# Patient Record
Sex: MALE | Race: WHITE | NOT HISPANIC OR LATINO | Employment: FULL TIME | URBAN - METROPOLITAN AREA
[De-identification: names, ages, dates, MRNs, and addresses within clinical notes are randomized per-mention and may not be internally consistent; named-entity substitution may affect disease eponyms.]

---

## 2017-02-08 ENCOUNTER — HISTORICAL (OUTPATIENT)
Dept: ADMINISTRATIVE | Facility: HOSPITAL | Age: 67
End: 2017-02-08

## 2017-02-21 ENCOUNTER — HISTORICAL (OUTPATIENT)
Dept: ADMINISTRATIVE | Facility: HOSPITAL | Age: 67
End: 2017-02-21

## 2017-03-14 ENCOUNTER — HISTORICAL (OUTPATIENT)
Dept: LAB | Facility: HOSPITAL | Age: 67
End: 2017-03-14

## 2017-03-30 ENCOUNTER — HOSPITAL ENCOUNTER (OUTPATIENT)
Dept: MEDSURG UNIT | Facility: HOSPITAL | Age: 67
End: 2017-03-31
Attending: SURGERY | Admitting: SURGERY

## 2017-08-14 ENCOUNTER — HISTORICAL (OUTPATIENT)
Dept: ADMINISTRATIVE | Facility: HOSPITAL | Age: 67
End: 2017-08-14

## 2017-08-14 LAB
ABS NEUT (OLG): 3.89 X10(3)/MCL (ref 2.1–9.2)
ALBUMIN SERPL-MCNC: 3.3 GM/DL (ref 3.4–5)
ALBUMIN/GLOB SERPL: 1.3 {RATIO}
ALP SERPL-CCNC: 79 UNIT/L (ref 50–136)
ALT SERPL-CCNC: 21 UNIT/L (ref 12–78)
AST SERPL-CCNC: 12 UNIT/L (ref 15–37)
BASOPHILS # BLD AUTO: 0 X10(3)/MCL (ref 0–0.2)
BASOPHILS NFR BLD AUTO: 1 %
BILIRUB SERPL-MCNC: 0.3 MG/DL (ref 0.2–1)
BILIRUBIN DIRECT+TOT PNL SERPL-MCNC: 0.1 MG/DL (ref 0–0.2)
BILIRUBIN DIRECT+TOT PNL SERPL-MCNC: 0.2 MG/DL (ref 0–0.8)
BUN SERPL-MCNC: 11 MG/DL (ref 7–18)
CALCIUM SERPL-MCNC: 8.7 MG/DL (ref 8.5–10.1)
CHLORIDE SERPL-SCNC: 101 MMOL/L (ref 98–107)
CHOLEST SERPL-MCNC: 149 MG/DL (ref 0–200)
CHOLEST/HDLC SERPL: 3.6 {RATIO} (ref 0–5)
CO2 SERPL-SCNC: 35 MMOL/L (ref 21–32)
CREAT SERPL-MCNC: 0.87 MG/DL (ref 0.7–1.3)
EOSINOPHIL # BLD AUTO: 0.4 X10(3)/MCL (ref 0–0.9)
EOSINOPHIL NFR BLD AUTO: 6 %
ERYTHROCYTE [DISTWIDTH] IN BLOOD BY AUTOMATED COUNT: 13.1 % (ref 11.5–17)
GLOBULIN SER-MCNC: 2.6 GM/DL (ref 2.4–3.5)
GLUCOSE SERPL-MCNC: 86 MG/DL (ref 74–106)
HCT VFR BLD AUTO: 46.8 % (ref 42–52)
HDLC SERPL-MCNC: 41 MG/DL (ref 35–60)
HGB BLD-MCNC: 14.8 GM/DL (ref 14–18)
LDLC SERPL CALC-MCNC: 93 MG/DL (ref 0–129)
LYMPHOCYTES # BLD AUTO: 0.8 X10(3)/MCL (ref 0.6–4.6)
LYMPHOCYTES NFR BLD AUTO: 14 %
MCH RBC QN AUTO: 29.4 PG (ref 27–31)
MCHC RBC AUTO-ENTMCNC: 31.6 GM/DL (ref 33–36)
MCV RBC AUTO: 93 FL (ref 80–94)
MONOCYTES # BLD AUTO: 0.6 X10(3)/MCL (ref 0.1–1.3)
MONOCYTES NFR BLD AUTO: 10 %
NEUTROPHILS # BLD AUTO: 3.89 X10(3)/MCL (ref 2.1–9.2)
NEUTROPHILS NFR BLD AUTO: 68 %
PLATELET # BLD AUTO: 144 X10(3)/MCL (ref 130–400)
PMV BLD AUTO: 10.3 FL (ref 9.4–12.4)
POTASSIUM SERPL-SCNC: 4 MMOL/L (ref 3.5–5.1)
PROT SERPL-MCNC: 5.9 GM/DL (ref 6.4–8.2)
PSA SERPL-MCNC: 0.88 NG/ML (ref 0–4)
RBC # BLD AUTO: 5.03 X10(6)/MCL (ref 4.7–6.1)
SODIUM SERPL-SCNC: 141 MMOL/L (ref 136–145)
TESTOST SERPL-MCNC: 247 NG/DL (ref 241–827)
TRIGL SERPL-MCNC: 75 MG/DL (ref 30–150)
VLDLC SERPL CALC-MCNC: 15 MG/DL
WBC # SPEC AUTO: 5.7 X10(3)/MCL (ref 4.5–11.5)

## 2017-08-21 ENCOUNTER — HISTORICAL (OUTPATIENT)
Dept: LAB | Facility: HOSPITAL | Age: 67
End: 2017-08-21

## 2017-08-21 LAB
APPEARANCE, UA: CLEAR
BACTERIA SPEC CULT: NORMAL /HPF
BILIRUB UR QL STRIP: NEGATIVE
BNP BLD-MCNC: 16 PG/ML (ref 0–125)
COLOR UR: YELLOW
GLUCOSE (UA): NEGATIVE
HGB UR QL STRIP: NEGATIVE
KETONES UR QL STRIP: NEGATIVE
LEUKOCYTE ESTERASE UR QL STRIP: NEGATIVE
NITRITE UR QL STRIP: NEGATIVE
PH UR STRIP: 6 [PH] (ref 5–9)
PROT UR QL STRIP: NEGATIVE
RBC #/AREA URNS HPF: NORMAL /[HPF]
SP GR UR STRIP: 1.02 (ref 1–1.03)
SQUAMOUS EPITHELIAL, UA: NORMAL
TSH SERPL-ACNC: 1.09 MIU/ML (ref 0.36–3.74)
UROBILINOGEN UR STRIP-ACNC: 0.2
WBC #/AREA URNS HPF: NORMAL /HPF

## 2017-09-12 ENCOUNTER — HISTORICAL (OUTPATIENT)
Dept: ADMINISTRATIVE | Facility: HOSPITAL | Age: 67
End: 2017-09-12

## 2018-01-29 ENCOUNTER — HISTORICAL (OUTPATIENT)
Dept: ADMINISTRATIVE | Facility: HOSPITAL | Age: 68
End: 2018-01-29

## 2018-01-29 LAB
ABS NEUT (OLG): 4.47 X10(3)/MCL (ref 2.1–9.2)
ALBUMIN SERPL-MCNC: 3.5 GM/DL (ref 3.4–5)
ALBUMIN/GLOB SERPL: 1.2 RATIO (ref 1.1–2)
ALP SERPL-CCNC: 67 UNIT/L (ref 50–136)
ALT SERPL-CCNC: 29 UNIT/L (ref 12–78)
AST SERPL-CCNC: 19 UNIT/L (ref 15–37)
BASOPHILS # BLD AUTO: 0 X10(3)/MCL (ref 0–0.2)
BASOPHILS NFR BLD AUTO: 1 %
BILIRUB SERPL-MCNC: 0.5 MG/DL (ref 0.2–1)
BILIRUBIN DIRECT+TOT PNL SERPL-MCNC: 0.1 MG/DL (ref 0–0.5)
BILIRUBIN DIRECT+TOT PNL SERPL-MCNC: 0.4 MG/DL (ref 0–0.8)
BUN SERPL-MCNC: 13 MG/DL (ref 7–18)
CALCIUM SERPL-MCNC: 8.6 MG/DL (ref 8.5–10.1)
CHLORIDE SERPL-SCNC: 100 MMOL/L (ref 98–107)
CHOLEST SERPL-MCNC: 167 MG/DL (ref 0–200)
CHOLEST/HDLC SERPL: 3.5 {RATIO} (ref 0–5)
CO2 SERPL-SCNC: 35 MMOL/L (ref 21–32)
CREAT SERPL-MCNC: 0.78 MG/DL (ref 0.7–1.3)
EOSINOPHIL # BLD AUTO: 0.4 X10(3)/MCL (ref 0–0.9)
EOSINOPHIL NFR BLD AUTO: 6 %
ERYTHROCYTE [DISTWIDTH] IN BLOOD BY AUTOMATED COUNT: 13.4 % (ref 11.5–17)
GLOBULIN SER-MCNC: 2.9 GM/DL (ref 2.4–3.5)
GLUCOSE SERPL-MCNC: 91 MG/DL (ref 74–106)
HCT VFR BLD AUTO: 49 % (ref 42–52)
HDLC SERPL-MCNC: 48 MG/DL (ref 35–60)
HGB BLD-MCNC: 15.8 GM/DL (ref 14–18)
LDLC SERPL CALC-MCNC: 105 MG/DL (ref 0–129)
LYMPHOCYTES # BLD AUTO: 1 X10(3)/MCL (ref 0.6–4.6)
LYMPHOCYTES NFR BLD AUTO: 15 %
MCH RBC QN AUTO: 29.7 PG (ref 27–31)
MCHC RBC AUTO-ENTMCNC: 32.2 GM/DL (ref 33–36)
MCV RBC AUTO: 92.1 FL (ref 80–94)
MONOCYTES # BLD AUTO: 0.6 X10(3)/MCL (ref 0.1–1.3)
MONOCYTES NFR BLD AUTO: 9 %
NEUTROPHILS # BLD AUTO: 4.47 X10(3)/MCL (ref 1.4–7.9)
NEUTROPHILS NFR BLD AUTO: 70 %
PLATELET # BLD AUTO: 156 X10(3)/MCL (ref 130–400)
PMV BLD AUTO: 10.3 FL (ref 9.4–12.4)
POTASSIUM SERPL-SCNC: 4 MMOL/L (ref 3.5–5.1)
PROT SERPL-MCNC: 6.4 GM/DL (ref 6.4–8.2)
RBC # BLD AUTO: 5.32 X10(6)/MCL (ref 4.7–6.1)
SODIUM SERPL-SCNC: 141 MMOL/L (ref 136–145)
TESTOST SERPL-MCNC: 318.7 NG/DL (ref 241–827)
TRIGL SERPL-MCNC: 71 MG/DL (ref 30–150)
VLDLC SERPL CALC-MCNC: 14 MG/DL
WBC # SPEC AUTO: 6.4 X10(3)/MCL (ref 4.5–11.5)

## 2018-07-31 ENCOUNTER — HISTORICAL (OUTPATIENT)
Dept: ADMINISTRATIVE | Facility: HOSPITAL | Age: 68
End: 2018-07-31

## 2018-07-31 LAB
ABS NEUT (OLG): 5.09 X10(3)/MCL (ref 2.1–9.2)
ALBUMIN SERPL-MCNC: 3.5 GM/DL (ref 3.4–5)
ALBUMIN/GLOB SERPL: 1.2 RATIO (ref 1.1–2)
ALP SERPL-CCNC: 57 UNIT/L (ref 50–136)
ALT SERPL-CCNC: 26 UNIT/L (ref 12–78)
AST SERPL-CCNC: 19 UNIT/L (ref 15–37)
BASOPHILS # BLD AUTO: 0 X10(3)/MCL (ref 0–0.2)
BASOPHILS NFR BLD AUTO: 1 %
BILIRUB SERPL-MCNC: 0.3 MG/DL (ref 0.2–1)
BILIRUBIN DIRECT+TOT PNL SERPL-MCNC: 0.1 MG/DL (ref 0–0.5)
BILIRUBIN DIRECT+TOT PNL SERPL-MCNC: 0.2 MG/DL (ref 0–0.8)
BUN SERPL-MCNC: 20 MG/DL (ref 7–18)
CALCIUM SERPL-MCNC: 8.9 MG/DL (ref 8.5–10.1)
CHLORIDE SERPL-SCNC: 103 MMOL/L (ref 98–107)
CHOLEST SERPL-MCNC: 165 MG/DL (ref 0–200)
CHOLEST/HDLC SERPL: 4.1 {RATIO} (ref 0–5)
CO2 SERPL-SCNC: 32 MMOL/L (ref 21–32)
CREAT SERPL-MCNC: 0.91 MG/DL (ref 0.7–1.3)
EOSINOPHIL # BLD AUTO: 0.4 X10(3)/MCL (ref 0–0.9)
EOSINOPHIL NFR BLD AUTO: 5 %
ERYTHROCYTE [DISTWIDTH] IN BLOOD BY AUTOMATED COUNT: 13.5 % (ref 11.5–17)
GLOBULIN SER-MCNC: 2.8 GM/DL (ref 2.4–3.5)
GLUCOSE SERPL-MCNC: 89 MG/DL (ref 74–106)
HCT VFR BLD AUTO: 48.4 % (ref 42–52)
HDLC SERPL-MCNC: 40 MG/DL (ref 35–60)
HGB BLD-MCNC: 15.5 GM/DL (ref 14–18)
LDLC SERPL CALC-MCNC: 112 MG/DL (ref 0–129)
LYMPHOCYTES # BLD AUTO: 0.8 X10(3)/MCL (ref 0.6–4.6)
LYMPHOCYTES NFR BLD AUTO: 11 %
MCH RBC QN AUTO: 29.9 PG (ref 27–31)
MCHC RBC AUTO-ENTMCNC: 32 GM/DL (ref 33–36)
MCV RBC AUTO: 93.3 FL (ref 80–94)
MONOCYTES # BLD AUTO: 0.6 X10(3)/MCL (ref 0.1–1.3)
MONOCYTES NFR BLD AUTO: 9 %
NEUTROPHILS # BLD AUTO: 5.09 X10(3)/MCL (ref 2.1–9.2)
NEUTROPHILS NFR BLD AUTO: 74 %
PLATELET # BLD AUTO: 134 X10(3)/MCL (ref 130–400)
PMV BLD AUTO: 11.2 FL (ref 9.4–12.4)
POTASSIUM SERPL-SCNC: 4.1 MMOL/L (ref 3.5–5.1)
PROT SERPL-MCNC: 6.3 GM/DL (ref 6.4–8.2)
PSA SERPL-MCNC: 1.05 NG/ML (ref 0–4)
RBC # BLD AUTO: 5.19 X10(6)/MCL (ref 4.7–6.1)
SODIUM SERPL-SCNC: 143 MMOL/L (ref 136–145)
TESTOST SERPL-MCNC: 516 NG/DL (ref 241–827)
TRIGL SERPL-MCNC: 66 MG/DL (ref 30–150)
VLDLC SERPL CALC-MCNC: 13 MG/DL
WBC # SPEC AUTO: 6.9 X10(3)/MCL (ref 4.5–11.5)

## 2018-08-16 ENCOUNTER — HISTORICAL (OUTPATIENT)
Dept: LAB | Facility: HOSPITAL | Age: 68
End: 2018-08-16

## 2018-08-16 LAB
APPEARANCE, UA: CLEAR
BACTERIA SPEC CULT: ABNORMAL /HPF
BILIRUB UR QL STRIP: ABNORMAL
COLOR UR: ABNORMAL
GLUCOSE (UA): NEGATIVE
HGB UR QL STRIP: NEGATIVE
KETONES UR QL STRIP: NEGATIVE
LEUKOCYTE ESTERASE UR QL STRIP: ABNORMAL
NITRITE UR QL STRIP: NEGATIVE
PH UR STRIP: 5.5 [PH] (ref 5–9)
PROT UR QL STRIP: ABNORMAL
RBC #/AREA URNS HPF: ABNORMAL /[HPF]
SP GR UR STRIP: 1.03 (ref 1–1.03)
SQUAMOUS EPITHELIAL, UA: ABNORMAL
UROBILINOGEN UR STRIP-ACNC: 1
WBC #/AREA URNS HPF: 47 /HPF (ref 0–3)

## 2018-08-21 ENCOUNTER — HISTORICAL (OUTPATIENT)
Dept: CARDIOLOGY | Facility: HOSPITAL | Age: 68
End: 2018-08-21

## 2018-09-18 ENCOUNTER — HISTORICAL (OUTPATIENT)
Dept: ADMINISTRATIVE | Facility: HOSPITAL | Age: 68
End: 2018-09-18

## 2019-01-29 ENCOUNTER — HISTORICAL (OUTPATIENT)
Dept: ADMINISTRATIVE | Facility: HOSPITAL | Age: 69
End: 2019-01-29

## 2019-01-29 LAB
ABS NEUT (OLG): 4.94 X10(3)/MCL (ref 2.1–9.2)
ALBUMIN SERPL-MCNC: 3.4 GM/DL (ref 3.4–5)
ALBUMIN/GLOB SERPL: 1.3 RATIO (ref 1.1–2)
ALP SERPL-CCNC: 58 UNIT/L (ref 50–136)
ALT SERPL-CCNC: 28 UNIT/L (ref 12–78)
AST SERPL-CCNC: 19 UNIT/L (ref 15–37)
BASOPHILS # BLD AUTO: 0 X10(3)/MCL (ref 0–0.2)
BASOPHILS NFR BLD AUTO: 0 %
BILIRUB SERPL-MCNC: 0.4 MG/DL (ref 0.2–1)
BILIRUBIN DIRECT+TOT PNL SERPL-MCNC: 0.1 MG/DL (ref 0–0.5)
BILIRUBIN DIRECT+TOT PNL SERPL-MCNC: 0.3 MG/DL (ref 0–0.8)
BUN SERPL-MCNC: 22 MG/DL (ref 7–18)
CALCIUM SERPL-MCNC: 8.5 MG/DL (ref 8.5–10.1)
CHLORIDE SERPL-SCNC: 102 MMOL/L (ref 98–107)
CHOLEST SERPL-MCNC: 162 MG/DL (ref 0–200)
CHOLEST/HDLC SERPL: 4 {RATIO} (ref 0–5)
CO2 SERPL-SCNC: 33 MMOL/L (ref 21–32)
CREAT SERPL-MCNC: 0.85 MG/DL (ref 0.7–1.3)
EOSINOPHIL # BLD AUTO: 0.4 X10(3)/MCL (ref 0–0.9)
EOSINOPHIL NFR BLD AUTO: 5 %
ERYTHROCYTE [DISTWIDTH] IN BLOOD BY AUTOMATED COUNT: 13.2 % (ref 11.5–17)
GLOBULIN SER-MCNC: 2.6 GM/DL (ref 2.4–3.5)
GLUCOSE SERPL-MCNC: 93 MG/DL (ref 74–106)
HCT VFR BLD AUTO: 48.1 % (ref 42–52)
HDLC SERPL-MCNC: 41 MG/DL (ref 35–60)
HGB BLD-MCNC: 15.2 GM/DL (ref 14–18)
LDLC SERPL CALC-MCNC: 107 MG/DL (ref 0–129)
LYMPHOCYTES # BLD AUTO: 0.7 X10(3)/MCL (ref 0.6–4.6)
LYMPHOCYTES NFR BLD AUTO: 11 %
MCH RBC QN AUTO: 29.8 PG (ref 27–31)
MCHC RBC AUTO-ENTMCNC: 31.6 GM/DL (ref 33–36)
MCV RBC AUTO: 94.3 FL (ref 80–94)
MONOCYTES # BLD AUTO: 0.6 X10(3)/MCL (ref 0.1–1.3)
MONOCYTES NFR BLD AUTO: 9 %
NEUTROPHILS # BLD AUTO: 4.94 X10(3)/MCL (ref 2.1–9.2)
NEUTROPHILS NFR BLD AUTO: 74 %
PLATELET # BLD AUTO: 129 X10(3)/MCL (ref 130–400)
PMV BLD AUTO: 10.3 FL (ref 9.4–12.4)
POTASSIUM SERPL-SCNC: 3.8 MMOL/L (ref 3.5–5.1)
PROT SERPL-MCNC: 6 GM/DL (ref 6.4–8.2)
RBC # BLD AUTO: 5.1 X10(6)/MCL (ref 4.7–6.1)
SODIUM SERPL-SCNC: 140 MMOL/L (ref 136–145)
TRIGL SERPL-MCNC: 72 MG/DL (ref 30–150)
VLDLC SERPL CALC-MCNC: 14 MG/DL
WBC # SPEC AUTO: 6.7 X10(3)/MCL (ref 4.5–11.5)

## 2019-02-05 ENCOUNTER — HISTORICAL (OUTPATIENT)
Dept: ADMINISTRATIVE | Facility: HOSPITAL | Age: 69
End: 2019-02-05

## 2019-02-19 ENCOUNTER — HISTORICAL (OUTPATIENT)
Dept: ADMINISTRATIVE | Facility: HOSPITAL | Age: 69
End: 2019-02-19

## 2019-06-05 LAB — NONINV COLON CA DNA+OCC BLD SCRN STL QL: NEGATIVE

## 2019-07-10 ENCOUNTER — HISTORICAL (OUTPATIENT)
Dept: ADMINISTRATIVE | Facility: HOSPITAL | Age: 69
End: 2019-07-10

## 2019-07-30 ENCOUNTER — HISTORICAL (OUTPATIENT)
Dept: ADMINISTRATIVE | Facility: HOSPITAL | Age: 69
End: 2019-07-30

## 2019-07-30 LAB
ABS NEUT (OLG): 3.59 X10(3)/MCL (ref 2.1–9.2)
ALBUMIN SERPL-MCNC: 3.2 GM/DL (ref 3.4–5)
ALBUMIN/GLOB SERPL: 1.1 RATIO (ref 1.1–2)
ALP SERPL-CCNC: 64 UNIT/L (ref 50–136)
ALT SERPL-CCNC: 24 UNIT/L (ref 12–78)
AST SERPL-CCNC: 15 UNIT/L (ref 15–37)
BASOPHILS # BLD AUTO: 0 X10(3)/MCL (ref 0–0.2)
BASOPHILS NFR BLD AUTO: 1 %
BILIRUB SERPL-MCNC: 0.4 MG/DL (ref 0.2–1)
BILIRUBIN DIRECT+TOT PNL SERPL-MCNC: 0.1 MG/DL (ref 0–0.5)
BILIRUBIN DIRECT+TOT PNL SERPL-MCNC: 0.3 MG/DL (ref 0–0.8)
BUN SERPL-MCNC: 18 MG/DL (ref 7–18)
CALCIUM SERPL-MCNC: 8.6 MG/DL (ref 8.5–10.1)
CHLORIDE SERPL-SCNC: 103 MMOL/L (ref 98–107)
CHOLEST SERPL-MCNC: 158 MG/DL (ref 0–200)
CHOLEST/HDLC SERPL: 3.7 {RATIO} (ref 0–5)
CO2 SERPL-SCNC: 34 MMOL/L (ref 21–32)
CREAT SERPL-MCNC: 0.81 MG/DL (ref 0.7–1.3)
EOSINOPHIL # BLD AUTO: 0.4 X10(3)/MCL (ref 0–0.9)
EOSINOPHIL NFR BLD AUTO: 7 %
ERYTHROCYTE [DISTWIDTH] IN BLOOD BY AUTOMATED COUNT: 13.3 % (ref 11.5–17)
GLOBULIN SER-MCNC: 2.8 GM/DL (ref 2.4–3.5)
GLUCOSE SERPL-MCNC: 86 MG/DL (ref 74–106)
HCT VFR BLD AUTO: 49.1 % (ref 42–52)
HDLC SERPL-MCNC: 43 MG/DL (ref 35–60)
HGB BLD-MCNC: 15.4 GM/DL (ref 14–18)
LDLC SERPL CALC-MCNC: 103 MG/DL (ref 0–129)
LYMPHOCYTES # BLD AUTO: 0.7 X10(3)/MCL (ref 0.6–4.6)
LYMPHOCYTES NFR BLD AUTO: 14 %
MCH RBC QN AUTO: 30 PG (ref 27–31)
MCHC RBC AUTO-ENTMCNC: 31.4 GM/DL (ref 33–36)
MCV RBC AUTO: 95.5 FL (ref 80–94)
MONOCYTES # BLD AUTO: 0.6 X10(3)/MCL (ref 0.1–1.3)
MONOCYTES NFR BLD AUTO: 10 %
NEUTROPHILS # BLD AUTO: 3.59 X10(3)/MCL (ref 2.1–9.2)
NEUTROPHILS NFR BLD AUTO: 68 %
PLATELET # BLD AUTO: 161 X10(3)/MCL (ref 130–400)
PMV BLD AUTO: 10.6 FL (ref 9.4–12.4)
POTASSIUM SERPL-SCNC: 3.9 MMOL/L (ref 3.5–5.1)
PROT SERPL-MCNC: 6 GM/DL (ref 6.4–8.2)
PSA SERPL-MCNC: 3.36 NG/ML (ref 0–4)
RBC # BLD AUTO: 5.14 X10(6)/MCL (ref 4.7–6.1)
SODIUM SERPL-SCNC: 142 MMOL/L (ref 136–145)
TESTOST SERPL-MCNC: 399 NG/DL (ref 241–827)
TRIGL SERPL-MCNC: 59 MG/DL (ref 30–150)
TSH SERPL-ACNC: 1.35 MIU/L (ref 0.36–3.74)
VLDLC SERPL CALC-MCNC: 12 MG/DL
WBC # SPEC AUTO: 5.2 X10(3)/MCL (ref 4.5–11.5)

## 2019-09-03 ENCOUNTER — HISTORICAL (OUTPATIENT)
Dept: ADMINISTRATIVE | Facility: HOSPITAL | Age: 69
End: 2019-09-03

## 2020-01-21 ENCOUNTER — HISTORICAL (OUTPATIENT)
Dept: ADMINISTRATIVE | Facility: HOSPITAL | Age: 70
End: 2020-01-21

## 2020-02-04 ENCOUNTER — HISTORICAL (OUTPATIENT)
Dept: ADMINISTRATIVE | Facility: HOSPITAL | Age: 70
End: 2020-02-04

## 2020-02-04 LAB
ABS NEUT (OLG): 4.39 X10(3)/MCL (ref 2.1–9.2)
ALBUMIN SERPL-MCNC: 3.4 GM/DL (ref 3.4–5)
ALBUMIN/GLOB SERPL: 1.2 RATIO (ref 1.1–2)
ALP SERPL-CCNC: 64 UNIT/L (ref 50–136)
ALT SERPL-CCNC: 24 UNIT/L (ref 12–78)
AST SERPL-CCNC: 13 UNIT/L (ref 15–37)
BASOPHILS # BLD AUTO: 0 X10(3)/MCL (ref 0–0.2)
BASOPHILS NFR BLD AUTO: 0 %
BILIRUB SERPL-MCNC: 0.4 MG/DL (ref 0.2–1)
BILIRUBIN DIRECT+TOT PNL SERPL-MCNC: 0.2 MG/DL (ref 0–0.5)
BILIRUBIN DIRECT+TOT PNL SERPL-MCNC: 0.2 MG/DL (ref 0–0.8)
BUN SERPL-MCNC: 11 MG/DL (ref 7–18)
CALCIUM SERPL-MCNC: 8.3 MG/DL (ref 8.5–10.1)
CHLORIDE SERPL-SCNC: 102 MMOL/L (ref 98–107)
CHOLEST SERPL-MCNC: 155 MG/DL (ref 0–200)
CHOLEST/HDLC SERPL: 3.5 {RATIO} (ref 0–5)
CO2 SERPL-SCNC: 36 MMOL/L (ref 21–32)
CREAT SERPL-MCNC: 0.82 MG/DL (ref 0.7–1.3)
EOSINOPHIL # BLD AUTO: 0.2 X10(3)/MCL (ref 0–0.9)
EOSINOPHIL NFR BLD AUTO: 4 %
ERYTHROCYTE [DISTWIDTH] IN BLOOD BY AUTOMATED COUNT: 13.3 % (ref 11.5–17)
GLOBULIN SER-MCNC: 2.8 GM/DL (ref 2.4–3.5)
GLUCOSE SERPL-MCNC: 84 MG/DL (ref 74–106)
HCT VFR BLD AUTO: 51.1 % (ref 42–52)
HDLC SERPL-MCNC: 44 MG/DL (ref 35–60)
HGB BLD-MCNC: 15.9 GM/DL (ref 14–18)
IMM GRANULOCYTES # BLD AUTO: 0 10*3/UL
IMM GRANULOCYTES NFR BLD AUTO: 0 %
LDLC SERPL CALC-MCNC: 101 MG/DL (ref 0–129)
LYMPHOCYTES # BLD AUTO: 0.6 X10(3)/MCL (ref 0.6–4.6)
LYMPHOCYTES NFR BLD AUTO: 10 %
MCH RBC QN AUTO: 29.5 PG (ref 27–31)
MCHC RBC AUTO-ENTMCNC: 31.1 GM/DL (ref 33–36)
MCV RBC AUTO: 94.8 FL (ref 80–94)
MONOCYTES # BLD AUTO: 0.5 X10(3)/MCL (ref 0.1–1.3)
MONOCYTES NFR BLD AUTO: 8 %
NEUTROPHILS # BLD AUTO: 4.39 X10(3)/MCL (ref 2.1–9.2)
NEUTROPHILS NFR BLD AUTO: 77 %
PLATELET # BLD AUTO: 154 X10(3)/MCL (ref 130–400)
PMV BLD AUTO: 10.5 FL (ref 9.4–12.4)
POTASSIUM SERPL-SCNC: 4.2 MMOL/L (ref 3.5–5.1)
PROT SERPL-MCNC: 6.2 GM/DL (ref 6.4–8.2)
PSA SERPL-MCNC: 1.54 NG/ML (ref 0–4)
RBC # BLD AUTO: 5.39 X10(6)/MCL (ref 4.7–6.1)
SODIUM SERPL-SCNC: 141 MMOL/L (ref 136–145)
TESTOST SERPL-MCNC: 530 NG/DL (ref 241–827)
TRIGL SERPL-MCNC: 49 MG/DL (ref 30–150)
VLDLC SERPL CALC-MCNC: 10 MG/DL
WBC # SPEC AUTO: 5.7 X10(3)/MCL (ref 4.5–11.5)

## 2020-04-03 ENCOUNTER — HISTORICAL (OUTPATIENT)
Dept: ADMINISTRATIVE | Facility: HOSPITAL | Age: 70
End: 2020-04-03

## 2020-04-03 LAB
BUN SERPL-MCNC: 15 MG/DL (ref 8.4–25.7)
CALCIUM SERPL-MCNC: 8.6 MG/DL (ref 8.8–10)
CHLORIDE SERPL-SCNC: 100 MMOL/L (ref 98–107)
CO2 SERPL-SCNC: 38 MMOL/L (ref 23–31)
CREAT SERPL-MCNC: 0.85 MG/DL (ref 0.73–1.18)
CREAT/UREA NIT SERPL: 18
GLUCOSE SERPL-MCNC: 96 MG/DL (ref 82–115)
POTASSIUM SERPL-SCNC: 4.3 MMOL/L (ref 3.5–5.1)
SODIUM SERPL-SCNC: 144 MMOL/L (ref 136–145)

## 2020-04-16 ENCOUNTER — HISTORICAL (OUTPATIENT)
Dept: CARDIOLOGY | Facility: HOSPITAL | Age: 70
End: 2020-04-16

## 2020-04-28 ENCOUNTER — HISTORICAL (OUTPATIENT)
Dept: RADIOLOGY | Facility: HOSPITAL | Age: 70
End: 2020-04-28

## 2020-05-13 ENCOUNTER — HISTORICAL (OUTPATIENT)
Dept: ADMINISTRATIVE | Facility: HOSPITAL | Age: 70
End: 2020-05-13

## 2020-05-13 LAB
ABS NEUT (OLG): 4.83 X10(3)/MCL (ref 2.1–9.2)
ALBUMIN SERPL-MCNC: 3.6 GM/DL (ref 3.4–4.8)
ALBUMIN/GLOB SERPL: 1.6 RATIO (ref 1.1–2)
ALP SERPL-CCNC: 72 UNIT/L (ref 40–150)
ALT SERPL-CCNC: 23 UNIT/L (ref 0–55)
AST SERPL-CCNC: 18 UNIT/L (ref 5–34)
BASOPHILS # BLD AUTO: 0 X10(3)/MCL (ref 0–0.2)
BASOPHILS NFR BLD AUTO: 1 %
BILIRUB SERPL-MCNC: 0.4 MG/DL
BILIRUBIN DIRECT+TOT PNL SERPL-MCNC: 0.2 MG/DL (ref 0–0.5)
BILIRUBIN DIRECT+TOT PNL SERPL-MCNC: 0.2 MG/DL (ref 0–0.8)
BNP BLD-MCNC: 12.7 PG/ML
BUN SERPL-MCNC: 17 MG/DL (ref 8.4–25.7)
CALCIUM SERPL-MCNC: 9 MG/DL (ref 8.8–10)
CHLORIDE SERPL-SCNC: 104 MMOL/L (ref 98–107)
CHOLEST SERPL-MCNC: 92 MG/DL
CHOLEST/HDLC SERPL: 2 {RATIO} (ref 0–5)
CO2 SERPL-SCNC: 34 MMOL/L (ref 23–31)
CREAT SERPL-MCNC: 0.81 MG/DL (ref 0.73–1.18)
D DIMER PPP IA.FEU-MCNC: 0.37 MCG/ML FEU (ref 0–0.5)
EOSINOPHIL # BLD AUTO: 0.3 X10(3)/MCL (ref 0–0.9)
EOSINOPHIL NFR BLD AUTO: 5 %
ERYTHROCYTE [DISTWIDTH] IN BLOOD BY AUTOMATED COUNT: 14.5 % (ref 11.5–17)
GLOBULIN SER-MCNC: 2.2 GM/DL (ref 2.4–3.5)
GLUCOSE SERPL-MCNC: 98 MG/DL (ref 82–115)
HCT VFR BLD AUTO: 45.4 % (ref 42–52)
HDLC SERPL-MCNC: 37 MG/DL (ref 35–60)
HGB BLD-MCNC: 14.5 GM/DL (ref 14–18)
LDLC SERPL CALC-MCNC: 43 MG/DL (ref 50–140)
LYMPHOCYTES # BLD AUTO: 0.6 X10(3)/MCL (ref 0.6–4.6)
LYMPHOCYTES NFR BLD AUTO: 10 %
MCH RBC QN AUTO: 30.2 PG (ref 27–31)
MCHC RBC AUTO-ENTMCNC: 31.9 GM/DL (ref 33–36)
MCV RBC AUTO: 94.6 FL (ref 80–94)
MONOCYTES # BLD AUTO: 0.6 X10(3)/MCL (ref 0.1–1.3)
MONOCYTES NFR BLD AUTO: 9 %
NEUTROPHILS # BLD AUTO: 4.83 X10(3)/MCL (ref 2.1–9.2)
NEUTROPHILS NFR BLD AUTO: 75 %
PLATELET # BLD AUTO: 130 X10(3)/MCL (ref 130–400)
PMV BLD AUTO: 10.3 FL (ref 9.4–12.4)
POTASSIUM SERPL-SCNC: 3.9 MMOL/L (ref 3.5–5.1)
PROT SERPL-MCNC: 5.8 GM/DL (ref 5.8–7.6)
RBC # BLD AUTO: 4.8 X10(6)/MCL (ref 4.7–6.1)
SODIUM SERPL-SCNC: 144 MMOL/L (ref 136–145)
TRIGL SERPL-MCNC: 62 MG/DL (ref 34–140)
TSH SERPL-ACNC: 0.87 UIU/ML (ref 0.35–4.94)
VLDLC SERPL CALC-MCNC: 12 MG/DL
WBC # SPEC AUTO: 6.4 X10(3)/MCL (ref 4.5–11.5)

## 2020-05-18 ENCOUNTER — HISTORICAL (OUTPATIENT)
Dept: LAB | Facility: HOSPITAL | Age: 70
End: 2020-05-18

## 2020-05-21 ENCOUNTER — HISTORICAL (OUTPATIENT)
Dept: RADIOLOGY | Facility: HOSPITAL | Age: 70
End: 2020-05-21

## 2020-06-04 ENCOUNTER — HISTORICAL (OUTPATIENT)
Dept: ADMINISTRATIVE | Facility: HOSPITAL | Age: 70
End: 2020-06-04

## 2020-06-24 ENCOUNTER — HISTORICAL (OUTPATIENT)
Dept: RESPIRATORY THERAPY | Facility: HOSPITAL | Age: 70
End: 2020-06-24

## 2020-07-20 ENCOUNTER — HISTORICAL (OUTPATIENT)
Dept: ADMINISTRATIVE | Facility: HOSPITAL | Age: 70
End: 2020-07-20

## 2020-07-20 LAB
ABS NEUT (OLG): 3.89 X10(3)/MCL (ref 2.1–9.2)
ALBUMIN SERPL-MCNC: 3.5 GM/DL (ref 3.4–5)
ALBUMIN/GLOB SERPL: 1.5 RATIO (ref 1.1–2)
ALP SERPL-CCNC: 71 UNIT/L (ref 40–150)
ALT SERPL-CCNC: 23 UNIT/L (ref 0–55)
AST SERPL-CCNC: 19 UNIT/L (ref 5–34)
BASOPHILS # BLD AUTO: 0 X10(3)/MCL (ref 0–0.2)
BASOPHILS NFR BLD AUTO: 1 %
BILIRUB SERPL-MCNC: 0.5 MG/DL
BILIRUBIN DIRECT+TOT PNL SERPL-MCNC: 0.2 MG/DL (ref 0–0.8)
BILIRUBIN DIRECT+TOT PNL SERPL-MCNC: 0.3 MG/DL (ref 0–0.5)
BUN SERPL-MCNC: 15.5 MG/DL (ref 8.4–25.7)
CALCIUM SERPL-MCNC: 8.4 MG/DL (ref 8.8–10)
CHLORIDE SERPL-SCNC: 98 MMOL/L (ref 98–107)
CHOLEST SERPL-MCNC: 102 MG/DL
CHOLEST/HDLC SERPL: 3 {RATIO} (ref 0–5)
CO2 SERPL-SCNC: 38 MMOL/L (ref 23–31)
CREAT SERPL-MCNC: 0.8 MG/DL (ref 0.73–1.18)
EOSINOPHIL # BLD AUTO: 0.2 X10(3)/MCL (ref 0–0.9)
EOSINOPHIL NFR BLD AUTO: 5 %
ERYTHROCYTE [DISTWIDTH] IN BLOOD BY AUTOMATED COUNT: 12.8 % (ref 11.5–17)
GLOBULIN SER-MCNC: 2.3 GM/DL (ref 2.4–3.5)
GLUCOSE SERPL-MCNC: 71 MG/DL (ref 82–115)
HCT VFR BLD AUTO: 50.4 % (ref 42–52)
HDLC SERPL-MCNC: 39 MG/DL (ref 35–60)
HGB BLD-MCNC: 15.6 GM/DL (ref 14–18)
LDLC SERPL CALC-MCNC: 54 MG/DL (ref 50–140)
LYMPHOCYTES # BLD AUTO: 0.7 X10(3)/MCL (ref 0.6–4.6)
LYMPHOCYTES NFR BLD AUTO: 12 %
MCH RBC QN AUTO: 30.4 PG (ref 27–31)
MCHC RBC AUTO-ENTMCNC: 31 GM/DL (ref 33–36)
MCV RBC AUTO: 98.1 FL (ref 80–94)
MONOCYTES # BLD AUTO: 0.6 X10(3)/MCL (ref 0.1–1.3)
MONOCYTES NFR BLD AUTO: 11 %
NEUTROPHILS # BLD AUTO: 3.89 X10(3)/MCL (ref 2.1–9.2)
NEUTROPHILS NFR BLD AUTO: 71 %
PLATELET # BLD AUTO: 126 X10(3)/MCL (ref 130–400)
PMV BLD AUTO: 10.6 FL (ref 9.4–12.4)
POTASSIUM SERPL-SCNC: 4.3 MMOL/L (ref 3.5–5.1)
PROT SERPL-MCNC: 5.8 GM/DL (ref 5.8–7.6)
PSA SERPL-MCNC: 0.84 NG/ML
RBC # BLD AUTO: 5.14 X10(6)/MCL (ref 4.7–6.1)
SODIUM SERPL-SCNC: 143 MMOL/L (ref 136–145)
TESTOST SERPL-MCNC: 453.34 NG/DL (ref 220.91–715.81)
TRIGL SERPL-MCNC: 46 MG/DL (ref 34–140)
VLDLC SERPL CALC-MCNC: 9 MG/DL
WBC # SPEC AUTO: 5.4 X10(3)/MCL (ref 4.5–11.5)

## 2020-11-04 ENCOUNTER — HISTORICAL (OUTPATIENT)
Dept: ADMINISTRATIVE | Facility: HOSPITAL | Age: 70
End: 2020-11-04

## 2020-11-04 LAB
BUN SERPL-MCNC: 17.2 MG/DL (ref 7–25)
CALCIUM SERPL-MCNC: 8.9 MG/DL (ref 8.5–10.1)
CHLORIDE SERPL-SCNC: 101 MMOL/L (ref 98–107)
CO2 SERPL-SCNC: 31 MMOL/L (ref 23–31)
CREAT SERPL-MCNC: 0.79 MG/DL (ref 0.73–1.18)
CREAT/UREA NIT SERPL: 22
GLUCOSE SERPL-MCNC: 128 MG/DL (ref 82–115)
POTASSIUM SERPL-SCNC: 3.7 MMOL/L (ref 3.5–5.1)
SODIUM SERPL-SCNC: 142 MMOL/L (ref 136–145)

## 2021-02-02 ENCOUNTER — HISTORICAL (OUTPATIENT)
Dept: ADMINISTRATIVE | Facility: HOSPITAL | Age: 71
End: 2021-02-02

## 2021-02-02 LAB
ABS NEUT (OLG): 4.43 X10(3)/MCL (ref 2.1–9.2)
ALBUMIN SERPL-MCNC: 3.8 GM/DL (ref 3.4–4.8)
ALBUMIN/GLOB SERPL: 1.7 RATIO (ref 1.1–2)
ALP SERPL-CCNC: 59 UNIT/L (ref 40–150)
ALT SERPL-CCNC: 20 UNIT/L (ref 0–55)
AST SERPL-CCNC: 19 UNIT/L (ref 5–34)
BASOPHILS # BLD AUTO: 0 X10(3)/MCL (ref 0–0.2)
BASOPHILS NFR BLD AUTO: 0 %
BILIRUB SERPL-MCNC: 0.5 MG/DL
BILIRUBIN DIRECT+TOT PNL SERPL-MCNC: 0.2 MG/DL (ref 0–0.8)
BILIRUBIN DIRECT+TOT PNL SERPL-MCNC: 0.3 MG/DL (ref 0–0.5)
BUN SERPL-MCNC: 23.5 MG/DL (ref 7–25)
CALCIUM SERPL-MCNC: 9.1 MG/DL (ref 8.5–10.1)
CHLORIDE SERPL-SCNC: 101 MMOL/L (ref 98–107)
CHOLEST SERPL-MCNC: 98 MG/DL
CHOLEST/HDLC SERPL: 2 {RATIO} (ref 0–5)
CO2 SERPL-SCNC: 39 MMOL/L (ref 23–31)
CREAT SERPL-MCNC: 0.91 MG/DL (ref 0.73–1.18)
EOSINOPHIL # BLD AUTO: 0.2 X10(3)/MCL (ref 0–0.9)
EOSINOPHIL NFR BLD AUTO: 4 %
ERYTHROCYTE [DISTWIDTH] IN BLOOD BY AUTOMATED COUNT: 13.5 % (ref 11.5–17)
GLOBULIN SER-MCNC: 2.2 GM/DL (ref 2.4–3.5)
GLUCOSE SERPL-MCNC: 83 MG/DL (ref 82–115)
HCT VFR BLD AUTO: 49.6 % (ref 42–52)
HDLC SERPL-MCNC: 40 MG/DL (ref 35–60)
HGB BLD-MCNC: 15.9 GM/DL (ref 14–18)
LDLC SERPL CALC-MCNC: 47 MG/DL (ref 50–140)
LYMPHOCYTES # BLD AUTO: 0.6 X10(3)/MCL (ref 0.6–4.6)
LYMPHOCYTES NFR BLD AUTO: 10 % (ref 13–40)
MCH RBC QN AUTO: 31.2 PG (ref 27–31)
MCHC RBC AUTO-ENTMCNC: 32.1 GM/DL (ref 33–36)
MCV RBC AUTO: 97.4 FL (ref 80–94)
MONOCYTES # BLD AUTO: 0.6 X10(3)/MCL (ref 0.1–1.3)
MONOCYTES NFR BLD AUTO: 10 %
NEUTROPHILS # BLD AUTO: 4.43 X10(3)/MCL (ref 2.1–9.2)
NEUTROPHILS NFR BLD AUTO: 76 %
PLATELET # BLD AUTO: 127 X10(3)/MCL (ref 130–400)
PMV BLD AUTO: 11.3 FL (ref 9.4–12.4)
POTASSIUM SERPL-SCNC: 3.8 MMOL/L (ref 3.5–5.1)
PROT SERPL-MCNC: 6 GM/DL (ref 5.8–7.6)
PSA SERPL-MCNC: 1.09 NG/ML
RBC # BLD AUTO: 5.09 X10(6)/MCL (ref 4.7–6.1)
SODIUM SERPL-SCNC: 143 MMOL/L (ref 136–145)
TESTOST SERPL-MCNC: 431.27 NG/DL (ref 220.91–715.81)
TRIGL SERPL-MCNC: 57 MG/DL (ref 34–140)
VLDLC SERPL CALC-MCNC: 11 MG/DL
WBC # SPEC AUTO: 5.8 X10(3)/MCL (ref 4.5–11.5)

## 2021-05-12 ENCOUNTER — HISTORICAL (OUTPATIENT)
Dept: ADMINISTRATIVE | Facility: HOSPITAL | Age: 71
End: 2021-05-12

## 2021-05-12 LAB
ANTINUCLEAR ANTIBODY SCREEN (OHS): NEGATIVE
DSDNA ANTIBODY (OHS): NEGATIVE
EST. AVERAGE GLUCOSE BLD GHB EST-MCNC: 96.8 MG/DL
FT4I SERPL CALC-MCNC: 2.35 (ref 2.6–3.6)
HBA1C MFR BLD: 5 %
RHEUMATOID FACT SERPL-ACNC: <13 IU/ML
T3RU NFR SERPL: 26.93 % (ref 31–39)
T4 SERPL-MCNC: 8.74 UG/DL (ref 4.87–11.72)
TSH SERPL-ACNC: 0.82 UIU/ML (ref 0.35–4.94)
VIT B12 SERPL-MCNC: 742 PG/ML (ref 213–816)

## 2021-08-03 ENCOUNTER — HISTORICAL (OUTPATIENT)
Dept: ADMINISTRATIVE | Facility: HOSPITAL | Age: 71
End: 2021-08-03

## 2021-08-03 LAB
ABS NEUT (OLG): 6.37 X10(3)/MCL (ref 2.1–9.2)
ALBUMIN SERPL-MCNC: 3.5 GM/DL (ref 3.4–4.8)
ALBUMIN/GLOB SERPL: 1.4 RATIO (ref 1.1–2)
ALP SERPL-CCNC: 54 UNIT/L (ref 40–150)
ALT SERPL-CCNC: 19 UNIT/L (ref 0–55)
AST SERPL-CCNC: 21 UNIT/L (ref 5–34)
BASOPHILS # BLD AUTO: 0 X10(3)/MCL (ref 0–0.2)
BASOPHILS NFR BLD AUTO: 0 %
BILIRUB SERPL-MCNC: 0.6 MG/DL
BILIRUBIN DIRECT+TOT PNL SERPL-MCNC: 0.3 MG/DL (ref 0–0.5)
BILIRUBIN DIRECT+TOT PNL SERPL-MCNC: 0.3 MG/DL (ref 0–0.8)
BUN SERPL-MCNC: 20.3 MG/DL (ref 7–25)
CALCIUM SERPL-MCNC: 10.1 MG/DL (ref 8.5–10.1)
CHLORIDE SERPL-SCNC: 100 MMOL/L (ref 98–107)
CHOLEST SERPL-MCNC: 110 MG/DL
CHOLEST/HDLC SERPL: 3 {RATIO} (ref 0–5)
CO2 SERPL-SCNC: 34 MMOL/L (ref 23–31)
CREAT SERPL-MCNC: 0.91 MG/DL (ref 0.73–1.18)
EOSINOPHIL # BLD AUTO: 0.2 X10(3)/MCL (ref 0–0.9)
EOSINOPHIL NFR BLD AUTO: 3 %
ERYTHROCYTE [DISTWIDTH] IN BLOOD BY AUTOMATED COUNT: 13.1 % (ref 11.5–17)
GLOBULIN SER-MCNC: 2.5 GM/DL (ref 2.4–3.5)
GLUCOSE SERPL-MCNC: 77 MG/DL (ref 82–115)
HCT VFR BLD AUTO: 49.8 % (ref 42–52)
HDLC SERPL-MCNC: 39 MG/DL (ref 35–60)
HGB BLD-MCNC: 16.2 GM/DL (ref 14–18)
LDLC SERPL CALC-MCNC: 56 MG/DL (ref 50–140)
LYMPHOCYTES # BLD AUTO: 0.8 X10(3)/MCL (ref 0.6–4.6)
LYMPHOCYTES NFR BLD AUTO: 10 % (ref 13–40)
MCH RBC QN AUTO: 31.5 PG (ref 27–31)
MCHC RBC AUTO-ENTMCNC: 32.5 GM/DL (ref 33–36)
MCV RBC AUTO: 96.9 FL (ref 80–94)
MONOCYTES # BLD AUTO: 0.6 X10(3)/MCL (ref 0.1–1.3)
MONOCYTES NFR BLD AUTO: 8 %
NEUTROPHILS # BLD AUTO: 6.37 X10(3)/MCL (ref 2.1–9.2)
NEUTROPHILS NFR BLD AUTO: 79 %
PLATELET # BLD AUTO: 139 X10(3)/MCL (ref 130–400)
PMV BLD AUTO: 11.4 FL (ref 9.4–12.4)
POTASSIUM SERPL-SCNC: 4.1 MMOL/L (ref 3.5–5.1)
PROT SERPL-MCNC: 6 GM/DL (ref 5.8–7.6)
PSA SERPL-MCNC: 0.82 NG/ML
RBC # BLD AUTO: 5.14 X10(6)/MCL (ref 4.7–6.1)
SODIUM SERPL-SCNC: 145 MMOL/L (ref 136–145)
TESTOST SERPL-MCNC: 257.28 NG/DL (ref 220.91–715.81)
TRIGL SERPL-MCNC: 75 MG/DL (ref 34–140)
VLDLC SERPL CALC-MCNC: 15 MG/DL
WBC # SPEC AUTO: 8.1 X10(3)/MCL (ref 4.5–11.5)

## 2021-11-09 ENCOUNTER — HISTORICAL (OUTPATIENT)
Dept: ADMINISTRATIVE | Facility: HOSPITAL | Age: 71
End: 2021-11-09

## 2021-12-28 ENCOUNTER — HISTORICAL (OUTPATIENT)
Dept: RESPIRATORY THERAPY | Facility: HOSPITAL | Age: 71
End: 2021-12-28

## 2022-01-10 ENCOUNTER — HISTORICAL (OUTPATIENT)
Dept: ADMINISTRATIVE | Facility: HOSPITAL | Age: 72
End: 2022-01-10

## 2022-01-10 LAB — SARS-COV-2 RNA RESP QL NAA+PROBE: NOT DETECTED

## 2022-02-16 ENCOUNTER — HISTORICAL (OUTPATIENT)
Dept: ADMINISTRATIVE | Facility: HOSPITAL | Age: 72
End: 2022-02-16

## 2022-02-16 LAB
ABS NEUT (OLG): 5.7 (ref 2.1–9.2)
ALBUMIN SERPL-MCNC: 3.5 G/DL (ref 3.4–4.8)
ALBUMIN/GLOB SERPL: 1.5 {RATIO} (ref 1.1–2)
ALP SERPL-CCNC: 56 U/L (ref 40–150)
ALT SERPL-CCNC: 25 U/L (ref 0–55)
AST SERPL-CCNC: 21 U/L (ref 5–34)
BASOPHILS # BLD AUTO: 0 10*3/UL (ref 0–0.2)
BASOPHILS NFR BLD AUTO: 0 %
BILIRUB SERPL-MCNC: 0.6 MG/DL
BILIRUBIN DIRECT+TOT PNL SERPL-MCNC: 0.3 (ref 0–0.5)
BILIRUBIN DIRECT+TOT PNL SERPL-MCNC: 0.3 (ref 0–0.8)
BUN SERPL-MCNC: 14.6 MG/DL (ref 7–25)
CALCIUM SERPL-MCNC: 9 MG/DL (ref 8.5–10.1)
CHLORIDE SERPL-SCNC: 100 MMOL/L (ref 98–107)
CHOLEST SERPL-MCNC: 115 MG/DL
CHOLEST/HDLC SERPL: 3 {RATIO} (ref 0–5)
CO2 SERPL-SCNC: 36 MMOL/L (ref 23–31)
CREAT SERPL-MCNC: 0.86 MG/DL (ref 0.73–1.18)
EOSINOPHIL # BLD AUTO: 0.2 10*3/UL (ref 0–0.9)
EOSINOPHIL NFR BLD AUTO: 3 %
ERYTHROCYTE [DISTWIDTH] IN BLOOD BY AUTOMATED COUNT: 14.1 % (ref 11.5–17)
GLOBULIN SER-MCNC: 2.4 G/DL (ref 2.4–3.5)
GLUCOSE SERPL-MCNC: 84 MG/DL (ref 82–115)
HCT VFR BLD AUTO: 49.6 % (ref 42–52)
HDLC SERPL-MCNC: 42 MG/DL (ref 35–60)
HEMOLYSIS INTERF INDEX SERPL-ACNC: 10
HGB BLD-MCNC: 16 G/DL (ref 14–18)
ICTERIC INTERF INDEX SERPL-ACNC: 1
LDLC SERPL CALC-MCNC: 60 MG/DL (ref 50–140)
LIPEMIC INTERF INDEX SERPL-ACNC: 1
LYMPHOCYTES # BLD AUTO: 0.7 10*3/UL (ref 0.6–4.6)
LYMPHOCYTES NFR BLD AUTO: 10 % (ref 13–40)
MANUAL DIFF? (OHS): NO
MCH RBC QN AUTO: 30.3 PG (ref 27–31)
MCHC RBC AUTO-ENTMCNC: 32.3 G/DL (ref 33–36)
MCV RBC AUTO: 93.9 FL (ref 80–94)
MONOCYTES # BLD AUTO: 0.6 10*3/UL (ref 0.1–1.3)
MONOCYTES NFR BLD AUTO: 9 %
NEUTROPHILS # BLD AUTO: 5.7 10*3/UL (ref 2.1–9.2)
NEUTROPHILS NFR BLD AUTO: 78 %
PLATELET # BLD AUTO: 121 10*3/UL (ref 130–400)
PMV BLD AUTO: 11.2 FL (ref 9.4–12.4)
POTASSIUM SERPL-SCNC: 3.8 MMOL/L (ref 3.5–5.1)
PROT SERPL-MCNC: 5.9 G/DL (ref 5.8–7.6)
RBC # BLD AUTO: 5.28 10*6/UL (ref 4.7–6.1)
SODIUM SERPL-SCNC: 143 MMOL/L (ref 136–145)
TESTOST SERPL-MCNC: 685.85 NG/DL (ref 220.91–715.81)
TRIGL SERPL-MCNC: 66 MG/DL (ref 34–140)
VLDLC SERPL CALC-MCNC: 13 MG/DL
WBC # SPEC AUTO: 7.3 10*3/UL (ref 4.5–11.5)

## 2022-04-10 ENCOUNTER — HISTORICAL (OUTPATIENT)
Dept: ADMINISTRATIVE | Facility: HOSPITAL | Age: 72
End: 2022-04-10
Payer: COMMERCIAL

## 2022-04-11 ENCOUNTER — HISTORICAL (OUTPATIENT)
Dept: ADMINISTRATIVE | Facility: HOSPITAL | Age: 72
End: 2022-04-11

## 2022-04-29 ENCOUNTER — HISTORICAL (OUTPATIENT)
Dept: ADMINISTRATIVE | Facility: HOSPITAL | Age: 72
End: 2022-04-29
Payer: COMMERCIAL

## 2022-04-29 VITALS
HEIGHT: 68 IN | OXYGEN SATURATION: 92 % | WEIGHT: 241 LBS | DIASTOLIC BLOOD PRESSURE: 72 MMHG | BODY MASS INDEX: 36.53 KG/M2 | SYSTOLIC BLOOD PRESSURE: 118 MMHG

## 2022-04-29 LAB
ABS NEUT (OLG): 3.29 (ref 2.1–9.2)
ALBUMIN SERPL-MCNC: 3.5 G/DL (ref 3.4–4.8)
ALBUMIN/GLOB SERPL: 1.6 {RATIO} (ref 1.1–2)
ALP SERPL-CCNC: 55 U/L (ref 40–150)
ALT SERPL-CCNC: 44 U/L (ref 0–55)
AST SERPL-CCNC: 27 U/L (ref 5–34)
BASOPHILS # BLD AUTO: 0 10*3/UL (ref 0–0.2)
BASOPHILS NFR BLD AUTO: 1 %
BILIRUB SERPL-MCNC: 0.6 MG/DL
BILIRUBIN DIRECT+TOT PNL SERPL-MCNC: 0.3 (ref 0–0.5)
BILIRUBIN DIRECT+TOT PNL SERPL-MCNC: 0.3 (ref 0–0.8)
BUN SERPL-MCNC: 12.8 MG/DL (ref 8.4–25.7)
CALCIUM SERPL-MCNC: 9 MG/DL (ref 8.7–10.5)
CHLORIDE SERPL-SCNC: 100 MMOL/L (ref 98–107)
CO2 SERPL-SCNC: 32 MMOL/L (ref 23–31)
CREAT SERPL-MCNC: 0.73 MG/DL (ref 0.73–1.18)
EOSINOPHIL # BLD AUTO: 0.2 10*3/UL (ref 0–0.9)
EOSINOPHIL NFR BLD AUTO: 4 %
ERYTHROCYTE [DISTWIDTH] IN BLOOD BY AUTOMATED COUNT: 13.5 % (ref 11.5–17)
GLOBULIN SER-MCNC: 2.2 G/DL (ref 2.4–3.5)
GLUCOSE SERPL-MCNC: 78 MG/DL (ref 82–115)
HCT VFR BLD AUTO: 44.8 % (ref 42–52)
HEMOLYSIS INTERF INDEX SERPL-ACNC: 5
HGB BLD-MCNC: 14.6 G/DL (ref 14–18)
ICTERIC INTERF INDEX SERPL-ACNC: 1
LIPEMIC INTERF INDEX SERPL-ACNC: 2
LYMPHOCYTES # BLD AUTO: 0.7 10*3/UL (ref 0.6–4.6)
LYMPHOCYTES NFR BLD AUTO: 15 %
MANUAL DIFF? (OHS): NO
MCH RBC QN AUTO: 30.9 PG (ref 27–31)
MCHC RBC AUTO-ENTMCNC: 32.6 G/DL (ref 33–36)
MCV RBC AUTO: 94.9 FL (ref 80–94)
MONOCYTES # BLD AUTO: 0.5 10*3/UL (ref 0.1–1.3)
MONOCYTES NFR BLD AUTO: 10 %
NEUTROPHILS # BLD AUTO: 3.29 10*3/UL (ref 2.1–9.2)
NEUTROPHILS NFR BLD AUTO: 70 %
PLATELET # BLD AUTO: 180 10*3/UL (ref 130–400)
PMV BLD AUTO: 10.7 FL (ref 9.4–12.4)
POTASSIUM SERPL-SCNC: 3.6 MMOL/L (ref 3.5–5.1)
PROT SERPL-MCNC: 5.7 G/DL (ref 5.8–7.6)
RBC # BLD AUTO: 4.72 10*6/UL (ref 4.7–6.1)
SODIUM SERPL-SCNC: 141 MMOL/L (ref 136–145)
WBC # SPEC AUTO: 4.7 10*3/UL (ref 4.5–11.5)

## 2022-05-03 ENCOUNTER — OFFICE VISIT (OUTPATIENT)
Dept: SURGICAL ONCOLOGY | Facility: CLINIC | Age: 72
End: 2022-05-03
Payer: COMMERCIAL

## 2022-05-03 VITALS
BODY MASS INDEX: 35.16 KG/M2 | HEIGHT: 68 IN | HEART RATE: 90 BPM | DIASTOLIC BLOOD PRESSURE: 64 MMHG | SYSTOLIC BLOOD PRESSURE: 118 MMHG | WEIGHT: 232 LBS

## 2022-05-03 DIAGNOSIS — R10.9 ABDOMINAL PAIN, UNSPECIFIED ABDOMINAL LOCATION: Primary | ICD-10-CM

## 2022-05-03 PROCEDURE — 3074F PR MOST RECENT SYSTOLIC BLOOD PRESSURE < 130 MM HG: ICD-10-PCS | Mod: CPTII,S$GLB,, | Performed by: NURSE PRACTITIONER

## 2022-05-03 PROCEDURE — 3078F DIAST BP <80 MM HG: CPT | Mod: CPTII,S$GLB,, | Performed by: NURSE PRACTITIONER

## 2022-05-03 PROCEDURE — 3074F SYST BP LT 130 MM HG: CPT | Mod: CPTII,S$GLB,, | Performed by: NURSE PRACTITIONER

## 2022-05-03 PROCEDURE — 1159F MED LIST DOCD IN RCRD: CPT | Mod: CPTII,S$GLB,, | Performed by: NURSE PRACTITIONER

## 2022-05-03 PROCEDURE — 1159F PR MEDICATION LIST DOCUMENTED IN MEDICAL RECORD: ICD-10-PCS | Mod: CPTII,S$GLB,, | Performed by: NURSE PRACTITIONER

## 2022-05-03 PROCEDURE — 99024 POSTOP FOLLOW-UP VISIT: CPT | Mod: S$GLB,,, | Performed by: NURSE PRACTITIONER

## 2022-05-03 PROCEDURE — 3078F PR MOST RECENT DIASTOLIC BLOOD PRESSURE < 80 MM HG: ICD-10-PCS | Mod: CPTII,S$GLB,, | Performed by: NURSE PRACTITIONER

## 2022-05-03 PROCEDURE — 3008F BODY MASS INDEX DOCD: CPT | Mod: CPTII,S$GLB,, | Performed by: NURSE PRACTITIONER

## 2022-05-03 PROCEDURE — 3008F PR BODY MASS INDEX (BMI) DOCUMENTED: ICD-10-PCS | Mod: CPTII,S$GLB,, | Performed by: NURSE PRACTITIONER

## 2022-05-03 PROCEDURE — 99999 PR PBB SHADOW E&M-EST. PATIENT-LVL III: ICD-10-PCS | Mod: PBBFAC,,, | Performed by: NURSE PRACTITIONER

## 2022-05-03 PROCEDURE — 99999 PR PBB SHADOW E&M-EST. PATIENT-LVL III: CPT | Mod: PBBFAC,,, | Performed by: NURSE PRACTITIONER

## 2022-05-03 PROCEDURE — 99024 PR POST-OP FOLLOW-UP VISIT: ICD-10-PCS | Mod: S$GLB,,, | Performed by: NURSE PRACTITIONER

## 2022-05-03 RX ORDER — ALBUTEROL SULFATE 90 UG/1
AEROSOL, METERED RESPIRATORY (INHALATION)
COMMUNITY
Start: 2022-04-26 | End: 2022-07-12 | Stop reason: SDUPTHER

## 2022-05-03 RX ORDER — UMECLIDINIUM BROMIDE AND VILANTEROL TRIFENATATE 62.5; 25 UG/1; UG/1
POWDER RESPIRATORY (INHALATION)
Status: ON HOLD | COMMUNITY
Start: 2022-05-02 | End: 2023-01-07 | Stop reason: ALTCHOICE

## 2022-05-03 RX ORDER — ALPRAZOLAM 0.5 MG/1
0.5 TABLET ORAL 2 TIMES DAILY
COMMUNITY
Start: 2022-04-25 | End: 2022-11-22

## 2022-05-03 RX ORDER — LATANOPROST 50 UG/ML
SOLUTION/ DROPS OPHTHALMIC
Status: ON HOLD | COMMUNITY
Start: 2022-04-26 | End: 2023-01-07 | Stop reason: ALTCHOICE

## 2022-05-03 RX ORDER — TESTOSTERONE CYPIONATE 200 MG/ML
INJECTION, SOLUTION INTRAMUSCULAR
COMMUNITY
Start: 2022-03-08 | End: 2022-06-20 | Stop reason: SDUPTHER

## 2022-05-03 RX ORDER — DOXYCYCLINE HYCLATE 100 MG
100 TABLET ORAL 2 TIMES DAILY
Status: ON HOLD | COMMUNITY
Start: 2022-01-19 | End: 2023-01-05

## 2022-05-03 RX ORDER — TAMSULOSIN HYDROCHLORIDE 0.4 MG/1
1 CAPSULE ORAL DAILY
COMMUNITY
Start: 2022-03-18

## 2022-05-03 RX ORDER — APIXABAN 5 MG/1
5 TABLET, FILM COATED ORAL 2 TIMES DAILY
COMMUNITY
Start: 2022-04-16 | End: 2023-06-07 | Stop reason: SDUPTHER

## 2022-05-03 RX ORDER — DILTIAZEM HYDROCHLORIDE 180 MG/1
180 CAPSULE, COATED, EXTENDED RELEASE ORAL DAILY
COMMUNITY
Start: 2022-04-28 | End: 2022-08-11

## 2022-05-03 RX ORDER — ROSUVASTATIN CALCIUM 20 MG/1
TABLET, COATED ORAL
COMMUNITY
Start: 2022-03-14

## 2022-05-03 RX ORDER — FLUTICASONE PROPIONATE AND SALMETEROL XINAFOATE 230; 21 UG/1; UG/1
2 AEROSOL, METERED RESPIRATORY (INHALATION) 2 TIMES DAILY
COMMUNITY
Start: 2022-02-23 | End: 2022-06-08 | Stop reason: ALTCHOICE

## 2022-05-03 RX ORDER — AMIODARONE HYDROCHLORIDE 200 MG/1
200 TABLET ORAL DAILY
Status: ON HOLD | COMMUNITY
Start: 2022-04-16 | End: 2023-01-05 | Stop reason: HOSPADM

## 2022-05-03 RX ORDER — MONTELUKAST SODIUM 10 MG/1
10 TABLET ORAL DAILY
COMMUNITY
Start: 2022-04-07 | End: 2022-08-29

## 2022-05-03 RX ORDER — ZOLPIDEM TARTRATE 5 MG/1
TABLET ORAL
COMMUNITY
Start: 2022-04-28 | End: 2022-08-11

## 2022-05-03 RX ORDER — OMEPRAZOLE 40 MG/1
40 CAPSULE, DELAYED RELEASE ORAL DAILY
COMMUNITY
Start: 2022-04-07 | End: 2022-08-11

## 2022-05-03 RX ORDER — POTASSIUM CHLORIDE 600 MG/1
8 TABLET, FILM COATED, EXTENDED RELEASE ORAL DAILY
COMMUNITY
Start: 2022-04-28 | End: 2022-12-03

## 2022-05-03 RX ORDER — DULOXETIN HYDROCHLORIDE 30 MG/1
CAPSULE, DELAYED RELEASE ORAL
COMMUNITY
Start: 2022-04-28 | End: 2022-06-17

## 2022-05-03 RX ORDER — HYDROCHLOROTHIAZIDE 50 MG/1
50 TABLET ORAL DAILY
COMMUNITY
Start: 2022-03-18 | End: 2022-08-10

## 2022-05-03 RX ORDER — METOPROLOL SUCCINATE 50 MG/1
50 TABLET, EXTENDED RELEASE ORAL DAILY
COMMUNITY
Start: 2022-04-25 | End: 2022-11-03 | Stop reason: SDUPTHER

## 2022-05-03 RX ORDER — FLUTICASONE PROPIONATE AND SALMETEROL XINAFOATE 115; 21 UG/1; UG/1
2 AEROSOL, METERED RESPIRATORY (INHALATION) 2 TIMES DAILY
COMMUNITY
Start: 2022-01-26 | End: 2022-05-03

## 2022-05-03 NOTE — LETTER
May 3, 2022      [facility]  [address], [address]  [place] [address]  Phone: [phone]       Patient: John Bullard   YOB: 1950  Date of Visit: 05/03/2022    To Whom It May Concern:    Raad Bullard  was at [facility] on 05/03/2022. The patient may return to work/school on 5/6/2022with no restrictions. If you have any questions or concerns, or if I can be of further assistance, please do not hesitate to contact me.    Sincerely,    NED Gray

## 2022-05-03 NOTE — PROGRESS NOTES
HOSPITAL FOLLOW UP  RECENT ADMISSION FOR SBO TREATED WITH CONSERVATIVE MEASURES    PT TELLS ME HE IS DOING WELL  EATING REG FOOD  HAVING BMS  NO COMPLAINTS OF PAIN  NO NAUSEA OR VOMITING  HE IS EAGER TO GET BACK TO WORK  NO FEVER OR CHILLS  HE IS VERY APPRECIATIVE OF CARE    ABD SOFT, NONTENDER    WORK RELEASE AND PAPERWORK COMPLETED  HE KNOWS TO CALL WITH ANY PROBLEMS    RTC PRN

## 2022-05-04 NOTE — HISTORICAL [facility] CERNER
This is a historical note converted from [facility]. Formatting and pictures may have been removed.  Please reference [facility] for original formatting and attached multimedia. Chief Complaint  Pt is here for bilateral knee pain. Pt stated he has pain at times. Pt has been taking meloxicam to help with pain.  History of Present Illness  Knee symptoms both knees ?::knee gives way ?? ?no Knee joint pain on the left and right,but a tightness ???? ?Worse with weightbearing ?? ?Worse in the morning  ?? ?Slowly worsens with extended activity ?? ?Is increased by bending it ?? ?By twisting it ?? ?By kneeling ?? ?With stairs ?? ?By squatting  ?? ?Knee joint swelling on the left? ?posteriorly ?? ?Medially ?? ?Laterally? ?  ? ?Knee joint pain not improved by rest ?? ?Not by ice? ?? ?No clicking sensation in the left knee ?? ?No grating sensation in the left knee?  ?? ?The knee did not suddenly buckle due to contact ?? ?No popping sound was heard in the knee?  ?? ?No tingling ?? ?No burning sensation  ?   c/o leg weakness and heaviness  Review of Systems  Review of Systems?  ?????Constitutional: ?No fever, No chills. ?  ?????Respiratory: ?No shortness of breath, No cough. ?  ?????Cardiovascular: ?No chest pain. ?  ?????Gastrointestinal: ?No nausea, No vomiting, No diarrhea, No constipation, No heartburn. ?  ?????Genitourinary: ?No dysuria, No hematuria. ?  ?????Hematology/Lymphatics: ?No bleeding tendency. ?  ?????Endocrine: ?No polyuria. ?  ?????Neurologic: ?Alert and oriented X4, No numbness, No tingling. ?  ?????Psychiatric: ?No anxiety, No depression. ?  ?????Integumentary: no abnormalities except as noted in history of present illness  Physical Exam  Vitals & Measurements  T:?36.1? ?C (Oral)? HR:?80(Peripheral)? BP:?130/78?  HT:?172?cm? WT:?105?kg? BMI:?35.49?  PHYSICAL FINDINGS  Cardiovascular:  ? ?? Arterial Pulses: ? Dorsalis pedis pulses were normal right.? ? Dorsalis pedis pulses were normal left.  Musculoskeletal System:  ?  ?? Hips:  ? ?? ?? ? General/bilateral: ? No swelling of the hips.? ? No induration of the hips.  ? ?? ?? ? Right Hip: ? Motion was normal.? ? No pain was elicited by active internal rotation with the hip flexed.  ? ?? ?? ? ? No pain was elicited by active external rotation with the hip flexed.? ? No pain was elicited by active motion.? ? No pain was elicited by passive motion.  ? ?? ?? ? Left Hip: ? Motion was normal.  ? ?? Left and right?Knee: ? Examined.? ??No effusion.? ? no localized swelling.? ?Genu varum.? ?Patella demonstrated crepitus.? ?Anteromedial aspect was tender on palpation.? ?Medial aspect was tender on palpation.  patella  Left Knee:  Knee Motion:? ?? ?? Value???????????  ? Active flexion?????????125 ?degrees  Active extension??????0 ?degrees  ?   left knee?? ?? negative fluctuation test.? ? No erythema.? ? No warmth.? ? Anterior aspect was not tender on palpation.? ? Patellofemoral region was not tender on palpation.? ? Medial collateral ligament was not tender on palpation.? ? Lateral collateral ligament was not tender on palpation.? ? No pain was elicited by motion using Leanna apprehension test.? ? No laxity of the posterior cruciate ligament.? ? No anterior drawer sign was present.? ? No one plane medial (straight) instability.? ? No one plane lateral (straight) instability.? ? A Lachman test did not demonstrate one plane anterior instability.? ? Clarkes sign was not observed for chondromalacia.  ?   Right Knee:  Knee Motion:? ?? ? Value? ?? ?? ?? ?? Normal Range  Active flexion???????125 degrees  Active extension???? 0 degrees  ?  ?   2-3+ edema BLE  ?   TESTS  Imaging:  X-Ray Knee:  AP and lateral view x-rays of the left knee with sunrise view of the patella were performed -of left knee.  ?   IMPRESSIONS RADIOLOGY TEST  Mild narrowing of the left knee joint space medially L>R  ?   diagnosis  bilateral knee OA  BLE edema  ?  plan  Mobic  compression socks  weight  loss  Assessment/Plan  1.?Arthritis of knee, right?M17.11  Ordered:  Clinic Follow up, 01/21/20 11:05:00 CST, prn, Arthritis of knee, right  Arthritis of knee, left  Lower extremity edema, [facility]  Office/Outpatient Visit Level 3 Established [id number], Arthritis of knee, right  Arthritis of knee, left  Lower extremity edema, [facility], 01/21/20 11:05:00 CST  ?  2.?Arthritis of knee, left?M17.12  Ordered:  Clinic Follow up, 01/21/20 11:05:00 CST, prn, Arthritis of knee, right  Arthritis of knee, left  Lower extremity edema, [facility]  Office/Outpatient Visit Level 3 Established [id number] [facility], Arthritis of knee, right  Arthritis of knee, left  Lower extremity edema, [facility], 01/21/20 11:05:00 CST  ?  3.?Lower extremity edema?R60.0  Ordered:  Clinic Follow up, 01/21/20 11:05:00 CST, prn, Arthritis of knee, right  Arthritis of knee, left  Lower extremity edema, [facility]  Office/Outpatient Visit Level 3 Established [id number] PC, Arthritis of knee, right  Arthritis of knee, left  Lower extremity edema, [facility] Clinic, 01/21/20 11:05:00 CST  ?  Orders:  XR Knee Left 4 or More Views, Routine, 01/21/20 10:23:00 CST, Pain, None, Ambulatory, Patient Has IV?, Patient on Oxygen?, Rad Type, Left knee pain, 01/21/20 10:23:00 CST  XR Knee Right 4 or More Views, Routine, 01/21/20 10:23:00 CST, Pain, None, Ambulatory, Patient Has IV?, Patient on Oxygen?, Rad Type, Right knee pain, 01/21/20 10:23:00 CST  Referrals  Clinic Follow up, 01/21/20 11:05:00 CST, prn, Arthritis of knee, right  Arthritis of knee, left  Lower extremity edema, [facility]   Problem List/Past Medical History  Ongoing  Abnormal PSA  Adynamic ileus  Anxiety Disorder(  Confirmed  )  Arthritis of knee, left  Arthritis of knee, right  Asthma  Chondromalacia of left patella  Chondromalacia of right patella  Decreased testosterone level  GERD (gastroesophageal reflux disease)(  Confirmed  )  Hernia,  umbilical  History of kidney stones(  Confirmed  )  Hypertension, essential(  Confirmed  )  Insomnia  Lower extremity edema  Malaise and fatigue  Obesity  Ongoing use of possibly toxic medication  Pneumonia  Quadriceps weakness  Seborrhea capitis(  Confirmed  )  Venous insufficiency  Venous Insufficiency(  Confirmed  )  Wellness examination  Historical  Anxiety  Asthma  GERD (gastroesophageal reflux disease)  Hypertension  Kidney stones  Pneumonia  SBO (small bowel obstruction)  Umbilical hernia  Venous insufficiency of lower extremity  Procedure/Surgical History  Hernia Repair Umbilical Robot Assist (.) (03/30/2017)  Laparoscopy, surgical, repair, ventral, umbilical, spigelian or epigastric hernia (includes mesh insertion, when performed); incarcerated or strangulated (03/30/2017)  Robotic Assisted Procedure of Trunk Region, Percutaneous Endoscopic Approach (03/30/2017)  Supplement Abdominal Wall with Synthetic Substitute, Percutaneous Endoscopic Approach (03/30/2017)  Diagnostic Laparoscopic (06/19/2016)  Excision of Abdominal Wall, Open Approach (06/19/2016)  Insertion of Infusion Device into Trunk Subcutaneous Tissue and Fascia, Open Approach (06/19/2016)  Inspection of Gastrointestinal Tract, Percutaneous Endoscopic Approach (06/19/2016)  Release Ileum, Open Approach (06/19/2016)  Release Mesentery, Open Approach (06/19/2016)  Repair Abdominal Wall, Open Approach (06/19/2016)  Repair Ileum, Open Approach (06/19/2016)  colonoscopy (06/23/2009)  Lithotripsy  sinus surgery   Medications  alPRAzolam 0.5 mg oral tablet, 0.5 mg= 1 tab(s), Oral, BID, PRN, 1 refills  Diltiazem Hydrochloride  mg/24 hours oral capsule, extended release, See Instructions, 11 refills  Flonase 50 mcg/inh nasal spray, 2 spray(s), Nasal, Daily  fluticasone CFC free 220 mcg/inh inhalation aerosol, 1 puff(s), INH, BID, 5 refills  hydrochlorothiazide 25 mg oral tablet, See Instructions, 11 refills  Klor-Con 8 oral tablet, extended  release, See Instructions, 11 refills  Melatonin 5 mg oral tablet, 10 mg= 2 tab(s), Oral, Once a day (at bedtime), PRN  meloxicam 7.5 mg oral tablet, See Instructions  omeprazole 40 mg oral DR capsule, See Instructions, 11 refills  ProAir HFA 90 mcg/inh inhalation aerosol with adapter, 2 puff(s), INH, QID, PRN, 11 refills  TAMSULOSIN HCL 0.4 MG CAPSULE, 0.4 mg= 1 cap(s), Oral, Daily  Testosterone Cypionate 200 mg/mL intramuscular solution, See Instructions  Tylenol 8 HR Arthritis Pain 650 mg oral tablet, extended release, 1300 mg= 2 tab(s), Oral, q8hr, PRN  zolpidem 5 mg oral tablet, See Instructions  Allergies  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  Immunizations  Vaccine Date Status Comments   pneumococcal 13-valent conjugate vaccine 12/11/2017 Given    influenza virus vaccine, inactivated 10/2017 Recorded [2/5/2018] at work [facility]   influenza virus vaccine, inactivated 10/31/2016 Recorded [2/21/2017] got at work   influenza virus vaccine, inactivated 10/30/2015 Recorded    influenza virus vaccine, inactivated 10/08/2014 Recorded    pneumococcal 23-polyvalent vaccine 08/13/2014 Recorded    influenza virus vaccine, inactivated 10/30/2013 Recorded    Health Maintenance  Health Maintenance  ???Pending?(in the next year)  ??? ??OverDue  ??? ? ? ?Pneumococcal Vaccine due??and every?  ??? ? ? ?Zoster Vaccine due??03/21/18??and every 100??year(s)  ??? ? ? ?Asthma Management-Spirometry  due??03/30/18??and every 1??year(s)  ??? ? ? ?Hypertension Management-Education due??10/09/19??and every 6??month(s)  ??? ? ? ?Advance Directive due??01/01/20??and every 1??year(s)  ??? ? ? ?Geriatric Depression Screening due??01/01/20??and every 1??year(s)  ??? ??Due?  ??? ? ? ?Alcohol Misuse Screening due??01/01/20??and every 1??year(s)  ??? ? ? ?Cognitive Screening due??01/01/20??and every 1??year(s)  ??? ? ? ?Fall Risk Assessment due??01/01/20??and every 1??year(s)  ??? ? ? ?Functional Assessment due??01/01/20??and every 1??year(s)  ??? ? ? ?Asthma Management-[place] Flow due??01/21/20??Variable frequency  ??? ? ? ?Asthma Management-Written Action Plan due??01/21/20??and every 6??month(s)  ??? ? ? ?Tetanus Vaccine due??01/21/20??and every 10??year(s)  ??? ??Due In Future?  ??? ? ? ?Asthma Management-Asthma Education not due until??02/06/20??and every 6??month(s)  ??? ? ? ?ADL Screening not due until??04/09/20??and every 1??year(s)  ??? ? ? ?Aspirin Therapy for CVD Prevention not due until??05/28/20??and every 1??year(s)  ??? ? ? ?TB Skin Test not due until??07/18/20??and every 1??year(s)  ??? ? ? ?Hypertension Management-BMP not due until??07/29/20??and every 1??year(s)  ??? ? ? ?Hypertension Management-Blood Pressure not due until??08/05/20??and every 1??year(s)  ??? ? ? ?Obesity Screening not due until??01/01/21??and every 1??year(s)  ???Satisfied?(in the past 1 year)  ??? ??Satisfied?  ??? ? ? ?ADL Screening on??04/09/19.??Satisfied by Vladimir Lopez LPN.  ??? ? ? ?Advance Directive on??08/06/19.??Satisfied by Susie Harvey LPN  ??? ? ? ?Alcohol Misuse Screening on??07/10/19.??Satisfied by Susie Harvey LPN  ??? ? ? ?Aspirin Therapy for CVD Prevention on??05/28/19.??Satisfied by Vladimir Lopez LPN.  ??? ? ? ?Asthma Management-Asthma Education on??08/06/19.??Satisfied by Susie Harvey LPN  ??? ? ? ?Blood Pressure Screening on??01/21/20.??Satisfied by Gail Luong LPN  ??? ? ? ?Body Mass Index Check  on??01/21/20.??Satisfied by Gail Luong LPN  ??? ? ? ?Cognitive Screening on??04/09/19.??Satisfied by Vladimir Lopez LPN.  ??? ? ? ?Colorectal Screening ([facility]) on??05/28/19.??Satisfied by Vladimir Lopez LPN.  ??? ? ? ?Depression Screening on??04/09/19.??Satisfied by Vladimir Lopez LPN.  ??? ? ? ?Diabetes Screening on??07/30/19.??Satisfied by Parminder Galdamez  ??? ? ? ?Fall Risk Assessment on??07/06/19.??Satisfied by Ketty Tran RN  ??? ? ? ?Functional Assessment on??05/28/19.??Satisfied by Vladimir Lopez LPN.  ??? ? ? ?Geriatric Depression Screening on??04/09/19.??Satisfied by Vladimir Lopez LPN.  ??? ? ? ?Hypertension Management-Blood Pressure on??01/21/20.??Satisfied by Gail Luong LPN  ??? ? ? ?Lipid Screening on??07/30/19.??Satisfied by Parminder Galdamez  ??? ? ? ?Obesity Screening on??01/21/20.??Satisfied by Gail Luong LPN  ??? ? ? ?TB Skin Test on??07/18/19.  ??? ??Refused?  ??? ? ? ?Alcohol Misuse Screening on??05/28/19.??Recorded by Vladimir Lopez LPN.  ?

## 2022-05-04 NOTE — HISTORICAL [facility]
This is a historical note converted from [facility]. Formatting and pictures may have been removed.  Please reference [facility] for original formatting and attached multimedia. Chief Complaint  pt here for right knee pain, states was going down stairs and hurt knee about a week ago, wears a knee sleeve, xrays today.....sm  History of Present Illness  Knee symptoms ::knee gives way ??? Knee joint pain on the right ??? Worse with weightbearing ??? Worse in the morning  ??? Slowly worsens with extended activity ??? Is increased by bending it ??? By twisting it ??? By kneeling ??? With stairs ??? By squatting  ??? Knee joint swelling on the right??  ?? Knee joint pain not improved by rest ? ??? No clicking sensation in the right knee ??? No grating sensation in the right knee?  ??? The knee did not suddenly buckle due to contact ?? No popping sound was heard in the knee?  ??? No tingling ??? No burning sensation  Review of Systems  Review of Systems?  ?????Constitutional: ?No fever, No chills. ?  ?????Respiratory: ?No shortness of breath, No cough. ?  ?????Cardiovascular: ?No chest pain. ?  ?????Gastrointestinal: ?No nausea, No vomiting, No diarrhea, No constipation, No heartburn. ?  ?????Genitourinary: ?No dysuria, No hematuria. ?  ?????Hematology/Lymphatics: ?No bleeding tendency. ?  ?????Endocrine: ?No polyuria. ?  ?????Neurologic: ?Alert and oriented X4, No numbness, No tingling. ?  ?????Psychiatric: ?No anxiety, No depression. ?  ?????Integumentary: no abnormalities except as noted in history of present illness  Physical Exam  Vitals & Measurements  T:?97.8? ?F (Oral)? HR:?84(Peripheral)? BP:?131/78?  HT:?172?cm? WT:?106?kg? BMI:?35.83?  PHYSICAL FINDINGS  Cardiovascular:  Arterial Pulses: ? Dorsalis pedis pulses were normal right. ? Dorsalis pedis pulses were normal right.  Musculoskeletal System:  Hips:  General/bilateral: ? No swelling of the hips. ? No induration of the hips.  Right Hip: ? Motion was normal. ? No pain  was elicited by active internal rotation with the hip flexed.  ? No pain was elicited by active external rotation with the hip flexed. ? No pain was elicited by active motion. ? No pain was elicited by passive motion.  Left Hip: ? Motion was normal.  Right Knee: ? Examined. ? Positive effusion. ?Localized swelling. ?Genu varum. ?Patella demonstrated crepitus. ?Anteromedial aspect was tender on palpation. ?Medial aspect was tender on palpation.  patella  Right Knee:  Knee Motion: Value Normal Range  Active flexion??????125??? degrees  Active extension???0 ?degrees  ???  ? Pain was elicited throughout the range of motion. ? Swelling with a negative fluctuation test. ? No erythema. ? No warmth. ? Anterior aspect was not tender on palpation. ? Patellofemoral region was not tender on palpation. ? Medial collateral ligament was not tender on palpation. ? Lateral collateral ligament was not tender on palpation. ? No pain was elicited by motion using [place] apprehension test. ? No laxity of the posterior cruciate ligament. ? No anterior drawer sign was present. ? No one plane medial (straight) instability. ? No one plane lateral (straight) instability. ? A Lachman test did not demonstrate one plane anterior instability. ? Clarkes sign was? observed for chondromalacia.  Left Knee:  Knee Motion: Value Normal Range  Active flexion??????125??? ?degrees  Active extension??0?? degrees  ???  Foot:  Right Foot: ? No excessive pronation. ? No excessive supination.  Functional Exam:  General/bilateral: ? Ambulation did not require a cane. ? Ambulation did not require a walker.  Neurological:  Sensation: ? No sensory exam abnormalities were noted.  Gait and Stance: ? A right-sided antalgic gait was observed.  Skin:  Injury / Incision Site: ? Right knee showed no tissue injury scar.  ???  ???  TESTS  Imaging:  X-Ray Knee:  AP and lateral view x-rays of the right knee with sunrise view of the patella were performed -of right  knee.  ???  IMPRESSIONS RADIOLOGY TEST  Narrowing of the right knee joint space?at the patellofemoral joint, showed sclerosis of the right knee, and osteophytes arising from the right knee.  This patient is can avoid stooping squatting and stair climbing.  Assessment/Plan  Right knee pain?M25.561  Ordered:  XR Knee Right 4 or More Views, Routine, 06/04/20 12:12:00 CDT, None, Stretcher, Patient Has IV?, Patient on Oxygen?, Rad Type, Right knee pain, Not Scheduled, 06/04/20 12:12:00 CDT  ?   Problem List/Past Medical History  Ongoing  Abnormal PSA  Adynamic ileus  Anxiety Disorder(  Confirmed  )  Arthritis of knee, left  Arthritis of knee, right  Asthma  Chest pain on exertion  Chondromalacia of left patella  Chondromalacia of right patella  Decreased testosterone level  Gallstone  GERD (gastroesophageal reflux disease)(  Confirmed  )  Hernia, umbilical  History of kidney stones(  Confirmed  )  Hypertension, essential(  Confirmed  )  Insomnia  Laceration of leg  Lower extremity edema  Malaise and fatigue  Obesity  Ongoing use of possibly toxic medication  Osteoarthritis  Pneumonia  Quadriceps weakness  Seborrhea capitis(  Confirmed  )  Venous insufficiency  Venous Insufficiency(  Confirmed  )  Wellness examination  Historical  Anxiety  Asthma  GERD (gastroesophageal reflux disease)  Hypertension  Kidney stones  Pneumonia  SBO (small bowel obstruction)  Umbilical hernia  Venous insufficiency of lower extremity  Procedure/Surgical History  Catheter placement in coronary artery(s) for coronary angiography, including intraprocedural injection(s) for coronary angiography, imaging supervision and interpretation; with left heart catheterization including intraprocedural injection(s) for left teresa (04/16/2020)  Fluoroscopy of Left Heart using Low Osmolar Contrast (04/16/2020)  Fluoroscopy of Multiple Coronary Arteries using Low Osmolar Contrast (04/16/2020)  Measurement of Cardiac Sampling and Pressure, Left Heart,  Percutaneous Approach (04/16/2020)  Hernia Repair Umbilical Robot Assist (.) (03/30/2017)  Laparoscopy, surgical, repair, ventral, umbilical, spigelian or epigastric hernia (includes mesh insertion, when performed); incarcerated or strangulated (03/30/2017)  Robotic Assisted Procedure of Trunk Region, Percutaneous Endoscopic Approach (03/30/2017)  Supplement Abdominal Wall with Synthetic Substitute, Percutaneous Endoscopic Approach (03/30/2017)  Diagnostic Laparoscopic (06/19/2016)  Excision of Abdominal Wall, Open Approach (06/19/2016)  Insertion of Infusion Device into Trunk Subcutaneous Tissue and Fascia, Open Approach (06/19/2016)  Inspection of Gastrointestinal Tract, Percutaneous Endoscopic Approach (06/19/2016)  Release Ileum, Open Approach (06/19/2016)  Release Mesentery, Open Approach (06/19/2016)  Repair Abdominal Wall, Open Approach (06/19/2016)  Repair Ileum, Open Approach (06/19/2016)  colonoscopy (06/23/2009)  Lithotripsy  sinus surgery   Medications  ALBUTEROL HFA 90MCG (9GM) AER, See Instructions,? ?Not taking: Last Dose Date/Time Unknown  alPRAzolam 0.5 mg oral tablet, 0.5 mg= 1 tab(s), Oral, BID, PRN, 5 refills  aspirin 81 mg oral Delayed Release (EC) tablet, 81 mg= 1 tab(s), Oral, Daily  Bactroban 2% Ointment (22.5 gmTube), 1 le, TOP, TID, 1 refills  Diltiazem Hydrochloride  mg/24 hours oral capsule, extended release, See Instructions, 11 refills  hydrochlorothiazide 25 mg oral tablet, See Instructions, 11 refills  Klor-Con 8 oral tablet, extended release, See Instructions, 11 refills  Melatonin 5 mg oral tablet, 5 mg= 1 tab(s), Oral, Once a day (at bedtime), PRN  meloxicam 7.5 mg oral tablet, 7.5 mg= 1 tab(s), Oral, Daily  omeprazole 40 mg oral DR capsule, See Instructions, 11 refills  ProAir HFA 90 mcg/inh inhalation aerosol with adapter, 2 puff(s), INH, QID, PRN, 11 refills  rosuvastatin 20 mg oral tablet, 20 mg= 1 tab(s), Oral, qPM  TAMSULOSIN HCL 0.4 MG CAPSULE, 0.4 mg= 1 cap(s), Oral,  Daily  Testosterone Cypionate 200 mg/mL intramuscular solution, See Instructions, 1 refills  travoprost 0.004% ophthalmic solution, 1 drop(s), OPTH, qPM  Tylenol 8 HR Arthritis Pain 650 mg oral tablet, extended release, 1300 mg= 2 tab(s), Oral, q8hr, PRN  zolpidem 5 mg oral tablet, 5 mg= 1 tab(s), Oral, Once a day (at bedtime), PRN, 5 refills  Allergies  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  Immunizations  Vaccine Date Status Comments   pneumococcal 13-valent conjugate vaccine 12/11/2017 Given    influenza virus vaccine, inactivated 10/2017 Recorded [2/5/2018] at work [facility]   influenza virus vaccine, inactivated 10/31/2016 Recorded [2/21/2017] got at work   influenza virus vaccine, inactivated 10/30/2015 Recorded    influenza virus vaccine, inactivated 10/08/2014 Recorded    pneumococcal 23-polyvalent vaccine 08/13/2014 Recorded    influenza virus vaccine, inactivated 10/30/2013 Recorded    Health Maintenance  Health Maintenance  ???Pending?(in the next year)  ??? ??OverDue  ??? ? ? ?Coronary Artery Disease Maintenance-Antiplatelet Agent Prescribed due??and every?  ??? ? ? ?Coronary Artery Disease Maintenance-Lipid Lowering Therapy due??and every?  ??? ? ? ?Pneumococcal Vaccine due??and every?  ??? ? ? ?Zoster Vaccine due??03/21/18??and every 100??year(s)  ??? ? ? ?Asthma Management-Spirometry due??03/30/18??and every 1??year(s)  ??? ? ? ?Asthma  Management-Asthma Education due??02/06/20??and every 6??month(s)  ??? ??Due?  ??? ? ? ?Asthma Management-[place] Flow due??06/04/20??Variable frequency  ??? ? ? ?Asthma Management-Written Action Plan due??06/04/20??and every 6??month(s)  ??? ? ? ?Tetanus Vaccine due??06/04/20??and every 10??year(s)  ??? ??Due In Future?  ??? ? ? ?TB Skin Test not due until??07/18/20??and every 1??year(s)  ??? ? ? ?Hypertension Management-Education not due until??11/12/20??and every 6??month(s)  ??? ? ? ?Advance Directive not due until??01/01/21??and every 1??year(s)  ??? ? ? ?Alcohol Misuse Screening not due until??01/01/21??and every 1??year(s)  ??? ? ? ?Cognitive Screening not due until??01/01/21??and every 1??year(s)  ??? ? ? ?Fall Risk Assessment not due until??01/01/21??and every 1??year(s)  ??? ? ? ?Functional Assessment not due until??01/01/21??and every 1??year(s)  ??? ? ? ?Obesity Screening not due until??01/01/21??and every 1??year(s)  ??? ? ? ?Aspirin Therapy for CVD Prevention not due until??04/16/21??and every 1??year(s)  ??? ? ? ?ADL Screening not due until??04/30/21??and every 1??year(s)  ??? ? ? ?Hypertension Management-Blood Pressure not due until??05/12/21??and every 1??year(s)  ??? ? ? ?Hypertension Management-BMP not due until??05/13/21??and every 1??year(s)  ???Satisfied?(in the past 1 year)  ??? ??Satisfied?  ??? ? ? ?ADL Screening on??04/30/20.??Satisfied by Susie Harvey LPN  ??? ? ? ?Advance Directive on??02/11/20.??Satisfied by Susie Harvey LPN  ??? ? ? ?Alcohol Misuse Screening on??02/11/20.??Satisfied by Susie Harvey LPN  ??? ? ? ?Aspirin Therapy for CVD Prevention on??04/16/20.  ??? ? ? ?Asthma Management-Asthma Medication Prescribed on??04/30/20.??Satisfied by Fabiano Liang MD  ??? ? ? ?Blood Pressure Screening on??06/04/20.??Satisfied by Sarika Sam LPN  ??? ? ? ?Body Mass Index Check on??06/04/20.??Satisfied by Sarika Sam LPN  ??? ? ? ?Coronary Artery Disease Maintenance-Lipid  Lowering Therapy on??04/30/20.  ??? ? ? ?Depression Screening on??02/11/20.??Satisfied by Susie Harvey LPN  ??? ? ? ?Diabetes Screening on??05/13/20.??Satisfied by Randee Palomino MT  ??? ? ? ?Fall Risk Assessment on??07/06/19.??Satisfied by Ketty Tran RN  ??? ? ? ?Functional Assessment on??04/16/20.??Satisfied by Omero Aleman RN  ??? ? ? ?Hypertension Management-Blood Pressure on??06/04/20.??Satisfied by Sarika Sam LPN  ??? ? ? ?Lipid Screening on??05/13/20.??Satisfied by Randee Palomino MT  ??? ? ? ?Obesity Screening on??06/04/20.??Satisfied by Sarika Sam LPN  ??? ? ? ?TB Skin Test on??07/18/19.  ?      Diagnosis: Right knee osteoarthritis  Plan: Patient avoid deep knee flexion stooping and squatting activities much as possible.  Follow-up in 1 month for repeat evaluation

## 2022-05-04 NOTE — HISTORICAL [facility]
This is a historical note converted from oDnovan. Formatting and pictures may have been removed.  Please reference [facility] for original formatting and attached multimedia. Chief Complaint  Pt is here for bilateral knee pain. Pt stated the pain has been going on for years.  History of Present Illness  Bilateral Knee symptoms ::knee gives way ?? ?Knee joint pain ?? ?Worse with weightbearing ?? ?Worse in the morning  ?? ?Slowly worsens with extended activity ?? ?Is increased by bending it ?? ?By twisting it ?? ?By kneeling ?? ?With stairs ?? ?By squatting  ?? ?Knee joint swelling on the left? ?posteriorly ?? ?Medially ?? ?Laterally? ?  ? ?Knee joint pain not improved by rest ?? ?Not by ice? ?? ?No clicking sensation? ?? ?No grating sensation  ?? ?The knee did not suddenly buckle due to contact ?? ?No popping sound was heard in the knee?  ?? ?No tingling ?? ?No burning sensation  Review of Systems  Review of Systems?  ?????Constitutional: ?No fever, No chills. ?  ?????Respiratory: ?No shortness of breath, No cough. ?  ?????Cardiovascular: ?No chest pain. ?  ?????Gastrointestinal: ?No nausea, No vomiting, No diarrhea, No constipation, No heartburn. ?  ?????Genitourinary: ?No dysuria, No hematuria. ?  ?????Hematology/Lymphatics: ?No bleeding tendency. ?  ?????Endocrine: ?No polyuria. ?  ?????Neurologic: ?Alert and oriented X4, No numbness, No tingling. ?  ?????Psychiatric: ?No anxiety, No depression. ?  ?????Integumentary: no abnormalities except as noted in history of present illness  Physical Exam  Vitals & Measurements  BP:?138/84?  HT:?172?cm? WT:?107.2?kg? BMI:?36.24?  PHYSICAL FINDINGS  Cardiovascular:  ? ?? Arterial Pulses: ? Dorsalis pedis pulses were normal right.? ? Dorsalis pedis pulses were normal left.  Musculoskeletal System:  ? ?? Hips:  ? ?? ?? ? General/bilateral: ? No swelling of the hips.? ? No induration of the hips.  ? ?? ?? ? Right Hip: ? Motion was normal.? ? No pain was elicited by active internal  rotation with the hip flexed.  ? ?? ?? ? ? No pain was elicited by active external rotation with the hip flexed.? ? No pain was elicited by active motion.? ? No pain was elicited by passive motion.  ? ?? ?? ? Left Hip: ? some mild?restriction with internal rotation  ???? Left Knee: ? Examined.? ? Positive effusion.? ?Localized swelling.? ?Genu varum.? ?Patella demonstrated crepitus.? ?Anteromedial aspect was tender on palpation.? ?Medial aspect was tender on palpation.  patella  Left Knee:  Knee Motion:? ?? ?? Value???????????  ? Active flexion???????125 degrees  Active extension?????? 03?degrees  ?   left knee ?? Pain was elicited throughout the range of motion.? ? Swelling with a negative fluctuation test.? ? No erythema.? ? No warmth.? ? Anterior aspect was not tender on palpation.? ? Patellofemoral region was tender on palpation.? ? Medial collateral ligament was not tender on palpation.? ? Lateral collateral ligament was not tender on palpation.? ? No pain was elicited by motion using [place] apprehension test.? ? No laxity of the posterior cruciate ligament.? ? No anterior drawer sign was present.? ? No one plane medial (straight) instability.? ? No one plane lateral (straight) instability.? ? A Lachman test did not demonstrate one plane anterior instability.? ? Clarkes sign was observed for chondromalacia.  ?   Right Knee:  Knee Motion:? ?? ? Value? ?? ?? ?? ?? Normal Range  Active flexion???????? 125?degrees  Active extension???? 03?degrees  ?   2+ pitting edema BLE  ?  ?   TESTS  Imaging:  X-Ray Knee:  AP and lateral view x-rays of the left knee with sunrise view of the patella were performed -of left knee.  ?   IMPRESSIONS RADIOLOGY TEST  mild arthritic changes of both knees but joint spaces well maintained  patellofemoral compartments show more degeneration  ?   low impact activities  avoid stooping and squatting (deep knee flexion)  NSAIDS  Assessment/Plan  1.?Chondromalacia of left  patella?M22.42  2.?Chondromalacia of right patella?M22.41  Bilateral knee pain?M25.561   Problem List/Past Medical History  Ongoing  Adynamic ileus  Anxiety Disorder(  Confirmed  )  Asthma  Chondromalacia of left patella  Chondromalacia of right patella  Decreased testosterone level  GERD (gastroesophageal reflux disease)(  Confirmed  )  Hernia, umbilical  History of kidney stones(  Confirmed  )  Hypertension, essential(  Confirmed  )  Insomnia  Malaise and fatigue  Obesity  Ongoing use of possibly toxic medication  Pneumonia  Quadriceps weakness  Seborrhea capitis(  Confirmed  )  Venous insufficiency  Venous Insufficiency(  Confirmed  )  Historical  Anxiety  Asthma  GERD (gastroesophageal reflux disease)  Hypertension  Kidney stones  Pneumonia  SBO (small bowel obstruction)  Umbilical hernia  Venous insufficiency of lower extremity  Procedure/Surgical History  Hernia Repair Umbilical Robot Assist (.) (03/30/2017)  Diagnostic Laparoscopic (06/19/2016)  colonoscopy (06/23/2009)  Lithotripsy  sinus surgery   Medications  alPRAzolam 0.5 mg oral tablet, See Instructions, 3 refills  Benadryl  Cartia  mg/24 hours oral capsule, extended release, See Instructions, 11 refills  Flonase 50 mcg/inh nasal spray, 2 spray(s), Nasal, Daily  fluticasone CFC free 220 mcg/inh inhalation aerosol, 1 puff(s), INH, BID, 5 refills  hydrochlorothiazide 25 mg oral tablet, See Instructions, 11 refills  KETOCONAZOLE 2% SHAMPOO, 1 le, TOP, 2x/Wk  Klor-Con 8 oral tablet, extended release, See Instructions, 11 refills  Maalox Max with Simethicone (400/400/40mg per 5 mL) UD Susp, 30 mL, Oral, BID, PRN  Melatonin 5 mg oral tablet, 10 mg= 2 tab(s), Oral, Once a day (at bedtime), PRN  meloxicam 7.5 mg oral tablet, 7.5 mg= 1 tab(s), Oral, Daily  omeprazole 40 mg oral DR capsule, 40 mg= 1 cap(s), Oral, Daily, 11 refills  ProAir HFA 90 mcg/inh inhalation aerosol with adapter, See Instructions, 11 refills  Qvar 40 mcg/inh inhalation  aerosol, 1 puff(s), INH, BID, 11 refills  TAMSULOSIN HCL 0.4 MG CAPSULE, 0.4 mg= 1 cap(s), Oral, Daily  Testosterone Cypionate 200 mg/mL intramuscular solution, See Instructions, 1 refills  zolpidem 5 mg oral tablet, 5 mg= 1 tab(s), Oral, Once a day (at bedtime), PRN, 3 refills  Allergies  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 Previous treatment: None., 08/16/2018  Family History  Cancer: Mother.  Immunizations  Vaccine Date Status Comments   pneumococcal 13-valent conjugate vaccine 12/11/2017 Given    influenza virus vaccine, inactivated 10/2017 Recorded [2/5/2018] at work [facility]   influenza virus vaccine, inactivated 10/31/2016 Recorded [2/21/2017] got at work   influenza virus vaccine, inactivated 10/30/2015 Recorded    influenza virus vaccine, inactivated 10/08/2014 Recorded    pneumococcal 23-polyvalent vaccine 08/13/2014 Recorded    influenza virus vaccine, inactivated 10/30/2013 Recorded    Health Maintenance  Health Maintenance  ???Pending?(in the next year)  ??? ??OverDue  ??? ? ? ?Pneumococcal Vaccine due??and every?  ??? ? ? ?Zoster Vaccine due??03/21/18??and every 100??year(s)  ??? ? ? ?Asthma Management-Spirometry due??03/30/18??and every 1??year(s)  ??? ? ? ?Asthma Management-Asthma Education due??02/14/19??and every 6??month(s)  ??? ??Due?  ??? ? ? ?ADL Screening due??02/21/19??and every 1??year(s)  ??? ? ? ?Aspirin Therapy for CVD Prevention due??02/21/19??and every 1??year(s)  ???  ? ? ?Asthma Management-Written Action Plan due??02/21/19??and every 6??month(s)  ??? ? ? ?Asthma Management-[place] Flow due??02/21/19??Variable frequency  ??? ? ? ?Cognitive Screening due??02/21/19??and every 1??year(s)  ??? ? ? ?Geriatric Depression Screening due??02/21/19??and every 1??year(s)  ??? ? ? ?TB Skin Test due??02/21/19??Variable frequency  ??? ? ? ?Tetanus Vaccine due??02/21/19??and every 10??year(s)  ??? ??Due In Future?  ??? ? ? ?Colorectal Screening (Senior Wellness) not due until??06/21/19??and every 10??year(s)  ??? ? ? ?Hypertension Management-Education not due until??08/05/19??and every 6??month(s)  ??? ? ? ?Alcohol Misuse Screening not due until??08/14/19??and every 1??year(s)  ??? ? ? ?Functional Assessment not due until??08/14/19??and every 1??year(s)  ??? ? ? ?Advance Directive not due until??08/16/19??and every 1??year(s)  ??? ? ? ?Fall Risk Assessment not due until??12/12/19??and every 1??year(s)  ??? ? ? ?Hypertension Management-BMP not due until??01/29/20??and every 1??year(s)  ??? ? ? ?Hypertension Management-Blood Pressure not due until??02/19/20??and every 1??year(s)  ??? ? ? ?Obesity Screening not due until??02/19/20??and every 1??year(s)  ???Satisfied?(in the past 1 year)  ??? ??Satisfied?  ??? ? ? ?Advance Directive on??08/16/18.??Satisfied by Susie Dykes LPN  ??? ? ? ?Alcohol Misuse Screening on??08/14/18.??Satisfied by Susie Dykes LPN  ??? ? ? ?Asthma Management-Asthma Medication Prescribed on??02/18/19.??Satisfied by Fabiano Liang MD  ??? ? ? ?Blood Pressure Screening on??02/19/19.??Satisfied by Gail Luong LPN  ??? ? ? ?Body Mass Index Check on??02/19/19.??Satisfied by Gail Luong LPN  ??? ? ? ?Diabetes Screening on??01/29/19.??Satisfied by Parminder Galdamez  ??? ? ? ?Fall Risk Assessment on??12/12/18.??Satisfied by Eduarda Benito LPN  ??? ? ? ?Functional Assessment on??08/14/18.??Satisfied by Susie Dykes LPN  ??? ? ?  ?Hypertension Management-Blood Pressure on??02/19/19.??Satisfied by Gail Luong LPN  ??? ? ? ?Lipid Screening on??01/29/19.??Satisfied by Parminder Galdamez  ??? ? ? ?Obesity Screening on??02/19/19.??Satisfied by Gail Luong LPN  ?  ?

## 2022-05-17 ENCOUNTER — CLINICAL SUPPORT (OUTPATIENT)
Dept: INTERNAL MEDICINE | Facility: CLINIC | Age: 72
End: 2022-05-17
Payer: COMMERCIAL

## 2022-05-17 DIAGNOSIS — R79.89 LOW TESTOSTERONE: Primary | ICD-10-CM

## 2022-05-17 PROCEDURE — 99211 OFF/OP EST MAY X REQ PHY/QHP: CPT | Mod: ,,, | Performed by: INTERNAL MEDICINE

## 2022-05-17 PROCEDURE — 99211 PR OFFICE/OUTPT VISIT, EST, LEVL I: ICD-10-PCS | Mod: ,,, | Performed by: INTERNAL MEDICINE

## 2022-05-17 RX ORDER — TESTOSTERONE CYPIONATE 200 MG/ML
150 INJECTION, SOLUTION INTRAMUSCULAR
Status: COMPLETED | OUTPATIENT
Start: 2022-05-17 | End: 2022-05-17

## 2022-05-17 RX ORDER — TESTOSTERONE CYPIONATE 200 MG/ML
50 INJECTION, SOLUTION INTRAMUSCULAR
Status: DISCONTINUED | OUTPATIENT
Start: 2022-05-17 | End: 2022-05-17

## 2022-05-17 RX ADMIN — TESTOSTERONE CYPIONATE 150 MG: 200 INJECTION, SOLUTION INTRAMUSCULAR at 08:05

## 2022-05-31 ENCOUNTER — CLINICAL SUPPORT (OUTPATIENT)
Dept: INTERNAL MEDICINE | Facility: CLINIC | Age: 72
End: 2022-05-31
Payer: COMMERCIAL

## 2022-05-31 DIAGNOSIS — R79.89 LOW TESTOSTERONE: Primary | ICD-10-CM

## 2022-05-31 DIAGNOSIS — R79.89 LOW TESTOSTERONE IN MALE: ICD-10-CM

## 2022-05-31 DIAGNOSIS — E29.1 HYPOGONADISM IN MALE: ICD-10-CM

## 2022-05-31 RX ORDER — TESTOSTERONE CYPIONATE 200 MG/ML
200 INJECTION, SOLUTION INTRAMUSCULAR
Status: DISCONTINUED | OUTPATIENT
Start: 2022-05-31 | End: 2022-06-21

## 2022-06-02 ENCOUNTER — DOCUMENTATION ONLY (OUTPATIENT)
Dept: ADMINISTRATIVE | Facility: HOSPITAL | Age: 72
End: 2022-06-02
Payer: COMMERCIAL

## 2022-06-08 PROBLEM — E66.01 SEVERE OBESITY (BMI 35.0-39.9) WITH COMORBIDITY: Status: ACTIVE | Noted: 2022-06-08

## 2022-06-20 DIAGNOSIS — R79.89 LOW TESTOSTERONE: Primary | ICD-10-CM

## 2022-06-20 RX ORDER — TESTOSTERONE CYPIONATE 200 MG/ML
INJECTION, SOLUTION INTRAMUSCULAR
Qty: 6 ML | Refills: 1 | Status: SHIPPED | OUTPATIENT
Start: 2022-06-20 | End: 2022-06-21

## 2022-06-22 RX ORDER — TESTOSTERONE CYPIONATE 200 MG/ML
INJECTION, SOLUTION INTRAMUSCULAR
Qty: 3 ML | Refills: 0 | OUTPATIENT
Start: 2022-06-22

## 2022-06-28 ENCOUNTER — CLINICAL SUPPORT (OUTPATIENT)
Dept: INTERNAL MEDICINE | Facility: CLINIC | Age: 72
End: 2022-06-28
Payer: COMMERCIAL

## 2022-06-28 DIAGNOSIS — R79.89 LOW TESTOSTERONE: Primary | ICD-10-CM

## 2022-06-28 RX ORDER — TESTOSTERONE CYPIONATE 200 MG/ML
200 INJECTION, SOLUTION INTRAMUSCULAR
Status: DISCONTINUED | OUTPATIENT
Start: 2022-06-28 | End: 2022-08-09

## 2022-07-01 DIAGNOSIS — R79.89 LOW TESTOSTERONE: Primary | ICD-10-CM

## 2022-07-01 DIAGNOSIS — I10 HYPERTENSION, UNSPECIFIED TYPE: ICD-10-CM

## 2022-07-01 DIAGNOSIS — K21.9 GASTROESOPHAGEAL REFLUX DISEASE, UNSPECIFIED WHETHER ESOPHAGITIS PRESENT: ICD-10-CM

## 2022-07-01 DIAGNOSIS — J45.909 ASTHMA, UNSPECIFIED ASTHMA SEVERITY, UNSPECIFIED WHETHER COMPLICATED, UNSPECIFIED WHETHER PERSISTENT: ICD-10-CM

## 2022-07-07 ENCOUNTER — LAB VISIT (OUTPATIENT)
Dept: LAB | Facility: HOSPITAL | Age: 72
End: 2022-07-07
Attending: INTERNAL MEDICINE
Payer: COMMERCIAL

## 2022-07-07 DIAGNOSIS — K21.9 GASTROESOPHAGEAL REFLUX DISEASE, UNSPECIFIED WHETHER ESOPHAGITIS PRESENT: ICD-10-CM

## 2022-07-07 DIAGNOSIS — J45.909 ASTHMA, UNSPECIFIED ASTHMA SEVERITY, UNSPECIFIED WHETHER COMPLICATED, UNSPECIFIED WHETHER PERSISTENT: ICD-10-CM

## 2022-07-07 DIAGNOSIS — I10 HYPERTENSION, UNSPECIFIED TYPE: ICD-10-CM

## 2022-07-07 DIAGNOSIS — R79.89 LOW TESTOSTERONE: ICD-10-CM

## 2022-07-07 LAB
ALBUMIN SERPL-MCNC: 3.7 GM/DL (ref 3.4–4.8)
ALBUMIN/GLOB SERPL: 1.8 RATIO (ref 1.1–2)
ALP SERPL-CCNC: 50 UNIT/L (ref 40–150)
ALT SERPL-CCNC: 24 UNIT/L (ref 0–55)
AST SERPL-CCNC: 21 UNIT/L (ref 5–34)
BASOPHILS # BLD AUTO: 0.03 X10(3)/MCL (ref 0–0.2)
BASOPHILS NFR BLD AUTO: 0.5 %
BILIRUBIN DIRECT+TOT PNL SERPL-MCNC: 0.6 MG/DL
BUN SERPL-MCNC: 18.6 MG/DL (ref 8.4–25.7)
CALCIUM SERPL-MCNC: 9.5 MG/DL (ref 8.8–10)
CHLORIDE SERPL-SCNC: 101 MMOL/L (ref 98–107)
CHOLEST SERPL-MCNC: 102 MG/DL
CHOLEST/HDLC SERPL: 2 {RATIO} (ref 0–5)
CO2 SERPL-SCNC: 37 MMOL/L (ref 23–31)
CREAT SERPL-MCNC: 0.96 MG/DL (ref 0.73–1.18)
EOSINOPHIL # BLD AUTO: 0.19 X10(3)/MCL (ref 0–0.9)
EOSINOPHIL NFR BLD AUTO: 3.2 %
ERYTHROCYTE [DISTWIDTH] IN BLOOD BY AUTOMATED COUNT: 13.6 % (ref 11.5–17)
GLOBULIN SER-MCNC: 2.1 GM/DL (ref 2.4–3.5)
GLUCOSE SERPL-MCNC: 94 MG/DL (ref 82–115)
HCT VFR BLD AUTO: 46.8 % (ref 42–52)
HDLC SERPL-MCNC: 41 MG/DL (ref 35–60)
HGB BLD-MCNC: 15.3 GM/DL (ref 14–18)
IMM GRANULOCYTES # BLD AUTO: 0.04 X10(3)/MCL (ref 0–0.04)
IMM GRANULOCYTES NFR BLD AUTO: 0.7 %
LDLC SERPL CALC-MCNC: 50 MG/DL (ref 50–140)
LYMPHOCYTES # BLD AUTO: 0.6 X10(3)/MCL (ref 0.6–4.6)
LYMPHOCYTES NFR BLD AUTO: 10.3 %
MCH RBC QN AUTO: 31.3 PG (ref 27–31)
MCHC RBC AUTO-ENTMCNC: 32.7 MG/DL (ref 33–36)
MCV RBC AUTO: 95.7 FL (ref 80–94)
MONOCYTES # BLD AUTO: 0.57 X10(3)/MCL (ref 0.1–1.3)
MONOCYTES NFR BLD AUTO: 9.7 %
NEUTROPHILS # BLD AUTO: 4.4 X10(3)/MCL (ref 2.1–9.2)
NEUTROPHILS NFR BLD AUTO: 75.6 %
NRBC BLD AUTO-RTO: 0 %
PLATELET # BLD AUTO: 140 X10(3)/MCL (ref 130–400)
PMV BLD AUTO: 10.8 FL (ref 7.4–10.4)
POTASSIUM SERPL-SCNC: 3.7 MMOL/L (ref 3.5–5.1)
PROT SERPL-MCNC: 5.8 GM/DL (ref 5.8–7.6)
PSA SERPL-MCNC: 1.04 NG/ML
RBC # BLD AUTO: 4.89 X10(6)/MCL (ref 4.7–6.1)
SODIUM SERPL-SCNC: 144 MMOL/L (ref 136–145)
TRIGL SERPL-MCNC: 55 MG/DL (ref 34–140)
TSH SERPL-ACNC: 1.32 UIU/ML (ref 0.35–4.94)
VLDLC SERPL CALC-MCNC: 11 MG/DL
WBC # SPEC AUTO: 5.9 X10(3)/MCL (ref 4.5–11.5)

## 2022-07-07 PROCEDURE — 36415 COLL VENOUS BLD VENIPUNCTURE: CPT

## 2022-07-07 PROCEDURE — 80061 LIPID PANEL: CPT

## 2022-07-07 PROCEDURE — 84153 ASSAY OF PSA TOTAL: CPT

## 2022-07-07 PROCEDURE — 80053 COMPREHEN METABOLIC PANEL: CPT

## 2022-07-07 PROCEDURE — 84443 ASSAY THYROID STIM HORMONE: CPT

## 2022-07-07 PROCEDURE — 85025 COMPLETE CBC W/AUTO DIFF WBC: CPT

## 2022-07-11 DIAGNOSIS — R79.89 LOW TESTOSTERONE: Primary | ICD-10-CM

## 2022-07-11 RX ORDER — TESTOSTERONE CYPIONATE 200 MG/ML
200 INJECTION, SOLUTION INTRAMUSCULAR
Qty: 3 ML | Refills: 5 | Status: SHIPPED | OUTPATIENT
Start: 2022-07-11 | End: 2023-01-31

## 2022-07-11 RX ORDER — TESTOSTERONE CYPIONATE 200 MG/ML
200 INJECTION, SOLUTION INTRAMUSCULAR
COMMUNITY
Start: 2022-06-22 | End: 2022-07-11 | Stop reason: SDUPTHER

## 2022-07-12 ENCOUNTER — CLINICAL SUPPORT (OUTPATIENT)
Dept: INTERNAL MEDICINE | Facility: CLINIC | Age: 72
End: 2022-07-12
Payer: COMMERCIAL

## 2022-07-12 DIAGNOSIS — R79.89 LOW TESTOSTERONE: Primary | ICD-10-CM

## 2022-07-12 DIAGNOSIS — R06.02 SOB (SHORTNESS OF BREATH): Primary | ICD-10-CM

## 2022-07-12 RX ORDER — ALBUTEROL SULFATE 90 UG/1
AEROSOL, METERED RESPIRATORY (INHALATION)
Qty: 8 G | Refills: 3 | Status: SHIPPED | OUTPATIENT
Start: 2022-07-12 | End: 2022-09-29 | Stop reason: SDUPTHER

## 2022-07-12 RX ORDER — TESTOSTERONE CYPIONATE 200 MG/ML
200 INJECTION, SOLUTION INTRAMUSCULAR
Status: DISCONTINUED | OUTPATIENT
Start: 2022-07-12 | End: 2022-08-09

## 2022-07-20 ENCOUNTER — TELEPHONE (OUTPATIENT)
Dept: INTERNAL MEDICINE | Facility: CLINIC | Age: 72
End: 2022-07-20
Payer: COMMERCIAL

## 2022-07-20 DIAGNOSIS — J45.909 ASTHMA, UNSPECIFIED ASTHMA SEVERITY, UNSPECIFIED WHETHER COMPLICATED, UNSPECIFIED WHETHER PERSISTENT: Primary | ICD-10-CM

## 2022-07-20 NOTE — TELEPHONE ENCOUNTER
Patient saw me over the weekend while I was making hospital rounds.  He tells me his breathing is getting worse.  He feels like his diaphragm is affecting his breathing.  He would like a referral to Pulmonary Medicine doctor.  He requested Dr. Mitchell in.  We will make that referral

## 2022-07-26 ENCOUNTER — CLINICAL SUPPORT (OUTPATIENT)
Dept: INTERNAL MEDICINE | Facility: CLINIC | Age: 72
End: 2022-07-26
Payer: COMMERCIAL

## 2022-07-26 DIAGNOSIS — R79.89 LOW TESTOSTERONE: Primary | ICD-10-CM

## 2022-07-26 PROCEDURE — 99211 OFF/OP EST MAY X REQ PHY/QHP: CPT | Mod: ,,, | Performed by: INTERNAL MEDICINE

## 2022-07-26 PROCEDURE — 99211 PR OFFICE/OUTPT VISIT, EST, LEVL I: ICD-10-PCS | Mod: ,,, | Performed by: INTERNAL MEDICINE

## 2022-07-26 RX ORDER — TESTOSTERONE CYPIONATE 200 MG/ML
200 INJECTION, SOLUTION INTRAMUSCULAR
Status: COMPLETED | OUTPATIENT
Start: 2022-07-26 | End: 2022-07-26

## 2022-07-26 RX ADMIN — TESTOSTERONE CYPIONATE 200 MG: 200 INJECTION, SOLUTION INTRAMUSCULAR at 08:07

## 2022-08-08 DIAGNOSIS — I10 HYPERTENSION, UNSPECIFIED TYPE: ICD-10-CM

## 2022-08-08 DIAGNOSIS — R79.89 LOW TESTOSTERONE: Primary | ICD-10-CM

## 2022-08-08 DIAGNOSIS — J45.909 ASTHMA, UNSPECIFIED ASTHMA SEVERITY, UNSPECIFIED WHETHER COMPLICATED, UNSPECIFIED WHETHER PERSISTENT: ICD-10-CM

## 2022-08-08 DIAGNOSIS — K21.9 GASTROESOPHAGEAL REFLUX DISEASE, UNSPECIFIED WHETHER ESOPHAGITIS PRESENT: ICD-10-CM

## 2022-08-09 ENCOUNTER — CLINICAL SUPPORT (OUTPATIENT)
Dept: INTERNAL MEDICINE | Facility: CLINIC | Age: 72
End: 2022-08-09
Payer: COMMERCIAL

## 2022-08-09 DIAGNOSIS — R79.89 LOW TESTOSTERONE: Primary | ICD-10-CM

## 2022-08-09 PROCEDURE — 99211 PR OFFICE/OUTPT VISIT, EST, LEVL I: ICD-10-PCS | Mod: ,,, | Performed by: INTERNAL MEDICINE

## 2022-08-09 PROCEDURE — 99211 OFF/OP EST MAY X REQ PHY/QHP: CPT | Mod: ,,, | Performed by: INTERNAL MEDICINE

## 2022-08-09 RX ORDER — TESTOSTERONE CYPIONATE 200 MG/ML
50 INJECTION, SOLUTION INTRAMUSCULAR
Status: DISCONTINUED | OUTPATIENT
Start: 2022-08-09 | End: 2022-08-09

## 2022-08-09 RX ORDER — TESTOSTERONE CYPIONATE 200 MG/ML
200 INJECTION, SOLUTION INTRAMUSCULAR
Status: COMPLETED | OUTPATIENT
Start: 2022-08-09 | End: 2022-08-09

## 2022-08-09 RX ADMIN — TESTOSTERONE CYPIONATE 200 MG: 200 INJECTION, SOLUTION INTRAMUSCULAR at 08:08

## 2022-08-12 ENCOUNTER — DOCUMENTATION ONLY (OUTPATIENT)
Dept: INTERNAL MEDICINE | Facility: CLINIC | Age: 72
End: 2022-08-12
Payer: COMMERCIAL

## 2022-08-12 LAB — CRC RECOMMENDATION EXT: NORMAL

## 2022-08-16 ENCOUNTER — OFFICE VISIT (OUTPATIENT)
Dept: INTERNAL MEDICINE | Facility: CLINIC | Age: 72
End: 2022-08-16
Payer: COMMERCIAL

## 2022-08-16 ENCOUNTER — TELEPHONE (OUTPATIENT)
Dept: INTERNAL MEDICINE | Facility: CLINIC | Age: 72
End: 2022-08-16

## 2022-08-16 VITALS
WEIGHT: 234 LBS | HEART RATE: 82 BPM | OXYGEN SATURATION: 93 % | RESPIRATION RATE: 18 BRPM | BODY MASS INDEX: 35.46 KG/M2 | SYSTOLIC BLOOD PRESSURE: 134 MMHG | DIASTOLIC BLOOD PRESSURE: 76 MMHG | HEIGHT: 68 IN

## 2022-08-16 DIAGNOSIS — E66.01 SEVERE OBESITY (BMI 35.0-39.9) WITH COMORBIDITY: ICD-10-CM

## 2022-08-16 DIAGNOSIS — I48.0 PAROXYSMAL ATRIAL FIBRILLATION: ICD-10-CM

## 2022-08-16 DIAGNOSIS — I65.23 BILATERAL CAROTID ARTERY OCCLUSION: ICD-10-CM

## 2022-08-16 DIAGNOSIS — R79.89 LOW TESTOSTERONE: ICD-10-CM

## 2022-08-16 DIAGNOSIS — R09.89 RIGHT CAROTID BRUIT: ICD-10-CM

## 2022-08-16 DIAGNOSIS — K56.50 SMALL BOWEL OBSTRUCTION DUE TO ADHESIONS: ICD-10-CM

## 2022-08-16 DIAGNOSIS — K21.9 GASTROESOPHAGEAL REFLUX DISEASE, UNSPECIFIED WHETHER ESOPHAGITIS PRESENT: ICD-10-CM

## 2022-08-16 DIAGNOSIS — Z00.00 WELLNESS EXAMINATION: Primary | ICD-10-CM

## 2022-08-16 DIAGNOSIS — E66.01 SEVERE OBESITY (BMI 35.0-39.9) WITH COMORBIDITY: Primary | ICD-10-CM

## 2022-08-16 DIAGNOSIS — J45.909 ASTHMA, UNSPECIFIED ASTHMA SEVERITY, UNSPECIFIED WHETHER COMPLICATED, UNSPECIFIED WHETHER PERSISTENT: ICD-10-CM

## 2022-08-16 DIAGNOSIS — G47.33 OSA ON CPAP: ICD-10-CM

## 2022-08-16 DIAGNOSIS — I47.20 VENTRICULAR TACHYARRHYTHMIA: ICD-10-CM

## 2022-08-16 DIAGNOSIS — I10 HYPERTENSION, UNSPECIFIED TYPE: ICD-10-CM

## 2022-08-16 PROCEDURE — 1159F PR MEDICATION LIST DOCUMENTED IN MEDICAL RECORD: ICD-10-PCS | Mod: CPTII,,, | Performed by: INTERNAL MEDICINE

## 2022-08-16 PROCEDURE — 3008F PR BODY MASS INDEX (BMI) DOCUMENTED: ICD-10-PCS | Mod: CPTII,,, | Performed by: INTERNAL MEDICINE

## 2022-08-16 PROCEDURE — 3288F PR FALLS RISK ASSESSMENT DOCUMENTED: ICD-10-PCS | Mod: CPTII,,, | Performed by: INTERNAL MEDICINE

## 2022-08-16 PROCEDURE — 1159F MED LIST DOCD IN RCRD: CPT | Mod: CPTII,,, | Performed by: INTERNAL MEDICINE

## 2022-08-16 PROCEDURE — 3288F FALL RISK ASSESSMENT DOCD: CPT | Mod: CPTII,,, | Performed by: INTERNAL MEDICINE

## 2022-08-16 PROCEDURE — 1101F PR PT FALLS ASSESS DOC 0-1 FALLS W/OUT INJ PAST YR: ICD-10-PCS | Mod: CPTII,,, | Performed by: INTERNAL MEDICINE

## 2022-08-16 PROCEDURE — G0439 PR MEDICARE ANNUAL WELLNESS SUBSEQUENT VISIT: ICD-10-PCS | Mod: ,,, | Performed by: INTERNAL MEDICINE

## 2022-08-16 PROCEDURE — 3078F DIAST BP <80 MM HG: CPT | Mod: CPTII,,, | Performed by: INTERNAL MEDICINE

## 2022-08-16 PROCEDURE — 3075F PR MOST RECENT SYSTOLIC BLOOD PRESS GE 130-139MM HG: ICD-10-PCS | Mod: CPTII,,, | Performed by: INTERNAL MEDICINE

## 2022-08-16 PROCEDURE — 1126F AMNT PAIN NOTED NONE PRSNT: CPT | Mod: CPTII,,, | Performed by: INTERNAL MEDICINE

## 2022-08-16 PROCEDURE — 1101F PT FALLS ASSESS-DOCD LE1/YR: CPT | Mod: CPTII,,, | Performed by: INTERNAL MEDICINE

## 2022-08-16 PROCEDURE — 3075F SYST BP GE 130 - 139MM HG: CPT | Mod: CPTII,,, | Performed by: INTERNAL MEDICINE

## 2022-08-16 PROCEDURE — G0439 PPPS, SUBSEQ VISIT: HCPCS | Mod: ,,, | Performed by: INTERNAL MEDICINE

## 2022-08-16 PROCEDURE — 3008F BODY MASS INDEX DOCD: CPT | Mod: CPTII,,, | Performed by: INTERNAL MEDICINE

## 2022-08-16 PROCEDURE — 1126F PR PAIN SEVERITY QUANTIFIED, NO PAIN PRESENT: ICD-10-PCS | Mod: CPTII,,, | Performed by: INTERNAL MEDICINE

## 2022-08-16 PROCEDURE — 3078F PR MOST RECENT DIASTOLIC BLOOD PRESSURE < 80 MM HG: ICD-10-PCS | Mod: CPTII,,, | Performed by: INTERNAL MEDICINE

## 2022-08-16 NOTE — PROGRESS NOTES
Subjective:      Patient Care Team:  Fabiano Liang MD as PCP - General (Internal Medicine)      Patient ID: John Bullard is a 72 y.o. male.    Chief Complaint: Annual Exam      Patient is a 72-year-old man and for wellness check.  He feels okay except for persistent shortness of breath.  Worse when he sits down and better with standing.  He is worried about his diaphragm which was elevated in the past.  However PFTs were more consistent with obstructive disease. He was even referred to a pulmonologist who changed his inhalers but did not think the diaphragm was an issue.  The patient does suffer from significant central obesity.    Review of Systems     Current Outpatient Medications on File Prior to Visit   Medication Sig Dispense Refill    albuterol (PROVENTIL/VENTOLIN HFA) 90 mcg/actuation inhaler INHALE 2 PUFFS BY MOUTH EVERY 6 HOURS 8 g 3    ALPRAZolam (XANAX) 0.5 MG tablet Take 0.5 mg by mouth 2 (two) times daily. as needed for anxiety.      amiodarone (PACERONE) 200 MG Tab TAKE 2 TABLETS BY MOUTH TWICE DAILY FOR 3 DAYS, THEN TAKE 1 TABLET BY MOUTH TWICE DAILY FOR 3 DAYS, THEN TAKE 1 TABLET BY MOUTH ONCE DAILY      ANORO ELLIPTA 62.5-25 mcg/actuation DsDv       CARTIA  mg 24 hr capsule Take 1 capsule by mouth once daily 30 capsule 0    DULoxetine (CYMBALTA) 30 MG capsule TAKE 1 CAPSULE BY MOUTH ONCE DAILY (DO NOT CRUSH OR CHEW) 30 capsule 0    ELIQUIS 5 mg Tab Take 5 mg by mouth 2 (two) times daily.      hydroCHLOROthiazide (HYDRODIURIL) 50 MG tablet Take 1 tablet by mouth once daily 30 tablet 0    latanoprost 0.005 % ophthalmic solution INSTILL 1 DROP INTO EACH EYE AT BEDTIME      metoprolol succinate (TOPROL-XL) 50 MG 24 hr tablet Take 50 mg by mouth once daily.      montelukast (SINGULAIR) 10 mg tablet Take 10 mg by mouth once daily.      omeprazole (PRILOSEC) 40 MG capsule Take 1 capsule by mouth once daily 30 capsule 0    potassium chloride (KLOR-CON) 8 MEQ TbSR Take 8 mEq by  "mouth once daily.      rosuvastatin (CRESTOR) 20 MG tablet TAKE 1 TABLET BY MOUTH ONCE IN THE EVENING      tamsulosin (FLOMAX) 0.4 mg Cap Take 1 capsule by mouth once daily.      testosterone cypionate (DEPOTESTOTERONE CYPIONATE) 200 mg/mL injection Inject 1 mL (200 mg total) into the muscle every 14 (fourteen) days. 3 mL 5    zolpidem (AMBIEN) 5 MG Tab TAKE 1 TABLET BY MOUTH ONCE DAILY AT BEDTIME AS NEEDED FOR INSOMNIA 30 tablet 0    doxycycline (VIBRA-TABS) 100 MG tablet Take 100 mg by mouth 2 (two) times daily.       No current facility-administered medications on file prior to visit.       Patient Reported Health Risk Assessment       Objective:      /76 (BP Location: Right arm, Patient Position: Sitting, BP Method: Large (Manual))   Pulse 82   Resp 18   Ht 5' 8" (1.727 m)   Wt 106.1 kg (234 lb)   SpO2 (!) 93%   BMI 35.58 kg/m²     Physical Exam  Vitals reviewed.   Constitutional:       Appearance: Normal appearance.   HENT:      Head: Normocephalic and atraumatic.      Right Ear: Tympanic membrane normal.      Left Ear: Tympanic membrane normal.      Mouth/Throat:      Pharynx: Oropharynx is clear.   Eyes:      Pupils: Pupils are equal, round, and reactive to light.   Neck:      Vascular: No carotid bruit.      Comments: There is a right carotid bruit that I think is new  Cardiovascular:      Rate and Rhythm: Normal rate and regular rhythm.      Pulses: Normal pulses.      Heart sounds: Normal heart sounds.      Comments: There is a 2/6 systolic murmur right upper sternal border  Pulmonary:      Effort: Pulmonary effort is normal.      Breath sounds: Normal breath sounds.   Abdominal:      General: Abdomen is flat.      Palpations: Abdomen is soft. There is no mass.      Tenderness: There is no abdominal tenderness. There is no guarding.   Genitourinary:     Comments: Rectal exam not done as he sees his urologist  Musculoskeletal:         General: No swelling.      Cervical back: Neck supple. "      Comments: 1+ bilateral pretibial edema. Normal foot pulses.   Lymphadenopathy:      Cervical: No cervical adenopathy.   Skin:     General: Skin is warm and dry.   Neurological:      General: No focal deficit present.      Mental Status: He is alert and oriented to person, place, and time.         Lab work all okay.    No flowsheet data found.  Fall Risk Assessment - Outpatient 8/16/2022   Mobility Status Ambulatory   Number of falls 0   Identified as fall risk 0                Assessment:       1. Wellness examination    2. Paroxysmal atrial fibrillation    3. Small bowel obstruction due to adhesions    4. Ventricular tachyarrhythmia    5. Hypertension, unspecified type    6. Asthma, unspecified asthma severity, unspecified whether complicated, unspecified whether persistent    7. Severe obesity (BMI 35.0-39.9) with comorbidity    8. Gastroesophageal reflux disease, unspecified whether esophagitis present    9. Low testosterone    10. MINDA on CPAP    11. Right carotid bruit      overall he is medically stable and does have numerous health issues and also suffers from severe obesity.  The obesity is affecting his respiratory situation.  I think more so than the sluggish diaphragm on the left.  I do not think surgical intervention on the diaphragm would be beneficial but I do think bariatric surgery likely would be.  He has been trying to lose weight for many years and has had very little success.  Plan:       Check carotid ultrasound.  Obtain a chest x-ray.  If no surprises there then would strongly consider bariatric surgery.    Refer to GI for screening colonoscopy.  He had a rosario guard study a little over 3 years ago that was negative.    Follow-up with me 6 months with CBC CMP lipid prior        Medicare Annual Wellness and Personalized Prevention Plan:   Fall Risk + Home Safety + Hearing Impairment + Depression Screen + Cognitive Impairment Screen + Health Risk Assessment all reviewed.     Health  Maintenance Topics with due status: Not Due       Topic Last Completion Date    Influenza Vaccine 09/26/2021    Lipid Panel 07/07/2022      The patient's Health Maintenance was reviewed and the following appears to be due at this time:   Health Maintenance Due   Topic Date Due    Hepatitis C Screening  Never done    TETANUS VACCINE  Never done    Shingles Vaccine (1 of 2) Never done    Pneumococcal Vaccines (Age 65+) (2 - PPSV23 or PCV20) 12/11/2018    Colorectal Cancer Screening  06/06/2019    COVID-19 Vaccine (4 - Booster for Pfizer series) 04/02/2022         Advance Care Planning   I attest to discussing Advance Care Planning with patient and/or family member.  Education was provided including the importance of the Health Care Power of , Advance Directives, and/or [place] documentation.  The patient expressed understanding to the importance of this information and discussion.  Length of ACP conversation in minutes:          Follow up in about 6 months (around 2/16/2023). In addition to their scheduled follow up, the patient has also been instructed to follow up on as needed basis.

## 2022-08-19 ENCOUNTER — HOSPITAL ENCOUNTER (OUTPATIENT)
Dept: RADIOLOGY | Facility: HOSPITAL | Age: 72
Discharge: HOME | End: 2022-08-19
Attending: INTERNAL MEDICINE
Payer: COMMERCIAL

## 2022-08-19 DIAGNOSIS — J45.909 ASTHMA, UNSPECIFIED ASTHMA SEVERITY, UNSPECIFIED WHETHER COMPLICATED, UNSPECIFIED WHETHER PERSISTENT: ICD-10-CM

## 2022-08-19 PROCEDURE — 71046 X-RAY EXAM CHEST 2 VIEWS: CPT | Mod: TC

## 2022-08-23 ENCOUNTER — CLINICAL SUPPORT (OUTPATIENT)
Dept: INTERNAL MEDICINE | Facility: CLINIC | Age: 72
End: 2022-08-23
Payer: COMMERCIAL

## 2022-08-23 DIAGNOSIS — R79.89 LOW TESTOSTERONE: Primary | ICD-10-CM

## 2022-08-23 PROCEDURE — 96372 PR INJECTION,THERAP/PROPH/DIAG2ST, IM OR SUBCUT: ICD-10-PCS | Mod: ,,, | Performed by: INTERNAL MEDICINE

## 2022-08-23 PROCEDURE — 96372 THER/PROPH/DIAG INJ SC/IM: CPT | Mod: ,,, | Performed by: INTERNAL MEDICINE

## 2022-08-23 RX ORDER — TESTOSTERONE CYPIONATE 200 MG/ML
200 INJECTION, SOLUTION INTRAMUSCULAR
Status: COMPLETED | OUTPATIENT
Start: 2022-08-23 | End: 2022-08-23

## 2022-08-23 RX ADMIN — TESTOSTERONE CYPIONATE 200 MG: 200 INJECTION, SOLUTION INTRAMUSCULAR at 08:08

## 2022-09-06 ENCOUNTER — CLINICAL SUPPORT (OUTPATIENT)
Dept: INTERNAL MEDICINE | Facility: CLINIC | Age: 72
End: 2022-09-06
Payer: COMMERCIAL

## 2022-09-06 DIAGNOSIS — R79.89 LOW TESTOSTERONE: Primary | ICD-10-CM

## 2022-09-06 PROCEDURE — 96372 THER/PROPH/DIAG INJ SC/IM: CPT | Mod: ,,, | Performed by: INTERNAL MEDICINE

## 2022-09-06 PROCEDURE — 96372 PR INJECTION,THERAP/PROPH/DIAG2ST, IM OR SUBCUT: ICD-10-PCS | Mod: ,,, | Performed by: INTERNAL MEDICINE

## 2022-09-06 RX ORDER — TESTOSTERONE CYPIONATE 200 MG/ML
200 INJECTION, SOLUTION INTRAMUSCULAR
Status: COMPLETED | OUTPATIENT
Start: 2022-09-06 | End: 2022-09-06

## 2022-09-06 RX ADMIN — TESTOSTERONE CYPIONATE 200 MG: 200 INJECTION, SOLUTION INTRAMUSCULAR at 08:09

## 2022-09-15 DIAGNOSIS — K21.9 GASTROESOPHAGEAL REFLUX DISEASE, UNSPECIFIED WHETHER ESOPHAGITIS PRESENT: Primary | ICD-10-CM

## 2022-09-15 RX ORDER — OMEPRAZOLE 40 MG/1
CAPSULE, DELAYED RELEASE ORAL
Qty: 30 CAPSULE | Refills: 0 | Status: SHIPPED | OUTPATIENT
Start: 2022-09-15 | End: 2022-10-28

## 2022-09-20 ENCOUNTER — CLINICAL SUPPORT (OUTPATIENT)
Dept: INTERNAL MEDICINE | Facility: CLINIC | Age: 72
End: 2022-09-20
Payer: COMMERCIAL

## 2022-09-20 DIAGNOSIS — R79.89 LOW TESTOSTERONE: Primary | ICD-10-CM

## 2022-09-20 PROCEDURE — 96372 THER/PROPH/DIAG INJ SC/IM: CPT | Mod: ,,, | Performed by: INTERNAL MEDICINE

## 2022-09-20 PROCEDURE — 96372 PR INJECTION,THERAP/PROPH/DIAG2ST, IM OR SUBCUT: ICD-10-PCS | Mod: ,,, | Performed by: INTERNAL MEDICINE

## 2022-09-20 RX ORDER — TESTOSTERONE CYPIONATE 200 MG/ML
200 INJECTION, SOLUTION INTRAMUSCULAR
Status: COMPLETED | OUTPATIENT
Start: 2022-09-20 | End: 2022-09-20

## 2022-09-20 RX ADMIN — TESTOSTERONE CYPIONATE 200 MG: 200 INJECTION, SOLUTION INTRAMUSCULAR at 08:09

## 2022-09-26 ENCOUNTER — DOCUMENTATION ONLY (OUTPATIENT)
Dept: ADMINISTRATIVE | Facility: HOSPITAL | Age: 72
End: 2022-09-26
Payer: COMMERCIAL

## 2022-09-29 ENCOUNTER — TELEPHONE (OUTPATIENT)
Dept: INTERNAL MEDICINE | Facility: CLINIC | Age: 72
End: 2022-09-29

## 2022-09-29 ENCOUNTER — OFFICE VISIT (OUTPATIENT)
Dept: INTERNAL MEDICINE | Facility: CLINIC | Age: 72
End: 2022-09-29
Payer: COMMERCIAL

## 2022-09-29 VITALS
BODY MASS INDEX: 36.92 KG/M2 | DIASTOLIC BLOOD PRESSURE: 58 MMHG | HEIGHT: 68 IN | SYSTOLIC BLOOD PRESSURE: 100 MMHG | WEIGHT: 243.63 LBS | HEART RATE: 77 BPM | OXYGEN SATURATION: 97 % | RESPIRATION RATE: 14 BRPM

## 2022-09-29 DIAGNOSIS — K21.9 GASTROESOPHAGEAL REFLUX DISEASE, UNSPECIFIED WHETHER ESOPHAGITIS PRESENT: ICD-10-CM

## 2022-09-29 DIAGNOSIS — I10 HYPERTENSION, UNSPECIFIED TYPE: ICD-10-CM

## 2022-09-29 DIAGNOSIS — I48.0 PAROXYSMAL ATRIAL FIBRILLATION: ICD-10-CM

## 2022-09-29 DIAGNOSIS — R06.02 SOB (SHORTNESS OF BREATH): ICD-10-CM

## 2022-09-29 DIAGNOSIS — I87.2 VENOUS INSUFFICIENCY: ICD-10-CM

## 2022-09-29 DIAGNOSIS — R60.0 LEG EDEMA, RIGHT: Primary | ICD-10-CM

## 2022-09-29 DIAGNOSIS — K21.9 GASTROESOPHAGEAL REFLUX DISEASE, UNSPECIFIED WHETHER ESOPHAGITIS PRESENT: Primary | ICD-10-CM

## 2022-09-29 PROCEDURE — 3008F PR BODY MASS INDEX (BMI) DOCUMENTED: ICD-10-PCS | Mod: CPTII,,, | Performed by: NURSE PRACTITIONER

## 2022-09-29 PROCEDURE — 1101F PT FALLS ASSESS-DOCD LE1/YR: CPT | Mod: CPTII,,, | Performed by: NURSE PRACTITIONER

## 2022-09-29 PROCEDURE — 3078F PR MOST RECENT DIASTOLIC BLOOD PRESSURE < 80 MM HG: ICD-10-PCS | Mod: CPTII,,, | Performed by: NURSE PRACTITIONER

## 2022-09-29 PROCEDURE — 3008F BODY MASS INDEX DOCD: CPT | Mod: CPTII,,, | Performed by: NURSE PRACTITIONER

## 2022-09-29 PROCEDURE — 1125F AMNT PAIN NOTED PAIN PRSNT: CPT | Mod: CPTII,,, | Performed by: NURSE PRACTITIONER

## 2022-09-29 PROCEDURE — 99214 OFFICE O/P EST MOD 30 MIN: CPT | Mod: ,,, | Performed by: NURSE PRACTITIONER

## 2022-09-29 PROCEDURE — 3078F DIAST BP <80 MM HG: CPT | Mod: CPTII,,, | Performed by: NURSE PRACTITIONER

## 2022-09-29 PROCEDURE — 1159F MED LIST DOCD IN RCRD: CPT | Mod: CPTII,,, | Performed by: NURSE PRACTITIONER

## 2022-09-29 PROCEDURE — 1160F RVW MEDS BY RX/DR IN RCRD: CPT | Mod: CPTII,,, | Performed by: NURSE PRACTITIONER

## 2022-09-29 PROCEDURE — 3074F PR MOST RECENT SYSTOLIC BLOOD PRESSURE < 130 MM HG: ICD-10-PCS | Mod: CPTII,,, | Performed by: NURSE PRACTITIONER

## 2022-09-29 PROCEDURE — 1159F PR MEDICATION LIST DOCUMENTED IN MEDICAL RECORD: ICD-10-PCS | Mod: CPTII,,, | Performed by: NURSE PRACTITIONER

## 2022-09-29 PROCEDURE — 3288F PR FALLS RISK ASSESSMENT DOCUMENTED: ICD-10-PCS | Mod: CPTII,,, | Performed by: NURSE PRACTITIONER

## 2022-09-29 PROCEDURE — 1160F PR REVIEW ALL MEDS BY PRESCRIBER/CLIN PHARMACIST DOCUMENTED: ICD-10-PCS | Mod: CPTII,,, | Performed by: NURSE PRACTITIONER

## 2022-09-29 PROCEDURE — 1125F PR PAIN SEVERITY QUANTIFIED, PAIN PRESENT: ICD-10-PCS | Mod: CPTII,,, | Performed by: NURSE PRACTITIONER

## 2022-09-29 PROCEDURE — 3074F SYST BP LT 130 MM HG: CPT | Mod: CPTII,,, | Performed by: NURSE PRACTITIONER

## 2022-09-29 PROCEDURE — 99214 PR OFFICE/OUTPT VISIT, EST, LEVL IV, 30-39 MIN: ICD-10-PCS | Mod: ,,, | Performed by: NURSE PRACTITIONER

## 2022-09-29 PROCEDURE — 1101F PR PT FALLS ASSESS DOC 0-1 FALLS W/OUT INJ PAST YR: ICD-10-PCS | Mod: CPTII,,, | Performed by: NURSE PRACTITIONER

## 2022-09-29 PROCEDURE — 3288F FALL RISK ASSESSMENT DOCD: CPT | Mod: CPTII,,, | Performed by: NURSE PRACTITIONER

## 2022-09-29 RX ORDER — TRIFLURIDINE 1 G/100ML
SOLUTION OPHTHALMIC
Status: ON HOLD | COMMUNITY
Start: 2022-09-28 | End: 2023-01-07

## 2022-09-29 RX ORDER — DULOXETIN HYDROCHLORIDE 30 MG/1
CAPSULE, DELAYED RELEASE ORAL
Qty: 30 CAPSULE | Refills: 0 | Status: SHIPPED | OUTPATIENT
Start: 2022-09-29 | End: 2022-10-28

## 2022-09-29 RX ORDER — DILTIAZEM HYDROCHLORIDE 180 MG/1
CAPSULE, EXTENDED RELEASE ORAL
Qty: 30 CAPSULE | Refills: 0 | Status: SHIPPED | OUTPATIENT
Start: 2022-09-29 | End: 2022-11-03

## 2022-09-29 RX ORDER — MONTELUKAST SODIUM 10 MG/1
TABLET ORAL
Qty: 30 TABLET | Refills: 0 | Status: SHIPPED | OUTPATIENT
Start: 2022-09-29 | End: 2022-11-03

## 2022-09-29 RX ORDER — ALBUTEROL SULFATE 90 UG/1
AEROSOL, METERED RESPIRATORY (INHALATION)
Qty: 8 G | Refills: 3 | Status: SHIPPED | OUTPATIENT
Start: 2022-09-29 | End: 2022-09-30 | Stop reason: SDUPTHER

## 2022-09-29 NOTE — PROGRESS NOTES
"Subjective:      Patient ID: John Bullard is a 72 y.o. male.    Chief Complaint: Leg Pain (Pt here for right calf muscle pain that started last week and thought it was a rosa horse but not it is swollen and down to his feet and pt is on Eliquis and pain not getting better)      HPI: Patient here today for c/o RLE edema/pain x 10 days. States that he awoke with Charley horse 10 days ago with inc pain to care that persists.  Some bruising noted to ankle. Reports some claudication s/s. No CP, palpitations, or SOB. He is OAC Eliquis for h/o A-fib.    Vaccines: Td, Shingrix, PNA, COVID  Colorectal Screening: Due    Review of patient's allergies indicates:   Allergen Reactions    Ativan [lorazepam] Hallucinations       Review of Systems  Constitutional: No fever, No chills, No sweats, No fatigue  Respiratory: No shortness of breath, No exertional dyspnea.   Cardiovascular: No chest pain, No palpitations, claudication, No orthopnea, RLE peripheral edema.  Integumentary: No rash, R calf ecchymosis.    Objective:   BP (!) 100/58 (BP Location: Left arm, Patient Position: Sitting, BP Method: Medium (Manual))   Pulse 77   Resp 14   Ht 5' 8" (1.727 m)   Wt 110.5 kg (243 lb 9.6 oz)   SpO2 97%   BMI 37.04 kg/m²     Physical Exam  Constitutional:       General: He is not in acute distress.     Appearance: Normal appearance. He is normal weight. He is not ill-appearing, toxic-appearing or diaphoretic.   HENT:      Head: Normocephalic and atraumatic.   Cardiovascular:      Rate and Rhythm: Normal rate and regular rhythm.      Pulses: Normal pulses.      Heart sounds: Normal heart sounds.   Pulmonary:      Effort: Pulmonary effort is normal. No respiratory distress.      Breath sounds: Normal breath sounds. No stridor. No wheezing, rhonchi or rales.   Musculoskeletal:         General: Swelling (RLE) present. Normal range of motion.   Skin:     General: Skin is warm and dry.      Findings: Bruising (mild area to " posterior/lower R calf) present.   Neurological:      General: No focal deficit present.      Mental Status: He is alert and oriented to person, place, and time. Mental status is at baseline.   Psychiatric:         Mood and Affect: Mood normal.         Behavior: Behavior normal.         Thought Content: Thought content normal.         Judgment: Judgment normal.       Assessment/Plan:   1. Leg edema, right  US venous NIVA  Elevate LE, rec use of compression    - US Lower Extremity Veins Right; Future    2. Venous insufficiency  See above.    - US Lower Extremity Veins Right; Future    3. Paroxysmal atrial fibrillation  On Eliquis.    - US Lower Extremity Veins Right; Future    Medication List with Changes/Refills   Current Medications    ALBUTEROL (PROVENTIL/VENTOLIN HFA) 90 MCG/ACTUATION INHALER    INHALE 2 PUFFS BY MOUTH EVERY 6 HOURS    ALPRAZOLAM (XANAX) 0.5 MG TABLET    Take 0.5 mg by mouth 2 (two) times daily. as needed for anxiety.    AMIODARONE (PACERONE) 200 MG TAB    TAKE 2 TABLETS BY MOUTH TWICE DAILY FOR 3 DAYS, THEN TAKE 1 TABLET BY MOUTH TWICE DAILY FOR 3 DAYS, THEN TAKE 1 TABLET BY MOUTH ONCE DAILY    ANORO ELLIPTA 62.5-25 MCG/ACTUATION DSDV        CARTIA  MG 24 HR CAPSULE    Take 1 capsule by mouth once daily    DOXYCYCLINE (VIBRA-TABS) 100 MG TABLET    Take 100 mg by mouth 2 (two) times daily.    DULOXETINE (CYMBALTA) 30 MG CAPSULE    TAKE 1 CAPSULE BY MOUTH ONCE DAILY**DO NOT CRUSH OR CHEW**    ELIQUIS 5 MG TAB    Take 5 mg by mouth 2 (two) times daily.    HYDROCHLOROTHIAZIDE (HYDRODIURIL) 50 MG TABLET    Take 1 tablet by mouth once daily    LATANOPROST 0.005 % OPHTHALMIC SOLUTION    INSTILL 1 DROP INTO EACH EYE AT BEDTIME    METOPROLOL SUCCINATE (TOPROL-XL) 50 MG 24 HR TABLET    Take 50 mg by mouth once daily.    MONTELUKAST (SINGULAIR) 10 MG TABLET    Take 1 tablet by mouth once daily    OMEPRAZOLE (PRILOSEC) 40 MG CAPSULE    Take 1 capsule by mouth once daily    POTASSIUM CHLORIDE  (KLOR-CON) 8 MEQ TBSR    Take 8 mEq by mouth once daily.    ROSUVASTATIN (CRESTOR) 20 MG TABLET    TAKE 1 TABLET BY MOUTH ONCE IN THE EVENING    TAMSULOSIN (FLOMAX) 0.4 MG CAP    Take 1 capsule by mouth once daily.    TESTOSTERONE CYPIONATE (DEPOTESTOTERONE CYPIONATE) 200 MG/ML INJECTION    Inject 1 mL (200 mg total) into the muscle every 14 (fourteen) days.    TRIFLURIDINE (VIROPTIC) 1 % OPHTHALMIC SOLUTION    Place into both eyes.    ZOLPIDEM (AMBIEN) 5 MG TAB    TAKE 1 TABLET BY MOUTH ONCE DAILY AT BEDTIME AS NEEDED FOR INSOMNIA        No follow-ups on file.

## 2022-09-29 NOTE — TELEPHONE ENCOUNTER
Attempted to scheduled at [facility] but was advised by  unable to see order.  I asked if patient can physically bring in the order so that he can schedule when he gets to work, he works at [employer].  Was advised that patient cannot bring in physical orders to schedule, UNTIL patient is scheduled.  Order Edit for [facility], as originally ordered

## 2022-09-30 DIAGNOSIS — R06.02 SOB (SHORTNESS OF BREATH): ICD-10-CM

## 2022-09-30 RX ORDER — ALBUTEROL SULFATE 90 UG/1
AEROSOL, METERED RESPIRATORY (INHALATION)
Qty: 8 G | Refills: 3 | Status: SHIPPED | OUTPATIENT
Start: 2022-09-30 | End: 2022-12-23 | Stop reason: SDUPTHER

## 2022-10-04 DIAGNOSIS — R79.89 LOW TESTOSTERONE: Primary | ICD-10-CM

## 2022-10-04 PROCEDURE — 96372 PR INJECTION,THERAP/PROPH/DIAG2ST, IM OR SUBCUT: ICD-10-PCS | Mod: ,,, | Performed by: INTERNAL MEDICINE

## 2022-10-04 PROCEDURE — 96372 THER/PROPH/DIAG INJ SC/IM: CPT | Mod: ,,, | Performed by: INTERNAL MEDICINE

## 2022-10-04 RX ORDER — TESTOSTERONE CYPIONATE 200 MG/ML
200 INJECTION, SOLUTION INTRAMUSCULAR
Status: COMPLETED | OUTPATIENT
Start: 2022-10-04 | End: 2022-10-04

## 2022-10-04 RX ADMIN — TESTOSTERONE CYPIONATE 200 MG: 200 INJECTION, SOLUTION INTRAMUSCULAR at 08:10

## 2022-10-18 ENCOUNTER — CLINICAL SUPPORT (OUTPATIENT)
Dept: INTERNAL MEDICINE | Facility: CLINIC | Age: 72
End: 2022-10-18
Payer: COMMERCIAL

## 2022-10-18 DIAGNOSIS — R79.89 LOW TESTOSTERONE: Primary | ICD-10-CM

## 2022-10-18 PROCEDURE — 96372 PR INJECTION,THERAP/PROPH/DIAG2ST, IM OR SUBCUT: ICD-10-PCS | Mod: ,,, | Performed by: INTERNAL MEDICINE

## 2022-10-18 PROCEDURE — 96372 THER/PROPH/DIAG INJ SC/IM: CPT | Mod: ,,, | Performed by: INTERNAL MEDICINE

## 2022-10-18 RX ORDER — TESTOSTERONE CYPIONATE 200 MG/ML
200 INJECTION, SOLUTION INTRAMUSCULAR
Status: COMPLETED | OUTPATIENT
Start: 2022-10-18 | End: 2022-10-18

## 2022-10-18 RX ADMIN — TESTOSTERONE CYPIONATE 200 MG: 200 INJECTION, SOLUTION INTRAMUSCULAR at 08:10

## 2022-10-25 ENCOUNTER — TELEPHONE (OUTPATIENT)
Dept: INTERNAL MEDICINE | Facility: CLINIC | Age: 72
End: 2022-10-25
Payer: COMMERCIAL

## 2022-10-25 NOTE — TELEPHONE ENCOUNTER
The patient called requesting an appointment.  For the last 3-4 days  his resting pulse rate has stayed around 130. He is concerned about this.  He is taking medication currently for his Afib.  He states that his apple watch is constantly alerting him of his high pulse rate.  He has not seen his cardiologist () recently. Please advise.    [phone]

## 2022-10-25 NOTE — TELEPHONE ENCOUNTER
His last visit was 9/29 with paz. Do you want me to schedule a visit with you or refer him out to his Cardiologist since he hasn't seen him in a while? Please advise.

## 2022-10-27 ENCOUNTER — OFFICE VISIT (OUTPATIENT)
Dept: INTERNAL MEDICINE | Facility: CLINIC | Age: 72
End: 2022-10-27
Payer: COMMERCIAL

## 2022-10-27 VITALS
WEIGHT: 246 LBS | DIASTOLIC BLOOD PRESSURE: 72 MMHG | OXYGEN SATURATION: 92 % | SYSTOLIC BLOOD PRESSURE: 112 MMHG | HEART RATE: 133 BPM | HEIGHT: 68 IN | BODY MASS INDEX: 37.28 KG/M2

## 2022-10-27 DIAGNOSIS — R00.0 TACHYCARDIA: ICD-10-CM

## 2022-10-27 PROCEDURE — 1101F PR PT FALLS ASSESS DOC 0-1 FALLS W/OUT INJ PAST YR: ICD-10-PCS | Mod: CPTII,,, | Performed by: INTERNAL MEDICINE

## 2022-10-27 PROCEDURE — 99214 PR OFFICE/OUTPT VISIT, EST, LEVL IV, 30-39 MIN: ICD-10-PCS | Mod: ,,, | Performed by: INTERNAL MEDICINE

## 2022-10-27 PROCEDURE — 1101F PT FALLS ASSESS-DOCD LE1/YR: CPT | Mod: CPTII,,, | Performed by: INTERNAL MEDICINE

## 2022-10-27 PROCEDURE — 3288F PR FALLS RISK ASSESSMENT DOCUMENTED: ICD-10-PCS | Mod: CPTII,,, | Performed by: INTERNAL MEDICINE

## 2022-10-27 PROCEDURE — 1159F PR MEDICATION LIST DOCUMENTED IN MEDICAL RECORD: ICD-10-PCS | Mod: CPTII,,, | Performed by: INTERNAL MEDICINE

## 2022-10-27 PROCEDURE — 99214 OFFICE O/P EST MOD 30 MIN: CPT | Mod: ,,, | Performed by: INTERNAL MEDICINE

## 2022-10-27 PROCEDURE — 3078F DIAST BP <80 MM HG: CPT | Mod: CPTII,,, | Performed by: INTERNAL MEDICINE

## 2022-10-27 PROCEDURE — 3288F FALL RISK ASSESSMENT DOCD: CPT | Mod: CPTII,,, | Performed by: INTERNAL MEDICINE

## 2022-10-27 PROCEDURE — 3078F PR MOST RECENT DIASTOLIC BLOOD PRESSURE < 80 MM HG: ICD-10-PCS | Mod: CPTII,,, | Performed by: INTERNAL MEDICINE

## 2022-10-27 PROCEDURE — 3074F PR MOST RECENT SYSTOLIC BLOOD PRESSURE < 130 MM HG: ICD-10-PCS | Mod: CPTII,,, | Performed by: INTERNAL MEDICINE

## 2022-10-27 PROCEDURE — 3074F SYST BP LT 130 MM HG: CPT | Mod: CPTII,,, | Performed by: INTERNAL MEDICINE

## 2022-10-27 PROCEDURE — 1159F MED LIST DOCD IN RCRD: CPT | Mod: CPTII,,, | Performed by: INTERNAL MEDICINE

## 2022-10-27 NOTE — PROGRESS NOTES
Patient is a 72-year-old man who comes in because of a rapid heart rate.  He uses an Apple watch and over the past week it is given him alert that his heart rate has been high.  Often as high as 130.  He does not feel anything.  No palpitations.  No change in his chronic shortness of breath.  No orthopnea PND or change in chronic mild dependent edema.  No lightheadedness or near-syncope.  Essentially asymptomatic.    He does have history of atrial fib following a hospital stay for small bowel obstruction.  He was followed by his cardiologist who put him on Eliquis as well as amiodarone.  His last checkup with me was 3 months ago it which time everything was good including lab work including a thyroid level.  Subjective:       Patient ID: John Bullard is a 72 y.o. male.    Chief Complaint: elevated heart rate  (Heart rate up to 137)      HPI  Review of Systems     Current Outpatient Medications on File Prior to Visit   Medication Sig Dispense Refill    albuterol (PROVENTIL/VENTOLIN HFA) 90 mcg/actuation inhaler INHALE 2 PUFFS BY MOUTH EVERY 6 HOURS 8 g 3    ALPRAZolam (XANAX) 0.5 MG tablet Take 0.5 mg by mouth 2 (two) times daily. as needed for anxiety.      amiodarone (PACERONE) 200 MG Tab TAKE 2 TABLETS BY MOUTH TWICE DAILY FOR 3 DAYS, THEN TAKE 1 TABLET BY MOUTH TWICE DAILY FOR 3 DAYS, THEN TAKE 1 TABLET BY MOUTH ONCE DAILY      ANORO ELLIPTA 62.5-25 mcg/actuation DsDv       CARTIA  mg 24 hr capsule Take 1 capsule by mouth once daily 30 capsule 0    DULoxetine (CYMBALTA) 30 MG capsule TAKE 1 CAPSULE BY MOUTH ONCE DAILY**DO NOT CRUSH OR CHEW** 30 capsule 0    ELIQUIS 5 mg Tab Take 5 mg by mouth 2 (two) times daily.      hydroCHLOROthiazide (HYDRODIURIL) 50 MG tablet Take 1 tablet by mouth once daily 30 tablet 0    latanoprost 0.005 % ophthalmic solution INSTILL 1 DROP INTO EACH EYE AT BEDTIME      metoprolol succinate (TOPROL-XL) 50 MG 24 hr tablet Take 50 mg by mouth once daily.      montelukast  "(SINGULAIR) 10 mg tablet Take 1 tablet by mouth once daily 30 tablet 0    omeprazole (PRILOSEC) 40 MG capsule Take 1 capsule by mouth once daily 30 capsule 0    potassium chloride (KLOR-CON) 8 MEQ TbSR Take 8 mEq by mouth once daily.      rosuvastatin (CRESTOR) 20 MG tablet TAKE 1 TABLET BY MOUTH ONCE IN THE EVENING      tamsulosin (FLOMAX) 0.4 mg Cap Take 1 capsule by mouth once daily.      testosterone cypionate (DEPOTESTOTERONE CYPIONATE) 200 mg/mL injection Inject 1 mL (200 mg total) into the muscle every 14 (fourteen) days. 3 mL 5    zolpidem (AMBIEN) 5 MG Tab TAKE 1 TABLET BY MOUTH ONCE DAILY AT BEDTIME AS NEEDED FOR INSOMNIA 30 tablet 0    doxycycline (VIBRA-TABS) 100 MG tablet Take 100 mg by mouth 2 (two) times daily.      trifluridine (VIROPTIC) 1 % ophthalmic solution Place into both eyes.       No current facility-administered medications on file prior to visit.     Objective:      /72 (BP Location: Right arm, Patient Position: Sitting)   Pulse (!) 133   Ht 5' 8" (1.727 m)   Wt 111.6 kg (246 lb)   SpO2 (!) 92%   BMI 37.40 kg/m²     Physical Exam  Vitals reviewed.   Constitutional:       Appearance: Normal appearance.   HENT:      Head: Normocephalic and atraumatic.      Mouth/Throat:      Pharynx: Oropharynx is clear.   Eyes:      Pupils: Pupils are equal, round, and reactive to light.   Cardiovascular:      Rate and Rhythm: Normal rate and regular rhythm.      Pulses: Normal pulses.      Heart sounds: Normal heart sounds.      Comments: Heart rate is very fast and slightly irregular.  Pulmonary:      Breath sounds: Normal breath sounds.   Abdominal:      General: Abdomen is flat.      Palpations: Abdomen is soft.      Comments: Abdomen is distended.  But no different from baseline.   Musculoskeletal:      Cervical back: Neck supple.      Comments: There is 1+ bilateral pretibial edema.   Skin:     General: Skin is warm and dry.   Neurological:      Mental Status: He is alert.     EKG shows " atrial fib with rapid ventricular response  Assessment:       1. Tachycardia        1. Atrial fib with rapid ventricular response.  This is recurrence of what happened 6 or 7 months ago following hospital stay for small-bowel obstruction.  Reason for exacerbation unclear.      2. History of hypertension.  Stable     3. Asthma.    4. Obesity.    Plan:       1. Will have him take an extra diltiazem 180 tonight and then tomorrow begin 240 daily.  He has plenty at home as he was taking these in the past.      Check lab work tomorrow to include a CBC CMP TSH T4.    Follow-up with me 1 week.    I texted a message to his cardiologist regarding these plans.

## 2022-10-28 ENCOUNTER — LAB VISIT (OUTPATIENT)
Dept: LAB | Facility: HOSPITAL | Age: 72
End: 2022-10-28
Attending: INTERNAL MEDICINE
Payer: COMMERCIAL

## 2022-10-28 DIAGNOSIS — R00.0 TACHYCARDIA: ICD-10-CM

## 2022-10-28 LAB
ALBUMIN SERPL-MCNC: 3.8 GM/DL (ref 3.4–4.8)
ALBUMIN/GLOB SERPL: 1.7 RATIO (ref 1.1–2)
ALP SERPL-CCNC: 54 UNIT/L (ref 40–150)
ALT SERPL-CCNC: 27 UNIT/L (ref 0–55)
AST SERPL-CCNC: 22 UNIT/L (ref 5–34)
BASOPHILS # BLD AUTO: 0.04 X10(3)/MCL (ref 0–0.2)
BASOPHILS NFR BLD AUTO: 0.6 %
BILIRUBIN DIRECT+TOT PNL SERPL-MCNC: 0.6 MG/DL
BUN SERPL-MCNC: 21.6 MG/DL (ref 8.4–25.7)
CALCIUM SERPL-MCNC: 9.7 MG/DL (ref 8.8–10)
CHLORIDE SERPL-SCNC: 104 MMOL/L (ref 98–107)
CO2 SERPL-SCNC: 32 MMOL/L (ref 23–31)
CREAT SERPL-MCNC: 1.09 MG/DL (ref 0.73–1.18)
EOSINOPHIL # BLD AUTO: 0.23 X10(3)/MCL (ref 0–0.9)
EOSINOPHIL NFR BLD AUTO: 3.6 %
ERYTHROCYTE [DISTWIDTH] IN BLOOD BY AUTOMATED COUNT: 13.6 % (ref 11.5–17)
GFR SERPLBLD CREATININE-BSD FMLA CKD-EPI: >60 MLS/MIN/1.73/M2
GLOBULIN SER-MCNC: 2.2 GM/DL (ref 2.4–3.5)
GLUCOSE SERPL-MCNC: 102 MG/DL (ref 82–115)
HCT VFR BLD AUTO: 46.9 % (ref 42–52)
HGB BLD-MCNC: 15.2 GM/DL (ref 14–18)
IMM GRANULOCYTES # BLD AUTO: 0.03 X10(3)/MCL (ref 0–0.04)
IMM GRANULOCYTES NFR BLD AUTO: 0.5 %
LYMPHOCYTES # BLD AUTO: 0.72 X10(3)/MCL (ref 0.6–4.6)
LYMPHOCYTES NFR BLD AUTO: 11.4 %
MCH RBC QN AUTO: 31.3 PG (ref 27–31)
MCHC RBC AUTO-ENTMCNC: 32.4 MG/DL (ref 33–36)
MCV RBC AUTO: 96.7 FL (ref 80–94)
MONOCYTES # BLD AUTO: 0.66 X10(3)/MCL (ref 0.1–1.3)
MONOCYTES NFR BLD AUTO: 10.4 %
NEUTROPHILS # BLD AUTO: 4.7 X10(3)/MCL (ref 2.1–9.2)
NEUTROPHILS NFR BLD AUTO: 73.5 %
NRBC BLD AUTO-RTO: 0 %
PLATELET # BLD AUTO: 140 X10(3)/MCL (ref 130–400)
PMV BLD AUTO: 11 FL (ref 7.4–10.4)
POTASSIUM SERPL-SCNC: 4 MMOL/L (ref 3.5–5.1)
PROT SERPL-MCNC: 6 GM/DL (ref 5.8–7.6)
RBC # BLD AUTO: 4.85 X10(6)/MCL (ref 4.7–6.1)
SODIUM SERPL-SCNC: 142 MMOL/L (ref 136–145)
T4 FREE SERPL-MCNC: 1.07 NG/DL (ref 0.7–1.48)
TSH SERPL-ACNC: 1.26 UIU/ML (ref 0.35–4.94)
WBC # SPEC AUTO: 6.3 X10(3)/MCL (ref 4.5–11.5)

## 2022-10-28 PROCEDURE — 84443 ASSAY THYROID STIM HORMONE: CPT

## 2022-10-28 PROCEDURE — 84439 ASSAY OF FREE THYROXINE: CPT

## 2022-10-28 PROCEDURE — 36415 COLL VENOUS BLD VENIPUNCTURE: CPT

## 2022-10-28 PROCEDURE — 80053 COMPREHEN METABOLIC PANEL: CPT

## 2022-10-28 PROCEDURE — 85025 COMPLETE CBC W/AUTO DIFF WBC: CPT

## 2022-11-01 ENCOUNTER — CLINICAL SUPPORT (OUTPATIENT)
Dept: INTERNAL MEDICINE | Facility: CLINIC | Age: 72
End: 2022-11-01
Payer: COMMERCIAL

## 2022-11-01 DIAGNOSIS — R79.89 LOW TESTOSTERONE: Primary | ICD-10-CM

## 2022-11-01 PROCEDURE — 96372 PR INJECTION,THERAP/PROPH/DIAG2ST, IM OR SUBCUT: ICD-10-PCS | Mod: ,,, | Performed by: INTERNAL MEDICINE

## 2022-11-01 PROCEDURE — 96372 THER/PROPH/DIAG INJ SC/IM: CPT | Mod: ,,, | Performed by: INTERNAL MEDICINE

## 2022-11-01 RX ORDER — TESTOSTERONE CYPIONATE 200 MG/ML
200 INJECTION, SOLUTION INTRAMUSCULAR
Status: COMPLETED | OUTPATIENT
Start: 2022-11-01 | End: 2022-11-01

## 2022-11-01 RX ADMIN — TESTOSTERONE CYPIONATE 200 MG: 200 INJECTION, SOLUTION INTRAMUSCULAR at 09:11

## 2022-11-03 ENCOUNTER — OFFICE VISIT (OUTPATIENT)
Dept: INTERNAL MEDICINE | Facility: CLINIC | Age: 72
End: 2022-11-03
Payer: COMMERCIAL

## 2022-11-03 VITALS
HEART RATE: 128 BPM | DIASTOLIC BLOOD PRESSURE: 81 MMHG | HEIGHT: 68 IN | RESPIRATION RATE: 20 BRPM | OXYGEN SATURATION: 93 % | BODY MASS INDEX: 37.86 KG/M2 | SYSTOLIC BLOOD PRESSURE: 117 MMHG | WEIGHT: 249.81 LBS

## 2022-11-03 DIAGNOSIS — I48.0 PAROXYSMAL ATRIAL FIBRILLATION: ICD-10-CM

## 2022-11-03 DIAGNOSIS — I87.2 VENOUS INSUFFICIENCY: ICD-10-CM

## 2022-11-03 DIAGNOSIS — I10 PRIMARY HYPERTENSION: ICD-10-CM

## 2022-11-03 DIAGNOSIS — R06.02 SOB (SHORTNESS OF BREATH): ICD-10-CM

## 2022-11-03 DIAGNOSIS — R00.0 TACHYCARDIA: Primary | ICD-10-CM

## 2022-11-03 DIAGNOSIS — E66.01 SEVERE OBESITY (BMI 35.0-39.9) WITH COMORBIDITY: ICD-10-CM

## 2022-11-03 PROCEDURE — 99213 PR OFFICE/OUTPT VISIT, EST, LEVL III, 20-29 MIN: ICD-10-PCS | Mod: ,,, | Performed by: INTERNAL MEDICINE

## 2022-11-03 PROCEDURE — 3074F SYST BP LT 130 MM HG: CPT | Mod: CPTII,,, | Performed by: INTERNAL MEDICINE

## 2022-11-03 PROCEDURE — 1159F MED LIST DOCD IN RCRD: CPT | Mod: CPTII,,, | Performed by: INTERNAL MEDICINE

## 2022-11-03 PROCEDURE — 99213 OFFICE O/P EST LOW 20 MIN: CPT | Mod: ,,, | Performed by: INTERNAL MEDICINE

## 2022-11-03 PROCEDURE — 3008F PR BODY MASS INDEX (BMI) DOCUMENTED: ICD-10-PCS | Mod: CPTII,,, | Performed by: INTERNAL MEDICINE

## 2022-11-03 PROCEDURE — 3079F DIAST BP 80-89 MM HG: CPT | Mod: CPTII,,, | Performed by: INTERNAL MEDICINE

## 2022-11-03 PROCEDURE — 3074F PR MOST RECENT SYSTOLIC BLOOD PRESSURE < 130 MM HG: ICD-10-PCS | Mod: CPTII,,, | Performed by: INTERNAL MEDICINE

## 2022-11-03 PROCEDURE — 1126F PR PAIN SEVERITY QUANTIFIED, NO PAIN PRESENT: ICD-10-PCS | Mod: CPTII,,, | Performed by: INTERNAL MEDICINE

## 2022-11-03 PROCEDURE — 3008F BODY MASS INDEX DOCD: CPT | Mod: CPTII,,, | Performed by: INTERNAL MEDICINE

## 2022-11-03 PROCEDURE — 1159F PR MEDICATION LIST DOCUMENTED IN MEDICAL RECORD: ICD-10-PCS | Mod: CPTII,,, | Performed by: INTERNAL MEDICINE

## 2022-11-03 PROCEDURE — 1126F AMNT PAIN NOTED NONE PRSNT: CPT | Mod: CPTII,,, | Performed by: INTERNAL MEDICINE

## 2022-11-03 PROCEDURE — 3079F PR MOST RECENT DIASTOLIC BLOOD PRESSURE 80-89 MM HG: ICD-10-PCS | Mod: CPTII,,, | Performed by: INTERNAL MEDICINE

## 2022-11-03 RX ORDER — METOPROLOL SUCCINATE 50 MG/1
50 TABLET, EXTENDED RELEASE ORAL 3 TIMES DAILY
Qty: 90 TABLET | Refills: 11 | Status: ON HOLD | OUTPATIENT
Start: 2022-11-03 | End: 2023-01-21 | Stop reason: HOSPADM

## 2022-11-03 RX ORDER — DILTIAZEM HYDROCHLORIDE 240 MG/1
240 CAPSULE, COATED, EXTENDED RELEASE ORAL DAILY
Qty: 30 CAPSULE | Refills: 11 | Status: SHIPPED | OUTPATIENT
Start: 2022-11-03 | End: 2022-11-23 | Stop reason: SDUPTHER

## 2022-11-03 NOTE — PROGRESS NOTES
The patient is a 72-year-old man with a history of atrial fib in recently and increase in his heart rate.  He is essentially asymptomatic but his Apple watch told him his rate was up and sure enough it has been.  I saw him last week and I increased his diltiazem to 240 daily.  His cardiologist increased his metoprolol to 100 daily.  His heart rate is improved slightly but still mostly in the 120 range.  He remains essentially asymptomatic but does note increase in his dependent edema.  Subjective:       Patient ID: John Bullard is a 72 y.o. male.    Chief Complaint: Follow-up      Follow-up    Review of Systems     Current Outpatient Medications on File Prior to Visit   Medication Sig Dispense Refill    albuterol (PROVENTIL/VENTOLIN HFA) 90 mcg/actuation inhaler INHALE 2 PUFFS BY MOUTH EVERY 6 HOURS 8 g 3    ALPRAZolam (XANAX) 0.5 MG tablet Take 0.5 mg by mouth 2 (two) times daily. as needed for anxiety.      amiodarone (PACERONE) 200 MG Tab TAKE 2 TABLETS BY MOUTH TWICE DAILY FOR 3 DAYS, THEN TAKE 1 TABLET BY MOUTH TWICE DAILY FOR 3 DAYS, THEN TAKE 1 TABLET BY MOUTH ONCE DAILY      ANORO ELLIPTA 62.5-25 mcg/actuation DsDv       doxycycline (VIBRA-TABS) 100 MG tablet Take 100 mg by mouth 2 (two) times daily.      DULoxetine (CYMBALTA) 30 MG capsule TAKE 1 CAPSULE BY MOUTH ONCE DAILY .  DO  NOT  CRUSH  OR  CHEW 30 capsule 0    ELIQUIS 5 mg Tab Take 5 mg by mouth 2 (two) times daily.      hydroCHLOROthiazide (HYDRODIURIL) 50 MG tablet Take 1 tablet by mouth once daily 30 tablet 0    latanoprost 0.005 % ophthalmic solution INSTILL 1 DROP INTO EACH EYE AT BEDTIME      montelukast (SINGULAIR) 10 mg tablet Take 1 tablet by mouth once daily 30 tablet 0    omeprazole (PRILOSEC) 40 MG capsule Take 1 capsule by mouth once daily 30 capsule 0    potassium chloride (KLOR-CON) 8 MEQ TbSR Take 8 mEq by mouth once daily.      rosuvastatin (CRESTOR) 20 MG tablet TAKE 1 TABLET BY MOUTH ONCE IN THE EVENING      tamsulosin (FLOMAX)  "0.4 mg Cap Take 1 capsule by mouth once daily.      testosterone cypionate (DEPOTESTOTERONE CYPIONATE) 200 mg/mL injection Inject 1 mL (200 mg total) into the muscle every 14 (fourteen) days. 3 mL 5    trifluridine (VIROPTIC) 1 % ophthalmic solution Place into both eyes.      zolpidem (AMBIEN) 5 MG Tab TAKE 1 TABLET BY MOUTH ONCE DAILY AT BEDTIME AS NEEDED FOR INSOMNIA 30 tablet 5    [DISCONTINUED] CARTIA  mg 24 hr capsule Take 1 capsule by mouth once daily 30 capsule 0    [DISCONTINUED] metoprolol succinate (TOPROL-XL) 50 MG 24 hr tablet Take 50 mg by mouth once daily.      [DISCONTINUED] montelukast (SINGULAIR) 10 mg tablet Take 1 tablet by mouth once daily 30 tablet 0    [DISCONTINUED] zolpidem (AMBIEN) 5 MG Tab TAKE 1 TABLET BY MOUTH ONCE DAILY AT BEDTIME AS NEEDED FOR INSOMNIA 30 tablet 0     No current facility-administered medications on file prior to visit.     Objective:      /81 (BP Location: Right arm, Patient Position: Sitting, BP Method: Large (Manual))   Pulse (!) 128   Resp 20   Ht 5' 8" (1.727 m)   Wt 113.3 kg (249 lb 12.8 oz)   SpO2 (!) 93%   BMI 37.98 kg/m²     Physical Exam  Vitals reviewed.   Constitutional:       Appearance: Normal appearance.   HENT:      Head: Normocephalic and atraumatic.      Mouth/Throat:      Pharynx: Oropharynx is clear.   Eyes:      Pupils: Pupils are equal, round, and reactive to light.   Cardiovascular:      Rate and Rhythm: Normal rate and regular rhythm.      Pulses: Normal pulses.      Heart sounds: Normal heart sounds.      Comments: Heart the rhythm is regular but rates around 130.  Pulmonary:      Breath sounds: Normal breath sounds.   Abdominal:      General: Abdomen is flat.      Palpations: Abdomen is soft.   Musculoskeletal:      Cervical back: Neck supple.      Comments: At least 1+ bilateral pretibial edema noted   Skin:     General: Skin is warm and dry.   Neurological:      Mental Status: He is alert.     Lab work reviewed and numbers " acceptable  Assessment:       1. Tachycardia    2. Paroxysmal atrial fibrillation    3. Venous insufficiency    4. SOB (shortness of breath)    5. Severe obesity (BMI 35.0-39.9) with comorbidity    6. Primary hypertension        1. And 2.  Atrial fib and rapid ventricular response.  The fib is old but the tachycardia is new.  Essentially asymptomatic    3. Venous insufficiency lower extremities     4. Hypertension.  Controlled  Plan:       Increase his Toprol to 50 in the morning and 100 at night.  Continue diltiazem 240 sustained release daily.  Follow-up with me 3 weeks.  He has an appointment with his cardiologist next week.  She

## 2022-11-11 DIAGNOSIS — I10 HYPERTENSION, UNSPECIFIED TYPE: Primary | ICD-10-CM

## 2022-11-11 DIAGNOSIS — I48.0 PAROXYSMAL ATRIAL FIBRILLATION: ICD-10-CM

## 2022-11-11 RX ORDER — HYDROCHLOROTHIAZIDE 50 MG/1
TABLET ORAL
Qty: 30 TABLET | Refills: 6 | Status: ON HOLD | OUTPATIENT
Start: 2022-11-11 | End: 2023-01-21 | Stop reason: HOSPADM

## 2022-11-15 ENCOUNTER — CLINICAL SUPPORT (OUTPATIENT)
Dept: INTERNAL MEDICINE | Facility: CLINIC | Age: 72
End: 2022-11-15
Payer: COMMERCIAL

## 2022-11-15 DIAGNOSIS — R79.89 LOW TESTOSTERONE: Primary | ICD-10-CM

## 2022-11-15 PROCEDURE — 96372 THER/PROPH/DIAG INJ SC/IM: CPT | Mod: ,,, | Performed by: INTERNAL MEDICINE

## 2022-11-15 PROCEDURE — 96372 PR INJECTION,THERAP/PROPH/DIAG2ST, IM OR SUBCUT: ICD-10-PCS | Mod: ,,, | Performed by: INTERNAL MEDICINE

## 2022-11-15 RX ORDER — TESTOSTERONE CYPIONATE 200 MG/ML
200 INJECTION, SOLUTION INTRAMUSCULAR
Status: COMPLETED | OUTPATIENT
Start: 2022-11-15 | End: 2022-11-15

## 2022-11-15 RX ADMIN — TESTOSTERONE CYPIONATE 200 MG: 200 INJECTION, SOLUTION INTRAMUSCULAR at 08:11

## 2022-11-16 ENCOUNTER — TELEPHONE (OUTPATIENT)
Dept: INTERNAL MEDICINE | Facility: CLINIC | Age: 72
End: 2022-11-16
Payer: COMMERCIAL

## 2022-11-16 NOTE — TELEPHONE ENCOUNTER
----- Message from Teodora Benavidez LPN sent at 11/15/2022 11:52 AM CST -----  Regarding: qiana Leigh 11/23 @10:40  Fasting labs needed.

## 2022-11-21 ENCOUNTER — LAB VISIT (OUTPATIENT)
Dept: LAB | Facility: HOSPITAL | Age: 72
End: 2022-11-21
Attending: INTERNAL MEDICINE
Payer: COMMERCIAL

## 2022-11-21 DIAGNOSIS — K21.9 GASTROESOPHAGEAL REFLUX DISEASE, UNSPECIFIED WHETHER ESOPHAGITIS PRESENT: ICD-10-CM

## 2022-11-21 DIAGNOSIS — R79.89 LOW TESTOSTERONE: ICD-10-CM

## 2022-11-21 DIAGNOSIS — I10 HYPERTENSION, UNSPECIFIED TYPE: ICD-10-CM

## 2022-11-21 DIAGNOSIS — J45.909 ASTHMA, UNSPECIFIED ASTHMA SEVERITY, UNSPECIFIED WHETHER COMPLICATED, UNSPECIFIED WHETHER PERSISTENT: ICD-10-CM

## 2022-11-21 LAB
ALBUMIN SERPL-MCNC: 3.7 GM/DL (ref 3.4–4.8)
ALBUMIN/GLOB SERPL: 1.6 RATIO (ref 1.1–2)
ALP SERPL-CCNC: 60 UNIT/L (ref 40–150)
ALT SERPL-CCNC: 26 UNIT/L (ref 0–55)
AST SERPL-CCNC: 23 UNIT/L (ref 5–34)
BASOPHILS # BLD AUTO: 0.04 X10(3)/MCL (ref 0–0.2)
BASOPHILS NFR BLD AUTO: 0.6 %
BILIRUBIN DIRECT+TOT PNL SERPL-MCNC: 0.8 MG/DL
BUN SERPL-MCNC: 10.7 MG/DL (ref 8.4–25.7)
CALCIUM SERPL-MCNC: 9.2 MG/DL (ref 8.8–10)
CHLORIDE SERPL-SCNC: 98 MMOL/L (ref 98–107)
CHOLEST SERPL-MCNC: 94 MG/DL
CHOLEST/HDLC SERPL: 2 {RATIO} (ref 0–5)
CO2 SERPL-SCNC: 34 MMOL/L (ref 23–31)
CREAT SERPL-MCNC: 0.93 MG/DL (ref 0.73–1.18)
EOSINOPHIL # BLD AUTO: 0.23 X10(3)/MCL (ref 0–0.9)
EOSINOPHIL NFR BLD AUTO: 3.4 %
ERYTHROCYTE [DISTWIDTH] IN BLOOD BY AUTOMATED COUNT: 13.3 % (ref 11.5–17)
GFR SERPLBLD CREATININE-BSD FMLA CKD-EPI: >60 MLS/MIN/1.73/M2
GLOBULIN SER-MCNC: 2.3 GM/DL (ref 2.4–3.5)
GLUCOSE SERPL-MCNC: 87 MG/DL (ref 82–115)
HCT VFR BLD AUTO: 47.5 % (ref 42–52)
HDLC SERPL-MCNC: 40 MG/DL (ref 35–60)
HGB BLD-MCNC: 15.1 GM/DL (ref 14–18)
IMM GRANULOCYTES # BLD AUTO: 0.04 X10(3)/MCL (ref 0–0.04)
IMM GRANULOCYTES NFR BLD AUTO: 0.6 %
LDLC SERPL CALC-MCNC: 42 MG/DL (ref 50–140)
LYMPHOCYTES # BLD AUTO: 0.64 X10(3)/MCL (ref 0.6–4.6)
LYMPHOCYTES NFR BLD AUTO: 9.4 %
MCH RBC QN AUTO: 30.3 PG (ref 27–31)
MCHC RBC AUTO-ENTMCNC: 31.8 MG/DL (ref 33–36)
MCV RBC AUTO: 95.4 FL (ref 80–94)
MONOCYTES # BLD AUTO: 0.64 X10(3)/MCL (ref 0.1–1.3)
MONOCYTES NFR BLD AUTO: 9.4 %
NEUTROPHILS # BLD AUTO: 5.2 X10(3)/MCL (ref 2.1–9.2)
NEUTROPHILS NFR BLD AUTO: 76.6 %
NRBC BLD AUTO-RTO: 0 %
PLATELET # BLD AUTO: 124 X10(3)/MCL (ref 130–400)
PMV BLD AUTO: 11.6 FL (ref 7.4–10.4)
POTASSIUM SERPL-SCNC: 3.7 MMOL/L (ref 3.5–5.1)
PROT SERPL-MCNC: 6 GM/DL (ref 5.8–7.6)
PSA SERPL-MCNC: 0.88 NG/ML
RBC # BLD AUTO: 4.98 X10(6)/MCL (ref 4.7–6.1)
SODIUM SERPL-SCNC: 139 MMOL/L (ref 136–145)
TRIGL SERPL-MCNC: 59 MG/DL (ref 34–140)
TSH SERPL-ACNC: 1.67 UIU/ML (ref 0.35–4.94)
VLDLC SERPL CALC-MCNC: 12 MG/DL
WBC # SPEC AUTO: 6.8 X10(3)/MCL (ref 4.5–11.5)

## 2022-11-21 PROCEDURE — 84153 ASSAY OF PSA TOTAL: CPT

## 2022-11-21 PROCEDURE — 85025 COMPLETE CBC W/AUTO DIFF WBC: CPT

## 2022-11-21 PROCEDURE — 36415 COLL VENOUS BLD VENIPUNCTURE: CPT

## 2022-11-21 PROCEDURE — 80061 LIPID PANEL: CPT

## 2022-11-21 PROCEDURE — 80053 COMPREHEN METABOLIC PANEL: CPT

## 2022-11-21 PROCEDURE — 84443 ASSAY THYROID STIM HORMONE: CPT

## 2022-11-23 ENCOUNTER — OFFICE VISIT (OUTPATIENT)
Dept: INTERNAL MEDICINE | Facility: CLINIC | Age: 72
End: 2022-11-23
Payer: COMMERCIAL

## 2022-11-23 VITALS
SYSTOLIC BLOOD PRESSURE: 128 MMHG | DIASTOLIC BLOOD PRESSURE: 84 MMHG | BODY MASS INDEX: 37.62 KG/M2 | HEIGHT: 68 IN | OXYGEN SATURATION: 89 % | HEART RATE: 116 BPM | WEIGHT: 248.19 LBS | RESPIRATION RATE: 20 BRPM

## 2022-11-23 DIAGNOSIS — R06.02 SOB (SHORTNESS OF BREATH): ICD-10-CM

## 2022-11-23 DIAGNOSIS — I48.0 PAROXYSMAL ATRIAL FIBRILLATION: Primary | ICD-10-CM

## 2022-11-23 DIAGNOSIS — E66.01 SEVERE OBESITY (BMI 35.0-39.9) WITH COMORBIDITY: ICD-10-CM

## 2022-11-23 PROCEDURE — 3079F PR MOST RECENT DIASTOLIC BLOOD PRESSURE 80-89 MM HG: ICD-10-PCS | Mod: CPTII,,, | Performed by: INTERNAL MEDICINE

## 2022-11-23 PROCEDURE — 1101F PT FALLS ASSESS-DOCD LE1/YR: CPT | Mod: CPTII,,, | Performed by: INTERNAL MEDICINE

## 2022-11-23 PROCEDURE — 3288F FALL RISK ASSESSMENT DOCD: CPT | Mod: CPTII,,, | Performed by: INTERNAL MEDICINE

## 2022-11-23 PROCEDURE — 99213 PR OFFICE/OUTPT VISIT, EST, LEVL III, 20-29 MIN: ICD-10-PCS | Mod: ,,, | Performed by: INTERNAL MEDICINE

## 2022-11-23 PROCEDURE — 99213 OFFICE O/P EST LOW 20 MIN: CPT | Mod: ,,, | Performed by: INTERNAL MEDICINE

## 2022-11-23 PROCEDURE — 1126F AMNT PAIN NOTED NONE PRSNT: CPT | Mod: CPTII,,, | Performed by: INTERNAL MEDICINE

## 2022-11-23 PROCEDURE — 3079F DIAST BP 80-89 MM HG: CPT | Mod: CPTII,,, | Performed by: INTERNAL MEDICINE

## 2022-11-23 PROCEDURE — 1126F PR PAIN SEVERITY QUANTIFIED, NO PAIN PRESENT: ICD-10-PCS | Mod: CPTII,,, | Performed by: INTERNAL MEDICINE

## 2022-11-23 PROCEDURE — 3074F PR MOST RECENT SYSTOLIC BLOOD PRESSURE < 130 MM HG: ICD-10-PCS | Mod: CPTII,,, | Performed by: INTERNAL MEDICINE

## 2022-11-23 PROCEDURE — 3008F PR BODY MASS INDEX (BMI) DOCUMENTED: ICD-10-PCS | Mod: CPTII,,, | Performed by: INTERNAL MEDICINE

## 2022-11-23 PROCEDURE — 3008F BODY MASS INDEX DOCD: CPT | Mod: CPTII,,, | Performed by: INTERNAL MEDICINE

## 2022-11-23 PROCEDURE — 3288F PR FALLS RISK ASSESSMENT DOCUMENTED: ICD-10-PCS | Mod: CPTII,,, | Performed by: INTERNAL MEDICINE

## 2022-11-23 PROCEDURE — 1101F PR PT FALLS ASSESS DOC 0-1 FALLS W/OUT INJ PAST YR: ICD-10-PCS | Mod: CPTII,,, | Performed by: INTERNAL MEDICINE

## 2022-11-23 PROCEDURE — 3074F SYST BP LT 130 MM HG: CPT | Mod: CPTII,,, | Performed by: INTERNAL MEDICINE

## 2022-11-23 RX ORDER — DILTIAZEM HYDROCHLORIDE 360 MG/1
360 CAPSULE, EXTENDED RELEASE ORAL DAILY
Qty: 30 CAPSULE | Refills: 11 | Status: SHIPPED | OUTPATIENT
Start: 2022-11-23 | End: 2022-12-01

## 2022-11-23 NOTE — PROGRESS NOTES
Subjective:       Patient ID: John Bullard is a 72 y.o. male.    Chief Complaint: Follow-up (3wk/Discuss labs )      The patient is a 72-year-old man with a history of atrial fibrillation.  He is also seen his cardiologist who found him now to be in flutter with rates around 120.  That is congruent with his rate seen on his Apple watch.  He does however complain of shortness of breath with exertion.  He is very limited in his activity because of the shortness of breath.  At rest he is fine.  No chest pain.  No edema.    He has been referred to an electrophysiologist for possible ablation.  His meds and not been adjusted we have considered increasing the dose of diltiazem.    Follow-up    Review of Systems     Current Outpatient Medications on File Prior to Visit   Medication Sig Dispense Refill    albuterol (PROVENTIL/VENTOLIN HFA) 90 mcg/actuation inhaler INHALE 2 PUFFS BY MOUTH EVERY 6 HOURS 8 g 3    ALPRAZolam (XANAX) 0.5 MG tablet Take 1 tablet by mouth twice daily as needed for anxiety 45 tablet 0    amiodarone (PACERONE) 200 MG Tab TAKE 2 TABLETS BY MOUTH TWICE DAILY FOR 3 DAYS, THEN TAKE 1 TABLET BY MOUTH TWICE DAILY FOR 3 DAYS, THEN TAKE 1 TABLET BY MOUTH ONCE DAILY      ANORO ELLIPTA 62.5-25 mcg/actuation DsDv       doxycycline (VIBRA-TABS) 100 MG tablet Take 100 mg by mouth 2 (two) times daily.      DULoxetine (CYMBALTA) 30 MG capsule TAKE 1 CAPSULE BY MOUTH ONCE DAILY .  DO  NOT  CRUSH  OR  CHEW 30 capsule 0    ELIQUIS 5 mg Tab Take 5 mg by mouth 2 (two) times daily.      hydroCHLOROthiazide (HYDRODIURIL) 50 MG tablet Take 1 tablet by mouth once daily 30 tablet 6    latanoprost 0.005 % ophthalmic solution INSTILL 1 DROP INTO EACH EYE AT BEDTIME      metoprolol succinate (TOPROL-XL) 50 MG 24 hr tablet Take 1 tablet (50 mg total) by mouth 3 (three) times daily. 90 tablet 11    montelukast (SINGULAIR) 10 mg tablet Take 1 tablet by mouth once daily 30 tablet 0    omeprazole (PRILOSEC) 40 MG capsule Take 1  "capsule by mouth once daily 30 capsule 0    potassium chloride (KLOR-CON) 8 MEQ TbSR Take 8 mEq by mouth once daily.      rosuvastatin (CRESTOR) 20 MG tablet TAKE 1 TABLET BY MOUTH ONCE IN THE EVENING      tamsulosin (FLOMAX) 0.4 mg Cap Take 1 capsule by mouth once daily.      testosterone cypionate (DEPOTESTOTERONE CYPIONATE) 200 mg/mL injection Inject 1 mL (200 mg total) into the muscle every 14 (fourteen) days. 3 mL 5    trifluridine (VIROPTIC) 1 % ophthalmic solution Place into both eyes.      zolpidem (AMBIEN) 5 MG Tab TAKE 1 TABLET BY MOUTH ONCE DAILY AT BEDTIME AS NEEDED FOR INSOMNIA 30 tablet 5    [DISCONTINUED] diltiaZEM (CARDIZEM CD) 240 MG 24 hr capsule Take 1 capsule (240 mg total) by mouth once daily. 30 capsule 11     No current facility-administered medications on file prior to visit.     Objective:      /84 (BP Location: Left arm, Patient Position: Sitting, BP Method: Large (Manual))   Pulse (!) 116   Resp 20   Ht 5' 7.99" (1.727 m)   Wt 112.6 kg (248 lb 3.2 oz)   SpO2 (!) 89%   BMI 37.75 kg/m²     Physical Exam  Vitals reviewed.   Constitutional:       Appearance: Normal appearance.   HENT:      Head: Normocephalic and atraumatic.      Mouth/Throat:      Pharynx: Oropharynx is clear.   Eyes:      Pupils: Pupils are equal, round, and reactive to light.   Cardiovascular:      Rate and Rhythm: Normal rate and regular rhythm.      Pulses: Normal pulses.      Heart sounds: Normal heart sounds.      Comments: The ventricular rate is quite fast and at least 120 per lying down.  Pulmonary:      Breath sounds: Normal breath sounds.   Abdominal:      General: Abdomen is flat.      Palpations: Abdomen is soft.   Musculoskeletal:      Cervical back: Neck supple.   Skin:     General: Skin is warm and dry.   Neurological:      Mental Status: He is alert.       Lab work reviewed.  Numbers are all okay including thyroid level.  Assessment:       1. Paroxysmal atrial fibrillation    2. SOB (shortness of " breath)    3. Severe obesity (BMI 35.0-39.9) with comorbidity          Most likely the atrial fib flutter is the reason for the exacerbation of exertional dyspnea.  Plan:         Increase diltiazem to 360 daily.  Follow-up with me 1 week.

## 2022-12-01 ENCOUNTER — OFFICE VISIT (OUTPATIENT)
Dept: INTERNAL MEDICINE | Facility: CLINIC | Age: 72
End: 2022-12-01
Payer: COMMERCIAL

## 2022-12-01 VITALS
DIASTOLIC BLOOD PRESSURE: 78 MMHG | BODY MASS INDEX: 38.98 KG/M2 | SYSTOLIC BLOOD PRESSURE: 130 MMHG | WEIGHT: 248.38 LBS | HEIGHT: 67 IN | HEART RATE: 107 BPM | OXYGEN SATURATION: 93 % | RESPIRATION RATE: 18 BRPM

## 2022-12-01 DIAGNOSIS — I48.0 PAROXYSMAL ATRIAL FIBRILLATION: Primary | ICD-10-CM

## 2022-12-01 DIAGNOSIS — R79.89 LOW TESTOSTERONE: Primary | ICD-10-CM

## 2022-12-01 DIAGNOSIS — R06.02 SOB (SHORTNESS OF BREATH): ICD-10-CM

## 2022-12-01 PROCEDURE — 96372 THER/PROPH/DIAG INJ SC/IM: CPT | Mod: ,,, | Performed by: INTERNAL MEDICINE

## 2022-12-01 PROCEDURE — 3008F BODY MASS INDEX DOCD: CPT | Mod: CPTII,,, | Performed by: INTERNAL MEDICINE

## 2022-12-01 PROCEDURE — 3075F PR MOST RECENT SYSTOLIC BLOOD PRESS GE 130-139MM HG: ICD-10-PCS | Mod: CPTII,,, | Performed by: INTERNAL MEDICINE

## 2022-12-01 PROCEDURE — 1126F AMNT PAIN NOTED NONE PRSNT: CPT | Mod: CPTII,,, | Performed by: INTERNAL MEDICINE

## 2022-12-01 PROCEDURE — 3078F PR MOST RECENT DIASTOLIC BLOOD PRESSURE < 80 MM HG: ICD-10-PCS | Mod: CPTII,,, | Performed by: INTERNAL MEDICINE

## 2022-12-01 PROCEDURE — 96372 PR INJECTION,THERAP/PROPH/DIAG2ST, IM OR SUBCUT: ICD-10-PCS | Mod: ,,, | Performed by: INTERNAL MEDICINE

## 2022-12-01 PROCEDURE — 3075F SYST BP GE 130 - 139MM HG: CPT | Mod: CPTII,,, | Performed by: INTERNAL MEDICINE

## 2022-12-01 PROCEDURE — 3288F FALL RISK ASSESSMENT DOCD: CPT | Mod: CPTII,,, | Performed by: INTERNAL MEDICINE

## 2022-12-01 PROCEDURE — 1159F MED LIST DOCD IN RCRD: CPT | Mod: CPTII,,, | Performed by: INTERNAL MEDICINE

## 2022-12-01 PROCEDURE — 3288F PR FALLS RISK ASSESSMENT DOCUMENTED: ICD-10-PCS | Mod: CPTII,,, | Performed by: INTERNAL MEDICINE

## 2022-12-01 PROCEDURE — 3078F DIAST BP <80 MM HG: CPT | Mod: CPTII,,, | Performed by: INTERNAL MEDICINE

## 2022-12-01 PROCEDURE — 1159F PR MEDICATION LIST DOCUMENTED IN MEDICAL RECORD: ICD-10-PCS | Mod: CPTII,,, | Performed by: INTERNAL MEDICINE

## 2022-12-01 PROCEDURE — 99213 PR OFFICE/OUTPT VISIT, EST, LEVL III, 20-29 MIN: ICD-10-PCS | Mod: 25,,, | Performed by: INTERNAL MEDICINE

## 2022-12-01 PROCEDURE — 1101F PR PT FALLS ASSESS DOC 0-1 FALLS W/OUT INJ PAST YR: ICD-10-PCS | Mod: CPTII,,, | Performed by: INTERNAL MEDICINE

## 2022-12-01 PROCEDURE — 1126F PR PAIN SEVERITY QUANTIFIED, NO PAIN PRESENT: ICD-10-PCS | Mod: CPTII,,, | Performed by: INTERNAL MEDICINE

## 2022-12-01 PROCEDURE — 99213 OFFICE O/P EST LOW 20 MIN: CPT | Mod: 25,,, | Performed by: INTERNAL MEDICINE

## 2022-12-01 PROCEDURE — 1101F PT FALLS ASSESS-DOCD LE1/YR: CPT | Mod: CPTII,,, | Performed by: INTERNAL MEDICINE

## 2022-12-01 PROCEDURE — 3008F PR BODY MASS INDEX (BMI) DOCUMENTED: ICD-10-PCS | Mod: CPTII,,, | Performed by: INTERNAL MEDICINE

## 2022-12-01 RX ORDER — TESTOSTERONE CYPIONATE 200 MG/ML
200 INJECTION, SOLUTION INTRAMUSCULAR
Status: COMPLETED | OUTPATIENT
Start: 2022-12-01 | End: 2022-12-01

## 2022-12-01 RX ORDER — DILTIAZEM HYDROCHLORIDE 240 MG/1
240 CAPSULE, COATED, EXTENDED RELEASE ORAL 2 TIMES DAILY
Qty: 60 CAPSULE | Refills: 11 | Status: ON HOLD | OUTPATIENT
Start: 2022-12-01 | End: 2023-01-21 | Stop reason: HOSPADM

## 2022-12-01 RX ADMIN — TESTOSTERONE CYPIONATE 200 MG: 200 INJECTION, SOLUTION INTRAMUSCULAR at 04:12

## 2022-12-01 NOTE — PROGRESS NOTES
Subjective:       Patient ID: John Bullard is a 72 y.o. male.    Chief Complaint: Follow-up      The patient is a 72-year-old man in for follow-up of atrial fib with rapid ventricular response.  Last time he was here increased his Cardizem to 320 daily.  His heart rate has improved but still mildly elevated above the normal range.  His shortness of breath with exertion and in certain positions continues.  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  He is scheduled to see the electrophysiologist in about 2 weeks.    Follow-up    Review of Systems     Current Outpatient Medications on File Prior to Visit   Medication Sig Dispense Refill    albuterol (PROVENTIL/VENTOLIN HFA) 90 mcg/actuation inhaler INHALE 2 PUFFS BY MOUTH EVERY 6 HOURS 8 g 3    ALPRAZolam (XANAX) 0.5 MG tablet Take 1 tablet by mouth twice daily as needed for anxiety 45 tablet 0    amiodarone (PACERONE) 200 MG Tab TAKE 2 TABLETS BY MOUTH TWICE DAILY FOR 3 DAYS, THEN TAKE 1 TABLET BY MOUTH TWICE DAILY FOR 3 DAYS, THEN TAKE 1 TABLET BY MOUTH ONCE DAILY      ANORO ELLIPTA 62.5-25 mcg/actuation DsDv       doxycycline (VIBRA-TABS) 100 MG tablet Take 100 mg by mouth 2 (two) times daily.      DULoxetine (CYMBALTA) 30 MG capsule TAKE 1 CAPSULE BY MOUTH ONCE DAILY. DO NOT CRUSH OR CHEW 30 capsule 0    ELIQUIS 5 mg Tab Take 5 mg by mouth 2 (two) times daily.      hydroCHLOROthiazide (HYDRODIURIL) 50 MG tablet Take 1 tablet by mouth once daily 30 tablet 6    latanoprost 0.005 % ophthalmic solution INSTILL 1 DROP INTO EACH EYE AT BEDTIME      metoprolol succinate (TOPROL-XL) 50 MG 24 hr tablet Take 1 tablet (50 mg total) by mouth 3 (three) times daily. 90 tablet 11    montelukast (SINGULAIR) 10 mg tablet Take 1 tablet by mouth once daily 30 tablet 0    omeprazole (PRILOSEC) 40 MG capsule Take 1 capsule by mouth once daily 30 capsule 0    potassium chloride (KLOR-CON) 8 MEQ TbSR Take 8 mEq by mouth once daily.      rosuvastatin  "(CRESTOR) 20 MG tablet TAKE 1 TABLET BY MOUTH ONCE IN THE EVENING      tamsulosin (FLOMAX) 0.4 mg Cap Take 1 capsule by mouth once daily.      testosterone cypionate (DEPOTESTOTERONE CYPIONATE) 200 mg/mL injection Inject 1 mL (200 mg total) into the muscle every 14 (fourteen) days. 3 mL 5    trifluridine (VIROPTIC) 1 % ophthalmic solution Place into both eyes.      zolpidem (AMBIEN) 5 MG Tab TAKE 1 TABLET BY MOUTH ONCE DAILY AT BEDTIME AS NEEDED FOR INSOMNIA 30 tablet 5    [DISCONTINUED] diltiaZEM (CARDIZEM CD) 360 MG 24 hr capsule Take 1 capsule (360 mg total) by mouth once daily. 30 capsule 11     No current facility-administered medications on file prior to visit.     Objective:      /78 (BP Location: Right arm, Patient Position: Sitting, BP Method: Large (Manual))   Pulse 107   Resp 18   Ht 5' 7" (1.702 m)   Wt 112.7 kg (248 lb 6.4 oz)   SpO2 (!) 93%   BMI 38.90 kg/m²     Physical Exam  Vitals reviewed.   Constitutional:       Appearance: Normal appearance.   HENT:      Head: Normocephalic and atraumatic.      Mouth/Throat:      Pharynx: Oropharynx is clear.   Eyes:      Pupils: Pupils are equal, round, and reactive to light.   Cardiovascular:      Rate and Rhythm: Normal rate and regular rhythm.      Pulses: Normal pulses.      Heart sounds: Normal heart sounds.      Comments: Rhythm is irregular and apical rate is about 110 beats per minute  Pulmonary:      Breath sounds: Normal breath sounds.   Abdominal:      General: Abdomen is flat.      Palpations: Abdomen is soft.   Musculoskeletal:      Cervical back: Neck supple.      Comments: There is 1+ bilateral pretibial edema   Skin:     General: Skin is warm and dry.   Neurological:      Mental Status: He is alert.       Assessment:       No diagnosis found.    Plan:     1. Atrial fibrillation with rapid ventricular response.  Rate remains too high but improved     2. History of asthma     3. 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  Follow-up with me 3 weeks.

## 2022-12-02 DIAGNOSIS — E87.6 HYPOKALEMIA: Primary | ICD-10-CM

## 2022-12-03 RX ORDER — POTASSIUM CHLORIDE 600 MG/1
TABLET, FILM COATED, EXTENDED RELEASE ORAL
Qty: 30 TABLET | Refills: 5 | Status: SHIPPED | OUTPATIENT
Start: 2022-12-03 | End: 2022-12-06 | Stop reason: SDUPTHER

## 2022-12-06 DIAGNOSIS — E87.6 HYPOKALEMIA: ICD-10-CM

## 2022-12-06 RX ORDER — POTASSIUM CHLORIDE 600 MG/1
8 TABLET, FILM COATED, EXTENDED RELEASE ORAL DAILY
Qty: 30 TABLET | Refills: 5 | Status: SHIPPED | OUTPATIENT
Start: 2022-12-06 | End: 2024-01-09 | Stop reason: SDUPTHER

## 2022-12-13 ENCOUNTER — CLINICAL SUPPORT (OUTPATIENT)
Dept: INTERNAL MEDICINE | Facility: CLINIC | Age: 72
End: 2022-12-13
Payer: COMMERCIAL

## 2022-12-13 DIAGNOSIS — R79.89 LOW TESTOSTERONE: Primary | ICD-10-CM

## 2022-12-13 PROCEDURE — 96372 PR INJECTION,THERAP/PROPH/DIAG2ST, IM OR SUBCUT: ICD-10-PCS | Mod: ,,, | Performed by: INTERNAL MEDICINE

## 2022-12-13 PROCEDURE — 96372 THER/PROPH/DIAG INJ SC/IM: CPT | Mod: ,,, | Performed by: INTERNAL MEDICINE

## 2022-12-13 RX ORDER — TESTOSTERONE CYPIONATE 200 MG/ML
200 INJECTION, SOLUTION INTRAMUSCULAR
Status: COMPLETED | OUTPATIENT
Start: 2022-12-13 | End: 2022-12-13

## 2022-12-13 RX ADMIN — TESTOSTERONE CYPIONATE 200 MG: 200 INJECTION, SOLUTION INTRAMUSCULAR at 08:12

## 2022-12-15 ENCOUNTER — TELEPHONE (OUTPATIENT)
Dept: INTERNAL MEDICINE | Facility: CLINIC | Age: 72
End: 2022-12-15
Payer: COMMERCIAL

## 2022-12-15 NOTE — TELEPHONE ENCOUNTER
----- Message from Teodora Benavidez LPN sent at 12/13/2022  1:32 PM CST -----  Regarding: qiana Leigh 12/22 @1:40  No labs needed.

## 2022-12-22 ENCOUNTER — OFFICE VISIT (OUTPATIENT)
Dept: INTERNAL MEDICINE | Facility: CLINIC | Age: 72
End: 2022-12-22
Payer: COMMERCIAL

## 2022-12-22 VITALS
OXYGEN SATURATION: 91 % | RESPIRATION RATE: 18 BRPM | HEART RATE: 71 BPM | WEIGHT: 251.81 LBS | HEIGHT: 67 IN | DIASTOLIC BLOOD PRESSURE: 80 MMHG | SYSTOLIC BLOOD PRESSURE: 132 MMHG | BODY MASS INDEX: 39.52 KG/M2

## 2022-12-22 DIAGNOSIS — I48.19 PERSISTENT ATRIAL FIBRILLATION: Primary | ICD-10-CM

## 2022-12-22 DIAGNOSIS — I87.2 VENOUS INSUFFICIENCY: ICD-10-CM

## 2022-12-22 DIAGNOSIS — R06.02 SOB (SHORTNESS OF BREATH): ICD-10-CM

## 2022-12-22 DIAGNOSIS — I10 PRIMARY HYPERTENSION: ICD-10-CM

## 2022-12-22 PROCEDURE — 3079F PR MOST RECENT DIASTOLIC BLOOD PRESSURE 80-89 MM HG: ICD-10-PCS | Mod: CPTII,,, | Performed by: INTERNAL MEDICINE

## 2022-12-22 PROCEDURE — 3008F PR BODY MASS INDEX (BMI) DOCUMENTED: ICD-10-PCS | Mod: CPTII,,, | Performed by: INTERNAL MEDICINE

## 2022-12-22 PROCEDURE — 1126F AMNT PAIN NOTED NONE PRSNT: CPT | Mod: CPTII,,, | Performed by: INTERNAL MEDICINE

## 2022-12-22 PROCEDURE — 3288F FALL RISK ASSESSMENT DOCD: CPT | Mod: CPTII,,, | Performed by: INTERNAL MEDICINE

## 2022-12-22 PROCEDURE — 3075F PR MOST RECENT SYSTOLIC BLOOD PRESS GE 130-139MM HG: ICD-10-PCS | Mod: CPTII,,, | Performed by: INTERNAL MEDICINE

## 2022-12-22 PROCEDURE — 3075F SYST BP GE 130 - 139MM HG: CPT | Mod: CPTII,,, | Performed by: INTERNAL MEDICINE

## 2022-12-22 PROCEDURE — 1101F PR PT FALLS ASSESS DOC 0-1 FALLS W/OUT INJ PAST YR: ICD-10-PCS | Mod: CPTII,,, | Performed by: INTERNAL MEDICINE

## 2022-12-22 PROCEDURE — 1159F MED LIST DOCD IN RCRD: CPT | Mod: CPTII,,, | Performed by: INTERNAL MEDICINE

## 2022-12-22 PROCEDURE — 3008F BODY MASS INDEX DOCD: CPT | Mod: CPTII,,, | Performed by: INTERNAL MEDICINE

## 2022-12-22 PROCEDURE — 3079F DIAST BP 80-89 MM HG: CPT | Mod: CPTII,,, | Performed by: INTERNAL MEDICINE

## 2022-12-22 PROCEDURE — 99213 PR OFFICE/OUTPT VISIT, EST, LEVL III, 20-29 MIN: ICD-10-PCS | Mod: ,,, | Performed by: INTERNAL MEDICINE

## 2022-12-22 PROCEDURE — 99213 OFFICE O/P EST LOW 20 MIN: CPT | Mod: ,,, | Performed by: INTERNAL MEDICINE

## 2022-12-22 PROCEDURE — 1101F PT FALLS ASSESS-DOCD LE1/YR: CPT | Mod: CPTII,,, | Performed by: INTERNAL MEDICINE

## 2022-12-22 PROCEDURE — 1159F PR MEDICATION LIST DOCUMENTED IN MEDICAL RECORD: ICD-10-PCS | Mod: CPTII,,, | Performed by: INTERNAL MEDICINE

## 2022-12-22 PROCEDURE — 1126F PR PAIN SEVERITY QUANTIFIED, NO PAIN PRESENT: ICD-10-PCS | Mod: CPTII,,, | Performed by: INTERNAL MEDICINE

## 2022-12-22 PROCEDURE — 3288F PR FALLS RISK ASSESSMENT DOCUMENTED: ICD-10-PCS | Mod: CPTII,,, | Performed by: INTERNAL MEDICINE

## 2022-12-22 NOTE — PROGRESS NOTES
Subjective:       Patient ID: John Bullard is a 72 y.o. male.    Chief Complaint: Follow-up      The patient is a 72-year-old  in for follow-up.  I saw him 3 weeks ago at which time I increased his diltiazem because of a persistently elevated heart rate related to his atrial fibrillation.  Since I saw him he did see the electrophysiologist and he is scheduled for an ablation to be done on about January 5th.  He feels okay but still has shortness of breath with minimal exertion.  No chest pain.    Follow-up    Review of Systems     Current Outpatient Medications on File Prior to Visit   Medication Sig Dispense Refill    albuterol (PROVENTIL/VENTOLIN HFA) 90 mcg/actuation inhaler INHALE 2 PUFFS BY MOUTH EVERY 6 HOURS 8 g 3    ALPRAZolam (XANAX) 0.5 MG tablet Take 1 tablet by mouth twice daily as needed for anxiety 45 tablet 0    amiodarone (PACERONE) 200 MG Tab TAKE 2 TABLETS BY MOUTH TWICE DAILY FOR 3 DAYS, THEN TAKE 1 TABLET BY MOUTH TWICE DAILY FOR 3 DAYS, THEN TAKE 1 TABLET BY MOUTH ONCE DAILY      ANORO ELLIPTA 62.5-25 mcg/actuation DsDv       diltiaZEM (CARDIZEM CD) 240 MG 24 hr capsule Take 1 capsule (240 mg total) by mouth 2 (two) times a day. 60 capsule 11    doxycycline (VIBRA-TABS) 100 MG tablet Take 100 mg by mouth 2 (two) times daily.      DULoxetine (CYMBALTA) 30 MG capsule TAKE 1 CAPSULE BY MOUTH ONCE DAILY. DO NOT CRUSH OR CHEW 30 capsule 0    ELIQUIS 5 mg Tab Take 5 mg by mouth 2 (two) times daily.      hydroCHLOROthiazide (HYDRODIURIL) 50 MG tablet Take 1 tablet by mouth once daily 30 tablet 6    latanoprost 0.005 % ophthalmic solution INSTILL 1 DROP INTO EACH EYE AT BEDTIME      metoprolol succinate (TOPROL-XL) 50 MG 24 hr tablet Take 1 tablet (50 mg total) by mouth 3 (three) times daily. 90 tablet 11    montelukast (SINGULAIR) 10 mg tablet Take 1 tablet by mouth once daily 30 tablet 0    omeprazole (PRILOSEC) 40 MG capsule Take 1 capsule by mouth once daily 30 capsule 0     "potassium chloride (KLOR-CON) 8 MEQ TbSR Take 1 tablet (8 mEq total) by mouth once daily. 30 tablet 5    rosuvastatin (CRESTOR) 20 MG tablet TAKE 1 TABLET BY MOUTH ONCE IN THE EVENING      tamsulosin (FLOMAX) 0.4 mg Cap Take 1 capsule by mouth once daily.      testosterone cypionate (DEPOTESTOTERONE CYPIONATE) 200 mg/mL injection Inject 1 mL (200 mg total) into the muscle every 14 (fourteen) days. 3 mL 5    trifluridine (VIROPTIC) 1 % ophthalmic solution Place into both eyes.      zolpidem (AMBIEN) 5 MG Tab TAKE 1 TABLET BY MOUTH ONCE DAILY AT BEDTIME AS NEEDED FOR INSOMNIA 30 tablet 5     No current facility-administered medications on file prior to visit.     Objective:      /80 (BP Location: Left arm, Patient Position: Sitting, BP Method: Large (Manual))   Pulse 71   Resp 18   Ht 5' 7" (1.702 m)   Wt 114.2 kg (251 lb 12.8 oz)   SpO2 (!) 91%   BMI 39.44 kg/m²     Physical Exam  Vitals reviewed.   Constitutional:       Appearance: Normal appearance.   HENT:      Head: Normocephalic and atraumatic.      Mouth/Throat:      Pharynx: Oropharynx is clear.   Eyes:      Pupils: Pupils are equal, round, and reactive to light.   Cardiovascular:      Rate and Rhythm: Normal rate and regular rhythm.      Pulses: Normal pulses.      Heart sounds: Normal heart sounds.      Comments: Heart rhythm is irregular with an apical rate of around 100.   Pulmonary:      Breath sounds: Normal breath sounds.   Abdominal:      General: Abdomen is flat.      Palpations: Abdomen is soft.   Musculoskeletal:      Cervical back: Neck supple.      Comments: One to 2+ pretibial edema bilaterally.   Skin:     General: Skin is warm and dry.   Neurological:      Mental Status: He is alert.       Assessment:       No diagnosis found.    Plan:       1. Atrial fibrillation with rapid ventricular response.  Heart rate has improved.  He is scheduled for ablation in about 2 weeks     2. Hypertension.  Control    3. Venous insufficiency lower " extremities     4. 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  Proceed with ablation.  Follow-up with me 6 weeks.

## 2022-12-23 DIAGNOSIS — R06.02 SOB (SHORTNESS OF BREATH): ICD-10-CM

## 2022-12-23 RX ORDER — ALBUTEROL SULFATE 90 UG/1
AEROSOL, METERED RESPIRATORY (INHALATION)
Qty: 8 G | Refills: 3 | Status: SHIPPED | OUTPATIENT
Start: 2022-12-23 | End: 2023-05-31 | Stop reason: SDUPTHER

## 2023-01-03 ENCOUNTER — ANESTHESIA EVENT (OUTPATIENT)
Dept: CARDIOLOGY | Facility: HOSPITAL | Age: 73
End: 2023-01-03
Payer: COMMERCIAL

## 2023-01-03 ENCOUNTER — CLINICAL SUPPORT (OUTPATIENT)
Dept: INTERNAL MEDICINE | Facility: CLINIC | Age: 73
End: 2023-01-03
Payer: COMMERCIAL

## 2023-01-03 DIAGNOSIS — R79.89 LOW TESTOSTERONE: Primary | ICD-10-CM

## 2023-01-03 PROCEDURE — 96372 THER/PROPH/DIAG INJ SC/IM: CPT | Mod: ,,, | Performed by: INTERNAL MEDICINE

## 2023-01-03 PROCEDURE — 96372 PR INJECTION,THERAP/PROPH/DIAG2ST, IM OR SUBCUT: ICD-10-PCS | Mod: ,,, | Performed by: INTERNAL MEDICINE

## 2023-01-03 RX ORDER — TESTOSTERONE CYPIONATE 200 MG/ML
200 INJECTION, SOLUTION INTRAMUSCULAR
Status: COMPLETED | OUTPATIENT
Start: 2023-01-03 | End: 2023-01-03

## 2023-01-03 RX ADMIN — TESTOSTERONE CYPIONATE 200 MG: 200 INJECTION, SOLUTION INTRAMUSCULAR at 08:01

## 2023-01-04 DIAGNOSIS — K21.9 GASTROESOPHAGEAL REFLUX DISEASE, UNSPECIFIED WHETHER ESOPHAGITIS PRESENT: ICD-10-CM

## 2023-01-04 DIAGNOSIS — I10 HYPERTENSION, UNSPECIFIED TYPE: ICD-10-CM

## 2023-01-05 ENCOUNTER — HOSPITAL ENCOUNTER (OUTPATIENT)
Facility: HOSPITAL | Age: 73
Discharge: HOME | End: 2023-01-05
Attending: INTERNAL MEDICINE | Admitting: INTERNAL MEDICINE
Payer: COMMERCIAL

## 2023-01-05 ENCOUNTER — ANESTHESIA (OUTPATIENT)
Dept: CARDIOLOGY | Facility: HOSPITAL | Age: 73
End: 2023-01-05
Payer: COMMERCIAL

## 2023-01-05 VITALS
OXYGEN SATURATION: 92 % | WEIGHT: 242.75 LBS | HEART RATE: 81 BPM | SYSTOLIC BLOOD PRESSURE: 99 MMHG | DIASTOLIC BLOOD PRESSURE: 75 MMHG | BODY MASS INDEX: 36.79 KG/M2 | TEMPERATURE: 97 F | HEIGHT: 68 IN | RESPIRATION RATE: 21 BRPM

## 2023-01-05 DIAGNOSIS — Z01.818 PREOP TESTING: ICD-10-CM

## 2023-01-05 DIAGNOSIS — I48.92 ATRIAL FLUTTER: ICD-10-CM

## 2023-01-05 DIAGNOSIS — I48.91 ATRIAL FIBRILLATION: ICD-10-CM

## 2023-01-05 PROCEDURE — C1732 CATH, EP, DIAG/ABL, 3D/VECT: HCPCS | Performed by: INTERNAL MEDICINE

## 2023-01-05 PROCEDURE — 63600175 PHARM REV CODE 636 W HCPCS: Performed by: INTERNAL MEDICINE

## 2023-01-05 PROCEDURE — 94761 N-INVAS EAR/PLS OXIMETRY MLT: CPT

## 2023-01-05 PROCEDURE — 93010 ELECTROCARDIOGRAM REPORT: CPT | Mod: 76,,, | Performed by: INTERNAL MEDICINE

## 2023-01-05 PROCEDURE — 37000008 HC ANESTHESIA 1ST 15 MINUTES: Performed by: INTERNAL MEDICINE

## 2023-01-05 PROCEDURE — 27201423 OPTIME MED/SURG SUP & DEVICES STERILE SUPPLY: Performed by: INTERNAL MEDICINE

## 2023-01-05 PROCEDURE — 63600175 PHARM REV CODE 636 W HCPCS: Performed by: NURSE ANESTHETIST, CERTIFIED REGISTERED

## 2023-01-05 PROCEDURE — 94660 CPAP INITIATION&MGMT: CPT

## 2023-01-05 PROCEDURE — 25000003 PHARM REV CODE 250: Performed by: INTERNAL MEDICINE

## 2023-01-05 PROCEDURE — 25000003 PHARM REV CODE 250: Performed by: NURSE ANESTHETIST, CERTIFIED REGISTERED

## 2023-01-05 PROCEDURE — 93010 EKG 12-LEAD: ICD-10-PCS | Mod: 76,,, | Performed by: INTERNAL MEDICINE

## 2023-01-05 PROCEDURE — 99900035 HC TECH TIME PER 15 MIN (STAT)

## 2023-01-05 PROCEDURE — C1894 INTRO/SHEATH, NON-LASER: HCPCS | Performed by: INTERNAL MEDICINE

## 2023-01-05 PROCEDURE — 27000221 HC OXYGEN, UP TO 24 HOURS

## 2023-01-05 PROCEDURE — 27100171 HC OXYGEN HIGH FLOW UP TO 24 HOURS

## 2023-01-05 PROCEDURE — 27000190 HC CPAP FULL FACE MASK W/VALVE

## 2023-01-05 PROCEDURE — 37000009 HC ANESTHESIA EA ADD 15 MINS: Performed by: INTERNAL MEDICINE

## 2023-01-05 PROCEDURE — 93653 COMPRE EP EVAL TX SVT: CPT | Performed by: INTERNAL MEDICINE

## 2023-01-05 PROCEDURE — C1731 CATH, EP, 20 OR MORE ELEC: HCPCS | Performed by: INTERNAL MEDICINE

## 2023-01-05 PROCEDURE — 93005 ELECTROCARDIOGRAM TRACING: CPT

## 2023-01-05 PROCEDURE — C1730 CATH, EP, 19 OR FEW ELECT: HCPCS | Performed by: INTERNAL MEDICINE

## 2023-01-05 PROCEDURE — 94799 UNLISTED PULMONARY SVC/PX: CPT

## 2023-01-05 RX ORDER — PHENYLEPHRINE HCL IN 0.9% NACL 1 MG/10 ML
SYRINGE (ML) INTRAVENOUS
Status: DISCONTINUED | OUTPATIENT
Start: 2023-01-05 | End: 2023-01-05

## 2023-01-05 RX ORDER — DEXAMETHASONE SODIUM PHOSPHATE 4 MG/ML
INJECTION, SOLUTION INTRA-ARTICULAR; INTRALESIONAL; INTRAMUSCULAR; INTRAVENOUS; SOFT TISSUE
Status: DISCONTINUED | OUTPATIENT
Start: 2023-01-05 | End: 2023-01-05

## 2023-01-05 RX ORDER — PROPOFOL 10 MG/ML
INJECTION, EMULSION INTRAVENOUS
Status: DISCONTINUED | OUTPATIENT
Start: 2023-01-05 | End: 2023-01-05

## 2023-01-05 RX ORDER — KETOROLAC TROMETHAMINE 30 MG/ML
15 INJECTION, SOLUTION INTRAMUSCULAR; INTRAVENOUS EVERY 6 HOURS PRN
Status: DISCONTINUED | OUTPATIENT
Start: 2023-01-05 | End: 2023-01-05 | Stop reason: HOSPADM

## 2023-01-05 RX ORDER — ROCURONIUM BROMIDE 10 MG/ML
INJECTION, SOLUTION INTRAVENOUS
Status: DISCONTINUED | OUTPATIENT
Start: 2023-01-05 | End: 2023-01-05

## 2023-01-05 RX ORDER — ONDANSETRON 2 MG/ML
INJECTION INTRAMUSCULAR; INTRAVENOUS
Status: DISCONTINUED | OUTPATIENT
Start: 2023-01-05 | End: 2023-01-05

## 2023-01-05 RX ORDER — HYDROCODONE BITARTRATE AND ACETAMINOPHEN 10; 325 MG/1; MG/1
1 TABLET ORAL EVERY 4 HOURS PRN
Status: DISCONTINUED | OUTPATIENT
Start: 2023-01-05 | End: 2023-01-05 | Stop reason: HOSPADM

## 2023-01-05 RX ORDER — LIDOCAINE HYDROCHLORIDE 10 MG/ML
INJECTION, SOLUTION EPIDURAL; INFILTRATION; INTRACAUDAL; PERINEURAL
Status: DISCONTINUED | OUTPATIENT
Start: 2023-01-05 | End: 2023-01-05 | Stop reason: HOSPADM

## 2023-01-05 RX ORDER — ADENOSINE 3 MG/ML
INJECTION, SOLUTION INTRAVENOUS
Status: DISCONTINUED | OUTPATIENT
Start: 2023-01-05 | End: 2023-01-05 | Stop reason: HOSPADM

## 2023-01-05 RX ORDER — EPINEPHRINE 1 MG/ML
INJECTION, SOLUTION, CONCENTRATE INTRAVENOUS
Status: DISCONTINUED | OUTPATIENT
Start: 2023-01-05 | End: 2023-01-05

## 2023-01-05 RX ORDER — ACETAMINOPHEN 325 MG/1
650 TABLET ORAL EVERY 4 HOURS PRN
Status: DISCONTINUED | OUTPATIENT
Start: 2023-01-05 | End: 2023-01-05 | Stop reason: HOSPADM

## 2023-01-05 RX ORDER — LIDOCAINE HCL/PF 100 MG/5ML
SYRINGE (ML) INTRAVENOUS
Status: DISCONTINUED | OUTPATIENT
Start: 2023-01-05 | End: 2023-01-05

## 2023-01-05 RX ORDER — ALBUMIN HUMAN 250 G/1000ML
SOLUTION INTRAVENOUS CONTINUOUS PRN
Status: DISCONTINUED | OUTPATIENT
Start: 2023-01-05 | End: 2023-01-05

## 2023-01-05 RX ORDER — DULOXETIN HYDROCHLORIDE 30 MG/1
CAPSULE, DELAYED RELEASE ORAL
Qty: 30 CAPSULE | Refills: 0 | Status: ON HOLD | OUTPATIENT
Start: 2023-01-05 | End: 2023-01-07

## 2023-01-05 RX ORDER — VASOPRESSIN 20 [USP'U]/ML
INJECTION, SOLUTION INTRAMUSCULAR; SUBCUTANEOUS
Status: DISCONTINUED | OUTPATIENT
Start: 2023-01-05 | End: 2023-01-05

## 2023-01-05 RX ADMIN — Medication 200 MCG: at 08:01

## 2023-01-05 RX ADMIN — ALBUMIN HUMAN: 250 SOLUTION INTRAVENOUS at 08:01

## 2023-01-05 RX ADMIN — EPINEPHRINE 20 MCG: 1 INJECTION, SOLUTION, CONCENTRATE INTRAVENOUS at 08:01

## 2023-01-05 RX ADMIN — PHENYLEPHRINE HYDROCHLORIDE 20 MCG/MIN: 10 INJECTION INTRAVENOUS at 08:01

## 2023-01-05 RX ADMIN — LIDOCAINE HYDROCHLORIDE 50 MG: 20 INJECTION, SOLUTION INTRAVENOUS at 08:01

## 2023-01-05 RX ADMIN — ROCURONIUM BROMIDE 60 MG: 10 INJECTION, SOLUTION INTRAVENOUS at 09:01

## 2023-01-05 RX ADMIN — EPINEPHRINE 10 MCG: 1 INJECTION, SOLUTION, CONCENTRATE INTRAVENOUS at 08:01

## 2023-01-05 RX ADMIN — ALBUMIN HUMAN: 250 SOLUTION INTRAVENOUS at 09:01

## 2023-01-05 RX ADMIN — DEXAMETHASONE SODIUM PHOSPHATE 4 MG: 4 INJECTION, SOLUTION INTRA-ARTICULAR; INTRALESIONAL; INTRAMUSCULAR; INTRAVENOUS; SOFT TISSUE at 09:01

## 2023-01-05 RX ADMIN — SODIUM CHLORIDE, SODIUM GLUCONATE, SODIUM ACETATE, POTASSIUM CHLORIDE AND MAGNESIUM CHLORIDE: 526; 502; 368; 37; 30 INJECTION, SOLUTION INTRAVENOUS at 07:01

## 2023-01-05 RX ADMIN — PROPOFOL 150 MG: 10 INJECTION, EMULSION INTRAVENOUS at 08:01

## 2023-01-05 RX ADMIN — VASOPRESSIN 1 UNITS: 20 INJECTION INTRAVENOUS at 08:01

## 2023-01-05 RX ADMIN — VASOPRESSIN 1 UNITS: 20 INJECTION INTRAVENOUS at 09:01

## 2023-01-05 RX ADMIN — ONDANSETRON 4 MG: 2 INJECTION INTRAMUSCULAR; INTRAVENOUS at 09:01

## 2023-01-05 RX ADMIN — SUGAMMADEX 250 MG: 100 INJECTION, SOLUTION INTRAVENOUS at 10:01

## 2023-01-05 NOTE — Clinical Note
Difficulties maintaining sats; pt intubated with ETT and manually bagged to get sats back up; sats increase to 80s however desats quickly into 60s.

## 2023-01-05 NOTE — ANESTHESIA POSTPROCEDURE EVALUATION
Anesthesia Post Evaluation    Patient: John Bullard    Procedure(s) Performed: Procedure(s) (LRB):  ABLATION (N/A)    Final Anesthesia Type: general      Patient location during evaluation: PACU  Patient participation: Yes- Able to Participate  Level of consciousness: awake and alert  Post-procedure vital signs: reviewed and stable  Pain management: adequate  Airway patency: patent  MINDA mitigation strategies: Verification of full reversal of neuromuscular block, Extubation and recovery carried out in lateral, semiupright, or other nonsupine position, Postoperative administration of CPAP, nasopharyngeal airway, or oral appliance in the postanesthesia care unit (PACU) and Multimodal analgesia  PONV status at discharge: No PONV  Anesthetic complications: no      Cardiovascular status: hemodynamically stable  Respiratory status: nasal cannula  Hydration status: euvolemic  Follow-up not needed.          Vitals Value Taken Time   BP 89/63 01/05/23 1416   Temp 36.2 °C (97.1 °F) 01/05/23 1110   Pulse 79 01/05/23 1423   Resp 23 01/05/23 1423   SpO2 92 % 01/05/23 1423   Vitals shown include unvalidated device data.      Event Time   Out of Recovery 12:50:00         Pain/Jim Score: Jim Score: 9 (1/5/2023 12:50 PM)

## 2023-01-05 NOTE — DISCHARGE SUMMARY
[facility] - Cath Lab Services  Discharge Note  Short Stay    Procedure(s) (LRB):  ABLATION (N/A)      OUTCOME: Patient tolerated treatment/procedure well without complication and is now ready for discharge.    DISPOSITION: Home or Self Care    FINAL DIAGNOSIS:  Atrial flutter    FOLLOWUP: In clinic    DISCHARGE INSTRUCTIONS:  EP study and ablation for typical atrial flutter.    TIME SPENT ON DISCHARGE: 15 minutes

## 2023-01-05 NOTE — DISCHARGE INSTRUCTIONS
-Remove dressing in 24hrs  -No driving for two Days  -Do not lift anything heavier than a gallon of milk for 5 days  -No tub bathing for 5 days (if you have a groin site).   -No lotions, powders, creams around site for 5 days  - Return to the nearest emergency room if you start running a fever; have any kind of discharge coming from the site, the site looks red or swollen.  - If site starts to bleed, lay flat on the ground, apply pressure to the site and call 911.    Resume Eliquis tonight 1/5/2023.  Stop Amiodarone per Dr. Waller's orders.

## 2023-01-05 NOTE — Clinical Note
A dressing was applied to the right femoral vein puncture site. Right femoral groin access sites covered with sterile gauze and tegaderm.

## 2023-01-05 NOTE — OR NURSING
1125 Dr. Reg Alba notified of present bp readings and at bedside, as per md jackson bp. No new orders at present.   1140 Md at bedside, no new orders.   1200 Md gave verbal order to titrate of bipap and continue to monitor bp, no new orders at present.

## 2023-01-05 NOTE — ANESTHESIA PREPROCEDURE EVALUATION
01/04/2023  John Bullard is a 72 y.o., male.    ABLATION (Chest)  Scheduled, [facility] ANES CATH LAB 03    Pre-op Assessment    I have reviewed the Patient Summary Reports.     I have reviewed the Nursing Notes. I have reviewed the NPO Status.   I have reviewed the Medications.     Review of Systems  Anesthesia Hx:  No problems with previous Anesthesia    Hematology/Oncology:  Hematology Normal   Oncology Normal     EENT/Dental:EENT/Dental Normal   Cardiovascular:   Exercise tolerance: good Hypertension Dysrhythmias atrial fibrillation  Functional Capacity 3.5 METS  EF=55% WITH MILD TO MOD AS  NUCLEAR STRESS NEG   Pulmonary:   Sleep Apnea    Renal/:   Denies Chronic Renal Disease.     Hepatic/GI:   GERD    Musculoskeletal:   Arthritis     Neurological:  Neurology Normal    Endocrine:  Endocrine Normal  Denies Morbid Obesity / BMI > 40  Dermatological:  Skin Normal    Psych:  Psychiatric Normal           Physical Exam  General: Alert, Oriented, Well nourished and Cooperative    Airway:  Mallampati: II   Mouth Opening: Normal  TM Distance: Normal  Tongue: Normal  Neck ROM: Normal ROM    Dental:  Intact    Chest/Lungs:  Clear to auscultation, Normal Respiratory Rate    Heart:  Rate: Normal  Rhythm: Regular Rhythm       Latest Reference Range & Units 11/21/22 06:17   WBC 4.5 - 11.5 x10(3)/mcL 6.8   RBC 4.70 - 6.10 x10(6)/mcL 4.98   Hemoglobin 14.0 - 18.0 gm/dL 15.1   Hematocrit 42.0 - 52.0 % 47.5   MCV 80.0 - 94.0 fL 95.4 (H)   MCH 27.0 - 31.0 pg 30.3   MCHC 33.0 - 36.0 mg/dL 31.8 (L)   RDW 11.5 - 17.0 % 13.3   Platelets 130 - 400 x10(3)/mcL 124 (L)   MPV 7.4 - 10.4 fL 11.6 (H)   Neut % % 76.6   LYMPH % % 9.4   Mono % % 9.4   Eosinophil % % 3.4   Basophil % % 0.6   Immature Granulocytes % 0.6   Neut # 2.1 - 9.2 x10(3)/mcL 5.2   Lymph # 0.6 - 4.6 x10(3)/mcL 0.64   Mono # 0.1 - 1.3 x10(3)/mcL 0.64   Eos # 0 - 0.9  x10(3)/mcL 0.23   Baso # 0 - 0.2 x10(3)/mcL 0.04   Immature Grans (Abs) 0 - 0.04 x10(3)/mcL 0.04   nRBC % 0.0   Sodium 136 - 145 mmol/L 139   Potassium 3.5 - 5.1 mmol/L 3.7   Chloride 98 - 107 mmol/L 98   CO2 23 - 31 mmol/L 34 (H)   BUN 8.4 - 25.7 mg/dL 10.7   Creatinine 0.73 - 1.18 mg/dL 0.93   eGFR mls/min/1.73/m2 >60   Glucose 82 - 115 mg/dL 87   Calcium 8.8 - 10.0 mg/dL 9.2   Alkaline Phosphatase 40 - 150 unit/L 60   PROTEIN TOTAL 5.8 - 7.6 gm/dL 6.0   Albumin 3.4 - 4.8 gm/dL 3.7   Albumin/Globulin Ratio 1.1 - 2.0 ratio 1.6   BILIRUBIN TOTAL <=1.5 mg/dL 0.8   AST 5 - 34 unit/L 23   ALT 0 - 55 unit/L 26   Globulin, Total 2.4 - 3.5 gm/dL 2.3 (L)   Cholesterol <=200 mg/dL 94   HDL 35 - 60 mg/dL 40   LDL Cholesterol External 50.00 - 140.00 mg/dL 42.00 (L)   Total Cholesterol/HDL Ratio 0 - 5  2   Triglycerides 34 - 140 mg/dL 59   Very Low Density Lipoprotein  12   Thyroid Stimulating Hormone 0.3500 - 4.9400 uIU/mL 1.6713   PSA, Screen <=4.00 ng/mL 0.88   (H): Data is abnormally high  (L): Data is abnormally low   Latest Reference Range & Units 11/21/22 06:17   WBC 4.5 - 11.5 x10(3)/mcL 6.8   RBC 4.70 - 6.10 x10(6)/mcL 4.98   Hemoglobin 14.0 - 18.0 gm/dL 15.1   Hematocrit 42.0 - 52.0 % 47.5   MCV 80.0 - 94.0 fL 95.4 (H)   MCH 27.0 - 31.0 pg 30.3   MCHC 33.0 - 36.0 mg/dL 31.8 (L)   RDW 11.5 - 17.0 % 13.3   Platelets 130 - 400 x10(3)/mcL 124 (L)   MPV 7.4 - 10.4 fL 11.6 (H)   Neut % % 76.6   LYMPH % % 9.4   Mono % % 9.4   Eosinophil % % 3.4   Basophil % % 0.6   Immature Granulocytes % 0.6   Neut # 2.1 - 9.2 x10(3)/mcL 5.2   Lymph # 0.6 - 4.6 x10(3)/mcL 0.64   Mono # 0.1 - 1.3 x10(3)/mcL 0.64   Eos # 0 - 0.9 x10(3)/mcL 0.23   Baso # 0 - 0.2 x10(3)/mcL 0.04   Immature Grans (Abs) 0 - 0.04 x10(3)/mcL 0.04   nRBC % 0.0   Sodium 136 - 145 mmol/L 139   Potassium 3.5 - 5.1 mmol/L 3.7   Chloride 98 - 107 mmol/L 98   CO2 23 - 31 mmol/L 34 (H)   BUN 8.4 - 25.7 mg/dL 10.7   Creatinine 0.73 - 1.18 mg/dL 0.93   eGFR mls/min/1.73/m2  >60   Glucose 82 - 115 mg/dL 87   Calcium 8.8 - 10.0 mg/dL 9.2   Alkaline Phosphatase 40 - 150 unit/L 60   PROTEIN TOTAL 5.8 - 7.6 gm/dL 6.0   Albumin 3.4 - 4.8 gm/dL 3.7   Albumin/Globulin Ratio 1.1 - 2.0 ratio 1.6   BILIRUBIN TOTAL <=1.5 mg/dL 0.8   AST 5 - 34 unit/L 23   ALT 0 - 55 unit/L 26   Globulin, Total 2.4 - 3.5 gm/dL 2.3 (L)   Cholesterol <=200 mg/dL 94   HDL 35 - 60 mg/dL 40   LDL Cholesterol External 50.00 - 140.00 mg/dL 42.00 (L)   Total Cholesterol/HDL Ratio 0 - 5  2   Triglycerides 34 - 140 mg/dL 59   Very Low Density Lipoprotein  12   Thyroid Stimulating Hormone 0.3500 - 4.9400 uIU/mL 1.6713   PSA, Screen <=4.00 ng/mL 0.88   (H): Data is abnormally high  (L): Data is abnormally low    Anesthesia Plan  Type of Anesthesia, risks & benefits discussed:    Anesthesia Type: Gen ETT  Intra-op Monitoring Plan: Standard ASA Monitors and Art Line  Post Op Pain Control Plan: multimodal analgesia  Induction:  IV and Inhalation  Airway Plan: Direct  Informed Consent: Informed consent signed with the Patient and all parties understand the risks and agree with anesthesia plan.  All questions answered. Patient consented to blood products? Yes  ASA Score: 3  Day of Surgery Review of History & Physical: H&P Update referred to the surgeon/provider.    Ready For Surgery From Anesthesia Perspective.     .

## 2023-01-05 NOTE — ANESTHESIA PROCEDURE NOTES
Intubation    Date/Time: 1/5/2023 9:05 AM  Performed by: Samm Vallejo CRNA  Authorized by: Reg Alba MD     Intubation:     Induction:  Intravenous    Intubated:  Postinduction    Mask Ventilation:  Easy with oral airway    Attempts:  1    Attempted By:  CRNA    Method of Intubation:  Direct    Blade:  Flores 3    Laryngeal View Grade: Grade IIA - cords partially seen      Difficult Airway Encountered?: No      Complications:  None    Airway Device:  Oral endotracheal tube    Airway Device Size:  7.5    Style/Cuff Inflation:  Cuffed (inflated to minimal occlusive pressure)    Inflation Amount (mL):  6    Tube secured:  21    Secured at:  The lips    Placement Verified By:  Capnometry    Complicating Factors:  None    Findings Post-Intubation:  BS equal bilateral

## 2023-01-05 NOTE — Clinical Note
Albumiin infusing; pt intubated and manually bagged for awhile- BP and sats normalizing- pt stable. Right femoral Ciara initiated for better monitoring.

## 2023-01-05 NOTE — Clinical Note
right femoral artery. Right femoral artery 4fr sheath with manual pressure; the three 7fr right femoral venous sheath sites sutured closed with OS-8.

## 2023-01-05 NOTE — TRANSFER OF CARE
"Anesthesia Transfer of Care Note    Patient: John Bullard    Procedure(s) Performed: Procedure(s) (LRB):  ABLATION (N/A)    Patient location: PACU    Anesthesia Type: general    Transport from OR: Continuous SpO2 monitoring in transport. Continuous ECG monitoring in transport. Transported from OR on 6-10 L/min O2 by face mask with adequate spontaneous ventilation    Post pain: adequate analgesia    Post assessment: no apparent anesthetic complications and tolerated procedure well    Post vital signs: stable    Level of consciousness: awake and alert    Nausea/Vomiting: no nausea/vomiting    Complications: none    Transfer of care protocol was followedComments: Pt placed on bipap on arrival to PACU      Last vitals:   Visit Vitals  BP 97/64 (BP Location: Left arm, Patient Position: Lying)   Pulse (!) 118   Temp 36.2 °C (97.1 °F)   Resp 16   Ht 5' 8" (1.727 m)   Wt 110.1 kg (242 lb 11.6 oz)   SpO2 (!) 94%   BMI 36.91 kg/m²     "

## 2023-01-05 NOTE — ANESTHESIA PROCEDURE NOTES
Intubation    Date/Time: 1/5/2023 9:05 AM  Performed by: Samm Vallejo CRNA  Authorized by: Reg Alba MD     Intubation:     Induction:  Intravenous    Intubated:  Postinduction    Mask Ventilation:  Easy with oral airway    Attempts:  1    Attempted By:  CRNA    Difficult Airway Encountered?: No      Airway Device:  Supraglottic airway/LMA    Airway Device Size:  4.0    Style/Cuff Inflation:  Cuffed (inflated to minimal occlusive pressure)    Inflation Amount (mL):  18    Placement Verified By:  Capnometry    Complicating Factors:  None    Findings Post-Intubation:  BS equal bilateral

## 2023-01-06 ENCOUNTER — HOSPITAL ENCOUNTER (INPATIENT)
Facility: HOSPITAL | Age: 73
LOS: 14 days | Discharge: HOME HEALTH | DRG: 291 | End: 2023-01-21
Attending: EMERGENCY MEDICINE | Admitting: INTERNAL MEDICINE
Payer: COMMERCIAL

## 2023-01-06 DIAGNOSIS — J18.9 PNEUMONIA OF BOTH LUNGS DUE TO INFECTIOUS ORGANISM, UNSPECIFIED PART OF LUNG: ICD-10-CM

## 2023-01-06 DIAGNOSIS — K21.9 GASTROESOPHAGEAL REFLUX DISEASE, UNSPECIFIED WHETHER ESOPHAGITIS PRESENT: ICD-10-CM

## 2023-01-06 DIAGNOSIS — R07.9 CHEST PAIN, UNSPECIFIED TYPE: Primary | ICD-10-CM

## 2023-01-06 DIAGNOSIS — J45.909 ASTHMA, UNSPECIFIED ASTHMA SEVERITY, UNSPECIFIED WHETHER COMPLICATED, UNSPECIFIED WHETHER PERSISTENT: Primary | ICD-10-CM

## 2023-01-06 DIAGNOSIS — R07.9 CHEST PAIN: ICD-10-CM

## 2023-01-06 DIAGNOSIS — I48.91 ATRIAL FIBRILLATION: ICD-10-CM

## 2023-01-06 DIAGNOSIS — I49.9 ARRHYTHMIA: ICD-10-CM

## 2023-01-06 DIAGNOSIS — I48.91 AFIB: ICD-10-CM

## 2023-01-06 DIAGNOSIS — I21.4 NSTEMI (NON-ST ELEVATED MYOCARDIAL INFARCTION): ICD-10-CM

## 2023-01-06 DIAGNOSIS — I48.91 A-FIB: ICD-10-CM

## 2023-01-06 PROCEDURE — 93010 ELECTROCARDIOGRAM REPORT: CPT | Mod: ,,, | Performed by: INTERNAL MEDICINE

## 2023-01-06 PROCEDURE — 93005 ELECTROCARDIOGRAM TRACING: CPT

## 2023-01-06 PROCEDURE — 99285 EMERGENCY DEPT VISIT HI MDM: CPT | Mod: 25

## 2023-01-06 PROCEDURE — 96374 THER/PROPH/DIAG INJ IV PUSH: CPT

## 2023-01-06 PROCEDURE — 93010 EKG 12-LEAD: ICD-10-PCS | Mod: ,,, | Performed by: INTERNAL MEDICINE

## 2023-01-06 PROCEDURE — 25000003 PHARM REV CODE 250: Performed by: EMERGENCY MEDICINE

## 2023-01-06 RX ORDER — NAPROXEN SODIUM 220 MG/1
324 TABLET, FILM COATED ORAL
Status: COMPLETED | OUTPATIENT
Start: 2023-01-06 | End: 2023-01-06

## 2023-01-06 RX ADMIN — ASPIRIN 81 MG CHEWABLE TABLET 324 MG: 81 TABLET CHEWABLE at 11:01

## 2023-01-07 LAB
ALBUMIN SERPL-MCNC: 3.7 G/DL (ref 3.4–4.8)
ALBUMIN/GLOB SERPL: 1.6 RATIO (ref 1.1–2)
ALP SERPL-CCNC: 57 UNIT/L (ref 40–150)
ALT SERPL-CCNC: 32 UNIT/L (ref 0–55)
AST SERPL-CCNC: 35 UNIT/L (ref 5–34)
AV INDEX (PROSTH): 0.33
AV MEAN GRADIENT: 10 MMHG
AV PEAK GRADIENT: 16 MMHG
AV VALVE AREA: 1.03 CM2
AV VELOCITY RATIO: 0.32
BASOPHILS # BLD AUTO: 0.03 X10(3)/MCL (ref 0–0.2)
BASOPHILS NFR BLD AUTO: 0.2 %
BILIRUBIN DIRECT+TOT PNL SERPL-MCNC: 0.9 MG/DL
BNP BLD-MCNC: 44.5 PG/ML
BSA FOR ECHO PROCEDURE: 2.35 M2
BUN SERPL-MCNC: 33.2 MG/DL (ref 8.4–25.7)
CALCIUM SERPL-MCNC: 8.8 MG/DL (ref 8.8–10)
CHLORIDE SERPL-SCNC: 101 MMOL/L (ref 98–107)
CO2 SERPL-SCNC: 29 MMOL/L (ref 23–31)
CREAT SERPL-MCNC: 1.34 MG/DL (ref 0.73–1.18)
CV ECHO LV RWT: 0.47 CM
DOP CALC AO PEAK VEL: 1.99 M/S
DOP CALC AO VTI: 34 CM
DOP CALC LVOT AREA: 3.1 CM2
DOP CALC LVOT DIAMETER: 2 CM
DOP CALC LVOT PEAK VEL: 0.64 M/S
DOP CALC LVOT STROKE VOLUME: 35.17 CM3
DOP CALC MV VTI: 20.7 CM
DOP CALCLVOT PEAK VEL VTI: 11.2 CM
E WAVE DECELERATION TIME: 182 MSEC
E/A RATIO: 1.95
E/E' RATIO: 7.6 M/S
ECHO LV POSTERIOR WALL: 1.27 CM (ref 0.6–1.1)
EJECTION FRACTION: 40 %
EOSINOPHIL # BLD AUTO: 0.04 X10(3)/MCL (ref 0–0.9)
EOSINOPHIL NFR BLD AUTO: 0.3 %
ERYTHROCYTE [DISTWIDTH] IN BLOOD BY AUTOMATED COUNT: 14.3 % (ref 11.6–14.4)
FLUAV AG UPPER RESP QL IA.RAPID: NOT DETECTED
FLUBV AG UPPER RESP QL IA.RAPID: NOT DETECTED
FRACTIONAL SHORTENING: 18 % (ref 28–44)
GFR SERPLBLD CREATININE-BSD FMLA CKD-EPI: 56 MLS/MIN/1.73/M2
GLOBULIN SER-MCNC: 2.3 GM/DL (ref 2.4–3.5)
GLUCOSE SERPL-MCNC: 108 MG/DL (ref 82–115)
HCT VFR BLD AUTO: 46.7 % (ref 42–52)
HGB BLD-MCNC: 14.6 GM/DL (ref 14–18)
IMM GRANULOCYTES # BLD AUTO: 0.05 X10(3)/MCL (ref 0–0.04)
IMM GRANULOCYTES NFR BLD AUTO: 0.4 %
INR BLD: 1.3 (ref 0–1.3)
INTERVENTRICULAR SEPTUM: 1.53 CM (ref 0.6–1.1)
LEFT ATRIUM SIZE: 4.6 CM
LEFT ATRIUM VOLUME INDEX MOD: 42.9 ML/M2
LEFT ATRIUM VOLUME MOD: 97 CM3
LEFT INTERNAL DIMENSION IN SYSTOLE: 4.43 CM (ref 2.1–4)
LEFT VENTRICLE DIASTOLIC VOLUME INDEX: 63.27 ML/M2
LEFT VENTRICLE DIASTOLIC VOLUME: 143 ML
LEFT VENTRICLE MASS INDEX: 146 G/M2
LEFT VENTRICLE SYSTOLIC VOLUME INDEX: 39.4 ML/M2
LEFT VENTRICLE SYSTOLIC VOLUME: 89.1 ML
LEFT VENTRICULAR INTERNAL DIMENSION IN DIASTOLE: 5.42 CM (ref 3.5–6)
LEFT VENTRICULAR MASS: 330.23 G
LV LATERAL E/E' RATIO: 6.91 M/S
LV SEPTAL E/E' RATIO: 8.44 M/S
LVOT MG: 1 MMHG
LVOT MV: 0.43 CM/S
LYMPHOCYTES # BLD AUTO: 0.53 X10(3)/MCL (ref 0.6–4.6)
LYMPHOCYTES NFR BLD AUTO: 4.1 %
MCH RBC QN AUTO: 29.4 PG
MCHC RBC AUTO-ENTMCNC: 31.3 MG/DL (ref 33–36)
MCV RBC AUTO: 94 FL (ref 80–94)
MONOCYTES # BLD AUTO: 1.42 X10(3)/MCL (ref 0.1–1.3)
MONOCYTES NFR BLD AUTO: 10.9 %
MV MEAN GRADIENT: 2 MMHG
MV PEAK A VEL: 0.39 M/S
MV PEAK E VEL: 0.76 M/S
MV PEAK GRADIENT: 4 MMHG
MV STENOSIS PRESSURE HALF TIME: 76 MS
MV VALVE AREA BY CONTINUITY EQUATION: 1.7 CM2
MV VALVE AREA P 1/2 METHOD: 2.89 CM2
NEUTROPHILS # BLD AUTO: 10.93 X10(3)/MCL (ref 2.1–9.2)
NEUTROPHILS NFR BLD AUTO: 84.1 %
NRBC BLD AUTO-RTO: 0 % (ref 0–1)
PISA TR MAX VEL: 2.28 M/S
PLATELET # BLD AUTO: 127 X10(3)/MCL (ref 140–371)
PMV BLD AUTO: 11 FL (ref 9.4–12.4)
POTASSIUM SERPL-SCNC: 3.8 MMOL/L (ref 3.5–5.1)
PROT SERPL-MCNC: 6 GM/DL (ref 5.8–7.6)
PROTHROMBIN TIME: 16 SECONDS (ref 12.5–14.5)
PV PEAK VELOCITY: 0.82 CM/S
RBC # BLD AUTO: 4.97 X10(6)/MCL (ref 4.7–6.1)
SARS-COV-2 RNA RESP QL NAA+PROBE: NOT DETECTED
SODIUM SERPL-SCNC: 138 MMOL/L (ref 136–145)
TDI LATERAL: 0.11 M/S
TDI SEPTAL: 0.09 M/S
TDI: 0.1 M/S
TR MAX PG: 21 MMHG
TRICUSPID ANNULAR PLANE SYSTOLIC EXCURSION: 2.42 CM
TROPONIN I SERPL-MCNC: 0.23 NG/ML (ref 0–0.04)
TROPONIN I SERPL-MCNC: 0.3 NG/ML (ref 0–0.04)
TROPONIN I SERPL-MCNC: 0.35 NG/ML (ref 0–0.04)
WBC # SPEC AUTO: 13 X10(3)/MCL (ref 4.5–11.5)

## 2023-01-07 PROCEDURE — 27000221 HC OXYGEN, UP TO 24 HOURS

## 2023-01-07 PROCEDURE — 99223 1ST HOSP IP/OBS HIGH 75: CPT | Mod: ,,, | Performed by: INTERNAL MEDICINE

## 2023-01-07 PROCEDURE — 85610 PROTHROMBIN TIME: CPT | Performed by: EMERGENCY MEDICINE

## 2023-01-07 PROCEDURE — 63600175 PHARM REV CODE 636 W HCPCS: Performed by: EMERGENCY MEDICINE

## 2023-01-07 PROCEDURE — 25000003 PHARM REV CODE 250: Performed by: EMERGENCY MEDICINE

## 2023-01-07 PROCEDURE — 25500020 PHARM REV CODE 255: Performed by: EMERGENCY MEDICINE

## 2023-01-07 PROCEDURE — 25000242 PHARM REV CODE 250 ALT 637 W/ HCPCS: Performed by: EMERGENCY MEDICINE

## 2023-01-07 PROCEDURE — 21400001 HC TELEMETRY ROOM

## 2023-01-07 PROCEDURE — 0240U COVID/FLU A&B PCR: CPT | Performed by: EMERGENCY MEDICINE

## 2023-01-07 PROCEDURE — 25000003 PHARM REV CODE 250: Performed by: INTERNAL MEDICINE

## 2023-01-07 PROCEDURE — 94640 AIRWAY INHALATION TREATMENT: CPT

## 2023-01-07 PROCEDURE — 80053 COMPREHEN METABOLIC PANEL: CPT | Performed by: EMERGENCY MEDICINE

## 2023-01-07 PROCEDURE — 84484 ASSAY OF TROPONIN QUANT: CPT | Performed by: EMERGENCY MEDICINE

## 2023-01-07 PROCEDURE — 36415 COLL VENOUS BLD VENIPUNCTURE: CPT | Performed by: EMERGENCY MEDICINE

## 2023-01-07 PROCEDURE — 99900035 HC TECH TIME PER 15 MIN (STAT)

## 2023-01-07 PROCEDURE — 11000001 HC ACUTE MED/SURG PRIVATE ROOM

## 2023-01-07 PROCEDURE — 85025 COMPLETE CBC W/AUTO DIFF WBC: CPT | Performed by: EMERGENCY MEDICINE

## 2023-01-07 PROCEDURE — 87040 BLOOD CULTURE FOR BACTERIA: CPT | Performed by: EMERGENCY MEDICINE

## 2023-01-07 PROCEDURE — 83880 ASSAY OF NATRIURETIC PEPTIDE: CPT | Performed by: EMERGENCY MEDICINE

## 2023-01-07 PROCEDURE — 94761 N-INVAS EAR/PLS OXIMETRY MLT: CPT

## 2023-01-07 PROCEDURE — 99223 PR INITIAL HOSPITAL CARE,LEVL III: ICD-10-PCS | Mod: ,,, | Performed by: INTERNAL MEDICINE

## 2023-01-07 RX ORDER — LORATADINE 10 MG/1
10 TABLET ORAL DAILY PRN
COMMUNITY

## 2023-01-07 RX ORDER — TALC
9 POWDER (GRAM) TOPICAL DAILY PRN
Status: DISCONTINUED | OUTPATIENT
Start: 2023-01-07 | End: 2023-01-21 | Stop reason: HOSPADM

## 2023-01-07 RX ORDER — FAMOTIDINE 20 MG/1
20 TABLET, FILM COATED ORAL 2 TIMES DAILY
Status: DISCONTINUED | OUTPATIENT
Start: 2023-01-07 | End: 2023-01-21 | Stop reason: HOSPADM

## 2023-01-07 RX ORDER — TALC
6 POWDER (GRAM) TOPICAL NIGHTLY PRN
Status: DISCONTINUED | OUTPATIENT
Start: 2023-01-07 | End: 2023-01-21 | Stop reason: HOSPADM

## 2023-01-07 RX ORDER — ALBUTEROL SULFATE 90 UG/1
2 AEROSOL, METERED RESPIRATORY (INHALATION) EVERY 4 HOURS PRN
Status: DISCONTINUED | OUTPATIENT
Start: 2023-01-07 | End: 2023-01-21 | Stop reason: HOSPADM

## 2023-01-07 RX ORDER — FLUTICASONE PROPIONATE AND SALMETEROL XINAFOATE 230; 21 UG/1; UG/1
2 AEROSOL, METERED RESPIRATORY (INHALATION) DAILY PRN
COMMUNITY
End: 2023-07-21

## 2023-01-07 RX ORDER — ACETAMINOPHEN 10 MG/ML
1000 INJECTION, SOLUTION INTRAVENOUS ONCE
Status: COMPLETED | OUTPATIENT
Start: 2023-01-07 | End: 2023-01-07

## 2023-01-07 RX ORDER — HYDROCHLOROTHIAZIDE 25 MG/1
50 TABLET ORAL DAILY
Status: DISCONTINUED | OUTPATIENT
Start: 2023-01-08 | End: 2023-01-09

## 2023-01-07 RX ORDER — DILTIAZEM HYDROCHLORIDE 180 MG/1
180 CAPSULE, COATED, EXTENDED RELEASE ORAL 2 TIMES DAILY
Status: DISCONTINUED | OUTPATIENT
Start: 2023-01-07 | End: 2023-01-09

## 2023-01-07 RX ORDER — MONTELUKAST SODIUM 10 MG/1
10 TABLET ORAL DAILY
Status: DISCONTINUED | OUTPATIENT
Start: 2023-01-08 | End: 2023-01-12

## 2023-01-07 RX ORDER — IPRATROPIUM BROMIDE AND ALBUTEROL SULFATE 2.5; .5 MG/3ML; MG/3ML
3 SOLUTION RESPIRATORY (INHALATION)
Status: COMPLETED | OUTPATIENT
Start: 2023-01-07 | End: 2023-01-07

## 2023-01-07 RX ORDER — ACETAMINOPHEN, DIPHENHYDRAMINE HCL, PHENYLEPHRINE HCL 325; 25; 5 MG/1; MG/1; MG/1
10-20 TABLET ORAL DAILY PRN
COMMUNITY

## 2023-01-07 RX ORDER — FLUTICASONE FUROATE AND VILANTEROL 200; 25 UG/1; UG/1
1 POWDER RESPIRATORY (INHALATION) DAILY
Status: DISCONTINUED | OUTPATIENT
Start: 2023-01-07 | End: 2023-01-21 | Stop reason: HOSPADM

## 2023-01-07 RX ORDER — LATANOPROST 50 UG/ML
1 SOLUTION/ DROPS OPHTHALMIC DAILY
Status: DISCONTINUED | OUTPATIENT
Start: 2023-01-07 | End: 2023-01-21 | Stop reason: HOSPADM

## 2023-01-07 RX ORDER — TAMSULOSIN HYDROCHLORIDE 0.4 MG/1
1 CAPSULE ORAL DAILY
Status: DISCONTINUED | OUTPATIENT
Start: 2023-01-07 | End: 2023-01-21 | Stop reason: HOSPADM

## 2023-01-07 RX ORDER — SODIUM CHLORIDE, SODIUM LACTATE, POTASSIUM CHLORIDE, CALCIUM CHLORIDE 600; 310; 30; 20 MG/100ML; MG/100ML; MG/100ML; MG/100ML
INJECTION, SOLUTION INTRAVENOUS CONTINUOUS
Status: DISCONTINUED | OUTPATIENT
Start: 2023-01-07 | End: 2023-01-07

## 2023-01-07 RX ORDER — ALPRAZOLAM 0.5 MG/1
0.5 TABLET ORAL 2 TIMES DAILY PRN
Status: DISCONTINUED | OUTPATIENT
Start: 2023-01-07 | End: 2023-01-21 | Stop reason: HOSPADM

## 2023-01-07 RX ORDER — ACETAMINOPHEN 325 MG/1
650 TABLET ORAL EVERY 8 HOURS PRN
Status: DISCONTINUED | OUTPATIENT
Start: 2023-01-07 | End: 2023-01-07

## 2023-01-07 RX ORDER — ATORVASTATIN CALCIUM 10 MG/1
20 TABLET, FILM COATED ORAL DAILY
Status: DISCONTINUED | OUTPATIENT
Start: 2023-01-07 | End: 2023-01-21 | Stop reason: HOSPADM

## 2023-01-07 RX ORDER — CETIRIZINE HYDROCHLORIDE 10 MG/1
10 TABLET ORAL DAILY
Status: DISCONTINUED | OUTPATIENT
Start: 2023-01-07 | End: 2023-01-21 | Stop reason: HOSPADM

## 2023-01-07 RX ORDER — ZOLPIDEM TARTRATE 5 MG/1
5 TABLET ORAL NIGHTLY
Status: DISCONTINUED | OUTPATIENT
Start: 2023-01-07 | End: 2023-01-21 | Stop reason: HOSPADM

## 2023-01-07 RX ORDER — ACETAMINOPHEN 325 MG/1
650 TABLET ORAL EVERY 6 HOURS PRN
Status: DISCONTINUED | OUTPATIENT
Start: 2023-01-07 | End: 2023-01-21 | Stop reason: HOSPADM

## 2023-01-07 RX ORDER — IPRATROPIUM BROMIDE AND ALBUTEROL SULFATE 2.5; .5 MG/3ML; MG/3ML
3 SOLUTION RESPIRATORY (INHALATION)
Status: DISCONTINUED | OUTPATIENT
Start: 2023-01-07 | End: 2023-01-12

## 2023-01-07 RX ORDER — PANTOPRAZOLE SODIUM 40 MG/1
40 TABLET, DELAYED RELEASE ORAL DAILY
Status: DISCONTINUED | OUTPATIENT
Start: 2023-01-07 | End: 2023-01-21 | Stop reason: HOSPADM

## 2023-01-07 RX ORDER — POTASSIUM CHLORIDE 750 MG/1
10 TABLET, EXTENDED RELEASE ORAL DAILY
Status: DISCONTINUED | OUTPATIENT
Start: 2023-01-07 | End: 2023-01-09

## 2023-01-07 RX ORDER — METOPROLOL SUCCINATE 50 MG/1
50 TABLET, EXTENDED RELEASE ORAL DAILY
Status: DISCONTINUED | OUTPATIENT
Start: 2023-01-07 | End: 2023-01-09

## 2023-01-07 RX ADMIN — ACETAMINOPHEN 650 MG: 325 TABLET, FILM COATED ORAL at 11:01

## 2023-01-07 RX ADMIN — DILTIAZEM HYDROCHLORIDE 180 MG: 180 CAPSULE, COATED, EXTENDED RELEASE ORAL at 09:01

## 2023-01-07 RX ADMIN — POTASSIUM CHLORIDE 10 MEQ: 750 TABLET, FILM COATED, EXTENDED RELEASE ORAL at 11:01

## 2023-01-07 RX ADMIN — PANTOPRAZOLE SODIUM 40 MG: 40 TABLET, DELAYED RELEASE ORAL at 11:01

## 2023-01-07 RX ADMIN — AZITHROMYCIN MONOHYDRATE 500 MG: 500 INJECTION, POWDER, LYOPHILIZED, FOR SOLUTION INTRAVENOUS at 02:01

## 2023-01-07 RX ADMIN — TAMSULOSIN HYDROCHLORIDE 0.4 MG: 0.4 CAPSULE ORAL at 11:01

## 2023-01-07 RX ADMIN — SODIUM CHLORIDE, POTASSIUM CHLORIDE, SODIUM LACTATE AND CALCIUM CHLORIDE: 600; 310; 30; 20 INJECTION, SOLUTION INTRAVENOUS at 03:01

## 2023-01-07 RX ADMIN — METOPROLOL SUCCINATE 50 MG: 50 TABLET, EXTENDED RELEASE ORAL at 11:01

## 2023-01-07 RX ADMIN — ATORVASTATIN CALCIUM 20 MG: 10 TABLET, FILM COATED ORAL at 11:01

## 2023-01-07 RX ADMIN — IPRATROPIUM BROMIDE AND ALBUTEROL SULFATE 3 ML: 2.5; .5 SOLUTION RESPIRATORY (INHALATION) at 11:01

## 2023-01-07 RX ADMIN — ZOLPIDEM TARTRATE 5 MG: 5 TABLET ORAL at 09:01

## 2023-01-07 RX ADMIN — ACETAMINOPHEN 1000 MG: 10 INJECTION, SOLUTION INTRAVENOUS at 12:01

## 2023-01-07 RX ADMIN — APIXABAN 5 MG: 5 TABLET, FILM COATED ORAL at 09:01

## 2023-01-07 RX ADMIN — APIXABAN 5 MG: 5 TABLET, FILM COATED ORAL at 11:01

## 2023-01-07 RX ADMIN — ACETAMINOPHEN 650 MG: 325 TABLET, FILM COATED ORAL at 05:01

## 2023-01-07 RX ADMIN — DILTIAZEM HYDROCHLORIDE 180 MG: 180 CAPSULE, COATED, EXTENDED RELEASE ORAL at 11:01

## 2023-01-07 RX ADMIN — IPRATROPIUM BROMIDE AND ALBUTEROL SULFATE 3 ML: 2.5; .5 SOLUTION RESPIRATORY (INHALATION) at 04:01

## 2023-01-07 RX ADMIN — FAMOTIDINE 20 MG: 20 TABLET, FILM COATED ORAL at 09:01

## 2023-01-07 RX ADMIN — IPRATROPIUM BROMIDE AND ALBUTEROL SULFATE 3 ML: 2.5; .5 SOLUTION RESPIRATORY (INHALATION) at 09:01

## 2023-01-07 RX ADMIN — IPRATROPIUM BROMIDE AND ALBUTEROL SULFATE 3 ML: 2.5; .5 SOLUTION RESPIRATORY (INHALATION) at 01:01

## 2023-01-07 RX ADMIN — CEFTRIAXONE 2 G: 2 INJECTION, POWDER, FOR SOLUTION INTRAMUSCULAR; INTRAVENOUS at 02:01

## 2023-01-07 RX ADMIN — CETIRIZINE HYDROCHLORIDE 10 MG: 10 TABLET, FILM COATED ORAL at 11:01

## 2023-01-07 RX ADMIN — IOPAMIDOL 100 ML: 755 INJECTION, SOLUTION INTRAVENOUS at 12:01

## 2023-01-07 RX ADMIN — IPRATROPIUM BROMIDE AND ALBUTEROL SULFATE 3 ML: 2.5; .5 SOLUTION RESPIRATORY (INHALATION) at 07:01

## 2023-01-07 RX ADMIN — ACETAMINOPHEN 650 MG: 325 TABLET, FILM COATED ORAL at 03:01

## 2023-01-07 NOTE — H&P
Chief complaint:  Shortness of breath and chest pain    The patient is a 72-year-old man who underwent a cardiac ablation procedure 2 days ago for atrial fib flutter.  Apparently the procedure was a success.  I think he use was treated with general anesthesia.  Following the procedure ease he seemed to be well except for some mild soreness of his throat which she attributed to the endotracheal tube.  The following day he began having some vague discomfort in his chest that progress through the night.  It was associated with increasing shortness of breath.  The pain was up in the upper chest area in general distribution and was worse with taking deep breaths.  He did not have fever or cough.  He did present to the emergency room where he was found have infiltrates on chest x-ray and CT scan PE protocol was negative for PE but did reveal significant infiltrates in both lungs but worse on the right.  All in the lower lobes.  He was started on broad-spectrum antibiotics and was given IV Tylenol.  He has improved.  He still remains short of breath without oxygen.  His O2 sats were only 85 on room air.  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    Home meds were amiodarone which was stopped following the procedure 2 days ago.  He remains on p.r.n. Xanax, albuterol inhaler p.r.n., Anoro Ellipta inhaler daily, diltiazem 180 b.i.d..  Cymbalta 30 daily.  Let Eliquis 5 b.i.d..  Hydrochlorothiazide 50 daily, Toprol 50 daily, Singulair 10 daily, KCl 8 mEq daily, Crestor 20 daily, Flomax 0.4 q.h.s. eyedrops p.r.n. Ambien p.r.n. and Depo testosterone q.2 weeks.      Allergies to lorazepam     Past medical history is notable for atrial fibrillation which is a fairly recent problem he also has longstanding hypertension venous insufficiency lower extremities persistently elevated left hemidiaphragm asthma, and obesity.  He has also had some problems  with small-bowel obstructions with multiple abdominal surgeries including those for abdominal wall hernias.  He has obstructive sleep apnea on CPAP.    He does not smoke or use alcohol in excess.      Social he is a  who is the actual  at this hospital.    Review of systems is negative for fevers chills change in weight etc..  HEENT negative for headaches or history of dysphagia.  Endocrine negative for diabetes and thyroid disease.  Cardiovascular no anginal-type chest pain orthopnea PND but he has had pedal edema.  No cough but he has had problems with wheezing due to asthma.  No nausea or vomiting or change in bowel habits.  No urgency frequency dysuria.      Physical exam:  He is afebrile.  Blood pressure 113/77.  Heart rate 80 respiratory rate 18 O2 sat 94% but he is on nasal cannula O2.      General appearance is unremarkable.  He is in no distress.  HEENT exam is unremarkable.  Neck is supple without thyromegaly or adenopathy.  Heart has a regular rate and rhythm.  Lungs with bilobed basilar rales and diminished breath sounds at the right base.  No wheezing.  Abdomen is distended as usual but nontender.  Extremities with 1 to 2+ pretibial edema as well as chronic venous stasis changes with hyperpigmentation bilaterally.    Laboratory studies essentially unremarkable.  White count mildly elevated at 13,000 thousand protime is unremarkable.  Chemistry profile notable for slightly elevated BUN at 33 creatinine 1.34.  BNP normal and troponin slightly elevated.  Flu swab negative.  COVID swab negative.  Blood cultures negative thus far.    X-ray studies include chest film showing infiltrate right elevated him if diaphragm left.  CT scan negative for PE but also showed significant infiltrates in both lower lobes but especially on the right.      Impression: 1.  Pneumonia.  This appears to most likely be a chemical pneumonitis.  Presumably there was an aspiration type phenomena around the time  of his procedure 2 days ago.  Thus far no evidence of infection although instance of bacterial coinfection is quite high    2. History of asthma and limited pulmonary reserve.  Some restrictive lung disease as well related to abdominal obesity and elevated hemidiaphragm.  However pulmonary function test showed his bigger problem was an obstructive-type phenomena.      3. History of atrial fib flutter status post ablation 2 days ago    4. Obesity.      5. Venous insufficiency lower extremities     6. Controlled hypertension     Plan:  Continue supportive care with breathing treatments but oxygen etc..  Will discontinue IV fluids.  Will continue empiric antibiotics for now but likely these can be stopped fairly soon.    Will update blood work in the morning and also get another chest x-ray.    Will also consult his cardiologist.

## 2023-01-07 NOTE — CONSULTS
Inpatient consult to Cardiology  Consult performed by: ZAINAB Delgado  Consult ordered by: Fabiano Liang MD      [facility] - 6th Floor Medical Telemetry  Cardiology  Consult Note    Patient Name: John Bullard  MRN: [id number]  Admission Date: 1/6/2023  Hospital Length of Stay: 0 days  Code Status: Full Code   Attending Provider: Fabiano Liang MD   Consulting Provider: Iron Landrum MD  Primary Care Physician: Fabiano Liang MD  Principal Problem:<principal problem not specified>    Patient information was obtained from patient, past medical records, and ER records.     Subjective:     Chief Complaint: Reason for Consult: Elevated Troponin     HPI: Mr./Father lAeah is a 73 y/o male who is known to [facility], Mark Waller/Donald. The patient recently had an EP Study with Successful Ablation of Typical A.Flutter. He underwent this EP Study on 1.5.23 without complications and was discharged home in stable condition. Post operatively he had a sore throat and a cough which he attributed the ETT. His SOB worsened and he presented to the ER for evaluation. Upon arrival to the ER he was found to have a CXR with Infiltrates and a CT of the Chest with no Evidence of Pulmonary Embolism, however, he did have significant infiltrates in bilateral lungs but worse on the R side. He was admitted to his Primary Care Provider and [facility] was consulted for Elevated Troponin Levels and Recent Cardiac Procedure.     PMH: Anxiety, Asthma, PAF/Ablation , DJD, GERD, HTN, OA, Obesity, MINDA/CPAP, VI, VT, Insomnia, Elevated Calcium Score/Non-Obstructive CAD, FLORENTINO/Bilaterally Mild-Moderate  PSH: Lithotripsy, Sinus Surgery, Colonoscopy, Hernia Repair, EP Study/Ablation, Abdominal Surgery, [place] Teeth Extraction   Family History: Reviewed and Unremarkable for Heart Disease  Social History: Denies Illicit Drug, ETOH and Tobacco    Previous Cardiac Diagnostics:   Carotid US 8.17.22:  Mild Bilaterally and Tortuous  Carotid Arteries with No Hemodynamically Significant FLORENTINO  < 50% Bilaterally    ECHO 4.11.22:  Limited Parasternal Windows  Normal LV Systolic Function: LVEF 55-60%  Moderate AS, Peak AV Velocity 3.2 m/sec, Mean Gradient 23mmHg, JENNY 1.4 cm2  Grade I DD  Mild TR with RVSP of 30mmHg    LHC 4.16.20:  LHC via R Radial Approach with Nonobstructive CAD with Normal LV Function and an EF of 55%. EDP was 20mmHg.    PET 4.8.20:  The study quality is below average.   This is an abnormal perfusion study. Study is consistent with mostly scar tissue with minimal ischemia.   Small fixed perfusion abnormality of moderate intensity in the apical segment. Small partially reversible perfusion abnormality of moderate intensity in the anterior septal region. Small fixed perfusion abnormality of moderate intensity in the inferior region.   This scan is suggestive of low to moderate risk for future cardiovascular events.   The left ventricular cavity is noted to be normal on the stress studies. The stress left ventricular ejection fraction was calculated to be 52% and left ventricular global function is normal. The rest left ventricular cavity is noted to be normal. The rest left ventricular ejection fraction was calculated to be 42% and rest left ventricular global function is mildly reduced.   When compared to the resting ejection fraction (42%), the stress ejection fraction (52%) has increased.     Review of patient's allergies indicates:   Allergen Reactions    Ativan [lorazepam] Hallucinations     No current facility-administered medications on file prior to encounter.     Current Outpatient Medications on File Prior to Encounter   Medication Sig    albuterol (PROVENTIL/VENTOLIN HFA) 90 mcg/actuation inhaler INHALE 2 PUFFS BY MOUTH EVERY 6 HOURS    ALPRAZolam (XANAX) 0.5 MG tablet Take 1 tablet by mouth twice daily as needed for anxiety    diltiaZEM (CARDIZEM CD) 240 MG 24 hr capsule Take 1 capsule (240 mg total) by mouth 2 (two)  times a day. (Patient taking differently: Take 180 mg by mouth 2 (two) times a day.)    ELIQUIS 5 mg Tab Take 5 mg by mouth 2 (two) times daily.    fluticasone-salmeterol 230-21 mcg/dose (ADVAIR HFA) 230-21 mcg/actuation HFAA inhaler Inhale 2 puffs into the lungs 2 (two) times daily. Controller    hydroCHLOROthiazide (HYDRODIURIL) 50 MG tablet Take 1 tablet by mouth once daily    loratadine (CLARITIN) 10 mg tablet Take 10 mg by mouth daily as needed for Allergies.    melatonin 10 mg Tab Take 10 mg by mouth daily as needed (insomnia).    metoprolol succinate (TOPROL-XL) 50 MG 24 hr tablet Take 1 tablet (50 mg total) by mouth 3 (three) times daily. (Patient taking differently: Take 50 mg by mouth once daily.)    montelukast (SINGULAIR) 10 mg tablet Take 1 tablet by mouth once daily    omeprazole (PRILOSEC) 40 MG capsule Take 1 capsule by mouth once daily    potassium chloride (KLOR-CON) 8 MEQ TbSR Take 1 tablet (8 mEq total) by mouth once daily.    rosuvastatin (CRESTOR) 20 MG tablet TAKE 1 TABLET BY MOUTH ONCE IN THE EVENING    tamsulosin (FLOMAX) 0.4 mg Cap Take 1 capsule by mouth once daily.    testosterone cypionate (DEPOTESTOTERONE CYPIONATE) 200 mg/mL injection Inject 1 mL (200 mg total) into the muscle every 14 (fourteen) days. (Patient taking differently: Inject 200 mg into the muscle every 14 (fourteen) days. On Tuesdays)    travoprost, benzalkonium, (TRAVATAN) 0.004 % ophthalmic solution Place 1 drop into both eyes every evening.    zolpidem (AMBIEN) 5 MG Tab TAKE 1 TABLET BY MOUTH ONCE DAILY AT BEDTIME AS NEEDED FOR INSOMNIA    [DISCONTINUED] ANORO ELLIPTA 62.5-25 mcg/actuation DsDv     [DISCONTINUED] DULoxetine (CYMBALTA) 30 MG capsule TAKE 1 CAPSULE BY MOUTH ONCE DAILY DO  NOT  CRUSH  OR  CHEW    [DISCONTINUED] latanoprost 0.005 % ophthalmic solution INSTILL 1 DROP INTO EACH EYE AT BEDTIME    [DISCONTINUED] trifluridine (VIROPTIC) 1 % ophthalmic solution Place into both eyes.     Review of Systems    Constitutional:  Positive for fatigue. Negative for chills and fever.   Respiratory:  Positive for cough and shortness of breath. Negative for chest tightness, wheezing and stridor.    Cardiovascular:  Negative for chest pain, palpitations and leg swelling.   All other systems reviewed and are negative.    Objective:     Vital Signs (Most Recent):  Temp: 98.1 °F (36.7 °C) (01/07/23 1308)  Pulse: 87 (01/07/23 1308)  Resp: 18 (01/07/23 1308)  BP: 112/75 (01/07/23 1308)  SpO2: (!) 92 % (01/07/23 1308)   Vital Signs (24h Range):  Temp:  [97.5 °F (36.4 °C)-98.4 °F (36.9 °C)] 98.1 °F (36.7 °C)  Pulse:  [80-87] 87  Resp:  [16-24] 18  SpO2:  [85 %-98 %] 92 %  BP: (109-125)/(69-77) 112/75     Weight: 115.3 kg (254 lb 1.6 oz)  Body mass index is 38.64 kg/m².    SpO2: (!) 92 %         Intake/Output Summary (Last 24 hours) at 1/7/2023 1344  Last data filed at 1/7/2023 0624  Gross per 24 hour   Intake 240 ml   Output --   Net 240 ml     Lines/Drains/Airways       Peripheral Intravenous Line  Duration                  Peripheral IV - Single Lumen 01/06/23 2357 20 G Anterior;Left Upper Arm <1 day                  Significant Labs:  Recent Results (from the past 72 hour(s))   Comprehensive metabolic panel    Collection Time: 01/07/23 12:08 AM   Result Value Ref Range    Sodium Level 138 136 - 145 mmol/L    Potassium Level 3.8 3.5 - 5.1 mmol/L    Chloride 101 98 - 107 mmol/L    Carbon Dioxide 29 23 - 31 mmol/L    Glucose Level 108 82 - 115 mg/dL    Blood Urea Nitrogen 33.2 (H) 8.4 - 25.7 mg/dL    Creatinine 1.34 (H) 0.73 - 1.18 mg/dL    Calcium Level Total 8.8 8.8 - 10.0 mg/dL    Protein Total 6.0 5.8 - 7.6 gm/dL    Albumin Level 3.7 3.4 - 4.8 g/dL    Globulin 2.3 (L) 2.4 - 3.5 gm/dL    Albumin/Globulin Ratio 1.6 1.1 - 2.0 ratio    Bilirubin Total 0.9 <=1.5 mg/dL    Alkaline Phosphatase 57 40 - 150 unit/L    Alanine Aminotransferase 32 0 - 55 unit/L    Aspartate Aminotransferase 35 (H) 5 - 34 unit/L    eGFR 56 mls/min/1.73/m2    Brain natriuretic peptide    Collection Time: 01/07/23 12:08 AM   Result Value Ref Range    Natriuretic Peptide 44.5 <=100.0 pg/mL   Troponin I    Collection Time: 01/07/23 12:08 AM   Result Value Ref Range    Troponin-I 0.350 (H) 0.000 - 0.045 ng/mL   Protime-INR    Collection Time: 01/07/23 12:08 AM   Result Value Ref Range    PT 16.0 (H) 12.5 - 14.5 seconds    INR 1.30 0.00 - 1.30   CBC with Differential    Collection Time: 01/07/23 12:08 AM   Result Value Ref Range    WBC 13.0 (H) 4.5 - 11.5 x10(3)/mcL    RBC 4.97 4.70 - 6.10 x10(6)/mcL    Hgb 14.6 14.0 - 18.0 gm/dL    Hct 46.7 42.0 - 52.0 %    MCV 94.0 80.0 - 94.0 fL    MCH 29.4 pg    MCHC 31.3 (L) 33.0 - 36.0 mg/dL    RDW 14.3 11.6 - 14.4 %    Platelet 127 (L) 140 - 371 x10(3)/mcL    MPV 11.0 9.4 - 12.4 fL    Neut % 84.1 %    Lymph % 4.1 %    Mono % 10.9 %    Eos % 0.3 %    Basophil % 0.2 %    Lymph # 0.53 (L) 0.6 - 4.6 x10(3)/mcL    Neut # 10.93 (H) 2.1 - 9.2 x10(3)/mcL    Mono # 1.42 (H) 0.1 - 1.3 x10(3)/mcL    Eos # 0.04 0 - 0.9 x10(3)/mcL    Baso # 0.03 0 - 0.2 x10(3)/mcL    IG# 0.05 (H) 0 - 0.04 x10(3)/mcL    IG% 0.4 %    NRBC% 0.0 0 - 1 %   COVID/FLU A&B PCR    Collection Time: 01/07/23  2:02 AM   Result Value Ref Range    Influenza A PCR Not Detected Not Detected    Influenza B PCR Not Detected Not Detected    SARS-CoV-2 PCR Not Detected Not Detected   Troponin I    Collection Time: 01/07/23  3:39 AM   Result Value Ref Range    Troponin-I 0.299 (H) 0.000 - 0.045 ng/mL   Troponin I    Collection Time: 01/07/23  8:52 AM   Result Value Ref Range    Troponin-I 0.229 (H) 0.000 - 0.045 ng/mL     Significant Imaging:  Imaging Results              CTA Chest Non-Coronary (PE Studies) (Final result)  Result time 01/07/23 09:25:24      Final result by Oswaldo Orellana MD (01/07/23 09:25:24)                   Impression:      1. Negative for pulmonary thromboembolic disease.  2. Small right and trace left pleural effusions.  3. Bibasilar consolidation at least  partially relates to atelectasis but superimposed infection possible.  4. Small volume ascites.  5.  No significant discrepancy with the preliminary report.      Electronically signed by: Oswaldo Orellana  Date:    01/07/2023  Time:    09:25               Narrative:    EXAMINATION:  CTA CHEST NON CORONARY (PE STUDIES)    CLINICAL HISTORY:  Pulmonary embolism (PE) suspected, unknown D-dimer;    TECHNIQUE:  Helical acquisition through the chest with IV contrast targeting the pulmonary arteries. Multiplanar and 3D MIP reconstructed images were provided for review.  mGycm. Automatic exposure control, adjustment of mA/kV or iterative reconstruction technique was used to reduce radiation.    COMPARISON:  21 May 2020.    FINDINGS:  There is good opacification of the pulmonary arterial tree. There is no evidence of pulmonary thromboembolism.  There is no aortic dissection.    There is no mediastinal, hilar or axillary lymphadenopathy by size criteria.    Heart size upper limit normal.  There are coronary artery calcifications.  No pericardial effusion.    Small right and trace left pleural effusions.  There is bibasilar atelectasis.  There are additional areas of bibasilar consolidation where infection is possible.  Upper lungs are clear.    There is small volume ascites.  There are no acute osseous findings.                        Preliminary result by Oswaldo Orellana MD (01/07/23 01:34:29)                   Narrative:    START OF REPORT:  Technique: CT Scan of the chest was performed with intravenous contrast with direct axial images as well as sagittal and coronal reconstruction images pulmonary embolus protocol.    Dosage Information: Automated Exposure Control was utilized.    Comparison: None.    Clinical History: Sob.    Findings:  Artifact: Mild motion artifact is seen on multiple images.  Soft Tissues: Unremarkable.  Axilla: A few prominent lymph nodes are seen in the axilla.  Neck: The visualized soft tissues  of the neck appear unremarkable. The isthmus of the thyroid gland is mildly prominent with a hypodense nodule noted within containing a punctate calcification. Correlate with clinical and laboratory findings as regards further evaluation and follow up.  Mediastinum: A few subcentimeter mediastinal lymph nodes are seen pretracheal paratracheal precarinal and subcarinal spaces . The largest is in pre carinal space and measures â10.2 mmâ in least dimension. This suggests reactive lymphadenopathy. Correlate with clinical and laboratory findings as regards further evaluation and follow up.  Heart: The heart size is within normal limits.  Aorta: Unremarkable appearing aorta.  Pulmonary Arteries: No filling defects are seen in the pulmonary arteries to suggest pulmonary embolus.  Lungs: There are bilateral basal posteromedial areas of dense consolidation noted, left greater than right. These features are suggestive of multifocal pneumonia with aspiration component a consideration.  Pleura: There is a small right sided pleural effusion. No pneumothorax is identified.  Bony Structures:  Spine: Mild spondylolytic changes are seen in the thoracic spine.  Ribs: The ribs appear unremarkable.  Abdomen: There is some free fluid noted in the left upper quadrant around the spleen. Correlate with clinical and laboratory findings as regards further evaluation and follow up.      Impression:  1. No filling defects are seen in the pulmonary arteries to suggest pulmonary embolus.  2. There is a small right sided pleural effusion.  3. There are bilateral basal posteromedial areas of dense consolidation noted, left greater than right. These features are suggestive of multifocal pneumonia with aspiration component a consideration. Correlate with clinical and laboratory findings as regards additional evaluation and follow-up to full resolution as indicated.  4. Details and other findings as discussed above.                           Preliminary result by Alex Luciano MD (01/07/23 01:34:29)                   Narrative:    START OF REPORT:  Technique: CT Scan of the chest was performed with intravenous contrast with direct axial images as well as sagittal and coronal reconstruction images pulmonary embolus protocol.    Dosage Information: Automated Exposure Control was utilized.    Comparison: None.    Clinical History: Sob.    Findings:  Artifact: Mild motion artifact is seen on multiple images.  Soft Tissues: Unremarkable.  Axilla: A few prominent lymph nodes are seen in the axilla.  Neck: The visualized soft tissues of the neck appear unremarkable. The isthmus of the thyroid gland is mildly prominent with a hypodense nodule noted within containing a punctate calcification. Correlate with clinical and laboratory findings as regards further evaluation and follow up.  Mediastinum: A few subcentimeter mediastinal lymph nodes are seen pretracheal paratracheal precarinal and subcarinal spaces . The largest is in pre carinal space and measures â10.2 mmâ in least dimension. This suggests reactive lymphadenopathy. Correlate with clinical and laboratory findings as regards further evaluation and follow up.  Heart: The heart size is within normal limits.  Aorta: Unremarkable appearing aorta.  Pulmonary Arteries: No filling defects are seen in the pulmonary arteries to suggest pulmonary embolus.  Lungs: There are bilateral basal posteromedial areas of dense consolidation noted, left greater than right. These features are suggestive of multifocal pneumonia with aspiration component a consideration.  Pleura: There is a small right sided pleural effusion. No pneumothorax is identified.  Bony Structures:  Spine: Mild spondylolytic changes are seen in the thoracic spine.  Ribs: The ribs appear unremarkable.  Abdomen: There is some free fluid noted in the left upper quadrant around the spleen. Correlate with clinical and laboratory findings as regards  further evaluation and follow up.      Impression:  1. No filling defects are seen in the pulmonary arteries to suggest pulmonary embolus.  2. There is a small right sided pleural effusion.  3. There are bilateral basal posteromedial areas of dense consolidation noted, left greater than right. These features are suggestive of multifocal pneumonia with aspiration component a consideration. Correlate with clinical and laboratory findings as regards additional evaluation and follow-up to full resolution as indicated.  4. Details and other findings as discussed above.                                         X-Ray Chest PA And Lateral (Final result)  Result time 01/07/23 10:02:00      Final result by Wong Brewer MD (01/07/23 10:02:00)                   Impression:      Right lower lobe consolidative changes.  Query symptomatology.      Electronically signed by: Wong Brewer MD  Date:    01/07/2023  Time:    10:02               Narrative:    EXAMINATION:  XR CHEST PA AND LATERAL    CLINICAL HISTORY:  Chest Pain;    TECHNIQUE:  PA and lateral views of the chest were performed.    COMPARISON:  08/09/2022    FINDINGS:  Heart size enlarged the pulmonary vasculature is congested.    Bibasilar atelectatic changes lungs with persistent elevation of the left hemidiaphragm.  Concern for developing consolidation in the right lower lobe.                                    EKG:        Telemetry: SR 80s    Physical Exam  Constitutional:       Appearance: Normal appearance.   HENT:      Head: Normocephalic.      Mouth/Throat:      Mouth: Mucous membranes are moist.   Eyes:      Extraocular Movements: Extraocular movements intact.   Cardiovascular:      Rate and Rhythm: Normal rate and regular rhythm.      Pulses: Normal pulses.      Heart sounds: Normal heart sounds.   Pulmonary:      Effort: Pulmonary effort is normal.      Breath sounds: Rhonchi present.      Comments: NC O2  Abdominal:      Palpations: Abdomen is soft.    Musculoskeletal:      Right lower leg: No edema.      Left lower leg: No edema.   Skin:     General: Skin is warm and dry.   Neurological:      General: No focal deficit present.      Mental Status: He is alert and oriented to person, place, and time. Mental status is at baseline.   Psychiatric:         Mood and Affect: Mood normal.         Behavior: Behavior normal.     Home Medications:   No current facility-administered medications on file prior to encounter.     Current Outpatient Medications on File Prior to Encounter   Medication Sig Dispense Refill    albuterol (PROVENTIL/VENTOLIN HFA) 90 mcg/actuation inhaler INHALE 2 PUFFS BY MOUTH EVERY 6 HOURS 8 g 3    ALPRAZolam (XANAX) 0.5 MG tablet Take 1 tablet by mouth twice daily as needed for anxiety 45 tablet 0    diltiaZEM (CARDIZEM CD) 240 MG 24 hr capsule Take 1 capsule (240 mg total) by mouth 2 (two) times a day. (Patient taking differently: Take 180 mg by mouth 2 (two) times a day.) 60 capsule 11    ELIQUIS 5 mg Tab Take 5 mg by mouth 2 (two) times daily.      fluticasone-salmeterol 230-21 mcg/dose (ADVAIR HFA) 230-21 mcg/actuation HFAA inhaler Inhale 2 puffs into the lungs 2 (two) times daily. Controller      hydroCHLOROthiazide (HYDRODIURIL) 50 MG tablet Take 1 tablet by mouth once daily 30 tablet 6    loratadine (CLARITIN) 10 mg tablet Take 10 mg by mouth daily as needed for Allergies.      melatonin 10 mg Tab Take 10 mg by mouth daily as needed (insomnia).      metoprolol succinate (TOPROL-XL) 50 MG 24 hr tablet Take 1 tablet (50 mg total) by mouth 3 (three) times daily. (Patient taking differently: Take 50 mg by mouth once daily.) 90 tablet 11    montelukast (SINGULAIR) 10 mg tablet Take 1 tablet by mouth once daily 30 tablet 0    omeprazole (PRILOSEC) 40 MG capsule Take 1 capsule by mouth once daily 30 capsule 0    potassium chloride (KLOR-CON) 8 MEQ TbSR Take 1 tablet (8 mEq total) by mouth once daily. 30 tablet 5    rosuvastatin (CRESTOR) 20 MG  tablet TAKE 1 TABLET BY MOUTH ONCE IN THE EVENING      tamsulosin (FLOMAX) 0.4 mg Cap Take 1 capsule by mouth once daily.      testosterone cypionate (DEPOTESTOTERONE CYPIONATE) 200 mg/mL injection Inject 1 mL (200 mg total) into the muscle every 14 (fourteen) days. (Patient taking differently: Inject 200 mg into the muscle every 14 (fourteen) days. On Tuesdays) 3 mL 5    travoprost, benzalkonium, (TRAVATAN) 0.004 % ophthalmic solution Place 1 drop into both eyes every evening.      zolpidem (AMBIEN) 5 MG Tab TAKE 1 TABLET BY MOUTH ONCE DAILY AT BEDTIME AS NEEDED FOR INSOMNIA 30 tablet 5    [DISCONTINUED] ANORO ELLIPTA 62.5-25 mcg/actuation DsDv       [DISCONTINUED] DULoxetine (CYMBALTA) 30 MG capsule TAKE 1 CAPSULE BY MOUTH ONCE DAILY DO  NOT  CRUSH  OR  CHEW 30 capsule 0    [DISCONTINUED] latanoprost 0.005 % ophthalmic solution INSTILL 1 DROP INTO EACH EYE AT BEDTIME      [DISCONTINUED] trifluridine (VIROPTIC) 1 % ophthalmic solution Place into both eyes.       Current Inpatient Medications:    Current Facility-Administered Medications:     acetaminophen tablet 650 mg, 650 mg, Oral, Q6H PRN, Fabiano Liang MD, 650 mg at 01/07/23 1137    albuterol inhaler 2 puff, 2 puff, Inhalation, Q4H PRN, Fabiano Liang MD    albuterol-ipratropium 2.5 mg-0.5 mg/3 mL nebulizer solution 3 mL, 3 mL, Nebulization, Q4H WAKE, Dameon Sanchez III, MD, 3 mL at 01/07/23 1154    ALPRAZolam tablet 0.5 mg, 0.5 mg, Oral, BID PRN, Fabiano Liang MD    apixaban tablet 5 mg, 5 mg, Oral, BID, Fabiano Liang MD, 5 mg at 01/07/23 1137    atorvastatin tablet 20 mg, 20 mg, Oral, Daily, Fabiano Liang MD, 20 mg at 01/07/23 1137    azithromycin 500 mg in dextrose 5 % 250 mL IVPB (ready to mix system), 500 mg, Intravenous, Q24H, Dameon Sanchez III, MD    cefTRIAXone (ROCEPHIN) 1 g in dextrose 5 % in water (D5W) 5 % 50 mL IVPB (MB+), 1 g, Intravenous, Q24H, Dameon Sanchez III, MD    cetirizine tablet 10 mg, 10 mg,  Oral, Daily, Fabiano Liang MD, 10 mg at 01/07/23 1137    diltiaZEM 24 hr capsule 180 mg, 180 mg, Oral, BID, Fabiano Liang MD, 180 mg at 01/07/23 1137    famotidine tablet 20 mg, 20 mg, Oral, BID, Dameon Sanchez III, MD, 20 mg at 01/07/23 0920    fluticasone furoate-vilanteroL 200-25 mcg/dose diskus inhaler 1 puff, 1 puff, Inhalation, Daily, Fabiano Liang MD    [START ON 1/8/2023] hydroCHLOROthiazide tablet 50 mg, 50 mg, Oral, Daily, Fabiano Liang MD    latanoprost 0.005 % ophthalmic solution 1 drop, 1 drop, Both Eyes, Daily, Fabiano Liang MD    melatonin tablet 6 mg, 6 mg, Oral, Nightly PRN, Dameon Sanchez III, MD    melatonin tablet 9 mg, 9 mg, Oral, Daily PRN, Fabiano Liang MD    metoprolol succinate (TOPROL-XL) 24 hr tablet 50 mg, 50 mg, Oral, Daily, Fabiano Liang MD, 50 mg at 01/07/23 1137    [START ON 1/8/2023] montelukast tablet 10 mg, 10 mg, Oral, Daily, Fabiano Liang MD    pantoprazole EC tablet 40 mg, 40 mg, Oral, Daily, Fabiano Liang MD, 40 mg at 01/07/23 1137    potassium chloride CR tablet 10 mEq, 10 mEq, Oral, Daily, Fabiano Liang MD, 10 mEq at 01/07/23 1137    tamsulosin 24 hr capsule 0.4 mg, 1 capsule, Oral, Daily, Fabiano Liang MD, 0.4 mg at 01/07/23 1137    zolpidem tablet 5 mg, 5 mg, Oral, QHS, Fabiano Liang MD    VTE Risk Mitigation (From admission, onward)           Ordered     apixaban tablet 5 mg  2 times daily         01/07/23 1050     IP VTE HIGH RISK PATIENT  Once         01/07/23 0303     Place sequential compression device  Until discontinued         01/07/23 0303                  Assessment:   Bilateral Pneumonitis (R > L)  Mildly Elevated Troponin (Flat)  Hx of Asthma  PAF - Now in SR    - CHADsVASc - 5 Points - 7.2% Stroke Risk per Year     - EP Study with Successful Ablation of Typical A.Flutter with Termination into SR (1.5.23)  HTN - Controlled  MINDA/CPAP  VI  Elevated Calcium Score/Mild  Calcified Non-Obstructive CAD via [facility] (2020)  FLORENTINO/Bilaterally Mild-Moderate  Anxiety  GERD  OA  Obseity  No Hx of GIB per Chart Review    Plan:   Trend Cardiac Biomarkers  ECHO today to Evaluate for RWMAs and EF  Treatment for Pneumonia per Primary Team  Continue Eliquis Re Stroke Risk Reduction  Continue BB, Dilt and Statin Therapies  Will F/U in AM with Recommendations once ECHO Complete  Labs in AM: CBC, CMP, Mg and Troponin     Thank you for your consult.     Iron Landrum MD  Cardiology  [facility] - 6th Floor Medical Telemetry  01/07/2023 11:51 AM

## 2023-01-07 NOTE — ED PROVIDER NOTES
Encounter Date: 1/6/2023    SCRIBE #1 NOTE: I, Song Berg, am scribing for, and in the presence of,  Dr. Sanchez. I have scribed the following portions of the note - the EKG reading. Other sections scribed: HPI, ROS, Physical Exam, MDM, Attending.     History     Chief Complaint   Patient presents with    Chest Pain     Complaint of chest pain, onset gradually during the day.  Worse with deep breaths.  Did have ablation done 2 days ago. Sp02 in triage is 85%.      71 y/o CM with history of asthma, sleep apnea, HTN and A flutter on Eliquis presents to ED for chest pain gradually worsening over the course of the day.  Pt had ablation with Dr. Waller 2 days ago that resolved his A flutter.  He also reports mild leg pain and swelling, along with SOB ongoing for the past 6-8 months, and he says his pain is worse with deep breaths.  Pt has been using inhaler to help with his SOB.  He was 85% on RA in triage.  Pt has no history of DVT.  He denies fever or wheezing.  Pt's cardiologist is Dr. Bennett and PCP is Dr. Liang.    The history is provided by the patient.   Chest Pain  The current episode started today. Chest pain occurs constantly. The chest pain is worsening. Chest pain is worsened by deep breathing. Primary symptoms include shortness of breath. Pertinent negatives for primary symptoms include no fever and no wheezing.   Episode onset: 6-8 months ago. The shortness of breath is mild. The patient's medical history is significant for asthma.   Associated symptoms include lower extremity edema. Risk factors include male gender and being elderly.   His past medical history is significant for hypertension and sleep apnea.   Review of patient's allergies indicates:   Allergen Reactions    Ativan [lorazepam] Hallucinations     Past Medical History:   Diagnosis Date    Anticoagulant long-term use     Anxiety     Asthma     Atrial fibrillation     Decreased testosterone level     Dyspnea     GERD (gastroesophageal  reflux disease)     Hypertension     OA (osteoarthritis)     Obesity     SBO (small bowel obstruction)     Sleep apnea     Venous insufficiency     Ventricular tachycardia      Past Surgical History:   Procedure Laterality Date    ABLATION N/A 1/5/2023    Procedure: ABLATION;  Surgeon: Kenny Waller MD;  Location: [facility] CATH LAB;  Service: Cardiology;  Laterality: N/A;  EPS + AFL CATH ABLATION W/ ANEST.    COLONOSCOPY      DIAGNOSTIC LAPAROSCOPY      LAPAROSCOPIC REPAIR OF UMBILICAL HERNIA      LITHOTRIPSY      SINUS SURGERY      WISDOM TOOTH EXTRACTION Bilateral      Family History   Problem Relation Age of Onset    Cancer Mother      Social History     Tobacco Use    Smoking status: Never    Smokeless tobacco: Never   Substance Use Topics    Alcohol use: Yes     Alcohol/week: 1.0 standard drink     Types: 1 Glasses of wine per week    Drug use: Never     Review of Systems   Constitutional:  Negative for fever.   Respiratory:  Positive for shortness of breath. Negative for wheezing.    Cardiovascular:  Positive for chest pain and leg swelling.   Musculoskeletal:  Positive for myalgias (legs).     Physical Exam     Initial Vitals [01/06/23 2309]   BP Pulse Resp Temp SpO2   125/72 84 (!) 24 98.4 °F (36.9 °C) (!) 85 %      MAP       --         Physical Exam    Nursing note and vitals reviewed.  Constitutional: No distress.   HENT:   Head: Normocephalic and atraumatic.   Neck: Trachea normal.   Cardiovascular:  Normal rate and regular rhythm.           No murmur heard.  Pulmonary/Chest: No respiratory distress.   Crackles to bilateral bases   Abdominal: Abdomen is soft. Bowel sounds are normal. He exhibits no distension. There is no abdominal tenderness.   Musculoskeletal:         General: Normal range of motion.      Lumbar back: Normal.     Neurological: He is alert and oriented to person, place, and time. He has normal strength. No cranial nerve deficit.   Skin: Skin is warm and dry. No rash noted.   Psychiatric: He  has a normal mood and affect. Judgment normal.       ED Course   Procedures  Labs Reviewed   COMPREHENSIVE METABOLIC PANEL - Abnormal; Notable for the following components:       Result Value    Blood Urea Nitrogen 33.2 (*)     Creatinine 1.34 (*)     Globulin 2.3 (*)     Aspartate Aminotransferase 35 (*)     All other components within normal limits   TROPONIN I - Abnormal; Notable for the following components:    Troponin-I 0.350 (*)     All other components within normal limits   PROTIME-INR - Abnormal; Notable for the following components:    PT 16.0 (*)     All other components within normal limits   CBC WITH DIFFERENTIAL - Abnormal; Notable for the following components:    WBC 13.0 (*)     MCHC 31.3 (*)     Platelet 127 (*)     Lymph # 0.53 (*)     Neut # 10.93 (*)     Mono # 1.42 (*)     IG# 0.05 (*)     All other components within normal limits   B-TYPE NATRIURETIC PEPTIDE - Normal   BLOOD CULTURE [facility]LG   BLOOD CULTURE [facility]   CBC W/ AUTO DIFFERENTIAL    Narrative:     The following orders were created for panel order CBC auto differential.  Procedure                               Abnormality         Status                     ---------                               -----------         ------                     CBC with Differential[[id number]]        Abnormal            Final result                 Please view results for these tests on the individual orders.   COVID/FLU A&B PCR   ISTAT CHEM8     EKG Readings: (Independently Interpreted)   Initial Reading: No STEMI. Rhythm: Normal Sinus Rhythm. Heart Rate: 84. Ectopy: No Ectopy. Conduction: RBBB. ST Segments: Normal ST Segments. T Waves: Normal. Axis: Normal.   2305     Imaging Results              CTA Chest Non-Coronary (PE Studies) (Preliminary result)  Result time 01/07/23 01:34:29      Preliminary result by Alex Luciano MD (01/07/23 01:34:29)                   Narrative:    START OF REPORT:  Technique: CT Scan of the chest was performed with intravenous  contrast with direct axial images as well as sagittal and coronal reconstruction images pulmonary embolus protocol.    Dosage Information: Automated Exposure Control was utilized.    Comparison: None.    Clinical History: Sob.    Findings:  Artifact: Mild motion artifact is seen on multiple images.  Soft Tissues: Unremarkable.  Axilla: A few prominent lymph nodes are seen in the axilla.  Neck: The visualized soft tissues of the neck appear unremarkable. The isthmus of the thyroid gland is mildly prominent with a hypodense nodule noted within containing a punctate calcification. Correlate with clinical and laboratory findings as regards further evaluation and follow up.  Mediastinum: A few subcentimeter mediastinal lymph nodes are seen pretracheal paratracheal precarinal and subcarinal spaces . The largest is in pre carinal space and measures â10.2 mmâ in least dimension. This suggests reactive lymphadenopathy. Correlate with clinical and laboratory findings as regards further evaluation and follow up.  Heart: The heart size is within normal limits.  Aorta: Unremarkable appearing aorta.  Pulmonary Arteries: No filling defects are seen in the pulmonary arteries to suggest pulmonary embolus.  Lungs: There are bilateral basal posteromedial areas of dense consolidation noted, left greater than right. These features are suggestive of multifocal pneumonia with aspiration component a consideration.  Pleura: There is a small right sided pleural effusion. No pneumothorax is identified.  Bony Structures:  Spine: Mild spondylolytic changes are seen in the thoracic spine.  Ribs: The ribs appear unremarkable.  Abdomen: There is some free fluid noted in the left upper quadrant around the spleen. Correlate with clinical and laboratory findings as regards further evaluation and follow up.      Impression:  1. No filling defects are seen in the pulmonary arteries to suggest pulmonary embolus.  2. There is a small right sided  pleural effusion.  3. There are bilateral basal posteromedial areas of dense consolidation noted, left greater than right. These features are suggestive of multifocal pneumonia with aspiration component a consideration. Correlate with clinical and laboratory findings as regards additional evaluation and follow-up to full resolution as indicated.  4. Details and other findings as discussed above.                          Preliminary result by Interface, Rad Results In (01/07/23 01:34:29)                   Narrative:    START OF REPORT:  Technique: CT Scan of the chest was performed with intravenous contrast with direct axial images as well as sagittal and coronal reconstruction images pulmonary embolus protocol.    Dosage Information: Automated Exposure Control was utilized.    Comparison: None.    Clinical History: Sob.    Findings:  Artifact: Mild motion artifact is seen on multiple images.  Soft Tissues: Unremarkable.  Axilla: A few prominent lymph nodes are seen in the axilla.  Neck: The visualized soft tissues of the neck appear unremarkable. The isthmus of the thyroid gland is mildly prominent with a hypodense nodule noted within containing a punctate calcification. Correlate with clinical and laboratory findings as regards further evaluation and follow up.  Mediastinum: A few subcentimeter mediastinal lymph nodes are seen pretracheal paratracheal precarinal and subcarinal spaces . The largest is in pre carinal space and measures â10.2 mmâ in least dimension. This suggests reactive lymphadenopathy. Correlate with clinical and laboratory findings as regards further evaluation and follow up.  Heart: The heart size is within normal limits.  Aorta: Unremarkable appearing aorta.  Pulmonary Arteries: No filling defects are seen in the pulmonary arteries to suggest pulmonary embolus.  Lungs: There are bilateral basal posteromedial areas of dense consolidation noted, left greater than right. These features are  suggestive of multifocal pneumonia with aspiration component a consideration.  Pleura: There is a small right sided pleural effusion. No pneumothorax is identified.  Bony Structures:  Spine: Mild spondylolytic changes are seen in the thoracic spine.  Ribs: The ribs appear unremarkable.  Abdomen: There is some free fluid noted in the left upper quadrant around the spleen. Correlate with clinical and laboratory findings as regards further evaluation and follow up.      Impression:  1. No filling defects are seen in the pulmonary arteries to suggest pulmonary embolus.  2. There is a small right sided pleural effusion.  3. There are bilateral basal posteromedial areas of dense consolidation noted, left greater than right. These features are suggestive of multifocal pneumonia with aspiration component a consideration. Correlate with clinical and laboratory findings as regards additional evaluation and follow-up to full resolution as indicated.  4. Details and other findings as discussed above.                                         X-Ray Chest PA And Lateral (In process)                      Medications   azithromycin 500 mg in dextrose 5 % 250 mL IVPB (ready to mix system) (500 mg Intravenous New Bag 1/7/23 0204)   aspirin chewable tablet 324 mg (324 mg Oral Given 1/6/23 2352)   iopamidoL (ISOVUE-370) injection 100 mL (100 mLs Intravenous Given 1/7/23 0037)   acetaminophen 1,000 mg/100 mL (10 mg/mL) injection 1,000 mg (0 mg Intravenous Stopped 1/7/23 0107)   albuterol-ipratropium 2.5 mg-0.5 mg/3 mL nebulizer solution 3 mL (3 mLs Nebulization Given 1/7/23 0105)   cefTRIAXone (ROCEPHIN) 2 g in dextrose 5 % in water (D5W) 5 % 50 mL IVPB (MB+) (2 g Intravenous New Bag 1/7/23 0204)     Medical Decision Making:   Initial Assessment:   Mildly tachypneic and hypoxic  Differential Diagnosis:   Congestive heart failure pneumonia pneumothorax acute coronary syndrome pulmonary embolus anemia  Clinical Tests:   Lab Tests: Ordered  and Reviewed  Radiological Study: Ordered and Reviewed  Medical Tests: Ordered and Reviewed        Scribe Attestation:   Scribe #1: I performed the above scribed service and the documentation accurately describes the services I performed. I attest to the accuracy of the note.    Attending Attestation:           Physician Attestation for Scribe:  Physician Attestation Statement for Scribe #1: I, reviewed documentation, as scribed by Song Berg in my presence, and it is both accurate and complete.            Medical Decision Making  Problems Addressed:  Chest pain: complicated acute illness or injury     Details: Patient with an EKG that is normal but does have a troponin of mild elevation likely secondary to his recent ablation but will admit on telemetry and repeat cardiac enzymes  Pneumonia of both lungs due to infectious organism, unspecified part of lung: acute illness or injury with systemic symptoms that poses a threat to life or bodily functions     Details: Patient hypoxic patient with recent intubation discussed case with admitting service will treat for community-acquired pneumonia admit on IV antibiotics blood cultures nebs as needed                 Clinical Impression:   Final diagnoses:  [R07.9] Chest pain  [R07.9] Chest pain, unspecified type (Primary)  [J18.9] Pneumonia of both lungs due to infectious organism, unspecified part of lung        ED Disposition Condition    Admit Stable                Dameon Sanchez III, MD  01/07/23 5118

## 2023-01-08 LAB
ALBUMIN SERPL-MCNC: 3.4 G/DL (ref 3.4–4.8)
ALBUMIN/GLOB SERPL: 1.6 RATIO (ref 1.1–2)
ALP SERPL-CCNC: 52 UNIT/L (ref 40–150)
ALT SERPL-CCNC: 25 UNIT/L (ref 0–55)
AST SERPL-CCNC: 22 UNIT/L (ref 5–34)
BASOPHILS # BLD AUTO: 0.03 X10(3)/MCL (ref 0–0.2)
BASOPHILS NFR BLD AUTO: 0.3 %
BILIRUBIN DIRECT+TOT PNL SERPL-MCNC: 0.8 MG/DL
BNP BLD-MCNC: 87.2 PG/ML
BUN SERPL-MCNC: 29.2 MG/DL (ref 8.4–25.7)
CALCIUM SERPL-MCNC: 8.6 MG/DL (ref 8.8–10)
CHLORIDE SERPL-SCNC: 99 MMOL/L (ref 98–107)
CO2 SERPL-SCNC: 32 MMOL/L (ref 23–31)
CREAT SERPL-MCNC: 1.2 MG/DL (ref 0.73–1.18)
EOSINOPHIL # BLD AUTO: 0.07 X10(3)/MCL (ref 0–0.9)
EOSINOPHIL NFR BLD AUTO: 0.7 %
ERYTHROCYTE [DISTWIDTH] IN BLOOD BY AUTOMATED COUNT: 14.6 % (ref 11.6–14.4)
GFR SERPLBLD CREATININE-BSD FMLA CKD-EPI: 64 MLS/MIN/1.73/M2
GLOBULIN SER-MCNC: 2.1 GM/DL (ref 2.4–3.5)
GLUCOSE SERPL-MCNC: 89 MG/DL (ref 82–115)
HCT VFR BLD AUTO: 43.9 % (ref 42–52)
HGB BLD-MCNC: 13.6 GM/DL (ref 14–18)
IMM GRANULOCYTES # BLD AUTO: 0.05 X10(3)/MCL (ref 0–0.04)
IMM GRANULOCYTES NFR BLD AUTO: 0.5 %
LYMPHOCYTES # BLD AUTO: 0.47 X10(3)/MCL (ref 0.6–4.6)
LYMPHOCYTES NFR BLD AUTO: 4.6 %
MAGNESIUM SERPL-MCNC: 2.5 MG/DL (ref 1.6–2.6)
MCH RBC QN AUTO: 29.5 PG
MCHC RBC AUTO-ENTMCNC: 31 MG/DL (ref 33–36)
MCV RBC AUTO: 95.2 FL (ref 80–94)
MONOCYTES # BLD AUTO: 1.2 X10(3)/MCL (ref 0.1–1.3)
MONOCYTES NFR BLD AUTO: 11.8 %
NEUTROPHILS # BLD AUTO: 8.36 X10(3)/MCL (ref 2.1–9.2)
NEUTROPHILS NFR BLD AUTO: 82.1 %
NRBC BLD AUTO-RTO: 0 % (ref 0–1)
PLATELET # BLD AUTO: 123 X10(3)/MCL (ref 140–371)
PMV BLD AUTO: 11.6 FL (ref 9.4–12.4)
POTASSIUM SERPL-SCNC: 3.6 MMOL/L (ref 3.5–5.1)
PROT SERPL-MCNC: 5.5 GM/DL (ref 5.8–7.6)
RBC # BLD AUTO: 4.61 X10(6)/MCL (ref 4.7–6.1)
SODIUM SERPL-SCNC: 139 MMOL/L (ref 136–145)
TROPONIN I SERPL-MCNC: 0.17 NG/ML (ref 0–0.04)
WBC # SPEC AUTO: 10.2 X10(3)/MCL (ref 4.5–11.5)

## 2023-01-08 PROCEDURE — 25000003 PHARM REV CODE 250: Performed by: EMERGENCY MEDICINE

## 2023-01-08 PROCEDURE — 93010 EKG 12-LEAD: ICD-10-PCS | Mod: ,,, | Performed by: INTERNAL MEDICINE

## 2023-01-08 PROCEDURE — 25000003 PHARM REV CODE 250: Performed by: INTERNAL MEDICINE

## 2023-01-08 PROCEDURE — 83880 ASSAY OF NATRIURETIC PEPTIDE: CPT | Performed by: INTERNAL MEDICINE

## 2023-01-08 PROCEDURE — 99900031 HC PATIENT EDUCATION (STAT)

## 2023-01-08 PROCEDURE — 80053 COMPREHEN METABOLIC PANEL: CPT | Performed by: INTERNAL MEDICINE

## 2023-01-08 PROCEDURE — 63600175 PHARM REV CODE 636 W HCPCS: Performed by: EMERGENCY MEDICINE

## 2023-01-08 PROCEDURE — 99233 SBSQ HOSP IP/OBS HIGH 50: CPT | Mod: ,,, | Performed by: INTERNAL MEDICINE

## 2023-01-08 PROCEDURE — 93005 ELECTROCARDIOGRAM TRACING: CPT

## 2023-01-08 PROCEDURE — 25000242 PHARM REV CODE 250 ALT 637 W/ HCPCS: Performed by: INTERNAL MEDICINE

## 2023-01-08 PROCEDURE — 94761 N-INVAS EAR/PLS OXIMETRY MLT: CPT

## 2023-01-08 PROCEDURE — 25000242 PHARM REV CODE 250 ALT 637 W/ HCPCS: Performed by: EMERGENCY MEDICINE

## 2023-01-08 PROCEDURE — 84484 ASSAY OF TROPONIN QUANT: CPT | Performed by: INTERNAL MEDICINE

## 2023-01-08 PROCEDURE — 99233 PR SUBSEQUENT HOSPITAL CARE,LEVL III: ICD-10-PCS | Mod: ,,, | Performed by: INTERNAL MEDICINE

## 2023-01-08 PROCEDURE — 63600175 PHARM REV CODE 636 W HCPCS: Performed by: INTERNAL MEDICINE

## 2023-01-08 PROCEDURE — 27000221 HC OXYGEN, UP TO 24 HOURS

## 2023-01-08 PROCEDURE — 36415 COLL VENOUS BLD VENIPUNCTURE: CPT | Performed by: INTERNAL MEDICINE

## 2023-01-08 PROCEDURE — 83735 ASSAY OF MAGNESIUM: CPT | Performed by: NURSE PRACTITIONER

## 2023-01-08 PROCEDURE — 21400001 HC TELEMETRY ROOM

## 2023-01-08 PROCEDURE — 85025 COMPLETE CBC W/AUTO DIFF WBC: CPT | Performed by: INTERNAL MEDICINE

## 2023-01-08 PROCEDURE — 94640 AIRWAY INHALATION TREATMENT: CPT

## 2023-01-08 PROCEDURE — 93010 ELECTROCARDIOGRAM REPORT: CPT | Mod: ,,, | Performed by: INTERNAL MEDICINE

## 2023-01-08 RX ORDER — METOPROLOL TARTRATE 1 MG/ML
5 INJECTION, SOLUTION INTRAVENOUS
Status: DISCONTINUED | OUTPATIENT
Start: 2023-01-08 | End: 2023-01-21 | Stop reason: HOSPADM

## 2023-01-08 RX ORDER — FUROSEMIDE 10 MG/ML
40 INJECTION INTRAMUSCULAR; INTRAVENOUS ONCE
Status: COMPLETED | OUTPATIENT
Start: 2023-01-08 | End: 2023-01-08

## 2023-01-08 RX ADMIN — IPRATROPIUM BROMIDE AND ALBUTEROL SULFATE 3 ML: 2.5; .5 SOLUTION RESPIRATORY (INHALATION) at 11:01

## 2023-01-08 RX ADMIN — FAMOTIDINE 20 MG: 20 TABLET, FILM COATED ORAL at 09:01

## 2023-01-08 RX ADMIN — AZITHROMYCIN MONOHYDRATE 500 MG: 500 INJECTION, POWDER, LYOPHILIZED, FOR SOLUTION INTRAVENOUS at 04:01

## 2023-01-08 RX ADMIN — POTASSIUM CHLORIDE 10 MEQ: 750 TABLET, FILM COATED, EXTENDED RELEASE ORAL at 10:01

## 2023-01-08 RX ADMIN — DILTIAZEM HYDROCHLORIDE 180 MG: 180 CAPSULE, COATED, EXTENDED RELEASE ORAL at 10:01

## 2023-01-08 RX ADMIN — LATANOPROST 1 DROP: 50 SOLUTION OPHTHALMIC at 10:01

## 2023-01-08 RX ADMIN — METOPROLOL SUCCINATE 50 MG: 50 TABLET, EXTENDED RELEASE ORAL at 10:01

## 2023-01-08 RX ADMIN — IPRATROPIUM BROMIDE AND ALBUTEROL SULFATE 3 ML: 2.5; .5 SOLUTION RESPIRATORY (INHALATION) at 03:01

## 2023-01-08 RX ADMIN — ATORVASTATIN CALCIUM 20 MG: 10 TABLET, FILM COATED ORAL at 10:01

## 2023-01-08 RX ADMIN — IPRATROPIUM BROMIDE AND ALBUTEROL SULFATE 3 ML: 2.5; .5 SOLUTION RESPIRATORY (INHALATION) at 07:01

## 2023-01-08 RX ADMIN — CETIRIZINE HYDROCHLORIDE 10 MG: 10 TABLET, FILM COATED ORAL at 10:01

## 2023-01-08 RX ADMIN — MONTELUKAST 10 MG: 10 TABLET, FILM COATED ORAL at 10:01

## 2023-01-08 RX ADMIN — ACETAMINOPHEN 650 MG: 325 TABLET, FILM COATED ORAL at 10:01

## 2023-01-08 RX ADMIN — APIXABAN 5 MG: 5 TABLET, FILM COATED ORAL at 09:01

## 2023-01-08 RX ADMIN — FLUTICASONE FUROATE AND VILANTEROL TRIFENATATE 1 PUFF: 200; 25 POWDER RESPIRATORY (INHALATION) at 10:01

## 2023-01-08 RX ADMIN — DILTIAZEM HYDROCHLORIDE 180 MG: 180 CAPSULE, COATED, EXTENDED RELEASE ORAL at 09:01

## 2023-01-08 RX ADMIN — PANTOPRAZOLE SODIUM 40 MG: 40 TABLET, DELAYED RELEASE ORAL at 10:01

## 2023-01-08 RX ADMIN — HYDROCHLOROTHIAZIDE 50 MG: 25 TABLET ORAL at 10:01

## 2023-01-08 RX ADMIN — ZOLPIDEM TARTRATE 5 MG: 5 TABLET ORAL at 09:01

## 2023-01-08 RX ADMIN — TAMSULOSIN HYDROCHLORIDE 0.4 MG: 0.4 CAPSULE ORAL at 10:01

## 2023-01-08 RX ADMIN — IPRATROPIUM BROMIDE AND ALBUTEROL SULFATE 3 ML: 2.5; .5 SOLUTION RESPIRATORY (INHALATION) at 08:01

## 2023-01-08 RX ADMIN — FAMOTIDINE 20 MG: 20 TABLET, FILM COATED ORAL at 10:01

## 2023-01-08 RX ADMIN — APIXABAN 5 MG: 5 TABLET, FILM COATED ORAL at 10:01

## 2023-01-08 RX ADMIN — CEFTRIAXONE 1 G: 1 INJECTION, POWDER, FOR SOLUTION INTRAMUSCULAR; INTRAVENOUS at 04:01

## 2023-01-08 RX ADMIN — FUROSEMIDE 40 MG: 10 INJECTION, SOLUTION INTRAMUSCULAR; INTRAVENOUS at 01:01

## 2023-01-08 NOTE — PROGRESS NOTES
Hx Afib sp ablation 1/5/23  Cordarone stopped at that time due to successful ablation    Converted back to afib with controlled rate, vitals stable    Continue to monitor  PRN metoprolol  EKG

## 2023-01-08 NOTE — NURSING
Patient noted to be in afib. Called tele to see when converted, officially converted around 2200 but had frequent short pauses prior (appearing to want to convert but maintained NSR); very rate controlled after. Less rate controlled now but maintaining 60-80 HR. [facility] notified.

## 2023-01-08 NOTE — PROGRESS NOTES
"  [facility] General  Cardiology  Progress Note    Patient Name: John Bullard  MRN: [id number]  Admission Date: 1/6/2023  Hospital Length of Stay: 1 days  Code Status: Full Code   Attending Provider: Fabiano Liang MD   Consulting Provider: Iron Landrum MD  Primary Care Physician: Fabiano Liang MD  Principal Problem:<principal problem not specified>    Patient information was obtained from patient, past medical records, and ER records.     Subjective:     Chief Complaint: Reason for Consult: Elevated Troponin     HPI: Mr./Father Aleah is a 71 y/o male who is known to [facility], Mark Waller/Donald. The patient recently had an EP Study with Successful Ablation of Typical A.Flutter. He underwent this EP Study on 1.5.23 without complications and was discharged home in stable condition. Post operatively he had a sore throat and a cough which he attributed the ETT. His SOB worsened and he presented to the ER for evaluation. Upon arrival to the ER he was found to have a CXR with Infiltrates and a CT of the Chest with no Evidence of Pulmonary Embolism, however, he did have significant infiltrates in bilateral lungs but worse on the R side. He was admitted to his Primary Care Provider and [facility] was consulted for Elevated Troponin Levels and Recent Cardiac Procedure.     1.8.23: NAD. Converted into PAF Last Night. Remains with CVR. "I am feeling ok." + SOB, Improving. Denies CP and Palps. + Cough.    PMH: Anxiety, Asthma, PAF/Ablation , DJD, GERD, HTN, OA, Obesity, MINDA/CPAP, VI, VT, Insomnia, Elevated Calcium Score/Non-Obstructive CAD, FLORENTINO/Bilaterally Mild-Moderate  PSH: Lithotripsy, Sinus Surgery, Colonoscopy, Hernia Repair, EP Study/Ablation, Abdominal Surgery, Preston Teeth Extraction   Family History: Reviewed and Unremarkable for Heart Disease  Social History: Denies Illicit Drug, ETOH and Tobacco    Previous Cardiac Diagnostics:   ECHO 1.7.23:  Concentric hypertrophy and mildly decreased systolic " function.  The estimated ejection fraction is 35-40%.  Grade I left ventricular diastolic dysfunction.  With normal right ventricular systolic function.  There is mild aortic valve stenosis.  Aortic valve area is 1.03 cm2; peak velocity is 1.99 m/s; mean gradient is 10 mmHg.    Carotid US 8.17.22:  Mild Bilaterally and Tortuous Carotid Arteries with No Hemodynamically Significant FLORENTINO  < 50% Bilaterally    ECHO 4.11.22:  Limited Parasternal Windows  Normal LV Systolic Function: LVEF 55-60%  Moderate AS, Peak AV Velocity 3.2 m/sec, Mean Gradient 23mmHg, JENNY 1.4 cm2  Grade I DD  Mild TR with RVSP of 30mmHg    LHC 4.16.20:  LHC via R Radial Approach with Nonobstructive CAD with Normal LV Function and an EF of 55%. EDP was 20mmHg.    PET 4.8.20:  The study quality is below average.   This is an abnormal perfusion study. Study is consistent with mostly scar tissue with minimal ischemia.   Small fixed perfusion abnormality of moderate intensity in the apical segment. Small partially reversible perfusion abnormality of moderate intensity in the anterior septal region. Small fixed perfusion abnormality of moderate intensity in the inferior region.   This scan is suggestive of low to moderate risk for future cardiovascular events.   The left ventricular cavity is noted to be normal on the stress studies. The stress left ventricular ejection fraction was calculated to be 52% and left ventricular global function is normal. The rest left ventricular cavity is noted to be normal. The rest left ventricular ejection fraction was calculated to be 42% and rest left ventricular global function is mildly reduced.   When compared to the resting ejection fraction (42%), the stress ejection fraction (52%) has increased.     Review of patient's allergies indicates:   Allergen Reactions    Ativan [lorazepam] Hallucinations     No current facility-administered medications on file prior to encounter.     Current Outpatient Medications on File  Prior to Encounter   Medication Sig    albuterol (PROVENTIL/VENTOLIN HFA) 90 mcg/actuation inhaler INHALE 2 PUFFS BY MOUTH EVERY 6 HOURS    ALPRAZolam (XANAX) 0.5 MG tablet Take 1 tablet by mouth twice daily as needed for anxiety    diltiaZEM (CARDIZEM CD) 240 MG 24 hr capsule Take 1 capsule (240 mg total) by mouth 2 (two) times a day. (Patient taking differently: Take 180 mg by mouth 2 (two) times a day.)    ELIQUIS 5 mg Tab Take 5 mg by mouth 2 (two) times daily.    fluticasone-salmeterol 230-21 mcg/dose (ADVAIR HFA) 230-21 mcg/actuation HFAA inhaler Inhale 2 puffs into the lungs 2 (two) times daily. Controller    hydroCHLOROthiazide (HYDRODIURIL) 50 MG tablet Take 1 tablet by mouth once daily    loratadine (CLARITIN) 10 mg tablet Take 10 mg by mouth daily as needed for Allergies.    melatonin 10 mg Tab Take 10 mg by mouth daily as needed (insomnia).    metoprolol succinate (TOPROL-XL) 50 MG 24 hr tablet Take 1 tablet (50 mg total) by mouth 3 (three) times daily. (Patient taking differently: Take 50 mg by mouth once daily.)    montelukast (SINGULAIR) 10 mg tablet Take 1 tablet by mouth once daily    omeprazole (PRILOSEC) 40 MG capsule Take 1 capsule by mouth once daily    potassium chloride (KLOR-CON) 8 MEQ TbSR Take 1 tablet (8 mEq total) by mouth once daily.    rosuvastatin (CRESTOR) 20 MG tablet TAKE 1 TABLET BY MOUTH ONCE IN THE EVENING    tamsulosin (FLOMAX) 0.4 mg Cap Take 1 capsule by mouth once daily.    testosterone cypionate (DEPOTESTOTERONE CYPIONATE) 200 mg/mL injection Inject 1 mL (200 mg total) into the muscle every 14 (fourteen) days. (Patient taking differently: Inject 200 mg into the muscle every 14 (fourteen) days. On Tuesdays)    travoprost, benzalkonium, (TRAVATAN) 0.004 % ophthalmic solution Place 1 drop into both eyes every evening.    zolpidem (AMBIEN) 5 MG Tab TAKE 1 TABLET BY MOUTH ONCE DAILY AT BEDTIME AS NEEDED FOR INSOMNIA     Review of Systems   Constitutional:  Positive for fatigue.  Negative for chills and fever.   Respiratory:  Positive for cough and shortness of breath. Negative for chest tightness, wheezing and stridor.    Cardiovascular:  Negative for chest pain, palpitations and leg swelling.   All other systems reviewed and are negative.    Objective:     Vital Signs (Most Recent):  Temp: 98.1 °F (36.7 °C) (01/08/23 0811)  Pulse: 68 (01/08/23 1150)  Resp: 18 (01/08/23 1150)  BP: 114/76 (01/08/23 0811)  SpO2: (!) 94 % (01/08/23 0821)   Vital Signs (24h Range):  Temp:  [97.5 °F (36.4 °C)-98.9 °F (37.2 °C)] 98.1 °F (36.7 °C)  Pulse:  [64-91] 68  Resp:  [16-24] 18  SpO2:  [89 %-97 %] 94 %  BP: (105-153)/(70-94) 114/76     Weight: 115.3 kg (254 lb 3.1 oz)  Body mass index is 38.65 kg/m².    SpO2: (!) 94 %         Intake/Output Summary (Last 24 hours) at 1/8/2023 1153  Last data filed at 1/7/2023 2222  Gross per 24 hour   Intake 1200 ml   Output --   Net 1200 ml     Lines/Drains/Airways       Peripheral Intravenous Line  Duration                  Peripheral IV - Single Lumen 01/08/23 0400 18 G Anterior;Right Forearm <1 day                  Significant Labs:  Recent Results (from the past 72 hour(s))   Comprehensive metabolic panel    Collection Time: 01/07/23 12:08 AM   Result Value Ref Range    Sodium Level 138 136 - 145 mmol/L    Potassium Level 3.8 3.5 - 5.1 mmol/L    Chloride 101 98 - 107 mmol/L    Carbon Dioxide 29 23 - 31 mmol/L    Glucose Level 108 82 - 115 mg/dL    Blood Urea Nitrogen 33.2 (H) 8.4 - 25.7 mg/dL    Creatinine 1.34 (H) 0.73 - 1.18 mg/dL    Calcium Level Total 8.8 8.8 - 10.0 mg/dL    Protein Total 6.0 5.8 - 7.6 gm/dL    Albumin Level 3.7 3.4 - 4.8 g/dL    Globulin 2.3 (L) 2.4 - 3.5 gm/dL    Albumin/Globulin Ratio 1.6 1.1 - 2.0 ratio    Bilirubin Total 0.9 <=1.5 mg/dL    Alkaline Phosphatase 57 40 - 150 unit/L    Alanine Aminotransferase 32 0 - 55 unit/L    Aspartate Aminotransferase 35 (H) 5 - 34 unit/L    eGFR 56 mls/min/1.73/m2   Brain natriuretic peptide    Collection  Time: 01/07/23 12:08 AM   Result Value Ref Range    Natriuretic Peptide 44.5 <=100.0 pg/mL   Troponin I    Collection Time: 01/07/23 12:08 AM   Result Value Ref Range    Troponin-I 0.350 (H) 0.000 - 0.045 ng/mL   Protime-INR    Collection Time: 01/07/23 12:08 AM   Result Value Ref Range    PT 16.0 (H) 12.5 - 14.5 seconds    INR 1.30 0.00 - 1.30   CBC with Differential    Collection Time: 01/07/23 12:08 AM   Result Value Ref Range    WBC 13.0 (H) 4.5 - 11.5 x10(3)/mcL    RBC 4.97 4.70 - 6.10 x10(6)/mcL    Hgb 14.6 14.0 - 18.0 gm/dL    Hct 46.7 42.0 - 52.0 %    MCV 94.0 80.0 - 94.0 fL    MCH 29.4 pg    MCHC 31.3 (L) 33.0 - 36.0 mg/dL    RDW 14.3 11.6 - 14.4 %    Platelet 127 (L) 140 - 371 x10(3)/mcL    MPV 11.0 9.4 - 12.4 fL    Neut % 84.1 %    Lymph % 4.1 %    Mono % 10.9 %    Eos % 0.3 %    Basophil % 0.2 %    Lymph # 0.53 (L) 0.6 - 4.6 x10(3)/mcL    Neut # 10.93 (H) 2.1 - 9.2 x10(3)/mcL    Mono # 1.42 (H) 0.1 - 1.3 x10(3)/mcL    Eos # 0.04 0 - 0.9 x10(3)/mcL    Baso # 0.03 0 - 0.2 x10(3)/mcL    IG# 0.05 (H) 0 - 0.04 x10(3)/mcL    IG% 0.4 %    NRBC% 0.0 0 - 1 %   COVID/FLU A&B PCR    Collection Time: 01/07/23  2:02 AM   Result Value Ref Range    Influenza A PCR Not Detected Not Detected    Influenza B PCR Not Detected Not Detected    SARS-CoV-2 PCR Not Detected Not Detected   Blood culture #1 **CANNOT BE ORDERED STAT**    Collection Time: 01/07/23  3:34 AM    Specimen: Blood   Result Value Ref Range    CULTURE, BLOOD ([facility]) No Growth At 24 Hours    Blood culture #2 **CANNOT BE ORDERED STAT**    Collection Time: 01/07/23  3:39 AM    Specimen: Blood   Result Value Ref Range    CULTURE, BLOOD ([facility]) No Growth At 24 Hours    Troponin I    Collection Time: 01/07/23  3:39 AM   Result Value Ref Range    Troponin-I 0.299 (H) 0.000 - 0.045 ng/mL   Troponin I    Collection Time: 01/07/23  8:52 AM   Result Value Ref Range    Troponin-I 0.229 (H) 0.000 - 0.045 ng/mL   Echo Saline Bubble? No    Collection Time: 01/07/23  3:08 PM    Result Value Ref Range    BSA 2.35 m2    TDI SEPTAL 0.09 m/s    LV LATERAL E/E' RATIO 6.91 m/s    LV SEPTAL E/E' RATIO 8.44 m/s    EF 40 %    Left Ventricular Outflow Tract Mean Velocity 0.43 cm/s    Left Ventricular Outflow Tract Mean Gradient 1.00 mmHg    TDI LATERAL 0.11 m/s    PV PEAK VELOCITY 0.82 cm/s    LVIDd 5.42 3.5 - 6.0 cm    IVS 1.53 (A) 0.6 - 1.1 cm    Posterior Wall 1.27 (A) 0.6 - 1.1 cm    LVIDs 4.43 (A) 2.1 - 4.0 cm    FS 18 28 - 44 %    LV mass 330.23 g    LA size 4.60 cm    TAPSE 2.42 cm    Left Ventricle Relative Wall Thickness 0.47 cm    AV mean gradient 10 mmHg    AV valve area 1.03 cm2    AV Velocity Ratio 0.32     AV index (prosthetic) 0.33     MV mean gradient 2 mmHg    MV valve area p 1/2 method 2.89 cm2    MV valve area by continuity eq 1.70 cm2    E/A ratio 1.95     Mean e' 0.10 m/s    E wave deceleration time 182.00 msec    LVOT diameter 2.00 cm    LVOT area 3.1 cm2    LVOT peak david 0.64 m/s    LVOT peak VTI 11.20 cm    Ao peak david 1.99 m/s    Ao VTI 34.0 cm    LVOT stroke volume 35.17 cm3    AV peak gradient 16 mmHg    MV peak gradient 4 mmHg    E/E' ratio 7.60 m/s    MV Peak E David 0.76 m/s    TR Max David 2.28 m/s    MV VTI 20.7 cm    MV stenosis pressure 1/2 time 76.00 ms    MV Peak A David 0.39 m/s    LV Systolic Volume 89.10 mL    LV Systolic Volume Index 39.4 mL/m2    LV Diastolic Volume 143.00 mL    LV Diastolic Volume Index 63.27 mL/m2    LV Mass Index 146 g/m2    Triscuspid Valve Regurgitation Peak Gradient 21 mmHg    LA Volume Index (Mod) 42.9 mL/m2    LA volume (mod) 97.00 cm3   Comprehensive Metabolic Panel    Collection Time: 01/08/23  3:24 AM   Result Value Ref Range    Sodium Level 139 136 - 145 mmol/L    Potassium Level 3.6 3.5 - 5.1 mmol/L    Chloride 99 98 - 107 mmol/L    Carbon Dioxide 32 (H) 23 - 31 mmol/L    Glucose Level 89 82 - 115 mg/dL    Blood Urea Nitrogen 29.2 (H) 8.4 - 25.7 mg/dL    Creatinine 1.20 (H) 0.73 - 1.18 mg/dL    Calcium Level Total 8.6 (L) 8.8 - 10.0  mg/dL    Protein Total 5.5 (L) 5.8 - 7.6 gm/dL    Albumin Level 3.4 3.4 - 4.8 g/dL    Globulin 2.1 (L) 2.4 - 3.5 gm/dL    Albumin/Globulin Ratio 1.6 1.1 - 2.0 ratio    Bilirubin Total 0.8 <=1.5 mg/dL    Alkaline Phosphatase 52 40 - 150 unit/L    Alanine Aminotransferase 25 0 - 55 unit/L    Aspartate Aminotransferase 22 5 - 34 unit/L    eGFR 64 mls/min/1.73/m2   BNP    Collection Time: 01/08/23  3:24 AM   Result Value Ref Range    Natriuretic Peptide 87.2 <=100.0 pg/mL   Troponin I    Collection Time: 01/08/23  3:24 AM   Result Value Ref Range    Troponin-I 0.168 (H) 0.000 - 0.045 ng/mL   Magnesium    Collection Time: 01/08/23  3:24 AM   Result Value Ref Range    Magnesium Level 2.50 1.60 - 2.60 mg/dL   CBC with Differential    Collection Time: 01/08/23  3:24 AM   Result Value Ref Range    WBC 10.2 4.5 - 11.5 x10(3)/mcL    RBC 4.61 (L) 4.70 - 6.10 x10(6)/mcL    Hgb 13.6 (L) 14.0 - 18.0 gm/dL    Hct 43.9 42.0 - 52.0 %    MCV 95.2 (H) 80.0 - 94.0 fL    MCH 29.5 pg    MCHC 31.0 (L) 33.0 - 36.0 mg/dL    RDW 14.6 (H) 11.6 - 14.4 %    Platelet 123 (L) 140 - 371 x10(3)/mcL    MPV 11.6 9.4 - 12.4 fL    Neut % 82.1 %    Lymph % 4.6 %    Mono % 11.8 %    Eos % 0.7 %    Basophil % 0.3 %    Lymph # 0.47 (L) 0.6 - 4.6 x10(3)/mcL    Neut # 8.36 2.1 - 9.2 x10(3)/mcL    Mono # 1.20 0.1 - 1.3 x10(3)/mcL    Eos # 0.07 0 - 0.9 x10(3)/mcL    Baso # 0.03 0 - 0.2 x10(3)/mcL    IG# 0.05 (H) 0 - 0.04 x10(3)/mcL    IG% 0.5 %    NRBC% 0.0 0 - 1 %     Significant Imaging:  Imaging Results              CTA Chest Non-Coronary (PE Studies) (Final result)  Result time 01/07/23 09:25:24      Final result by Oswaldo Orellana MD (01/07/23 09:25:24)                   Impression:      1. Negative for pulmonary thromboembolic disease.  2. Small right and trace left pleural effusions.  3. Bibasilar consolidation at least partially relates to atelectasis but superimposed infection possible.  4. Small volume ascites.  5.  No significant discrepancy with the  preliminary report.      Electronically signed by: Oswaldo Orellana  Date:    01/07/2023  Time:    09:25               Narrative:    EXAMINATION:  CTA CHEST NON CORONARY (PE STUDIES)    CLINICAL HISTORY:  Pulmonary embolism (PE) suspected, unknown D-dimer;    TECHNIQUE:  Helical acquisition through the chest with IV contrast targeting the pulmonary arteries. Multiplanar and 3D MIP reconstructed images were provided for review.  mGycm. Automatic exposure control, adjustment of mA/kV or iterative reconstruction technique was used to reduce radiation.    COMPARISON:  21 May 2020.    FINDINGS:  There is good opacification of the pulmonary arterial tree. There is no evidence of pulmonary thromboembolism.  There is no aortic dissection.    There is no mediastinal, hilar or axillary lymphadenopathy by size criteria.    Heart size upper limit normal.  There are coronary artery calcifications.  No pericardial effusion.    Small right and trace left pleural effusions.  There is bibasilar atelectasis.  There are additional areas of bibasilar consolidation where infection is possible.  Upper lungs are clear.    There is small volume ascites.  There are no acute osseous findings.                        Preliminary result by Oswaldo Orellana MD (01/07/23 01:34:29)                   Narrative:    START OF REPORT:  Technique: CT Scan of the chest was performed with intravenous contrast with direct axial images as well as sagittal and coronal reconstruction images pulmonary embolus protocol.    Dosage Information: Automated Exposure Control was utilized.    Comparison: None.    Clinical History: Sob.    Findings:  Artifact: Mild motion artifact is seen on multiple images.  Soft Tissues: Unremarkable.  Axilla: A few prominent lymph nodes are seen in the axilla.  Neck: The visualized soft tissues of the neck appear unremarkable. The isthmus of the thyroid gland is mildly prominent with a hypodense nodule noted within containing a  punctate calcification. Correlate with clinical and laboratory findings as regards further evaluation and follow up.  Mediastinum: A few subcentimeter mediastinal lymph nodes are seen pretracheal paratracheal precarinal and subcarinal spaces . The largest is in pre carinal space and measures â10.2 mmâ in least dimension. This suggests reactive lymphadenopathy. Correlate with clinical and laboratory findings as regards further evaluation and follow up.  Heart: The heart size is within normal limits.  Aorta: Unremarkable appearing aorta.  Pulmonary Arteries: No filling defects are seen in the pulmonary arteries to suggest pulmonary embolus.  Lungs: There are bilateral basal posteromedial areas of dense consolidation noted, left greater than right. These features are suggestive of multifocal pneumonia with aspiration component a consideration.  Pleura: There is a small right sided pleural effusion. No pneumothorax is identified.  Bony Structures:  Spine: Mild spondylolytic changes are seen in the thoracic spine.  Ribs: The ribs appear unremarkable.  Abdomen: There is some free fluid noted in the left upper quadrant around the spleen. Correlate with clinical and laboratory findings as regards further evaluation and follow up.      Impression:  1. No filling defects are seen in the pulmonary arteries to suggest pulmonary embolus.  2. There is a small right sided pleural effusion.  3. There are bilateral basal posteromedial areas of dense consolidation noted, left greater than right. These features are suggestive of multifocal pneumonia with aspiration component a consideration. Correlate with clinical and laboratory findings as regards additional evaluation and follow-up to full resolution as indicated.  4. Details and other findings as discussed above.                          Preliminary result by Alex Luciano MD (01/07/23 01:34:29)                   Narrative:    START OF REPORT:  Technique: CT Scan of the  chest was performed with intravenous contrast with direct axial images as well as sagittal and coronal reconstruction images pulmonary embolus protocol.    Dosage Information: Automated Exposure Control was utilized.    Comparison: None.    Clinical History: Sob.    Findings:  Artifact: Mild motion artifact is seen on multiple images.  Soft Tissues: Unremarkable.  Axilla: A few prominent lymph nodes are seen in the axilla.  Neck: The visualized soft tissues of the neck appear unremarkable. The isthmus of the thyroid gland is mildly prominent with a hypodense nodule noted within containing a punctate calcification. Correlate with clinical and laboratory findings as regards further evaluation and follow up.  Mediastinum: A few subcentimeter mediastinal lymph nodes are seen pretracheal paratracheal precarinal and subcarinal spaces . The largest is in pre carinal space and measures â10.2 mmâ in least dimension. This suggests reactive lymphadenopathy. Correlate with clinical and laboratory findings as regards further evaluation and follow up.  Heart: The heart size is within normal limits.  Aorta: Unremarkable appearing aorta.  Pulmonary Arteries: No filling defects are seen in the pulmonary arteries to suggest pulmonary embolus.  Lungs: There are bilateral basal posteromedial areas of dense consolidation noted, left greater than right. These features are suggestive of multifocal pneumonia with aspiration component a consideration.  Pleura: There is a small right sided pleural effusion. No pneumothorax is identified.  Bony Structures:  Spine: Mild spondylolytic changes are seen in the thoracic spine.  Ribs: The ribs appear unremarkable.  Abdomen: There is some free fluid noted in the left upper quadrant around the spleen. Correlate with clinical and laboratory findings as regards further evaluation and follow up.      Impression:  1. No filling defects are seen in the pulmonary arteries to suggest pulmonary  embolus.  2. There is a small right sided pleural effusion.  3. There are bilateral basal posteromedial areas of dense consolidation noted, left greater than right. These features are suggestive of multifocal pneumonia with aspiration component a consideration. Correlate with clinical and laboratory findings as regards additional evaluation and follow-up to full resolution as indicated.  4. Details and other findings as discussed above.                                         X-Ray Chest PA And Lateral (Final result)  Result time 01/07/23 10:02:00      Final result by Wong Brewer MD (01/07/23 10:02:00)                   Impression:      Right lower lobe consolidative changes.  Query symptomatology.      Electronically signed by: Wong Brewer MD  Date:    01/07/2023  Time:    10:02               Narrative:    EXAMINATION:  XR CHEST PA AND LATERAL    CLINICAL HISTORY:  Chest Pain;    TECHNIQUE:  PA and lateral views of the chest were performed.    COMPARISON:  08/09/2022    FINDINGS:  Heart size enlarged the pulmonary vasculature is congested.    Bibasilar atelectatic changes lungs with persistent elevation of the left hemidiaphragm.  Concern for developing consolidation in the right lower lobe.                                    EKG:        Telemetry: SR 80s    Physical Exam  Constitutional:       Appearance: Normal appearance.   HENT:      Head: Normocephalic.      Mouth/Throat:      Mouth: Mucous membranes are moist.   Eyes:      Extraocular Movements: Extraocular movements intact.   Cardiovascular:      Rate and Rhythm: Normal rate and regular rhythm.      Pulses: Normal pulses.      Heart sounds: Normal heart sounds.   Pulmonary:      Effort: Pulmonary effort is normal.      Breath sounds: Rhonchi present.      Comments: NC O2  Abdominal:      Palpations: Abdomen is soft.   Musculoskeletal:      Right lower leg: No edema.      Left lower leg: No edema.   Skin:     General: Skin is warm and dry.   Neurological:       General: No focal deficit present.      Mental Status: He is alert and oriented to person, place, and time. Mental status is at baseline.   Psychiatric:         Mood and Affect: Mood normal.         Behavior: Behavior normal.     Home Medications:   No current facility-administered medications on file prior to encounter.     Current Outpatient Medications on File Prior to Encounter   Medication Sig Dispense Refill    albuterol (PROVENTIL/VENTOLIN HFA) 90 mcg/actuation inhaler INHALE 2 PUFFS BY MOUTH EVERY 6 HOURS 8 g 3    ALPRAZolam (XANAX) 0.5 MG tablet Take 1 tablet by mouth twice daily as needed for anxiety 45 tablet 0    diltiaZEM (CARDIZEM CD) 240 MG 24 hr capsule Take 1 capsule (240 mg total) by mouth 2 (two) times a day. (Patient taking differently: Take 180 mg by mouth 2 (two) times a day.) 60 capsule 11    ELIQUIS 5 mg Tab Take 5 mg by mouth 2 (two) times daily.      fluticasone-salmeterol 230-21 mcg/dose (ADVAIR HFA) 230-21 mcg/actuation HFAA inhaler Inhale 2 puffs into the lungs 2 (two) times daily. Controller      hydroCHLOROthiazide (HYDRODIURIL) 50 MG tablet Take 1 tablet by mouth once daily 30 tablet 6    loratadine (CLARITIN) 10 mg tablet Take 10 mg by mouth daily as needed for Allergies.      melatonin 10 mg Tab Take 10 mg by mouth daily as needed (insomnia).      metoprolol succinate (TOPROL-XL) 50 MG 24 hr tablet Take 1 tablet (50 mg total) by mouth 3 (three) times daily. (Patient taking differently: Take 50 mg by mouth once daily.) 90 tablet 11    montelukast (SINGULAIR) 10 mg tablet Take 1 tablet by mouth once daily 30 tablet 0    omeprazole (PRILOSEC) 40 MG capsule Take 1 capsule by mouth once daily 30 capsule 0    potassium chloride (KLOR-CON) 8 MEQ TbSR Take 1 tablet (8 mEq total) by mouth once daily. 30 tablet 5    rosuvastatin (CRESTOR) 20 MG tablet TAKE 1 TABLET BY MOUTH ONCE IN THE EVENING      tamsulosin (FLOMAX) 0.4 mg Cap Take 1 capsule by mouth once daily.      testosterone  cypionate (DEPOTESTOTERONE CYPIONATE) 200 mg/mL injection Inject 1 mL (200 mg total) into the muscle every 14 (fourteen) days. (Patient taking differently: Inject 200 mg into the muscle every 14 (fourteen) days. On Tuesdays) 3 mL 5    travoprost, benzalkonium, (TRAVATAN) 0.004 % ophthalmic solution Place 1 drop into both eyes every evening.      zolpidem (AMBIEN) 5 MG Tab TAKE 1 TABLET BY MOUTH ONCE DAILY AT BEDTIME AS NEEDED FOR INSOMNIA 30 tablet 5     Current Inpatient Medications:    Current Facility-Administered Medications:     acetaminophen tablet 650 mg, 650 mg, Oral, Q6H PRN, Fabiano Liang MD, 650 mg at 01/08/23 1036    albuterol inhaler 2 puff, 2 puff, Inhalation, Q4H PRN, Fabiano Liang MD    albuterol-ipratropium 2.5 mg-0.5 mg/3 mL nebulizer solution 3 mL, 3 mL, Nebulization, Q4H WAKE, Dameon Sanchez III, MD, 3 mL at 01/08/23 1150    ALPRAZolam tablet 0.5 mg, 0.5 mg, Oral, BID PRN, Fabiano Liang MD    apixaban tablet 5 mg, 5 mg, Oral, BID, Fabiano Liang MD, 5 mg at 01/08/23 1035    atorvastatin tablet 20 mg, 20 mg, Oral, Daily, Fabiano Liang MD, 20 mg at 01/08/23 1035    azithromycin 500 mg in dextrose 5 % 250 mL IVPB (ready to mix system), 500 mg, Intravenous, Q24H, Dameon Sanchez III, MD, Stopped at 01/08/23 0500    cefTRIAXone (ROCEPHIN) 1 g in dextrose 5 % in water (D5W) 5 % 50 mL IVPB (MB+), 1 g, Intravenous, Q24H, Dameon Sanchez III, MD, Stopped at 01/08/23 0430    cetirizine tablet 10 mg, 10 mg, Oral, Daily, Fabiano Liang MD, 10 mg at 01/08/23 1036    diltiaZEM 24 hr capsule 180 mg, 180 mg, Oral, BID, Fabiano Liang MD, 180 mg at 01/08/23 1036    famotidine tablet 20 mg, 20 mg, Oral, BID, Dameon Sanchez III, MD, 20 mg at 01/08/23 1035    fluticasone furoate-vilanteroL 200-25 mcg/dose diskus inhaler 1 puff, 1 puff, Inhalation, Daily, Fabiano Liang MD, 1 puff at 01/08/23 1036    hydroCHLOROthiazide tablet 50 mg, 50 mg, Oral, Daily,  Fabiano Liang MD, 50 mg at 01/08/23 1035    latanoprost 0.005 % ophthalmic solution 1 drop, 1 drop, Both Eyes, Daily, Fabiano Liang MD, 1 drop at 01/08/23 1036    melatonin tablet 6 mg, 6 mg, Oral, Nightly PRN, Dameon Sanchez III, MD    melatonin tablet 9 mg, 9 mg, Oral, Daily PRN, Fabiano Liang MD    metoprolol injection 5 mg, 5 mg, Intravenous, Q15 Min PRN, Lionel Roach NP    metoprolol succinate (TOPROL-XL) 24 hr tablet 50 mg, 50 mg, Oral, Daily, Fabiano Liang MD, 50 mg at 01/08/23 1035    montelukast tablet 10 mg, 10 mg, Oral, Daily, Fabiano Liang MD, 10 mg at 01/08/23 1035    pantoprazole EC tablet 40 mg, 40 mg, Oral, Daily, Fabiano Liang MD, 40 mg at 01/08/23 1036    potassium chloride CR tablet 10 mEq, 10 mEq, Oral, Daily, Fabiano Liang MD, 10 mEq at 01/08/23 1036    tamsulosin 24 hr capsule 0.4 mg, 1 capsule, Oral, Daily, Fabiano Liang MD, 0.4 mg at 01/08/23 1036    zolpidem tablet 5 mg, 5 mg, Oral, QHS, Fabiano Liang MD, 5 mg at 01/07/23 2151    VTE Risk Mitigation (From admission, onward)           Ordered     apixaban tablet 5 mg  2 times daily         01/07/23 1050     IP VTE HIGH RISK PATIENT  Once         01/07/23 0303     Place sequential compression device  Until discontinued         01/07/23 0303                  Assessment:   Bilateral Pneumonitis (R > L)  Mildly Elevated Troponin (Flat)  Hx of Asthma  PAF - Now with CVR    - CHADsVASc - 5 Points - 7.2% Stroke Risk per Year     - EP Study with Successful Ablation of Typical A.Flutter with Termination into SR (1.5.23)  CMO/EF 35-40%     - ECHO (1.7.23) - Grade 1 DD, Mild AS, EF 35-40%    - ECHO (4.11.22) - LVEF 55-60%  HTN - Controlled  MINDA/CPAP  VI  Elevated Calcium Score/Mild Calcified Non-Obstructive CAD via [facility] (2020)    - [facility] (4.16.20) - Nonobstructive CAD with Normal LV Function and an EF of 55%  FLORENTINO/Bilaterally Mild-Moderate  Anxiety  GERD  OA  Obseity  No Hx of GIB per  Chart Review    Plan:   ECHO Reviewed   Treatment for Pneumonia per Primary Team  Continue Eliquis Re Stroke Risk Reduction  Continue BB, Dilt and Statin Therapies  Will Repeat Limited ECHO upon F/U with Mark Bennett/Sridhar  Labs in AM: CBC, CMP and Mg     Iron Landrum MD  Cardiology  [facility]  01/08/2023 11:51 AM

## 2023-01-08 NOTE — NURSING
Nurses Note -- 4 Eyes      1/7/2023   4:00 AM      Skin assessed during: Admit      [x] No Pressure Injuries Present    [x]Prevention Measures Documented      [] Yes- Altered Skin Integrity Present or Discovered   [] LDA Added if Not in Epic (Describe Wound)   [] New Altered Skin Integrity was Present on Admit and Documented in LDA   [] Wound Image Taken    Wound Care Consulted? No    Attending Nurse:  Agustina Morillo RN     Second RN/Staff Member:  Silva Rojas RN

## 2023-01-08 NOTE — PROGRESS NOTES
Subjective:  The chest pain has disappeared.  He is breathing easier.  He is starting to cough up a small amount of phlegm.    Objective:  He is afebrile.  T-max 98.9°.  Vital signs stable.  O2 sats somewhat labile but as low as 88% all the way up to 94%.  He is on nasal cannula but is not always in his nose.    General appearance is unremarkable.  He is in no distress.  Heart has an irregular rhythm.  Rate is fine.  Lungs with rales in both bases but more prominent on the right.  Abdomen nontender extremities with 1 to 2+ pretibial edema bilaterally.    Laboratory studies show an improved white count 34318.  Chemistry profile shows also an improved BUN and creatinine.  Cultures remain negative.    Assessment:  1. Pneumonitis.  It appears to be more chemical than anything.  Maybe secondary bacterial.  He does seem to be improving     2. Atrial fib.  It has reoccurred status post ablation    3. History of asthma limited pulmonary reserve    4. Hypertension.  Control adequate    5. Venous insufficiency     6. Mild systolic dysfunction by recent echo    Plan:  Continue same antibiotics.  Repeat blood work in the morning including troponin.

## 2023-01-09 LAB
ALBUMIN SERPL-MCNC: 3.2 G/DL (ref 3.4–4.8)
ALBUMIN/GLOB SERPL: 1.4 RATIO (ref 1.1–2)
ALP SERPL-CCNC: 51 UNIT/L (ref 40–150)
ALT SERPL-CCNC: 26 UNIT/L (ref 0–55)
AST SERPL-CCNC: 22 UNIT/L (ref 5–34)
BASOPHILS # BLD AUTO: 0.03 X10(3)/MCL (ref 0–0.2)
BASOPHILS NFR BLD AUTO: 0.4 %
BILIRUBIN DIRECT+TOT PNL SERPL-MCNC: 0.6 MG/DL
BNP BLD-MCNC: 196 PG/ML
BUN SERPL-MCNC: 23.7 MG/DL (ref 8.4–25.7)
CALCIUM SERPL-MCNC: 8.6 MG/DL (ref 8.8–10)
CHLORIDE SERPL-SCNC: 99 MMOL/L (ref 98–107)
CO2 SERPL-SCNC: 32 MMOL/L (ref 23–31)
CREAT SERPL-MCNC: 1.1 MG/DL (ref 0.73–1.18)
EOSINOPHIL # BLD AUTO: 0.21 X10(3)/MCL (ref 0–0.9)
EOSINOPHIL NFR BLD AUTO: 2.5 %
ERYTHROCYTE [DISTWIDTH] IN BLOOD BY AUTOMATED COUNT: 14.5 % (ref 11.6–14.4)
GFR SERPLBLD CREATININE-BSD FMLA CKD-EPI: 71 MLS/MIN/1.73/M2
GLOBULIN SER-MCNC: 2.3 GM/DL (ref 2.4–3.5)
GLUCOSE SERPL-MCNC: 99 MG/DL (ref 82–115)
HCT VFR BLD AUTO: 42.1 % (ref 42–52)
HGB BLD-MCNC: 13.2 GM/DL (ref 14–18)
IMM GRANULOCYTES # BLD AUTO: 0.03 X10(3)/MCL (ref 0–0.04)
IMM GRANULOCYTES NFR BLD AUTO: 0.4 %
LYMPHOCYTES # BLD AUTO: 0.47 X10(3)/MCL (ref 0.6–4.6)
LYMPHOCYTES NFR BLD AUTO: 5.5 %
MAGNESIUM SERPL-MCNC: 2.37 MG/DL (ref 1.6–2.6)
MCH RBC QN AUTO: 29.6 PG
MCHC RBC AUTO-ENTMCNC: 31.4 MG/DL (ref 33–36)
MCV RBC AUTO: 94.4 FL (ref 80–94)
MONOCYTES # BLD AUTO: 0.89 X10(3)/MCL (ref 0.1–1.3)
MONOCYTES NFR BLD AUTO: 10.4 %
NEUTROPHILS # BLD AUTO: 6.93 X10(3)/MCL (ref 2.1–9.2)
NEUTROPHILS NFR BLD AUTO: 80.8 %
NRBC BLD AUTO-RTO: 0 % (ref 0–1)
PLATELET # BLD AUTO: 131 X10(3)/MCL (ref 140–371)
PMV BLD AUTO: 11.3 FL (ref 9.4–12.4)
POTASSIUM SERPL-SCNC: 3.3 MMOL/L (ref 3.5–5.1)
PROT SERPL-MCNC: 5.5 GM/DL (ref 5.8–7.6)
RBC # BLD AUTO: 4.46 X10(6)/MCL (ref 4.7–6.1)
SODIUM SERPL-SCNC: 142 MMOL/L (ref 136–145)
TROPONIN I SERPL-MCNC: 0.12 NG/ML (ref 0–0.04)
WBC # SPEC AUTO: 8.6 X10(3)/MCL (ref 4.5–11.5)

## 2023-01-09 PROCEDURE — 83880 ASSAY OF NATRIURETIC PEPTIDE: CPT | Performed by: INTERNAL MEDICINE

## 2023-01-09 PROCEDURE — 83735 ASSAY OF MAGNESIUM: CPT | Performed by: NURSE PRACTITIONER

## 2023-01-09 PROCEDURE — 94640 AIRWAY INHALATION TREATMENT: CPT

## 2023-01-09 PROCEDURE — 21400001 HC TELEMETRY ROOM

## 2023-01-09 PROCEDURE — 99232 PR SUBSEQUENT HOSPITAL CARE,LEVL II: ICD-10-PCS | Mod: ,,, | Performed by: INTERNAL MEDICINE

## 2023-01-09 PROCEDURE — 84484 ASSAY OF TROPONIN QUANT: CPT | Performed by: INTERNAL MEDICINE

## 2023-01-09 PROCEDURE — 80053 COMPREHEN METABOLIC PANEL: CPT | Performed by: NURSE PRACTITIONER

## 2023-01-09 PROCEDURE — 25000003 PHARM REV CODE 250: Performed by: INTERNAL MEDICINE

## 2023-01-09 PROCEDURE — 27000221 HC OXYGEN, UP TO 24 HOURS

## 2023-01-09 PROCEDURE — 25000003 PHARM REV CODE 250: Performed by: EMERGENCY MEDICINE

## 2023-01-09 PROCEDURE — 63600175 PHARM REV CODE 636 W HCPCS: Performed by: EMERGENCY MEDICINE

## 2023-01-09 PROCEDURE — 94761 N-INVAS EAR/PLS OXIMETRY MLT: CPT

## 2023-01-09 PROCEDURE — 85025 COMPLETE CBC W/AUTO DIFF WBC: CPT | Performed by: NURSE PRACTITIONER

## 2023-01-09 PROCEDURE — 99900031 HC PATIENT EDUCATION (STAT)

## 2023-01-09 PROCEDURE — 25000242 PHARM REV CODE 250 ALT 637 W/ HCPCS: Performed by: EMERGENCY MEDICINE

## 2023-01-09 PROCEDURE — 99232 SBSQ HOSP IP/OBS MODERATE 35: CPT | Mod: ,,, | Performed by: INTERNAL MEDICINE

## 2023-01-09 PROCEDURE — 36415 COLL VENOUS BLD VENIPUNCTURE: CPT | Performed by: NURSE PRACTITIONER

## 2023-01-09 PROCEDURE — 94799 UNLISTED PULMONARY SVC/PX: CPT

## 2023-01-09 PROCEDURE — 25000242 PHARM REV CODE 250 ALT 637 W/ HCPCS: Performed by: INTERNAL MEDICINE

## 2023-01-09 RX ORDER — POTASSIUM CHLORIDE 750 MG/1
10 TABLET, EXTENDED RELEASE ORAL 3 TIMES DAILY
Status: DISCONTINUED | OUTPATIENT
Start: 2023-01-09 | End: 2023-01-10

## 2023-01-09 RX ORDER — FUROSEMIDE 40 MG/1
40 TABLET ORAL DAILY
Status: DISCONTINUED | OUTPATIENT
Start: 2023-01-09 | End: 2023-01-10

## 2023-01-09 RX ORDER — AZITHROMYCIN 250 MG/1
500 TABLET, FILM COATED ORAL DAILY
Status: COMPLETED | OUTPATIENT
Start: 2023-01-10 | End: 2023-01-11

## 2023-01-09 RX ORDER — METOPROLOL SUCCINATE 50 MG/1
100 TABLET, EXTENDED RELEASE ORAL DAILY
Status: DISCONTINUED | OUTPATIENT
Start: 2023-01-10 | End: 2023-01-21 | Stop reason: HOSPADM

## 2023-01-09 RX ORDER — MONTELUKAST SODIUM 10 MG/1
TABLET ORAL
Qty: 30 TABLET | Refills: 6 | Status: SHIPPED | OUTPATIENT
Start: 2023-01-09 | End: 2023-07-31

## 2023-01-09 RX ADMIN — PANTOPRAZOLE SODIUM 40 MG: 40 TABLET, DELAYED RELEASE ORAL at 09:01

## 2023-01-09 RX ADMIN — POTASSIUM CHLORIDE 10 MEQ: 750 TABLET, FILM COATED, EXTENDED RELEASE ORAL at 09:01

## 2023-01-09 RX ADMIN — METOPROLOL SUCCINATE 50 MG: 50 TABLET, EXTENDED RELEASE ORAL at 09:01

## 2023-01-09 RX ADMIN — FLUTICASONE FUROATE AND VILANTEROL TRIFENATATE 1 PUFF: 200; 25 POWDER RESPIRATORY (INHALATION) at 09:01

## 2023-01-09 RX ADMIN — MONTELUKAST 10 MG: 10 TABLET, FILM COATED ORAL at 09:01

## 2023-01-09 RX ADMIN — APIXABAN 5 MG: 5 TABLET, FILM COATED ORAL at 09:01

## 2023-01-09 RX ADMIN — LATANOPROST 1 DROP: 50 SOLUTION OPHTHALMIC at 09:01

## 2023-01-09 RX ADMIN — POTASSIUM CHLORIDE 10 MEQ: 750 TABLET, FILM COATED, EXTENDED RELEASE ORAL at 02:01

## 2023-01-09 RX ADMIN — FUROSEMIDE 40 MG: 40 TABLET ORAL at 09:01

## 2023-01-09 RX ADMIN — IPRATROPIUM BROMIDE AND ALBUTEROL SULFATE 3 ML: 2.5; .5 SOLUTION RESPIRATORY (INHALATION) at 09:01

## 2023-01-09 RX ADMIN — CEFTRIAXONE 1 G: 1 INJECTION, POWDER, FOR SOLUTION INTRAMUSCULAR; INTRAVENOUS at 03:01

## 2023-01-09 RX ADMIN — AZITHROMYCIN MONOHYDRATE 500 MG: 500 INJECTION, POWDER, LYOPHILIZED, FOR SOLUTION INTRAVENOUS at 03:01

## 2023-01-09 RX ADMIN — IPRATROPIUM BROMIDE AND ALBUTEROL SULFATE 3 ML: 2.5; .5 SOLUTION RESPIRATORY (INHALATION) at 08:01

## 2023-01-09 RX ADMIN — CETIRIZINE HYDROCHLORIDE 10 MG: 10 TABLET, FILM COATED ORAL at 09:01

## 2023-01-09 RX ADMIN — DILTIAZEM HYDROCHLORIDE 180 MG: 180 CAPSULE, COATED, EXTENDED RELEASE ORAL at 09:01

## 2023-01-09 RX ADMIN — FAMOTIDINE 20 MG: 20 TABLET, FILM COATED ORAL at 09:01

## 2023-01-09 RX ADMIN — ATORVASTATIN CALCIUM 20 MG: 10 TABLET, FILM COATED ORAL at 09:01

## 2023-01-09 RX ADMIN — TAMSULOSIN HYDROCHLORIDE 0.4 MG: 0.4 CAPSULE ORAL at 09:01

## 2023-01-09 RX ADMIN — ZOLPIDEM TARTRATE 5 MG: 5 TABLET ORAL at 09:01

## 2023-01-09 RX ADMIN — IPRATROPIUM BROMIDE AND ALBUTEROL SULFATE 3 ML: 2.5; .5 SOLUTION RESPIRATORY (INHALATION) at 12:01

## 2023-01-09 NOTE — PROGRESS NOTES
Subjective:  The patient is still is without chest pain.  Shortness of breath with exertion persists.  Cough has essentially resolved.    He did receive a dose of IV Lasix and he did diurese well in the swelling and was legs has improved.    Objective:  He is afebrile.  Blood pressure most recently 109/72.  S mostly than relatively low.  Respiratory rate 20 O2 sat 94% on oxygen.  Heart rate around 70.  Telemetry reveals atrial fib with a controlled ventricular response.    General appearance is unremarkable.  He is in no distress.  Heart has an irregular rhythm with normal rate.  Lungs with rales at the right base.  Abdomen nontender.  extremities with 1+ bilateral pretibial edema.    Lab work includes an unremarkable CBC.  Potassium down to 3.3.  BUN creatinine normal.  BNP is elevated at 196.  Troponin trending downward and now is 0.122.    Assessment:  1. Pneumonitis.  Slowly better.  Still remains relatively hypoxic     2. Atrial fib.  Ventricular rate stable.  The maybe an element of congestive heart failure.  Recent echo showed mild systolic dysfunction    3. Asthma.  Stable     4. Hypertension.  Blood pressure low now     5. Venous insufficiency lower extremities    5. Hypokalemia    Plan:  1. Will add Lasix p.o. daily and stop the hydrochlorothiazide.  Will increase potassium supplements.    2. Update chest x-ray tomorrow, continue to monitor electrolytes kidney function etc.

## 2023-01-09 NOTE — PROGRESS NOTES
"  [facility]  Cardiology  Progress Note    Patient Name: John Bullard  MRN: [id number]  Admission Date: 1/6/2023  Hospital Length of Stay: 2 days  Code Status: Full Code   Attending Provider: Fabiano Liang MD   Consulting Provider: NED Iniguez  Primary Care Physician: Fabiano Liang MD  Principal Problem:<principal problem not specified>    Patient information was obtained from patient, past medical records, and ER records.     Subjective:     Chief Complaint: Reason for Consult: Elevated Troponin     HPI: Mr./Father Aleah is a 73 y/o male who is known to [facility], Mark Waller/Donald. The patient recently had an EP Study with Successful Ablation of Typical A.Flutter. He underwent this EP Study on 1.5.23 without complications and was discharged home in stable condition. Post operatively he had a sore throat and a cough which he attributed the ETT. His SOB worsened and he presented to the ER for evaluation. Upon arrival to the ER he was found to have a CXR with Infiltrates and a CT of the Chest with no Evidence of Pulmonary Embolism, however, he did have significant infiltrates in bilateral lungs but worse on the R side. He was admitted to his Primary Care Provider and [facility] was consulted for Elevated Troponin Levels and Recent Cardiac Procedure.     1.8.23: NAD. Converted into PAF Last Night. Remains with CVR. "I am feeling ok." + SOB, Improving. Denies CP and Palps. + Cough.  1.9.23: Patient sitting up at bedside. Conversational dyspnea. Bibasilar rales. Still requiring O2. Abdomen distended. 2+ peripheral edema. Afib 90's on tele.      PMH: Anxiety, Asthma, PAF/Ablation , DJD, GERD, HTN, OA, Obesity, MINDA/CPAP, VI, VT, Insomnia, Elevated Calcium Score/Non-Obstructive CAD, FLORENTINO/Bilaterally Mild-Moderate  PSH: Lithotripsy, Sinus Surgery, Colonoscopy, Hernia Repair, EP Study/Ablation, Abdominal Surgery, [place] Teeth Extraction   Family History: Reviewed and Unremarkable for Heart " Disease  Social History: Denies Illicit Drug, ETOH and Tobacco    Previous Cardiac Diagnostics:   ECHO 1.7.23:  Concentric hypertrophy and mildly decreased systolic function.  The estimated ejection fraction is 35-40%.  Grade I left ventricular diastolic dysfunction.  With normal right ventricular systolic function.  There is mild aortic valve stenosis.  Aortic valve area is 1.03 cm2; peak velocity is 1.99 m/s; mean gradient is 10 mmHg.    Carotid US 8.17.22:  Mild Bilaterally and Tortuous Carotid Arteries with No Hemodynamically Significant FLORENTINO  < 50% Bilaterally    ECHO 4.11.22:  Limited Parasternal Windows  Normal LV Systolic Function: LVEF 55-60%  Moderate AS, Peak AV Velocity 3.2 m/sec, Mean Gradient 23mmHg, JENNY 1.4 cm2  Grade I DD  Mild TR with RVSP of 30mmHg    LHC 4.16.20:  LHC via R Radial Approach with Nonobstructive CAD with Normal LV Function and an EF of 55%. EDP was 20mmHg.    PET 4.8.20:  The study quality is below average.   This is an abnormal perfusion study. Study is consistent with mostly scar tissue with minimal ischemia.   Small fixed perfusion abnormality of moderate intensity in the apical segment. Small partially reversible perfusion abnormality of moderate intensity in the anterior septal region. Small fixed perfusion abnormality of moderate intensity in the inferior region.   This scan is suggestive of low to moderate risk for future cardiovascular events.   The left ventricular cavity is noted to be normal on the stress studies. The stress left ventricular ejection fraction was calculated to be 52% and left ventricular global function is normal. The rest left ventricular cavity is noted to be normal. The rest left ventricular ejection fraction was calculated to be 42% and rest left ventricular global function is mildly reduced.   When compared to the resting ejection fraction (42%), the stress ejection fraction (52%) has increased.     Review of patient's allergies indicates:   Allergen  Reactions    Ativan [lorazepam] Hallucinations     No current facility-administered medications on file prior to encounter.     Current Outpatient Medications on File Prior to Encounter   Medication Sig    albuterol (PROVENTIL/VENTOLIN HFA) 90 mcg/actuation inhaler INHALE 2 PUFFS BY MOUTH EVERY 6 HOURS    ALPRAZolam (XANAX) 0.5 MG tablet Take 1 tablet by mouth twice daily as needed for anxiety    diltiaZEM (CARDIZEM CD) 240 MG 24 hr capsule Take 1 capsule (240 mg total) by mouth 2 (two) times a day. (Patient taking differently: Take 180 mg by mouth 2 (two) times a day.)    ELIQUIS 5 mg Tab Take 5 mg by mouth 2 (two) times daily.    fluticasone-salmeterol 230-21 mcg/dose (ADVAIR HFA) 230-21 mcg/actuation HFAA inhaler Inhale 2 puffs into the lungs 2 (two) times daily. Controller    hydroCHLOROthiazide (HYDRODIURIL) 50 MG tablet Take 1 tablet by mouth once daily    loratadine (CLARITIN) 10 mg tablet Take 10 mg by mouth daily as needed for Allergies.    melatonin 10 mg Tab Take 10 mg by mouth daily as needed (insomnia).    metoprolol succinate (TOPROL-XL) 50 MG 24 hr tablet Take 1 tablet (50 mg total) by mouth 3 (three) times daily. (Patient taking differently: Take 50 mg by mouth once daily.)    omeprazole (PRILOSEC) 40 MG capsule Take 1 capsule by mouth once daily    potassium chloride (KLOR-CON) 8 MEQ TbSR Take 1 tablet (8 mEq total) by mouth once daily.    rosuvastatin (CRESTOR) 20 MG tablet TAKE 1 TABLET BY MOUTH ONCE IN THE EVENING    tamsulosin (FLOMAX) 0.4 mg Cap Take 1 capsule by mouth once daily.    testosterone cypionate (DEPOTESTOTERONE CYPIONATE) 200 mg/mL injection Inject 1 mL (200 mg total) into the muscle every 14 (fourteen) days. (Patient taking differently: Inject 200 mg into the muscle every 14 (fourteen) days. On Tuesdays)    travoprost, benzalkonium, (TRAVATAN) 0.004 % ophthalmic solution Place 1 drop into both eyes every evening.    zolpidem (AMBIEN) 5 MG Tab TAKE 1 TABLET BY MOUTH ONCE DAILY AT  BEDTIME AS NEEDED FOR INSOMNIA    [DISCONTINUED] montelukast (SINGULAIR) 10 mg tablet Take 1 tablet by mouth once daily    montelukast (SINGULAIR) 10 mg tablet Take 1 tablet by mouth once daily     Review of Systems   Constitutional:  Positive for appetite change and fatigue. Negative for chills and fever.   Respiratory:  Positive for cough and shortness of breath. Negative for chest tightness, wheezing and stridor.    Cardiovascular:  Positive for leg swelling.   Gastrointestinal:  Positive for abdominal distention.   Genitourinary: Negative.    Skin: Negative.    Neurological: Negative.    Psychiatric/Behavioral: Negative.     All other systems reviewed and are negative.    Objective:     Vital Signs (Most Recent):  Temp: 98.5 °F (36.9 °C) (01/09/23 0721)  Pulse: 69 (01/09/23 0924)  Resp: 18 (01/09/23 0924)  BP: 109/72 (01/09/23 0916)  SpO2: (!) 94 % (01/09/23 0924)   Vital Signs (24h Range):  Temp:  [97.6 °F (36.4 °C)-98.5 °F (36.9 °C)] 98.5 °F (36.9 °C)  Pulse:  [65-79] 69  Resp:  [17-20] 18  SpO2:  [88 %-94 %] 94 %  BP: (101-138)/(61-80) 109/72     Weight: 114 kg (251 lb 5.2 oz)  Body mass index is 38.21 kg/m².    SpO2: (!) 94 %         Intake/Output Summary (Last 24 hours) at 1/9/2023 0929  Last data filed at 1/9/2023 0406  Gross per 24 hour   Intake 900 ml   Output 3675 ml   Net -2775 ml       Lines/Drains/Airways       Peripheral Intravenous Line  Duration                  Peripheral IV - Single Lumen 01/08/23 0400 18 G Anterior;Right Forearm 1 day                  Significant Labs:  Recent Results (from the past 72 hour(s))   Comprehensive metabolic panel    Collection Time: 01/07/23 12:08 AM   Result Value Ref Range    Sodium Level 138 136 - 145 mmol/L    Potassium Level 3.8 3.5 - 5.1 mmol/L    Chloride 101 98 - 107 mmol/L    Carbon Dioxide 29 23 - 31 mmol/L    Glucose Level 108 82 - 115 mg/dL    Blood Urea Nitrogen 33.2 (H) 8.4 - 25.7 mg/dL    Creatinine 1.34 (H) 0.73 - 1.18 mg/dL    Calcium Level Total  8.8 8.8 - 10.0 mg/dL    Protein Total 6.0 5.8 - 7.6 gm/dL    Albumin Level 3.7 3.4 - 4.8 g/dL    Globulin 2.3 (L) 2.4 - 3.5 gm/dL    Albumin/Globulin Ratio 1.6 1.1 - 2.0 ratio    Bilirubin Total 0.9 <=1.5 mg/dL    Alkaline Phosphatase 57 40 - 150 unit/L    Alanine Aminotransferase 32 0 - 55 unit/L    Aspartate Aminotransferase 35 (H) 5 - 34 unit/L    eGFR 56 mls/min/1.73/m2   Brain natriuretic peptide    Collection Time: 01/07/23 12:08 AM   Result Value Ref Range    Natriuretic Peptide 44.5 <=100.0 pg/mL   Troponin I    Collection Time: 01/07/23 12:08 AM   Result Value Ref Range    Troponin-I 0.350 (H) 0.000 - 0.045 ng/mL   Protime-INR    Collection Time: 01/07/23 12:08 AM   Result Value Ref Range    PT 16.0 (H) 12.5 - 14.5 seconds    INR 1.30 0.00 - 1.30   CBC with Differential    Collection Time: 01/07/23 12:08 AM   Result Value Ref Range    WBC 13.0 (H) 4.5 - 11.5 x10(3)/mcL    RBC 4.97 4.70 - 6.10 x10(6)/mcL    Hgb 14.6 14.0 - 18.0 gm/dL    Hct 46.7 42.0 - 52.0 %    MCV 94.0 80.0 - 94.0 fL    MCH 29.4 pg    MCHC 31.3 (L) 33.0 - 36.0 mg/dL    RDW 14.3 11.6 - 14.4 %    Platelet 127 (L) 140 - 371 x10(3)/mcL    MPV 11.0 9.4 - 12.4 fL    Neut % 84.1 %    Lymph % 4.1 %    Mono % 10.9 %    Eos % 0.3 %    Basophil % 0.2 %    Lymph # 0.53 (L) 0.6 - 4.6 x10(3)/mcL    Neut # 10.93 (H) 2.1 - 9.2 x10(3)/mcL    Mono # 1.42 (H) 0.1 - 1.3 x10(3)/mcL    Eos # 0.04 0 - 0.9 x10(3)/mcL    Baso # 0.03 0 - 0.2 x10(3)/mcL    IG# 0.05 (H) 0 - 0.04 x10(3)/mcL    IG% 0.4 %    NRBC% 0.0 0 - 1 %   COVID/FLU A&B PCR    Collection Time: 01/07/23  2:02 AM   Result Value Ref Range    Influenza A PCR Not Detected Not Detected    Influenza B PCR Not Detected Not Detected    SARS-CoV-2 PCR Not Detected Not Detected   Blood culture #1 **CANNOT BE ORDERED STAT**    Collection Time: 01/07/23  3:34 AM    Specimen: Blood   Result Value Ref Range    CULTURE, BLOOD ([facility]) No Growth At 48 Hours    Blood culture #2 **CANNOT BE ORDERED STAT**    Collection  Time: 01/07/23  3:39 AM    Specimen: Blood   Result Value Ref Range    CULTURE, BLOOD ([facility]) No Growth At 48 Hours    Troponin I    Collection Time: 01/07/23  3:39 AM   Result Value Ref Range    Troponin-I 0.299 (H) 0.000 - 0.045 ng/mL   Troponin I    Collection Time: 01/07/23  8:52 AM   Result Value Ref Range    Troponin-I 0.229 (H) 0.000 - 0.045 ng/mL   Echo Saline Bubble? No    Collection Time: 01/07/23  3:08 PM   Result Value Ref Range    BSA 2.35 m2    TDI SEPTAL 0.09 m/s    LV LATERAL E/E' RATIO 6.91 m/s    LV SEPTAL E/E' RATIO 8.44 m/s    EF 40 %    Left Ventricular Outflow Tract Mean Velocity 0.43 cm/s    Left Ventricular Outflow Tract Mean Gradient 1.00 mmHg    TDI LATERAL 0.11 m/s    PV PEAK VELOCITY 0.82 cm/s    LVIDd 5.42 3.5 - 6.0 cm    IVS 1.53 (A) 0.6 - 1.1 cm    Posterior Wall 1.27 (A) 0.6 - 1.1 cm    LVIDs 4.43 (A) 2.1 - 4.0 cm    FS 18 28 - 44 %    LV mass 330.23 g    LA size 4.60 cm    TAPSE 2.42 cm    Left Ventricle Relative Wall Thickness 0.47 cm    AV mean gradient 10 mmHg    AV valve area 1.03 cm2    AV Velocity Ratio 0.32     AV index (prosthetic) 0.33     MV mean gradient 2 mmHg    MV valve area p 1/2 method 2.89 cm2    MV valve area by continuity eq 1.70 cm2    E/A ratio 1.95     Mean e' 0.10 m/s    E wave deceleration time 182.00 msec    LVOT diameter 2.00 cm    LVOT area 3.1 cm2    LVOT peak david 0.64 m/s    LVOT peak VTI 11.20 cm    Ao peak david 1.99 m/s    Ao VTI 34.0 cm    LVOT stroke volume 35.17 cm3    AV peak gradient 16 mmHg    MV peak gradient 4 mmHg    E/E' ratio 7.60 m/s    MV Peak E David 0.76 m/s    TR Max David 2.28 m/s    MV VTI 20.7 cm    MV stenosis pressure 1/2 time 76.00 ms    MV Peak A David 0.39 m/s    LV Systolic Volume 89.10 mL    LV Systolic Volume Index 39.4 mL/m2    LV Diastolic Volume 143.00 mL    LV Diastolic Volume Index 63.27 mL/m2    LV Mass Index 146 g/m2    Triscuspid Valve Regurgitation Peak Gradient 21 mmHg    LA Volume Index (Mod) 42.9 mL/m2    LA volume (mod)  97.00 cm3   Comprehensive Metabolic Panel    Collection Time: 01/08/23  3:24 AM   Result Value Ref Range    Sodium Level 139 136 - 145 mmol/L    Potassium Level 3.6 3.5 - 5.1 mmol/L    Chloride 99 98 - 107 mmol/L    Carbon Dioxide 32 (H) 23 - 31 mmol/L    Glucose Level 89 82 - 115 mg/dL    Blood Urea Nitrogen 29.2 (H) 8.4 - 25.7 mg/dL    Creatinine 1.20 (H) 0.73 - 1.18 mg/dL    Calcium Level Total 8.6 (L) 8.8 - 10.0 mg/dL    Protein Total 5.5 (L) 5.8 - 7.6 gm/dL    Albumin Level 3.4 3.4 - 4.8 g/dL    Globulin 2.1 (L) 2.4 - 3.5 gm/dL    Albumin/Globulin Ratio 1.6 1.1 - 2.0 ratio    Bilirubin Total 0.8 <=1.5 mg/dL    Alkaline Phosphatase 52 40 - 150 unit/L    Alanine Aminotransferase 25 0 - 55 unit/L    Aspartate Aminotransferase 22 5 - 34 unit/L    eGFR 64 mls/min/1.73/m2   BNP    Collection Time: 01/08/23  3:24 AM   Result Value Ref Range    Natriuretic Peptide 87.2 <=100.0 pg/mL   Troponin I    Collection Time: 01/08/23  3:24 AM   Result Value Ref Range    Troponin-I 0.168 (H) 0.000 - 0.045 ng/mL   Magnesium    Collection Time: 01/08/23  3:24 AM   Result Value Ref Range    Magnesium Level 2.50 1.60 - 2.60 mg/dL   CBC with Differential    Collection Time: 01/08/23  3:24 AM   Result Value Ref Range    WBC 10.2 4.5 - 11.5 x10(3)/mcL    RBC 4.61 (L) 4.70 - 6.10 x10(6)/mcL    Hgb 13.6 (L) 14.0 - 18.0 gm/dL    Hct 43.9 42.0 - 52.0 %    MCV 95.2 (H) 80.0 - 94.0 fL    MCH 29.5 pg    MCHC 31.0 (L) 33.0 - 36.0 mg/dL    RDW 14.6 (H) 11.6 - 14.4 %    Platelet 123 (L) 140 - 371 x10(3)/mcL    MPV 11.6 9.4 - 12.4 fL    Neut % 82.1 %    Lymph % 4.6 %    Mono % 11.8 %    Eos % 0.7 %    Basophil % 0.3 %    Lymph # 0.47 (L) 0.6 - 4.6 x10(3)/mcL    Neut # 8.36 2.1 - 9.2 x10(3)/mcL    Mono # 1.20 0.1 - 1.3 x10(3)/mcL    Eos # 0.07 0 - 0.9 x10(3)/mcL    Baso # 0.03 0 - 0.2 x10(3)/mcL    IG# 0.05 (H) 0 - 0.04 x10(3)/mcL    IG% 0.5 %    NRBC% 0.0 0 - 1 %   Comprehensive Metabolic Panel    Collection Time: 01/09/23  3:55 AM   Result Value  Ref Range    Sodium Level 142 136 - 145 mmol/L    Potassium Level 3.3 (L) 3.5 - 5.1 mmol/L    Chloride 99 98 - 107 mmol/L    Carbon Dioxide 32 (H) 23 - 31 mmol/L    Glucose Level 99 82 - 115 mg/dL    Blood Urea Nitrogen 23.7 8.4 - 25.7 mg/dL    Creatinine 1.10 0.73 - 1.18 mg/dL    Calcium Level Total 8.6 (L) 8.8 - 10.0 mg/dL    Protein Total 5.5 (L) 5.8 - 7.6 gm/dL    Albumin Level 3.2 (L) 3.4 - 4.8 g/dL    Globulin 2.3 (L) 2.4 - 3.5 gm/dL    Albumin/Globulin Ratio 1.4 1.1 - 2.0 ratio    Bilirubin Total 0.6 <=1.5 mg/dL    Alkaline Phosphatase 51 40 - 150 unit/L    Alanine Aminotransferase 26 0 - 55 unit/L    Aspartate Aminotransferase 22 5 - 34 unit/L    eGFR 71 mls/min/1.73/m2   Magnesium    Collection Time: 01/09/23  3:55 AM   Result Value Ref Range    Magnesium Level 2.37 1.60 - 2.60 mg/dL   Troponin I    Collection Time: 01/09/23  3:55 AM   Result Value Ref Range    Troponin-I 0.122 (H) 0.000 - 0.045 ng/mL   CBC with Differential    Collection Time: 01/09/23  3:55 AM   Result Value Ref Range    WBC 8.6 4.5 - 11.5 x10(3)/mcL    RBC 4.46 (L) 4.70 - 6.10 x10(6)/mcL    Hgb 13.2 (L) 14.0 - 18.0 gm/dL    Hct 42.1 42.0 - 52.0 %    MCV 94.4 (H) 80.0 - 94.0 fL    MCH 29.6 pg    MCHC 31.4 (L) 33.0 - 36.0 mg/dL    RDW 14.5 (H) 11.6 - 14.4 %    Platelet 131 (L) 140 - 371 x10(3)/mcL    MPV 11.3 9.4 - 12.4 fL    Neut % 80.8 %    Lymph % 5.5 %    Mono % 10.4 %    Eos % 2.5 %    Basophil % 0.4 %    Lymph # 0.47 (L) 0.6 - 4.6 x10(3)/mcL    Neut # 6.93 2.1 - 9.2 x10(3)/mcL    Mono # 0.89 0.1 - 1.3 x10(3)/mcL    Eos # 0.21 0 - 0.9 x10(3)/mcL    Baso # 0.03 0 - 0.2 x10(3)/mcL    IG# 0.03 0 - 0.04 x10(3)/mcL    IG% 0.4 %    NRBC% 0.0 0 - 1 %   BNP    Collection Time: 01/09/23  3:56 AM   Result Value Ref Range    Natriuretic Peptide 196.0 (H) <=100.0 pg/mL     Significant Imaging:  Imaging Results              CTA Chest Non-Coronary (PE Studies) (Final result)  Result time 01/07/23 09:25:24      Final result by Oswaldo Orellana MD  (01/07/23 09:25:24)                   Impression:      1. Negative for pulmonary thromboembolic disease.  2. Small right and trace left pleural effusions.  3. Bibasilar consolidation at least partially relates to atelectasis but superimposed infection possible.  4. Small volume ascites.  5.  No significant discrepancy with the preliminary report.      Electronically signed by: Oswaldo Orellana  Date:    01/07/2023  Time:    09:25               Narrative:    EXAMINATION:  CTA CHEST NON CORONARY (PE STUDIES)    CLINICAL HISTORY:  Pulmonary embolism (PE) suspected, unknown D-dimer;    TECHNIQUE:  Helical acquisition through the chest with IV contrast targeting the pulmonary arteries. Multiplanar and 3D MIP reconstructed images were provided for review.  mGycm. Automatic exposure control, adjustment of mA/kV or iterative reconstruction technique was used to reduce radiation.    COMPARISON:  21 May 2020.    FINDINGS:  There is good opacification of the pulmonary arterial tree. There is no evidence of pulmonary thromboembolism.  There is no aortic dissection.    There is no mediastinal, hilar or axillary lymphadenopathy by size criteria.    Heart size upper limit normal.  There are coronary artery calcifications.  No pericardial effusion.    Small right and trace left pleural effusions.  There is bibasilar atelectasis.  There are additional areas of bibasilar consolidation where infection is possible.  Upper lungs are clear.    There is small volume ascites.  There are no acute osseous findings.                        Preliminary result by Oswaldo Orellana MD (01/07/23 01:34:29)                   Narrative:    START OF REPORT:  Technique: CT Scan of the chest was performed with intravenous contrast with direct axial images as well as sagittal and coronal reconstruction images pulmonary embolus protocol.    Dosage Information: Automated Exposure Control was utilized.    Comparison: None.    Clinical History:  Sob.    Findings:  Artifact: Mild motion artifact is seen on multiple images.  Soft Tissues: Unremarkable.  Axilla: A few prominent lymph nodes are seen in the axilla.  Neck: The visualized soft tissues of the neck appear unremarkable. The isthmus of the thyroid gland is mildly prominent with a hypodense nodule noted within containing a punctate calcification. Correlate with clinical and laboratory findings as regards further evaluation and follow up.  Mediastinum: A few subcentimeter mediastinal lymph nodes are seen pretracheal paratracheal precarinal and subcarinal spaces . The largest is in pre carinal space and measures â10.2 mmâ in least dimension. This suggests reactive lymphadenopathy. Correlate with clinical and laboratory findings as regards further evaluation and follow up.  Heart: The heart size is within normal limits.  Aorta: Unremarkable appearing aorta.  Pulmonary Arteries: No filling defects are seen in the pulmonary arteries to suggest pulmonary embolus.  Lungs: There are bilateral basal posteromedial areas of dense consolidation noted, left greater than right. These features are suggestive of multifocal pneumonia with aspiration component a consideration.  Pleura: There is a small right sided pleural effusion. No pneumothorax is identified.  Bony Structures:  Spine: Mild spondylolytic changes are seen in the thoracic spine.  Ribs: The ribs appear unremarkable.  Abdomen: There is some free fluid noted in the left upper quadrant around the spleen. Correlate with clinical and laboratory findings as regards further evaluation and follow up.      Impression:  1. No filling defects are seen in the pulmonary arteries to suggest pulmonary embolus.  2. There is a small right sided pleural effusion.  3. There are bilateral basal posteromedial areas of dense consolidation noted, left greater than right. These features are suggestive of multifocal pneumonia with aspiration component a consideration.  Correlate with clinical and laboratory findings as regards additional evaluation and follow-up to full resolution as indicated.  4. Details and other findings as discussed above.                          Preliminary result by Alex Luciano MD (01/07/23 01:34:29)                   Narrative:    START OF REPORT:  Technique: CT Scan of the chest was performed with intravenous contrast with direct axial images as well as sagittal and coronal reconstruction images pulmonary embolus protocol.    Dosage Information: Automated Exposure Control was utilized.    Comparison: None.    Clinical History: Sob.    Findings:  Artifact: Mild motion artifact is seen on multiple images.  Soft Tissues: Unremarkable.  Axilla: A few prominent lymph nodes are seen in the axilla.  Neck: The visualized soft tissues of the neck appear unremarkable. The isthmus of the thyroid gland is mildly prominent with a hypodense nodule noted within containing a punctate calcification. Correlate with clinical and laboratory findings as regards further evaluation and follow up.  Mediastinum: A few subcentimeter mediastinal lymph nodes are seen pretracheal paratracheal precarinal and subcarinal spaces . The largest is in pre carinal space and measures â10.2 mmâ in least dimension. This suggests reactive lymphadenopathy. Correlate with clinical and laboratory findings as regards further evaluation and follow up.  Heart: The heart size is within normal limits.  Aorta: Unremarkable appearing aorta.  Pulmonary Arteries: No filling defects are seen in the pulmonary arteries to suggest pulmonary embolus.  Lungs: There are bilateral basal posteromedial areas of dense consolidation noted, left greater than right. These features are suggestive of multifocal pneumonia with aspiration component a consideration.  Pleura: There is a small right sided pleural effusion. No pneumothorax is identified.  Bony Structures:  Spine: Mild spondylolytic changes are seen in  the thoracic spine.  Ribs: The ribs appear unremarkable.  Abdomen: There is some free fluid noted in the left upper quadrant around the spleen. Correlate with clinical and laboratory findings as regards further evaluation and follow up.      Impression:  1. No filling defects are seen in the pulmonary arteries to suggest pulmonary embolus.  2. There is a small right sided pleural effusion.  3. There are bilateral basal posteromedial areas of dense consolidation noted, left greater than right. These features are suggestive of multifocal pneumonia with aspiration component a consideration. Correlate with clinical and laboratory findings as regards additional evaluation and follow-up to full resolution as indicated.  4. Details and other findings as discussed above.                                         X-Ray Chest PA And Lateral (Final result)  Result time 01/07/23 10:02:00      Final result by Wong Brewer MD (01/07/23 10:02:00)                   Impression:      Right lower lobe consolidative changes.  Query symptomatology.      Electronically signed by: Wong Brewer MD  Date:    01/07/2023  Time:    10:02               Narrative:    EXAMINATION:  XR CHEST PA AND LATERAL    CLINICAL HISTORY:  Chest Pain;    TECHNIQUE:  PA and lateral views of the chest were performed.    COMPARISON:  08/09/2022    FINDINGS:  Heart size enlarged the pulmonary vasculature is congested.    Bibasilar atelectatic changes lungs with persistent elevation of the left hemidiaphragm.  Concern for developing consolidation in the right lower lobe.                                    Telemetry:         Physical Exam  Constitutional:       Appearance: Normal appearance.   HENT:      Head: Normocephalic.      Mouth/Throat:      Mouth: Mucous membranes are moist.   Eyes:      Extraocular Movements: Extraocular movements intact.   Cardiovascular:      Rate and Rhythm: Normal rate. Rhythm irregular.      Pulses: Normal pulses.      Heart sounds:  Murmur heard.   Pulmonary:      Breath sounds: Rales present.      Comments: NC O2  Conversational dyspnea  Abdominal:      General: There is distension.      Palpations: Abdomen is soft.      Tenderness: There is no abdominal tenderness.   Musculoskeletal:         General: Normal range of motion.      Right lower leg: Edema present.      Left lower leg: Edema present.   Skin:     General: Skin is warm and dry.   Neurological:      General: No focal deficit present.      Mental Status: He is alert and oriented to person, place, and time. Mental status is at baseline.   Psychiatric:         Mood and Affect: Mood normal.         Behavior: Behavior normal.     Current Inpatient Medications:    Current Facility-Administered Medications:     acetaminophen tablet 650 mg, 650 mg, Oral, Q6H PRN, Fabiano Liang MD, 650 mg at 01/08/23 1036    albuterol inhaler 2 puff, 2 puff, Inhalation, Q4H PRN, Fabiano Liang MD    albuterol-ipratropium 2.5 mg-0.5 mg/3 mL nebulizer solution 3 mL, 3 mL, Nebulization, Q4H WAKE, Dameon Sanchez III, MD, 3 mL at 01/09/23 0924    ALPRAZolam tablet 0.5 mg, 0.5 mg, Oral, BID PRN, Fabiano Liang MD    apixaban tablet 5 mg, 5 mg, Oral, BID, Fabiano Liang MD, 5 mg at 01/09/23 0916    atorvastatin tablet 20 mg, 20 mg, Oral, Daily, Fabiano Liang MD, 20 mg at 01/09/23 0916    azithromycin 500 mg in dextrose 5 % 250 mL IVPB (ready to mix system), 500 mg, Intravenous, Q24H, Dameon Sanchez III, MD, Stopped at 01/09/23 0450    cefTRIAXone (ROCEPHIN) 1 g in dextrose 5 % in water (D5W) 5 % 50 mL IVPB (MB+), 1 g, Intravenous, Q24H, Dameon Sanchez III, MD, Stopped at 01/09/23 0342    cetirizine tablet 10 mg, 10 mg, Oral, Daily, Fabiano Liang MD, 10 mg at 01/09/23 0916    diltiaZEM 24 hr capsule 180 mg, 180 mg, Oral, BID, Fabiano Liang MD, 180 mg at 01/09/23 0916    famotidine tablet 20 mg, 20 mg, Oral, BID, Dameon Sanchez III, MD, 20 mg at 01/09/23 0916     fluticasone furoate-vilanteroL 200-25 mcg/dose diskus inhaler 1 puff, 1 puff, Inhalation, Daily, Fabiano Liang MD, 1 puff at 01/09/23 0915    furosemide tablet 40 mg, 40 mg, Oral, Daily, Fabiano Liang MD, 40 mg at 01/09/23 0916    latanoprost 0.005 % ophthalmic solution 1 drop, 1 drop, Both Eyes, Daily, Fabiano Liang MD, 1 drop at 01/09/23 0915    melatonin tablet 6 mg, 6 mg, Oral, Nightly PRN, Dameon Sanchez III, MD    melatonin tablet 9 mg, 9 mg, Oral, Daily PRN, Fabiano Liang MD    metoprolol injection 5 mg, 5 mg, Intravenous, Q15 Min PRN, Lionel Roach NP    metoprolol succinate (TOPROL-XL) 24 hr tablet 50 mg, 50 mg, Oral, Daily, Fabiano Liang MD, 50 mg at 01/09/23 0916    montelukast tablet 10 mg, 10 mg, Oral, Daily, Fabiano Liang MD, 10 mg at 01/09/23 0916    pantoprazole EC tablet 40 mg, 40 mg, Oral, Daily, Fabiano Liang MD, 40 mg at 01/09/23 0916    potassium chloride CR tablet 10 mEq, 10 mEq, Oral, TID, Fabiano Liang MD, 10 mEq at 01/09/23 0916    tamsulosin 24 hr capsule 0.4 mg, 1 capsule, Oral, Daily, Fabiano Liang MD, 0.4 mg at 01/09/23 0916    zolpidem tablet 5 mg, 5 mg, Oral, QHS, Fabiano Liang MD, 5 mg at 01/08/23 2109    VTE Risk Mitigation (From admission, onward)           Ordered     apixaban tablet 5 mg  2 times daily         01/07/23 1050     IP VTE HIGH RISK PATIENT  Once         01/07/23 0303     Place sequential compression device  Until discontinued         01/07/23 0303                  Assessment:   Acute on chronic systolic and diastolic HF  --decompensated  CMO/EF 35-40%     - ECHO (1.7.23) - Grade 1 DD, Mild AS, EF 35-40%    - ECHO (4.11.22) - LVEF 55-60%  Bilateral Pneumonitis (R > L)  Mildly Elevated Troponin (Flat)--likely due to recent ablation/decompensated heart failure  Hx of Asthma  PAF - Now with CVR    - CHADsVASc - 5 Points - 7.2% Stroke Risk per Year     - EP Study with Successful Ablation of  Typical A.Flutter with Termination into SR (1.5.23)  VHD  --Mild AS-- JENNY 1.03 cm2; PV 1.99m/s; MG 10mmHg.   HTN - Controlled  MINDA/CPAP  VI  Elevated Calcium Score/Mild Calcified Non-Obstructive CAD via [facility] (2020)    - [facility] (4.16.20) - Nonobstructive CAD with Normal LV Function and an EF of 55%  FLORENTINO/Bilaterally Mild-Moderate  Anxiety  GERD  OA  Obseity  No Hx of GIB per Chart Review    Plan:   ECHO Reviewed. EF 35-40% with G1 DD. Still appears overloaded. Continue lasix. DC Cardizem. Increase Metoprolol succinate to 100 mg daily. Unable to add ARNI due to marginal BPs at times.   Further uptitration of HF medications post discharge.   Treatment for Pneumonia per Primary Team  Continue Eliquis Re Stroke Risk Reduction  Continue BB  and Statin Therapies  Will Repeat Limited ECHO upon F/U with Mark Bennett/Sridhar as reduced EF may be s/t Afib  Labs in AM: CBC, CMP and Mg     NED Iniguez  Cardiology  [facility]  01/09/2023

## 2023-01-10 LAB
ANION GAP SERPL CALC-SCNC: 10 MEQ/L
BASOPHILS # BLD AUTO: 0.03 X10(3)/MCL (ref 0–0.2)
BASOPHILS NFR BLD AUTO: 0.4 %
BNP BLD-MCNC: 174.6 PG/ML
BUN SERPL-MCNC: 19.4 MG/DL (ref 8.4–25.7)
CALCIUM SERPL-MCNC: 8.4 MG/DL (ref 8.8–10)
CHLORIDE SERPL-SCNC: 97 MMOL/L (ref 98–107)
CO2 SERPL-SCNC: 34 MMOL/L (ref 23–31)
CREAT SERPL-MCNC: 0.9 MG/DL (ref 0.73–1.18)
CREAT/UREA NIT SERPL: 22
EOSINOPHIL # BLD AUTO: 0.24 X10(3)/MCL (ref 0–0.9)
EOSINOPHIL NFR BLD AUTO: 3.1 %
ERYTHROCYTE [DISTWIDTH] IN BLOOD BY AUTOMATED COUNT: 14.3 % (ref 11.5–17)
GFR SERPLBLD CREATININE-BSD FMLA CKD-EPI: >60 MLS/MIN/1.73/M2
GLUCOSE SERPL-MCNC: 74 MG/DL (ref 82–115)
HCT VFR BLD AUTO: 42.6 % (ref 42–52)
HGB BLD-MCNC: 13.4 GM/DL (ref 14–18)
IMM GRANULOCYTES # BLD AUTO: 0.03 X10(3)/MCL (ref 0–0.04)
IMM GRANULOCYTES NFR BLD AUTO: 0.4 %
LYMPHOCYTES # BLD AUTO: 0.48 X10(3)/MCL (ref 0.6–4.6)
LYMPHOCYTES NFR BLD AUTO: 6.2 %
MCH RBC QN AUTO: 29.6 PG
MCHC RBC AUTO-ENTMCNC: 31.5 MG/DL (ref 33–36)
MCV RBC AUTO: 94 FL (ref 80–94)
MONOCYTES # BLD AUTO: 0.78 X10(3)/MCL (ref 0.1–1.3)
MONOCYTES NFR BLD AUTO: 10.1 %
NEUTROPHILS # BLD AUTO: 6.14 X10(3)/MCL (ref 2.1–9.2)
NEUTROPHILS NFR BLD AUTO: 79.8 %
NRBC BLD AUTO-RTO: 0 %
PLATELET # BLD AUTO: 147 X10(3)/MCL (ref 130–400)
PMV BLD AUTO: 10.2 FL (ref 7.4–10.4)
POTASSIUM SERPL-SCNC: 3.3 MMOL/L (ref 3.5–5.1)
RBC # BLD AUTO: 4.53 X10(6)/MCL (ref 4.7–6.1)
SODIUM SERPL-SCNC: 141 MMOL/L (ref 136–145)
TROPONIN I SERPL-MCNC: 0.08 NG/ML (ref 0–0.04)
WBC # SPEC AUTO: 7.7 X10(3)/MCL (ref 4.5–11.5)

## 2023-01-10 PROCEDURE — 84484 ASSAY OF TROPONIN QUANT: CPT | Performed by: INTERNAL MEDICINE

## 2023-01-10 PROCEDURE — 94640 AIRWAY INHALATION TREATMENT: CPT

## 2023-01-10 PROCEDURE — 25000003 PHARM REV CODE 250: Performed by: INTERNAL MEDICINE

## 2023-01-10 PROCEDURE — 99232 SBSQ HOSP IP/OBS MODERATE 35: CPT | Mod: ,,, | Performed by: INTERNAL MEDICINE

## 2023-01-10 PROCEDURE — 80048 BASIC METABOLIC PNL TOTAL CA: CPT | Performed by: INTERNAL MEDICINE

## 2023-01-10 PROCEDURE — 63600175 PHARM REV CODE 636 W HCPCS: Performed by: INTERNAL MEDICINE

## 2023-01-10 PROCEDURE — 21400001 HC TELEMETRY ROOM

## 2023-01-10 PROCEDURE — 25000242 PHARM REV CODE 250 ALT 637 W/ HCPCS: Performed by: INTERNAL MEDICINE

## 2023-01-10 PROCEDURE — 25000003 PHARM REV CODE 250: Performed by: EMERGENCY MEDICINE

## 2023-01-10 PROCEDURE — 63700000 PHARM REV CODE 250 ALT 637 W/O HCPCS: Performed by: INTERNAL MEDICINE

## 2023-01-10 PROCEDURE — 83880 ASSAY OF NATRIURETIC PEPTIDE: CPT | Performed by: INTERNAL MEDICINE

## 2023-01-10 PROCEDURE — 85025 COMPLETE CBC W/AUTO DIFF WBC: CPT | Performed by: INTERNAL MEDICINE

## 2023-01-10 PROCEDURE — 99232 PR SUBSEQUENT HOSPITAL CARE,LEVL II: ICD-10-PCS | Mod: ,,, | Performed by: INTERNAL MEDICINE

## 2023-01-10 PROCEDURE — 94761 N-INVAS EAR/PLS OXIMETRY MLT: CPT

## 2023-01-10 PROCEDURE — 25000003 PHARM REV CODE 250: Performed by: NURSE PRACTITIONER

## 2023-01-10 PROCEDURE — 25000242 PHARM REV CODE 250 ALT 637 W/ HCPCS: Performed by: EMERGENCY MEDICINE

## 2023-01-10 PROCEDURE — 36415 COLL VENOUS BLD VENIPUNCTURE: CPT | Performed by: INTERNAL MEDICINE

## 2023-01-10 PROCEDURE — 27000221 HC OXYGEN, UP TO 24 HOURS

## 2023-01-10 RX ORDER — FUROSEMIDE 10 MG/ML
40 INJECTION INTRAMUSCULAR; INTRAVENOUS DAILY
Status: DISCONTINUED | OUTPATIENT
Start: 2023-01-10 | End: 2023-01-17

## 2023-01-10 RX ORDER — POTASSIUM CHLORIDE 20 MEQ/1
20 TABLET, EXTENDED RELEASE ORAL 3 TIMES DAILY
Status: DISCONTINUED | OUTPATIENT
Start: 2023-01-10 | End: 2023-01-21 | Stop reason: HOSPADM

## 2023-01-10 RX ORDER — DOCUSATE SODIUM 100 MG/1
100 CAPSULE, LIQUID FILLED ORAL DAILY
Status: DISCONTINUED | OUTPATIENT
Start: 2023-01-10 | End: 2023-01-21 | Stop reason: HOSPADM

## 2023-01-10 RX ADMIN — FAMOTIDINE 20 MG: 20 TABLET, FILM COATED ORAL at 11:01

## 2023-01-10 RX ADMIN — ZOLPIDEM TARTRATE 5 MG: 5 TABLET ORAL at 08:01

## 2023-01-10 RX ADMIN — PANTOPRAZOLE SODIUM 40 MG: 40 TABLET, DELAYED RELEASE ORAL at 11:01

## 2023-01-10 RX ADMIN — IPRATROPIUM BROMIDE AND ALBUTEROL SULFATE 3 ML: 2.5; .5 SOLUTION RESPIRATORY (INHALATION) at 03:01

## 2023-01-10 RX ADMIN — POTASSIUM CHLORIDE 20 MEQ: 1500 TABLET, EXTENDED RELEASE ORAL at 08:01

## 2023-01-10 RX ADMIN — METOPROLOL SUCCINATE 100 MG: 50 TABLET, EXTENDED RELEASE ORAL at 11:01

## 2023-01-10 RX ADMIN — FUROSEMIDE 40 MG: 10 INJECTION, SOLUTION INTRAMUSCULAR; INTRAVENOUS at 11:01

## 2023-01-10 RX ADMIN — CETIRIZINE HYDROCHLORIDE 10 MG: 10 TABLET, FILM COATED ORAL at 11:01

## 2023-01-10 RX ADMIN — APIXABAN 5 MG: 5 TABLET, FILM COATED ORAL at 08:01

## 2023-01-10 RX ADMIN — CEFTRIAXONE 1 G: 1 INJECTION, POWDER, FOR SOLUTION INTRAMUSCULAR; INTRAVENOUS at 05:01

## 2023-01-10 RX ADMIN — AZITHROMYCIN MONOHYDRATE 500 MG: 250 TABLET ORAL at 11:01

## 2023-01-10 RX ADMIN — ATORVASTATIN CALCIUM 20 MG: 10 TABLET, FILM COATED ORAL at 11:01

## 2023-01-10 RX ADMIN — FLUTICASONE FUROATE AND VILANTEROL TRIFENATATE 1 PUFF: 200; 25 POWDER RESPIRATORY (INHALATION) at 11:01

## 2023-01-10 RX ADMIN — APIXABAN 5 MG: 5 TABLET, FILM COATED ORAL at 11:01

## 2023-01-10 RX ADMIN — IPRATROPIUM BROMIDE AND ALBUTEROL SULFATE 3 ML: 2.5; .5 SOLUTION RESPIRATORY (INHALATION) at 08:01

## 2023-01-10 RX ADMIN — DOCUSATE SODIUM 100 MG: 100 CAPSULE, LIQUID FILLED ORAL at 12:01

## 2023-01-10 RX ADMIN — POTASSIUM CHLORIDE 20 MEQ: 1500 TABLET, EXTENDED RELEASE ORAL at 02:01

## 2023-01-10 RX ADMIN — MONTELUKAST 10 MG: 10 TABLET, FILM COATED ORAL at 11:01

## 2023-01-10 RX ADMIN — TAMSULOSIN HYDROCHLORIDE 0.4 MG: 0.4 CAPSULE ORAL at 11:01

## 2023-01-10 RX ADMIN — FAMOTIDINE 20 MG: 20 TABLET, FILM COATED ORAL at 08:01

## 2023-01-10 RX ADMIN — LATANOPROST 1 DROP: 50 SOLUTION OPHTHALMIC at 11:01

## 2023-01-10 NOTE — PROGRESS NOTES
Subjective:  The patient feels like he is breathing better overall.  Still not back to baseline.  Edema is not back to baseline either.  He does have some occasional spasms of the anterior chest or upper abdominal wall.  Seem to be positional.    Objective:  Blood pressure 127/78.  Heart rate 80 respiratory 18 O2 sat 93% on 2 L O2 input and output record as 11 40 in and 2350 out    General appearance is unremarkable.  He is in no distress.  Heart has an irregular rhythm with normal rate.  Lungs with rales 1/3 way up the back on the right and only at the base on the left.  Abdomen is obese but nontender.  Extremities with 1 to 2+ pretibial edema bilaterally.    Laboratory studies include a stable CBC.  BMP shows that the potassium still a bit low at 3.3.  BUN and creatinine are normal.    Assessment:  1. Pneumonitis.  Slowly better     2. Atrial fibrillation.  Rate controlled     3. Systolic dysfunction noted on recent echo, there is also diastolic dysfunction    4. Asthma.  Stable     5. Atrial fibrillation.  Persistent    6. Mild hypokalemia     Plan:  Will try to increase the diuresis she is a bit more vigorously.  He will get IV Lasix today.

## 2023-01-10 NOTE — PROGRESS NOTES
"  [facility]  Cardiology  Progress Note    Patient Name: John Bullard  MRN: [id number]  Admission Date: 1/6/2023  Hospital Length of Stay: 3 days  Code Status: Full Code   Attending Provider: Fabiano Liang MD   Consulting Provider: NED Iniguez  Primary Care Physician: Fabiano Liang MD  Principal Problem:<principal problem not specified>    Patient information was obtained from patient, past medical records, and ER records.     Subjective:     Chief Complaint: Reason for Consult: Elevated Troponin     HPI: Mr./Father Aleah is a 73 y/o male who is known to [facility], Mark Waller/Donald. The patient recently had an EP Study with Successful Ablation of Typical A.Flutter. He underwent this EP Study on 1.5.23 without complications and was discharged home in stable condition. Post operatively he had a sore throat and a cough which he attributed the ETT. His SOB worsened and he presented to the ER for evaluation. Upon arrival to the ER he was found to have a CXR with Infiltrates and a CT of the Chest with no Evidence of Pulmonary Embolism, however, he did have significant infiltrates in bilateral lungs but worse on the R side. He was admitted to his Primary Care Provider and [facility] was consulted for Elevated Troponin Levels and Recent Cardiac Procedure.     1.8.23: NAD. Converted into PAF Last Night. Remains with CVR. "I am feeling ok." + SOB, Improving. Denies CP and Palps. + Cough.  1.9.23: Patient sitting up at bedside. Conversational dyspnea. Bibasilar rales. Still requiring O2. Abdomen distended. 2+ peripheral edema. Afib 90's on tele.  1.10.23: Patient standing up at bedside. Reports breathing is better. Bibasilar crackles noted. Abdomen still firm distended. 2+ peripheral edema. Co2 rising with diuresis. Albumin 3.2      PMH: Anxiety, Asthma, PAF/Ablation , DJD, GERD, HTN, OA, Obesity, MINDA/CPAP, VI, VT, Insomnia, Elevated Calcium Score/Non-Obstructive CAD, FLORENTINO/Bilaterally " Mild-Moderate  PSH: Lithotripsy, Sinus Surgery, Colonoscopy, Hernia Repair, EP Study/Ablation, Abdominal Surgery, [place] Teeth Extraction   Family History: Reviewed and Unremarkable for Heart Disease  Social History: Denies Illicit Drug, ETOH and Tobacco    Previous Cardiac Diagnostics:   ECHO 1.7.23:  Concentric hypertrophy and mildly decreased systolic function.  The estimated ejection fraction is 35-40%.  Grade I left ventricular diastolic dysfunction.  With normal right ventricular systolic function.  There is mild aortic valve stenosis.  Aortic valve area is 1.03 cm2; peak velocity is 1.99 m/s; mean gradient is 10 mmHg.    Carotid US 8.17.22:  Mild Bilaterally and Tortuous Carotid Arteries with No Hemodynamically Significant FLORENTINO  < 50% Bilaterally    ECHO 4.11.22:  Limited Parasternal Windows  Normal LV Systolic Function: LVEF 55-60%  Moderate AS, Peak AV Velocity 3.2 m/sec, Mean Gradient 23mmHg, JENNY 1.4 cm2  Grade I DD  Mild TR with RVSP of 30mmHg    LHC 4.16.20:  LHC via R Radial Approach with Nonobstructive CAD with Normal LV Function and an EF of 55%. EDP was 20mmHg.  LM: patent vessel.  LAD: calcified type III vessel with 4 patent diagonal arteries. D1 is moderate to large in size with Proximal 50% stenosis. Remaining diagonals are small to medium size patent vessels  Lcx: Nondominant patent vessel that gives rise to a medium size OM1 with an early takeoff. The OM 2 is a medium to large size patent bifurcating vessel. The AV groove artery terminates in a small size patent OM3 with a small terminal bifurcating PL segment.   RCA: very large dominant patent vessel with mild Luminal irregularities. RCA terminates in a very large patent PDA and a large patent PL branch    PET 4.8.20:  The study quality is below average.   This is an abnormal perfusion study. Study is consistent with mostly scar tissue with minimal ischemia.   Small fixed perfusion abnormality of moderate intensity in the apical segment. Small  partially reversible perfusion abnormality of moderate intensity in the anterior septal region. Small fixed perfusion abnormality of moderate intensity in the inferior region.   This scan is suggestive of low to moderate risk for future cardiovascular events.   The left ventricular cavity is noted to be normal on the stress studies. The stress left ventricular ejection fraction was calculated to be 52% and left ventricular global function is normal. The rest left ventricular cavity is noted to be normal. The rest left ventricular ejection fraction was calculated to be 42% and rest left ventricular global function is mildly reduced.   When compared to the resting ejection fraction (42%), the stress ejection fraction (52%) has increased.     Review of patient's allergies indicates:   Allergen Reactions    Ativan [lorazepam] Hallucinations     No current facility-administered medications on file prior to encounter.     Current Outpatient Medications on File Prior to Encounter   Medication Sig    albuterol (PROVENTIL/VENTOLIN HFA) 90 mcg/actuation inhaler INHALE 2 PUFFS BY MOUTH EVERY 6 HOURS    ALPRAZolam (XANAX) 0.5 MG tablet Take 1 tablet by mouth twice daily as needed for anxiety    diltiaZEM (CARDIZEM CD) 240 MG 24 hr capsule Take 1 capsule (240 mg total) by mouth 2 (two) times a day. (Patient taking differently: Take 180 mg by mouth 2 (two) times a day.)    ELIQUIS 5 mg Tab Take 5 mg by mouth 2 (two) times daily.    fluticasone-salmeterol 230-21 mcg/dose (ADVAIR HFA) 230-21 mcg/actuation HFAA inhaler Inhale 2 puffs into the lungs 2 (two) times daily. Controller    hydroCHLOROthiazide (HYDRODIURIL) 50 MG tablet Take 1 tablet by mouth once daily    loratadine (CLARITIN) 10 mg tablet Take 10 mg by mouth daily as needed for Allergies.    melatonin 10 mg Tab Take 10 mg by mouth daily as needed (insomnia).    metoprolol succinate (TOPROL-XL) 50 MG 24 hr tablet Take 1 tablet (50 mg total) by mouth 3 (three) times daily.  (Patient taking differently: Take 50 mg by mouth once daily.)    omeprazole (PRILOSEC) 40 MG capsule Take 1 capsule by mouth once daily    potassium chloride (KLOR-CON) 8 MEQ TbSR Take 1 tablet (8 mEq total) by mouth once daily.    rosuvastatin (CRESTOR) 20 MG tablet TAKE 1 TABLET BY MOUTH ONCE IN THE EVENING    tamsulosin (FLOMAX) 0.4 mg Cap Take 1 capsule by mouth once daily.    testosterone cypionate (DEPOTESTOTERONE CYPIONATE) 200 mg/mL injection Inject 1 mL (200 mg total) into the muscle every 14 (fourteen) days. (Patient taking differently: Inject 200 mg into the muscle every 14 (fourteen) days. On Tuesdays)    travoprost, benzalkonium, (TRAVATAN) 0.004 % ophthalmic solution Place 1 drop into both eyes every evening.    zolpidem (AMBIEN) 5 MG Tab TAKE 1 TABLET BY MOUTH ONCE DAILY AT BEDTIME AS NEEDED FOR INSOMNIA    montelukast (SINGULAIR) 10 mg tablet Take 1 tablet by mouth once daily     Review of Systems   Constitutional:  Positive for appetite change. Negative for chills and fever.   Respiratory:  Negative for cough, chest tightness, shortness of breath, wheezing and stridor.    Cardiovascular:  Positive for leg swelling.   Gastrointestinal:  Positive for abdominal distention.   Genitourinary: Negative.    Skin: Negative.    Neurological: Negative.    Psychiatric/Behavioral: Negative.     All other systems reviewed and are negative.    Objective:     Vital Signs (Most Recent):  Temp: 97.6 °F (36.4 °C) (01/10/23 0740)  Pulse: 80 (01/10/23 0740)  Resp: 18 (01/10/23 0740)  BP: 127/78 (01/10/23 0740)  SpO2: (!) 93 % (01/10/23 0740)   Vital Signs (24h Range):  Temp:  [97.6 °F (36.4 °C)-98.2 °F (36.8 °C)] 97.6 °F (36.4 °C)  Pulse:  [66-89] 80  Resp:  [18-22] 18  SpO2:  [90 %-95 %] 93 %  BP: (124-144)/(68-80) 127/78     Weight: 114 kg (251 lb 5.2 oz)  Body mass index is 38.21 kg/m².    SpO2: (!) 93 %         Intake/Output Summary (Last 24 hours) at 1/10/2023 1013  Last data filed at 1/9/2023 1706  Gross per 24  hour   Intake 1140 ml   Output 2350 ml   Net -1210 ml       Lines/Drains/Airways       Peripheral Intravenous Line  Duration                  Peripheral IV - Single Lumen 01/09/23 1030 20 G Left;Posterior Forearm <1 day                  Significant Labs:  Recent Results (from the past 72 hour(s))   Echo Saline Bubble? No    Collection Time: 01/07/23  3:08 PM   Result Value Ref Range    BSA 2.35 m2    TDI SEPTAL 0.09 m/s    LV LATERAL E/E' RATIO 6.91 m/s    LV SEPTAL E/E' RATIO 8.44 m/s    EF 40 %    Left Ventricular Outflow Tract Mean Velocity 0.43 cm/s    Left Ventricular Outflow Tract Mean Gradient 1.00 mmHg    TDI LATERAL 0.11 m/s    PV PEAK VELOCITY 0.82 cm/s    LVIDd 5.42 3.5 - 6.0 cm    IVS 1.53 (A) 0.6 - 1.1 cm    Posterior Wall 1.27 (A) 0.6 - 1.1 cm    LVIDs 4.43 (A) 2.1 - 4.0 cm    FS 18 28 - 44 %    LV mass 330.23 g    LA size 4.60 cm    TAPSE 2.42 cm    Left Ventricle Relative Wall Thickness 0.47 cm    AV mean gradient 10 mmHg    AV valve area 1.03 cm2    AV Velocity Ratio 0.32     AV index (prosthetic) 0.33     MV mean gradient 2 mmHg    MV valve area p 1/2 method 2.89 cm2    MV valve area by continuity eq 1.70 cm2    E/A ratio 1.95     Mean e' 0.10 m/s    E wave deceleration time 182.00 msec    LVOT diameter 2.00 cm    LVOT area 3.1 cm2    LVOT peak david 0.64 m/s    LVOT peak VTI 11.20 cm    Ao peak david 1.99 m/s    Ao VTI 34.0 cm    LVOT stroke volume 35.17 cm3    AV peak gradient 16 mmHg    MV peak gradient 4 mmHg    E/E' ratio 7.60 m/s    MV Peak E David 0.76 m/s    TR Max David 2.28 m/s    MV VTI 20.7 cm    MV stenosis pressure 1/2 time 76.00 ms    MV Peak A David 0.39 m/s    LV Systolic Volume 89.10 mL    LV Systolic Volume Index 39.4 mL/m2    LV Diastolic Volume 143.00 mL    LV Diastolic Volume Index 63.27 mL/m2    LV Mass Index 146 g/m2    Triscuspid Valve Regurgitation Peak Gradient 21 mmHg    LA Volume Index (Mod) 42.9 mL/m2    LA volume (mod) 97.00 cm3   Comprehensive Metabolic Panel    Collection  Time: 01/08/23  3:24 AM   Result Value Ref Range    Sodium Level 139 136 - 145 mmol/L    Potassium Level 3.6 3.5 - 5.1 mmol/L    Chloride 99 98 - 107 mmol/L    Carbon Dioxide 32 (H) 23 - 31 mmol/L    Glucose Level 89 82 - 115 mg/dL    Blood Urea Nitrogen 29.2 (H) 8.4 - 25.7 mg/dL    Creatinine 1.20 (H) 0.73 - 1.18 mg/dL    Calcium Level Total 8.6 (L) 8.8 - 10.0 mg/dL    Protein Total 5.5 (L) 5.8 - 7.6 gm/dL    Albumin Level 3.4 3.4 - 4.8 g/dL    Globulin 2.1 (L) 2.4 - 3.5 gm/dL    Albumin/Globulin Ratio 1.6 1.1 - 2.0 ratio    Bilirubin Total 0.8 <=1.5 mg/dL    Alkaline Phosphatase 52 40 - 150 unit/L    Alanine Aminotransferase 25 0 - 55 unit/L    Aspartate Aminotransferase 22 5 - 34 unit/L    eGFR 64 mls/min/1.73/m2   BNP    Collection Time: 01/08/23  3:24 AM   Result Value Ref Range    Natriuretic Peptide 87.2 <=100.0 pg/mL   Troponin I    Collection Time: 01/08/23  3:24 AM   Result Value Ref Range    Troponin-I 0.168 (H) 0.000 - 0.045 ng/mL   Magnesium    Collection Time: 01/08/23  3:24 AM   Result Value Ref Range    Magnesium Level 2.50 1.60 - 2.60 mg/dL   CBC with Differential    Collection Time: 01/08/23  3:24 AM   Result Value Ref Range    WBC 10.2 4.5 - 11.5 x10(3)/mcL    RBC 4.61 (L) 4.70 - 6.10 x10(6)/mcL    Hgb 13.6 (L) 14.0 - 18.0 gm/dL    Hct 43.9 42.0 - 52.0 %    MCV 95.2 (H) 80.0 - 94.0 fL    MCH 29.5 pg    MCHC 31.0 (L) 33.0 - 36.0 mg/dL    RDW 14.6 (H) 11.6 - 14.4 %    Platelet 123 (L) 140 - 371 x10(3)/mcL    MPV 11.6 9.4 - 12.4 fL    Neut % 82.1 %    Lymph % 4.6 %    Mono % 11.8 %    Eos % 0.7 %    Basophil % 0.3 %    Lymph # 0.47 (L) 0.6 - 4.6 x10(3)/mcL    Neut # 8.36 2.1 - 9.2 x10(3)/mcL    Mono # 1.20 0.1 - 1.3 x10(3)/mcL    Eos # 0.07 0 - 0.9 x10(3)/mcL    Baso # 0.03 0 - 0.2 x10(3)/mcL    IG# 0.05 (H) 0 - 0.04 x10(3)/mcL    IG% 0.5 %    NRBC% 0.0 0 - 1 %   Comprehensive Metabolic Panel    Collection Time: 01/09/23  3:55 AM   Result Value Ref Range    Sodium Level 142 136 - 145 mmol/L     Potassium Level 3.3 (L) 3.5 - 5.1 mmol/L    Chloride 99 98 - 107 mmol/L    Carbon Dioxide 32 (H) 23 - 31 mmol/L    Glucose Level 99 82 - 115 mg/dL    Blood Urea Nitrogen 23.7 8.4 - 25.7 mg/dL    Creatinine 1.10 0.73 - 1.18 mg/dL    Calcium Level Total 8.6 (L) 8.8 - 10.0 mg/dL    Protein Total 5.5 (L) 5.8 - 7.6 gm/dL    Albumin Level 3.2 (L) 3.4 - 4.8 g/dL    Globulin 2.3 (L) 2.4 - 3.5 gm/dL    Albumin/Globulin Ratio 1.4 1.1 - 2.0 ratio    Bilirubin Total 0.6 <=1.5 mg/dL    Alkaline Phosphatase 51 40 - 150 unit/L    Alanine Aminotransferase 26 0 - 55 unit/L    Aspartate Aminotransferase 22 5 - 34 unit/L    eGFR 71 mls/min/1.73/m2   Magnesium    Collection Time: 01/09/23  3:55 AM   Result Value Ref Range    Magnesium Level 2.37 1.60 - 2.60 mg/dL   Troponin I    Collection Time: 01/09/23  3:55 AM   Result Value Ref Range    Troponin-I 0.122 (H) 0.000 - 0.045 ng/mL   CBC with Differential    Collection Time: 01/09/23  3:55 AM   Result Value Ref Range    WBC 8.6 4.5 - 11.5 x10(3)/mcL    RBC 4.46 (L) 4.70 - 6.10 x10(6)/mcL    Hgb 13.2 (L) 14.0 - 18.0 gm/dL    Hct 42.1 42.0 - 52.0 %    MCV 94.4 (H) 80.0 - 94.0 fL    MCH 29.6 pg    MCHC 31.4 (L) 33.0 - 36.0 mg/dL    RDW 14.5 (H) 11.6 - 14.4 %    Platelet 131 (L) 140 - 371 x10(3)/mcL    MPV 11.3 9.4 - 12.4 fL    Neut % 80.8 %    Lymph % 5.5 %    Mono % 10.4 %    Eos % 2.5 %    Basophil % 0.4 %    Lymph # 0.47 (L) 0.6 - 4.6 x10(3)/mcL    Neut # 6.93 2.1 - 9.2 x10(3)/mcL    Mono # 0.89 0.1 - 1.3 x10(3)/mcL    Eos # 0.21 0 - 0.9 x10(3)/mcL    Baso # 0.03 0 - 0.2 x10(3)/mcL    IG# 0.03 0 - 0.04 x10(3)/mcL    IG% 0.4 %    NRBC% 0.0 0 - 1 %   BNP    Collection Time: 01/09/23  3:56 AM   Result Value Ref Range    Natriuretic Peptide 196.0 (H) <=100.0 pg/mL   Basic Metabolic Panel    Collection Time: 01/10/23  3:41 AM   Result Value Ref Range    Sodium Level 141 136 - 145 mmol/L    Potassium Level 3.3 (L) 3.5 - 5.1 mmol/L    Chloride 97 (L) 98 - 107 mmol/L    Carbon Dioxide 34 (H) 23  - 31 mmol/L    Glucose Level 74 (L) 82 - 115 mg/dL    Blood Urea Nitrogen 19.4 8.4 - 25.7 mg/dL    Creatinine 0.90 0.73 - 1.18 mg/dL    BUN/Creatinine Ratio 22     Calcium Level Total 8.4 (L) 8.8 - 10.0 mg/dL    Anion Gap 10.0 mEq/L    eGFR >60 mls/min/1.73/m2   BNP    Collection Time: 01/10/23  3:41 AM   Result Value Ref Range    Natriuretic Peptide 174.6 (H) <=100.0 pg/mL   Troponin I    Collection Time: 01/10/23  3:41 AM   Result Value Ref Range    Troponin-I 0.083 (H) 0.000 - 0.045 ng/mL   CBC with Differential    Collection Time: 01/10/23  3:41 AM   Result Value Ref Range    WBC 7.7 4.5 - 11.5 x10(3)/mcL    RBC 4.53 (L) 4.70 - 6.10 x10(6)/mcL    Hgb 13.4 (L) 14.0 - 18.0 gm/dL    Hct 42.6 42.0 - 52.0 %    MCV 94.0 80.0 - 94.0 fL    MCH 29.6 pg    MCHC 31.5 (L) 33.0 - 36.0 mg/dL    RDW 14.3 11.5 - 17.0 %    Platelet 147 130 - 400 x10(3)/mcL    MPV 10.2 7.4 - 10.4 fL    Neut % 79.8 %    Lymph % 6.2 %    Mono % 10.1 %    Eos % 3.1 %    Basophil % 0.4 %    Lymph # 0.48 (L) 0.6 - 4.6 x10(3)/mcL    Neut # 6.14 2.1 - 9.2 x10(3)/mcL    Mono # 0.78 0.1 - 1.3 x10(3)/mcL    Eos # 0.24 0 - 0.9 x10(3)/mcL    Baso # 0.03 0 - 0.2 x10(3)/mcL    IG# 0.03 0 - 0.04 x10(3)/mcL    IG% 0.4 %    NRBC% 0.0 %     Significant Imaging:  Imaging Results              CTA Chest Non-Coronary (PE Studies) (Final result)  Result time 01/07/23 09:25:24      Final result by Oswaldo Orellana MD (01/07/23 09:25:24)                   Impression:      1. Negative for pulmonary thromboembolic disease.  2. Small right and trace left pleural effusions.  3. Bibasilar consolidation at least partially relates to atelectasis but superimposed infection possible.  4. Small volume ascites.  5.  No significant discrepancy with the preliminary report.      Electronically signed by: Oswaldo Orellana  Date:    01/07/2023  Time:    09:25               Narrative:    EXAMINATION:  CTA CHEST NON CORONARY (PE STUDIES)    CLINICAL HISTORY:  Pulmonary embolism (PE) suspected,  unknown D-dimer;    TECHNIQUE:  Helical acquisition through the chest with IV contrast targeting the pulmonary arteries. Multiplanar and 3D MIP reconstructed images were provided for review.  mGycm. Automatic exposure control, adjustment of mA/kV or iterative reconstruction technique was used to reduce radiation.    COMPARISON:  21 May 2020.    FINDINGS:  There is good opacification of the pulmonary arterial tree. There is no evidence of pulmonary thromboembolism.  There is no aortic dissection.    There is no mediastinal, hilar or axillary lymphadenopathy by size criteria.    Heart size upper limit normal.  There are coronary artery calcifications.  No pericardial effusion.    Small right and trace left pleural effusions.  There is bibasilar atelectasis.  There are additional areas of bibasilar consolidation where infection is possible.  Upper lungs are clear.    There is small volume ascites.  There are no acute osseous findings.                        Preliminary result by Oswaldo Orellana MD (01/07/23 01:34:29)                   Narrative:    START OF REPORT:  Technique: CT Scan of the chest was performed with intravenous contrast with direct axial images as well as sagittal and coronal reconstruction images pulmonary embolus protocol.    Dosage Information: Automated Exposure Control was utilized.    Comparison: None.    Clinical History: Sob.    Findings:  Artifact: Mild motion artifact is seen on multiple images.  Soft Tissues: Unremarkable.  Axilla: A few prominent lymph nodes are seen in the axilla.  Neck: The visualized soft tissues of the neck appear unremarkable. The isthmus of the thyroid gland is mildly prominent with a hypodense nodule noted within containing a punctate calcification. Correlate with clinical and laboratory findings as regards further evaluation and follow up.  Mediastinum: A few subcentimeter mediastinal lymph nodes are seen pretracheal paratracheal precarinal and subcarinal  spaces . The largest is in pre carinal space and measures â10.2 mmâ in least dimension. This suggests reactive lymphadenopathy. Correlate with clinical and laboratory findings as regards further evaluation and follow up.  Heart: The heart size is within normal limits.  Aorta: Unremarkable appearing aorta.  Pulmonary Arteries: No filling defects are seen in the pulmonary arteries to suggest pulmonary embolus.  Lungs: There are bilateral basal posteromedial areas of dense consolidation noted, left greater than right. These features are suggestive of multifocal pneumonia with aspiration component a consideration.  Pleura: There is a small right sided pleural effusion. No pneumothorax is identified.  Bony Structures:  Spine: Mild spondylolytic changes are seen in the thoracic spine.  Ribs: The ribs appear unremarkable.  Abdomen: There is some free fluid noted in the left upper quadrant around the spleen. Correlate with clinical and laboratory findings as regards further evaluation and follow up.      Impression:  1. No filling defects are seen in the pulmonary arteries to suggest pulmonary embolus.  2. There is a small right sided pleural effusion.  3. There are bilateral basal posteromedial areas of dense consolidation noted, left greater than right. These features are suggestive of multifocal pneumonia with aspiration component a consideration. Correlate with clinical and laboratory findings as regards additional evaluation and follow-up to full resolution as indicated.  4. Details and other findings as discussed above.                          Preliminary result by Alex Luciano MD (01/07/23 01:34:29)                   Narrative:    START OF REPORT:  Technique: CT Scan of the chest was performed with intravenous contrast with direct axial images as well as sagittal and coronal reconstruction images pulmonary embolus protocol.    Dosage Information: Automated Exposure Control was utilized.    Comparison:  None.    Clinical History: Sob.    Findings:  Artifact: Mild motion artifact is seen on multiple images.  Soft Tissues: Unremarkable.  Axilla: A few prominent lymph nodes are seen in the axilla.  Neck: The visualized soft tissues of the neck appear unremarkable. The isthmus of the thyroid gland is mildly prominent with a hypodense nodule noted within containing a punctate calcification. Correlate with clinical and laboratory findings as regards further evaluation and follow up.  Mediastinum: A few subcentimeter mediastinal lymph nodes are seen pretracheal paratracheal precarinal and subcarinal spaces . The largest is in pre carinal space and measures â10.2 mmâ in least dimension. This suggests reactive lymphadenopathy. Correlate with clinical and laboratory findings as regards further evaluation and follow up.  Heart: The heart size is within normal limits.  Aorta: Unremarkable appearing aorta.  Pulmonary Arteries: No filling defects are seen in the pulmonary arteries to suggest pulmonary embolus.  Lungs: There are bilateral basal posteromedial areas of dense consolidation noted, left greater than right. These features are suggestive of multifocal pneumonia with aspiration component a consideration.  Pleura: There is a small right sided pleural effusion. No pneumothorax is identified.  Bony Structures:  Spine: Mild spondylolytic changes are seen in the thoracic spine.  Ribs: The ribs appear unremarkable.  Abdomen: There is some free fluid noted in the left upper quadrant around the spleen. Correlate with clinical and laboratory findings as regards further evaluation and follow up.      Impression:  1. No filling defects are seen in the pulmonary arteries to suggest pulmonary embolus.  2. There is a small right sided pleural effusion.  3. There are bilateral basal posteromedial areas of dense consolidation noted, left greater than right. These features are suggestive of multifocal pneumonia with aspiration  component a consideration. Correlate with clinical and laboratory findings as regards additional evaluation and follow-up to full resolution as indicated.  4. Details and other findings as discussed above.                                         X-Ray Chest PA And Lateral (Final result)  Result time 01/07/23 10:02:00      Final result by Wong Brewer MD (01/07/23 10:02:00)                   Impression:      Right lower lobe consolidative changes.  Query symptomatology.      Electronically signed by: Wong Brewer MD  Date:    01/07/2023  Time:    10:02               Narrative:    EXAMINATION:  XR CHEST PA AND LATERAL    CLINICAL HISTORY:  Chest Pain;    TECHNIQUE:  PA and lateral views of the chest were performed.    COMPARISON:  08/09/2022    FINDINGS:  Heart size enlarged the pulmonary vasculature is congested.    Bibasilar atelectatic changes lungs with persistent elevation of the left hemidiaphragm.  Concern for developing consolidation in the right lower lobe.                                    Telemetry:         Physical Exam  Constitutional:       Appearance: Normal appearance.   HENT:      Head: Normocephalic.      Mouth/Throat:      Mouth: Mucous membranes are moist.   Eyes:      Extraocular Movements: Extraocular movements intact.   Cardiovascular:      Rate and Rhythm: Normal rate. Rhythm irregular.      Pulses: Normal pulses.      Heart sounds: Murmur heard.   Pulmonary:      Breath sounds: Rales present.      Comments: NC O2  Conversational dyspnea  Abdominal:      General: There is distension.      Tenderness: There is no abdominal tenderness.   Musculoskeletal:         General: Normal range of motion.      Right lower leg: Edema present.      Left lower leg: Edema present.   Skin:     General: Skin is warm and dry.   Neurological:      General: No focal deficit present.      Mental Status: He is alert and oriented to person, place, and time. Mental status is at baseline.   Psychiatric:         Mood and  Affect: Mood normal.         Behavior: Behavior normal.     Current Inpatient Medications:    Current Facility-Administered Medications:     acetaminophen tablet 650 mg, 650 mg, Oral, Q6H PRN, Fabiano iLang MD, 650 mg at 01/08/23 1036    albuterol inhaler 2 puff, 2 puff, Inhalation, Q4H PRN, Fabiano Liang MD    albuterol-ipratropium 2.5 mg-0.5 mg/3 mL nebulizer solution 3 mL, 3 mL, Nebulization, Q4H WAKE, Dameon Sanchez III, MD, 3 mL at 01/09/23 2044    ALPRAZolam tablet 0.5 mg, 0.5 mg, Oral, BID PRN, Fabiano Liang MD    apixaban tablet 5 mg, 5 mg, Oral, BID, Fabiano Liang MD, 5 mg at 01/09/23 2146    atorvastatin tablet 20 mg, 20 mg, Oral, Daily, Fabiano Liang MD, 20 mg at 01/09/23 0916    azithromycin tablet 500 mg, 500 mg, Oral, Daily, Fabiano Liang MD    cefTRIAXone (ROCEPHIN) 1 g in dextrose 5 % in water (D5W) 5 % 50 mL IVPB (MB+), 1 g, Intravenous, Q24H, Fbaiano Liang MD, Stopped at 01/10/23 0608    cetirizine tablet 10 mg, 10 mg, Oral, Daily, Fabiano Liang MD, 10 mg at 01/09/23 0916    famotidine tablet 20 mg, 20 mg, Oral, BID, Dameon Sanchez III, MD, 20 mg at 01/09/23 2146    fluticasone furoate-vilanteroL 200-25 mcg/dose diskus inhaler 1 puff, 1 puff, Inhalation, Daily, Fabiano Liang MD, 1 puff at 01/09/23 0915    furosemide injection 40 mg, 40 mg, Intravenous, Daily, Fabiano Liang MD    latanoprost 0.005 % ophthalmic solution 1 drop, 1 drop, Both Eyes, Daily, Fabiano Liang MD, 1 drop at 01/09/23 0915    melatonin tablet 6 mg, 6 mg, Oral, Nightly PRN, Dameon Sanchez III, MD    melatonin tablet 9 mg, 9 mg, Oral, Daily PRN, Fabiano Liang MD    metoprolol injection 5 mg, 5 mg, Intravenous, Q15 Min PRN, Lionel Q. Marley, NP    metoprolol succinate (TOPROL-XL) 24 hr tablet 100 mg, 100 mg, Oral, Daily, NED Iniguez    montelukast tablet 10 mg, 10 mg, Oral, Daily, Fabiano Liang MD, 10 mg at 01/09/23  0916    pantoprazole EC tablet 40 mg, 40 mg, Oral, Daily, Fabiano Liang MD, 40 mg at 01/09/23 0916    potassium chloride SA CR tablet 20 mEq, 20 mEq, Oral, TID, Fabiano Liang MD    tamsulosin 24 hr capsule 0.4 mg, 1 capsule, Oral, Daily, Fabiano Liang MD, 0.4 mg at 01/09/23 0916    zolpidem tablet 5 mg, 5 mg, Oral, QHS, Fabiano Liang MD, 5 mg at 01/09/23 2146    VTE Risk Mitigation (From admission, onward)           Ordered     apixaban tablet 5 mg  2 times daily         01/07/23 1050     IP VTE HIGH RISK PATIENT  Once         01/07/23 0303     Place sequential compression device  Until discontinued         01/07/23 0303                  Assessment:   Acute on chronic systolic and diastolic HF  --decompensated  CMO/EF 35-40%     - ECHO (1.7.23) - Grade 1 DD, Mild AS, EF 35-40%    - ECHO (4.11.22) - LVEF 55-60%  Bilateral Pneumonitis (R > L)  Mildly Elevated Troponin (Flat)--likely due to recent ablation/decompensated heart failure  Hx of Asthma  PAF - Now with CVR    - CHADsVASc - 5 Points - 7.2% Stroke Risk per Year     - EP Study with Successful Ablation of Typical A.Flutter with Termination into SR (1.5.23)  VHD  --Mild AS-- JENNY 1.03 cm2; PV 1.99m/s; MG 10mmHg.   HTN - Controlled  MINDA/CPAP  Venous Insufficiency  Elevated Calcium Score/Mild Calcified Non-Obstructive CAD via [facility] (2020)    - [facility] (4.16.20) - Nonobstructive CAD with Normal LV Function and an EF of 55%  FLORENTINO/Bilaterally Mild-Moderate  Anxiety  GERD  OA  Obesity  No Hx of GIB per Chart Review    Plan:   ECHO Reviewed. EF 35-40% with G1 DD. Mild AS. Previous echo reported moderate AS. Will likely need repeat angiogram with valve study in future to determine need for valve replacement. May take place outpatient  Still appears overloaded. Continue lasix, Metoprolol succinate 100 mg daily. Unable to add ARNI due to marginal BPs at times.   Further uptitration of HF medications post discharge.   Treatment for Pneumonia per Primary  Team  Continue Eliquis Re Stroke Risk Reduction  Continue BB  and Statin Therapies  Will Repeat Limited ECHO upon F/U with Mark Bennett/Sridhar as reduced EF may be s/t Afib  Consider addition of albumin  with diuresis due to concerns for IVVD and possible 3rd spacing.    Rika Lake, MATIASP  Cardiology  [facility]  01/10/2023

## 2023-01-11 LAB
ANION GAP SERPL CALC-SCNC: 10 MEQ/L
BASOPHILS # BLD AUTO: 0.04 X10(3)/MCL (ref 0–0.2)
BASOPHILS NFR BLD AUTO: 0.6 %
BNP BLD-MCNC: 129.4 PG/ML
BUN SERPL-MCNC: 19.9 MG/DL (ref 8.4–25.7)
CALCIUM SERPL-MCNC: 8.8 MG/DL (ref 8.8–10)
CHLORIDE SERPL-SCNC: 101 MMOL/L (ref 98–107)
CO2 SERPL-SCNC: 31 MMOL/L (ref 23–31)
CREAT SERPL-MCNC: 0.89 MG/DL (ref 0.73–1.18)
CREAT/UREA NIT SERPL: 22
EOSINOPHIL # BLD AUTO: 0.23 X10(3)/MCL (ref 0–0.9)
EOSINOPHIL NFR BLD AUTO: 3.2 %
ERYTHROCYTE [DISTWIDTH] IN BLOOD BY AUTOMATED COUNT: 14.2 % (ref 11.5–17)
GFR SERPLBLD CREATININE-BSD FMLA CKD-EPI: >60 MLS/MIN/1.73/M2
GLUCOSE SERPL-MCNC: 85 MG/DL (ref 82–115)
HCT VFR BLD AUTO: 42.2 % (ref 42–52)
HGB BLD-MCNC: 13.2 GM/DL (ref 14–18)
IMM GRANULOCYTES # BLD AUTO: 0.02 X10(3)/MCL (ref 0–0.04)
IMM GRANULOCYTES NFR BLD AUTO: 0.3 %
LYMPHOCYTES # BLD AUTO: 0.58 X10(3)/MCL (ref 0.6–4.6)
LYMPHOCYTES NFR BLD AUTO: 8.1 %
MCH RBC QN AUTO: 29.8 PG
MCHC RBC AUTO-ENTMCNC: 31.3 MG/DL (ref 33–36)
MCV RBC AUTO: 95.3 FL (ref 80–94)
MONOCYTES # BLD AUTO: 0.79 X10(3)/MCL (ref 0.1–1.3)
MONOCYTES NFR BLD AUTO: 11 %
NEUTROPHILS # BLD AUTO: 5.52 X10(3)/MCL (ref 2.1–9.2)
NEUTROPHILS NFR BLD AUTO: 76.8 %
NRBC BLD AUTO-RTO: 0 %
PLATELET # BLD AUTO: 152 X10(3)/MCL (ref 130–400)
PMV BLD AUTO: 10.6 FL (ref 7.4–10.4)
POTASSIUM SERPL-SCNC: 3.7 MMOL/L (ref 3.5–5.1)
RBC # BLD AUTO: 4.43 X10(6)/MCL (ref 4.7–6.1)
SODIUM SERPL-SCNC: 142 MMOL/L (ref 136–145)
WBC # SPEC AUTO: 7.2 X10(3)/MCL (ref 4.5–11.5)

## 2023-01-11 PROCEDURE — 63700000 PHARM REV CODE 250 ALT 637 W/O HCPCS: Performed by: INTERNAL MEDICINE

## 2023-01-11 PROCEDURE — 99232 PR SUBSEQUENT HOSPITAL CARE,LEVL II: ICD-10-PCS | Mod: ,,, | Performed by: INTERNAL MEDICINE

## 2023-01-11 PROCEDURE — 21400001 HC TELEMETRY ROOM

## 2023-01-11 PROCEDURE — 83880 ASSAY OF NATRIURETIC PEPTIDE: CPT | Performed by: INTERNAL MEDICINE

## 2023-01-11 PROCEDURE — 25000003 PHARM REV CODE 250: Performed by: NURSE PRACTITIONER

## 2023-01-11 PROCEDURE — 36415 COLL VENOUS BLD VENIPUNCTURE: CPT | Performed by: INTERNAL MEDICINE

## 2023-01-11 PROCEDURE — 94761 N-INVAS EAR/PLS OXIMETRY MLT: CPT

## 2023-01-11 PROCEDURE — 63600175 PHARM REV CODE 636 W HCPCS: Performed by: INTERNAL MEDICINE

## 2023-01-11 PROCEDURE — 85025 COMPLETE CBC W/AUTO DIFF WBC: CPT | Performed by: INTERNAL MEDICINE

## 2023-01-11 PROCEDURE — 25000003 PHARM REV CODE 250: Performed by: INTERNAL MEDICINE

## 2023-01-11 PROCEDURE — 80048 BASIC METABOLIC PNL TOTAL CA: CPT | Performed by: INTERNAL MEDICINE

## 2023-01-11 PROCEDURE — 25000242 PHARM REV CODE 250 ALT 637 W/ HCPCS: Performed by: INTERNAL MEDICINE

## 2023-01-11 PROCEDURE — 25000003 PHARM REV CODE 250: Performed by: EMERGENCY MEDICINE

## 2023-01-11 PROCEDURE — 63600175 PHARM REV CODE 636 W HCPCS: Performed by: NURSE PRACTITIONER

## 2023-01-11 PROCEDURE — 27000221 HC OXYGEN, UP TO 24 HOURS

## 2023-01-11 PROCEDURE — 99232 SBSQ HOSP IP/OBS MODERATE 35: CPT | Mod: ,,, | Performed by: INTERNAL MEDICINE

## 2023-01-11 RX ADMIN — TAMSULOSIN HYDROCHLORIDE 0.4 MG: 0.4 CAPSULE ORAL at 09:01

## 2023-01-11 RX ADMIN — AMIODARONE HYDROCHLORIDE 0.5 MG/MIN: 1.8 INJECTION, SOLUTION INTRAVENOUS at 03:01

## 2023-01-11 RX ADMIN — POTASSIUM CHLORIDE 20 MEQ: 1500 TABLET, EXTENDED RELEASE ORAL at 08:01

## 2023-01-11 RX ADMIN — CETIRIZINE HYDROCHLORIDE 10 MG: 10 TABLET, FILM COATED ORAL at 09:01

## 2023-01-11 RX ADMIN — FAMOTIDINE 20 MG: 20 TABLET, FILM COATED ORAL at 08:01

## 2023-01-11 RX ADMIN — MONTELUKAST 10 MG: 10 TABLET, FILM COATED ORAL at 09:01

## 2023-01-11 RX ADMIN — PANTOPRAZOLE SODIUM 40 MG: 40 TABLET, DELAYED RELEASE ORAL at 09:01

## 2023-01-11 RX ADMIN — AMIODARONE HYDROCHLORIDE 150 MG: 1.5 INJECTION, SOLUTION INTRAVENOUS at 09:01

## 2023-01-11 RX ADMIN — POTASSIUM CHLORIDE 20 MEQ: 1500 TABLET, EXTENDED RELEASE ORAL at 02:01

## 2023-01-11 RX ADMIN — AZITHROMYCIN MONOHYDRATE 500 MG: 250 TABLET ORAL at 09:01

## 2023-01-11 RX ADMIN — LATANOPROST 1 DROP: 50 SOLUTION OPHTHALMIC at 09:01

## 2023-01-11 RX ADMIN — ZOLPIDEM TARTRATE 5 MG: 5 TABLET ORAL at 08:01

## 2023-01-11 RX ADMIN — FUROSEMIDE 40 MG: 10 INJECTION, SOLUTION INTRAMUSCULAR; INTRAVENOUS at 09:01

## 2023-01-11 RX ADMIN — AMIODARONE HYDROCHLORIDE 1 MG/MIN: 1.8 INJECTION, SOLUTION INTRAVENOUS at 02:01

## 2023-01-11 RX ADMIN — FLUTICASONE FUROATE AND VILANTEROL TRIFENATATE 1 PUFF: 200; 25 POWDER RESPIRATORY (INHALATION) at 09:01

## 2023-01-11 RX ADMIN — FAMOTIDINE 20 MG: 20 TABLET, FILM COATED ORAL at 09:01

## 2023-01-11 RX ADMIN — AMIODARONE HYDROCHLORIDE 1 MG/MIN: 1.8 INJECTION, SOLUTION INTRAVENOUS at 09:01

## 2023-01-11 RX ADMIN — APIXABAN 5 MG: 5 TABLET, FILM COATED ORAL at 08:01

## 2023-01-11 RX ADMIN — APIXABAN 5 MG: 5 TABLET, FILM COATED ORAL at 09:01

## 2023-01-11 RX ADMIN — DOCUSATE SODIUM 100 MG: 100 CAPSULE, LIQUID FILLED ORAL at 09:01

## 2023-01-11 RX ADMIN — POTASSIUM CHLORIDE 20 MEQ: 1500 TABLET, EXTENDED RELEASE ORAL at 09:01

## 2023-01-11 RX ADMIN — METOPROLOL SUCCINATE 100 MG: 50 TABLET, EXTENDED RELEASE ORAL at 09:01

## 2023-01-11 RX ADMIN — CEFTRIAXONE 1 G: 1 INJECTION, POWDER, FOR SOLUTION INTRAMUSCULAR; INTRAVENOUS at 10:01

## 2023-01-11 RX ADMIN — ATORVASTATIN CALCIUM 20 MG: 10 TABLET, FILM COATED ORAL at 09:01

## 2023-01-11 NOTE — PROGRESS NOTES
"  [facility]  Cardiology  Progress Note    Patient Name: John Bullard  MRN: [id number]  Admission Date: 1/6/2023  Hospital Length of Stay: 4 days  Code Status: Full Code   Attending Provider: Fabiano Liang MD   Consulting Provider: NED Iniguez  Primary Care Physician: Fabiano Liang MD  Principal Problem:<principal problem not specified>    Patient information was obtained from patient, past medical records, and ER records.     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 The patient recently had an EP Study with Successful Ablation of Typical A.Flutter. He underwent this EP Study on 1.5.23 without complications and was discharged home in stable condition. Post operatively he had a sore throat and a cough which he attributed the ETT. His SOB worsened and he presented to the ER for evaluation. Upon arrival to the ER he was found to have a CXR with Infiltrates and a CT of the Chest with no Evidence of Pulmonary Embolism, however, he did have significant infiltrates in bilateral lungs but worse on the R side. He was admitted to his Primary Care Provider and [facility] was consulted for Elevated Troponin Levels and Recent Cardiac Procedure.     1.8.23: NAD. Converted into PAF Last Night. Remains with CVR. "I am feeling ok." + SOB, Improving. Denies CP and Palps. + Cough.  1.9.23: Patient sitting up at bedside. Conversational dyspnea. Bibasilar rales. Still requiring O2. Abdomen distended. 2+ peripheral edema. Afib 90's on tele.  1.10.23: Patient standing up at bedside. Reports breathing is better. Bibasilar crackles noted. Abdomen still firm distended. 2+ peripheral edema. Co2 rising with diuresis. Albumin 3.2  1.11.23: Patient sitting up at bedside. Reports continued [place]. No conversational dyspnea noted. Still in Afib, Rate controlled.       PMH: Anxiety, Asthma, PAF/Ablation , " DJD, GERD, HTN, OA, Obesity, MINDA/CPAP, VI, VT, Insomnia, Elevated Calcium Score/Non-Obstructive CAD, FLORENTINO/Bilaterally Mild-Moderate  PSH: Lithotripsy, Sinus Surgery, Colonoscopy, Hernia Repair, EP Study/Ablation, Abdominal Surgery, [place] Teeth Extraction   Family History: Reviewed and Unremarkable for Heart Disease  Social History: Denies Illicit Drug, ETOH and Tobacco    Previous Cardiac Diagnostics:   ECHO 1.7.23:  Concentric hypertrophy and mildly decreased systolic function.  The estimated ejection fraction is 35-40%.  Grade I left ventricular diastolic dysfunction.  With normal right ventricular systolic function.  There is mild aortic valve stenosis.  Aortic valve area is 1.03 cm2; peak velocity is 1.99 m/s; mean gradient is 10 mmHg.    Carotid US 8.17.22:  Mild Bilaterally and Tortuous Carotid Arteries with No Hemodynamically Significant FLORENTINO  < 50% Bilaterally    ECHO 4.11.22:  Limited Parasternal Windows  Normal LV Systolic Function: LVEF 55-60%  Moderate AS, Peak AV Velocity 3.2 m/sec, Mean Gradient 23mmHg, JENNY 1.4 cm2  Grade I DD  Mild TR with RVSP of 30mmHg    LHC 4.16.20:  LHC via R Radial Approach with Nonobstructive CAD with Normal LV Function and an EF of 55%. EDP was 20mmHg.  LM: patent vessel.  LAD: calcified type III vessel with 4 patent diagonal arteries. D1 is moderate to large in size with Proximal 50% stenosis. Remaining diagonals are small to medium size patent vessels  Lcx: Nondominant patent vessel that gives rise to a medium size OM1 with an early takeoff. The OM 2 is a medium to large size patent bifurcating vessel. The AV groove artery terminates in a small size patent OM3 with a small terminal bifurcating PL segment.   RCA: very large dominant patent vessel with mild Luminal irregularities. RCA terminates in a very large patent PDA and a large patent PL branch    PET 4.8.20:  The study quality is below average.   This is an abnormal perfusion study. Study is consistent with mostly scar  tissue with minimal ischemia.   Small fixed perfusion abnormality of moderate intensity in the apical segment. Small partially reversible perfusion abnormality of moderate intensity in the anterior septal region. Small fixed perfusion abnormality of moderate intensity in the inferior region.   This scan is suggestive of low to moderate risk for future cardiovascular events.   The left ventricular cavity is noted to be normal on the stress studies. The stress left ventricular ejection fraction was calculated to be 52% and left ventricular global function is normal. The rest left ventricular cavity is noted to be normal. The rest left ventricular ejection fraction was calculated to be 42% and rest left ventricular global function is mildly reduced.   When compared to the resting ejection fraction (42%), the stress ejection fraction (52%) has increased.     Review of patient's allergies indicates:   Allergen Reactions    Ativan [lorazepam] Hallucinations     Review of Systems   Constitutional:  Positive for appetite change. Negative for chills and fever.   Respiratory:  Negative for cough, chest tightness, shortness of breath, wheezing and stridor.    Cardiovascular:  Positive for leg swelling.   Gastrointestinal:  Positive for abdominal distention.   Genitourinary: Negative.    Skin: Negative.    Neurological: Negative.    Psychiatric/Behavioral: Negative.     All other systems reviewed and are negative.    Objective:     Vital Signs (Most Recent):  Temp: 98.3 °F (36.8 °C) (01/11/23 0733)  Pulse: 89 (01/11/23 0906)  Resp: 18 (01/11/23 0906)  BP: 131/86 (01/11/23 0733)  SpO2: (!) 92 % (01/11/23 0733)   Vital Signs (24h Range):  Temp:  [97.9 °F (36.6 °C)-98.7 °F (37.1 °C)] 98.3 °F (36.8 °C)  Pulse:  [75-93] 89  Resp:  [18-20] 18  SpO2:  [92 %-97 %] 92 %  BP: (112-134)/(70-86) 131/86     Weight: 114 kg (251 lb 5.2 oz)  Body mass index is 38.21 kg/m².    SpO2: (!) 92 %         Intake/Output Summary (Last 24 hours) at  1/11/2023 1025  Last data filed at 1/11/2023 0402  Gross per 24 hour   Intake 1560 ml   Output 2925 ml   Net -1365 ml       Lines/Drains/Airways       Peripheral Intravenous Line  Duration                  Peripheral IV - Single Lumen 01/09/23 1030 20 G Left;Posterior Forearm 1 day                  Significant Labs:  Recent Results (from the past 72 hour(s))   Comprehensive Metabolic Panel    Collection Time: 01/09/23  3:55 AM   Result Value Ref Range    Sodium Level 142 136 - 145 mmol/L    Potassium Level 3.3 (L) 3.5 - 5.1 mmol/L    Chloride 99 98 - 107 mmol/L    Carbon Dioxide 32 (H) 23 - 31 mmol/L    Glucose Level 99 82 - 115 mg/dL    Blood Urea Nitrogen 23.7 8.4 - 25.7 mg/dL    Creatinine 1.10 0.73 - 1.18 mg/dL    Calcium Level Total 8.6 (L) 8.8 - 10.0 mg/dL    Protein Total 5.5 (L) 5.8 - 7.6 gm/dL    Albumin Level 3.2 (L) 3.4 - 4.8 g/dL    Globulin 2.3 (L) 2.4 - 3.5 gm/dL    Albumin/Globulin Ratio 1.4 1.1 - 2.0 ratio    Bilirubin Total 0.6 <=1.5 mg/dL    Alkaline Phosphatase 51 40 - 150 unit/L    Alanine Aminotransferase 26 0 - 55 unit/L    Aspartate Aminotransferase 22 5 - 34 unit/L    eGFR 71 mls/min/1.73/m2   Magnesium    Collection Time: 01/09/23  3:55 AM   Result Value Ref Range    Magnesium Level 2.37 1.60 - 2.60 mg/dL   Troponin I    Collection Time: 01/09/23  3:55 AM   Result Value Ref Range    Troponin-I 0.122 (H) 0.000 - 0.045 ng/mL   CBC with Differential    Collection Time: 01/09/23  3:55 AM   Result Value Ref Range    WBC 8.6 4.5 - 11.5 x10(3)/mcL    RBC 4.46 (L) 4.70 - 6.10 x10(6)/mcL    Hgb 13.2 (L) 14.0 - 18.0 gm/dL    Hct 42.1 42.0 - 52.0 %    MCV 94.4 (H) 80.0 - 94.0 fL    MCH 29.6 pg    MCHC 31.4 (L) 33.0 - 36.0 mg/dL    RDW 14.5 (H) 11.6 - 14.4 %    Platelet 131 (L) 140 - 371 x10(3)/mcL    MPV 11.3 9.4 - 12.4 fL    Neut % 80.8 %    Lymph % 5.5 %    Mono % 10.4 %    Eos % 2.5 %    Basophil % 0.4 %    Lymph # 0.47 (L) 0.6 - 4.6 x10(3)/mcL    Neut # 6.93 2.1 - 9.2 x10(3)/mcL    Mono # 0.89 0.1  - 1.3 x10(3)/mcL    Eos # 0.21 0 - 0.9 x10(3)/mcL    Baso # 0.03 0 - 0.2 x10(3)/mcL    IG# 0.03 0 - 0.04 x10(3)/mcL    IG% 0.4 %    NRBC% 0.0 0 - 1 %   BNP    Collection Time: 01/09/23  3:56 AM   Result Value Ref Range    Natriuretic Peptide 196.0 (H) <=100.0 pg/mL   Basic Metabolic Panel    Collection Time: 01/10/23  3:41 AM   Result Value Ref Range    Sodium Level 141 136 - 145 mmol/L    Potassium Level 3.3 (L) 3.5 - 5.1 mmol/L    Chloride 97 (L) 98 - 107 mmol/L    Carbon Dioxide 34 (H) 23 - 31 mmol/L    Glucose Level 74 (L) 82 - 115 mg/dL    Blood Urea Nitrogen 19.4 8.4 - 25.7 mg/dL    Creatinine 0.90 0.73 - 1.18 mg/dL    BUN/Creatinine Ratio 22     Calcium Level Total 8.4 (L) 8.8 - 10.0 mg/dL    Anion Gap 10.0 mEq/L    eGFR >60 mls/min/1.73/m2   BNP    Collection Time: 01/10/23  3:41 AM   Result Value Ref Range    Natriuretic Peptide 174.6 (H) <=100.0 pg/mL   Troponin I    Collection Time: 01/10/23  3:41 AM   Result Value Ref Range    Troponin-I 0.083 (H) 0.000 - 0.045 ng/mL   CBC with Differential    Collection Time: 01/10/23  3:41 AM   Result Value Ref Range    WBC 7.7 4.5 - 11.5 x10(3)/mcL    RBC 4.53 (L) 4.70 - 6.10 x10(6)/mcL    Hgb 13.4 (L) 14.0 - 18.0 gm/dL    Hct 42.6 42.0 - 52.0 %    MCV 94.0 80.0 - 94.0 fL    MCH 29.6 pg    MCHC 31.5 (L) 33.0 - 36.0 mg/dL    RDW 14.3 11.5 - 17.0 %    Platelet 147 130 - 400 x10(3)/mcL    MPV 10.2 7.4 - 10.4 fL    Neut % 79.8 %    Lymph % 6.2 %    Mono % 10.1 %    Eos % 3.1 %    Basophil % 0.4 %    Lymph # 0.48 (L) 0.6 - 4.6 x10(3)/mcL    Neut # 6.14 2.1 - 9.2 x10(3)/mcL    Mono # 0.78 0.1 - 1.3 x10(3)/mcL    Eos # 0.24 0 - 0.9 x10(3)/mcL    Baso # 0.03 0 - 0.2 x10(3)/mcL    IG# 0.03 0 - 0.04 x10(3)/mcL    IG% 0.4 %    NRBC% 0.0 %   Basic Metabolic Panel    Collection Time: 01/11/23  3:31 AM   Result Value Ref Range    Sodium Level 142 136 - 145 mmol/L    Potassium Level 3.7 3.5 - 5.1 mmol/L    Chloride 101 98 - 107 mmol/L    Carbon Dioxide 31 23 - 31 mmol/L    Glucose  Level 85 82 - 115 mg/dL    Blood Urea Nitrogen 19.9 8.4 - 25.7 mg/dL    Creatinine 0.89 0.73 - 1.18 mg/dL    BUN/Creatinine Ratio 22     Calcium Level Total 8.8 8.8 - 10.0 mg/dL    Anion Gap 10.0 mEq/L    eGFR >60 mls/min/1.73/m2   BNP    Collection Time: 01/11/23  3:31 AM   Result Value Ref Range    Natriuretic Peptide 129.4 (H) <=100.0 pg/mL   CBC with Differential    Collection Time: 01/11/23  3:31 AM   Result Value Ref Range    WBC 7.2 4.5 - 11.5 x10(3)/mcL    RBC 4.43 (L) 4.70 - 6.10 x10(6)/mcL    Hgb 13.2 (L) 14.0 - 18.0 gm/dL    Hct 42.2 42.0 - 52.0 %    MCV 95.3 (H) 80.0 - 94.0 fL    MCH 29.8 pg    MCHC 31.3 (L) 33.0 - 36.0 mg/dL    RDW 14.2 11.5 - 17.0 %    Platelet 152 130 - 400 x10(3)/mcL    MPV 10.6 (H) 7.4 - 10.4 fL    Neut % 76.8 %    Lymph % 8.1 %    Mono % 11.0 %    Eos % 3.2 %    Basophil % 0.6 %    Lymph # 0.58 (L) 0.6 - 4.6 x10(3)/mcL    Neut # 5.52 2.1 - 9.2 x10(3)/mcL    Mono # 0.79 0.1 - 1.3 x10(3)/mcL    Eos # 0.23 0 - 0.9 x10(3)/mcL    Baso # 0.04 0 - 0.2 x10(3)/mcL    IG# 0.02 0 - 0.04 x10(3)/mcL    IG% 0.3 %    NRBC% 0.0 %     Significant Imaging:  Imaging Results              CTA Chest Non-Coronary (PE Studies) (Final result)  Result time 01/07/23 09:25:24      Final result by Oswaldo Orellana MD (01/07/23 09:25:24)                   Impression:      1. Negative for pulmonary thromboembolic disease.  2. Small right and trace left pleural effusions.  3. Bibasilar consolidation at least partially relates to atelectasis but superimposed infection possible.  4. Small volume ascites.  5.  No significant discrepancy with the preliminary report.      Electronically signed by: Oswaldo Orellana  Date:    01/07/2023  Time:    09:25               Narrative:    EXAMINATION:  CTA CHEST NON CORONARY (PE STUDIES)    CLINICAL HISTORY:  Pulmonary embolism (PE) suspected, unknown D-dimer;    TECHNIQUE:  Helical acquisition through the chest with IV contrast targeting the pulmonary arteries. Multiplanar and 3D  MIP reconstructed images were provided for review.  mGycm. Automatic exposure control, adjustment of mA/kV or iterative reconstruction technique was used to reduce radiation.    COMPARISON:  21 May 2020.    FINDINGS:  There is good opacification of the pulmonary arterial tree. There is no evidence of pulmonary thromboembolism.  There is no aortic dissection.    There is no mediastinal, hilar or axillary lymphadenopathy by size criteria.    Heart size upper limit normal.  There are coronary artery calcifications.  No pericardial effusion.    Small right and trace left pleural effusions.  There is bibasilar atelectasis.  There are additional areas of bibasilar consolidation where infection is possible.  Upper lungs are clear.    There is small volume ascites.  There are no acute osseous findings.                        Preliminary result by Oswaldo Orellana MD (01/07/23 01:34:29)                   Narrative:    START OF REPORT:  Technique: CT Scan of the chest was performed with intravenous contrast with direct axial images as well as sagittal and coronal reconstruction images pulmonary embolus protocol.    Dosage Information: Automated Exposure Control was utilized.    Comparison: None.    Clinical History: Sob.    Findings:  Artifact: Mild motion artifact is seen on multiple images.  Soft Tissues: Unremarkable.  Axilla: A few prominent lymph nodes are seen in the axilla.  Neck: The visualized soft tissues of the neck appear unremarkable. The isthmus of the thyroid gland is mildly prominent with a hypodense nodule noted within containing a punctate calcification. Correlate with clinical and laboratory findings as regards further evaluation and follow up.  Mediastinum: A few subcentimeter mediastinal lymph nodes are seen pretracheal paratracheal precarinal and subcarinal spaces . The largest is in pre carinal space and measures â10.2 mmâ in least dimension. This suggests reactive lymphadenopathy. Correlate  with clinical and laboratory findings as regards further evaluation and follow up.  Heart: The heart size is within normal limits.  Aorta: Unremarkable appearing aorta.  Pulmonary Arteries: No filling defects are seen in the pulmonary arteries to suggest pulmonary embolus.  Lungs: There are bilateral basal posteromedial areas of dense consolidation noted, left greater than right. These features are suggestive of multifocal pneumonia with aspiration component a consideration.  Pleura: There is a small right sided pleural effusion. No pneumothorax is identified.  Bony Structures:  Spine: Mild spondylolytic changes are seen in the thoracic spine.  Ribs: The ribs appear unremarkable.  Abdomen: There is some free fluid noted in the left upper quadrant around the spleen. Correlate with clinical and laboratory findings as regards further evaluation and follow up.      Impression:  1. No filling defects are seen in the pulmonary arteries to suggest pulmonary embolus.  2. There is a small right sided pleural effusion.  3. There are bilateral basal posteromedial areas of dense consolidation noted, left greater than right. These features are suggestive of multifocal pneumonia with aspiration component a consideration. Correlate with clinical and laboratory findings as regards additional evaluation and follow-up to full resolution as indicated.  4. Details and other findings as discussed above.                          Preliminary result by Alex Luciano MD (01/07/23 01:34:29)                   Narrative:    START OF REPORT:  Technique: CT Scan of the chest was performed with intravenous contrast with direct axial images as well as sagittal and coronal reconstruction images pulmonary embolus protocol.    Dosage Information: Automated Exposure Control was utilized.    Comparison: None.    Clinical History: Sob.    Findings:  Artifact: Mild motion artifact is seen on multiple images.  Soft Tissues: Unremarkable.  Axilla: A few  prominent lymph nodes are seen in the axilla.  Neck: The visualized soft tissues of the neck appear unremarkable. The isthmus of the thyroid gland is mildly prominent with a hypodense nodule noted within containing a punctate calcification. Correlate with clinical and laboratory findings as regards further evaluation and follow up.  Mediastinum: A few subcentimeter mediastinal lymph nodes are seen pretracheal paratracheal precarinal and subcarinal spaces . The largest is in pre carinal space and measures â10.2 mmâ in least dimension. This suggests reactive lymphadenopathy. Correlate with clinical and laboratory findings as regards further evaluation and follow up.  Heart: The heart size is within normal limits.  Aorta: Unremarkable appearing aorta.  Pulmonary Arteries: No filling defects are seen in the pulmonary arteries to suggest pulmonary embolus.  Lungs: There are bilateral basal posteromedial areas of dense consolidation noted, left greater than right. These features are suggestive of multifocal pneumonia with aspiration component a consideration.  Pleura: There is a small right sided pleural effusion. No pneumothorax is identified.  Bony Structures:  Spine: Mild spondylolytic changes are seen in the thoracic spine.  Ribs: The ribs appear unremarkable.  Abdomen: There is some free fluid noted in the left upper quadrant around the spleen. Correlate with clinical and laboratory findings as regards further evaluation and follow up.      Impression:  1. No filling defects are seen in the pulmonary arteries to suggest pulmonary embolus.  2. There is a small right sided pleural effusion.  3. There are bilateral basal posteromedial areas of dense consolidation noted, left greater than right. These features are suggestive of multifocal pneumonia with aspiration component a consideration. Correlate with clinical and laboratory findings as regards additional evaluation and follow-up to full resolution as  indicated.  4. Details and other findings as discussed above.                                         X-Ray Chest PA And Lateral (Final result)  Result time 01/07/23 10:02:00      Final result by Wong Brewer MD (01/07/23 10:02:00)                   Impression:      Right lower lobe consolidative changes.  Query symptomatology.      Electronically signed by: Wong Brewer MD  Date:    01/07/2023  Time:    10:02               Narrative:    EXAMINATION:  XR CHEST PA AND LATERAL    CLINICAL HISTORY:  Chest Pain;    TECHNIQUE:  PA and lateral views of the chest were performed.    COMPARISON:  08/09/2022    FINDINGS:  Heart size enlarged the pulmonary vasculature is congested.    Bibasilar atelectatic changes lungs with persistent elevation of the left hemidiaphragm.  Concern for developing consolidation in the right lower lobe.                                    Telemetry:   Afib 70s      Physical Exam  Constitutional:       Appearance: Normal appearance.   HENT:      Head: Normocephalic.      Mouth/Throat:      Mouth: Mucous membranes are moist.   Eyes:      Extraocular Movements: Extraocular movements intact.   Cardiovascular:      Rate and Rhythm: Normal rate. Rhythm irregular.      Pulses: Normal pulses.      Heart sounds: Murmur heard.   Pulmonary:      Breath sounds: Rales present.      Comments: NC O2  Conversational dyspnea  Abdominal:      General: There is distension.      Tenderness: There is no abdominal tenderness.   Musculoskeletal:         General: Normal range of motion.      Right lower leg: Edema present.      Left lower leg: Edema present.   Skin:     General: Skin is warm and dry.   Neurological:      General: No focal deficit present.      Mental Status: He is alert and oriented to person, place, and time. Mental status is at baseline.   Psychiatric:         Mood and Affect: Mood normal.         Behavior: Behavior normal.     Current Inpatient Medications:    Current Facility-Administered  Medications:     acetaminophen tablet 650 mg, 650 mg, Oral, Q6H PRN, Fabiano Liang MD, 650 mg at 01/08/23 1036    albuterol inhaler 2 puff, 2 puff, Inhalation, Q4H PRN, Fabiano Liang MD    albuterol-ipratropium 2.5 mg-0.5 mg/3 mL nebulizer solution 3 mL, 3 mL, Nebulization, Q4H WAKE, Dameon Sanchez III, MD, 3 mL at 01/10/23 2000    ALPRAZolam tablet 0.5 mg, 0.5 mg, Oral, BID PRN, Fabiano Liang MD    amiodarone 360 mg/200 mL (1.8 mg/mL) infusion, 1 mg/min, Intravenous, Continuous, NED Iniguez, Last Rate: 33.3 mL/hr at 01/11/23 0926, 1 mg/min at 01/11/23 0926    amiodarone 360 mg/200 mL (1.8 mg/mL) infusion, 0.5 mg/min, Intravenous, Continuous, NED Iniguez    apixaban tablet 5 mg, 5 mg, Oral, BID, Fabiano Liang MD, 5 mg at 01/11/23 0905    atorvastatin tablet 20 mg, 20 mg, Oral, Daily, Fabiano Liang MD, 20 mg at 01/11/23 0905    cefTRIAXone (ROCEPHIN) 1 g in dextrose 5 % in water (D5W) 5 % 50 mL IVPB (MB+), 1 g, Intravenous, Q24H, Fabiano Liang MD    cetirizine tablet 10 mg, 10 mg, Oral, Daily, Fabiano Liang MD, 10 mg at 01/11/23 0905    docusate sodium capsule 100 mg, 100 mg, Oral, Daily, Fabiano Liang MD, 100 mg at 01/11/23 0905    famotidine tablet 20 mg, 20 mg, Oral, BID, Dameon Sanchez III, MD, 20 mg at 01/11/23 0905    fluticasone furoate-vilanteroL 200-25 mcg/dose diskus inhaler 1 puff, 1 puff, Inhalation, Daily, Fabiano Liang MD, 1 puff at 01/11/23 0906    furosemide injection 40 mg, 40 mg, Intravenous, Daily, Fabiano Liang MD, 40 mg at 01/11/23 0904    latanoprost 0.005 % ophthalmic solution 1 drop, 1 drop, Both Eyes, Daily, Fabiano Liang MD, 1 drop at 01/11/23 0906    melatonin tablet 6 mg, 6 mg, Oral, Nightly PRN, Dameon Sanchez III, MD    melatonin tablet 9 mg, 9 mg, Oral, Daily PRN, Fabiano Liang MD    metoprolol injection 5 mg, 5 mg, Intravenous, Q15 Min PRN, Lionel Roach, NP     metoprolol succinate (TOPROL-XL) 24 hr tablet 100 mg, 100 mg, Oral, Daily, [place], NED, 100 mg at 01/11/23 0905    montelukast tablet 10 mg, 10 mg, Oral, Daily, Fabiano Liang MD, 10 mg at 01/11/23 0905    pantoprazole EC tablet 40 mg, 40 mg, Oral, Daily, Fabiano Liang MD, 40 mg at 01/11/23 0905    potassium chloride SA CR tablet 20 mEq, 20 mEq, Oral, TID, Fabiano Liang MD, 20 mEq at 01/11/23 0905    tamsulosin 24 hr capsule 0.4 mg, 1 capsule, Oral, Daily, Fabiano Liang MD, 0.4 mg at 01/11/23 0904    zolpidem tablet 5 mg, 5 mg, Oral, QHS, Fabiano Liang MD, 5 mg at 01/10/23 2027    VTE Risk Mitigation (From admission, onward)           Ordered     apixaban tablet 5 mg  2 times daily         01/07/23 1050     IP VTE HIGH RISK PATIENT  Once         01/07/23 0303     Place sequential compression device  Until discontinued         01/07/23 0303                  Assessment:   Acute on chronic systolic and diastolic HF  --decompensated  CMO/EF 35-40%     - ECHO (1.7.23) - Grade 1 DD, Mild AS, EF 35-40%    - ECHO (4.11.22) - LVEF 55-60%  Bilateral Pneumonitis (R > L)  Mildly Elevated Troponin (Flat)--likely due to recent ablation/decompensated heart failure  Hx of Asthma  PAF - Now with CVR    - CHADsVASc - 5 Points - 7.2% Stroke Risk per Year     - EP Study with Successful Ablation of Typical A.Flutter with Termination into SR (1.5.23)  VHD  --Mild AS-- JENNY 1.03 cm2; PV 1.99m/s; MG 10mmHg.   HTN - Controlled  MINDA/CPAP  Venous Insufficiency  Elevated Calcium Score/Mild Calcified Non-Obstructive CAD via [facility] (2020)    - [facility] (4.16.20) - Nonobstructive CAD with Normal LV Function and an EF of 55%  FLORENTINO/Bilaterally Mild-Moderate  Anxiety  GERD  OA  Obesity  No Hx of GIB per Chart Review    Plan:   ECHO Reviewed. EF 35-40% with G1 DD. Mild AS. Previous echo reported moderate AS. Will likely need repeat angiogram with valve study in future to determine need for valve replacement. May  take place outpatient  Still appears overloaded. Continue lasix, Metoprolol succinate 100 mg daily. Unable to add ARNI due to marginal BPs at times.   Further uptitration of HF medications post discharge.   Treatment for Pneumonia per Primary Team  Continue Eliquis Re Stroke Risk Reduction  Continue BB  and Statin Therapies  Will Repeat Limited ECHO upon F/U with Mark Bennett/Sridhar as reduced EF may be s/t Afib  EP consult in am.  Start amiodarone drip per protocol. If remains in Afib tomorrow, will plan DCCV. No need for REJI due to no interruption in Eliquis since flutter ablation. Consent obtained and placed on chart. NPO after MN.  Daily EKG    NED Iniguez  Cardiology  [facility]  01/11/2023

## 2023-01-11 NOTE — PROGRESS NOTES
Subjective:  The patient feels like he is breathing a bit better.  He is having some vague chest discomfort.  Better with changes in position.  It is in the left anterior inferior chest.  He believes and may be associated with the diaphragm issues that have been found in the past.    Objective:  He is afebrile.  Vital signs are stable.  O2 sats have been acceptable.  Currently off oxygen and O2 sats ranging between 90 and 95.  Input and output is about 1500 and and 2900 out.  No recent weight.    General appearance is unremarkable.  He is in no distress.  Heart has an irregular rhythm but normal rate.  Lungs with diminished rales compared to yesterday.  Abdomen distended but nontender.  Extremities with 1+ bilateral pretibial edema.    Laboratory studies reveal a stable CBC chemistry profile also was fine.  BNP is improved now down to 129.  Chest x-ray reviewed and looks similar to prior.    Assessment: 1.  Pneumonitis.  Chemical plus or minus bacterial..  He does seem to be improving     2. Mild congestive heart failure.  Combination systolic and diastolic dysfunction.  Also better    3. Chronic atrial fib.  Rate controlled.  Failed recent ablation     4. History of asthma.  PFTs in the recent past showed more of an obstructive phenomena than restrictive.  However does have history of an elevated and likely poorly moving left hemidiaphragm.  Perhaps contributing to his symptomology chronically     Plan: Continue same meds including IV Lasix and antibiotics.  Will consult Pulmonary Medicine for opinion regarding his complicated course.

## 2023-01-11 NOTE — CONSULTS
[facility] - 6th Floor Medical Telemetry  Pulmonary Critical Care Note    Patient Name: John Bullard  MRN: [id number]  Admission Date: 1/6/2023  Hospital Length of Stay: 4 days  Code Status: Full Code  Attending Provider: Fabiano Liang MD  Primary Care Provider: Fabiano Liang MD     Subjective:     HPI:   This is a 72-year-old male with a history of HTN, HLD, GERD, chronically elevated left hemidiaphragm, and MINDA on CPAP. He is followed in pulmonary clinic by Dr. Kaplan for ongoing shortness of breath and dyspnea. PFTs from 12/28/21 revealed moderate obstruction with no restriction or diffusion defect and +BD response (FEV1/FVC of 58, FEV1 of 1.12L or 43%, and FVC of 1.93 or 54%) however his body weight was incorrect which could cause inaccuracy in values. It was thought at the time of his initial evaluation that his shortness of breath was contributed to polycythemia s/t testosterone injections, which in some literature can present with lower extremity edema in addition to shortness of breath with consideration for hyperviscosity syndrome. He was started on Anoro and saw a benefit in its use.    He underwent an ablation with Dr. Waller on 1/5 for Atrial Flutter. He then presented to the ED on 1/6 with complaints of chest pain. SpO2 on room air was 85%. He was admitted to his PCP Dr. Liang with consult to cardiology. CXR revealed bibasilar atelectatic changes with persistent elevation of the left hemidiaphragm. CTA of chest was negative for PE, reported small right and trace left pleural effusions, and bibasilar consolidation. He was started on Rocephin and azithromycin for possible pneumonia. Blood cultures negative. BNP was elevated at 196, however is down to 129 this AM.    Hospital Course/Significant events:  Echo from 1/7- estimated EF of 35-40%, grade 1 diastolic dysfunction, mild AS.    24 Hour Interval History:  Remains on Rocephin Day # 5, completed 5 days of azithromycin.  Currently on room air. Patient reports ongoing shortness of breath. Reports episodes of bronchospasm with certain movements such as lifting objects. Endorses ongoing leg swelling. He reports concern regarding elevated diaphragm even though this has been longstanding.     Past Medical History:   Diagnosis Date    Anticoagulant long-term use     Anxiety     Asthma     Atrial fibrillation     Decreased testosterone level     DJD (degenerative joint disease)     Dyspnea     GERD (gastroesophageal reflux disease)     Hypertension     OA (osteoarthritis)     Obesity     MINDA on CPAP     PAF (paroxysmal atrial fibrillation)     s/p Ablation (1/5/2023)    SBO (small bowel obstruction)     hx multiple (3) incudling requiring surgical intervention    Sleep apnea     Venous insufficiency     Ventricular tachycardia        Past Surgical History:   Procedure Laterality Date    ABDOMINAL SURGERY      for illeus    ABLATION N/A 01/05/2023    Procedure: ABLATION;  Surgeon: Kenny Waller MD;  Location: [facility] CATH LAB;  Service: Cardiology;  Laterality: N/A;  EPS + AFL CATH ABLATION W/ ANEST.    COLONOSCOPY      DIAGNOSTIC LAPAROSCOPY      LAPAROSCOPIC REPAIR OF UMBILICAL HERNIA      LITHOTRIPSY      SINUS SURGERY      WISDOM TOOTH EXTRACTION Bilateral        Social History     Socioeconomic History    Marital status: Single   Tobacco Use    Smoking status: Never    Smokeless tobacco: Never   Substance and Sexual Activity    Alcohol use: Yes     Alcohol/week: 1.0 standard drink     Types: 1 Glasses of wine per week    Drug use: Never    Sexual activity: Never           Current Outpatient Medications   Medication Instructions    albuterol (PROVENTIL/VENTOLIN HFA) 90 mcg/actuation inhaler INHALE 2 PUFFS BY MOUTH EVERY 6 HOURS    ALPRAZolam (XANAX) 0.5 MG tablet Take 1 tablet by mouth twice daily as needed for anxiety    diltiaZEM (CARDIZEM CD) 240 mg, Oral, 2 times daily    ELIQUIS 5 mg, Oral, 2 times daily    fluticasone-salmeterol  230-21 mcg/dose (ADVAIR HFA) 230-21 mcg/actuation HFAA inhaler 2 puffs, Inhalation, 2 times daily, Controller    hydroCHLOROthiazide (HYDRODIURIL) 50 MG tablet Take 1 tablet by mouth once daily    loratadine (CLARITIN) 10 mg, Oral, Daily PRN    melatonin 10 mg, Oral, Daily PRN    metoprolol succinate (TOPROL-XL) 50 mg, Oral, 3 times daily    montelukast (SINGULAIR) 10 mg tablet Take 1 tablet by mouth once daily    omeprazole (PRILOSEC) 40 MG capsule Take 1 capsule by mouth once daily    potassium chloride (KLOR-CON) 8 MEQ TbSR 8 mEq, Oral, Daily    rosuvastatin (CRESTOR) 20 MG tablet TAKE 1 TABLET BY MOUTH ONCE IN THE EVENING    tamsulosin (FLOMAX) 0.4 mg Cap 1 capsule, Oral, Daily    testosterone cypionate (DEPOTESTOTERONE CYPIONATE) 200 mg, Intramuscular, Every 14 days    travoprost, benzalkonium, (TRAVATAN) 0.004 % ophthalmic solution 1 drop, Both Eyes, Nightly    zolpidem (AMBIEN) 5 MG Tab TAKE 1 TABLET BY MOUTH ONCE DAILY AT BEDTIME AS NEEDED FOR INSOMNIA       Current Inpatient Medications   albuterol-ipratropium  3 mL Nebulization Q4H WAKE    apixaban  5 mg Oral BID    atorvastatin  20 mg Oral Daily    cefTRIAXone (ROCEPHIN) IVPB  1 g Intravenous Q24H    cetirizine  10 mg Oral Daily    docusate sodium  100 mg Oral Daily    famotidine  20 mg Oral BID    fluticasone furoate-vilanteroL  1 puff Inhalation Daily    furosemide (LASIX) injection  40 mg Intravenous Daily    latanoprost  1 drop Both Eyes Daily    metoprolol succinate  100 mg Oral Daily    montelukast  10 mg Oral Daily    pantoprazole  40 mg Oral Daily    potassium chloride  20 mEq Oral TID    tamsulosin  1 capsule Oral Daily    zolpidem  5 mg Oral QHS       Current Intravenous Infusions   amiodarone in dextrose 5% 1 mg/min (01/11/23 0926)    amiodarone in dextrose 5%           Review of Systems   Respiratory:  Positive for shortness of breath.    Cardiovascular:  Positive for leg swelling.        Objective:       Intake/Output Summary (Last 24 hours)  at 2023 0953  Last data filed at 2023 0402  Gross per 24 hour   Intake 1560 ml   Output 2925 ml   Net -1365 ml         Vital Signs (Most Recent):  Temp: 98.3 °F (36.8 °C) (23)  Pulse: 89 (23)  Resp: 18 (23)  BP: 131/86 (23)  SpO2: (!) 92 % (23)    Body mass index is 38.21 kg/m².  Weight: 114 kg (251 lb 5.2 oz) Vital Signs (24h Range):  Temp:  [97.9 °F (36.6 °C)-98.7 °F (37.1 °C)] 98.3 °F (36.8 °C)  Pulse:  [75-93] 89  Resp:  [18-20] 18  SpO2:  [92 %-97 %] 92 %  BP: (112-134)/(70-86) 131/86     Physical Exam  Vitals reviewed.   Constitutional:       Appearance: He is obese.   HENT:      Head: Normocephalic and atraumatic.   Cardiovascular:      Rate and Rhythm: Rhythm regularly irregular.   Pulmonary:      Effort: Pulmonary effort is normal.      Comments: Decreased to right base  Abdominal:      General: Bowel sounds are normal.      Palpations: Abdomen is soft.   Musculoskeletal:      Right lower le+ Pitting Edema present.      Left lower le+ Pitting Edema present.   Neurological:      General: No focal deficit present.      Mental Status: He is alert.   Psychiatric:         Mood and Affect: Mood normal.         Lines/Drains/Airways       Peripheral Intravenous Line  Duration                  Peripheral IV - Single Lumen 23 1030 20 G Left;Posterior Forearm 1 day                    Significant Labs:    Lab Results   Component Value Date    WBC 7.2 2023    HGB 13.2 (L) 2023    HCT 42.2 2023    MCV 95.3 (H) 2023     2023         BMP  Lab Results   Component Value Date     2023    K 3.7 2023    CO2 31 2023    BUN 19.9 2023    CREATININE 0.89 2023    CALCIUM 8.8 2023    EGFRNONAA >60 2022       ABG  No results for input(s): PH, PO2, PCO2, HCO3, BE in the last 168 hours.    Mechanical Ventilation Support:       Significant Imaging:  I have reviewed the  pertinent imaging within the past 24 hours.        Assessment/Plan:     Assessment  Shortness of breath  Acute on chronic systolic and diastolic heart failure; EF of 35-40% per echo, Grade 1 DD  History of Asthma; PFTs revealing obstruction  Persistent elevation of left hemidiaphragm  Atrial Fibrillation/Flutter  Possible Pneumonia      Plan  - Completed Azithromycin, last day of Rocephin is today.   - Continue nebs as needed  - Diuretics per Cards  - Plan for possible cardioversion tomorrow if patient does not convert with amio.  - Patient will need outpatient follow up with pulmonology. Will likely need to repeat PFTs  - Any further recommendations to follow upon MD rounds.         NED Brand  Pulmonary Critical Care Medicine  [facility] - 6th Floor Medical Telemetry

## 2023-01-12 ENCOUNTER — ANESTHESIA EVENT (OUTPATIENT)
Dept: CARDIOLOGY | Facility: HOSPITAL | Age: 73
DRG: 291 | End: 2023-01-12
Payer: COMMERCIAL

## 2023-01-12 ENCOUNTER — ANESTHESIA (OUTPATIENT)
Dept: CARDIOLOGY | Facility: HOSPITAL | Age: 73
DRG: 291 | End: 2023-01-12
Payer: COMMERCIAL

## 2023-01-12 DIAGNOSIS — Z12.12 SCREENING FOR COLORECTAL CANCER: Primary | ICD-10-CM

## 2023-01-12 DIAGNOSIS — Z12.11 SCREENING FOR COLORECTAL CANCER: Primary | ICD-10-CM

## 2023-01-12 LAB
ANION GAP SERPL CALC-SCNC: 12 MEQ/L
BACTERIA BLD CULT: NORMAL
BACTERIA BLD CULT: NORMAL
BNP BLD-MCNC: 80.9 PG/ML
BUN SERPL-MCNC: 17.3 MG/DL (ref 8.4–25.7)
CALCIUM SERPL-MCNC: 9 MG/DL (ref 8.8–10)
CHLORIDE SERPL-SCNC: 99 MMOL/L (ref 98–107)
CO2 SERPL-SCNC: 30 MMOL/L (ref 23–31)
CREAT SERPL-MCNC: 0.99 MG/DL (ref 0.73–1.18)
CREAT/UREA NIT SERPL: 17
GFR SERPLBLD CREATININE-BSD FMLA CKD-EPI: >60 MLS/MIN/1.73/M2
GLUCOSE SERPL-MCNC: 95 MG/DL (ref 82–115)
POTASSIUM SERPL-SCNC: 3.8 MMOL/L (ref 3.5–5.1)
SODIUM SERPL-SCNC: 141 MMOL/L (ref 136–145)

## 2023-01-12 PROCEDURE — 63600175 PHARM REV CODE 636 W HCPCS

## 2023-01-12 PROCEDURE — 99232 PR SUBSEQUENT HOSPITAL CARE,LEVL II: ICD-10-PCS | Mod: ,,, | Performed by: INTERNAL MEDICINE

## 2023-01-12 PROCEDURE — 25000003 PHARM REV CODE 250

## 2023-01-12 PROCEDURE — 25000242 PHARM REV CODE 250 ALT 637 W/ HCPCS: Performed by: INTERNAL MEDICINE

## 2023-01-12 PROCEDURE — 25000003 PHARM REV CODE 250: Performed by: NURSE PRACTITIONER

## 2023-01-12 PROCEDURE — 99232 SBSQ HOSP IP/OBS MODERATE 35: CPT | Mod: ,,, | Performed by: INTERNAL MEDICINE

## 2023-01-12 PROCEDURE — 25000003 PHARM REV CODE 250: Performed by: INTERNAL MEDICINE

## 2023-01-12 PROCEDURE — 21400001 HC TELEMETRY ROOM

## 2023-01-12 PROCEDURE — 83880 ASSAY OF NATRIURETIC PEPTIDE: CPT | Performed by: INTERNAL MEDICINE

## 2023-01-12 PROCEDURE — 25000003 PHARM REV CODE 250: Performed by: EMERGENCY MEDICINE

## 2023-01-12 PROCEDURE — 63600175 PHARM REV CODE 636 W HCPCS: Performed by: INTERNAL MEDICINE

## 2023-01-12 PROCEDURE — 63600175 PHARM REV CODE 636 W HCPCS: Performed by: NURSE PRACTITIONER

## 2023-01-12 PROCEDURE — 27000221 HC OXYGEN, UP TO 24 HOURS

## 2023-01-12 PROCEDURE — 36415 COLL VENOUS BLD VENIPUNCTURE: CPT | Performed by: INTERNAL MEDICINE

## 2023-01-12 PROCEDURE — 80048 BASIC METABOLIC PNL TOTAL CA: CPT | Performed by: INTERNAL MEDICINE

## 2023-01-12 PROCEDURE — 94761 N-INVAS EAR/PLS OXIMETRY MLT: CPT

## 2023-01-12 PROCEDURE — 94640 AIRWAY INHALATION TREATMENT: CPT

## 2023-01-12 RX ORDER — LEVALBUTEROL INHALATION SOLUTION 0.63 MG/3ML
0.63 SOLUTION RESPIRATORY (INHALATION) EVERY 4 HOURS
Status: DISCONTINUED | OUTPATIENT
Start: 2023-01-12 | End: 2023-01-13

## 2023-01-12 RX ORDER — LIDOCAINE HYDROCHLORIDE 10 MG/ML
1 INJECTION, SOLUTION EPIDURAL; INFILTRATION; INTRACAUDAL; PERINEURAL ONCE
Status: CANCELLED | OUTPATIENT
Start: 2023-01-12 | End: 2023-01-12

## 2023-01-12 RX ORDER — ONDANSETRON 2 MG/ML
4 INJECTION INTRAMUSCULAR; INTRAVENOUS DAILY PRN
Status: CANCELLED | OUTPATIENT
Start: 2023-01-12

## 2023-01-12 RX ORDER — SODIUM CHLORIDE, SODIUM GLUCONATE, SODIUM ACETATE, POTASSIUM CHLORIDE AND MAGNESIUM CHLORIDE 30; 37; 368; 526; 502 MG/100ML; MG/100ML; MG/100ML; MG/100ML; MG/100ML
INJECTION, SOLUTION INTRAVENOUS CONTINUOUS
Status: CANCELLED | OUTPATIENT
Start: 2023-01-12 | End: 2023-02-11

## 2023-01-12 RX ORDER — PROCHLORPERAZINE EDISYLATE 5 MG/ML
5 INJECTION INTRAMUSCULAR; INTRAVENOUS EVERY 30 MIN PRN
Status: CANCELLED | OUTPATIENT
Start: 2023-01-12

## 2023-01-12 RX ORDER — LIDOCAINE HYDROCHLORIDE 20 MG/ML
INJECTION, SOLUTION EPIDURAL; INFILTRATION; INTRACAUDAL; PERINEURAL
Status: DISCONTINUED | OUTPATIENT
Start: 2023-01-12 | End: 2023-01-12

## 2023-01-12 RX ORDER — IPRATROPIUM BROMIDE AND ALBUTEROL SULFATE 2.5; .5 MG/3ML; MG/3ML
3 SOLUTION RESPIRATORY (INHALATION) EVERY 8 HOURS
Status: DISCONTINUED | OUTPATIENT
Start: 2023-01-12 | End: 2023-01-13

## 2023-01-12 RX ORDER — MEPERIDINE HYDROCHLORIDE 25 MG/ML
12.5 INJECTION INTRAMUSCULAR; INTRAVENOUS; SUBCUTANEOUS EVERY 10 MIN PRN
Status: CANCELLED | OUTPATIENT
Start: 2023-01-12 | End: 2023-01-13

## 2023-01-12 RX ORDER — ONDANSETRON 4 MG/1
8 TABLET, ORALLY DISINTEGRATING ORAL EVERY 6 HOURS PRN
Status: CANCELLED | OUTPATIENT
Start: 2023-01-12

## 2023-01-12 RX ORDER — IPRATROPIUM BROMIDE AND ALBUTEROL SULFATE 2.5; .5 MG/3ML; MG/3ML
3 SOLUTION RESPIRATORY (INHALATION)
Status: CANCELLED | OUTPATIENT
Start: 2023-01-12

## 2023-01-12 RX ORDER — PROPOFOL 10 MG/ML
VIAL (ML) INTRAVENOUS
Status: DISCONTINUED | OUTPATIENT
Start: 2023-01-12 | End: 2023-01-12

## 2023-01-12 RX ADMIN — APIXABAN 5 MG: 5 TABLET, FILM COATED ORAL at 08:01

## 2023-01-12 RX ADMIN — FAMOTIDINE 20 MG: 20 TABLET, FILM COATED ORAL at 08:01

## 2023-01-12 RX ADMIN — POTASSIUM CHLORIDE 20 MEQ: 1500 TABLET, EXTENDED RELEASE ORAL at 03:01

## 2023-01-12 RX ADMIN — FLUTICASONE FUROATE AND VILANTEROL TRIFENATATE 1 PUFF: 200; 25 POWDER RESPIRATORY (INHALATION) at 08:01

## 2023-01-12 RX ADMIN — CETIRIZINE HYDROCHLORIDE 10 MG: 10 TABLET, FILM COATED ORAL at 08:01

## 2023-01-12 RX ADMIN — LIDOCAINE HYDROCHLORIDE 4 ML: 20 INJECTION, SOLUTION EPIDURAL; INFILTRATION; INTRACAUDAL; PERINEURAL at 10:01

## 2023-01-12 RX ADMIN — AMIODARONE HYDROCHLORIDE 0.5 MG/MIN: 1.8 INJECTION, SOLUTION INTRAVENOUS at 03:01

## 2023-01-12 RX ADMIN — LATANOPROST 1 DROP: 50 SOLUTION OPHTHALMIC at 08:01

## 2023-01-12 RX ADMIN — SODIUM CHLORIDE, SODIUM GLUCONATE, SODIUM ACETATE, POTASSIUM CHLORIDE AND MAGNESIUM CHLORIDE: 526; 502; 368; 37; 30 INJECTION, SOLUTION INTRAVENOUS at 10:01

## 2023-01-12 RX ADMIN — DOCUSATE SODIUM 100 MG: 100 CAPSULE, LIQUID FILLED ORAL at 08:01

## 2023-01-12 RX ADMIN — METOPROLOL SUCCINATE 100 MG: 50 TABLET, EXTENDED RELEASE ORAL at 08:01

## 2023-01-12 RX ADMIN — TAMSULOSIN HYDROCHLORIDE 0.4 MG: 0.4 CAPSULE ORAL at 08:01

## 2023-01-12 RX ADMIN — POTASSIUM CHLORIDE 20 MEQ: 1500 TABLET, EXTENDED RELEASE ORAL at 08:01

## 2023-01-12 RX ADMIN — ATORVASTATIN CALCIUM 20 MG: 10 TABLET, FILM COATED ORAL at 08:01

## 2023-01-12 RX ADMIN — PROPOFOL 100 MG: 10 INJECTION, EMULSION INTRAVENOUS at 10:01

## 2023-01-12 RX ADMIN — FUROSEMIDE 40 MG: 10 INJECTION, SOLUTION INTRAMUSCULAR; INTRAVENOUS at 11:01

## 2023-01-12 RX ADMIN — PANTOPRAZOLE SODIUM 40 MG: 40 TABLET, DELAYED RELEASE ORAL at 08:01

## 2023-01-12 RX ADMIN — ZOLPIDEM TARTRATE 5 MG: 5 TABLET ORAL at 08:01

## 2023-01-12 RX ADMIN — MONTELUKAST 10 MG: 10 TABLET, FILM COATED ORAL at 08:01

## 2023-01-12 RX ADMIN — LEVALBUTEROL HYDROCHLORIDE 0.63 MG: 0.63 SOLUTION RESPIRATORY (INHALATION) at 07:01

## 2023-01-12 NOTE — PROGRESS NOTES
[facility] - 6th Floor Medical Telemetry  Pulmonary Critical Care Note    Patient Name: John Bullard  MRN: [id number]  Admission Date: 1/6/2023  Hospital Length of Stay: 5 days  Code Status: Full Code  Attending Provider: Fabiano Liang MD  Primary Care Provider: Fabiano Liang MD     Subjective:     HPI:   This is a 72-year-old male with a history of HTN, HLD, GERD, chronically elevated left hemidiaphragm, and MINDA on CPAP. He is followed in pulmonary clinic by Dr. Kaplan for ongoing shortness of breath and dyspnea. PFTs from 12/28/21 revealed moderate obstruction with no restriction or diffusion defect and +BD response (FEV1/FVC of 58, FEV1 of 1.12L or 43%, and FVC of 1.93 or 54%) however his body weight was incorrect which could cause inaccuracy in values. It was thought at the time of his initial evaluation that his shortness of breath was contributed to polycythemia s/t testosterone injections, which in some literature can present with lower extremity edema in addition to shortness of breath with consideration for hyperviscosity syndrome. He was started on Anoro and saw a benefit in its use.    He underwent an ablation with Dr. Waller on 1/5 for Atrial Flutter. He then presented to the ED on 1/6 with complaints of chest pain. SpO2 on room air was 85%. He was admitted to his PCP Dr. Liang with consult to cardiology. CXR revealed bibasilar atelectatic changes with persistent elevation of the left hemidiaphragm. CTA of chest was negative for PE, reported small right and trace left pleural effusions, and bibasilar consolidation. He was started on Rocephin and azithromycin for possible pneumonia. Blood cultures negative. BNP was elevated at 196, however is down to 129 this AM.    Hospital Course/Significant events:  Echo from 1/7- estimated EF of 35-40%, grade 1 diastolic dysfunction, mild AS.    24 Hour Interval History:  Completed antibiotics. Currently on room air. Reports he feels  slightly better this AM. Slated for cardioversion today.    Past Medical History:   Diagnosis Date    Anticoagulant long-term use     Anxiety     Asthma     Atrial fibrillation     Decreased testosterone level     DJD (degenerative joint disease)     Dyspnea     GERD (gastroesophageal reflux disease)     Hypertension     OA (osteoarthritis)     Obesity     MINDA on CPAP     PAF (paroxysmal atrial fibrillation)     s/p Ablation (1/5/2023)    SBO (small bowel obstruction)     hx multiple (3) incudling requiring surgical intervention    Sleep apnea     Venous insufficiency     Ventricular tachycardia        Past Surgical History:   Procedure Laterality Date    ABDOMINAL SURGERY      for illeus    ABLATION N/A 01/05/2023    Procedure: ABLATION;  Surgeon: Kenny Waller MD;  Location: [facility] CATH LAB;  Service: Cardiology;  Laterality: N/A;  EPS + AFL CATH ABLATION W/ ANEST.    COLONOSCOPY      DIAGNOSTIC LAPAROSCOPY      LAPAROSCOPIC REPAIR OF UMBILICAL HERNIA      LITHOTRIPSY      SINUS SURGERY      WISDOM TOOTH EXTRACTION Bilateral        Social History     Socioeconomic History    Marital status: Single   Tobacco Use    Smoking status: Never    Smokeless tobacco: Never   Substance and Sexual Activity    Alcohol use: Yes     Alcohol/week: 1.0 standard drink     Types: 1 Glasses of wine per week    Drug use: Never    Sexual activity: Never           Current Outpatient Medications   Medication Instructions    albuterol (PROVENTIL/VENTOLIN HFA) 90 mcg/actuation inhaler INHALE 2 PUFFS BY MOUTH EVERY 6 HOURS    ALPRAZolam (XANAX) 0.5 MG tablet Take 1 tablet by mouth twice daily as needed for anxiety    diltiaZEM (CARDIZEM CD) 240 mg, Oral, 2 times daily    ELIQUIS 5 mg, Oral, 2 times daily    fluticasone-salmeterol 230-21 mcg/dose (ADVAIR HFA) 230-21 mcg/actuation HFAA inhaler 2 puffs, Inhalation, 2 times daily, Controller    hydroCHLOROthiazide (HYDRODIURIL) 50 MG tablet Take 1 tablet by mouth once daily    loratadine  (CLARITIN) 10 mg, Oral, Daily PRN    melatonin 10 mg, Oral, Daily PRN    metoprolol succinate (TOPROL-XL) 50 mg, Oral, 3 times daily    montelukast (SINGULAIR) 10 mg tablet Take 1 tablet by mouth once daily    omeprazole (PRILOSEC) 40 MG capsule Take 1 capsule by mouth once daily    potassium chloride (KLOR-CON) 8 MEQ TbSR 8 mEq, Oral, Daily    rosuvastatin (CRESTOR) 20 MG tablet TAKE 1 TABLET BY MOUTH ONCE IN THE EVENING    tamsulosin (FLOMAX) 0.4 mg Cap 1 capsule, Oral, Daily    testosterone cypionate (DEPOTESTOTERONE CYPIONATE) 200 mg, Intramuscular, Every 14 days    travoprost, benzalkonium, (TRAVATAN) 0.004 % ophthalmic solution 1 drop, Both Eyes, Nightly    zolpidem (AMBIEN) 5 MG Tab TAKE 1 TABLET BY MOUTH ONCE DAILY AT BEDTIME AS NEEDED FOR INSOMNIA       Current Inpatient Medications   albuterol-ipratropium  3 mL Nebulization Q4H WAKE    apixaban  5 mg Oral BID    atorvastatin  20 mg Oral Daily    cetirizine  10 mg Oral Daily    docusate sodium  100 mg Oral Daily    famotidine  20 mg Oral BID    fluticasone furoate-vilanteroL  1 puff Inhalation Daily    furosemide (LASIX) injection  40 mg Intravenous Daily    latanoprost  1 drop Both Eyes Daily    metoprolol succinate  100 mg Oral Daily    montelukast  10 mg Oral Daily    pantoprazole  40 mg Oral Daily    potassium chloride  20 mEq Oral TID    tamsulosin  1 capsule Oral Daily    zolpidem  5 mg Oral QHS       Current Intravenous Infusions   amiodarone in dextrose 5% 0.5 mg/min (01/12/23 0321)         Review of Systems   Respiratory:  Positive for shortness of breath.    Cardiovascular:  Positive for leg swelling.        Objective:       Intake/Output Summary (Last 24 hours) at 1/12/2023 0846  Last data filed at 1/12/2023 0534  Gross per 24 hour   Intake 1440 ml   Output 3750 ml   Net -2310 ml           Vital Signs (Most Recent):  Temp: 97.9 °F (36.6 °C) (01/12/23 0737)  Pulse: 82 (01/12/23 0830)  Resp: 18 (01/12/23 0830)  BP: 124/84 (01/12/23 0737)  SpO2: (!)  91 % (23 0737)    Body mass index is 37.28 kg/m².  Weight: 111.2 kg (245 lb 2.4 oz) Vital Signs (24h Range):  Temp:  [96.4 °F (35.8 °C)-98.1 °F (36.7 °C)] 97.9 °F (36.6 °C)  Pulse:  [73-89] 82  Resp:  [16-22] 18  SpO2:  [91 %-96 %] 91 %  BP: (111-132)/(70-91) 124/84     Physical Exam  Vitals reviewed.   Constitutional:       Appearance: He is obese.   HENT:      Head: Normocephalic and atraumatic.   Cardiovascular:      Rate and Rhythm: Rhythm regularly irregular.   Pulmonary:      Effort: Pulmonary effort is normal.      Comments: Decreased to right base  Abdominal:      General: Bowel sounds are normal.      Palpations: Abdomen is soft.   Musculoskeletal:      Right lower le+ Pitting Edema present.      Left lower le+ Pitting Edema present.   Neurological:      General: No focal deficit present.      Mental Status: He is alert.   Psychiatric:         Mood and Affect: Mood normal.         Lines/Drains/Airways       Peripheral Intravenous Line  Duration                  Peripheral IV - Single Lumen 23 1030 20 G Left;Posterior Forearm 2 days                    Significant Labs:    Lab Results   Component Value Date    WBC 7.2 2023    HGB 13.2 (L) 2023    HCT 42.2 2023    MCV 95.3 (H) 2023     2023         BMP  Lab Results   Component Value Date     2023    K 3.8 2023    CO2 30 2023    BUN 17.3 2023    CREATININE 0.99 2023    CALCIUM 9.0 2023    EGFRNONAA >60 2022       ABG  No results for input(s): PH, PO2, PCO2, HCO3, BE in the last 168 hours.    Mechanical Ventilation Support:       Significant Imaging:  I have reviewed the pertinent imaging within the past 24 hours.        Assessment/Plan:     Assessment  Shortness of breath  Acute on chronic systolic and diastolic heart failure; EF of 35-40% per echo, Grade 1 DD  History of Asthma; PFTs revealing obstruction  Persistent elevation of left hemidiaphragm  Atrial  Fibrillation/Flutter  Possible Pneumonia      Plan  - Completed antibiotics for possible pneumonia  - Continue nebs as needed  - Diuretics per Cards  - Plan for cardioversion today  - Dyspnea is likely multifactorial including weight, cardiac, possible pulmonary hypertension, obstructive sleep apnea, pneumonia and a chronic elevated left hemidiaphragm. Patient will need outpatient follow up with pulmonology. Will likely need to repeat PFTs. Dr. Art did discuss the possibility of diaphragm plication with patient although not clear if this would be helpful for him.         NED Brand  Pulmonary Critical Care Medicine  [facility] - 6th Floor Medical Telemetry

## 2023-01-12 NOTE — TRANSFER OF CARE
"Anesthesia Transfer of Care Note    Patient: John Bullard    Procedure(s) Performed: * No procedures listed *    Patient location: Cath Lab    Anesthesia Type: general    Transport from OR: Transported from OR on 2-3 L/min O2 by NC with adequate spontaneous ventilation    Post pain: adequate analgesia    Post assessment: no apparent anesthetic complications    Post vital signs: stable    Level of consciousness: responds to stimulation    Nausea/Vomiting: no nausea/vomiting    Complications: none    Transfer of care protocol was followedComments: Detailed report with handoff to licensed provider complete      Last vitals:   Visit Vitals  /71   Pulse 80   Temp 36.6 °C (97.9 °F) (Oral)   Resp 20   Ht 5' 8" (1.727 m)   Wt 111.2 kg (245 lb 2.4 oz)   SpO2 98%   BMI 37.28 kg/m²     "

## 2023-01-12 NOTE — ANESTHESIA PREPROCEDURE EVALUATION
01/12/2023  John Bullard is a 72 y.o., male readmission following recent ablation with bilateral pulmonary infiltrates consistent with aspiration pneumonitis (previous anesthetic converted LMA to general endotracheal tube intraop) and subsequent reconversion into AFib.  Patient presents today for DC     2D echo January 7, 2023   EF 35%   Grade 1 diastolic dysfunction   Moderate aortic stenosis (JENNY 1.03 centimeter sq with peak velocity of 1.99)    2D echo April 2022   EF 55% with grade i diastolic dysfunction  Moderate AS (peak velocity 3.2 with JENNY of 1.4 centimeter sq)  Mild TR   RVSP 30     Left heart catheterization April 2020   EF 55%   LVEDP 20   Left main patent   Lad 50% proximal  Left circumflex nonobstructive disease   RCA luminal irregularities          Pre-op Assessment    I have reviewed the Patient Summary Reports.    I have reviewed the NPO Status.   I have reviewed the Medications.     Review of Systems  Anesthesia Hx:   Denies Personal Hx of Anesthesia complications.   Social:  Non-Smoker    Cardiovascular:   Hypertension  Functional Capacity good / => 4 METS  Valvular Heart Disease: Aortic Stenosis (AS), moderate    Congestive Heart Failure (CHF)    Pulmonary:   Asthma mild Sleep Apnea, CPAP    Hepatic/GI:   GERD        Physical Exam  General: Well nourished, Cooperative, Alert and Oriented    Airway:  Mallampati: II   Mouth Opening: Normal  TM Distance: Normal  Tongue: Normal  Neck ROM: Normal ROM    Dental:  Intact    Chest/Lungs:  Clear to auscultation, Normal Respiratory Rate    Heart:  Rate: Normal  Rhythm: Irregularly Irregular        Anesthesia Plan  Type of Anesthesia, risks & benefits discussed:    Anesthesia Type: Gen Natural Airway  Intra-op Monitoring Plan: Standard ASA Monitors  Induction:  IV  Informed Consent: Informed consent signed with the Patient and all parties  understand the risks and agree with anesthesia plan.  All questions answered.   ASA Score: 3  Day of Surgery Review of History & Physical: H&P Update referred to the surgeon/provider.  Anesthesia Plan Notes: Propofol and etomidate    Ready For Surgery From Anesthesia Perspective.     .

## 2023-01-12 NOTE — PROGRESS NOTES
Subjective:  The patient feels somewhat better.  Breathing well on room air currently.  Still some intermittent shortness of breath.  No chest pain.      Objective:  He is afebrile.  Blood pressure 124/84.  Heart rate 82.  Respiratory rate 17.  O2 sat 91% room air.  Input and output over the past 24 hours is strongly negative.    Heart has an irregular rhythm.  Lungs with bibasilar rales.  Abdomen nontender.  Extremities with 1 to 2+ pretibial edema bilaterally.    Laboratory studies show stable BMP with normal BUN and creatinine and BNP has now normalized.    Assessment:  1. Pneumonitis.  Improved clinically.  Antibiotics discontinued     2. Congestive heart failure.  Systolic and diastolic dysfunction.  Slowly better    3. Chronic atrial fib.  Rate mostly controlled.  Amiodarone drip has not resulted in conversion to sinus and I think he is scheduled for an attempt at cardioversion today     4. History of asthma and elevated left hemidiaphragm.  Also abdominal obesity compresses the diaphragm as well resulting in some degree of restrictive lung disease.    Plan:  For cardioversion today.  Continue same meds.  Continue diuresis with IV Lasix.  Update blood work tomorrow.

## 2023-01-12 NOTE — PROGRESS NOTES
[facility]  Cardiology  EP Consult Note    Patient Name: John Bullard  MRN: [id number]  Admission Date: 1/6/2023  Hospital Length of Stay: 5 days  Code Status: Full Code   Attending Provider: Fabiano Liang MD   Consulting Provider: NED Iniguez  Primary Care Physician: Fabiano Liang MD  Principal Problem:<principal problem not specified>    Patient information was obtained from patient, past medical records, and ER records.     Subjective:     Chief Complaint: Reason for Consult: Elevated Troponin     HPI: Father Aelah is a 73 y/o male who is known to [facility], Mark Waller/Donald. The patient recently had an EP Study with Successful Ablation of Typical A.Flutter. He underwent this EP Study on 1.5.23 without complications and was discharged home in stable condition. Post operatively he had a sore throat and a cough which he attributed the ETT. His SOB worsened and he presented to the ER for evaluation. Upon arrival to the ER he was found to have a CXR with Infiltrates and a CT of the Chest with no Evidence of Pulmonary Embolism, however, he did have significant infiltrates in bilateral lungs but worse on the R side. He was admitted to his Primary Care Provider and [facility] was consulted for Elevated Troponin Levels and Recent Cardiac Procedure. He has completed ABX and has been receiving diuresis due to volume overload and has improved from respiratory standpoint. EP has been consulted for DCCV due to Afib.     PMH: Anxiety, Asthma, PAF/Ablation , DJD, GERD, HTN, OA, Obesity, MINDA/CPAP, VI, VT, Insomnia, Elevated Calcium Score/Non-Obstructive CAD, FLORENTINO/Bilaterally Mild-Moderate  PSH: Lithotripsy, Sinus Surgery, Colonoscopy, Hernia Repair, EP Study/Ablation, Abdominal Surgery, [place] Teeth Extraction   Family History: Reviewed and Unremarkable for Heart Disease  Social History: Denies Illicit Drug, ETOH and Tobacco    Previous Cardiac Diagnostics:   ECHO 1.7.23:  Concentric hypertrophy  and mildly decreased systolic function.  The estimated ejection fraction is 35-40%.  Grade I left ventricular diastolic dysfunction.  With normal right ventricular systolic function.  There is mild aortic valve stenosis.  Aortic valve area is 1.03 cm2; peak velocity is 1.99 m/s; mean gradient is 10 mmHg.    Carotid US 8.17.22:  Mild Bilaterally and Tortuous Carotid Arteries with No Hemodynamically Significant FLORENTINO  < 50% Bilaterally    ECHO 4.11.22:  Limited Parasternal Windows  Normal LV Systolic Function: LVEF 55-60%  Moderate AS, Peak AV Velocity 3.2 m/sec, Mean Gradient 23mmHg, JENNY 1.4 cm2  Grade I DD  Mild TR with RVSP of 30mmHg    LHC 4.16.20:  LHC via R Radial Approach with Nonobstructive CAD with Normal LV Function and an EF of 55%. EDP was 20mmHg.  LM: patent vessel.  LAD: calcified type III vessel with 4 patent diagonal arteries. D1 is moderate to large in size with Proximal 50% stenosis. Remaining diagonals are small to medium size patent vessels  Lcx: Nondominant patent vessel that gives rise to a medium size OM1 with an early takeoff. The OM 2 is a medium to large size patent bifurcating vessel. The AV groove artery terminates in a small size patent OM3 with a small terminal bifurcating PL segment.   RCA: very large dominant patent vessel with mild Luminal irregularities. RCA terminates in a very large patent PDA and a large patent PL branch    PET 4.8.20:  The study quality is below average.   This is an abnormal perfusion study. Study is consistent with mostly scar tissue with minimal ischemia.   Small fixed perfusion abnormality of moderate intensity in the apical segment. Small partially reversible perfusion abnormality of moderate intensity in the anterior septal region. Small fixed perfusion abnormality of moderate intensity in the inferior region.   This scan is suggestive of low to moderate risk for future cardiovascular events.   The left ventricular cavity is noted to be normal on the stress  studies. The stress left ventricular ejection fraction was calculated to be 52% and left ventricular global function is normal. The rest left ventricular cavity is noted to be normal. The rest left ventricular ejection fraction was calculated to be 42% and rest left ventricular global function is mildly reduced.   When compared to the resting ejection fraction (42%), the stress ejection fraction (52%) has increased.     Review of patient's allergies indicates:   Allergen Reactions    Ativan [lorazepam] Hallucinations     Review of Systems   Constitutional:  Positive for appetite change. Negative for chills and fever.   Respiratory:  Negative for cough, chest tightness, shortness of breath, wheezing and stridor.    Cardiovascular:  Positive for leg swelling.   Gastrointestinal:  Positive for abdominal distention.   Genitourinary: Negative.    Skin: Negative.    Neurological: Negative.    Psychiatric/Behavioral: Negative.     All other systems reviewed and are negative.    Objective:     Vital Signs (Most Recent):  Temp: 97.7 °F (36.5 °C) (01/12/23 1141)  Pulse: 84 (01/12/23 1141)  Resp: 17 (01/12/23 1141)  BP: 127/86 (01/12/23 1141)  SpO2: (!) 92 % (01/12/23 1141)   Vital Signs (24h Range):  Temp:  [96.4 °F (35.8 °C)-98.1 °F (36.7 °C)] 97.7 °F (36.5 °C)  Pulse:  [73-88] 84  Resp:  [16-22] 17  SpO2:  [91 %-98 %] 92 %  BP: (111-132)/(70-91) 127/86     Weight: 111.2 kg (245 lb 2.4 oz)  Body mass index is 37.28 kg/m².    SpO2: (!) 92 %         Intake/Output Summary (Last 24 hours) at 1/12/2023 1218  Last data filed at 1/12/2023 1039  Gross per 24 hour   Intake 1810 ml   Output 4150 ml   Net -2340 ml       Lines/Drains/Airways       Peripheral Intravenous Line  Duration                  Peripheral IV - Single Lumen 01/09/23 1030 20 G Left;Posterior Forearm 3 days                  Significant Labs:  Recent Results (from the past 72 hour(s))   Basic Metabolic Panel    Collection Time: 01/10/23  3:41 AM   Result Value Ref  Range    Sodium Level 141 136 - 145 mmol/L    Potassium Level 3.3 (L) 3.5 - 5.1 mmol/L    Chloride 97 (L) 98 - 107 mmol/L    Carbon Dioxide 34 (H) 23 - 31 mmol/L    Glucose Level 74 (L) 82 - 115 mg/dL    Blood Urea Nitrogen 19.4 8.4 - 25.7 mg/dL    Creatinine 0.90 0.73 - 1.18 mg/dL    BUN/Creatinine Ratio 22     Calcium Level Total 8.4 (L) 8.8 - 10.0 mg/dL    Anion Gap 10.0 mEq/L    eGFR >60 mls/min/1.73/m2   BNP    Collection Time: 01/10/23  3:41 AM   Result Value Ref Range    Natriuretic Peptide 174.6 (H) <=100.0 pg/mL   Troponin I    Collection Time: 01/10/23  3:41 AM   Result Value Ref Range    Troponin-I 0.083 (H) 0.000 - 0.045 ng/mL   CBC with Differential    Collection Time: 01/10/23  3:41 AM   Result Value Ref Range    WBC 7.7 4.5 - 11.5 x10(3)/mcL    RBC 4.53 (L) 4.70 - 6.10 x10(6)/mcL    Hgb 13.4 (L) 14.0 - 18.0 gm/dL    Hct 42.6 42.0 - 52.0 %    MCV 94.0 80.0 - 94.0 fL    MCH 29.6 pg    MCHC 31.5 (L) 33.0 - 36.0 mg/dL    RDW 14.3 11.5 - 17.0 %    Platelet 147 130 - 400 x10(3)/mcL    MPV 10.2 7.4 - 10.4 fL    Neut % 79.8 %    Lymph % 6.2 %    Mono % 10.1 %    Eos % 3.1 %    Basophil % 0.4 %    Lymph # 0.48 (L) 0.6 - 4.6 x10(3)/mcL    Neut # 6.14 2.1 - 9.2 x10(3)/mcL    Mono # 0.78 0.1 - 1.3 x10(3)/mcL    Eos # 0.24 0 - 0.9 x10(3)/mcL    Baso # 0.03 0 - 0.2 x10(3)/mcL    IG# 0.03 0 - 0.04 x10(3)/mcL    IG% 0.4 %    NRBC% 0.0 %   Basic Metabolic Panel    Collection Time: 01/11/23  3:31 AM   Result Value Ref Range    Sodium Level 142 136 - 145 mmol/L    Potassium Level 3.7 3.5 - 5.1 mmol/L    Chloride 101 98 - 107 mmol/L    Carbon Dioxide 31 23 - 31 mmol/L    Glucose Level 85 82 - 115 mg/dL    Blood Urea Nitrogen 19.9 8.4 - 25.7 mg/dL    Creatinine 0.89 0.73 - 1.18 mg/dL    BUN/Creatinine Ratio 22     Calcium Level Total 8.8 8.8 - 10.0 mg/dL    Anion Gap 10.0 mEq/L    eGFR >60 mls/min/1.73/m2   BNP    Collection Time: 01/11/23  3:31 AM   Result Value Ref Range    Natriuretic Peptide 129.4 (H) <=100.0 pg/mL    CBC with Differential    Collection Time: 01/11/23  3:31 AM   Result Value Ref Range    WBC 7.2 4.5 - 11.5 x10(3)/mcL    RBC 4.43 (L) 4.70 - 6.10 x10(6)/mcL    Hgb 13.2 (L) 14.0 - 18.0 gm/dL    Hct 42.2 42.0 - 52.0 %    MCV 95.3 (H) 80.0 - 94.0 fL    MCH 29.8 pg    MCHC 31.3 (L) 33.0 - 36.0 mg/dL    RDW 14.2 11.5 - 17.0 %    Platelet 152 130 - 400 x10(3)/mcL    MPV 10.6 (H) 7.4 - 10.4 fL    Neut % 76.8 %    Lymph % 8.1 %    Mono % 11.0 %    Eos % 3.2 %    Basophil % 0.6 %    Lymph # 0.58 (L) 0.6 - 4.6 x10(3)/mcL    Neut # 5.52 2.1 - 9.2 x10(3)/mcL    Mono # 0.79 0.1 - 1.3 x10(3)/mcL    Eos # 0.23 0 - 0.9 x10(3)/mcL    Baso # 0.04 0 - 0.2 x10(3)/mcL    IG# 0.02 0 - 0.04 x10(3)/mcL    IG% 0.3 %    NRBC% 0.0 %   BNP    Collection Time: 01/12/23  4:42 AM   Result Value Ref Range    Natriuretic Peptide 80.9 <=100.0 pg/mL   Basic Metabolic Panel    Collection Time: 01/12/23  4:42 AM   Result Value Ref Range    Sodium Level 141 136 - 145 mmol/L    Potassium Level 3.8 3.5 - 5.1 mmol/L    Chloride 99 98 - 107 mmol/L    Carbon Dioxide 30 23 - 31 mmol/L    Glucose Level 95 82 - 115 mg/dL    Blood Urea Nitrogen 17.3 8.4 - 25.7 mg/dL    Creatinine 0.99 0.73 - 1.18 mg/dL    BUN/Creatinine Ratio 17     Calcium Level Total 9.0 8.8 - 10.0 mg/dL    Anion Gap 12.0 mEq/L    eGFR >60 mls/min/1.73/m2     Significant Imaging:  Imaging Results              CTA Chest Non-Coronary (PE Studies) (Final result)  Result time 01/07/23 09:25:24      Final result by Oswaldo Orellana MD (01/07/23 09:25:24)                   Impression:      1. Negative for pulmonary thromboembolic disease.  2. Small right and trace left pleural effusions.  3. Bibasilar consolidation at least partially relates to atelectasis but superimposed infection possible.  4. Small volume ascites.  5.  No significant discrepancy with the preliminary report.      Electronically signed by: Oswaldo Orellana  Date:    01/07/2023  Time:    09:25               Narrative:     EXAMINATION:  CTA CHEST NON CORONARY (PE STUDIES)    CLINICAL HISTORY:  Pulmonary embolism (PE) suspected, unknown D-dimer;    TECHNIQUE:  Helical acquisition through the chest with IV contrast targeting the pulmonary arteries. Multiplanar and 3D MIP reconstructed images were provided for review.  mGycm. Automatic exposure control, adjustment of mA/kV or iterative reconstruction technique was used to reduce radiation.    COMPARISON:  21 May 2020.    FINDINGS:  There is good opacification of the pulmonary arterial tree. There is no evidence of pulmonary thromboembolism.  There is no aortic dissection.    There is no mediastinal, hilar or axillary lymphadenopathy by size criteria.    Heart size upper limit normal.  There are coronary artery calcifications.  No pericardial effusion.    Small right and trace left pleural effusions.  There is bibasilar atelectasis.  There are additional areas of bibasilar consolidation where infection is possible.  Upper lungs are clear.    There is small volume ascites.  There are no acute osseous findings.                        Preliminary result by Oswaldo Orellana MD (01/07/23 01:34:29)                   Narrative:    START OF REPORT:  Technique: CT Scan of the chest was performed with intravenous contrast with direct axial images as well as sagittal and coronal reconstruction images pulmonary embolus protocol.    Dosage Information: Automated Exposure Control was utilized.    Comparison: None.    Clinical History: Sob.    Findings:  Artifact: Mild motion artifact is seen on multiple images.  Soft Tissues: Unremarkable.  Axilla: A few prominent lymph nodes are seen in the axilla.  Neck: The visualized soft tissues of the neck appear unremarkable. The isthmus of the thyroid gland is mildly prominent with a hypodense nodule noted within containing a punctate calcification. Correlate with clinical and laboratory findings as regards further evaluation and follow up.  Mediastinum:  A few subcentimeter mediastinal lymph nodes are seen pretracheal paratracheal precarinal and subcarinal spaces . The largest is in pre carinal space and measures â10.2 mmâ in least dimension. This suggests reactive lymphadenopathy. Correlate with clinical and laboratory findings as regards further evaluation and follow up.  Heart: The heart size is within normal limits.  Aorta: Unremarkable appearing aorta.  Pulmonary Arteries: No filling defects are seen in the pulmonary arteries to suggest pulmonary embolus.  Lungs: There are bilateral basal posteromedial areas of dense consolidation noted, left greater than right. These features are suggestive of multifocal pneumonia with aspiration component a consideration.  Pleura: There is a small right sided pleural effusion. No pneumothorax is identified.  Bony Structures:  Spine: Mild spondylolytic changes are seen in the thoracic spine.  Ribs: The ribs appear unremarkable.  Abdomen: There is some free fluid noted in the left upper quadrant around the spleen. Correlate with clinical and laboratory findings as regards further evaluation and follow up.      Impression:  1. No filling defects are seen in the pulmonary arteries to suggest pulmonary embolus.  2. There is a small right sided pleural effusion.  3. There are bilateral basal posteromedial areas of dense consolidation noted, left greater than right. These features are suggestive of multifocal pneumonia with aspiration component a consideration. Correlate with clinical and laboratory findings as regards additional evaluation and follow-up to full resolution as indicated.  4. Details and other findings as discussed above.                          Preliminary result by Alex Luciano MD (01/07/23 01:34:29)                   Narrative:    START OF REPORT:  Technique: CT Scan of the chest was performed with intravenous contrast with direct axial images as well as sagittal and coronal reconstruction images pulmonary  embolus protocol.    Dosage Information: Automated Exposure Control was utilized.    Comparison: None.    Clinical History: Sob.    Findings:  Artifact: Mild motion artifact is seen on multiple images.  Soft Tissues: Unremarkable.  Axilla: A few prominent lymph nodes are seen in the axilla.  Neck: The visualized soft tissues of the neck appear unremarkable. The isthmus of the thyroid gland is mildly prominent with a hypodense nodule noted within containing a punctate calcification. Correlate with clinical and laboratory findings as regards further evaluation and follow up.  Mediastinum: A few subcentimeter mediastinal lymph nodes are seen pretracheal paratracheal precarinal and subcarinal spaces . The largest is in pre carinal space and measures â10.2 mmâ in least dimension. This suggests reactive lymphadenopathy. Correlate with clinical and laboratory findings as regards further evaluation and follow up.  Heart: The heart size is within normal limits.  Aorta: Unremarkable appearing aorta.  Pulmonary Arteries: No filling defects are seen in the pulmonary arteries to suggest pulmonary embolus.  Lungs: There are bilateral basal posteromedial areas of dense consolidation noted, left greater than right. These features are suggestive of multifocal pneumonia with aspiration component a consideration.  Pleura: There is a small right sided pleural effusion. No pneumothorax is identified.  Bony Structures:  Spine: Mild spondylolytic changes are seen in the thoracic spine.  Ribs: The ribs appear unremarkable.  Abdomen: There is some free fluid noted in the left upper quadrant around the spleen. Correlate with clinical and laboratory findings as regards further evaluation and follow up.      Impression:  1. No filling defects are seen in the pulmonary arteries to suggest pulmonary embolus.  2. There is a small right sided pleural effusion.  3. There are bilateral basal posteromedial areas of dense consolidation noted, left  greater than right. These features are suggestive of multifocal pneumonia with aspiration component a consideration. Correlate with clinical and laboratory findings as regards additional evaluation and follow-up to full resolution as indicated.  4. Details and other findings as discussed above.                                         X-Ray Chest PA And Lateral (Final result)  Result time 01/07/23 10:02:00      Final result by Wong Brewer MD (01/07/23 10:02:00)                   Impression:      Right lower lobe consolidative changes.  Query symptomatology.      Electronically signed by: Wong Brewer MD  Date:    01/07/2023  Time:    10:02               Narrative:    EXAMINATION:  XR CHEST PA AND LATERAL    CLINICAL HISTORY:  Chest Pain;    TECHNIQUE:  PA and lateral views of the chest were performed.    COMPARISON:  08/09/2022    FINDINGS:  Heart size enlarged the pulmonary vasculature is congested.    Bibasilar atelectatic changes lungs with persistent elevation of the left hemidiaphragm.  Concern for developing consolidation in the right lower lobe.                                    Telemetry:   Afib 70s      Physical Exam  Constitutional:       Appearance: Normal appearance.   HENT:      Head: Normocephalic.      Mouth/Throat:      Mouth: Mucous membranes are moist.   Eyes:      Extraocular Movements: Extraocular movements intact.   Cardiovascular:      Rate and Rhythm: Normal rate. Rhythm irregular.      Pulses: Normal pulses.      Heart sounds: Murmur heard.   Pulmonary:      Breath sounds: Rales present.      Comments: NC O2    Abdominal:      General: There is distension.      Tenderness: There is no abdominal tenderness.   Musculoskeletal:         General: Normal range of motion.      Right lower leg: Edema present.      Left lower leg: Edema present.   Skin:     General: Skin is warm and dry.   Neurological:      General: No focal deficit present.      Mental Status: He is alert and oriented to person,  place, and time. Mental status is at baseline.   Psychiatric:         Mood and Affect: Mood normal.         Behavior: Behavior normal.     Current Inpatient Medications:    Current Facility-Administered Medications:     acetaminophen tablet 650 mg, 650 mg, Oral, Q6H PRN, Fabiano Liang MD, 650 mg at 01/08/23 1036    albuterol inhaler 2 puff, 2 puff, Inhalation, Q4H PRN, Fabiano Liang MD    albuterol-ipratropium 2.5 mg-0.5 mg/3 mL nebulizer solution 3 mL, 3 mL, Nebulization, Q8H, Jose Butler MD    ALPRAZolam tablet 0.5 mg, 0.5 mg, Oral, BID PRN, Fabiano Liang MD    amiodarone 360 mg/200 mL (1.8 mg/mL) infusion, 0.5 mg/min, Intravenous, Continuous, NED Iniguez, Last Rate: 16.7 mL/hr at 01/12/23 0321, 0.5 mg/min at 01/12/23 0321    apixaban tablet 5 mg, 5 mg, Oral, BID, Fabiano Liang MD, 5 mg at 01/12/23 0831    atorvastatin tablet 20 mg, 20 mg, Oral, Daily, Fabiano Liang MD, 20 mg at 01/12/23 0830    cetirizine tablet 10 mg, 10 mg, Oral, Daily, Fabiano Liang MD, 10 mg at 01/12/23 0830    docusate sodium capsule 100 mg, 100 mg, Oral, Daily, Fabiano Liang MD, 100 mg at 01/12/23 0830    famotidine tablet 20 mg, 20 mg, Oral, BID, Dameon Sanchez III, MD, 20 mg at 01/12/23 0831    fluticasone furoate-vilanteroL 200-25 mcg/dose diskus inhaler 1 puff, 1 puff, Inhalation, Daily, Fabiano Liang MD, 1 puff at 01/12/23 0830    furosemide injection 40 mg, 40 mg, Intravenous, Daily, Fabiano Liang MD, 40 mg at 01/12/23 1130    latanoprost 0.005 % ophthalmic solution 1 drop, 1 drop, Both Eyes, Daily, Fabiano Liang MD, 1 drop at 01/12/23 0830    melatonin tablet 6 mg, 6 mg, Oral, Nightly PRN, Dameon Sanchez III, MD    melatonin tablet 9 mg, 9 mg, Oral, Daily PRN, Fabiano Liang MD    metoprolol injection 5 mg, 5 mg, Intravenous, Q15 Min PRN, Lionel SALDIVAR. MONIKA Roach    metoprolol succinate (TOPROL-XL) 24 hr tablet 100 mg, 100 mg, Oral,  Daily, NED Iniguez, 100 mg at 01/12/23 0830    pantoprazole EC tablet 40 mg, 40 mg, Oral, Daily, Fabiano Liang MD, 40 mg at 01/12/23 0831    potassium chloride SA CR tablet 20 mEq, 20 mEq, Oral, TID, Fabiano Liang MD, 20 mEq at 01/12/23 0830    tamsulosin 24 hr capsule 0.4 mg, 1 capsule, Oral, Daily, Fabiano Liang MD, 0.4 mg at 01/12/23 0830    zolpidem tablet 5 mg, 5 mg, Oral, QHS, Fabiano Liang MD, 5 mg at 01/11/23 2052    VTE Risk Mitigation (From admission, onward)           Ordered     apixaban tablet 5 mg  2 times daily         01/07/23 1050     IP VTE HIGH RISK PATIENT  Once         01/07/23 0303     Place sequential compression device  Until discontinued         01/07/23 0303                  Assessment:   Acute on chronic systolic and diastolic HF  --improving  CMO/EF 35-40%     - ECHO (1.7.23) - Grade 1 DD, Mild AS, EF 35-40%    - ECHO (4.11.22) - LVEF 55-60%  Bilateral Pneumonitis (R > L)  Mildly Elevated Troponin (Flat)--likely due to recent ablation/decompensated heart failure  Hx of Asthma  PAF - Now with CVR    - CHADsVASc - 5 Points - 7.2% Stroke Risk per Year     - EP Study with Successful Ablation of Typical A.Flutter with Termination into SR (1.5.23)  VHD  --Mild AS-- JENNY 1.03 cm2; PV 1.99m/s; MG 10mmHg.   HTN - Controlled  MINDA/CPAP  Venous Insufficiency  Elevated Calcium Score/Mild Calcified Non-Obstructive CAD via [facility] (2020)    - [facility] (4.16.20) - Nonobstructive CAD with Normal LV Function and an EF of 55%  FLORENTINO/Bilaterally Mild-Moderate  Anxiety  GERD  OA  Obesity  No Hx of GIB per Chart Review    Plan:   ECHO Reviewed. EF 35-40% with G1 DD. Mild AS. Previous echo reported moderate AS. Will likely need repeat angiogram with valve study in future to determine need for valve replacement. May take place outpatient  Symptomatically improving. Continue lasix, Metoprolol succinate 100 mg daily. Unable to add ARNI due to marginal BPs at times.   Further uptitration  of HF medications post discharge.   Treatment for Pneumonia per Primary Team  Continue Eliquis Re Stroke Risk Reduction  Continue BB  and Statin Therapies  Continue amiodarone drip per protocol. Will plan for DCCV today. Consent obtained and placed on chart.     I,Kenny Waller MD,performed the substantive portion of this visit. I had a face-to-face time with the patient on 1/12/23. I reviewed and agree with the nurse practitioner's history, physical exam and plan of care.       Physical exam:    CV:Irreg, irreg  Resp:CTA B  Extremities: No C/C     Medical decision making:   We will proceed w/ DCCV after IV Amio loading to reobtain SR.  Reevaluate EF w/ SR.      NED Iniguez  Cardiology  [facility]  01/12/2023

## 2023-01-13 LAB
ANION GAP SERPL CALC-SCNC: 15 MEQ/L
BASOPHILS # BLD AUTO: 0.04 X10(3)/MCL (ref 0–0.2)
BASOPHILS NFR BLD AUTO: 0.5 %
BUN SERPL-MCNC: 20.3 MG/DL (ref 8.4–25.7)
CALCIUM SERPL-MCNC: 8.8 MG/DL (ref 8.8–10)
CHLORIDE SERPL-SCNC: 96 MMOL/L (ref 98–107)
CO2 SERPL-SCNC: 32 MMOL/L (ref 23–31)
CREAT SERPL-MCNC: 1.01 MG/DL (ref 0.73–1.18)
CREAT/UREA NIT SERPL: 20
EOSINOPHIL # BLD AUTO: 0.28 X10(3)/MCL (ref 0–0.9)
EOSINOPHIL NFR BLD AUTO: 3.8 %
ERYTHROCYTE [DISTWIDTH] IN BLOOD BY AUTOMATED COUNT: 14.2 % (ref 11.5–17)
GFR SERPLBLD CREATININE-BSD FMLA CKD-EPI: >60 MLS/MIN/1.73/M2
GLUCOSE SERPL-MCNC: 88 MG/DL (ref 82–115)
HCT VFR BLD AUTO: 43.9 % (ref 42–52)
HGB BLD-MCNC: 13.7 GM/DL (ref 14–18)
IMM GRANULOCYTES # BLD AUTO: 0.02 X10(3)/MCL (ref 0–0.04)
IMM GRANULOCYTES NFR BLD AUTO: 0.3 %
LYMPHOCYTES # BLD AUTO: 0.58 X10(3)/MCL (ref 0.6–4.6)
LYMPHOCYTES NFR BLD AUTO: 7.8 %
MCH RBC QN AUTO: 29.7 PG
MCHC RBC AUTO-ENTMCNC: 31.2 MG/DL (ref 33–36)
MCV RBC AUTO: 95 FL (ref 80–94)
MONOCYTES # BLD AUTO: 0.83 X10(3)/MCL (ref 0.1–1.3)
MONOCYTES NFR BLD AUTO: 11.2 %
NEUTROPHILS # BLD AUTO: 5.68 X10(3)/MCL (ref 2.1–9.2)
NEUTROPHILS NFR BLD AUTO: 76.4 %
NRBC BLD AUTO-RTO: 0 %
PLATELET # BLD AUTO: 192 X10(3)/MCL (ref 130–400)
PMV BLD AUTO: 10.4 FL (ref 7.4–10.4)
POTASSIUM SERPL-SCNC: 4.1 MMOL/L (ref 3.5–5.1)
RBC # BLD AUTO: 4.62 X10(6)/MCL (ref 4.7–6.1)
SODIUM SERPL-SCNC: 143 MMOL/L (ref 136–145)
WBC # SPEC AUTO: 7.4 X10(3)/MCL (ref 4.5–11.5)

## 2023-01-13 PROCEDURE — 93005 ELECTROCARDIOGRAM TRACING: CPT

## 2023-01-13 PROCEDURE — 93010 EKG 12-LEAD: ICD-10-PCS | Mod: ,,, | Performed by: STUDENT IN AN ORGANIZED HEALTH CARE EDUCATION/TRAINING PROGRAM

## 2023-01-13 PROCEDURE — 25000003 PHARM REV CODE 250: Performed by: NURSE PRACTITIONER

## 2023-01-13 PROCEDURE — 93010 ELECTROCARDIOGRAM REPORT: CPT | Mod: ,,, | Performed by: STUDENT IN AN ORGANIZED HEALTH CARE EDUCATION/TRAINING PROGRAM

## 2023-01-13 PROCEDURE — 25500020 PHARM REV CODE 255: Performed by: INTERNAL MEDICINE

## 2023-01-13 PROCEDURE — 21400001 HC TELEMETRY ROOM

## 2023-01-13 PROCEDURE — 85025 COMPLETE CBC W/AUTO DIFF WBC: CPT | Performed by: INTERNAL MEDICINE

## 2023-01-13 PROCEDURE — 25000242 PHARM REV CODE 250 ALT 637 W/ HCPCS: Performed by: INTERNAL MEDICINE

## 2023-01-13 PROCEDURE — 63600175 PHARM REV CODE 636 W HCPCS: Performed by: INTERNAL MEDICINE

## 2023-01-13 PROCEDURE — 25000003 PHARM REV CODE 250: Performed by: EMERGENCY MEDICINE

## 2023-01-13 PROCEDURE — 25000003 PHARM REV CODE 250: Performed by: INTERNAL MEDICINE

## 2023-01-13 PROCEDURE — 99232 SBSQ HOSP IP/OBS MODERATE 35: CPT | Mod: ,,, | Performed by: INTERNAL MEDICINE

## 2023-01-13 PROCEDURE — 99232 PR SUBSEQUENT HOSPITAL CARE,LEVL II: ICD-10-PCS | Mod: ,,, | Performed by: INTERNAL MEDICINE

## 2023-01-13 PROCEDURE — 63600175 PHARM REV CODE 636 W HCPCS: Performed by: NURSE PRACTITIONER

## 2023-01-13 PROCEDURE — 80048 BASIC METABOLIC PNL TOTAL CA: CPT | Performed by: INTERNAL MEDICINE

## 2023-01-13 PROCEDURE — 99900035 HC TECH TIME PER 15 MIN (STAT)

## 2023-01-13 PROCEDURE — 36415 COLL VENOUS BLD VENIPUNCTURE: CPT | Performed by: INTERNAL MEDICINE

## 2023-01-13 PROCEDURE — 94640 AIRWAY INHALATION TREATMENT: CPT

## 2023-01-13 PROCEDURE — 94761 N-INVAS EAR/PLS OXIMETRY MLT: CPT

## 2023-01-13 RX ORDER — IPRATROPIUM BROMIDE AND ALBUTEROL SULFATE 2.5; .5 MG/3ML; MG/3ML
3 SOLUTION RESPIRATORY (INHALATION) EVERY 12 HOURS
Status: DISCONTINUED | OUTPATIENT
Start: 2023-01-13 | End: 2023-01-13

## 2023-01-13 RX ORDER — LEVALBUTEROL INHALATION SOLUTION 0.63 MG/3ML
0.63 SOLUTION RESPIRATORY (INHALATION) EVERY 12 HOURS
Status: DISCONTINUED | OUTPATIENT
Start: 2023-01-13 | End: 2023-01-18

## 2023-01-13 RX ORDER — VALSARTAN 40 MG/1
40 TABLET ORAL DAILY
Status: DISCONTINUED | OUTPATIENT
Start: 2023-01-13 | End: 2023-01-20

## 2023-01-13 RX ADMIN — POTASSIUM CHLORIDE 20 MEQ: 1500 TABLET, EXTENDED RELEASE ORAL at 09:01

## 2023-01-13 RX ADMIN — LEVALBUTEROL HYDROCHLORIDE 0.63 MG: 0.63 SOLUTION RESPIRATORY (INHALATION) at 08:01

## 2023-01-13 RX ADMIN — ATORVASTATIN CALCIUM 20 MG: 10 TABLET, FILM COATED ORAL at 09:01

## 2023-01-13 RX ADMIN — AMIODARONE HYDROCHLORIDE 0.5 MG/MIN: 1.8 INJECTION, SOLUTION INTRAVENOUS at 08:01

## 2023-01-13 RX ADMIN — FAMOTIDINE 20 MG: 20 TABLET, FILM COATED ORAL at 09:01

## 2023-01-13 RX ADMIN — AMIODARONE HYDROCHLORIDE 150 MG: 1.5 INJECTION, SOLUTION INTRAVENOUS at 02:01

## 2023-01-13 RX ADMIN — DOCUSATE SODIUM 100 MG: 100 CAPSULE, LIQUID FILLED ORAL at 09:01

## 2023-01-13 RX ADMIN — APIXABAN 5 MG: 5 TABLET, FILM COATED ORAL at 09:01

## 2023-01-13 RX ADMIN — FUROSEMIDE 40 MG: 10 INJECTION, SOLUTION INTRAMUSCULAR; INTRAVENOUS at 10:01

## 2023-01-13 RX ADMIN — PERFLUTREN 3 ML: 6.52 INJECTION, SUSPENSION INTRAVENOUS at 12:01

## 2023-01-13 RX ADMIN — FAMOTIDINE 20 MG: 20 TABLET, FILM COATED ORAL at 08:01

## 2023-01-13 RX ADMIN — TAMSULOSIN HYDROCHLORIDE 0.4 MG: 0.4 CAPSULE ORAL at 09:01

## 2023-01-13 RX ADMIN — PANTOPRAZOLE SODIUM 40 MG: 40 TABLET, DELAYED RELEASE ORAL at 09:01

## 2023-01-13 RX ADMIN — LEVALBUTEROL HYDROCHLORIDE 0.63 MG: 0.63 SOLUTION RESPIRATORY (INHALATION) at 12:01

## 2023-01-13 RX ADMIN — AMIODARONE HYDROCHLORIDE 1 MG/MIN: 1.8 INJECTION, SOLUTION INTRAVENOUS at 02:01

## 2023-01-13 RX ADMIN — VALSARTAN 40 MG: 40 TABLET, FILM COATED ORAL at 02:01

## 2023-01-13 RX ADMIN — CETIRIZINE HYDROCHLORIDE 10 MG: 10 TABLET, FILM COATED ORAL at 09:01

## 2023-01-13 RX ADMIN — POTASSIUM CHLORIDE 20 MEQ: 1500 TABLET, EXTENDED RELEASE ORAL at 02:01

## 2023-01-13 RX ADMIN — ZOLPIDEM TARTRATE 5 MG: 5 TABLET ORAL at 08:01

## 2023-01-13 RX ADMIN — LEVALBUTEROL HYDROCHLORIDE 0.63 MG: 0.63 SOLUTION RESPIRATORY (INHALATION) at 04:01

## 2023-01-13 RX ADMIN — METOPROLOL SUCCINATE 100 MG: 50 TABLET, EXTENDED RELEASE ORAL at 09:01

## 2023-01-13 RX ADMIN — APIXABAN 5 MG: 5 TABLET, FILM COATED ORAL at 08:01

## 2023-01-13 RX ADMIN — LATANOPROST 1 DROP: 50 SOLUTION OPHTHALMIC at 09:01

## 2023-01-13 RX ADMIN — FLUTICASONE FUROATE AND VILANTEROL TRIFENATATE 1 PUFF: 200; 25 POWDER RESPIRATORY (INHALATION) at 09:01

## 2023-01-13 RX ADMIN — POTASSIUM CHLORIDE 20 MEQ: 1500 TABLET, EXTENDED RELEASE ORAL at 08:01

## 2023-01-13 NOTE — PROGRESS NOTES
Subjective: He feels about the same.  He is breathing comfortably.  He states he has not moved or walked much.  Cough has improved.      Objective:  He is afebrile.  Vital signs are stable.  Blood pressure most recently 120/77 with a heart rate of 80 and respiratory rate of 16.  O2 sats on room air between 90 and 96%.    General appearance is unremarkable.  He is in no distress.  Heart has an irregular rhythm with normal rate.  Lungs with bibasilar rales, right worse than left.  Abdomen nontender.  Extremities with 1 to 2+ pitting edema bilaterally.    Telemetry reveals paroxysmal atrial fib with controlled rate    Lab work is unremarkable.    Assessment: 1.  Pneumonitis.  Clearing.  No longer on antibiotics.  O2 sats adequate on room air     2. Congestive heart failure.  Combination systolic and diastolic dysfunction.  Also better but still edematous    3. Paroxysmal atrial fib.  He did undergo cardioversion yesterday.  He also had an ablation in the recent past     4. History of asthma and elevated left hemidiaphragm     Plan:  Will reduced DuoNebs as per recommendations from Pulmonary Medicine.  Will await further suggestions from Cardiology.  Clinically he is improving overall but still with atrial fib and still somewhat edematous and benefitting from intravenous Lasix.

## 2023-01-13 NOTE — PROGRESS NOTES
"  [facility]  [facility]  Progress Note    Patient Name: John Bullard  MRN: [id number]  Admission Date: 1/6/2023  Hospital Length of Stay: 6 days  Code Status: Full Code   Attending Provider: Fabiano Liang MD   Consulting Provider: NED Iniguez  Primary Care Physician: Fabiano Liang MD  Principal Problem:<principal problem not specified>    Patient information was obtained from patient, past medical records, and ER records.     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 The patient recently had an EP Study with Successful Ablation of Typical A.Flutter. He underwent this EP Study on 1.5.23 without complications and was discharged home in stable condition. Post operatively he had a sore throat and a cough which he attributed the ETT. His SOB worsened and he presented to the ER for evaluation. Upon arrival to the ER he was found to have a CXR with Infiltrates and a CT of the Chest with no Evidence of Pulmonary Embolism, however, he did have significant infiltrates in bilateral lungs but worse on the R side. He was admitted to his Primary Care Provider and [facility] was consulted for Elevated Troponin Levels and Recent Cardiac Procedure.     1.8.23: NAD. Converted into PAF Last Night. Remains with CVR. "I am feeling ok." + SOB, Improving. Denies CP and Palps. + Cough.  1.9.23: Patient sitting up at bedside. Conversational dyspnea. Bibasilar rales. Still requiring O2. Abdomen distended. 2+ peripheral edema. Afib 90's on tele.  1.10.23: Patient standing up at bedside. Reports breathing is better. Bibasilar crackles noted. Abdomen still firm distended. 2+ peripheral edema. Co2 rising with diuresis. Albumin 3.2  1.11.23: Patient sitting up at bedside. Reports continued [place]. No conversational dyspnea noted. Still in Afib, Rate controlled.   1/12/23: Patient underwent DCCV with " successful conversion to SR  1/13/23:       PMH: Anxiety, Asthma, PAF/Ablation , DJD, GERD, HTN, OA, Obesity, MINDA/CPAP, VI, VT, Insomnia, Elevated Calcium Score/Non-Obstructive CAD, FLORENTINO/Bilaterally Mild-Moderate  PSH: Lithotripsy, Sinus Surgery, Colonoscopy, Hernia Repair, EP Study/Ablation, Abdominal Surgery, Ideal Teeth Extraction   Family History: Reviewed and Unremarkable for Heart Disease  Social History: Denies Illicit Drug, ETOH and Tobacco    Previous Cardiac Diagnostics:   ECHO 1.7.23:  Concentric hypertrophy and mildly decreased systolic function.  The estimated ejection fraction is 35-40%.  Grade I left ventricular diastolic dysfunction.  With normal right ventricular systolic function.  There is mild aortic valve stenosis.  Aortic valve area is 1.03 cm2; peak velocity is 1.99 m/s; mean gradient is 10 mmHg.    Carotid US 8.17.22:  Mild Bilaterally and Tortuous Carotid Arteries with No Hemodynamically Significant FLORENTINO  < 50% Bilaterally    ECHO 4.11.22:  Limited Parasternal Windows  Normal LV Systolic Function: LVEF 55-60%  Moderate AS, Peak AV Velocity 3.2 m/sec, Mean Gradient 23mmHg, JENNY 1.4 cm2  Grade I DD  Mild TR with RVSP of 30mmHg    LHC 4.16.20:  LHC via R Radial Approach with Nonobstructive CAD with Normal LV Function and an EF of 55%. EDP was 20mmHg.  LM: patent vessel.  LAD: calcified type III vessel with 4 patent diagonal arteries. D1 is moderate to large in size with Proximal 50% stenosis. Remaining diagonals are small to medium size patent vessels  Lcx: Nondominant patent vessel that gives rise to a medium size OM1 with an early takeoff. The OM 2 is a medium to large size patent bifurcating vessel. The AV groove artery terminates in a small size patent OM3 with a small terminal bifurcating PL segment.   RCA: very large dominant patent vessel with mild Luminal irregularities. RCA terminates in a very large patent PDA and a large patent PL branch    PET 4.8.20:  The study quality is below  average.   This is an abnormal perfusion study. Study is consistent with mostly scar tissue with minimal ischemia.   Small fixed perfusion abnormality of moderate intensity in the apical segment. Small partially reversible perfusion abnormality of moderate intensity in the anterior septal region. Small fixed perfusion abnormality of moderate intensity in the inferior region.   This scan is suggestive of low to moderate risk for future cardiovascular events.   The left ventricular cavity is noted to be normal on the stress studies. The stress left ventricular ejection fraction was calculated to be 52% and left ventricular global function is normal. The rest left ventricular cavity is noted to be normal. The rest left ventricular ejection fraction was calculated to be 42% and rest left ventricular global function is mildly reduced.   When compared to the resting ejection fraction (42%), the stress ejection fraction (52%) has increased.     Review of patient's allergies indicates:   Allergen Reactions    Ativan [lorazepam] Hallucinations     Review of Systems   Constitutional:  Positive for appetite change. Negative for chills and fever.   Respiratory:  Negative for cough, chest tightness, shortness of breath, wheezing and stridor.    Cardiovascular:  Positive for leg swelling.   Gastrointestinal:  Positive for abdominal distention.   Genitourinary: Negative.    Skin: Negative.    Neurological: Negative.    Psychiatric/Behavioral: Negative.     All other systems reviewed and are negative.    Objective:     Vital Signs (Most Recent):  Temp: 98 °F (36.7 °C) (01/13/23 1301)  Pulse: 85 (01/13/23 1301)  Resp: 18 (01/13/23 1301)  BP: 106/67 (01/13/23 1301)  SpO2: (!) 90 % (01/13/23 1301)   Vital Signs (24h Range):  Temp:  [97.2 °F (36.2 °C)-98.2 °F (36.8 °C)] 98 °F (36.7 °C)  Pulse:  [78-95] 85  Resp:  [16-21] 18  SpO2:  [90 %-96 %] 90 %  BP: (102-125)/(64-85) 106/67     Weight: 110.8 kg (244 lb 4.3 oz)  Body mass index is  37.14 kg/m².    SpO2: (!) 90 %         Intake/Output Summary (Last 24 hours) at 1/13/2023 1322  Last data filed at 1/13/2023 1122  Gross per 24 hour   Intake 1610 ml   Output 3020 ml   Net -1410 ml       Lines/Drains/Airways       Peripheral Intravenous Line  Duration                  Peripheral IV - Single Lumen 01/13/23 0400 20 G Posterior;Right Hand <1 day                  Significant Labs:  Recent Results (from the past 72 hour(s))   Basic Metabolic Panel    Collection Time: 01/11/23  3:31 AM   Result Value Ref Range    Sodium Level 142 136 - 145 mmol/L    Potassium Level 3.7 3.5 - 5.1 mmol/L    Chloride 101 98 - 107 mmol/L    Carbon Dioxide 31 23 - 31 mmol/L    Glucose Level 85 82 - 115 mg/dL    Blood Urea Nitrogen 19.9 8.4 - 25.7 mg/dL    Creatinine 0.89 0.73 - 1.18 mg/dL    BUN/Creatinine Ratio 22     Calcium Level Total 8.8 8.8 - 10.0 mg/dL    Anion Gap 10.0 mEq/L    eGFR >60 mls/min/1.73/m2   BNP    Collection Time: 01/11/23  3:31 AM   Result Value Ref Range    Natriuretic Peptide 129.4 (H) <=100.0 pg/mL   CBC with Differential    Collection Time: 01/11/23  3:31 AM   Result Value Ref Range    WBC 7.2 4.5 - 11.5 x10(3)/mcL    RBC 4.43 (L) 4.70 - 6.10 x10(6)/mcL    Hgb 13.2 (L) 14.0 - 18.0 gm/dL    Hct 42.2 42.0 - 52.0 %    MCV 95.3 (H) 80.0 - 94.0 fL    MCH 29.8 pg    MCHC 31.3 (L) 33.0 - 36.0 mg/dL    RDW 14.2 11.5 - 17.0 %    Platelet 152 130 - 400 x10(3)/mcL    MPV 10.6 (H) 7.4 - 10.4 fL    Neut % 76.8 %    Lymph % 8.1 %    Mono % 11.0 %    Eos % 3.2 %    Basophil % 0.6 %    Lymph # 0.58 (L) 0.6 - 4.6 x10(3)/mcL    Neut # 5.52 2.1 - 9.2 x10(3)/mcL    Mono # 0.79 0.1 - 1.3 x10(3)/mcL    Eos # 0.23 0 - 0.9 x10(3)/mcL    Baso # 0.04 0 - 0.2 x10(3)/mcL    IG# 0.02 0 - 0.04 x10(3)/mcL    IG% 0.3 %    NRBC% 0.0 %   BNP    Collection Time: 01/12/23  4:42 AM   Result Value Ref Range    Natriuretic Peptide 80.9 <=100.0 pg/mL   Basic Metabolic Panel    Collection Time: 01/12/23  4:42 AM   Result Value Ref Range     Sodium Level 141 136 - 145 mmol/L    Potassium Level 3.8 3.5 - 5.1 mmol/L    Chloride 99 98 - 107 mmol/L    Carbon Dioxide 30 23 - 31 mmol/L    Glucose Level 95 82 - 115 mg/dL    Blood Urea Nitrogen 17.3 8.4 - 25.7 mg/dL    Creatinine 0.99 0.73 - 1.18 mg/dL    BUN/Creatinine Ratio 17     Calcium Level Total 9.0 8.8 - 10.0 mg/dL    Anion Gap 12.0 mEq/L    eGFR >60 mls/min/1.73/m2   Basic Metabolic Panel    Collection Time: 01/13/23  4:45 AM   Result Value Ref Range    Sodium Level 143 136 - 145 mmol/L    Potassium Level 4.1 3.5 - 5.1 mmol/L    Chloride 96 (L) 98 - 107 mmol/L    Carbon Dioxide 32 (H) 23 - 31 mmol/L    Glucose Level 88 82 - 115 mg/dL    Blood Urea Nitrogen 20.3 8.4 - 25.7 mg/dL    Creatinine 1.01 0.73 - 1.18 mg/dL    BUN/Creatinine Ratio 20     Calcium Level Total 8.8 8.8 - 10.0 mg/dL    Anion Gap 15.0 mEq/L    eGFR >60 mls/min/1.73/m2   CBC with Differential    Collection Time: 01/13/23  4:45 AM   Result Value Ref Range    WBC 7.4 4.5 - 11.5 x10(3)/mcL    RBC 4.62 (L) 4.70 - 6.10 x10(6)/mcL    Hgb 13.7 (L) 14.0 - 18.0 gm/dL    Hct 43.9 42.0 - 52.0 %    MCV 95.0 (H) 80.0 - 94.0 fL    MCH 29.7 pg    MCHC 31.2 (L) 33.0 - 36.0 mg/dL    RDW 14.2 11.5 - 17.0 %    Platelet 192 130 - 400 x10(3)/mcL    MPV 10.4 7.4 - 10.4 fL    Neut % 76.4 %    Lymph % 7.8 %    Mono % 11.2 %    Eos % 3.8 %    Basophil % 0.5 %    Lymph # 0.58 (L) 0.6 - 4.6 x10(3)/mcL    Neut # 5.68 2.1 - 9.2 x10(3)/mcL    Mono # 0.83 0.1 - 1.3 x10(3)/mcL    Eos # 0.28 0 - 0.9 x10(3)/mcL    Baso # 0.04 0 - 0.2 x10(3)/mcL    IG# 0.02 0 - 0.04 x10(3)/mcL    IG% 0.3 %    NRBC% 0.0 %   Echo    Collection Time: 01/13/23 11:55 AM   Result Value Ref Range    BSA 2.35 m2     Significant Imaging:  Imaging Results              CTA Chest Non-Coronary (PE Studies) (Final result)  Result time 01/07/23 09:25:24      Final result by Oswaldo Orellana MD (01/07/23 09:25:24)                   Impression:      1. Negative for pulmonary thromboembolic disease.  2.  Small right and trace left pleural effusions.  3. Bibasilar consolidation at least partially relates to atelectasis but superimposed infection possible.  4. Small volume ascites.  5.  No significant discrepancy with the preliminary report.      Electronically signed by: Oswaldo Orellana  Date:    01/07/2023  Time:    09:25               Narrative:    EXAMINATION:  CTA CHEST NON CORONARY (PE STUDIES)    CLINICAL HISTORY:  Pulmonary embolism (PE) suspected, unknown D-dimer;    TECHNIQUE:  Helical acquisition through the chest with IV contrast targeting the pulmonary arteries. Multiplanar and 3D MIP reconstructed images were provided for review.  mGycm. Automatic exposure control, adjustment of mA/kV or iterative reconstruction technique was used to reduce radiation.    COMPARISON:  21 May 2020.    FINDINGS:  There is good opacification of the pulmonary arterial tree. There is no evidence of pulmonary thromboembolism.  There is no aortic dissection.    There is no mediastinal, hilar or axillary lymphadenopathy by size criteria.    Heart size upper limit normal.  There are coronary artery calcifications.  No pericardial effusion.    Small right and trace left pleural effusions.  There is bibasilar atelectasis.  There are additional areas of bibasilar consolidation where infection is possible.  Upper lungs are clear.    There is small volume ascites.  There are no acute osseous findings.                        Preliminary result by Oswaldo Orellana MD (01/07/23 01:34:29)                   Narrative:    START OF REPORT:  Technique: CT Scan of the chest was performed with intravenous contrast with direct axial images as well as sagittal and coronal reconstruction images pulmonary embolus protocol.    Dosage Information: Automated Exposure Control was utilized.    Comparison: None.    Clinical History: Sob.    Findings:  Artifact: Mild motion artifact is seen on multiple images.  Soft Tissues: Unremarkable.  Axilla: A few  prominent lymph nodes are seen in the axilla.  Neck: The visualized soft tissues of the neck appear unremarkable. The isthmus of the thyroid gland is mildly prominent with a hypodense nodule noted within containing a punctate calcification. Correlate with clinical and laboratory findings as regards further evaluation and follow up.  Mediastinum: A few subcentimeter mediastinal lymph nodes are seen pretracheal paratracheal precarinal and subcarinal spaces . The largest is in pre carinal space and measures â10.2 mmâ in least dimension. This suggests reactive lymphadenopathy. Correlate with clinical and laboratory findings as regards further evaluation and follow up.  Heart: The heart size is within normal limits.  Aorta: Unremarkable appearing aorta.  Pulmonary Arteries: No filling defects are seen in the pulmonary arteries to suggest pulmonary embolus.  Lungs: There are bilateral basal posteromedial areas of dense consolidation noted, left greater than right. These features are suggestive of multifocal pneumonia with aspiration component a consideration.  Pleura: There is a small right sided pleural effusion. No pneumothorax is identified.  Bony Structures:  Spine: Mild spondylolytic changes are seen in the thoracic spine.  Ribs: The ribs appear unremarkable.  Abdomen: There is some free fluid noted in the left upper quadrant around the spleen. Correlate with clinical and laboratory findings as regards further evaluation and follow up.      Impression:  1. No filling defects are seen in the pulmonary arteries to suggest pulmonary embolus.  2. There is a small right sided pleural effusion.  3. There are bilateral basal posteromedial areas of dense consolidation noted, left greater than right. These features are suggestive of multifocal pneumonia with aspiration component a consideration. Correlate with clinical and laboratory findings as regards additional evaluation and follow-up to full resolution as  indicated.  4. Details and other findings as discussed above.                          Preliminary result by Alex Luciano MD (01/07/23 01:34:29)                   Narrative:    START OF REPORT:  Technique: CT Scan of the chest was performed with intravenous contrast with direct axial images as well as sagittal and coronal reconstruction images pulmonary embolus protocol.    Dosage Information: Automated Exposure Control was utilized.    Comparison: None.    Clinical History: Sob.    Findings:  Artifact: Mild motion artifact is seen on multiple images.  Soft Tissues: Unremarkable.  Axilla: A few prominent lymph nodes are seen in the axilla.  Neck: The visualized soft tissues of the neck appear unremarkable. The isthmus of the thyroid gland is mildly prominent with a hypodense nodule noted within containing a punctate calcification. Correlate with clinical and laboratory findings as regards further evaluation and follow up.  Mediastinum: A few subcentimeter mediastinal lymph nodes are seen pretracheal paratracheal precarinal and subcarinal spaces . The largest is in pre carinal space and measures â10.2 mmâ in least dimension. This suggests reactive lymphadenopathy. Correlate with clinical and laboratory findings as regards further evaluation and follow up.  Heart: The heart size is within normal limits.  Aorta: Unremarkable appearing aorta.  Pulmonary Arteries: No filling defects are seen in the pulmonary arteries to suggest pulmonary embolus.  Lungs: There are bilateral basal posteromedial areas of dense consolidation noted, left greater than right. These features are suggestive of multifocal pneumonia with aspiration component a consideration.  Pleura: There is a small right sided pleural effusion. No pneumothorax is identified.  Bony Structures:  Spine: Mild spondylolytic changes are seen in the thoracic spine.  Ribs: The ribs appear unremarkable.  Abdomen: There is some free fluid noted in the left upper  quadrant around the spleen. Correlate with clinical and laboratory findings as regards further evaluation and follow up.      Impression:  1. No filling defects are seen in the pulmonary arteries to suggest pulmonary embolus.  2. There is a small right sided pleural effusion.  3. There are bilateral basal posteromedial areas of dense consolidation noted, left greater than right. These features are suggestive of multifocal pneumonia with aspiration component a consideration. Correlate with clinical and laboratory findings as regards additional evaluation and follow-up to full resolution as indicated.  4. Details and other findings as discussed above.                                         X-Ray Chest PA And Lateral (Final result)  Result time 01/07/23 10:02:00      Final result by Wong Brewre MD (01/07/23 10:02:00)                   Impression:      Right lower lobe consolidative changes.  Query symptomatology.      Electronically signed by: Wong Brewer MD  Date:    01/07/2023  Time:    10:02               Narrative:    EXAMINATION:  XR CHEST PA AND LATERAL    CLINICAL HISTORY:  Chest Pain;    TECHNIQUE:  PA and lateral views of the chest were performed.    COMPARISON:  08/09/2022    FINDINGS:  Heart size enlarged the pulmonary vasculature is congested.    Bibasilar atelectatic changes lungs with persistent elevation of the left hemidiaphragm.  Concern for developing consolidation in the right lower lobe.                                    Telemetry:   Afib 70s      Physical Exam  Constitutional:       Appearance: Normal appearance.   HENT:      Head: Normocephalic.      Mouth/Throat:      Mouth: Mucous membranes are moist.   Eyes:      Extraocular Movements: Extraocular movements intact.   Cardiovascular:      Rate and Rhythm: Normal rate. Rhythm irregular.      Pulses: Normal pulses.      Heart sounds: Murmur heard.   Pulmonary:      Breath sounds: Rales present.      Comments: NC O2  Conversational  dyspnea  Abdominal:      General: There is distension.      Tenderness: There is no abdominal tenderness.   Musculoskeletal:         General: Normal range of motion.      Right lower leg: Edema present.      Left lower leg: Edema present.   Skin:     General: Skin is warm and dry.   Neurological:      General: No focal deficit present.      Mental Status: He is alert and oriented to person, place, and time. Mental status is at baseline.   Psychiatric:         Mood and Affect: Mood normal.         Behavior: Behavior normal.     Current Inpatient Medications:    Current Facility-Administered Medications:     acetaminophen tablet 650 mg, 650 mg, Oral, Q6H PRN, Fabiano Liang MD, 650 mg at 01/08/23 1036    albuterol inhaler 2 puff, 2 puff, Inhalation, Q4H PRN, Fabiano Liang MD    ALPRAZolam tablet 0.5 mg, 0.5 mg, Oral, BID PRN, Fabiano Liang MD    apixaban tablet 5 mg, 5 mg, Oral, BID, Fabiano Liang MD, 5 mg at 01/13/23 0905    atorvastatin tablet 20 mg, 20 mg, Oral, Daily, Fabiano Liang MD, 20 mg at 01/13/23 0902    cetirizine tablet 10 mg, 10 mg, Oral, Daily, Fabiano Liang MD, 10 mg at 01/13/23 0903    docusate sodium capsule 100 mg, 100 mg, Oral, Daily, Fabiano Liang MD, 100 mg at 01/13/23 0901    famotidine tablet 20 mg, 20 mg, Oral, BID, Dameon Sanchez III, MD, 20 mg at 01/13/23 0905    fluticasone furoate-vilanteroL 200-25 mcg/dose diskus inhaler 1 puff, 1 puff, Inhalation, Daily, Fabiano Liang MD, 1 puff at 01/13/23 0905    furosemide injection 40 mg, 40 mg, Intravenous, Daily, Fabiano Liang MD, 40 mg at 01/13/23 1018    latanoprost 0.005 % ophthalmic solution 1 drop, 1 drop, Both Eyes, Daily, Fabiano Liagn MD, 1 drop at 01/13/23 0905    levalbuterol nebulizer solution 0.63 mg, 0.63 mg, Nebulization, Q12H, Fabiano Liang MD    melatonin tablet 6 mg, 6 mg, Oral, Nightly PRN, Dameon Sanchez III, MD    melatonin tablet 9 mg, 9 mg, Oral,  Daily PRN, Fabiano Liang MD    metoprolol injection 5 mg, 5 mg, Intravenous, Q15 Min PRN, Lionel SALDIVAR. MONIKA Roach    metoprolol succinate (TOPROL-XL) 24 hr tablet 100 mg, 100 mg, Oral, Daily, Rika Lake, FNP, 100 mg at 01/13/23 0903    pantoprazole EC tablet 40 mg, 40 mg, Oral, Daily, Fabiano Liang MD, 40 mg at 01/13/23 0903    potassium chloride SA CR tablet 20 mEq, 20 mEq, Oral, TID, Fabiano Liang MD, 20 mEq at 01/13/23 0905    tamsulosin 24 hr capsule 0.4 mg, 1 capsule, Oral, Daily, Fabiano Liang MD, 0.4 mg at 01/13/23 0904    zolpidem tablet 5 mg, 5 mg, Oral, QHS, Fabiano Liang MD, 5 mg at 01/12/23 2042    VTE Risk Mitigation (From admission, onward)           Ordered     apixaban tablet 5 mg  2 times daily         01/07/23 1050     IP VTE HIGH RISK PATIENT  Once         01/07/23 0303     Place sequential compression device  Until discontinued         01/07/23 0303                  Assessment:   Acute on chronic systolic and diastolic HF  --decompensated  CMO/EF 35-40%     - ECHO (1.7.23) - Grade 1 DD, Mild AS, EF 35-40%    - ECHO (4.11.22) - LVEF 55-60%  Bilateral Pneumonitis (R > L)  Mildly Elevated Troponin (Flat)--likely due to recent ablation/decompensated heart failure  Hx of Asthma  PAF - Now with CVR    - CHADsVASc - 5 Points - 7.2% Stroke Risk per Year     - EP Study with Successful Ablation of Typical A.Flutter with Termination into SR (1.5.23)  VHD  --Mild AS-- JENNY 1.03 cm2; PV 1.99m/s; MG 10mmHg.   HTN - Controlled  MINDA/CPAP  Venous Insufficiency  Elevated Calcium Score/Mild Calcified Non-Obstructive CAD via [facility] (2020)    - [facility] (4.16.20) - Nonobstructive CAD with Normal LV Function and an EF of 55%  FLORENTINO/Bilaterally Mild-Moderate  Anxiety  GERD  OA  Obesity  No Hx of GIB per Chart Review    Plan:   ECHO Reviewed. EF 35-40% with G1 DD. Mild AS. Previous echo reported moderate AS. Will likely need repeat angiogram with valve study in future to determine need for  valve replacement. May take place outpatient  Still appears overloaded. Continue lasix, Metoprolol succinate 100 mg daily. Add Valsartan 40 mg daily  Further uptitration of HF medications post discharge.   Continue Eliquis Lilly Stroke Risk Reduction  Continue BB  and Statin Therapies  Resume amiodarone drip per protocol. Continue zairaquis    NED Iniguez  Cardiology  [facility]  01/13/2023

## 2023-01-13 NOTE — PROGRESS NOTES
"Inpatient Nutrition Evaluation    Admit Date: 1/6/2023   Total duration of encounter: 7 days    Nutrition Recommendation/Prescription     Continue regular diet as tolerated  RD to monitor po intake and weight changes    Nutrition Assessment     Chart Review    Reason Seen: length of stay    Malnutrition Screening Tool Results   Have you recently lost weight without trying?: No  Have you been eating poorly because of a decreased appetite?: No   MST Score: 0     Diagnosis:  Pneumonitis  Congestive HF  Chronic afib  SOB    Relevant Medical History: asthma, elevated L hemidiaphragm, PAF, GERD, HTN, MINDA, CAD    Nutrition-Related Medications: Colace daily, Pepcid BID, Lasix daily, Protonix daily, KCl TID    Nutrition-Related Labs: 1/13: RBC-4.62, Hgb-13.7, Cl-96      Diet Order: Diet Adult Regular  Oral Supplement Order: none  Appetite/Oral Intake: good/% of meals  Factors Affecting Nutritional Intake: none identified  Food/Mormonism/Cultural Preferences: none reported  Food Allergies: none reported    Skin Integrity: bruised (ecchymotic), puncture  Wound(s):       Comments    1/13: pt reports good appetite, eating most of meals, with no n/v/d/c. Weight history not obtained at this time; pt had to use the bathroom. Weight of 110.8 kg (244 lb) on 1/13 and admit weight of 248 lb on 1/6. Weight fluctuation noted per previous weights, likely fluid related. Pt diuresed with lasix inpatient. Previous weight of 243 lb on 9/29/22. Weight stable at this time. Will continue to monitor.    Anthropometrics    Height: 5' 8" (172.7 cm) Height Method: Stated  Last Weight: 110.8 kg (244 lb 4.3 oz) (01/13/23 0600) Weight Method: Standard Scale  BMI (Calculated): 37.1  BMI Classification: obese grade II (BMI 35-39.9)        Ideal Body Weight (IBW), Male: 154 lb     % Ideal Body Weight, Male (lb): 165.06 %                          Usual Weight Provided By: EMR weight history    Wt Readings from Last 3 Encounters:   01/13/23 0600 110.8 " kg (244 lb 4.3 oz)   01/12/23 0537 111.2 kg (245 lb 2.4 oz)   01/09/23 0406 114 kg (251 lb 5.2 oz)   01/07/23 1308 115.3 kg (254 lb 3.1 oz)   01/07/23 0400 115.3 kg (254 lb 1.6 oz)   01/06/23 2309 112.5 kg (248 lb 0.3 oz)   01/05/23 0626 110.1 kg (242 lb 11.6 oz)   12/29/22 1505 113.4 kg (250 lb)   12/22/22 1407 114.2 kg (251 lb 12.8 oz)      Weight Change(s) Since Admission: -1.5%  Admit Weight: 112.5 kg (248 lb 0.3 oz) (01/06/23 2309)      Patient Education    Not applicable.    Monitoring & Evaluation     Dietitian will monitor food and beverage intake and weight change.  Nutrition Risk/Follow-Up: low (follow-up in 5-7 days)  Patients assigned 'low nutrition risk' status do not qualify for a full nutritional assessment but will be monitored and re-evaluated in a 5-7 day time period. Please consult if re-evaluation needed sooner.    Mariana Fajardo, Registration Eligible, Provisional LDN

## 2023-01-14 PROBLEM — I50.40 COMBINED SYSTOLIC AND DIASTOLIC CONGESTIVE HEART FAILURE: Status: ACTIVE | Noted: 2023-01-14

## 2023-01-14 LAB
ANION GAP SERPL CALC-SCNC: 11 MEQ/L
BASOPHILS # BLD AUTO: 0.04 X10(3)/MCL (ref 0–0.2)
BASOPHILS NFR BLD AUTO: 0.5 %
BSA FOR ECHO PROCEDURE: 2.35 M2
BUN SERPL-MCNC: 17.8 MG/DL (ref 8.4–25.7)
CALCIUM SERPL-MCNC: 9 MG/DL (ref 8.8–10)
CHLORIDE SERPL-SCNC: 100 MMOL/L (ref 98–107)
CO2 SERPL-SCNC: 30 MMOL/L (ref 23–31)
CREAT SERPL-MCNC: 1.05 MG/DL (ref 0.73–1.18)
CREAT/UREA NIT SERPL: 17
EOSINOPHIL # BLD AUTO: 0.24 X10(3)/MCL (ref 0–0.9)
EOSINOPHIL NFR BLD AUTO: 3.1 %
ERYTHROCYTE [DISTWIDTH] IN BLOOD BY AUTOMATED COUNT: 14.3 % (ref 11.5–17)
GFR SERPLBLD CREATININE-BSD FMLA CKD-EPI: >60 MLS/MIN/1.73/M2
GLUCOSE SERPL-MCNC: 107 MG/DL (ref 82–115)
HCT VFR BLD AUTO: 46.1 % (ref 42–52)
HGB BLD-MCNC: 14.4 GM/DL (ref 14–18)
IMM GRANULOCYTES # BLD AUTO: 0.03 X10(3)/MCL (ref 0–0.04)
IMM GRANULOCYTES NFR BLD AUTO: 0.4 %
LYMPHOCYTES # BLD AUTO: 0.47 X10(3)/MCL (ref 0.6–4.6)
LYMPHOCYTES NFR BLD AUTO: 6 %
MCH RBC QN AUTO: 29.8 PG
MCHC RBC AUTO-ENTMCNC: 31.2 MG/DL (ref 33–36)
MCV RBC AUTO: 95.4 FL (ref 80–94)
MONOCYTES # BLD AUTO: 0.86 X10(3)/MCL (ref 0.1–1.3)
MONOCYTES NFR BLD AUTO: 10.9 %
NEUTROPHILS # BLD AUTO: 6.22 X10(3)/MCL (ref 2.1–9.2)
NEUTROPHILS NFR BLD AUTO: 79.1 %
NRBC BLD AUTO-RTO: 0 %
PLATELET # BLD AUTO: 194 X10(3)/MCL (ref 130–400)
PMV BLD AUTO: 10 FL (ref 7.4–10.4)
POTASSIUM SERPL-SCNC: 4.3 MMOL/L (ref 3.5–5.1)
RBC # BLD AUTO: 4.83 X10(6)/MCL (ref 4.7–6.1)
SODIUM SERPL-SCNC: 141 MMOL/L (ref 136–145)
WBC # SPEC AUTO: 7.9 X10(3)/MCL (ref 4.5–11.5)

## 2023-01-14 PROCEDURE — 63600175 PHARM REV CODE 636 W HCPCS: Performed by: NURSE PRACTITIONER

## 2023-01-14 PROCEDURE — 80048 BASIC METABOLIC PNL TOTAL CA: CPT | Performed by: NURSE PRACTITIONER

## 2023-01-14 PROCEDURE — 25000003 PHARM REV CODE 250: Performed by: NURSE PRACTITIONER

## 2023-01-14 PROCEDURE — 36415 COLL VENOUS BLD VENIPUNCTURE: CPT | Performed by: NURSE PRACTITIONER

## 2023-01-14 PROCEDURE — 99232 SBSQ HOSP IP/OBS MODERATE 35: CPT | Mod: ,,, | Performed by: INTERNAL MEDICINE

## 2023-01-14 PROCEDURE — 85025 COMPLETE CBC W/AUTO DIFF WBC: CPT | Performed by: NURSE PRACTITIONER

## 2023-01-14 PROCEDURE — 94761 N-INVAS EAR/PLS OXIMETRY MLT: CPT

## 2023-01-14 PROCEDURE — 99232 PR SUBSEQUENT HOSPITAL CARE,LEVL II: ICD-10-PCS | Mod: ,,, | Performed by: INTERNAL MEDICINE

## 2023-01-14 PROCEDURE — 25000003 PHARM REV CODE 250: Performed by: INTERNAL MEDICINE

## 2023-01-14 PROCEDURE — 21400001 HC TELEMETRY ROOM

## 2023-01-14 PROCEDURE — 25000003 PHARM REV CODE 250: Performed by: EMERGENCY MEDICINE

## 2023-01-14 PROCEDURE — 25000242 PHARM REV CODE 250 ALT 637 W/ HCPCS: Performed by: INTERNAL MEDICINE

## 2023-01-14 PROCEDURE — 63600175 PHARM REV CODE 636 W HCPCS: Performed by: INTERNAL MEDICINE

## 2023-01-14 PROCEDURE — 94640 AIRWAY INHALATION TREATMENT: CPT

## 2023-01-14 RX ORDER — AMIODARONE HYDROCHLORIDE 200 MG/1
200 TABLET ORAL DAILY
Status: DISCONTINUED | OUTPATIENT
Start: 2023-01-20 | End: 2023-01-21 | Stop reason: HOSPADM

## 2023-01-14 RX ORDER — AMIODARONE HYDROCHLORIDE 200 MG/1
200 TABLET ORAL 2 TIMES DAILY
Status: COMPLETED | OUTPATIENT
Start: 2023-01-17 | End: 2023-01-19

## 2023-01-14 RX ORDER — AMIODARONE HYDROCHLORIDE 200 MG/1
400 TABLET ORAL 2 TIMES DAILY
Status: COMPLETED | OUTPATIENT
Start: 2023-01-14 | End: 2023-01-16

## 2023-01-14 RX ADMIN — POTASSIUM CHLORIDE 20 MEQ: 1500 TABLET, EXTENDED RELEASE ORAL at 02:01

## 2023-01-14 RX ADMIN — ZOLPIDEM TARTRATE 5 MG: 5 TABLET ORAL at 09:01

## 2023-01-14 RX ADMIN — ALPRAZOLAM 0.5 MG: 0.5 TABLET ORAL at 02:01

## 2023-01-14 RX ADMIN — AMIODARONE HYDROCHLORIDE 400 MG: 200 TABLET ORAL at 09:01

## 2023-01-14 RX ADMIN — FAMOTIDINE 20 MG: 20 TABLET, FILM COATED ORAL at 08:01

## 2023-01-14 RX ADMIN — LEVALBUTEROL HYDROCHLORIDE 0.63 MG: 0.63 SOLUTION RESPIRATORY (INHALATION) at 08:01

## 2023-01-14 RX ADMIN — AMIODARONE HYDROCHLORIDE 0.5 MG/MIN: 1.8 INJECTION, SOLUTION INTRAVENOUS at 08:01

## 2023-01-14 RX ADMIN — METOPROLOL SUCCINATE 100 MG: 50 TABLET, EXTENDED RELEASE ORAL at 08:01

## 2023-01-14 RX ADMIN — FAMOTIDINE 20 MG: 20 TABLET, FILM COATED ORAL at 09:01

## 2023-01-14 RX ADMIN — VALSARTAN 40 MG: 40 TABLET, FILM COATED ORAL at 08:01

## 2023-01-14 RX ADMIN — FUROSEMIDE 40 MG: 10 INJECTION, SOLUTION INTRAMUSCULAR; INTRAVENOUS at 08:01

## 2023-01-14 RX ADMIN — APIXABAN 5 MG: 5 TABLET, FILM COATED ORAL at 08:01

## 2023-01-14 RX ADMIN — ATORVASTATIN CALCIUM 20 MG: 10 TABLET, FILM COATED ORAL at 08:01

## 2023-01-14 RX ADMIN — POTASSIUM CHLORIDE 20 MEQ: 1500 TABLET, EXTENDED RELEASE ORAL at 09:01

## 2023-01-14 RX ADMIN — APIXABAN 5 MG: 5 TABLET, FILM COATED ORAL at 09:01

## 2023-01-14 RX ADMIN — CETIRIZINE HYDROCHLORIDE 10 MG: 10 TABLET, FILM COATED ORAL at 08:01

## 2023-01-14 RX ADMIN — DOCUSATE SODIUM 100 MG: 100 CAPSULE, LIQUID FILLED ORAL at 08:01

## 2023-01-14 RX ADMIN — PANTOPRAZOLE SODIUM 40 MG: 40 TABLET, DELAYED RELEASE ORAL at 08:01

## 2023-01-14 RX ADMIN — FLUTICASONE FUROATE AND VILANTEROL TRIFENATATE 1 PUFF: 200; 25 POWDER RESPIRATORY (INHALATION) at 08:01

## 2023-01-14 RX ADMIN — TAMSULOSIN HYDROCHLORIDE 0.4 MG: 0.4 CAPSULE ORAL at 08:01

## 2023-01-14 RX ADMIN — AMIODARONE HYDROCHLORIDE 400 MG: 200 TABLET ORAL at 01:01

## 2023-01-14 RX ADMIN — LATANOPROST 1 DROP: 50 SOLUTION OPHTHALMIC at 08:01

## 2023-01-14 RX ADMIN — POTASSIUM CHLORIDE 20 MEQ: 1500 TABLET, EXTENDED RELEASE ORAL at 08:01

## 2023-01-14 RX ADMIN — LEVALBUTEROL HYDROCHLORIDE 0.63 MG: 0.63 SOLUTION RESPIRATORY (INHALATION) at 07:01

## 2023-01-14 NOTE — PROGRESS NOTES
"  [facility] General  Cardiology  Progress Note    Patient Name: John Bullard  MRN: [id number]  Admission Date: 1/6/2023  Hospital Length of Stay: 7 days  Code Status: Full Code   Attending Provider: Fabiano Liang MD   Consulting Provider: NED Iniguez  Primary Care Physician: Fabiano Liang MD  Principal Problem:<principal problem not specified>    Patient information was obtained from patient, past medical records, and ER records.     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 The patient recently had an EP Study with Successful Ablation of Typical A.Flutter. He underwent this EP Study on 1.5.23 without complications and was discharged home in stable condition. Post operatively he had a sore throat and a cough which he attributed the ETT. His SOB worsened and he presented to the ER for evaluation. Upon arrival to the ER he was found to have a CXR with Infiltrates and a CT of the Chest with no Evidence of Pulmonary Embolism, however, he did have significant infiltrates in bilateral lungs but worse on the R side. He was admitted to his Primary Care Provider and [facility] was consulted for Elevated Troponin Levels and Recent Cardiac Procedure.     1.8.23: NAD. Converted into PAF Last Night. Remains with CVR. "I am feeling ok." + SOB, Improving. Denies CP and Palps. + Cough.  1.9.23: Patient sitting up at bedside. Conversational dyspnea. Bibasilar rales. Still requiring O2. Abdomen distended. 2+ peripheral edema. Afib 90's on tele.  1.10.23: Patient standing up at bedside. Reports breathing is better. Bibasilar crackles noted. Abdomen still firm distended. 2+ peripheral edema. Co2 rising with diuresis. Albumin 3.2  1.11.23: Patient sitting up at bedside. Reports continued [place]. No conversational dyspnea noted. Still in Afib, Rate controlled.   1/12/23: Patient underwent DCCV with " successful conversion to SR  1/13/23:       PMH: Anxiety, Asthma, PAF/Ablation , DJD, GERD, HTN, OA, Obesity, MINDA/CPAP, VI, VT, Insomnia, Elevated Calcium Score/Non-Obstructive CAD, FLORENTINO/Bilaterally Mild-Moderate  PSH: Lithotripsy, Sinus Surgery, Colonoscopy, Hernia Repair, EP Study/Ablation, Abdominal Surgery, [place] Teeth Extraction   Family History: Reviewed and Unremarkable for Heart Disease  Social History: Denies Illicit Drug, ETOH and Tobacco    Previous Cardiac Diagnostics:   ECHO 1.7.23:  Concentric hypertrophy and mildly decreased systolic function.  The estimated ejection fraction is 35-40%.  Grade I left ventricular diastolic dysfunction.  With normal right ventricular systolic function.  There is mild aortic valve stenosis.  Aortic valve area is 1.03 cm2; peak velocity is 1.99 m/s; mean gradient is 10 mmHg.    Carotid US 8.17.22:  Mild Bilaterally and Tortuous Carotid Arteries with No Hemodynamically Significant FLORENTINO  < 50% Bilaterally    ECHO 4.11.22:  Limited Parasternal Windows  Normal LV Systolic Function: LVEF 55-60%  Moderate AS, Peak AV Velocity 3.2 m/sec, Mean Gradient 23mmHg, JENNY 1.4 cm2  Grade I DD  Mild TR with RVSP of 30mmHg    LHC 4.16.20:  LHC via R Radial Approach with Nonobstructive CAD with Normal LV Function and an EF of 55%. EDP was 20mmHg.  LM: patent vessel.  LAD: calcified type III vessel with 4 patent diagonal arteries. D1 is moderate to large in size with Proximal 50% stenosis. Remaining diagonals are small to medium size patent vessels  Lcx: Nondominant patent vessel that gives rise to a medium size OM1 with an early takeoff. The OM 2 is a medium to large size patent bifurcating vessel. The AV groove artery terminates in a small size patent OM3 with a small terminal bifurcating PL segment.   RCA: very large dominant patent vessel with mild Luminal irregularities. RCA terminates in a very large patent PDA and a large patent PL branch    PET 4.8.20:  The study quality is below  average.   This is an abnormal perfusion study. Study is consistent with mostly scar tissue with minimal ischemia.   Small fixed perfusion abnormality of moderate intensity in the apical segment. Small partially reversible perfusion abnormality of moderate intensity in the anterior septal region. Small fixed perfusion abnormality of moderate intensity in the inferior region.   This scan is suggestive of low to moderate risk for future cardiovascular events.   The left ventricular cavity is noted to be normal on the stress studies. The stress left ventricular ejection fraction was calculated to be 52% and left ventricular global function is normal. The rest left ventricular cavity is noted to be normal. The rest left ventricular ejection fraction was calculated to be 42% and rest left ventricular global function is mildly reduced.   When compared to the resting ejection fraction (42%), the stress ejection fraction (52%) has increased.     Review of patient's allergies indicates:   Allergen Reactions    Ativan [lorazepam] Hallucinations     Review of Systems   Constitutional:  Negative for chills and fever.   Respiratory:  Negative for cough, chest tightness, shortness of breath, wheezing and stridor.    Cardiovascular:  Positive for leg swelling.   Gastrointestinal:  Positive for abdominal distention.   Genitourinary: Negative.    Skin: Negative.    Neurological: Negative.    Psychiatric/Behavioral: Negative.     All other systems reviewed and are negative.    Objective:     Vital Signs (Most Recent):  Temp: 98.2 °F (36.8 °C) (01/14/23 0738)  Pulse: 74 (01/14/23 0846)  Resp: 18 (01/14/23 0846)  BP: 121/76 (01/14/23 0824)  SpO2: 97 % (01/14/23 0846)   Vital Signs (24h Range):  Temp:  [97.5 °F (36.4 °C)-98.3 °F (36.8 °C)] 98.2 °F (36.8 °C)  Pulse:  [74-93] 74  Resp:  [18-21] 18  SpO2:  [90 %-97 %] 97 %  BP: (106-126)/(67-79) 121/76     Weight: 108.9 kg (240 lb 1.3 oz)  Body mass index is 36.5 kg/m².    SpO2: 97 %          Intake/Output Summary (Last 24 hours) at 1/14/2023 1115  Last data filed at 1/14/2023 1030  Gross per 24 hour   Intake 840 ml   Output 2675 ml   Net -1835 ml       Lines/Drains/Airways       Peripheral Intravenous Line  Duration                  Peripheral IV - Single Lumen 01/13/23 0400 20 G Posterior;Right Hand 1 day         Peripheral IV - Single Lumen 01/13/23 1347 20 G Anterior;Right Upper Arm <1 day                  Significant Labs:  Recent Results (from the past 72 hour(s))   BNP    Collection Time: 01/12/23  4:42 AM   Result Value Ref Range    Natriuretic Peptide 80.9 <=100.0 pg/mL   Basic Metabolic Panel    Collection Time: 01/12/23  4:42 AM   Result Value Ref Range    Sodium Level 141 136 - 145 mmol/L    Potassium Level 3.8 3.5 - 5.1 mmol/L    Chloride 99 98 - 107 mmol/L    Carbon Dioxide 30 23 - 31 mmol/L    Glucose Level 95 82 - 115 mg/dL    Blood Urea Nitrogen 17.3 8.4 - 25.7 mg/dL    Creatinine 0.99 0.73 - 1.18 mg/dL    BUN/Creatinine Ratio 17     Calcium Level Total 9.0 8.8 - 10.0 mg/dL    Anion Gap 12.0 mEq/L    eGFR >60 mls/min/1.73/m2   Basic Metabolic Panel    Collection Time: 01/13/23  4:45 AM   Result Value Ref Range    Sodium Level 143 136 - 145 mmol/L    Potassium Level 4.1 3.5 - 5.1 mmol/L    Chloride 96 (L) 98 - 107 mmol/L    Carbon Dioxide 32 (H) 23 - 31 mmol/L    Glucose Level 88 82 - 115 mg/dL    Blood Urea Nitrogen 20.3 8.4 - 25.7 mg/dL    Creatinine 1.01 0.73 - 1.18 mg/dL    BUN/Creatinine Ratio 20     Calcium Level Total 8.8 8.8 - 10.0 mg/dL    Anion Gap 15.0 mEq/L    eGFR >60 mls/min/1.73/m2   CBC with Differential    Collection Time: 01/13/23  4:45 AM   Result Value Ref Range    WBC 7.4 4.5 - 11.5 x10(3)/mcL    RBC 4.62 (L) 4.70 - 6.10 x10(6)/mcL    Hgb 13.7 (L) 14.0 - 18.0 gm/dL    Hct 43.9 42.0 - 52.0 %    MCV 95.0 (H) 80.0 - 94.0 fL    MCH 29.7 pg    MCHC 31.2 (L) 33.0 - 36.0 mg/dL    RDW 14.2 11.5 - 17.0 %    Platelet 192 130 - 400 x10(3)/mcL    MPV 10.4 7.4 - 10.4 fL     Neut % 76.4 %    Lymph % 7.8 %    Mono % 11.2 %    Eos % 3.8 %    Basophil % 0.5 %    Lymph # 0.58 (L) 0.6 - 4.6 x10(3)/mcL    Neut # 5.68 2.1 - 9.2 x10(3)/mcL    Mono # 0.83 0.1 - 1.3 x10(3)/mcL    Eos # 0.28 0 - 0.9 x10(3)/mcL    Baso # 0.04 0 - 0.2 x10(3)/mcL    IG# 0.02 0 - 0.04 x10(3)/mcL    IG% 0.3 %    NRBC% 0.0 %   Echo    Collection Time: 01/13/23 11:55 AM   Result Value Ref Range    BSA 2.35 m2   CBC with Differential    Collection Time: 01/14/23 10:45 AM   Result Value Ref Range    WBC 7.9 4.5 - 11.5 x10(3)/mcL    RBC 4.83 4.70 - 6.10 x10(6)/mcL    Hgb 14.4 14.0 - 18.0 gm/dL    Hct 46.1 42.0 - 52.0 %    MCV 95.4 (H) 80.0 - 94.0 fL    MCH 29.8 pg    MCHC 31.2 (L) 33.0 - 36.0 mg/dL    RDW 14.3 11.5 - 17.0 %    Platelet 194 130 - 400 x10(3)/mcL    MPV 10.0 7.4 - 10.4 fL    Neut % 79.1 %    Lymph % 6.0 %    Mono % 10.9 %    Eos % 3.1 %    Basophil % 0.5 %    Lymph # 0.47 (L) 0.6 - 4.6 x10(3)/mcL    Neut # 6.22 2.1 - 9.2 x10(3)/mcL    Mono # 0.86 0.1 - 1.3 x10(3)/mcL    Eos # 0.24 0 - 0.9 x10(3)/mcL    Baso # 0.04 0 - 0.2 x10(3)/mcL    IG# 0.03 0 - 0.04 x10(3)/mcL    IG% 0.4 %    NRBC% 0.0 %     Significant Imaging:  Imaging Results              CTA Chest Non-Coronary (PE Studies) (Final result)  Result time 01/07/23 09:25:24      Final result by Oswaldo Orellana MD (01/07/23 09:25:24)                   Impression:      1. Negative for pulmonary thromboembolic disease.  2. Small right and trace left pleural effusions.  3. Bibasilar consolidation at least partially relates to atelectasis but superimposed infection possible.  4. Small volume ascites.  5.  No significant discrepancy with the preliminary report.      Electronically signed by: Oswaldo Orellana  Date:    01/07/2023  Time:    09:25               Narrative:    EXAMINATION:  CTA CHEST NON CORONARY (PE STUDIES)    CLINICAL HISTORY:  Pulmonary embolism (PE) suspected, unknown D-dimer;    TECHNIQUE:  Helical acquisition through the chest with IV contrast  targeting the pulmonary arteries. Multiplanar and 3D MIP reconstructed images were provided for review.  mGycm. Automatic exposure control, adjustment of mA/kV or iterative reconstruction technique was used to reduce radiation.    COMPARISON:  21 May 2020.    FINDINGS:  There is good opacification of the pulmonary arterial tree. There is no evidence of pulmonary thromboembolism.  There is no aortic dissection.    There is no mediastinal, hilar or axillary lymphadenopathy by size criteria.    Heart size upper limit normal.  There are coronary artery calcifications.  No pericardial effusion.    Small right and trace left pleural effusions.  There is bibasilar atelectasis.  There are additional areas of bibasilar consolidation where infection is possible.  Upper lungs are clear.    There is small volume ascites.  There are no acute osseous findings.                        Preliminary result by Oswaldo Orellana MD (01/07/23 01:34:29)                   Narrative:    START OF REPORT:  Technique: CT Scan of the chest was performed with intravenous contrast with direct axial images as well as sagittal and coronal reconstruction images pulmonary embolus protocol.    Dosage Information: Automated Exposure Control was utilized.    Comparison: None.    Clinical History: Sob.    Findings:  Artifact: Mild motion artifact is seen on multiple images.  Soft Tissues: Unremarkable.  Axilla: A few prominent lymph nodes are seen in the axilla.  Neck: The visualized soft tissues of the neck appear unremarkable. The isthmus of the thyroid gland is mildly prominent with a hypodense nodule noted within containing a punctate calcification. Correlate with clinical and laboratory findings as regards further evaluation and follow up.  Mediastinum: A few subcentimeter mediastinal lymph nodes are seen pretracheal paratracheal precarinal and subcarinal spaces . The largest is in pre carinal space and measures â10.2 mmâ in least  dimension. This suggests reactive lymphadenopathy. Correlate with clinical and laboratory findings as regards further evaluation and follow up.  Heart: The heart size is within normal limits.  Aorta: Unremarkable appearing aorta.  Pulmonary Arteries: No filling defects are seen in the pulmonary arteries to suggest pulmonary embolus.  Lungs: There are bilateral basal posteromedial areas of dense consolidation noted, left greater than right. These features are suggestive of multifocal pneumonia with aspiration component a consideration.  Pleura: There is a small right sided pleural effusion. No pneumothorax is identified.  Bony Structures:  Spine: Mild spondylolytic changes are seen in the thoracic spine.  Ribs: The ribs appear unremarkable.  Abdomen: There is some free fluid noted in the left upper quadrant around the spleen. Correlate with clinical and laboratory findings as regards further evaluation and follow up.      Impression:  1. No filling defects are seen in the pulmonary arteries to suggest pulmonary embolus.  2. There is a small right sided pleural effusion.  3. There are bilateral basal posteromedial areas of dense consolidation noted, left greater than right. These features are suggestive of multifocal pneumonia with aspiration component a consideration. Correlate with clinical and laboratory findings as regards additional evaluation and follow-up to full resolution as indicated.  4. Details and other findings as discussed above.                          Preliminary result by Alex Luciano MD (01/07/23 01:34:29)                   Narrative:    START OF REPORT:  Technique: CT Scan of the chest was performed with intravenous contrast with direct axial images as well as sagittal and coronal reconstruction images pulmonary embolus protocol.    Dosage Information: Automated Exposure Control was utilized.    Comparison: None.    Clinical History: Sob.    Findings:  Artifact: Mild motion artifact is seen on  multiple images.  Soft Tissues: Unremarkable.  Axilla: A few prominent lymph nodes are seen in the axilla.  Neck: The visualized soft tissues of the neck appear unremarkable. The isthmus of the thyroid gland is mildly prominent with a hypodense nodule noted within containing a punctate calcification. Correlate with clinical and laboratory findings as regards further evaluation and follow up.  Mediastinum: A few subcentimeter mediastinal lymph nodes are seen pretracheal paratracheal precarinal and subcarinal spaces . The largest is in pre carinal space and measures â10.2 mmâ in least dimension. This suggests reactive lymphadenopathy. Correlate with clinical and laboratory findings as regards further evaluation and follow up.  Heart: The heart size is within normal limits.  Aorta: Unremarkable appearing aorta.  Pulmonary Arteries: No filling defects are seen in the pulmonary arteries to suggest pulmonary embolus.  Lungs: There are bilateral basal posteromedial areas of dense consolidation noted, left greater than right. These features are suggestive of multifocal pneumonia with aspiration component a consideration.  Pleura: There is a small right sided pleural effusion. No pneumothorax is identified.  Bony Structures:  Spine: Mild spondylolytic changes are seen in the thoracic spine.  Ribs: The ribs appear unremarkable.  Abdomen: There is some free fluid noted in the left upper quadrant around the spleen. Correlate with clinical and laboratory findings as regards further evaluation and follow up.      Impression:  1. No filling defects are seen in the pulmonary arteries to suggest pulmonary embolus.  2. There is a small right sided pleural effusion.  3. There are bilateral basal posteromedial areas of dense consolidation noted, left greater than right. These features are suggestive of multifocal pneumonia with aspiration component a consideration. Correlate with clinical and laboratory findings as regards  additional evaluation and follow-up to full resolution as indicated.  4. Details and other findings as discussed above.                                         X-Ray Chest PA And Lateral (Final result)  Result time 01/07/23 10:02:00      Final result by Wong Brewer MD (01/07/23 10:02:00)                   Impression:      Right lower lobe consolidative changes.  Query symptomatology.      Electronically signed by: Wong Brewer MD  Date:    01/07/2023  Time:    10:02               Narrative:    EXAMINATION:  XR CHEST PA AND LATERAL    CLINICAL HISTORY:  Chest Pain;    TECHNIQUE:  PA and lateral views of the chest were performed.    COMPARISON:  08/09/2022    FINDINGS:  Heart size enlarged the pulmonary vasculature is congested.    Bibasilar atelectatic changes lungs with persistent elevation of the left hemidiaphragm.  Concern for developing consolidation in the right lower lobe.                                    Telemetry:   Afib 70s      Physical Exam  Constitutional:       Appearance: Normal appearance.   HENT:      Head: Normocephalic.      Mouth/Throat:      Mouth: Mucous membranes are moist.   Eyes:      Extraocular Movements: Extraocular movements intact.   Cardiovascular:      Rate and Rhythm: Normal rate and regular rhythm.      Pulses: Normal pulses.      Heart sounds: Murmur heard.   Pulmonary:      Breath sounds: Rales present.      Comments: NC O2  Conversational dyspnea  Abdominal:      General: There is distension.      Tenderness: There is no abdominal tenderness.   Musculoskeletal:         General: Normal range of motion.      Right lower leg: Edema present.      Left lower leg: Edema present.   Skin:     General: Skin is warm and dry.   Neurological:      General: No focal deficit present.      Mental Status: He is alert and oriented to person, place, and time. Mental status is at baseline.   Psychiatric:         Mood and Affect: Mood normal.         Behavior: Behavior normal.     Current  Inpatient Medications:    Current Facility-Administered Medications:     acetaminophen tablet 650 mg, 650 mg, Oral, Q6H PRN, Fabiano Liang MD, 650 mg at 01/08/23 1036    albuterol inhaler 2 puff, 2 puff, Inhalation, Q4H PRN, Fabiano Liang MD    ALPRAZolam tablet 0.5 mg, 0.5 mg, Oral, BID PRN, Fabiano Liang MD    amiodarone 360 mg/200 mL (1.8 mg/mL) infusion, 0.5 mg/min, Intravenous, Continuous, NED Iniguez, Last Rate: 16.7 mL/hr at 01/14/23 0825, 0.5 mg/min at 01/14/23 0825    apixaban tablet 5 mg, 5 mg, Oral, BID, Fabiano Liang MD, 5 mg at 01/14/23 0824    atorvastatin tablet 20 mg, 20 mg, Oral, Daily, Fabiano Liang MD, 20 mg at 01/14/23 0824    cetirizine tablet 10 mg, 10 mg, Oral, Daily, Fabiano Liang MD, 10 mg at 01/14/23 0824    docusate sodium capsule 100 mg, 100 mg, Oral, Daily, Fabiano Liang MD, 100 mg at 01/14/23 0823    famotidine tablet 20 mg, 20 mg, Oral, BID, Dameon Sanchez III, MD, 20 mg at 01/14/23 0823    fluticasone furoate-vilanteroL 200-25 mcg/dose diskus inhaler 1 puff, 1 puff, Inhalation, Daily, Fabiano Liang MD, 1 puff at 01/14/23 0825    furosemide injection 40 mg, 40 mg, Intravenous, Daily, Fabiano Liang MD, 40 mg at 01/14/23 0826    latanoprost 0.005 % ophthalmic solution 1 drop, 1 drop, Both Eyes, Daily, Fabiano Liang MD, 1 drop at 01/14/23 0825    levalbuterol nebulizer solution 0.63 mg, 0.63 mg, Nebulization, Q12H, Fabiano Liang MD, 0.63 mg at 01/14/23 0846    melatonin tablet 6 mg, 6 mg, Oral, Nightly PRN, Dameon Sanchez III, MD    melatonin tablet 9 mg, 9 mg, Oral, Daily PRN, Fabiano Liang MD    metoprolol injection 5 mg, 5 mg, Intravenous, Q15 Min PRN, Lionel SALDIVAR. MONIKA Roach    metoprolol succinate (TOPROL-XL) 24 hr tablet 100 mg, 100 mg, Oral, Daily, NED Iniguez, 100 mg at 01/14/23 0824    pantoprazole EC tablet 40 mg, 40 mg, Oral, Daily, Fabiano Liang MD, 40 mg at  01/14/23 0824    potassium chloride SA CR tablet 20 mEq, 20 mEq, Oral, TID, Fabiano Liang MD, 20 mEq at 01/14/23 0824    tamsulosin 24 hr capsule 0.4 mg, 1 capsule, Oral, Daily, Fabiano Liang MD, 0.4 mg at 01/14/23 0823    valsartan tablet 40 mg, 40 mg, Oral, Daily, Rika Lake, FNP, 40 mg at 01/14/23 0824    zolpidem tablet 5 mg, 5 mg, Oral, QHS, Fabiano Liang MD, 5 mg at 01/13/23 2022    VTE Risk Mitigation (From admission, onward)           Ordered     apixaban tablet 5 mg  2 times daily         01/07/23 1050     IP VTE HIGH RISK PATIENT  Once         01/07/23 0303     Place sequential compression device  Until discontinued         01/07/23 0303                  Assessment:   Acute on chronic systolic and diastolic HF  --decompensated  CMO/EF 35-40%     - ECHO (1.7.23) - Grade 1 DD, Mild AS, EF 35-40%    - ECHO (4.11.22) - LVEF 55-60%  Bilateral Pneumonitis (R > L)  Mildly Elevated Troponin (Flat)--likely due to recent ablation/decompensated heart failure  Hx of Asthma  PAF - Now with CVR    - CHADsVASc - 5 Points - 7.2% Stroke Risk per Year     - EP Study with Successful Ablation of Typical A.Flutter with Termination into SR (1.5.23)  VHD  --Mild AS-- JENNY 1.03 cm2; PV 1.99m/s; MG 10mmHg.   HTN - Controlled  MINDA/CPAP  Venous Insufficiency  Elevated Calcium Score/Mild Calcified Non-Obstructive CAD via [facility] (2020)    - [facility] (4.16.20) - Nonobstructive CAD with Normal LV Function and an EF of 55%  FLORENTINO/Bilaterally Mild-Moderate  Anxiety  GERD  OA  Obesity  No Hx of GIB per Chart Review    Plan:   ECHO Reviewed. EF 35-40% with G1 DD. Mild AS. Previous echo reported moderate AS. Will likely need repeat angiogram with valve study in future to determine need for valve replacement. May take place outpatient  Still appears overloaded. Continue lasix, Metoprolol succinate 100 mg daily, and Valsartan 40 mg daily  Further uptitration of HF medications post discharge.   Continue Eliquis Re Stroke Risk  Reduction  Continue BB  and Statin Therapies  Currently SR. Continue amiodarone. Start oral amiodarone load. 400 mg bid x 3 days then 200 mg bid x 3 days then 200 mg daily. Continue NED Uriostegui  Cardiology  [facility]  01/14/2023

## 2023-01-14 NOTE — SUBJECTIVE & OBJECTIVE
Interval History:  Patient is a 72-year-old gentleman patient of Dr. Liang's admitted with new onset congestive heart failure with combination systolic and diastolic dysfunction.  He is overall better with diuresis however he still with lower extremity edema.  History of AFib underwent direct cardioversion yesterday today he is in sinus rhythm, started amiodarone today   Compliant with PAP therapy  Overall stable vital signs stable afebrile    Review of Systems   Constitutional: Negative.    HENT: Negative.     Eyes: Negative.    Respiratory:  Positive for shortness of breath. Negative for cough and chest tightness.    Cardiovascular:  Negative for chest pain and leg swelling.   Gastrointestinal:  Negative for abdominal pain, diarrhea, nausea and vomiting.   Endocrine: Negative.    Genitourinary: Negative.    Musculoskeletal: Negative.    Skin: Negative.    Allergic/Immunologic: Negative.    Neurological: Negative.  Negative for headaches.   Hematological: Negative.    Psychiatric/Behavioral: Negative.     Objective:     Vital Signs (Most Recent):  Temp: 98.1 °F (36.7 °C) (01/14/23 1558)  Pulse: 82 (01/14/23 1558)  Resp: 18 (01/14/23 1558)  BP: 113/75 (01/14/23 1558)  SpO2: (!) 91 % (01/14/23 1619)   Vital Signs (24h Range):  Temp:  [97.5 °F (36.4 °C)-98.2 °F (36.8 °C)] 98.1 °F (36.7 °C)  Pulse:  [74-93] 82  Resp:  [18-20] 18  SpO2:  [87 %-97 %] 91 %  BP: (112-126)/(68-79) 113/75     Weight: 108.9 kg (240 lb 1.3 oz)  Body mass index is 36.5 kg/m².    Intake/Output Summary (Last 24 hours) at 1/14/2023 1704  Last data filed at 1/14/2023 1459  Gross per 24 hour   Intake 1260 ml   Output 2125 ml   Net -865 ml      Physical Exam  Eyes:      Pupils: Pupils are equal, round, and reactive to light.   Cardiovascular:      Rate and Rhythm: Normal rate.      Pulses: Normal pulses.      Heart sounds: No murmur heard.  Pulmonary:      Effort: Pulmonary effort is normal.      Breath sounds: Normal breath sounds.   Abdominal:       General: Abdomen is flat. Bowel sounds are normal. There is no distension.      Palpations: Abdomen is soft.      Tenderness: There is no guarding.   Musculoskeletal:         General: Swelling present. Normal range of motion.      Cervical back: Normal range of motion and neck supple.   Skin:     General: Skin is warm.   Neurological:      General: No focal deficit present.      Mental Status: He is alert.   Psychiatric:         Mood and Affect: Mood normal.         Behavior: Behavior normal.       Significant Labs: All pertinent labs within the past 24 hours have been reviewed.  Recent Lab Results         01/14/23  1046   01/14/23  1045        Anion Gap 11.0         Baso #   0.04       Basophil %   0.5       BUN 17.8         BUN/CREAT RATIO 17         Calcium 9.0         Chloride 100         CO2 30         Creatinine 1.05         eGFR >60         Eos #   0.24       Eosinophil %   3.1       Glucose 107         Hematocrit   46.1       Hemoglobin   14.4       Immature Grans (Abs)   0.03       Immature Granulocytes   0.4       Lymph #   0.47       LYMPH %   6.0       MCH   29.8       MCHC   31.2       MCV   95.4       Mono #   0.86       Mono %   10.9       MPV   10.0       Neut #   6.22       Neut %   79.1       nRBC   0.0       Platelets   194       Potassium 4.3         RBC   4.83       RDW   14.3       Sodium 141         WBC   7.9               Significant Imaging: I have reviewed all pertinent imaging results/findings within the past 24 hours.

## 2023-01-14 NOTE — ASSESSMENT & PLAN NOTE
Patient is identified as having Combined Systolic and Diastolic heart failure that is Acute. CHF is currently controlled. Latest ECHO performed and demonstrates- Results for orders placed during the hospital encounter of 01/06/23    Echo    Interpretation Summary  Study was technically limited. Ejection fraction visually appears to be reduced, around 35-40%.  . Continue Furosemide and monitor clinical status closely. Monitor on telemetry. Patient is on CHF pathway.  Monitor strict Is&Os and daily weights.  Place on fluid restriction of 1.5 L. Continue to stress to patient importance of self efficacy and  on diet for CHF. Last BNP reviewed- and noted below   Recent Labs   Lab 01/12/23  0442   BNP 80.9   .

## 2023-01-14 NOTE — ASSESSMENT & PLAN NOTE
Patient with Persistent (7 days or more) atrial fibrillation which is controlled currently with Amiodarone. Patient is currently in sinus rhythm.[id number] Score: 1. HASBLED Score: . Anticoagulation indicated. Anticoagulation done with Eliquis  Underwent direct cardioversion yesterday now in sinus rhythm he is on amiodarone drip and is loading for p.o. amiodarone..

## 2023-01-14 NOTE — PROGRESS NOTES
[facility] - 6th Floor [facility] Medicine  Progress Note    Patient Name: John Bullard  MRN: [id number]  Patient Class: IP- Inpatient   Admission Date: 1/6/2023  Length of Stay: 7 days  Attending Physician: Fabiano Liang MD  Primary Care Provider: Fabiano Liang MD        Subjective:     Principal Problem:<principal problem not specified>        HPI:  No notes on file    Overview/Hospital Course:  No notes on file    Interval History:  Patient is a 72-year-old gentleman patient of Dr. Liang's admitted with new onset congestive heart failure with combination systolic and diastolic dysfunction.  He is overall better with diuresis however he still with lower extremity edema.  History of AFib underwent direct cardioversion yesterday today he is in sinus rhythm, started amiodarone today   Compliant with PAP therapy  Overall stable vital signs stable afebrile    Review of Systems   Constitutional: Negative.    HENT: Negative.     Eyes: Negative.    Respiratory:  Positive for shortness of breath. Negative for cough and chest tightness.    Cardiovascular:  Negative for chest pain and leg swelling.   Gastrointestinal:  Negative for abdominal pain, diarrhea, nausea and vomiting.   Endocrine: Negative.    Genitourinary: Negative.    Musculoskeletal: Negative.    Skin: Negative.    Allergic/Immunologic: Negative.    Neurological: Negative.  Negative for headaches.   Hematological: Negative.    Psychiatric/Behavioral: Negative.     Objective:     Vital Signs (Most Recent):  Temp: 98.1 °F (36.7 °C) (01/14/23 1558)  Pulse: 82 (01/14/23 1558)  Resp: 18 (01/14/23 1558)  BP: 113/75 (01/14/23 1558)  SpO2: (!) 91 % (01/14/23 1619)   Vital Signs (24h Range):  Temp:  [97.5 °F (36.4 °C)-98.2 °F (36.8 °C)] 98.1 °F (36.7 °C)  Pulse:  [74-93] 82  Resp:  [18-20] 18  SpO2:  [87 %-97 %] 91 %  BP: (112-126)/(68-79) 113/75     Weight: 108.9 kg (240 lb 1.3 oz)  Body mass index is 36.5  kg/m².    Intake/Output Summary (Last 24 hours) at 1/14/2023 1704  Last data filed at 1/14/2023 1459  Gross per 24 hour   Intake 1260 ml   Output 2125 ml   Net -865 ml      Physical Exam  Eyes:      Pupils: Pupils are equal, round, and reactive to light.   Cardiovascular:      Rate and Rhythm: Normal rate.      Pulses: Normal pulses.      Heart sounds: No murmur heard.  Pulmonary:      Effort: Pulmonary effort is normal.      Breath sounds: Normal breath sounds.   Abdominal:      General: Abdomen is flat. Bowel sounds are normal. There is no distension.      Palpations: Abdomen is soft.      Tenderness: There is no guarding.   Musculoskeletal:         General: Swelling present. Normal range of motion.      Cervical back: Normal range of motion and neck supple.   Skin:     General: Skin is warm.   Neurological:      General: No focal deficit present.      Mental Status: He is alert.   Psychiatric:         Mood and Affect: Mood normal.         Behavior: Behavior normal.       Significant Labs: All pertinent labs within the past 24 hours have been reviewed.  Recent Lab Results         01/14/23  1046   01/14/23  1045        Anion Gap 11.0         Baso #   0.04       Basophil %   0.5       BUN 17.8         BUN/CREAT RATIO 17         Calcium 9.0         Chloride 100         CO2 30         Creatinine 1.05         eGFR >60         Eos #   0.24       Eosinophil %   3.1       Glucose 107         Hematocrit   46.1       Hemoglobin   14.4       Immature Grans (Abs)   0.03       Immature Granulocytes   0.4       Lymph #   0.47       LYMPH %   6.0       MCH   29.8       MCHC   31.2       MCV   95.4       Mono #   0.86       Mono %   10.9       MPV   10.0       Neut #   6.22       Neut %   79.1       nRBC   0.0       Platelets   194       Potassium 4.3         RBC   4.83       RDW   14.3       Sodium 141         WBC   7.9               Significant Imaging: I have reviewed all pertinent imaging results/findings within the past 24  hours.      Assessment/Plan:      Combined systolic and diastolic congestive heart failure  Patient is identified as having Combined Systolic and Diastolic heart failure that is Acute. CHF is currently controlled. Latest ECHO performed and demonstrates- Results for orders placed during the hospital encounter of 01/06/23    Echo    Interpretation Summary  Study was technically limited. Ejection fraction visually appears to be reduced, around 35-40%.  . Continue Furosemide and monitor clinical status closely. Monitor on telemetry. Patient is on CHF pathway.  Monitor strict Is&Os and daily weights.  Place on fluid restriction of 1.5 L. Continue to stress to patient importance of self efficacy and  on diet for CHF. Last BNP reviewed- and noted below   Recent Labs   Lab 01/12/23  0442   BNP 80.9   .      Atrial fibrillation  Patient with Persistent (7 days or more) atrial fibrillation which is controlled currently with Amiodarone. Patient is currently in sinus rhythm.[id number] Score: 1. HASBLED Score: . Anticoagulation indicated. Anticoagulation done with Eliquis  Underwent direct cardioversion yesterday now in sinus rhythm he is on amiodarone drip and is loading for p.o. amiodarone..        Hypertension  Currently stable continue home medications      Venous insufficiency  Continue with compression stockings   Lasix as indicated.        VTE Risk Mitigation (From admission, onward)         Ordered     apixaban tablet 5 mg  2 times daily         01/07/23 1050     IP VTE HIGH RISK PATIENT  Once         01/07/23 0303     Place sequential compression device  Until discontinued         01/07/23 0303                Discharge Planning   ANAMARIA:      Code Status: Full Code   Is the patient medically ready for discharge?:     Reason for patient still in hospital (select all that apply): Treatment                     WOO HSU MD  Department of Hospital Medicine   [facility] - 6th [facility]  Telemetry

## 2023-01-14 NOTE — ANESTHESIA POSTPROCEDURE EVALUATION
Anesthesia Post Evaluation    Patient: John Bullard    Procedure(s) Performed: * No procedures listed *    Final Anesthesia Type: general      Patient location during evaluation: Cath Lab  Patient participation: Yes- Able to Participate  Level of consciousness: awake and alert  Post-procedure vital signs: reviewed and stable  Pain management: adequate  Airway patency: patent    PONV status at discharge: No PONV  Anesthetic complications: no      Cardiovascular status: blood pressure returned to baseline  Respiratory status: nasal cannula and spontaneous ventilation  Hydration status: euvolemic  Follow-up not needed.          Vitals Value Taken Time   /74 01/13/23 1615   Temp 36.8 °C (98.3 °F) 01/13/23 1615   Pulse 83 01/13/23 1615   Resp 18 01/13/23 1615   SpO2 91 % 01/13/23 1615         No case tracking events are documented in the log.      Pain/Jim Score: No data recorded

## 2023-01-15 LAB
ANION GAP SERPL CALC-SCNC: 14 MMOL/L (ref 8–16)
BUN SERPL-MCNC: 33 MG/DL (ref 6–30)
CHLORIDE SERPL-SCNC: 98 MMOL/L (ref 95–110)
CREAT SERPL-MCNC: 1.4 MG/DL (ref 0.5–1.4)
GLUCOSE SERPL-MCNC: 113 MG/DL (ref 70–110)
HCT VFR BLD CALC: 46 %PCV (ref 36–54)
HGB BLD-MCNC: 16 G/DL
POC IONIZED CALCIUM: 1.14 MMOL/L (ref 1.06–1.42)
POC TCO2 (MEASURED): 31 MMOL/L (ref 23–29)
POTASSIUM BLD-SCNC: 3.6 MMOL/L (ref 3.5–5.1)
SAMPLE: ABNORMAL
SODIUM BLD-SCNC: 138 MMOL/L (ref 136–145)

## 2023-01-15 PROCEDURE — 94640 AIRWAY INHALATION TREATMENT: CPT

## 2023-01-15 PROCEDURE — 27000221 HC OXYGEN, UP TO 24 HOURS

## 2023-01-15 PROCEDURE — 99232 SBSQ HOSP IP/OBS MODERATE 35: CPT | Mod: ,,, | Performed by: INTERNAL MEDICINE

## 2023-01-15 PROCEDURE — 25000003 PHARM REV CODE 250: Performed by: INTERNAL MEDICINE

## 2023-01-15 PROCEDURE — 63600175 PHARM REV CODE 636 W HCPCS: Performed by: INTERNAL MEDICINE

## 2023-01-15 PROCEDURE — 25000003 PHARM REV CODE 250: Performed by: EMERGENCY MEDICINE

## 2023-01-15 PROCEDURE — 94761 N-INVAS EAR/PLS OXIMETRY MLT: CPT

## 2023-01-15 PROCEDURE — 99232 PR SUBSEQUENT HOSPITAL CARE,LEVL II: ICD-10-PCS | Mod: ,,, | Performed by: INTERNAL MEDICINE

## 2023-01-15 PROCEDURE — 21400001 HC TELEMETRY ROOM

## 2023-01-15 PROCEDURE — 25000242 PHARM REV CODE 250 ALT 637 W/ HCPCS: Performed by: INTERNAL MEDICINE

## 2023-01-15 PROCEDURE — 25000003 PHARM REV CODE 250: Performed by: NURSE PRACTITIONER

## 2023-01-15 RX ADMIN — ZOLPIDEM TARTRATE 5 MG: 5 TABLET ORAL at 09:01

## 2023-01-15 RX ADMIN — FAMOTIDINE 20 MG: 20 TABLET, FILM COATED ORAL at 09:01

## 2023-01-15 RX ADMIN — APIXABAN 5 MG: 5 TABLET, FILM COATED ORAL at 09:01

## 2023-01-15 RX ADMIN — METOPROLOL SUCCINATE 100 MG: 50 TABLET, EXTENDED RELEASE ORAL at 09:01

## 2023-01-15 RX ADMIN — LATANOPROST 1 DROP: 50 SOLUTION OPHTHALMIC at 09:01

## 2023-01-15 RX ADMIN — TAMSULOSIN HYDROCHLORIDE 0.4 MG: 0.4 CAPSULE ORAL at 09:01

## 2023-01-15 RX ADMIN — ATORVASTATIN CALCIUM 20 MG: 10 TABLET, FILM COATED ORAL at 09:01

## 2023-01-15 RX ADMIN — FLUTICASONE FUROATE AND VILANTEROL TRIFENATATE 1 PUFF: 200; 25 POWDER RESPIRATORY (INHALATION) at 09:01

## 2023-01-15 RX ADMIN — VALSARTAN 40 MG: 40 TABLET, FILM COATED ORAL at 09:01

## 2023-01-15 RX ADMIN — PANTOPRAZOLE SODIUM 40 MG: 40 TABLET, DELAYED RELEASE ORAL at 09:01

## 2023-01-15 RX ADMIN — POTASSIUM CHLORIDE 20 MEQ: 1500 TABLET, EXTENDED RELEASE ORAL at 09:01

## 2023-01-15 RX ADMIN — POTASSIUM CHLORIDE 20 MEQ: 1500 TABLET, EXTENDED RELEASE ORAL at 04:01

## 2023-01-15 RX ADMIN — AMIODARONE HYDROCHLORIDE 400 MG: 200 TABLET ORAL at 09:01

## 2023-01-15 RX ADMIN — FUROSEMIDE 40 MG: 10 INJECTION, SOLUTION INTRAMUSCULAR; INTRAVENOUS at 09:01

## 2023-01-15 RX ADMIN — DOCUSATE SODIUM 100 MG: 100 CAPSULE, LIQUID FILLED ORAL at 09:01

## 2023-01-15 RX ADMIN — CETIRIZINE HYDROCHLORIDE 10 MG: 10 TABLET, FILM COATED ORAL at 09:01

## 2023-01-15 RX ADMIN — LEVALBUTEROL HYDROCHLORIDE 0.63 MG: 0.63 SOLUTION RESPIRATORY (INHALATION) at 08:01

## 2023-01-15 RX ADMIN — ALPRAZOLAM 0.5 MG: 0.5 TABLET ORAL at 04:01

## 2023-01-15 NOTE — PROGRESS NOTES
"  [facility] General  Cardiology  Progress Note    Patient Name: John Bullard  MRN: [id number]  Admission Date: 1/6/2023  Hospital Length of Stay: 8 days  Code Status: Full Code   Attending Provider: Fabiano Liang MD   Consulting Provider: NED Iniguez  Primary Care Physician: Fabiano Liang MD  Principal Problem:<principal problem not specified>    Patient information was obtained from patient, past medical records, and ER records.     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 The patient recently had an EP Study with Successful Ablation of Typical A.Flutter. He underwent this EP Study on 1.5.23 without complications and was discharged home in stable condition. Post operatively he had a sore throat and a cough which he attributed the ETT. His SOB worsened and he presented to the ER for evaluation. Upon arrival to the ER he was found to have a CXR with Infiltrates and a CT of the Chest with no Evidence of Pulmonary Embolism, however, he did have significant infiltrates in bilateral lungs but worse on the R side. He was admitted to his Primary Care Provider and [facility] was consulted for Elevated Troponin Levels and Recent Cardiac Procedure.     1.8.23: NAD. Converted into PAF Last Night. Remains with CVR. "I am feeling ok." + SOB, Improving. Denies CP and Palps. + Cough.  1.9.23: Patient sitting up at bedside. Conversational dyspnea. Bibasilar rales. Still requiring O2. Abdomen distended. 2+ peripheral edema. Afib 90's on tele.  1.10.23: Patient standing up at bedside. Reports breathing is better. Bibasilar crackles noted. Abdomen still firm distended. 2+ peripheral edema. Co2 rising with diuresis. Albumin 3.2  1.11.23: Patient sitting up at bedside. Reports continued [place]. No conversational dyspnea noted. Still in Afib, Rate controlled.   1/12/23: Patient underwent DCCV with " successful conversion to SR  1/13/23: Patient sitting up at bedside. NAD. VSS. Still has bibasilar crackles and peripheral edema. SR on tele  1/14/23: Remains SR on tele. VSS. Great UOP.     PMH: Anxiety, Asthma, PAF/Ablation , DJD, GERD, HTN, OA, Obesity, MINDA/CPAP, VI, VT, Insomnia, Elevated Calcium Score/Non-Obstructive CAD, FLORENTINO/Bilaterally Mild-Moderate  PSH: Lithotripsy, Sinus Surgery, Colonoscopy, Hernia Repair, EP Study/Ablation, Abdominal Surgery, [place] Teeth Extraction   Family History: Reviewed and Unremarkable for Heart Disease  Social History: Denies Illicit Drug, ETOH and Tobacco    Previous Cardiac Diagnostics:   ECHO 1.7.23:  Concentric hypertrophy and mildly decreased systolic function.  The estimated ejection fraction is 35-40%.  Grade I left ventricular diastolic dysfunction.  With normal right ventricular systolic function.  There is mild aortic valve stenosis.  Aortic valve area is 1.03 cm2; peak velocity is 1.99 m/s; mean gradient is 10 mmHg.    Carotid US 8.17.22:  Mild Bilaterally and Tortuous Carotid Arteries with No Hemodynamically Significant FLORENTINO  < 50% Bilaterally    ECHO 4.11.22:  Limited Parasternal Windows  Normal LV Systolic Function: LVEF 55-60%  Moderate AS, Peak AV Velocity 3.2 m/sec, Mean Gradient 23mmHg, JENNY 1.4 cm2  Grade I DD  Mild TR with RVSP of 30mmHg    LHC 4.16.20:  LHC via R Radial Approach with Nonobstructive CAD with Normal LV Function and an EF of 55%. EDP was 20mmHg.  LM: patent vessel.  LAD: calcified type III vessel with 4 patent diagonal arteries. D1 is moderate to large in size with Proximal 50% stenosis. Remaining diagonals are small to medium size patent vessels  Lcx: Nondominant patent vessel that gives rise to a medium size OM1 with an early takeoff. The OM 2 is a medium to large size patent bifurcating vessel. The AV groove artery terminates in a small size patent OM3 with a small terminal bifurcating PL segment.   RCA: very large dominant patent vessel with  mild Luminal irregularities. RCA terminates in a very large patent PDA and a large patent PL branch    PET 4.8.20:  The study quality is below average.   This is an abnormal perfusion study. Study is consistent with mostly scar tissue with minimal ischemia.   Small fixed perfusion abnormality of moderate intensity in the apical segment. Small partially reversible perfusion abnormality of moderate intensity in the anterior septal region. Small fixed perfusion abnormality of moderate intensity in the inferior region.   This scan is suggestive of low to moderate risk for future cardiovascular events.   The left ventricular cavity is noted to be normal on the stress studies. The stress left ventricular ejection fraction was calculated to be 52% and left ventricular global function is normal. The rest left ventricular cavity is noted to be normal. The rest left ventricular ejection fraction was calculated to be 42% and rest left ventricular global function is mildly reduced.   When compared to the resting ejection fraction (42%), the stress ejection fraction (52%) has increased.     Review of patient's allergies indicates:   Allergen Reactions    Ativan [lorazepam] Hallucinations     Review of Systems   Constitutional:  Negative for chills and fever.   Respiratory:  Negative for cough, chest tightness, shortness of breath, wheezing and stridor.    Cardiovascular:  Positive for leg swelling.   Gastrointestinal:  Positive for abdominal distention.   Genitourinary: Negative.    Skin: Negative.    Neurological: Negative.    Psychiatric/Behavioral: Negative.     All other systems reviewed and are negative.    Objective:     Vital Signs (Most Recent):  Temp: 98.1 °F (36.7 °C) (01/15/23 0847)  Pulse: 84 (01/15/23 0921)  Resp: 18 (01/15/23 0921)  BP: 128/65 (01/15/23 0920)  SpO2: (!) 91 % (01/15/23 0847)   Vital Signs (24h Range):  Temp:  [98.1 °F (36.7 °C)-98.3 °F (36.8 °C)] 98.1 °F (36.7 °C)  Pulse:  [62-92] 84  Resp:  [18-22]  18  SpO2:  [87 %-95 %] 91 %  BP: (104-150)/(65-94) 128/65     Weight: 109.7 kg (241 lb 13.5 oz)  Body mass index is 36.77 kg/m².    SpO2: (!) 91 %         Intake/Output Summary (Last 24 hours) at 1/15/2023 1125  Last data filed at 1/15/2023 1016  Gross per 24 hour   Intake 2220 ml   Output 1500 ml   Net 720 ml       Lines/Drains/Airways       Peripheral Intravenous Line  Duration                  Peripheral IV - Single Lumen 01/13/23 1347 20 G Anterior;Right Upper Arm 1 day                  Significant Labs:  Recent Results (from the past 72 hour(s))   Basic Metabolic Panel    Collection Time: 01/13/23  4:45 AM   Result Value Ref Range    Sodium Level 143 136 - 145 mmol/L    Potassium Level 4.1 3.5 - 5.1 mmol/L    Chloride 96 (L) 98 - 107 mmol/L    Carbon Dioxide 32 (H) 23 - 31 mmol/L    Glucose Level 88 82 - 115 mg/dL    Blood Urea Nitrogen 20.3 8.4 - 25.7 mg/dL    Creatinine 1.01 0.73 - 1.18 mg/dL    BUN/Creatinine Ratio 20     Calcium Level Total 8.8 8.8 - 10.0 mg/dL    Anion Gap 15.0 mEq/L    eGFR >60 mls/min/1.73/m2   CBC with Differential    Collection Time: 01/13/23  4:45 AM   Result Value Ref Range    WBC 7.4 4.5 - 11.5 x10(3)/mcL    RBC 4.62 (L) 4.70 - 6.10 x10(6)/mcL    Hgb 13.7 (L) 14.0 - 18.0 gm/dL    Hct 43.9 42.0 - 52.0 %    MCV 95.0 (H) 80.0 - 94.0 fL    MCH 29.7 pg    MCHC 31.2 (L) 33.0 - 36.0 mg/dL    RDW 14.2 11.5 - 17.0 %    Platelet 192 130 - 400 x10(3)/mcL    MPV 10.4 7.4 - 10.4 fL    Neut % 76.4 %    Lymph % 7.8 %    Mono % 11.2 %    Eos % 3.8 %    Basophil % 0.5 %    Lymph # 0.58 (L) 0.6 - 4.6 x10(3)/mcL    Neut # 5.68 2.1 - 9.2 x10(3)/mcL    Mono # 0.83 0.1 - 1.3 x10(3)/mcL    Eos # 0.28 0 - 0.9 x10(3)/mcL    Baso # 0.04 0 - 0.2 x10(3)/mcL    IG# 0.02 0 - 0.04 x10(3)/mcL    IG% 0.3 %    NRBC% 0.0 %   Echo    Collection Time: 01/13/23 11:55 AM   Result Value Ref Range    BSA 2.35 m2   CBC with Differential    Collection Time: 01/14/23 10:45 AM   Result Value Ref Range    WBC 7.9 4.5 - 11.5  x10(3)/mcL    RBC 4.83 4.70 - 6.10 x10(6)/mcL    Hgb 14.4 14.0 - 18.0 gm/dL    Hct 46.1 42.0 - 52.0 %    MCV 95.4 (H) 80.0 - 94.0 fL    MCH 29.8 pg    MCHC 31.2 (L) 33.0 - 36.0 mg/dL    RDW 14.3 11.5 - 17.0 %    Platelet 194 130 - 400 x10(3)/mcL    MPV 10.0 7.4 - 10.4 fL    Neut % 79.1 %    Lymph % 6.0 %    Mono % 10.9 %    Eos % 3.1 %    Basophil % 0.5 %    Lymph # 0.47 (L) 0.6 - 4.6 x10(3)/mcL    Neut # 6.22 2.1 - 9.2 x10(3)/mcL    Mono # 0.86 0.1 - 1.3 x10(3)/mcL    Eos # 0.24 0 - 0.9 x10(3)/mcL    Baso # 0.04 0 - 0.2 x10(3)/mcL    IG# 0.03 0 - 0.04 x10(3)/mcL    IG% 0.4 %    NRBC% 0.0 %   Basic Metabolic Panel    Collection Time: 01/14/23 10:46 AM   Result Value Ref Range    Sodium Level 141 136 - 145 mmol/L    Potassium Level 4.3 3.5 - 5.1 mmol/L    Chloride 100 98 - 107 mmol/L    Carbon Dioxide 30 23 - 31 mmol/L    Glucose Level 107 82 - 115 mg/dL    Blood Urea Nitrogen 17.8 8.4 - 25.7 mg/dL    Creatinine 1.05 0.73 - 1.18 mg/dL    BUN/Creatinine Ratio 17     Calcium Level Total 9.0 8.8 - 10.0 mg/dL    Anion Gap 11.0 mEq/L    eGFR >60 mls/min/1.73/m2     Significant Imaging:  Imaging Results              CTA Chest Non-Coronary (PE Studies) (Final result)  Result time 01/07/23 09:25:24      Final result by Oswaldo Orellana MD (01/07/23 09:25:24)                   Impression:      1. Negative for pulmonary thromboembolic disease.  2. Small right and trace left pleural effusions.  3. Bibasilar consolidation at least partially relates to atelectasis but superimposed infection possible.  4. Small volume ascites.  5.  No significant discrepancy with the preliminary report.      Electronically signed by: Oswaldo Orellana  Date:    01/07/2023  Time:    09:25               Narrative:    EXAMINATION:  CTA CHEST NON CORONARY (PE STUDIES)    CLINICAL HISTORY:  Pulmonary embolism (PE) suspected, unknown D-dimer;    TECHNIQUE:  Helical acquisition through the chest with IV contrast targeting the pulmonary arteries. Multiplanar  and 3D MIP reconstructed images were provided for review.  mGycm. Automatic exposure control, adjustment of mA/kV or iterative reconstruction technique was used to reduce radiation.    COMPARISON:  21 May 2020.    FINDINGS:  There is good opacification of the pulmonary arterial tree. There is no evidence of pulmonary thromboembolism.  There is no aortic dissection.    There is no mediastinal, hilar or axillary lymphadenopathy by size criteria.    Heart size upper limit normal.  There are coronary artery calcifications.  No pericardial effusion.    Small right and trace left pleural effusions.  There is bibasilar atelectasis.  There are additional areas of bibasilar consolidation where infection is possible.  Upper lungs are clear.    There is small volume ascites.  There are no acute osseous findings.                        Preliminary result by Oswaldo Orellana MD (01/07/23 01:34:29)                   Narrative:    START OF REPORT:  Technique: CT Scan of the chest was performed with intravenous contrast with direct axial images as well as sagittal and coronal reconstruction images pulmonary embolus protocol.    Dosage Information: Automated Exposure Control was utilized.    Comparison: None.    Clinical History: Sob.    Findings:  Artifact: Mild motion artifact is seen on multiple images.  Soft Tissues: Unremarkable.  Axilla: A few prominent lymph nodes are seen in the axilla.  Neck: The visualized soft tissues of the neck appear unremarkable. The isthmus of the thyroid gland is mildly prominent with a hypodense nodule noted within containing a punctate calcification. Correlate with clinical and laboratory findings as regards further evaluation and follow up.  Mediastinum: A few subcentimeter mediastinal lymph nodes are seen pretracheal paratracheal precarinal and subcarinal spaces . The largest is in pre carinal space and measures â10.2 mmâ in least dimension. This suggests reactive lymphadenopathy.  Correlate with clinical and laboratory findings as regards further evaluation and follow up.  Heart: The heart size is within normal limits.  Aorta: Unremarkable appearing aorta.  Pulmonary Arteries: No filling defects are seen in the pulmonary arteries to suggest pulmonary embolus.  Lungs: There are bilateral basal posteromedial areas of dense consolidation noted, left greater than right. These features are suggestive of multifocal pneumonia with aspiration component a consideration.  Pleura: There is a small right sided pleural effusion. No pneumothorax is identified.  Bony Structures:  Spine: Mild spondylolytic changes are seen in the thoracic spine.  Ribs: The ribs appear unremarkable.  Abdomen: There is some free fluid noted in the left upper quadrant around the spleen. Correlate with clinical and laboratory findings as regards further evaluation and follow up.      Impression:  1. No filling defects are seen in the pulmonary arteries to suggest pulmonary embolus.  2. There is a small right sided pleural effusion.  3. There are bilateral basal posteromedial areas of dense consolidation noted, left greater than right. These features are suggestive of multifocal pneumonia with aspiration component a consideration. Correlate with clinical and laboratory findings as regards additional evaluation and follow-up to full resolution as indicated.  4. Details and other findings as discussed above.                          Preliminary result by Alex Luciano MD (01/07/23 01:34:29)                   Narrative:    START OF REPORT:  Technique: CT Scan of the chest was performed with intravenous contrast with direct axial images as well as sagittal and coronal reconstruction images pulmonary embolus protocol.    Dosage Information: Automated Exposure Control was utilized.    Comparison: None.    Clinical History: Sob.    Findings:  Artifact: Mild motion artifact is seen on multiple images.  Soft Tissues:  Unremarkable.  Axilla: A few prominent lymph nodes are seen in the axilla.  Neck: The visualized soft tissues of the neck appear unremarkable. The isthmus of the thyroid gland is mildly prominent with a hypodense nodule noted within containing a punctate calcification. Correlate with clinical and laboratory findings as regards further evaluation and follow up.  Mediastinum: A few subcentimeter mediastinal lymph nodes are seen pretracheal paratracheal precarinal and subcarinal spaces . The largest is in pre carinal space and measures â10.2 mmâ in least dimension. This suggests reactive lymphadenopathy. Correlate with clinical and laboratory findings as regards further evaluation and follow up.  Heart: The heart size is within normal limits.  Aorta: Unremarkable appearing aorta.  Pulmonary Arteries: No filling defects are seen in the pulmonary arteries to suggest pulmonary embolus.  Lungs: There are bilateral basal posteromedial areas of dense consolidation noted, left greater than right. These features are suggestive of multifocal pneumonia with aspiration component a consideration.  Pleura: There is a small right sided pleural effusion. No pneumothorax is identified.  Bony Structures:  Spine: Mild spondylolytic changes are seen in the thoracic spine.  Ribs: The ribs appear unremarkable.  Abdomen: There is some free fluid noted in the left upper quadrant around the spleen. Correlate with clinical and laboratory findings as regards further evaluation and follow up.      Impression:  1. No filling defects are seen in the pulmonary arteries to suggest pulmonary embolus.  2. There is a small right sided pleural effusion.  3. There are bilateral basal posteromedial areas of dense consolidation noted, left greater than right. These features are suggestive of multifocal pneumonia with aspiration component a consideration. Correlate with clinical and laboratory findings as regards additional evaluation and follow-up to  full resolution as indicated.  4. Details and other findings as discussed above.                                         X-Ray Chest PA And Lateral (Final result)  Result time 01/07/23 10:02:00      Final result by Wong Brewer MD (01/07/23 10:02:00)                   Impression:      Right lower lobe consolidative changes.  Query symptomatology.      Electronically signed by: Wong Brewer MD  Date:    01/07/2023  Time:    10:02               Narrative:    EXAMINATION:  XR CHEST PA AND LATERAL    CLINICAL HISTORY:  Chest Pain;    TECHNIQUE:  PA and lateral views of the chest were performed.    COMPARISON:  08/09/2022    FINDINGS:  Heart size enlarged the pulmonary vasculature is congested.    Bibasilar atelectatic changes lungs with persistent elevation of the left hemidiaphragm.  Concern for developing consolidation in the right lower lobe.                                    Telemetry:   Afib 70s      Physical Exam  Constitutional:       Appearance: Normal appearance.   HENT:      Head: Normocephalic.      Mouth/Throat:      Mouth: Mucous membranes are moist.   Eyes:      Extraocular Movements: Extraocular movements intact.   Cardiovascular:      Rate and Rhythm: Normal rate and regular rhythm.      Pulses: Normal pulses.      Heart sounds: Murmur heard.   Pulmonary:      Breath sounds: Rales present.      Comments: NC O2  Conversational dyspnea  Abdominal:      General: There is distension.      Tenderness: There is no abdominal tenderness.   Musculoskeletal:         General: Normal range of motion.      Right lower leg: Edema present.      Left lower leg: Edema present.   Skin:     General: Skin is warm and dry.   Neurological:      General: No focal deficit present.      Mental Status: He is alert and oriented to person, place, and time. Mental status is at baseline.   Psychiatric:         Mood and Affect: Mood normal.         Behavior: Behavior normal.     Current Inpatient Medications:    Current  Facility-Administered Medications:     acetaminophen tablet 650 mg, 650 mg, Oral, Q6H PRN, Fabiano Liang MD, 650 mg at 01/08/23 1036    albuterol inhaler 2 puff, 2 puff, Inhalation, Q4H PRN, Fabiano Liang MD    ALPRAZolam tablet 0.5 mg, 0.5 mg, Oral, BID PRN, Fabiano Liang MD, 0.5 mg at 01/14/23 1445    [START ON 1/20/2023] amiodarone tablet 200 mg, 200 mg, Oral, Daily, NED Iniguez    [START ON 1/17/2023] amiodarone tablet 200 mg, 200 mg, Oral, BID, NED Iniguez    amiodarone tablet 400 mg, 400 mg, Oral, BID, NED Iniguez, 400 mg at 01/15/23 0920    apixaban tablet 5 mg, 5 mg, Oral, BID, Fabiano Liang MD, 5 mg at 01/15/23 0921    atorvastatin tablet 20 mg, 20 mg, Oral, Daily, Fabiano iLang MD, 20 mg at 01/15/23 0919    cetirizine tablet 10 mg, 10 mg, Oral, Daily, Fabiano Liang MD, 10 mg at 01/15/23 0920    docusate sodium capsule 100 mg, 100 mg, Oral, Daily, Fabiano Liang MD, 100 mg at 01/15/23 0921    famotidine tablet 20 mg, 20 mg, Oral, BID, Dameon Sanchez III, MD, 20 mg at 01/15/23 0921    fluticasone furoate-vilanteroL 200-25 mcg/dose diskus inhaler 1 puff, 1 puff, Inhalation, Daily, Fabiano Liang MD, 1 puff at 01/15/23 0921    furosemide injection 40 mg, 40 mg, Intravenous, Daily, Fabiano Liang MD, 40 mg at 01/15/23 0918    latanoprost 0.005 % ophthalmic solution 1 drop, 1 drop, Both Eyes, Daily, Fabiano Liang MD, 1 drop at 01/15/23 0921    levalbuterol nebulizer solution 0.63 mg, 0.63 mg, Nebulization, Q12H, Fabiano Liang MD, 0.63 mg at 01/15/23 0808    melatonin tablet 6 mg, 6 mg, Oral, Nightly PRN, Dameon Sanchez III, MD    melatonin tablet 9 mg, 9 mg, Oral, Daily PRN, Fabiano Liang MD    metoprolol injection 5 mg, 5 mg, Intravenous, Q15 Min PRN, Lionel SALDIVAR. Marley, MONIKA    metoprolol succinate (TOPROL-XL) 24 hr tablet 100 mg, 100 mg, Oral, Daily, NED Iniguez, 100 mg at  01/15/23 0920    pantoprazole EC tablet 40 mg, 40 mg, Oral, Daily, Fabiano Liang MD, 40 mg at 01/15/23 0920    potassium chloride SA CR tablet 20 mEq, 20 mEq, Oral, TID, Fabiano Liang MD, 20 mEq at 01/15/23 0920    tamsulosin 24 hr capsule 0.4 mg, 1 capsule, Oral, Daily, Fabiano Liang MD, 0.4 mg at 01/15/23 0920    valsartan tablet 40 mg, 40 mg, Oral, Daily, [place]ika [place], NED, 40 mg at 01/15/23 0921    zolpidem tablet 5 mg, 5 mg, Oral, QHS, Fabiano Liang MD, 5 mg at 01/14/23 2113    VTE Risk Mitigation (From admission, onward)           Ordered     apixaban tablet 5 mg  2 times daily         01/07/23 1050     IP VTE HIGH RISK PATIENT  Once         01/07/23 0303     Place sequential compression device  Until discontinued         01/07/23 0303                  Assessment:   Acute on chronic systolic and diastolic HF  --decompensated  CMO/EF 35-40%     - ECHO (1.7.23) - Grade 1 DD, Mild AS, EF 35-40%    - ECHO (4.11.22) - LVEF 55-60%  Bilateral Pneumonitis (R > L)  Mildly Elevated Troponin (Flat)--likely due to recent ablation/decompensated heart failure  Hx of Asthma  PAF - Now with CVR    - CHADsVASc - 5 Points - 7.2% Stroke Risk per Year     - EP Study with Successful Ablation of Typical A.Flutter with Termination into SR (1.5.23)  VHD  --Mild AS-- JENNY 1.03 cm2; PV 1.99m/s; MG 10mmHg.   HTN - Controlled  MINDA/CPAP  Venous Insufficiency  Elevated Calcium Score/Mild Calcified Non-Obstructive CAD via [facility] (2020)    - [facility] (4.16.20) - Nonobstructive CAD with Normal LV Function and an EF of 55%  FLORENTINO/Bilaterally Mild-Moderate  Anxiety  GERD  OA  Obesity  No Hx of GIB per Chart Review    Plan:   ECHO Reviewed. EF 35-40% with G1 DD. Mild AS. Previous echo reported moderate AS. Will likely need repeat angiogram with valve study in future to determine need for valve replacement. May take place outpatient  Still appears overloaded. Continue lasix, Metoprolol succinate 100 mg daily, and Valsartan  "40 mg daily  Further uptitration of HF medications post discharge.   Continue Eliquis Re Stroke Risk Reduction  Continue BB  and Statin Therapies  Currently SR. Continue amiodarone load. 400 mg bid x 3 days then 200 mg bid x 3 days then 200 mg daily. Continue eliquis  Obtain BMP in am    MATIAS IniguezP  Cardiology  [facility]  01/15/2023       CARD ATTENDING  ROS  GEN:   No fever.  No weakness.  RESP:  + SOB, improved.  No wheezing.  SKIN:  No pruritus.  No rash.  CARD:  No CP.  No palpitations.        VASC:  No cyanosis.  No claudication.  HEM:   No adenopathy.  No easy bruising.   GI:  No heart burn.  No melena.    NEURO:  No dizziness.  No syncope.    Blood pressure 128/65, pulse 84, temperature 98.1 °F (36.7 °C), temperature source Oral, resp. rate 18, height 5' 8" (1.727 m), weight 109.7 kg (241 lb 13.5 oz), SpO2 (!) 91 %.  PE  GEN:  No acute distress.  Not ill appearing.  NECK:  No cervical adenopathy.  No carotid bruit.  CV:  Normal rate.  Irregular rhythm.  No murmur.  PULY:  No respiratory distress.  No wheezing.  No crackles.  ABD:  Mild distention.  No tenderness.  +Ascites.  MUS SKEL:  No deformity.  + edema.  SKIN:  No bruising.  No rash.  NEURO:  Awake and alert.  Oriented x 3.        A/P  I agree with the assessment and plan as outlined above     "

## 2023-01-15 NOTE — SUBJECTIVE & OBJECTIVE
Interval History:  Patient is a 72-year-old gentleman patient of Dr. Liang's admitted with new onset congestive heart failure with combination systolic and diastolic dysfunction.  He reverted back to atrial fib although he is rate controlled and he is anticoagulated.  He is still with some lower extremity edema he is only on 40 Lasix IV once a day.  He is currently now transitioned from IV amiodarone to p.o. amiodarone.    Compliant with PAP therapy   Overall stable  Review of Systems   Constitutional: Negative.    HENT: Negative.     Eyes: Negative.    Respiratory:  Positive for shortness of breath. Negative for cough and chest tightness.    Cardiovascular:  Negative for chest pain and leg swelling.   Gastrointestinal:  Negative for abdominal pain, diarrhea, nausea and vomiting.   Endocrine: Negative.    Genitourinary: Negative.    Musculoskeletal: Negative.    Skin: Negative.    Allergic/Immunologic: Negative.    Neurological: Negative.  Negative for headaches.   Hematological: Negative.    Psychiatric/Behavioral: Negative.     Objective:     Vital Signs (Most Recent):  Temp: 98.5 °F (36.9 °C) (01/15/23 1551)  Pulse: 76 (01/15/23 1551)  Resp: 18 (01/15/23 1228)  BP: 113/69 (01/15/23 1551)  SpO2: (!) 91 % (01/15/23 1551)   Vital Signs (24h Range):  Temp:  [98.1 °F (36.7 °C)-98.5 °F (36.9 °C)] 98.5 °F (36.9 °C)  Pulse:  [62-92] 76  Resp:  [18-22] 18  SpO2:  [89 %-95 %] 91 %  BP: (104-150)/(65-94) 113/69     Weight: 109.7 kg (241 lb 13.5 oz)  Body mass index is 36.77 kg/m².    Intake/Output Summary (Last 24 hours) at 1/15/2023 1628  Last data filed at 1/15/2023 1138  Gross per 24 hour   Intake 960 ml   Output 1650 ml   Net -690 ml        Physical Exam  Eyes:      Pupils: Pupils are equal, round, and reactive to light.   Cardiovascular:      Rate and Rhythm: Normal rate.      Pulses: Normal pulses.      Heart sounds: No murmur heard.  Pulmonary:      Effort: Pulmonary effort is normal.      Breath sounds: Normal  breath sounds.   Abdominal:      General: Abdomen is flat. Bowel sounds are normal. There is no distension.      Palpations: Abdomen is soft.      Tenderness: There is no guarding.   Musculoskeletal:         General: Swelling present. Normal range of motion.      Cervical back: Normal range of motion and neck supple.   Skin:     General: Skin is warm.   Neurological:      General: No focal deficit present.      Mental Status: He is alert.   Psychiatric:         Mood and Affect: Mood normal.         Behavior: Behavior normal.       Significant Labs: All pertinent labs within the past 24 hours have been reviewed.  Recent Lab Results       None            Significant Imaging: I have reviewed all pertinent imaging results/findings within the past 24 hours.

## 2023-01-15 NOTE — ASSESSMENT & PLAN NOTE
Patient with Persistent (7 days or more) atrial fibrillation which is controlled currently with Amiodarone. Patient is currently in sinus rhythm.[id number] Score: 1. HASBLED Score: . Anticoagulation indicated. Anticoagulation done with Eliquis  Underwent direct cardioversion yesterday now in sinus rhythm he is on amiodarone drip and is loading for p.o. amiodarone..    He reverted back to atrial fib today he is rate controlled and on amiodarone and Eliquis.

## 2023-01-15 NOTE — ASSESSMENT & PLAN NOTE
Patient is identified as having Combined Systolic and Diastolic heart failure that is Acute. CHF is currently controlled. Latest ECHO performed and demonstrates- Results for orders placed during the hospital encounter of 01/06/23    Echo    Interpretation Summary  Study was technically limited. Ejection fraction visually appears to be reduced, around 35-40%.  . Continue Furosemide and monitor clinical status closely. Monitor on telemetry. Patient is on CHF pathway.  Monitor strict Is&Os and daily weights.  Place on fluid restriction of 1.5 L. Continue to stress to patient importance of self efficacy and  on diet for CHF. Last BNP reviewed- and noted below   Recent Labs   Lab 01/12/23  0442   BNP 80.9   .  May benefit from Lasix 40 mg b.i.d.

## 2023-01-15 NOTE — PROGRESS NOTES
[facility] - 6th Floor [facility] Medicine  Progress Note    Patient Name: John Bullard  MRN: [id number]  Patient Class: IP- Inpatient   Admission Date: 1/6/2023  Length of Stay: 8 days  Attending Physician: Fabiano Liang MD  Primary Care Provider: Fabiano Liang MD        Subjective:     Principal Problem:<principal problem not specified>        HPI:  No notes on file    Overview/Hospital Course:  No notes on file    Interval History:  Patient is a 72-year-old gentleman patient of Dr. Liang's admitted with new onset congestive heart failure with combination systolic and diastolic dysfunction.  He reverted back to atrial fib although he is rate controlled and he is anticoagulated.  He is still with some lower extremity edema he is only on 40 Lasix IV once a day.  He is currently now transitioned from IV amiodarone to p.o. amiodarone.    Compliant with PAP therapy   Overall stable  Review of Systems   Constitutional: Negative.    HENT: Negative.     Eyes: Negative.    Respiratory:  Positive for shortness of breath. Negative for cough and chest tightness.    Cardiovascular:  Negative for chest pain and leg swelling.   Gastrointestinal:  Negative for abdominal pain, diarrhea, nausea and vomiting.   Endocrine: Negative.    Genitourinary: Negative.    Musculoskeletal: Negative.    Skin: Negative.    Allergic/Immunologic: Negative.    Neurological: Negative.  Negative for headaches.   Hematological: Negative.    Psychiatric/Behavioral: Negative.     Objective:     Vital Signs (Most Recent):  Temp: 98.5 °F (36.9 °C) (01/15/23 1551)  Pulse: 76 (01/15/23 1551)  Resp: 18 (01/15/23 1228)  BP: 113/69 (01/15/23 1551)  SpO2: (!) 91 % (01/15/23 1551)   Vital Signs (24h Range):  Temp:  [98.1 °F (36.7 °C)-98.5 °F (36.9 °C)] 98.5 °F (36.9 °C)  Pulse:  [62-92] 76  Resp:  [18-22] 18  SpO2:  [89 %-95 %] 91 %  BP: (104-150)/(65-94) 113/69     Weight: 109.7 kg (241 lb 13.5 oz)  Body mass  index is 36.77 kg/m².    Intake/Output Summary (Last 24 hours) at 1/15/2023 1628  Last data filed at 1/15/2023 1138  Gross per 24 hour   Intake 960 ml   Output 1650 ml   Net -690 ml        Physical Exam  Eyes:      Pupils: Pupils are equal, round, and reactive to light.   Cardiovascular:      Rate and Rhythm: Normal rate.      Pulses: Normal pulses.      Heart sounds: No murmur heard.  Pulmonary:      Effort: Pulmonary effort is normal.      Breath sounds: Normal breath sounds.   Abdominal:      General: Abdomen is flat. Bowel sounds are normal. There is no distension.      Palpations: Abdomen is soft.      Tenderness: There is no guarding.   Musculoskeletal:         General: Swelling present. Normal range of motion.      Cervical back: Normal range of motion and neck supple.   Skin:     General: Skin is warm.   Neurological:      General: No focal deficit present.      Mental Status: He is alert.   Psychiatric:         Mood and Affect: Mood normal.         Behavior: Behavior normal.       Significant Labs: All pertinent labs within the past 24 hours have been reviewed.  Recent Lab Results       None            Significant Imaging: I have reviewed all pertinent imaging results/findings within the past 24 hours.      Assessment/Plan:      Combined systolic and diastolic congestive heart failure  Patient is identified as having Combined Systolic and Diastolic heart failure that is Acute. CHF is currently controlled. Latest ECHO performed and demonstrates- Results for orders placed during the hospital encounter of 01/06/23    Echo    Interpretation Summary  Study was technically limited. Ejection fraction visually appears to be reduced, around 35-40%.  . Continue Furosemide and monitor clinical status closely. Monitor on telemetry. Patient is on CHF pathway.  Monitor strict Is&Os and daily weights.  Place on fluid restriction of 1.5 L. Continue to stress to patient importance of self efficacy and  on diet for  CHF. Last BNP reviewed- and noted below   Recent Labs   Lab 01/12/23  0442   BNP 80.9   .  May benefit from Lasix 40 mg b.i.d.    Atrial fibrillation  Patient with Persistent (7 days or more) atrial fibrillation which is controlled currently with Amiodarone. Patient is currently in sinus rhythm.[id number] Score: 1. HASBLED Score: . Anticoagulation indicated. Anticoagulation done with Eliquis  Underwent direct cardioversion yesterday now in sinus rhythm he is on amiodarone drip and is loading for p.o. amiodarone..    He reverted back to atrial fib today he is rate controlled and on amiodarone and Eliquis.    Hypertension  Currently stable continue home medications      Venous insufficiency  Continue with compression stockings   Lasix as indicated.        VTE Risk Mitigation (From admission, onward)         Ordered     apixaban tablet 5 mg  2 times daily         01/07/23 1050     IP VTE HIGH RISK PATIENT  Once         01/07/23 0303     Place sequential compression device  Until discontinued         01/07/23 0303                Discharge Planning   ANAMARIA:      Code Status: Full Code   Is the patient medically ready for discharge?:     Reason for patient still in hospital (select all that apply): Treatment                     WOO HSU MD  Department of Hospital Medicine   [facility] - 6th Floor Medical Telemetry

## 2023-01-16 PROCEDURE — 94640 AIRWAY INHALATION TREATMENT: CPT

## 2023-01-16 PROCEDURE — 25000242 PHARM REV CODE 250 ALT 637 W/ HCPCS: Performed by: INTERNAL MEDICINE

## 2023-01-16 PROCEDURE — 99900031 HC PATIENT EDUCATION (STAT)

## 2023-01-16 PROCEDURE — 94761 N-INVAS EAR/PLS OXIMETRY MLT: CPT

## 2023-01-16 PROCEDURE — 25000003 PHARM REV CODE 250: Performed by: INTERNAL MEDICINE

## 2023-01-16 PROCEDURE — 63600175 PHARM REV CODE 636 W HCPCS: Performed by: INTERNAL MEDICINE

## 2023-01-16 PROCEDURE — 21400001 HC TELEMETRY ROOM

## 2023-01-16 PROCEDURE — 99232 SBSQ HOSP IP/OBS MODERATE 35: CPT | Mod: ,,, | Performed by: INTERNAL MEDICINE

## 2023-01-16 PROCEDURE — 99232 PR SUBSEQUENT HOSPITAL CARE,LEVL II: ICD-10-PCS | Mod: ,,, | Performed by: INTERNAL MEDICINE

## 2023-01-16 PROCEDURE — 25000003 PHARM REV CODE 250: Performed by: NURSE PRACTITIONER

## 2023-01-16 PROCEDURE — 25000003 PHARM REV CODE 250: Performed by: EMERGENCY MEDICINE

## 2023-01-16 RX ADMIN — ZOLPIDEM TARTRATE 5 MG: 5 TABLET ORAL at 09:01

## 2023-01-16 RX ADMIN — VALSARTAN 40 MG: 40 TABLET, FILM COATED ORAL at 09:01

## 2023-01-16 RX ADMIN — METOPROLOL SUCCINATE 100 MG: 50 TABLET, EXTENDED RELEASE ORAL at 09:01

## 2023-01-16 RX ADMIN — LEVALBUTEROL HYDROCHLORIDE 0.63 MG: 0.63 SOLUTION RESPIRATORY (INHALATION) at 08:01

## 2023-01-16 RX ADMIN — FUROSEMIDE 40 MG: 10 INJECTION, SOLUTION INTRAMUSCULAR; INTRAVENOUS at 09:01

## 2023-01-16 RX ADMIN — POTASSIUM CHLORIDE 20 MEQ: 1500 TABLET, EXTENDED RELEASE ORAL at 02:01

## 2023-01-16 RX ADMIN — FAMOTIDINE 20 MG: 20 TABLET, FILM COATED ORAL at 08:01

## 2023-01-16 RX ADMIN — CETIRIZINE HYDROCHLORIDE 10 MG: 10 TABLET, FILM COATED ORAL at 09:01

## 2023-01-16 RX ADMIN — FAMOTIDINE 20 MG: 20 TABLET, FILM COATED ORAL at 09:01

## 2023-01-16 RX ADMIN — APIXABAN 5 MG: 5 TABLET, FILM COATED ORAL at 08:01

## 2023-01-16 RX ADMIN — AMIODARONE HYDROCHLORIDE 400 MG: 200 TABLET ORAL at 09:01

## 2023-01-16 RX ADMIN — FLUTICASONE FUROATE AND VILANTEROL TRIFENATATE 1 PUFF: 200; 25 POWDER RESPIRATORY (INHALATION) at 10:01

## 2023-01-16 RX ADMIN — APIXABAN 5 MG: 5 TABLET, FILM COATED ORAL at 09:01

## 2023-01-16 RX ADMIN — DOCUSATE SODIUM 100 MG: 100 CAPSULE, LIQUID FILLED ORAL at 09:01

## 2023-01-16 RX ADMIN — ALBUTEROL SULFATE 2 PUFF: 90 AEROSOL, METERED RESPIRATORY (INHALATION) at 02:01

## 2023-01-16 RX ADMIN — LATANOPROST 1 DROP: 50 SOLUTION OPHTHALMIC at 10:01

## 2023-01-16 RX ADMIN — TAMSULOSIN HYDROCHLORIDE 0.4 MG: 0.4 CAPSULE ORAL at 09:01

## 2023-01-16 RX ADMIN — ATORVASTATIN CALCIUM 20 MG: 10 TABLET, FILM COATED ORAL at 09:01

## 2023-01-16 RX ADMIN — PANTOPRAZOLE SODIUM 40 MG: 40 TABLET, DELAYED RELEASE ORAL at 09:01

## 2023-01-16 RX ADMIN — POTASSIUM CHLORIDE 20 MEQ: 1500 TABLET, EXTENDED RELEASE ORAL at 09:01

## 2023-01-16 RX ADMIN — POTASSIUM CHLORIDE 20 MEQ: 1500 TABLET, EXTENDED RELEASE ORAL at 08:01

## 2023-01-16 NOTE — ASSESSMENT & PLAN NOTE
Patient is identified as having Combined Systolic and Diastolic heart failure that is Acute. CHF is currently controlled. Latest ECHO performed and demonstrates- Results for orders placed during the hospital encounter of 01/06/23    Echo    Interpretation Summary  Study was technically limited. Ejection fraction visually appears to be reduced, around 35-40%.  . Continue Furosemide and monitor clinical status closely. Monitor on telemetry. Patient is on CHF pathway.  Monitor strict Is&Os and daily weights.  Place on fluid restriction of 1.5 L. Continue to stress to patient importance of self efficacy and  on diet for CHF. Last BNP reviewed- and noted below   Recent Labs   Lab 01/12/23  0442   BNP 80.9   .  May benefit from Lasix 40 mg b.i.d. overall stable seems to be compensated.  He is on Lasix.  Continue with current therapy

## 2023-01-16 NOTE — ASSESSMENT & PLAN NOTE
Patient with Persistent (7 days or more) atrial fibrillation which is controlled currently with Amiodarone. Patient is currently in sinus rhythm.[id number] Score: 1. HASBLED Score: . Anticoagulation indicated. Anticoagulation done with Eliquis  Underwent direct cardioversion yesterday now in sinus rhythm he is on amiodarone drip and is loading for p.o. amiodarone..    Patient going in and out of atrial fib and sinus rhythm.  He is being transitioned from IV amiodarone to p.o. amiodarone.    He is on Lasix 40 mg IV in the morning.  Possibly could benefit from twice a day given his continued lower extremity edema.    Continue compression stockings.  Cardiology is on board   He is anticoagulated.  Overall he is stable

## 2023-01-16 NOTE — SUBJECTIVE & OBJECTIVE
Interval History:  Patient going in a RVR.  He is transitioned over to p.o. amiodarone.  He is on Lasix 40 mg a day.  He is still having some lower extremity edema   Overall stable   Without any specific complaints   He states that intermittently he does get some shortness of breath with minimal exertion.      Review of Systems   Constitutional: Negative.    HENT: Negative.     Eyes: Negative.    Respiratory:  Positive for shortness of breath. Negative for cough and chest tightness.    Cardiovascular:  Negative for chest pain and leg swelling.   Gastrointestinal:  Negative for abdominal pain, diarrhea, nausea and vomiting.   Endocrine: Negative.    Genitourinary: Negative.    Musculoskeletal: Negative.    Skin: Negative.    Allergic/Immunologic: Negative.    Neurological: Negative.  Negative for headaches.   Hematological: Negative.    Psychiatric/Behavioral: Negative.     Objective:     Vital Signs (Most Recent):  Temp: 97.5 °F (36.4 °C) (01/16/23 1151)  Pulse: 77 (01/16/23 1441)  Resp: (!) 22 (01/16/23 1441)  BP: 112/72 (01/16/23 1151)  SpO2: (!) 94 % (01/16/23 1151)   Vital Signs (24h Range):  Temp:  [97.2 °F (36.2 °C)-98.1 °F (36.7 °C)] 97.5 °F (36.4 °C)  Pulse:  [70-81] 77  Resp:  [18-24] 22  SpO2:  [90 %-96 %] 94 %  BP: (107-131)/(67-79) 112/72     Weight: 108.9 kg (240 lb 1.3 oz)  Body mass index is 36.5 kg/m².    Intake/Output Summary (Last 24 hours) at 1/16/2023 1608  Last data filed at 1/16/2023 1431  Gross per 24 hour   Intake 1050 ml   Output 3050 ml   Net -2000 ml        Physical Exam  Eyes:      Pupils: Pupils are equal, round, and reactive to light.   Cardiovascular:      Rate and Rhythm: Normal rate.      Pulses: Normal pulses.      Heart sounds: No murmur heard.  Pulmonary:      Effort: Pulmonary effort is normal.      Breath sounds: Normal breath sounds.   Abdominal:      General: Abdomen is flat. Bowel sounds are normal. There is no distension.      Palpations: Abdomen is soft.      Tenderness:  There is no guarding.   Musculoskeletal:         General: Swelling present. Normal range of motion.      Cervical back: Normal range of motion and neck supple.   Skin:     General: Skin is warm.   Neurological:      General: No focal deficit present.      Mental Status: He is alert.   Psychiatric:         Mood and Affect: Mood normal.         Behavior: Behavior normal.       Significant Labs: All pertinent labs within the past 24 hours have been reviewed.  Recent Lab Results       None            Significant Imaging: I have reviewed all pertinent imaging results/findings within the past 24 hours.

## 2023-01-16 NOTE — PLAN OF CARE
Problem: Adult Inpatient Plan of Care  Goal: Plan of Care Review  1/16/2023 1353 by Marleny Dubois RN  Outcome: Ongoing, Progressing  1/16/2023 1353 by Marleny Dubois RN  Outcome: Ongoing, Progressing  Goal: Patient-Specific Goal (Individualized)  1/16/2023 [facility] by Marleny Dubois RN  Outcome: Ongoing, Progressing  1/16/2023 [facility] by Marleny Dubois RN  Outcome: Ongoing, Progressing  Goal: Absence of Hospital-Acquired Illness or Injury  1/16/2023 [facility] by Marleny Dubois RN  Outcome: Ongoing, Progressing  1/16/2023 1353 by Marleny Dubois RN  Outcome: Ongoing, Progressing  Goal: Optimal Comfort and Wellbeing  1/16/2023 [facility] by Marleny Dubois RN  Outcome: Ongoing, Progressing  1/16/2023 [facility] by Marleny Dubois RN  Outcome: Ongoing, Progressing  Goal: Readiness for Transition of Care  1/16/2023 [facility] by Marleny Dubois RN  Outcome: Ongoing, Progressing  1/16/2023 [facility] by Marleny Dubois RN  Outcome: Ongoing, Progressing     Problem: Fall Injury Risk  Goal: Absence of Fall and Fall-Related Injury  1/16/2023 [facility] by Marleny Dubois RN  Outcome: Ongoing, Progressing  1/16/2023 [facility] by Marleny Dubois RN  Outcome: Ongoing, Progressing

## 2023-01-16 NOTE — PROGRESS NOTES
[facility] - 6th Floor [facility] Medicine  Progress Note    Patient Name: John Bullard  MRN: [id number]  Patient Class: IP- Inpatient   Admission Date: 1/6/2023  Length of Stay: 9 days  Attending Physician: Fabiano Liang MD  Primary Care Provider: Fabiano Liang MD        Subjective:     Principal Problem:<principal problem not specified>        HPI:  No notes on file    Overview/Hospital Course:  No notes on file    Interval History:  Patient going in a RVR.  He is transitioned over to p.o. amiodarone.  He is on Lasix 40 mg a day.  He is still having some lower extremity edema   Overall stable   Without any specific complaints   He states that intermittently he does get some shortness of breath with minimal exertion.      Review of Systems   Constitutional: Negative.    HENT: Negative.     Eyes: Negative.    Respiratory:  Positive for shortness of breath. Negative for cough and chest tightness.    Cardiovascular:  Negative for chest pain and leg swelling.   Gastrointestinal:  Negative for abdominal pain, diarrhea, nausea and vomiting.   Endocrine: Negative.    Genitourinary: Negative.    Musculoskeletal: Negative.    Skin: Negative.    Allergic/Immunologic: Negative.    Neurological: Negative.  Negative for headaches.   Hematological: Negative.    Psychiatric/Behavioral: Negative.     Objective:     Vital Signs (Most Recent):  Temp: 97.5 °F (36.4 °C) (01/16/23 1151)  Pulse: 77 (01/16/23 1441)  Resp: (!) 22 (01/16/23 1441)  BP: 112/72 (01/16/23 1151)  SpO2: (!) 94 % (01/16/23 1151)   Vital Signs (24h Range):  Temp:  [97.2 °F (36.2 °C)-98.1 °F (36.7 °C)] 97.5 °F (36.4 °C)  Pulse:  [70-81] 77  Resp:  [18-24] 22  SpO2:  [90 %-96 %] 94 %  BP: (107-131)/(67-79) 112/72     Weight: 108.9 kg (240 lb 1.3 oz)  Body mass index is 36.5 kg/m².    Intake/Output Summary (Last 24 hours) at 1/16/2023 1608  Last data filed at 1/16/2023 1431  Gross per 24 hour   Intake 1050 ml   Output  3050 ml   Net -2000 ml        Physical Exam  Eyes:      Pupils: Pupils are equal, round, and reactive to light.   Cardiovascular:      Rate and Rhythm: Normal rate.      Pulses: Normal pulses.      Heart sounds: No murmur heard.  Pulmonary:      Effort: Pulmonary effort is normal.      Breath sounds: Normal breath sounds.   Abdominal:      General: Abdomen is flat. Bowel sounds are normal. There is no distension.      Palpations: Abdomen is soft.      Tenderness: There is no guarding.   Musculoskeletal:         General: Swelling present. Normal range of motion.      Cervical back: Normal range of motion and neck supple.   Skin:     General: Skin is warm.   Neurological:      General: No focal deficit present.      Mental Status: He is alert.   Psychiatric:         Mood and Affect: Mood normal.         Behavior: Behavior normal.       Significant Labs: All pertinent labs within the past 24 hours have been reviewed.  Recent Lab Results       None            Significant Imaging: I have reviewed all pertinent imaging results/findings within the past 24 hours.      Assessment/Plan:      Combined systolic and diastolic congestive heart failure  Patient is identified as having Combined Systolic and Diastolic heart failure that is Acute. CHF is currently controlled. Latest ECHO performed and demonstrates- Results for orders placed during the hospital encounter of 01/06/23    Echo    Interpretation Summary  Study was technically limited. Ejection fraction visually appears to be reduced, around 35-40%.  . Continue Furosemide and monitor clinical status closely. Monitor on telemetry. Patient is on CHF pathway.  Monitor strict Is&Os and daily weights.  Place on fluid restriction of 1.5 L. Continue to stress to patient importance of self efficacy and  on diet for CHF. Last BNP reviewed- and noted below   Recent Labs   Lab 01/12/23  0442   BNP 80.9   .  May benefit from Lasix 40 mg b.i.d. overall stable seems to be  compensated.  He is on Lasix.  Continue with current therapy    Atrial fibrillation  Patient with Persistent (7 days or more) atrial fibrillation which is controlled currently with Amiodarone. Patient is currently in sinus rhythm.[id number] Score: 1. HASBLED Score: . Anticoagulation indicated. Anticoagulation done with Eliquis  Underwent direct cardioversion yesterday now in sinus rhythm he is on amiodarone drip and is loading for p.o. amiodarone..    Patient going in and out of atrial fib and sinus rhythm.  He is being transitioned from IV amiodarone to p.o. amiodarone.    He is on Lasix 40 mg IV in the morning.  Possibly could benefit from twice a day given his continued lower extremity edema.    Continue compression stockings.  Cardiology is on board   He is anticoagulated.  Overall he is stable    Hypertension  Currently stable continue home medications  Blood pressures are stable and controlled.      Venous insufficiency  Continue with compression stockings   Lasix as indicated.        VTE Risk Mitigation (From admission, onward)         Ordered     apixaban tablet 5 mg  2 times daily         01/07/23 1050     IP VTE HIGH RISK PATIENT  Once         01/07/23 0303     Place sequential compression device  Until discontinued         01/07/23 0303                Discharge Planning   ANAMARIA:      Code Status: Full Code   Is the patient medically ready for discharge?:     Reason for patient still in hospital (select all that apply): Treatment                     WOO HSU MD  Department of Hospital Medicine   [facility] - 6th Floor Medical Telemetry

## 2023-01-16 NOTE — PROGRESS NOTES
"  [facility]  Cardiology  Progress Note    Patient Name: John Bullard  MRN: [id number]  Admission Date: 1/6/2023  Hospital Length of Stay: 9 days  Code Status: Full Code   Attending Provider: Fabiano Liang MD   Consulting Provider: ZAINAB Delgado  Primary Care Physician: Fabiano Liang MD  Principal Problem:<principal problem not specified>    Patient information was obtained from patient, past medical records, and ER records.     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 The patient recently had an EP Study with Successful Ablation of Typical A.Flutter. He underwent this EP Study on 1.5.23 without complications and was discharged home in stable condition. Post operatively he had a sore throat and a cough which he attributed the ETT. His SOB worsened and he presented to the ER for evaluation. Upon arrival to the ER he was found to have a CXR with Infiltrates and a CT of the Chest with no Evidence of Pulmonary Embolism, however, he did have significant infiltrates in bilateral lungs but worse on the R side. He was admitted to his Primary Care Provider and [facility] was consulted for Elevated Troponin Levels and Recent Cardiac Procedure.     1.8.23: NAD. Converted into PAF Last Night. Remains with CVR. "I am feeling ok." + SOB, Improving. Denies CP and Palps. + Cough.  1.9.23: Patient sitting up at bedside. Conversational dyspnea. Bibasilar rales. Still requiring O2. Abdomen distended. 2+ peripheral edema. Afib 90's on tele.  1.10.23: Patient standing up at bedside. Reports breathing is better. Bibasilar crackles noted. Abdomen still firm distended. 2+ peripheral edema. Co2 rising with diuresis. Albumin 3.2  1.11.23: Patient sitting up at bedside. Reports continued [place]. No conversational dyspnea noted. Still in Afib, Rate controlled.   1.12.23: Patient underwent DCCV with successful " "conversion to SR  1.13.23: Patient sitting up at bedside. NAD. VSS. Still has bibasilar crackles and peripheral edema. SR on tele  1.14.23: Remains SR on tele. VSS. Great UOP.   1.16.23: NAD. "I am feeling ok, but I have SOB when I exert myself. Especially when I bend over." Denies CP and Palps. + SOB/[place]. Fluid Balance Net Negative 1600mL.    PMH: Anxiety, Asthma, PAF/Ablation , DJD, GERD, HTN, OA, Obesity, MINDA/CPAP, VI, VT, Insomnia, Elevated Calcium Score/Non-Obstructive CAD, FLORENTINO/Bilaterally Mild-Moderate  PSH: Lithotripsy, Sinus Surgery, Colonoscopy, Hernia Repair, EP Study/Ablation, Abdominal Surgery, [place] Teeth Extraction   Family History: Reviewed and Unremarkable for Heart Disease  Social History: Denies Illicit Drug, ETOH and Tobacco    Previous Cardiac Diagnostics:   ECHO 1.7.23:  Concentric hypertrophy and mildly decreased systolic function.  The estimated ejection fraction is 35-40%.  Grade I left ventricular diastolic dysfunction.  With normal right ventricular systolic function.  There is mild aortic valve stenosis.  Aortic valve area is 1.03 cm2; peak velocity is 1.99 m/s; mean gradient is 10 mmHg.    Carotid US 8.17.22:  Mild Bilaterally and Tortuous Carotid Arteries with No Hemodynamically Significant FLORENTINO  < 50% Bilaterally    ECHO 4.11.22:  Limited Parasternal Windows  Normal LV Systolic Function: LVEF 55-60%  Moderate AS, Peak AV Velocity 3.2 m/sec, Mean Gradient 23mmHg, JENNY 1.4 cm2  Grade I DD  Mild TR with RVSP of 30mmHg    LHC 4.16.20:  LHC via R Radial Approach with Nonobstructive CAD with Normal LV Function and an EF of 55%. EDP was 20mmHg.  LM: patent vessel.  LAD: calcified type III vessel with 4 patent diagonal arteries. D1 is moderate to large in size with Proximal 50% stenosis. Remaining diagonals are small to medium size patent vessels  Lcx: Nondominant patent vessel that gives rise to a medium size OM1 with an early takeoff. The OM 2 is a medium to large size patent bifurcating " vessel. The AV groove artery terminates in a small size patent OM3 with a small terminal bifurcating PL segment.   RCA: very large dominant patent vessel with mild Luminal irregularities. RCA terminates in a very large patent PDA and a large patent PL branch    PET 4.8.20:  The study quality is below average.   This is an abnormal perfusion study. Study is consistent with mostly scar tissue with minimal ischemia.   Small fixed perfusion abnormality of moderate intensity in the apical segment. Small partially reversible perfusion abnormality of moderate intensity in the anterior septal region. Small fixed perfusion abnormality of moderate intensity in the inferior region.   This scan is suggestive of low to moderate risk for future cardiovascular events.   The left ventricular cavity is noted to be normal on the stress studies. The stress left ventricular ejection fraction was calculated to be 52% and left ventricular global function is normal. The rest left ventricular cavity is noted to be normal. The rest left ventricular ejection fraction was calculated to be 42% and rest left ventricular global function is mildly reduced.   When compared to the resting ejection fraction (42%), the stress ejection fraction (52%) has increased.     Review of patient's allergies indicates:   Allergen Reactions    Ativan [lorazepam] Hallucinations     Review of Systems   Constitutional:  Negative for chills and fever.   Respiratory:  Positive for shortness of breath. Negative for cough, chest tightness, wheezing and stridor.    Cardiovascular:  Positive for leg swelling. Negative for chest pain.   All other systems reviewed and are negative.    Objective:     Vital Signs (Most Recent):  Temp: 98.1 °F (36.7 °C) (01/16/23 0738)  Pulse: 70 (01/16/23 0957)  Resp: 20 (01/16/23 0957)  BP: 124/79 (01/16/23 0738)  SpO2: 96 % (01/16/23 0809)   Vital Signs (24h Range):  Temp:  [97.2 °F (36.2 °C)-98.5 °F (36.9 °C)] 98.1 °F (36.7 °C)  Pulse:   [70-83] 70  Resp:  [18-24] 20  SpO2:  [90 %-96 %] 96 %  BP: (107-131)/(67-79) 124/79     Weight: 108.9 kg (240 lb 1.3 oz)  Body mass index is 36.5 kg/m².    SpO2: 96 %         Intake/Output Summary (Last 24 hours) at 1/16/2023 1142  Last data filed at 1/16/2023 1127  Gross per 24 hour   Intake 1200 ml   Output 2975 ml   Net -1775 ml       Lines/Drains/Airways       Peripheral Intravenous Line  Duration                  Peripheral IV - Single Lumen 01/13/23 1347 20 G Anterior;Right Upper Arm 2 days                  Significant Labs:  Recent Results (from the past 72 hour(s))   Echo    Collection Time: 01/13/23 11:55 AM   Result Value Ref Range    BSA 2.35 m2   CBC with Differential    Collection Time: 01/14/23 10:45 AM   Result Value Ref Range    WBC 7.9 4.5 - 11.5 x10(3)/mcL    RBC 4.83 4.70 - 6.10 x10(6)/mcL    Hgb 14.4 14.0 - 18.0 gm/dL    Hct 46.1 42.0 - 52.0 %    MCV 95.4 (H) 80.0 - 94.0 fL    MCH 29.8 pg    MCHC 31.2 (L) 33.0 - 36.0 mg/dL    RDW 14.3 11.5 - 17.0 %    Platelet 194 130 - 400 x10(3)/mcL    MPV 10.0 7.4 - 10.4 fL    Neut % 79.1 %    Lymph % 6.0 %    Mono % 10.9 %    Eos % 3.1 %    Basophil % 0.5 %    Lymph # 0.47 (L) 0.6 - 4.6 x10(3)/mcL    Neut # 6.22 2.1 - 9.2 x10(3)/mcL    Mono # 0.86 0.1 - 1.3 x10(3)/mcL    Eos # 0.24 0 - 0.9 x10(3)/mcL    Baso # 0.04 0 - 0.2 x10(3)/mcL    IG# 0.03 0 - 0.04 x10(3)/mcL    IG% 0.4 %    NRBC% 0.0 %   Basic Metabolic Panel    Collection Time: 01/14/23 10:46 AM   Result Value Ref Range    Sodium Level 141 136 - 145 mmol/L    Potassium Level 4.3 3.5 - 5.1 mmol/L    Chloride 100 98 - 107 mmol/L    Carbon Dioxide 30 23 - 31 mmol/L    Glucose Level 107 82 - 115 mg/dL    Blood Urea Nitrogen 17.8 8.4 - 25.7 mg/dL    Creatinine 1.05 0.73 - 1.18 mg/dL    BUN/Creatinine Ratio 17     Calcium Level Total 9.0 8.8 - 10.0 mg/dL    Anion Gap 11.0 mEq/L    eGFR >60 mls/min/1.73/m2     Telemetry: SR 70s    Physical Exam  Constitutional:       Appearance: Normal appearance.   HENT:       Head: Normocephalic.      Mouth/Throat:      Mouth: Mucous membranes are moist.   Eyes:      Extraocular Movements: Extraocular movements intact.   Cardiovascular:      Rate and Rhythm: Normal rate and regular rhythm.      Pulses: Normal pulses.      Heart sounds: Murmur heard.   Pulmonary:      Effort: No respiratory distress.      Breath sounds: Examination of the right-lower field reveals rales. Examination of the left-lower field reveals rales. Rales present.      Comments: PT on NC  Abdominal:      General: There is distension (Normal Anatomy).   Musculoskeletal:         General: Normal range of motion.      Right lower leg: Edema present.      Left lower leg: Edema present.   Skin:     General: Skin is warm and dry.   Neurological:      General: No focal deficit present.      Mental Status: He is alert and oriented to person, place, and time.   Psychiatric:         Mood and Affect: Mood normal.         Behavior: Behavior normal.     Current Inpatient Medications:    Current Facility-Administered Medications:     acetaminophen tablet 650 mg, 650 mg, Oral, Q6H PRN, Fabiano Liang MD, 650 mg at 01/08/23 1036    albuterol inhaler 2 puff, 2 puff, Inhalation, Q4H PRN, Fabiano Liang MD    ALPRAZolam tablet 0.5 mg, 0.5 mg, Oral, BID PRN, Fabiano Liang MD, 0.5 mg at 01/15/23 1659    [START ON 1/20/2023] amiodarone tablet 200 mg, 200 mg, Oral, Daily, NED Iniguez    [START ON 1/17/2023] amiodarone tablet 200 mg, 200 mg, Oral, BID, NED Iniguez    amiodarone tablet 400 mg, 400 mg, Oral, BID, NED Iniguez, 400 mg at 01/16/23 0957    apixaban tablet 5 mg, 5 mg, Oral, BID, Fabiano Liang MD, 5 mg at 01/16/23 0957    atorvastatin tablet 20 mg, 20 mg, Oral, Daily, Fabiano Liang MD, 20 mg at 01/16/23 0957    cetirizine tablet 10 mg, 10 mg, Oral, Daily, Fabiano Liang MD, 10 mg at 01/16/23 0957    docusate sodium capsule 100 mg, 100 mg, Oral, Daily,  Fabiano Liang MD, 100 mg at 01/16/23 0957    famotidine tablet 20 mg, 20 mg, Oral, BID, Dameon Sanchez III, MD, 20 mg at 01/16/23 0957    fluticasone furoate-vilanteroL 200-25 mcg/dose diskus inhaler 1 puff, 1 puff, Inhalation, Daily, Fabiano Liang MD, 1 puff at 01/16/23 1000    furosemide injection 40 mg, 40 mg, Intravenous, Daily, Fabiano Liang MD, 40 mg at 01/16/23 0957    latanoprost 0.005 % ophthalmic solution 1 drop, 1 drop, Both Eyes, Daily, Fabiano Liang MD, 1 drop at 01/16/23 1000    levalbuterol nebulizer solution 0.63 mg, 0.63 mg, Nebulization, Q12H, Fabiano Liang MD, 0.63 mg at 01/16/23 0809    melatonin tablet 6 mg, 6 mg, Oral, Nightly PRN, Dameon Sanchez III, MD    melatonin tablet 9 mg, 9 mg, Oral, Daily PRN, Fabiano Liang MD    metoprolol injection 5 mg, 5 mg, Intravenous, Q15 Min PRN, Lionel SALDIVAR. Marley, NP    metoprolol succinate (TOPROL-XL) 24 hr tablet 100 mg, 100 mg, Oral, Daily, NED Iniguez, 100 mg at 01/16/23 0957    pantoprazole EC tablet 40 mg, 40 mg, Oral, Daily, Fabiano Liang MD, 40 mg at 01/16/23 0957    potassium chloride SA CR tablet 20 mEq, 20 mEq, Oral, TID, Fabiano Liang MD, 20 mEq at 01/16/23 0957    tamsulosin 24 hr capsule 0.4 mg, 1 capsule, Oral, Daily, Fabiano Liang MD, 0.4 mg at 01/16/23 0957    valsartan tablet 40 mg, 40 mg, Oral, Daily, NED Iniguez, 40 mg at 01/16/23 0957    zolpidem tablet 5 mg, 5 mg, Oral, QHS, Fabiano Liang MD, 5 mg at 01/15/23 2108    VTE Risk Mitigation (From admission, onward)           Ordered     apixaban tablet 5 mg  2 times daily         01/07/23 1050     IP VTE HIGH RISK PATIENT  Once         01/07/23 0303     Place sequential compression device  Until discontinued         01/07/23 0303                  Assessment:   Acute on Chronic Combined Systolic and Diastolic HF/EF 35-40%  CMO/EF 35-40%     - ECHO (1.13.23) - LVEF 35-40%    - ECHO (1.7.23) -  Grade 1 DD, Mild AS, EF 35-40%    - ECHO (4.11.22) - LVEF 55-60%  Bilateral Pneumonitis (R > L)  Mildly Elevated Troponin (Flat) - likely due to Recent Ablation/Decompensated HF  Hx of Asthma  PAF - Now in SR with 1st Deg AVB    - CHADsVASc - 5 Points - 7.2% Stroke Risk per Year     - EP Study with Successful Ablation of Typical A.Flutter with Termination into SR (1.5.23)  VHD (Mild-Mod AS)  HTN - Controlled  MINDA/CPAP  Venous Insufficiency  Elevated Calcium Score/Mild Calcified Non-Obstructive CAD via [facility] (2020)    - [facility] (4.16.20) - Nonobstructive CAD with Normal LV Function and an EF of 55%  FLORENTINO/Bilaterally Mild-Moderate  Anxiety  GERD  OA  Obesity  No Hx of GIB per Chart Review    Plan:   Continue Diuresis with Lasix 40mg IVP Daily  Accurate I&Os and Daily Weights  Continue BB, Statin, Amiodarone and ARB  Continue Eliquis Re Stroke Risk Reduction   Will Plan for OP Ischemic Workup and Further Evaluation of Aortic Valve  Labs in AM: CBC, CMP and Mg    Elgin Gonsalez, ANP  Cardiology  [facility]  01/16/2023

## 2023-01-17 LAB
ALBUMIN SERPL-MCNC: 3 G/DL (ref 3.4–4.8)
ALBUMIN/GLOB SERPL: 1 RATIO (ref 1.1–2)
ALP SERPL-CCNC: 68 UNIT/L (ref 40–150)
ALT SERPL-CCNC: 26 UNIT/L (ref 0–55)
AST SERPL-CCNC: 19 UNIT/L (ref 5–34)
BASOPHILS # BLD AUTO: 0.04 X10(3)/MCL (ref 0–0.2)
BASOPHILS NFR BLD AUTO: 0.4 %
BILIRUBIN DIRECT+TOT PNL SERPL-MCNC: 1.1 MG/DL
BUN SERPL-MCNC: 19.9 MG/DL (ref 8.4–25.7)
CALCIUM SERPL-MCNC: 9.2 MG/DL (ref 8.8–10)
CHLORIDE SERPL-SCNC: 104 MMOL/L (ref 98–107)
CO2 SERPL-SCNC: 26 MMOL/L (ref 23–31)
CREAT SERPL-MCNC: 1.03 MG/DL (ref 0.73–1.18)
EOSINOPHIL # BLD AUTO: 0.22 X10(3)/MCL (ref 0–0.9)
EOSINOPHIL NFR BLD AUTO: 2.2 %
ERYTHROCYTE [DISTWIDTH] IN BLOOD BY AUTOMATED COUNT: 14.3 % (ref 11.5–17)
GFR SERPLBLD CREATININE-BSD FMLA CKD-EPI: >60 MLS/MIN/1.73/M2
GLOBULIN SER-MCNC: 3.1 GM/DL (ref 2.4–3.5)
GLUCOSE SERPL-MCNC: 123 MG/DL (ref 82–115)
HCT VFR BLD AUTO: 43 % (ref 42–52)
HGB BLD-MCNC: 13.5 GM/DL (ref 14–18)
IMM GRANULOCYTES # BLD AUTO: 0.06 X10(3)/MCL (ref 0–0.04)
IMM GRANULOCYTES NFR BLD AUTO: 0.6 %
LYMPHOCYTES # BLD AUTO: 0.76 X10(3)/MCL (ref 0.6–4.6)
LYMPHOCYTES NFR BLD AUTO: 7.8 %
MAGNESIUM SERPL-MCNC: 2 MG/DL (ref 1.6–2.6)
MCH RBC QN AUTO: 29.1 PG
MCHC RBC AUTO-ENTMCNC: 31.4 MG/DL (ref 33–36)
MCV RBC AUTO: 92.7 FL (ref 80–94)
MONOCYTES # BLD AUTO: 1.1 X10(3)/MCL (ref 0.1–1.3)
MONOCYTES NFR BLD AUTO: 11.2 %
NEUTROPHILS # BLD AUTO: 7.62 X10(3)/MCL (ref 2.1–9.2)
NEUTROPHILS NFR BLD AUTO: 77.8 %
NRBC BLD AUTO-RTO: 0 %
PLATELET # BLD AUTO: 194 X10(3)/MCL (ref 130–400)
PMV BLD AUTO: 10.4 FL (ref 7.4–10.4)
POTASSIUM SERPL-SCNC: 4.3 MMOL/L (ref 3.5–5.1)
PROT SERPL-MCNC: 6.1 GM/DL (ref 5.8–7.6)
RBC # BLD AUTO: 4.64 X10(6)/MCL (ref 4.7–6.1)
SODIUM SERPL-SCNC: 139 MMOL/L (ref 136–145)
WBC # SPEC AUTO: 9.8 X10(3)/MCL (ref 4.5–11.5)

## 2023-01-17 PROCEDURE — 63600175 PHARM REV CODE 636 W HCPCS: Performed by: INTERNAL MEDICINE

## 2023-01-17 PROCEDURE — 27000221 HC OXYGEN, UP TO 24 HOURS

## 2023-01-17 PROCEDURE — 83735 ASSAY OF MAGNESIUM: CPT | Performed by: NURSE PRACTITIONER

## 2023-01-17 PROCEDURE — 85025 COMPLETE CBC W/AUTO DIFF WBC: CPT | Performed by: NURSE PRACTITIONER

## 2023-01-17 PROCEDURE — 21400001 HC TELEMETRY ROOM

## 2023-01-17 PROCEDURE — 80053 COMPREHEN METABOLIC PANEL: CPT | Performed by: NURSE PRACTITIONER

## 2023-01-17 PROCEDURE — 94640 AIRWAY INHALATION TREATMENT: CPT

## 2023-01-17 PROCEDURE — 99232 SBSQ HOSP IP/OBS MODERATE 35: CPT | Mod: ,,, | Performed by: INTERNAL MEDICINE

## 2023-01-17 PROCEDURE — 36415 COLL VENOUS BLD VENIPUNCTURE: CPT | Performed by: NURSE PRACTITIONER

## 2023-01-17 PROCEDURE — 25000242 PHARM REV CODE 250 ALT 637 W/ HCPCS: Performed by: INTERNAL MEDICINE

## 2023-01-17 PROCEDURE — 25000003 PHARM REV CODE 250: Performed by: EMERGENCY MEDICINE

## 2023-01-17 PROCEDURE — 94761 N-INVAS EAR/PLS OXIMETRY MLT: CPT

## 2023-01-17 PROCEDURE — 99232 PR SUBSEQUENT HOSPITAL CARE,LEVL II: ICD-10-PCS | Mod: ,,, | Performed by: INTERNAL MEDICINE

## 2023-01-17 PROCEDURE — 25000003 PHARM REV CODE 250: Performed by: INTERNAL MEDICINE

## 2023-01-17 PROCEDURE — 25000003 PHARM REV CODE 250: Performed by: NURSE PRACTITIONER

## 2023-01-17 RX ORDER — FUROSEMIDE 10 MG/ML
40 INJECTION INTRAMUSCULAR; INTRAVENOUS
Status: DISCONTINUED | OUTPATIENT
Start: 2023-01-17 | End: 2023-01-19

## 2023-01-17 RX ADMIN — APIXABAN 5 MG: 5 TABLET, FILM COATED ORAL at 09:01

## 2023-01-17 RX ADMIN — FUROSEMIDE 40 MG: 10 INJECTION, SOLUTION INTRAMUSCULAR; INTRAVENOUS at 08:01

## 2023-01-17 RX ADMIN — PANTOPRAZOLE SODIUM 40 MG: 40 TABLET, DELAYED RELEASE ORAL at 08:01

## 2023-01-17 RX ADMIN — AMIODARONE HYDROCHLORIDE 200 MG: 200 TABLET ORAL at 08:01

## 2023-01-17 RX ADMIN — METOPROLOL SUCCINATE 100 MG: 50 TABLET, EXTENDED RELEASE ORAL at 08:01

## 2023-01-17 RX ADMIN — DOCUSATE SODIUM 100 MG: 100 CAPSULE, LIQUID FILLED ORAL at 08:01

## 2023-01-17 RX ADMIN — ATORVASTATIN CALCIUM 20 MG: 10 TABLET, FILM COATED ORAL at 08:01

## 2023-01-17 RX ADMIN — ZOLPIDEM TARTRATE 5 MG: 5 TABLET ORAL at 11:01

## 2023-01-17 RX ADMIN — ALPRAZOLAM 0.5 MG: 0.5 TABLET ORAL at 04:01

## 2023-01-17 RX ADMIN — POTASSIUM CHLORIDE 20 MEQ: 1500 TABLET, EXTENDED RELEASE ORAL at 08:01

## 2023-01-17 RX ADMIN — LEVALBUTEROL HYDROCHLORIDE 0.63 MG: 0.63 SOLUTION RESPIRATORY (INHALATION) at 08:01

## 2023-01-17 RX ADMIN — CETIRIZINE HYDROCHLORIDE 10 MG: 10 TABLET, FILM COATED ORAL at 08:01

## 2023-01-17 RX ADMIN — FAMOTIDINE 20 MG: 20 TABLET, FILM COATED ORAL at 08:01

## 2023-01-17 RX ADMIN — APIXABAN 5 MG: 5 TABLET, FILM COATED ORAL at 08:01

## 2023-01-17 RX ADMIN — TAMSULOSIN HYDROCHLORIDE 0.4 MG: 0.4 CAPSULE ORAL at 08:01

## 2023-01-17 RX ADMIN — POTASSIUM CHLORIDE 20 MEQ: 1500 TABLET, EXTENDED RELEASE ORAL at 02:01

## 2023-01-17 NOTE — PROGRESS NOTES
"  [facility]  Cardiology  Progress Note    Patient Name: John Bullard  MRN: [id number]  Admission Date: 1/6/2023  Hospital Length of Stay: 10 days  Code Status: Full Code   Attending Provider: Fabiano Liang MD   Consulting Provider: [facility]  Primary Care Physician: Fabiano Liang MD  Principal Problem:<principal problem not specified>    Patient information was obtained from patient, past medical records, and ER records.     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 The patient recently had an EP Study with Successful Ablation of Typical A.Flutter. He underwent this EP Study on 1.5.23 without complications and was discharged home in stable condition. Post operatively he had a sore throat and a cough which he attributed the ETT. His SOB worsened and he presented to the ER for evaluation. Upon arrival to the ER he was found to have a CXR with Infiltrates and a CT of the Chest with no Evidence of Pulmonary Embolism, however, he did have significant infiltrates in bilateral lungs but worse on the R side. He was admitted to his Primary Care Provider and [facility] was consulted for Elevated Troponin Levels and Recent Cardiac Procedure.     1.8.23: NAD. Converted into PAF Last Night. Remains with CVR. "I am feeling ok." + SOB, Improving. Denies CP and Palps. + Cough.  1.9.23: Patient sitting up at bedside. Conversational dyspnea. Bibasilar rales. Still requiring O2. Abdomen distended. 2+ peripheral edema. Afib 90's on tele.  1.10.23: Patient standing up at bedside. Reports breathing is better. Bibasilar crackles noted. Abdomen still firm distended. 2+ peripheral edema. Co2 rising with diuresis. Albumin 3.2  1.11.23: Patient sitting up at bedside. Reports continued [place]. No conversational dyspnea noted. Still in Afib, Rate controlled.   1.12.23: Patient underwent DCCV with " "successful conversion to SR  1.13.23: Patient sitting up at bedside. NAD. VSS. Still has bibasilar crackles and peripheral edema. SR on tele  1.14.23: Remains SR on tele. VSS. Great UOP.   1.16.23: NAD. "I am feeling ok, but I have SOB when I exert myself. Especially when I bend over." Denies CP and Palps. + SOB/[place]. Fluid Balance Net Negative 1600mL.  1.17.23: NAD noted. VSS. SR on tele. Negative 1635mL in 24hrs, weight down 0.9kg in 24hrs.    PMH: Anxiety, Asthma, PAF/Ablation , DJD, GERD, HTN, OA, Obesity, MINDA/CPAP, VI, VT, Insomnia, Elevated Calcium Score/Non-Obstructive CAD, FLORENTINO/Bilaterally Mild-Moderate  PSH: Lithotripsy, Sinus Surgery, Colonoscopy, Hernia Repair, EP Study/Ablation, Abdominal Surgery, [place] Teeth Extraction   Family History: Reviewed and Unremarkable for Heart Disease  Social History: Denies Illicit Drug, ETOH and Tobacco    Previous Cardiac Diagnostics:   ECHO 1.7.23:  Concentric hypertrophy and mildly decreased systolic function.  The estimated ejection fraction is 35-40%.  Grade I left ventricular diastolic dysfunction.  With normal right ventricular systolic function.  There is mild aortic valve stenosis.  Aortic valve area is 1.03 cm2; peak velocity is 1.99 m/s; mean gradient is 10 mmHg.    Carotid US 8.17.22:  Mild Bilaterally and Tortuous Carotid Arteries with No Hemodynamically Significant FLORENTINO  < 50% Bilaterally    ECHO 4.11.22:  Limited Parasternal Windows  Normal LV Systolic Function: LVEF 55-60%  Moderate AS, Peak AV Velocity 3.2 m/sec, Mean Gradient 23mmHg, JENNY 1.4 cm2  Grade I DD  Mild TR with RVSP of 30mmHg    LHC 4.16.20:  LHC via R Radial Approach with Nonobstructive CAD with Normal LV Function and an EF of 55%. EDP was 20mmHg.  LM: patent vessel.  LAD: calcified type III vessel with 4 patent diagonal arteries. D1 is moderate to large in size with Proximal 50% stenosis. Remaining diagonals are small to medium size patent vessels  Lcx: Nondominant patent vessel that gives rise " to a medium size OM1 with an early takeoff. The OM 2 is a medium to large size patent bifurcating vessel. The AV groove artery terminates in a small size patent OM3 with a small terminal bifurcating PL segment.   RCA: very large dominant patent vessel with mild Luminal irregularities. RCA terminates in a very large patent PDA and a large patent PL branch    PET 4.8.20:  The study quality is below average.   This is an abnormal perfusion study. Study is consistent with mostly scar tissue with minimal ischemia.   Small fixed perfusion abnormality of moderate intensity in the apical segment. Small partially reversible perfusion abnormality of moderate intensity in the anterior septal region. Small fixed perfusion abnormality of moderate intensity in the inferior region.   This scan is suggestive of low to moderate risk for future cardiovascular events.   The left ventricular cavity is noted to be normal on the stress studies. The stress left ventricular ejection fraction was calculated to be 52% and left ventricular global function is normal. The rest left ventricular cavity is noted to be normal. The rest left ventricular ejection fraction was calculated to be 42% and rest left ventricular global function is mildly reduced.   When compared to the resting ejection fraction (42%), the stress ejection fraction (52%) has increased.     Review of patient's allergies indicates:   Allergen Reactions    Ativan [lorazepam] Hallucinations     Review of Systems   Constitutional:  Negative for chills and fever.   Respiratory:  Positive for shortness of breath. Negative for cough, chest tightness, wheezing and stridor.    Cardiovascular:  Positive for leg swelling. Negative for chest pain.   All other systems reviewed and are negative.    Objective:     Vital Signs (Most Recent):  Temp: 97.9 °F (36.6 °C) (01/17/23 0738)  Pulse: 71 (01/17/23 0738)  Resp: 18 (01/17/23 0738)  BP: 102/65 (01/17/23 0738)  SpO2: (!) 91 % (01/17/23  0738)   Vital Signs (24h Range):  Temp:  [97.5 °F (36.4 °C)-98.8 °F (37.1 °C)] 97.9 °F (36.6 °C)  Pulse:  [69-97] 71  Resp:  [18-22] 18  SpO2:  [90 %-94 %] 91 %  BP: ()/(65-81) 102/65     Weight: 108 kg (238 lb 1.6 oz)  Body mass index is 36.2 kg/m².    SpO2: (!) 91 %         Intake/Output Summary (Last 24 hours) at 1/17/2023 0921  Last data filed at 1/17/2023 0600  Gross per 24 hour   Intake 1290 ml   Output 2925 ml   Net -1635 ml       Lines/Drains/Airways       Peripheral Intravenous Line  Duration                  Peripheral IV - Single Lumen 01/16/23 1508 20 G Anterior;Left;Proximal Forearm <1 day                  Significant Labs:  Recent Results (from the past 72 hour(s))   CBC with Differential    Collection Time: 01/14/23 10:45 AM   Result Value Ref Range    WBC 7.9 4.5 - 11.5 x10(3)/mcL    RBC 4.83 4.70 - 6.10 x10(6)/mcL    Hgb 14.4 14.0 - 18.0 gm/dL    Hct 46.1 42.0 - 52.0 %    MCV 95.4 (H) 80.0 - 94.0 fL    MCH 29.8 pg    MCHC 31.2 (L) 33.0 - 36.0 mg/dL    RDW 14.3 11.5 - 17.0 %    Platelet 194 130 - 400 x10(3)/mcL    MPV 10.0 7.4 - 10.4 fL    Neut % 79.1 %    Lymph % 6.0 %    Mono % 10.9 %    Eos % 3.1 %    Basophil % 0.5 %    Lymph # 0.47 (L) 0.6 - 4.6 x10(3)/mcL    Neut # 6.22 2.1 - 9.2 x10(3)/mcL    Mono # 0.86 0.1 - 1.3 x10(3)/mcL    Eos # 0.24 0 - 0.9 x10(3)/mcL    Baso # 0.04 0 - 0.2 x10(3)/mcL    IG# 0.03 0 - 0.04 x10(3)/mcL    IG% 0.4 %    NRBC% 0.0 %   Basic Metabolic Panel    Collection Time: 01/14/23 10:46 AM   Result Value Ref Range    Sodium Level 141 136 - 145 mmol/L    Potassium Level 4.3 3.5 - 5.1 mmol/L    Chloride 100 98 - 107 mmol/L    Carbon Dioxide 30 23 - 31 mmol/L    Glucose Level 107 82 - 115 mg/dL    Blood Urea Nitrogen 17.8 8.4 - 25.7 mg/dL    Creatinine 1.05 0.73 - 1.18 mg/dL    BUN/Creatinine Ratio 17     Calcium Level Total 9.0 8.8 - 10.0 mg/dL    Anion Gap 11.0 mEq/L    eGFR >60 mls/min/1.73/m2   Comprehensive Metabolic Panel    Collection Time: 01/17/23  4:06 AM    Result Value Ref Range    Sodium Level 139 136 - 145 mmol/L    Potassium Level 4.3 3.5 - 5.1 mmol/L    Chloride 104 98 - 107 mmol/L    Carbon Dioxide 26 23 - 31 mmol/L    Glucose Level 123 (H) 82 - 115 mg/dL    Blood Urea Nitrogen 19.9 8.4 - 25.7 mg/dL    Creatinine 1.03 0.73 - 1.18 mg/dL    Calcium Level Total 9.2 8.8 - 10.0 mg/dL    Protein Total 6.1 5.8 - 7.6 gm/dL    Albumin Level 3.0 (L) 3.4 - 4.8 g/dL    Globulin 3.1 2.4 - 3.5 gm/dL    Albumin/Globulin Ratio 1.0 (L) 1.1 - 2.0 ratio    Bilirubin Total 1.1 <=1.5 mg/dL    Alkaline Phosphatase 68 40 - 150 unit/L    Alanine Aminotransferase 26 0 - 55 unit/L    Aspartate Aminotransferase 19 5 - 34 unit/L    eGFR >60 mls/min/1.73/m2   Magnesium    Collection Time: 01/17/23  4:06 AM   Result Value Ref Range    Magnesium Level 2.00 1.60 - 2.60 mg/dL   CBC with Differential    Collection Time: 01/17/23  4:06 AM   Result Value Ref Range    WBC 9.8 4.5 - 11.5 x10(3)/mcL    RBC 4.64 (L) 4.70 - 6.10 x10(6)/mcL    Hgb 13.5 (L) 14.0 - 18.0 gm/dL    Hct 43.0 42.0 - 52.0 %    MCV 92.7 80.0 - 94.0 fL    MCH 29.1 pg    MCHC 31.4 (L) 33.0 - 36.0 mg/dL    RDW 14.3 11.5 - 17.0 %    Platelet 194 130 - 400 x10(3)/mcL    MPV 10.4 7.4 - 10.4 fL    Neut % 77.8 %    Lymph % 7.8 %    Mono % 11.2 %    Eos % 2.2 %    Basophil % 0.4 %    Lymph # 0.76 0.6 - 4.6 x10(3)/mcL    Neut # 7.62 2.1 - 9.2 x10(3)/mcL    Mono # 1.10 0.1 - 1.3 x10(3)/mcL    Eos # 0.22 0 - 0.9 x10(3)/mcL    Baso # 0.04 0 - 0.2 x10(3)/mcL    IG# 0.06 (H) 0 - 0.04 x10(3)/mcL    IG% 0.6 %    NRBC% 0.0 %     Telemetry: SR 70s    Physical Exam  Constitutional:       Appearance: Normal appearance.   HENT:      Head: Normocephalic.      Mouth/Throat:      Mouth: Mucous membranes are moist.   Eyes:      Extraocular Movements: Extraocular movements intact.   Cardiovascular:      Rate and Rhythm: Normal rate and regular rhythm.      Pulses: Normal pulses.      Heart sounds: Murmur heard.   Pulmonary:      Effort: No respiratory  distress.      Breath sounds: Examination of the right-lower field reveals rales. Examination of the left-lower field reveals rales. Rales present.      Comments: PT on NC  Abdominal:      General: There is distension (Normal Anatomy).   Musculoskeletal:         General: Normal range of motion.      Right lower leg: Edema present.      Left lower leg: Edema present.   Skin:     General: Skin is warm and dry.   Neurological:      General: No focal deficit present.      Mental Status: He is alert and oriented to person, place, and time.   Psychiatric:         Mood and Affect: Mood normal.         Behavior: Behavior normal.     Current Inpatient Medications:    Current Facility-Administered Medications:     acetaminophen tablet 650 mg, 650 mg, Oral, Q6H PRN, Fabiano Liang MD, 650 mg at 01/08/23 1036    albuterol inhaler 2 puff, 2 puff, Inhalation, Q4H PRN, Fabiano Liang MD, 2 puff at 01/16/23 1441    ALPRAZolam tablet 0.5 mg, 0.5 mg, Oral, BID PRN, Fabiano Liang MD, 0.5 mg at 01/15/23 1659    [START ON 1/20/2023] amiodarone tablet 200 mg, 200 mg, Oral, Daily, NED Iniguez    amiodarone tablet 200 mg, 200 mg, Oral, BID, NED Iniguez, 200 mg at 01/17/23 0839    apixaban tablet 5 mg, 5 mg, Oral, BID, Fabiano Liang MD, 5 mg at 01/17/23 0900    atorvastatin tablet 20 mg, 20 mg, Oral, Daily, Fabiano Liang MD, 20 mg at 01/17/23 0839    cetirizine tablet 10 mg, 10 mg, Oral, Daily, Fabiano Liang MD, 10 mg at 01/17/23 0840    docusate sodium capsule 100 mg, 100 mg, Oral, Daily, Fabiano Liang MD, 100 mg at 01/17/23 0840    famotidine tablet 20 mg, 20 mg, Oral, BID, Dameon Sanchez III, MD, 20 mg at 01/17/23 0839    fluticasone furoate-vilanteroL 200-25 mcg/dose diskus inhaler 1 puff, 1 puff, Inhalation, Daily, Fabiano Liang MD, 1 puff at 01/16/23 1000    furosemide injection 40 mg, 40 mg, Intravenous, Daily, Fabiano Liang MD, 40 mg at 01/17/23  0840    latanoprost 0.005 % ophthalmic solution 1 drop, 1 drop, Both Eyes, Daily, Fabiano Liang MD, 1 drop at 01/16/23 1000    levalbuterol nebulizer solution 0.63 mg, 0.63 mg, Nebulization, Q12H, Fabiano Liang MD, 0.63 mg at 01/16/23 2006    melatonin tablet 6 mg, 6 mg, Oral, Nightly PRN, Dameon Sanchez III, MD    melatonin tablet 9 mg, 9 mg, Oral, Daily PRN, Fabiano Liang MD    metoprolol injection 5 mg, 5 mg, Intravenous, Q15 Min PRN, Lionel SALDIVAR. Marley, MONIKA    metoprolol succinate (TOPROL-XL) 24 hr tablet 100 mg, 100 mg, Oral, Daily, NED Iniguez, 100 mg at 01/17/23 0826    pantoprazole EC tablet 40 mg, 40 mg, Oral, Daily, Fabiano Liang MD, 40 mg at 01/17/23 0839    potassium chloride SA CR tablet 20 mEq, 20 mEq, Oral, TID, Fabiano Liang MD, 20 mEq at 01/17/23 0839    tamsulosin 24 hr capsule 0.4 mg, 1 capsule, Oral, Daily, Fabiano Liang MD, 0.4 mg at 01/17/23 0840    valsartan tablet 40 mg, 40 mg, Oral, Daily, NED Iniguez, 40 mg at 01/16/23 0957    zolpidem tablet 5 mg, 5 mg, Oral, QHS, Fabiano Liang MD, 5 mg at 01/16/23 2143    VTE Risk Mitigation (From admission, onward)           Ordered     apixaban tablet 5 mg  2 times daily         01/07/23 1050     IP VTE HIGH RISK PATIENT  Once         01/07/23 0303     Place sequential compression device  Until discontinued         01/07/23 0303                  Assessment:   Acute on Chronic Combined Systolic and Diastolic HF/EF 35-40%  CMO/EF 35-40%     - ECHO (1.13.23) - LVEF 35-40%    - ECHO (1.7.23) - Grade 1 DD, Mild AS, EF 35-40%    - ECHO (4.11.22) - LVEF 55-60%  Bilateral Pneumonitis (R > L)  Mildly Elevated Troponin (Flat) - likely due to Recent Ablation/Decompensated HF  Hx of Asthma  PAF - Now in SR with 1st Deg AVB    - CHADsVASc - 5 Points - 7.2% Stroke Risk per Year     - EP Study with Successful Ablation of Typical A.Flutter with Termination into SR (1.5.23)  VHD (Mild-Mod AS)  HTN  - Controlled  MINDA/CPAP  Venous Insufficiency  Elevated Calcium Score/Mild Calcified Non-Obstructive CAD via [facility] (2020)    - [facility] (4.16.20) - Nonobstructive CAD with Normal LV Function and an EF of 55%  FLORENTINO/Bilaterally Mild-Moderate  Anxiety  GERD  OA  Obesity  No Hx of GIB per Chart Review    Plan:   Continue Diuresis with Lasix 40mg IVP Daily  Accurate I&Os and Daily Weights  Continue BB, Statin, Amiodarone and ARB  Continue Eliquis Re Stroke Risk Reduction   Will Plan for OP Ischemic Workup and Further Evaluation of Aortic Valve  CXR in AM  Labs in AM: CBC, CMP and Mg    ANTHONY Velasquez-BC  Cardiology  [facility]  01/17/2023     I have seen the patient, reviewed the Nurse Practitioner's note, assessment and plan. I have personally interviewed and examined the patient at bedside and agree with the findings. Medical decision making listed above were done under my guidance.

## 2023-01-18 LAB
ALBUMIN SERPL-MCNC: 3.1 G/DL (ref 3.4–4.8)
ALBUMIN/GLOB SERPL: 1.2 RATIO (ref 1.1–2)
ALP SERPL-CCNC: 71 UNIT/L (ref 40–150)
ALT SERPL-CCNC: 26 UNIT/L (ref 0–55)
AST SERPL-CCNC: 19 UNIT/L (ref 5–34)
BASOPHILS # BLD AUTO: 0.05 X10(3)/MCL (ref 0–0.2)
BASOPHILS NFR BLD AUTO: 0.5 %
BILIRUBIN DIRECT+TOT PNL SERPL-MCNC: 1 MG/DL
BNP BLD-MCNC: 55.5 PG/ML
BUN SERPL-MCNC: 23.2 MG/DL (ref 8.4–25.7)
CALCIUM SERPL-MCNC: 8.9 MG/DL (ref 8.8–10)
CHLORIDE SERPL-SCNC: 98 MMOL/L (ref 98–107)
CO2 SERPL-SCNC: 31 MMOL/L (ref 23–31)
CREAT SERPL-MCNC: 1.1 MG/DL (ref 0.73–1.18)
EOSINOPHIL # BLD AUTO: 0.21 X10(3)/MCL (ref 0–0.9)
EOSINOPHIL NFR BLD AUTO: 2 %
ERYTHROCYTE [DISTWIDTH] IN BLOOD BY AUTOMATED COUNT: 14.5 % (ref 11.5–17)
GFR SERPLBLD CREATININE-BSD FMLA CKD-EPI: >60 MLS/MIN/1.73/M2
GLOBULIN SER-MCNC: 2.6 GM/DL (ref 2.4–3.5)
GLUCOSE SERPL-MCNC: 97 MG/DL (ref 82–115)
HCT VFR BLD AUTO: 42.3 % (ref 42–52)
HGB BLD-MCNC: 13.4 GM/DL (ref 14–18)
IMM GRANULOCYTES # BLD AUTO: 0.06 X10(3)/MCL (ref 0–0.04)
IMM GRANULOCYTES NFR BLD AUTO: 0.6 %
LYMPHOCYTES # BLD AUTO: 0.54 X10(3)/MCL (ref 0.6–4.6)
LYMPHOCYTES NFR BLD AUTO: 5 %
MAGNESIUM SERPL-MCNC: 2.1 MG/DL (ref 1.6–2.6)
MCH RBC QN AUTO: 29.6 PG
MCHC RBC AUTO-ENTMCNC: 31.7 MG/DL (ref 33–36)
MCV RBC AUTO: 93.4 FL (ref 80–94)
MONOCYTES # BLD AUTO: 1.16 X10(3)/MCL (ref 0.1–1.3)
MONOCYTES NFR BLD AUTO: 10.8 %
NEUTROPHILS # BLD AUTO: 8.7 X10(3)/MCL (ref 2.1–9.2)
NEUTROPHILS NFR BLD AUTO: 81.1 %
NRBC BLD AUTO-RTO: 0 %
PLATELET # BLD AUTO: 199 X10(3)/MCL (ref 130–400)
PMV BLD AUTO: 11 FL (ref 7.4–10.4)
POTASSIUM SERPL-SCNC: 4.4 MMOL/L (ref 3.5–5.1)
PROT SERPL-MCNC: 5.7 GM/DL (ref 5.8–7.6)
RBC # BLD AUTO: 4.53 X10(6)/MCL (ref 4.7–6.1)
SODIUM SERPL-SCNC: 139 MMOL/L (ref 136–145)
TROPONIN I SERPL-MCNC: <0.01 NG/ML (ref 0–0.04)
WBC # SPEC AUTO: 10.7 X10(3)/MCL (ref 4.5–11.5)

## 2023-01-18 PROCEDURE — 63600175 PHARM REV CODE 636 W HCPCS: Performed by: INTERNAL MEDICINE

## 2023-01-18 PROCEDURE — 84484 ASSAY OF TROPONIN QUANT: CPT | Performed by: INTERNAL MEDICINE

## 2023-01-18 PROCEDURE — 25000003 PHARM REV CODE 250: Performed by: NURSE PRACTITIONER

## 2023-01-18 PROCEDURE — 99232 SBSQ HOSP IP/OBS MODERATE 35: CPT | Mod: ,,, | Performed by: INTERNAL MEDICINE

## 2023-01-18 PROCEDURE — 99900031 HC PATIENT EDUCATION (STAT)

## 2023-01-18 PROCEDURE — 97162 PT EVAL MOD COMPLEX 30 MIN: CPT

## 2023-01-18 PROCEDURE — 94640 AIRWAY INHALATION TREATMENT: CPT

## 2023-01-18 PROCEDURE — 25000003 PHARM REV CODE 250: Performed by: EMERGENCY MEDICINE

## 2023-01-18 PROCEDURE — 85025 COMPLETE CBC W/AUTO DIFF WBC: CPT | Performed by: INTERNAL MEDICINE

## 2023-01-18 PROCEDURE — 99232 PR SUBSEQUENT HOSPITAL CARE,LEVL II: ICD-10-PCS | Mod: ,,, | Performed by: INTERNAL MEDICINE

## 2023-01-18 PROCEDURE — 83735 ASSAY OF MAGNESIUM: CPT | Performed by: INTERNAL MEDICINE

## 2023-01-18 PROCEDURE — 27000221 HC OXYGEN, UP TO 24 HOURS

## 2023-01-18 PROCEDURE — 21400001 HC TELEMETRY ROOM

## 2023-01-18 PROCEDURE — 83880 ASSAY OF NATRIURETIC PEPTIDE: CPT | Performed by: INTERNAL MEDICINE

## 2023-01-18 PROCEDURE — 80053 COMPREHEN METABOLIC PANEL: CPT | Performed by: INTERNAL MEDICINE

## 2023-01-18 PROCEDURE — 36415 COLL VENOUS BLD VENIPUNCTURE: CPT | Performed by: INTERNAL MEDICINE

## 2023-01-18 PROCEDURE — 25000242 PHARM REV CODE 250 ALT 637 W/ HCPCS: Performed by: INTERNAL MEDICINE

## 2023-01-18 PROCEDURE — 25000003 PHARM REV CODE 250: Performed by: INTERNAL MEDICINE

## 2023-01-18 RX ORDER — LEVALBUTEROL INHALATION SOLUTION 0.63 MG/3ML
0.63 SOLUTION RESPIRATORY (INHALATION) EVERY 8 HOURS
Status: DISCONTINUED | OUTPATIENT
Start: 2023-01-18 | End: 2023-01-21 | Stop reason: HOSPADM

## 2023-01-18 RX ADMIN — DOCUSATE SODIUM 100 MG: 100 CAPSULE, LIQUID FILLED ORAL at 08:01

## 2023-01-18 RX ADMIN — LATANOPROST 1 DROP: 50 SOLUTION OPHTHALMIC at 09:01

## 2023-01-18 RX ADMIN — ZOLPIDEM TARTRATE 5 MG: 5 TABLET ORAL at 08:01

## 2023-01-18 RX ADMIN — CETIRIZINE HYDROCHLORIDE 10 MG: 10 TABLET, FILM COATED ORAL at 09:01

## 2023-01-18 RX ADMIN — APIXABAN 5 MG: 5 TABLET, FILM COATED ORAL at 08:01

## 2023-01-18 RX ADMIN — FUROSEMIDE 40 MG: 10 INJECTION, SOLUTION INTRAMUSCULAR; INTRAVENOUS at 07:01

## 2023-01-18 RX ADMIN — FLUTICASONE FUROATE AND VILANTEROL TRIFENATATE 1 PUFF: 200; 25 POWDER RESPIRATORY (INHALATION) at 09:01

## 2023-01-18 RX ADMIN — FAMOTIDINE 20 MG: 20 TABLET, FILM COATED ORAL at 09:01

## 2023-01-18 RX ADMIN — FUROSEMIDE 40 MG: 10 INJECTION, SOLUTION INTRAMUSCULAR; INTRAVENOUS at 08:01

## 2023-01-18 RX ADMIN — AMIODARONE HYDROCHLORIDE 200 MG: 200 TABLET ORAL at 08:01

## 2023-01-18 RX ADMIN — PANTOPRAZOLE SODIUM 40 MG: 40 TABLET, DELAYED RELEASE ORAL at 08:01

## 2023-01-18 RX ADMIN — AMIODARONE HYDROCHLORIDE 200 MG: 200 TABLET ORAL at 09:01

## 2023-01-18 RX ADMIN — POTASSIUM CHLORIDE 20 MEQ: 1500 TABLET, EXTENDED RELEASE ORAL at 08:01

## 2023-01-18 RX ADMIN — METOPROLOL SUCCINATE 100 MG: 50 TABLET, EXTENDED RELEASE ORAL at 09:01

## 2023-01-18 RX ADMIN — THEOPHYLLINE ANHYDROUS 200 MG: 200 CAPSULE, EXTENDED RELEASE ORAL at 10:01

## 2023-01-18 RX ADMIN — POTASSIUM CHLORIDE 20 MEQ: 1500 TABLET, EXTENDED RELEASE ORAL at 09:01

## 2023-01-18 RX ADMIN — ATORVASTATIN CALCIUM 20 MG: 10 TABLET, FILM COATED ORAL at 09:01

## 2023-01-18 RX ADMIN — LEVALBUTEROL HYDROCHLORIDE 0.63 MG: 0.63 SOLUTION RESPIRATORY (INHALATION) at 03:01

## 2023-01-18 RX ADMIN — POTASSIUM CHLORIDE 20 MEQ: 1500 TABLET, EXTENDED RELEASE ORAL at 01:01

## 2023-01-18 RX ADMIN — FAMOTIDINE 20 MG: 20 TABLET, FILM COATED ORAL at 08:01

## 2023-01-18 RX ADMIN — TAMSULOSIN HYDROCHLORIDE 0.4 MG: 0.4 CAPSULE ORAL at 09:01

## 2023-01-18 NOTE — PROGRESS NOTES
Subjective:  The patient feels about the same.  Still with shortness of breath.  Still having a sensation that he can not get a full breath on the left side.    He did see Dr. Bennett this morning.  I think it was more of a social visit.  Dr. Bennett did suggest physical therapy and the patient is agreeable.    Objective:  He is afebrile.  Blood pressure 116/77.  Heart rate 81.  O2 sat 91%.  Telemetry reveals atrial fib flutter.  Rate controlled.  Weight is down 1.5 kg from yesterday after increasing the diuretic to b.i.d..    I did observe him use the incentive spirometer is only able to pull up 500-750 mL    General appearance is unremarkable.  He is in no acute distress.  He is a bit anxious.  Heart has an irregular rhythm with normal rate.  Lungs with a few rales at the right base.  Abdomen nontender.  It is obese.  Extremities with 1 to 2+ pretibial edema bilaterally.    Laboratory studies today include a stable CBC also the BNP and troponin levels are normal.  Electrolytes are normal.  BUN and creatinine are little bit worse at 23 and 1.1.    Assessment:  1.  Acute and chronic dyspnea.  Multifactorial.    2. Atrial fibrillation seems to be more in fib flutter now than sinus    3. Cardiomyopathy.      4. Recent pneumonia    5. 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  He also had evidence of obstructive disease on prior PFTs.    Plan: Continue Lasix 40 IV Q 12.  Will watch his blood counts closely.  Will empirically try him on theophylline.  Will also increase the frequency of his Xopenex treatments.  Will also order physical therapy

## 2023-01-18 NOTE — PLAN OF CARE
Problem: Physical Therapy  Goal: Physical Therapy Goal  Description: Goals to be met by: 23     Patient will increase functional independence with mobility by performin. Sit to stand transfer with [place]  2. Gait  x 400 feet with [place] using No Assistive Device.   3. Ascend/descend 12 stair with bilateral Handrails Modified [place] using No Assistive Device.     Outcome: Ongoing, Progressing

## 2023-01-18 NOTE — PROGRESS NOTES
Subjective:  The patient felt more short of breath tonight.  He states he feels better when he leans forward at the waist.  He feels like he breathes better that way.  He feels a fullness in the left chest area, consistent with the elevated left hemidiaphragm.  The swelling of the lower extremities has improved but is still problematic.    Objective:  He is afebrile.  Blood pressure 109/72.  Heart rate 66 and respiratory rate 20.  O2 sat 94%.  This includes on room air.  The nurses have put him on nasal cannula for symptomatic relief.  His weight is 238 lb today which was as high as 251 3 weeks ago but 242 12 days ago.    General appearance is unremarkable.  Heart has an irregular rhythm when he stood but was regular when he was sitting.  Lungs with mild basilar rales right more than left.  No wheezing.  Abdomen distended but nontender.  Extremities with 2+ pretibial edema bilaterally.    Lab work from this morning was unremarkable.  Chest x-ray was probably a little better than the prior film at least to my interpretation.      Assessment:  1.  Atrial fib flutter converting recently to sinus.  It sounds like he has been in and out of the arrhythmia.      2. Cardiomyopathy.    3. Recent pneumonia.  Improving clinically    4. 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 Asthma.  Likely also a contributing factor     Plan:  Will increase diuretic to 40 IV Q 12.  I think we can be more aggressive with his diuresis.  I will update blood work tomorrow.

## 2023-01-18 NOTE — PROGRESS NOTES
"  [facility]  Cardiology  Progress Note    Patient Name: John Bullard  MRN: [id number]  Admission Date: 1/6/2023  Hospital Length of Stay: 11 days  Code Status: Full Code   Attending Provider: Fabiano Liang MD   Consulting Provider: Francisco Melgar [facility]  Primary Care Physician: Fabiano Liang MD  Principal Problem:<principal problem not specified>    Patient information was obtained from patient, past medical records, and ER records.     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 The patient recently had an EP Study with Successful Ablation of Typical A.Flutter. He underwent this EP Study on 1.5.23 without complications and was discharged home in stable condition. Post operatively he had a sore throat and a cough which he attributed the ETT. His SOB worsened and he presented to the ER for evaluation. Upon arrival to the ER he was found to have a CXR with Infiltrates and a CT of the Chest with no Evidence of Pulmonary Embolism, however, he did have significant infiltrates in bilateral lungs but worse on the R side. He was admitted to his Primary Care Provider and [facility] was consulted for Elevated Troponin Levels and Recent Cardiac Procedure.     1.8.23: NAD. Converted into PAF Last Night. Remains with CVR. "I am feeling ok." + SOB, Improving. Denies CP and Palps. + Cough.  1.9.23: Patient sitting up at bedside. Conversational dyspnea. Bibasilar rales. Still requiring O2. Abdomen distended. 2+ peripheral edema. Afib 90's on tele.  1.10.23: Patient standing up at bedside. Reports breathing is better. Bibasilar crackles noted. Abdomen still firm distended. 2+ peripheral edema. Co2 rising with diuresis. Albumin 3.2  1.11.23: Patient sitting up at bedside. Reports continued [place]. No conversational dyspnea noted. Still in Afib, Rate controlled.   1.12.23: Patient underwent DCCV with " "successful conversion to SR  1.13.23: Patient sitting up at bedside. NAD. VSS. Still has bibasilar crackles and peripheral edema. SR on tele  1.14.23: Remains SR on tele. VSS. Great UOP.   1.16.23: NAD. "I am feeling ok, but I have SOB when I exert myself. Especially when I bend over." Denies CP and Palps. + SOB/[place]. Fluid Balance Net Negative 1600mL.  1.17.23: NAD noted. VSS. SR on tele. Negative 1635mL in 24hrs, weight down 0.9kg in 24hrs.  1.18.23: NAD noted. Still SOB. Denies CP/Palps. Aflutter vs Coarse Afib with CVR on tele. Negative 1505mL in 24hrs, weight down 0.9kg    PMH: Anxiety, Asthma, PAF/Ablation , DJD, GERD, HTN, OA, Obesity, MINDA/CPAP, VI, VT, Insomnia, Elevated Calcium Score/Non-Obstructive CAD, FLORENTINO/Bilaterally Mild-Moderate  PSH: Lithotripsy, Sinus Surgery, Colonoscopy, Hernia Repair, EP Study/Ablation, Abdominal Surgery, [place] Teeth Extraction   Family History: Reviewed and Unremarkable for Heart Disease  Social History: Denies Illicit Drug, ETOH and Tobacco    Previous Cardiac Diagnostics:   ECHO 1.7.23:  Concentric hypertrophy and mildly decreased systolic function.  The estimated ejection fraction is 35-40%.  Grade I left ventricular diastolic dysfunction.  With normal right ventricular systolic function.  There is mild aortic valve stenosis.  Aortic valve area is 1.03 cm2; peak velocity is 1.99 m/s; mean gradient is 10 mmHg.    Carotid US 8.17.22:  Mild Bilaterally and Tortuous Carotid Arteries with No Hemodynamically Significant FLORENTINO  < 50% Bilaterally    ECHO 4.11.22:  Limited Parasternal Windows  Normal LV Systolic Function: LVEF 55-60%  Moderate AS, Peak AV Velocity 3.2 m/sec, Mean Gradient 23mmHg, JENNY 1.4 cm2  Grade I DD  Mild TR with RVSP of 30mmHg    LHC 4.16.20:  LHC via R Radial Approach with Nonobstructive CAD with Normal LV Function and an EF of 55%. EDP was 20mmHg.  LM: patent vessel.  LAD: calcified type III vessel with 4 patent diagonal arteries. D1 is moderate to large in size " with Proximal 50% stenosis. Remaining diagonals are small to medium size patent vessels  Lcx: Nondominant patent vessel that gives rise to a medium size OM1 with an early takeoff. The OM 2 is a medium to large size patent bifurcating vessel. The AV groove artery terminates in a small size patent OM3 with a small terminal bifurcating PL segment.   RCA: very large dominant patent vessel with mild Luminal irregularities. RCA terminates in a very large patent PDA and a large patent PL branch    PET 4.8.20:  The study quality is below average.   This is an abnormal perfusion study. Study is consistent with mostly scar tissue with minimal ischemia.   Small fixed perfusion abnormality of moderate intensity in the apical segment. Small partially reversible perfusion abnormality of moderate intensity in the anterior septal region. Small fixed perfusion abnormality of moderate intensity in the inferior region.   This scan is suggestive of low to moderate risk for future cardiovascular events.   The left ventricular cavity is noted to be normal on the stress studies. The stress left ventricular ejection fraction was calculated to be 52% and left ventricular global function is normal. The rest left ventricular cavity is noted to be normal. The rest left ventricular ejection fraction was calculated to be 42% and rest left ventricular global function is mildly reduced.   When compared to the resting ejection fraction (42%), the stress ejection fraction (52%) has increased.     Review of patient's allergies indicates:   Allergen Reactions    Ativan [lorazepam] Hallucinations     Review of Systems   Constitutional:  Negative for chills and fever.   Respiratory:  Positive for shortness of breath. Negative for cough, chest tightness, wheezing and stridor.    Cardiovascular:  Positive for leg swelling. Negative for chest pain.   All other systems reviewed and are negative.    Objective:     Vital Signs (Most Recent):  Temp: 97.9 °F  (36.6 °C) (01/18/23 0803)  Pulse: 83 (01/18/23 0901)  Resp: 18 (01/18/23 0901)  BP: 116/77 (01/18/23 0803)  SpO2: (!) 91 % (01/18/23 1100)   Vital Signs (24h Range):  Temp:  [97.3 °F (36.3 °C)-98.2 °F (36.8 °C)] 97.9 °F (36.6 °C)  Pulse:  [65-83] 83  Resp:  [16-20] 18  SpO2:  [90 %-95 %] 91 %  BP: ()/(61-77) 116/77     Weight: 107.1 kg (236 lb 1.8 oz)  Body mass index is 35.9 kg/m².    SpO2: (!) 91 %         Intake/Output Summary (Last 24 hours) at 1/18/2023 1108  Last data filed at 1/18/2023 0910  Gross per 24 hour   Intake 1320 ml   Output 2550 ml   Net -1230 ml       Lines/Drains/Airways       Peripheral Intravenous Line  Duration                  Peripheral IV - Single Lumen 01/16/23 1508 20 G Anterior;Left;Proximal Forearm 1 day                  Significant Labs:  Recent Results (from the past 72 hour(s))   Comprehensive Metabolic Panel    Collection Time: 01/17/23  4:06 AM   Result Value Ref Range    Sodium Level 139 136 - 145 mmol/L    Potassium Level 4.3 3.5 - 5.1 mmol/L    Chloride 104 98 - 107 mmol/L    Carbon Dioxide 26 23 - 31 mmol/L    Glucose Level 123 (H) 82 - 115 mg/dL    Blood Urea Nitrogen 19.9 8.4 - 25.7 mg/dL    Creatinine 1.03 0.73 - 1.18 mg/dL    Calcium Level Total 9.2 8.8 - 10.0 mg/dL    Protein Total 6.1 5.8 - 7.6 gm/dL    Albumin Level 3.0 (L) 3.4 - 4.8 g/dL    Globulin 3.1 2.4 - 3.5 gm/dL    Albumin/Globulin Ratio 1.0 (L) 1.1 - 2.0 ratio    Bilirubin Total 1.1 <=1.5 mg/dL    Alkaline Phosphatase 68 40 - 150 unit/L    Alanine Aminotransferase 26 0 - 55 unit/L    Aspartate Aminotransferase 19 5 - 34 unit/L    eGFR >60 mls/min/1.73/m2   Magnesium    Collection Time: 01/17/23  4:06 AM   Result Value Ref Range    Magnesium Level 2.00 1.60 - 2.60 mg/dL   CBC with Differential    Collection Time: 01/17/23  4:06 AM   Result Value Ref Range    WBC 9.8 4.5 - 11.5 x10(3)/mcL    RBC 4.64 (L) 4.70 - 6.10 x10(6)/mcL    Hgb 13.5 (L) 14.0 - 18.0 gm/dL    Hct 43.0 42.0 - 52.0 %    MCV 92.7 80.0 -  94.0 fL    MCH 29.1 pg    MCHC 31.4 (L) 33.0 - 36.0 mg/dL    RDW 14.3 11.5 - 17.0 %    Platelet 194 130 - 400 x10(3)/mcL    MPV 10.4 7.4 - 10.4 fL    Neut % 77.8 %    Lymph % 7.8 %    Mono % 11.2 %    Eos % 2.2 %    Basophil % 0.4 %    Lymph # 0.76 0.6 - 4.6 x10(3)/mcL    Neut # 7.62 2.1 - 9.2 x10(3)/mcL    Mono # 1.10 0.1 - 1.3 x10(3)/mcL    Eos # 0.22 0 - 0.9 x10(3)/mcL    Baso # 0.04 0 - 0.2 x10(3)/mcL    IG# 0.06 (H) 0 - 0.04 x10(3)/mcL    IG% 0.6 %    NRBC% 0.0 %   Comprehensive Metabolic Panel    Collection Time: 01/18/23  4:04 AM   Result Value Ref Range    Sodium Level 139 136 - 145 mmol/L    Potassium Level 4.4 3.5 - 5.1 mmol/L    Chloride 98 98 - 107 mmol/L    Carbon Dioxide 31 23 - 31 mmol/L    Glucose Level 97 82 - 115 mg/dL    Blood Urea Nitrogen 23.2 8.4 - 25.7 mg/dL    Creatinine 1.10 0.73 - 1.18 mg/dL    Calcium Level Total 8.9 8.8 - 10.0 mg/dL    Protein Total 5.7 (L) 5.8 - 7.6 gm/dL    Albumin Level 3.1 (L) 3.4 - 4.8 g/dL    Globulin 2.6 2.4 - 3.5 gm/dL    Albumin/Globulin Ratio 1.2 1.1 - 2.0 ratio    Bilirubin Total 1.0 <=1.5 mg/dL    Alkaline Phosphatase 71 40 - 150 unit/L    Alanine Aminotransferase 26 0 - 55 unit/L    Aspartate Aminotransferase 19 5 - 34 unit/L    eGFR >60 mls/min/1.73/m2   BNP    Collection Time: 01/18/23  4:04 AM   Result Value Ref Range    Natriuretic Peptide 55.5 <=100.0 pg/mL   Troponin I    Collection Time: 01/18/23  4:04 AM   Result Value Ref Range    Troponin-I <0.010 0.000 - 0.045 ng/mL   Magnesium    Collection Time: 01/18/23  4:04 AM   Result Value Ref Range    Magnesium Level 2.10 1.60 - 2.60 mg/dL   CBC with Differential    Collection Time: 01/18/23  4:04 AM   Result Value Ref Range    WBC 10.7 4.5 - 11.5 x10(3)/mcL    RBC 4.53 (L) 4.70 - 6.10 x10(6)/mcL    Hgb 13.4 (L) 14.0 - 18.0 gm/dL    Hct 42.3 42.0 - 52.0 %    MCV 93.4 80.0 - 94.0 fL    MCH 29.6 pg    MCHC 31.7 (L) 33.0 - 36.0 mg/dL    RDW 14.5 11.5 - 17.0 %    Platelet 199 130 - 400 x10(3)/mcL    MPV 11.0  (H) 7.4 - 10.4 fL    Neut % 81.1 %    Lymph % 5.0 %    Mono % 10.8 %    Eos % 2.0 %    Basophil % 0.5 %    Lymph # 0.54 (L) 0.6 - 4.6 x10(3)/mcL    Neut # 8.70 2.1 - 9.2 x10(3)/mcL    Mono # 1.16 0.1 - 1.3 x10(3)/mcL    Eos # 0.21 0 - 0.9 x10(3)/mcL    Baso # 0.05 0 - 0.2 x10(3)/mcL    IG# 0.06 (H) 0 - 0.04 x10(3)/mcL    IG% 0.6 %    NRBC% 0.0 %     Telemetry: SR 70s    Physical Exam  Constitutional:       Appearance: Normal appearance.   HENT:      Head: Normocephalic.      Mouth/Throat:      Mouth: Mucous membranes are moist.   Eyes:      Extraocular Movements: Extraocular movements intact.   Cardiovascular:      Rate and Rhythm: Normal rate and regular rhythm.      Pulses: Normal pulses.      Heart sounds: Murmur heard.   Pulmonary:      Effort: No respiratory distress.      Breath sounds: Examination of the right-lower field reveals rales. Examination of the left-lower field reveals rales. Rales present.      Comments: PT on NC  Abdominal:      General: There is distension (Normal Anatomy).   Musculoskeletal:         General: Normal range of motion.      Right lower leg: Edema present.      Left lower leg: Edema present.   Skin:     General: Skin is warm and dry.   Neurological:      General: No focal deficit present.      Mental Status: He is alert and oriented to person, place, and time.   Psychiatric:         Mood and Affect: Mood normal.         Behavior: Behavior normal.     Current Inpatient Medications:    Current Facility-Administered Medications:     acetaminophen tablet 650 mg, 650 mg, Oral, Q6H PRN, Fabiano Liang MD, 650 mg at 01/08/23 1036    albuterol inhaler 2 puff, 2 puff, Inhalation, Q4H PRN, Fabiano Liang MD, 2 puff at 01/16/23 1441    ALPRAZolam tablet 0.5 mg, 0.5 mg, Oral, BID PRN, Fabiano Liang MD, 0.5 mg at 01/17/23 1631    [START ON 1/20/2023] amiodarone tablet 200 mg, 200 mg, Oral, Daily, NED Iniguez    amiodarone tablet 200 mg, 200 mg, Oral, BID, Rika SÁNCHEZ  NED [place], 200 mg at 01/18/23 0900    apixaban tablet 5 mg, 5 mg, Oral, BID, Fabiano Liang MD, 5 mg at 01/18/23 0859    atorvastatin tablet 20 mg, 20 mg, Oral, Daily, Fabiano Liang MD, 20 mg at 01/18/23 0900    cetirizine tablet 10 mg, 10 mg, Oral, Daily, Fabiano Liang MD, 10 mg at 01/18/23 0900    docusate sodium capsule 100 mg, 100 mg, Oral, Daily, Fabiano Liang MD, 100 mg at 01/18/23 0859    famotidine tablet 20 mg, 20 mg, Oral, BID, Dameon Sanchez III, MD, 20 mg at 01/18/23 0901    fluticasone furoate-vilanteroL 200-25 mcg/dose diskus inhaler 1 puff, 1 puff, Inhalation, Daily, Fabiano Liang MD, 1 puff at 01/18/23 0901    furosemide injection 40 mg, 40 mg, Intravenous, Q12H, Fabiano Liang MD, 40 mg at 01/18/23 0859    latanoprost 0.005 % ophthalmic solution 1 drop, 1 drop, Both Eyes, Daily, Fabiano Liang MD, 1 drop at 01/18/23 0901    levalbuterol nebulizer solution 0.63 mg, 0.63 mg, Nebulization, Q8H, Fabiano Liang MD    melatonin tablet 6 mg, 6 mg, Oral, Nightly PRN, Dameon Sanchez III, MD    melatonin tablet 9 mg, 9 mg, Oral, Daily PRN, Fabiano Laing MD    metoprolol injection 5 mg, 5 mg, Intravenous, Q15 Min PRN, Lionel SALDIVAR. Marley, NP    metoprolol succinate (TOPROL-XL) 24 hr tablet 100 mg, 100 mg, Oral, Daily, NED Iniguez, 100 mg at 01/18/23 0900    pantoprazole EC tablet 40 mg, 40 mg, Oral, Daily, Fabiano Liang MD, 40 mg at 01/18/23 0859    potassium chloride SA CR tablet 20 mEq, 20 mEq, Oral, TID, Fabiano Liang MD, 20 mEq at 01/18/23 0900    tamsulosin 24 hr capsule 0.4 mg, 1 capsule, Oral, Daily, Fabiano Liang MD, 0.4 mg at 01/18/23 0900    theophylline 24 hr capsule 200 mg, 200 mg, Oral, Daily, Fabiano Liang MD, 200 mg at 01/18/23 1023    valsartan tablet 40 mg, 40 mg, Oral, Daily, NED Iniguez, 40 mg at 01/16/23 0957    zolpidem tablet 5 mg, 5 mg, Oral, QHS, Fabiano Liang,  MD, 5 mg at 01/17/23 2315    VTE Risk Mitigation (From admission, onward)           Ordered     apixaban tablet 5 mg  2 times daily         01/07/23 1050     IP VTE HIGH RISK PATIENT  Once         01/07/23 0303     Place sequential compression device  Until discontinued         01/07/23 0303                  Assessment:   Acute on Chronic Combined Systolic and Diastolic HF/EF 35-40%  CMO/EF 35-40%     - ECHO (1.13.23) - LVEF 35-40%    - ECHO (1.7.23) - Grade 1 DD, Mild AS, EF 35-40%    - ECHO (4.11.22) - LVEF 55-60%  Bilateral Pneumonitis (R > L)  Mildly Elevated Troponin (Flat) - likely due to Recent Ablation/Decompensated HF  Hx of Asthma  PAF - Now in Aflutter vs coarse afib    - CHADsVASc - 5 Points - 7.2% Stroke Risk per Year     - EP Study with Successful Ablation of Typical A.Flutter with Termination into SR (1.5.23)  VHD (Mild-Mod AS)  HTN - Controlled  MINDA/CPAP  Venous Insufficiency  Elevated Calcium Score/Mild Calcified Non-Obstructive CAD via [facility] (2020)    - [facility] (4.16.20) - Nonobstructive CAD with Normal LV Function and an EF of 55%  FLORENTINO/Bilaterally Mild-Moderate  Anxiety  GERD  OA  Obesity  No Hx of GIB per Chart Review    Plan:   Recommend avoiding Theophyline which would exact atrial arrhythmias  Continue Diuresis with Lasix 40mg IVP BID  Accurate I&Os and Daily Weights  Continue BB, Statin, Amiodarone and ARB  Continue Eliquis Re Stroke Risk Reduction   Aggressive PT/OT  Will Plan for OP Ischemic Workup and Further Evaluation of Aortic Valve  Labs in AM: CBC, CMP and Mg    ANTHONY Velasquez-BC  Cardiology  [facility]  01/18/2023     I have seen the patient, reviewed the Nurse Practitioner's note, assessment and plan. I have personally interviewed and examined the patient at bedside and agree with the findings. Medical decision making listed above were done under my guidance.

## 2023-01-18 NOTE — PT/OT/SLP EVAL
Physical Therapy Evaluation    Patient Name:  John Bullard   MRN:  [id number]    Recommendations:     Discharge Recommendations: home, outpatient PT   Discharge Equipment Recommendations: none   Barriers to discharge:  medical dx; decreased tolerance to ax    Assessment:     John Bullard is a 72 y.o. male admitted with a medical diagnosis of dyspnea; afib/aflutter; recent pneumonia.  He presents with the following impairments/functional limitations: gait instability, impaired endurance, impaired balance, impaired cardiopulmonary response to activity.    Rehab Prognosis: Good; patient would benefit from acute skilled PT services to address these deficits and reach maximum level of function.    Recent Surgery: Procedure(s) (LRB):  Cardioversion (N/A)      Plan:     During this hospitalization, patient to be seen 5 x/week to address the identified rehab impairments via gait training, therapeutic activities, therapeutic exercises and progress toward the following goals:    Plan of Care Expires:  02/18/23    Subjective     Chief Complaint: n/a  Patient/Family Comments/goals: to get better and go home  Pain/Comfort:  Pain Rating 1: 0/10    Patients cultural, spiritual, Congregation conflicts given the current situation: no    Living Environment:  Pt lives alone in a town house; has 12 stairs inside of the home that he has to climb to get to his bedroom, has bilateral railings.  Prior to admission, patients level of function was independent; pt is a  and works for [employer].    Equipment used at home:  .  DME owned (not currently used): none.    Upon discharge, patient will have assistance from brother/RAYA.    Objective:     Communicated with NSG prior to session.  Patient found up in chair with pulse ox (continuous), telemetry  upon PT entry to room.    General Precautions: Standard,    Orthopedic Precautions:N/A   Braces: N/A  Respiratory Status: Room air   SpO2: 95% at rest   SpO2: 87% with ax   RR: 45 with  ax    Exams:  Cognitive Exam:  Patient is oriented to Person, Place, Time, and Situation  RLE Strength: WFL  LLE Strength: WFL    Functional Mobility:  Transfers:     Sit to Stand:  stand by assistance with no AD  Gait: pt ambulated 170 feet without use of an AD, occasionally using an UE on hallway railings; slightly unsteady but no overt LOB; reported SOB feeling at end of ambulation trial       Patient left up in chair with all lines intact and call button in reach.    GOALS:   Multidisciplinary Problems       Physical Therapy Goals          Problem: Physical Therapy    Goal Priority Disciplines Outcome Goal Variances Interventions   Physical Therapy Goal     PT, PT/OT Ongoing, Progressing     Description: Goals to be met by: 23     Patient will increase functional independence with mobility by performin. Sit to stand transfer with [place]  2. Gait  x 400 feet with [place] using No Assistive Device.   3. Ascend/descend 12 stair with bilateral Handrails Modified [place] using No Assistive Device.                          History:     Past Medical History:   Diagnosis Date    Anticoagulant long-term use     Anxiety     Asthma     Atrial fibrillation     Decreased testosterone level     DJD (degenerative joint disease)     Dyspnea     GERD (gastroesophageal reflux disease)     Hypertension     OA (osteoarthritis)     Obesity     MINDA on CPAP     PAF (paroxysmal atrial fibrillation)     s/p Ablation (2023)    SBO (small bowel obstruction)     hx multiple (3) incudling requiring surgical intervention    Sleep apnea     Venous insufficiency     Ventricular tachycardia        Past Surgical History:   Procedure Laterality Date    ABDOMINAL SURGERY      for [place]    ABLATION N/A 2023    Procedure: ABLATION;  Surgeon: Kenny Waller MD;  [facility] CATH LAB;  Service: Cardiology;  Laterality: N/A;  EPS + AFL CATH ABLATION W/ ANEST.    COLONOSCOPY      DIAGNOSTIC LAPAROSCOPY       LAPAROSCOPIC REPAIR OF UMBILICAL HERNIA      LITHOTRIPSY      SINUS SURGERY      WISDOM TOOTH EXTRACTION Bilateral        Time Tracking:     PT Received On: 01/18/23  PT Start Time: 1437     PT Stop Time: 1500  PT Total Time (min): 23 min     Billable Minutes: Evaluation mod      01/18/2023

## 2023-01-19 LAB
ANION GAP SERPL CALC-SCNC: 11 MEQ/L
BUN SERPL-MCNC: 26.4 MG/DL (ref 8.4–25.7)
CALCIUM SERPL-MCNC: 10 MG/DL (ref 8.8–10)
CHLORIDE SERPL-SCNC: 97 MMOL/L (ref 98–107)
CO2 SERPL-SCNC: 33 MMOL/L (ref 23–31)
CREAT SERPL-MCNC: 1.49 MG/DL (ref 0.73–1.18)
CREAT/UREA NIT SERPL: 18
GFR SERPLBLD CREATININE-BSD FMLA CKD-EPI: 49 MLS/MIN/1.73/M2
GLUCOSE SERPL-MCNC: 96 MG/DL (ref 82–115)
POTASSIUM SERPL-SCNC: 4.6 MMOL/L (ref 3.5–5.1)
SODIUM SERPL-SCNC: 141 MMOL/L (ref 136–145)

## 2023-01-19 PROCEDURE — 80048 BASIC METABOLIC PNL TOTAL CA: CPT | Performed by: INTERNAL MEDICINE

## 2023-01-19 PROCEDURE — 21400001 HC TELEMETRY ROOM

## 2023-01-19 PROCEDURE — 27000221 HC OXYGEN, UP TO 24 HOURS

## 2023-01-19 PROCEDURE — 94640 AIRWAY INHALATION TREATMENT: CPT

## 2023-01-19 PROCEDURE — 99900031 HC PATIENT EDUCATION (STAT)

## 2023-01-19 PROCEDURE — 94761 N-INVAS EAR/PLS OXIMETRY MLT: CPT

## 2023-01-19 PROCEDURE — 97110 THERAPEUTIC EXERCISES: CPT | Mod: CQ

## 2023-01-19 PROCEDURE — 25000003 PHARM REV CODE 250: Performed by: NURSE PRACTITIONER

## 2023-01-19 PROCEDURE — 25000242 PHARM REV CODE 250 ALT 637 W/ HCPCS: Performed by: INTERNAL MEDICINE

## 2023-01-19 PROCEDURE — 97116 GAIT TRAINING THERAPY: CPT | Mod: CQ

## 2023-01-19 PROCEDURE — 36415 COLL VENOUS BLD VENIPUNCTURE: CPT | Performed by: INTERNAL MEDICINE

## 2023-01-19 PROCEDURE — 25000003 PHARM REV CODE 250: Performed by: EMERGENCY MEDICINE

## 2023-01-19 PROCEDURE — 25000003 PHARM REV CODE 250: Performed by: INTERNAL MEDICINE

## 2023-01-19 RX ADMIN — ATORVASTATIN CALCIUM 20 MG: 10 TABLET, FILM COATED ORAL at 08:01

## 2023-01-19 RX ADMIN — TAMSULOSIN HYDROCHLORIDE 0.4 MG: 0.4 CAPSULE ORAL at 08:01

## 2023-01-19 RX ADMIN — DOCUSATE SODIUM 100 MG: 100 CAPSULE, LIQUID FILLED ORAL at 08:01

## 2023-01-19 RX ADMIN — FLUTICASONE FUROATE AND VILANTEROL TRIFENATATE 1 PUFF: 200; 25 POWDER RESPIRATORY (INHALATION) at 08:01

## 2023-01-19 RX ADMIN — AMIODARONE HYDROCHLORIDE 200 MG: 200 TABLET ORAL at 08:01

## 2023-01-19 RX ADMIN — LEVALBUTEROL HYDROCHLORIDE 0.63 MG: 0.63 SOLUTION RESPIRATORY (INHALATION) at 07:01

## 2023-01-19 RX ADMIN — AMIODARONE HYDROCHLORIDE 200 MG: 200 TABLET ORAL at 09:01

## 2023-01-19 RX ADMIN — LEVALBUTEROL HYDROCHLORIDE 0.63 MG: 0.63 SOLUTION RESPIRATORY (INHALATION) at 03:01

## 2023-01-19 RX ADMIN — APIXABAN 5 MG: 5 TABLET, FILM COATED ORAL at 08:01

## 2023-01-19 RX ADMIN — VALSARTAN 40 MG: 40 TABLET, FILM COATED ORAL at 09:01

## 2023-01-19 RX ADMIN — POTASSIUM CHLORIDE 20 MEQ: 1500 TABLET, EXTENDED RELEASE ORAL at 08:01

## 2023-01-19 RX ADMIN — ALPRAZOLAM 0.5 MG: 0.5 TABLET ORAL at 05:01

## 2023-01-19 RX ADMIN — FAMOTIDINE 20 MG: 20 TABLET, FILM COATED ORAL at 09:01

## 2023-01-19 RX ADMIN — LATANOPROST 1 DROP: 50 SOLUTION OPHTHALMIC at 09:01

## 2023-01-19 RX ADMIN — LEVALBUTEROL HYDROCHLORIDE 0.63 MG: 0.63 SOLUTION RESPIRATORY (INHALATION) at 11:01

## 2023-01-19 RX ADMIN — POTASSIUM CHLORIDE 20 MEQ: 1500 TABLET, EXTENDED RELEASE ORAL at 04:01

## 2023-01-19 RX ADMIN — PANTOPRAZOLE SODIUM 40 MG: 40 TABLET, DELAYED RELEASE ORAL at 08:01

## 2023-01-19 RX ADMIN — METOPROLOL SUCCINATE 100 MG: 50 TABLET, EXTENDED RELEASE ORAL at 08:01

## 2023-01-19 RX ADMIN — ZOLPIDEM TARTRATE 5 MG: 5 TABLET ORAL at 09:01

## 2023-01-19 RX ADMIN — APIXABAN 5 MG: 5 TABLET, FILM COATED ORAL at 09:01

## 2023-01-19 RX ADMIN — ACETAMINOPHEN 650 MG: 325 TABLET, FILM COATED ORAL at 07:01

## 2023-01-19 RX ADMIN — FAMOTIDINE 20 MG: 20 TABLET, FILM COATED ORAL at 08:01

## 2023-01-19 RX ADMIN — POTASSIUM CHLORIDE 20 MEQ: 1500 TABLET, EXTENDED RELEASE ORAL at 09:01

## 2023-01-19 RX ADMIN — CETIRIZINE HYDROCHLORIDE 10 MG: 10 TABLET, FILM COATED ORAL at 08:01

## 2023-01-19 NOTE — PROGRESS NOTES
Subjective:  He feels somewhat better today.  Slight.  Seems be breathing little better.  He did walk yesterday.    Objective:  T-max 99.4°.  Currently afebrile.  Blood pressure 115/75.  (Nurses report that valsartan has been held at times because of relatively low blood pressure. )  Heart rate in the 70s.  Respiratory rate 18 and O2 sat 92% room air.      General appearance is unremarkable.  He is in no distress.  Heart has an irregular rhythm.  Lungs scant rales right base.  Abdomen nontender.  Extremities with 1+ pretibial edema bilaterally.    Laboratory studies include an abnormal chemistry profile with a worsening BUN and creatinine 26 and 1.49.    Telemetry reveals atrial fib flutter with controlled rate.    Assessment:  1.  Dyspnea.  Slightly better.  Multifactorial     2. Atrial fib flutter, paroxysmal.      3. Cardiomyopathy.      4. Recent pneumonia    5. Poorly functioning diaphragm     6. New azotemia.  Secondary to diuresis     Plan: Will hold Lasix for now.  Repeat BMP tomorrow.  I will discontinue the theophylline because of concerns by Cardiology.  Perhaps he will be able to go home tomorrow.

## 2023-01-19 NOTE — PROGRESS NOTES
Education Provided: heart healthy diet  Teaching Method: explanation and printed materials  Comprehension: verbalizes understanding  Barriers to Learning: none evident  Expected Compliance: good  Comments: All questions were answered and dietitian's contact information was provided.

## 2023-01-19 NOTE — PROGRESS NOTES
"  [facility]  Cardiology  Progress Note    Patient Name: John Bullard  MRN: [id number]  Admission Date: 1/6/2023  Hospital Length of Stay: 12 days  Code Status: Full Code   Attending Provider: Fabiano Liang MD   Consulting Provider: Francisco Melgar [facility]  Primary Care Physician: Fabiano Liang MD  Principal Problem:<principal problem not specified>    Patient information was obtained from patient, past medical records, and ER records.     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 The patient recently had an EP Study with Successful Ablation of Typical A.Flutter. He underwent this EP Study on 1.5.23 without complications and was discharged home in stable condition. Post operatively he had a sore throat and a cough which he attributed the ETT. His SOB worsened and he presented to the ER for evaluation. Upon arrival to the ER he was found to have a CXR with Infiltrates and a CT of the Chest with no Evidence of Pulmonary Embolism, however, he did have significant infiltrates in bilateral lungs but worse on the R side. He was admitted to his Primary Care Provider and [facility] was consulted for Elevated Troponin Levels and Recent Cardiac Procedure.     1.8.23: NAD. Converted into PAF Last Night. Remains with CVR. "I am feeling ok." + SOB, Improving. Denies CP and Palps. + Cough.  1.9.23: Patient sitting up at bedside. Conversational dyspnea. Bibasilar rales. Still requiring O2. Abdomen distended. 2+ peripheral edema. Afib 90's on tele.  1.10.23: Patient standing up at bedside. Reports breathing is better. Bibasilar crackles noted. Abdomen still firm distended. 2+ peripheral edema. Co2 rising with diuresis. Albumin 3.2  1.11.23: Patient sitting up at bedside. Reports continued [place]. No conversational dyspnea noted. Still in Afib, Rate controlled.   1.12.23: Patient underwent DCCV with " "successful conversion to SR  1.13.23: Patient sitting up at bedside. NAD. VSS. Still has bibasilar crackles and peripheral edema. SR on tele  1.14.23: Remains SR on tele. VSS. Great UOP.   1.16.23: NAD. "I am feeling ok, but I have SOB when I exert myself. Especially when I bend over." Denies CP and Palps. + SOB/[place]. Fluid Balance Net Negative 1600mL.  1.17.23: NAD noted. VSS. SR on tele. Negative 1635mL in 24hrs, weight down 0.9kg in 24hrs.  1.18.23: NAD noted. Still SOB. Denies CP/Palps. Aflutter vs Coarse Afib with CVR on tele. Negative 1505mL in 24hrs, weight down 0.9kg  1.19.23: NAD noted. Improved SOB. Denies CP/Palps. SR on tele. Negative 1580mL in 24hrs, weight down 0.7kg. Renal indices worse today.     PMH: Anxiety, Asthma, PAF/Ablation , DJD, GERD, HTN, OA, Obesity, MINDA/CPAP, VI, VT, Insomnia, Elevated Calcium Score/Non-Obstructive CAD, FLORENTINO/Bilaterally Mild-Moderate  PSH: Lithotripsy, Sinus Surgery, Colonoscopy, Hernia Repair, EP Study/Ablation, Abdominal Surgery, [place] Teeth Extraction   Family History: Reviewed and Unremarkable for Heart Disease  Social History: Denies Illicit Drug, ETOH and Tobacco    Previous Cardiac Diagnostics:   ECHO 1.7.23:  Concentric hypertrophy and mildly decreased systolic function.  The estimated ejection fraction is 35-40%.  Grade I left ventricular diastolic dysfunction.  With normal right ventricular systolic function.  There is mild aortic valve stenosis.  Aortic valve area is 1.03 cm2; peak velocity is 1.99 m/s; mean gradient is 10 mmHg.    Carotid US 8.17.22:  Mild Bilaterally and Tortuous Carotid Arteries with No Hemodynamically Significant FLORENTINO  < 50% Bilaterally    ECHO 4.11.22:  Limited Parasternal Windows  Normal LV Systolic Function: LVEF 55-60%  Moderate AS, Peak AV Velocity 3.2 m/sec, Mean Gradient 23mmHg, JENNY 1.4 cm2  Grade I DD  Mild TR with RVSP of 30mmHg    LHC 4.16.20:  LHC via R Radial Approach with Nonobstructive CAD with Normal LV Function and an EF of " 55%. EDP was 20mmHg.  LM: patent vessel.  LAD: calcified type III vessel with 4 patent diagonal arteries. D1 is moderate to large in size with Proximal 50% stenosis. Remaining diagonals are small to medium size patent vessels  Lcx: Nondominant patent vessel that gives rise to a medium size OM1 with an early takeoff. The OM 2 is a medium to large size patent bifurcating vessel. The AV groove artery terminates in a small size patent OM3 with a small terminal bifurcating PL segment.   RCA: very large dominant patent vessel with mild Luminal irregularities. RCA terminates in a very large patent PDA and a large patent PL branch    PET 4.8.20:  The study quality is below average.   This is an abnormal perfusion study. Study is consistent with mostly scar tissue with minimal ischemia.   Small fixed perfusion abnormality of moderate intensity in the apical segment. Small partially reversible perfusion abnormality of moderate intensity in the anterior septal region. Small fixed perfusion abnormality of moderate intensity in the inferior region.   This scan is suggestive of low to moderate risk for future cardiovascular events.   The left ventricular cavity is noted to be normal on the stress studies. The stress left ventricular ejection fraction was calculated to be 52% and left ventricular global function is normal. The rest left ventricular cavity is noted to be normal. The rest left ventricular ejection fraction was calculated to be 42% and rest left ventricular global function is mildly reduced.   When compared to the resting ejection fraction (42%), the stress ejection fraction (52%) has increased.     Review of patient's allergies indicates:   Allergen Reactions    Ativan [lorazepam] Hallucinations     Review of Systems   Constitutional:  Negative for chills and fever.   Respiratory:  Positive for shortness of breath. Negative for cough, chest tightness, wheezing and stridor.    Cardiovascular:  Positive for leg  swelling. Negative for chest pain.   All other systems reviewed and are negative.    Objective:     Vital Signs (Most Recent):  Temp: 98.2 °F (36.8 °C) (01/19/23 0752)  Pulse: 86 (01/19/23 0856)  Resp: 18 (01/19/23 0856)  BP: 115/75 (01/19/23 0752)  SpO2: (!) 92 % (01/19/23 0759)   Vital Signs (24h Range):  Temp:  [97.5 °F (36.4 °C)-99.4 °F (37.4 °C)] 98.2 °F (36.8 °C)  Pulse:  [73-86] 86  Resp:  [17-20] 18  SpO2:  [88 %-93 %] 92 %  BP: (101-115)/(64-81) 115/75     Weight: 106.4 kg (234 lb 9.1 oz)  Body mass index is 35.67 kg/m².    SpO2: (!) 92 %         Intake/Output Summary (Last 24 hours) at 1/19/2023 0941  Last data filed at 1/19/2023 0813  Gross per 24 hour   Intake 720 ml   Output 2250 ml   Net -1530 ml       Lines/Drains/Airways       Peripheral Intravenous Line  Duration                  Peripheral IV - Single Lumen 01/16/23 1508 20 G Anterior;Left;Proximal Forearm 2 days                  Significant Labs:  Recent Results (from the past 72 hour(s))   Comprehensive Metabolic Panel    Collection Time: 01/17/23  4:06 AM   Result Value Ref Range    Sodium Level 139 136 - 145 mmol/L    Potassium Level 4.3 3.5 - 5.1 mmol/L    Chloride 104 98 - 107 mmol/L    Carbon Dioxide 26 23 - 31 mmol/L    Glucose Level 123 (H) 82 - 115 mg/dL    Blood Urea Nitrogen 19.9 8.4 - 25.7 mg/dL    Creatinine 1.03 0.73 - 1.18 mg/dL    Calcium Level Total 9.2 8.8 - 10.0 mg/dL    Protein Total 6.1 5.8 - 7.6 gm/dL    Albumin Level 3.0 (L) 3.4 - 4.8 g/dL    Globulin 3.1 2.4 - 3.5 gm/dL    Albumin/Globulin Ratio 1.0 (L) 1.1 - 2.0 ratio    Bilirubin Total 1.1 <=1.5 mg/dL    Alkaline Phosphatase 68 40 - 150 unit/L    Alanine Aminotransferase 26 0 - 55 unit/L    Aspartate Aminotransferase 19 5 - 34 unit/L    eGFR >60 mls/min/1.73/m2   Magnesium    Collection Time: 01/17/23  4:06 AM   Result Value Ref Range    Magnesium Level 2.00 1.60 - 2.60 mg/dL   CBC with Differential    Collection Time: 01/17/23  4:06 AM   Result Value Ref Range    WBC 9.8  4.5 - 11.5 x10(3)/mcL    RBC 4.64 (L) 4.70 - 6.10 x10(6)/mcL    Hgb 13.5 (L) 14.0 - 18.0 gm/dL    Hct 43.0 42.0 - 52.0 %    MCV 92.7 80.0 - 94.0 fL    MCH 29.1 pg    MCHC 31.4 (L) 33.0 - 36.0 mg/dL    RDW 14.3 11.5 - 17.0 %    Platelet 194 130 - 400 x10(3)/mcL    MPV 10.4 7.4 - 10.4 fL    Neut % 77.8 %    Lymph % 7.8 %    Mono % 11.2 %    Eos % 2.2 %    Basophil % 0.4 %    Lymph # 0.76 0.6 - 4.6 x10(3)/mcL    Neut # 7.62 2.1 - 9.2 x10(3)/mcL    Mono # 1.10 0.1 - 1.3 x10(3)/mcL    Eos # 0.22 0 - 0.9 x10(3)/mcL    Baso # 0.04 0 - 0.2 x10(3)/mcL    IG# 0.06 (H) 0 - 0.04 x10(3)/mcL    IG% 0.6 %    NRBC% 0.0 %   Comprehensive Metabolic Panel    Collection Time: 01/18/23  4:04 AM   Result Value Ref Range    Sodium Level 139 136 - 145 mmol/L    Potassium Level 4.4 3.5 - 5.1 mmol/L    Chloride 98 98 - 107 mmol/L    Carbon Dioxide 31 23 - 31 mmol/L    Glucose Level 97 82 - 115 mg/dL    Blood Urea Nitrogen 23.2 8.4 - 25.7 mg/dL    Creatinine 1.10 0.73 - 1.18 mg/dL    Calcium Level Total 8.9 8.8 - 10.0 mg/dL    Protein Total 5.7 (L) 5.8 - 7.6 gm/dL    Albumin Level 3.1 (L) 3.4 - 4.8 g/dL    Globulin 2.6 2.4 - 3.5 gm/dL    Albumin/Globulin Ratio 1.2 1.1 - 2.0 ratio    Bilirubin Total 1.0 <=1.5 mg/dL    Alkaline Phosphatase 71 40 - 150 unit/L    Alanine Aminotransferase 26 0 - 55 unit/L    Aspartate Aminotransferase 19 5 - 34 unit/L    eGFR >60 mls/min/1.73/m2   BNP    Collection Time: 01/18/23  4:04 AM   Result Value Ref Range    Natriuretic Peptide 55.5 <=100.0 pg/mL   Troponin I    Collection Time: 01/18/23  4:04 AM   Result Value Ref Range    Troponin-I <0.010 0.000 - 0.045 ng/mL   Magnesium    Collection Time: 01/18/23  4:04 AM   Result Value Ref Range    Magnesium Level 2.10 1.60 - 2.60 mg/dL   CBC with Differential    Collection Time: 01/18/23  4:04 AM   Result Value Ref Range    WBC 10.7 4.5 - 11.5 x10(3)/mcL    RBC 4.53 (L) 4.70 - 6.10 x10(6)/mcL    Hgb 13.4 (L) 14.0 - 18.0 gm/dL    Hct 42.3 42.0 - 52.0 %    MCV 93.4  80.0 - 94.0 fL    MCH 29.6 pg    MCHC 31.7 (L) 33.0 - 36.0 mg/dL    RDW 14.5 11.5 - 17.0 %    Platelet 199 130 - 400 x10(3)/mcL    MPV 11.0 (H) 7.4 - 10.4 fL    Neut % 81.1 %    Lymph % 5.0 %    Mono % 10.8 %    Eos % 2.0 %    Basophil % 0.5 %    Lymph # 0.54 (L) 0.6 - 4.6 x10(3)/mcL    Neut # 8.70 2.1 - 9.2 x10(3)/mcL    Mono # 1.16 0.1 - 1.3 x10(3)/mcL    Eos # 0.21 0 - 0.9 x10(3)/mcL    Baso # 0.05 0 - 0.2 x10(3)/mcL    IG# 0.06 (H) 0 - 0.04 x10(3)/mcL    IG% 0.6 %    NRBC% 0.0 %   Basic Metabolic Panel    Collection Time: 01/19/23  3:58 AM   Result Value Ref Range    Sodium Level 141 136 - 145 mmol/L    Potassium Level 4.6 3.5 - 5.1 mmol/L    Chloride 97 (L) 98 - 107 mmol/L    Carbon Dioxide 33 (H) 23 - 31 mmol/L    Glucose Level 96 82 - 115 mg/dL    Blood Urea Nitrogen 26.4 (H) 8.4 - 25.7 mg/dL    Creatinine 1.49 (H) 0.73 - 1.18 mg/dL    BUN/Creatinine Ratio 18     Calcium Level Total 10.0 8.8 - 10.0 mg/dL    Anion Gap 11.0 mEq/L    eGFR 49 mls/min/1.73/m2     Telemetry: SR 70s    Physical Exam  Constitutional:       Appearance: Normal appearance.   HENT:      Head: Normocephalic.      Mouth/Throat:      Mouth: Mucous membranes are moist.   Eyes:      Extraocular Movements: Extraocular movements intact.   Cardiovascular:      Rate and Rhythm: Normal rate and regular rhythm.      Pulses: Normal pulses.      Heart sounds: Murmur heard.   Pulmonary:      Effort: No respiratory distress.      Breath sounds: Examination of the right-lower field reveals rales. Examination of the left-lower field reveals rales. Rales present.      Comments: PT on NC  Abdominal:      General: There is distension (Normal Anatomy).   Musculoskeletal:         General: Normal range of motion.      Right lower leg: Edema present.      Left lower leg: Edema present.   Skin:     General: Skin is warm and dry.   Neurological:      General: No focal deficit present.      Mental Status: He is alert and oriented to person, place, and time.    Psychiatric:         Mood and Affect: Mood normal.         Behavior: Behavior normal.     Current Inpatient Medications:    Current Facility-Administered Medications:     acetaminophen tablet 650 mg, 650 mg, Oral, Q6H PRN, Fabiano Liang MD, 650 mg at 01/08/23 1036    albuterol inhaler 2 puff, 2 puff, Inhalation, Q4H PRN, Fabiano Liang MD, 2 puff at 01/16/23 1441    ALPRAZolam tablet 0.5 mg, 0.5 mg, Oral, BID PRN, Fabiano Liang MD, 0.5 mg at 01/17/23 1631    [START ON 1/20/2023] amiodarone tablet 200 mg, 200 mg, Oral, Daily, MATIAS IniguezP    amiodarone tablet 200 mg, 200 mg, Oral, BID, NED Iniguez, 200 mg at 01/19/23 0856    apixaban tablet 5 mg, 5 mg, Oral, BID, Fabiano Liang MD, 5 mg at 01/19/23 0853    atorvastatin tablet 20 mg, 20 mg, Oral, Daily, Fabiano Liang MD, 20 mg at 01/19/23 0854    cetirizine tablet 10 mg, 10 mg, Oral, Daily, Fabiano Liang MD, 10 mg at 01/19/23 0853    docusate sodium capsule 100 mg, 100 mg, Oral, Daily, Fabiano Liang MD, 100 mg at 01/19/23 0853    famotidine tablet 20 mg, 20 mg, Oral, BID, Dameon Sanchez III, MD, 20 mg at 01/19/23 0853    fluticasone furoate-vilanteroL 200-25 mcg/dose diskus inhaler 1 puff, 1 puff, Inhalation, Daily, Fabiano Liang MD, 1 puff at 01/19/23 0856    latanoprost 0.005 % ophthalmic solution 1 drop, 1 drop, Both Eyes, Daily, Fabiano Liang MD, 1 drop at 01/19/23 0901    levalbuterol nebulizer solution 0.63 mg, 0.63 mg, Nebulization, Q8H, Fabiano Liang MD, 0.63 mg at 01/19/23 0759    melatonin tablet 6 mg, 6 mg, Oral, Nightly PRN, Dameon Sanchez III, MD    melatonin tablet 9 mg, 9 mg, Oral, Daily PRN, Fabiano Liang MD    metoprolol injection 5 mg, 5 mg, Intravenous, Q15 Min PRN, Lionel Roach NP    metoprolol succinate (TOPROL-XL) 24 hr tablet 100 mg, 100 mg, Oral, Daily, NED Iniguez, 100 mg at 01/19/23 0853    pantoprazole EC tablet 40 mg,  40 mg, Oral, Daily, Fabiano Liang MD, 40 mg at 01/19/23 0854    potassium chloride SA CR tablet 20 mEq, 20 mEq, Oral, TID, Fabiano Liang MD, 20 mEq at 01/19/23 0853    tamsulosin 24 hr capsule 0.4 mg, 1 capsule, Oral, Daily, Fabiano Liang MD, 0.4 mg at 01/19/23 0853    valsartan tablet 40 mg, 40 mg, Oral, Daily, [place], NED, 40 mg at 01/19/23 0900    zolpidem tablet 5 mg, 5 mg, Oral, QHS, Fabiano Liang MD, 5 mg at 01/18/23 2043    VTE Risk Mitigation (From admission, onward)           Ordered     apixaban tablet 5 mg  2 times daily         01/07/23 1050     IP VTE HIGH RISK PATIENT  Once         01/07/23 0303     Place sequential compression device  Until discontinued         01/07/23 0303                  Assessment:   Acute on Chronic Combined Systolic and Diastolic HF/EF 35-40%  CMO/EF 35-40%     - ECHO (1.13.23) - LVEF 35-40%    - ECHO (1.7.23) - Grade 1 DD, Mild AS, EF 35-40%    - ECHO (4.11.22) - LVEF 55-60%  Bilateral Pneumonitis (R > L)  Mildly Elevated Troponin (Flat) - likely due to Recent Ablation/Decompensated HF  Hx of Asthma  PAF - Now SR    - CHADsVASc - 5 Points - 7.2% Stroke Risk per Year     - EP Study with Successful Ablation of Typical A.Flutter with Termination into SR (1.5.23)  VHD (Mild-Mod AS)  HTN - Controlled  MINDA/CPAP  Venous Insufficiency  Elevated Calcium Score/Mild Calcified Non-Obstructive CAD via [facility] (2020)    - [facility] (4.16.20) - Nonobstructive CAD with Normal LV Function and an EF of 55%  FLORENTINO/Bilaterally Mild-Moderate  Anxiety  GERD  OA  Obesity  No Hx of GIB per Chart Review    Plan:   CXR in AM  Agree with holding IV Lasix, Will likely need PO lasix upon DC  Accurate I&Os and Daily Weights  Continue BB, Statin, Amiodarone and ARB  Continue Eliquis Re Stroke Risk Reduction   Aggressive PT/OT  Will Plan for OP Ischemic Workup and Further Evaluation of Aortic Valve  Labs in AM: CBC, CMP and Mg    Francisco Melgar, [facility]-BC  Cardiology  [facility]  01/19/2023     I have seen the patient, reviewed the Nurse Practitioner's note, assessment and plan. I have personally interviewed and examined the patient at bedside and agree with the findings. Medical decision making listed above were done under my guidance.

## 2023-01-19 NOTE — PT/OT/SLP PROGRESS
Physical Therapy Treatment    Patient Name:  John Bullard   MRN:  [id number]    Recommendations:     Discharge Recommendations: home with home health, outpatient PT  Discharge Equipment Recommendations: none  Barriers to discharge:       Assessment:     John Bullard is a 72 y.o. male admitted with a medical diagnosis of dyspnea, afib, pneumonia.  He presents with the following impairments/functional limitations: gait instability, impaired endurance, impaired functional mobility, weakness.    Rehab Prognosis: Good; patient would benefit from acute skilled PT services to address these deficits and reach maximum level of function.    Recent Surgery: Procedure(s) (LRB):  Cardioversion (N/A)      Plan:     During this hospitalization, patient to be seen 5 x/week to address the identified rehab impairments via gait training, therapeutic activities, therapeutic exercises and progress toward the following goals:    Plan of Care Expires:  02/18/23    Subjective     Chief Complaint:   Patient/Family Comments/goals:   Pain/Comfort:  Pain Rating 1: 0/10      Objective:     Communicated with NSG prior to session.  Patient found supine with telemetry, pulse ox (continuous) upon PTA entry to room.     General Precautions: Standard,    Orthopedic Precautions: N/A  Braces: N/A  Respiratory Status: Room air    Spo2: 88% - 92%     Functional Mobility:  T/F independent with sit <-> stand from recliner    Gait with no AD: Pt ambulated for 400 ft demo step through gait pattern, slow pace, tends to grab onto peters railing for support. Encourage pt to try to practice ambulating with SPC for support, pt declined.       Treatment & Education:  B LE therex x 20 reps: LAQ, hamstring curls, ankle pumps     Patient left up in chair with all lines intact and call button in reach..    GOALS:   Multidisciplinary Problems       Physical Therapy Goals          Problem: Physical Therapy    Goal Priority Disciplines Outcome Goal Variances  Interventions   Physical Therapy Goal     PT, PT/OT Ongoing, Progressing     Description: Goals to be met by: 23     Patient will increase functional independence with mobility by performin. Sit to stand transfer with [place]  2. Gait  x 400 feet with [place] using No Assistive Device.   3. Ascend/descend 12 stair with bilateral Handrails Modified [place] using No Assistive Device.                          Time Tracking:     PT Received On:    PT Start Time: 1241     PT Stop Time: 1306  PT Total Time (min): 25 min     Billable Minutes: Gait Training 15 and Therapeutic Exercise 10    Treatment Type: Treatment  PT/PTA: PTA     PTA Visit Number: 2023

## 2023-01-20 LAB
ANION GAP SERPL CALC-SCNC: 9 MEQ/L
BUN SERPL-MCNC: 26.2 MG/DL (ref 8.4–25.7)
CALCIUM SERPL-MCNC: 8.9 MG/DL (ref 8.8–10)
CHLORIDE SERPL-SCNC: 95 MMOL/L (ref 98–107)
CO2 SERPL-SCNC: 32 MMOL/L (ref 23–31)
CREAT SERPL-MCNC: 1.16 MG/DL (ref 0.73–1.18)
CREAT/UREA NIT SERPL: 23
GFR SERPLBLD CREATININE-BSD FMLA CKD-EPI: >60 MLS/MIN/1.73/M2
GLUCOSE SERPL-MCNC: 89 MG/DL (ref 82–115)
POTASSIUM SERPL-SCNC: 4.2 MMOL/L (ref 3.5–5.1)
SODIUM SERPL-SCNC: 136 MMOL/L (ref 136–145)

## 2023-01-20 PROCEDURE — 25000242 PHARM REV CODE 250 ALT 637 W/ HCPCS: Performed by: INTERNAL MEDICINE

## 2023-01-20 PROCEDURE — 25000003 PHARM REV CODE 250: Performed by: NURSE PRACTITIONER

## 2023-01-20 PROCEDURE — 36415 COLL VENOUS BLD VENIPUNCTURE: CPT | Performed by: INTERNAL MEDICINE

## 2023-01-20 PROCEDURE — 21400001 HC TELEMETRY ROOM

## 2023-01-20 PROCEDURE — 99232 SBSQ HOSP IP/OBS MODERATE 35: CPT | Mod: ,,, | Performed by: INTERNAL MEDICINE

## 2023-01-20 PROCEDURE — 80048 BASIC METABOLIC PNL TOTAL CA: CPT | Performed by: INTERNAL MEDICINE

## 2023-01-20 PROCEDURE — 97530 THERAPEUTIC ACTIVITIES: CPT | Mod: CQ

## 2023-01-20 PROCEDURE — 25000003 PHARM REV CODE 250: Performed by: EMERGENCY MEDICINE

## 2023-01-20 PROCEDURE — 99900031 HC PATIENT EDUCATION (STAT)

## 2023-01-20 PROCEDURE — 97116 GAIT TRAINING THERAPY: CPT | Mod: CQ

## 2023-01-20 PROCEDURE — 99232 PR SUBSEQUENT HOSPITAL CARE,LEVL II: ICD-10-PCS | Mod: ,,, | Performed by: INTERNAL MEDICINE

## 2023-01-20 PROCEDURE — 27000221 HC OXYGEN, UP TO 24 HOURS

## 2023-01-20 PROCEDURE — 94640 AIRWAY INHALATION TREATMENT: CPT

## 2023-01-20 PROCEDURE — 25000003 PHARM REV CODE 250: Performed by: INTERNAL MEDICINE

## 2023-01-20 RX ORDER — METOPROLOL SUCCINATE 100 MG/1
100 TABLET, EXTENDED RELEASE ORAL DAILY
Qty: 30 TABLET | Refills: 11 | OUTPATIENT
Start: 2023-01-20 | End: 2024-01-20

## 2023-01-20 RX ORDER — VALSARTAN 40 MG/1
40 TABLET ORAL DAILY
Qty: 90 TABLET | Refills: 3 | OUTPATIENT
Start: 2023-01-20 | End: 2024-01-20

## 2023-01-20 RX ORDER — FUROSEMIDE 40 MG/1
80 TABLET ORAL DAILY
Status: DISCONTINUED | OUTPATIENT
Start: 2023-01-20 | End: 2023-01-21

## 2023-01-20 RX ORDER — AMIODARONE HYDROCHLORIDE 200 MG/1
200 TABLET ORAL DAILY
Qty: 30 TABLET | Refills: 11 | OUTPATIENT
Start: 2023-01-20 | End: 2024-01-20

## 2023-01-20 RX ORDER — POTASSIUM CHLORIDE 20 MEQ/1
20 TABLET, EXTENDED RELEASE ORAL DAILY
Qty: 30 TABLET | Refills: 5 | OUTPATIENT
Start: 2023-01-20

## 2023-01-20 RX ADMIN — AMIODARONE HYDROCHLORIDE 200 MG: 200 TABLET ORAL at 09:01

## 2023-01-20 RX ADMIN — POTASSIUM CHLORIDE 20 MEQ: 1500 TABLET, EXTENDED RELEASE ORAL at 08:01

## 2023-01-20 RX ADMIN — ACETAMINOPHEN 325 MG: 325 TABLET, FILM COATED ORAL at 01:01

## 2023-01-20 RX ADMIN — ATORVASTATIN CALCIUM 20 MG: 10 TABLET, FILM COATED ORAL at 08:01

## 2023-01-20 RX ADMIN — FUROSEMIDE 80 MG: 40 TABLET ORAL at 10:01

## 2023-01-20 RX ADMIN — LEVALBUTEROL HYDROCHLORIDE 0.63 MG: 0.63 SOLUTION RESPIRATORY (INHALATION) at 04:01

## 2023-01-20 RX ADMIN — LEVALBUTEROL HYDROCHLORIDE 0.63 MG: 0.63 SOLUTION RESPIRATORY (INHALATION) at 08:01

## 2023-01-20 RX ADMIN — FAMOTIDINE 20 MG: 20 TABLET, FILM COATED ORAL at 07:01

## 2023-01-20 RX ADMIN — CETIRIZINE HYDROCHLORIDE 10 MG: 10 TABLET, FILM COATED ORAL at 08:01

## 2023-01-20 RX ADMIN — METOPROLOL SUCCINATE 100 MG: 50 TABLET, EXTENDED RELEASE ORAL at 08:01

## 2023-01-20 RX ADMIN — APIXABAN 5 MG: 5 TABLET, FILM COATED ORAL at 07:01

## 2023-01-20 RX ADMIN — FLUTICASONE FUROATE AND VILANTEROL TRIFENATATE 1 PUFF: 200; 25 POWDER RESPIRATORY (INHALATION) at 08:01

## 2023-01-20 RX ADMIN — PANTOPRAZOLE SODIUM 40 MG: 40 TABLET, DELAYED RELEASE ORAL at 08:01

## 2023-01-20 RX ADMIN — ACETAMINOPHEN 650 MG: 325 TABLET, FILM COATED ORAL at 08:01

## 2023-01-20 RX ADMIN — DOCUSATE SODIUM 100 MG: 100 CAPSULE, LIQUID FILLED ORAL at 08:01

## 2023-01-20 RX ADMIN — POTASSIUM CHLORIDE 20 MEQ: 1500 TABLET, EXTENDED RELEASE ORAL at 05:01

## 2023-01-20 RX ADMIN — ZOLPIDEM TARTRATE 5 MG: 5 TABLET ORAL at 07:01

## 2023-01-20 RX ADMIN — APIXABAN 5 MG: 5 TABLET, FILM COATED ORAL at 08:01

## 2023-01-20 RX ADMIN — POTASSIUM CHLORIDE 20 MEQ: 1500 TABLET, EXTENDED RELEASE ORAL at 07:01

## 2023-01-20 RX ADMIN — TAMSULOSIN HYDROCHLORIDE 0.4 MG: 0.4 CAPSULE ORAL at 08:01

## 2023-01-20 RX ADMIN — LATANOPROST 1 DROP: 50 SOLUTION OPHTHALMIC at 08:01

## 2023-01-20 RX ADMIN — FAMOTIDINE 20 MG: 20 TABLET, FILM COATED ORAL at 08:01

## 2023-01-20 NOTE — PROGRESS NOTES
Subjective:  The patient feels okay.  He is noted a vigorous diuresis after taking the 80 mg of Lasix by mouth.  Yesterday his IV Lasix was stopped because of the development of  azotemia.  He is not walked with therapy yet today.    Objective:  He is afebrile.  Blood pressures been on the low end of normal, most recently 104/68 but as low as 91/57.  O2 sats at rest on room air range between 90 and 99%.  Input and output over the past 24 hours was 15 60 in 925 out.  He is had over 900 out this morning.  Today's weight was up 2 lb from yesterday.    General appearance is unremarkable.  He is in no distress.  Appears comfortable sitting in the chair.  Heart has an irregular rhythm with normal rate.  Lungs with scant rales right base.  Abdomen distended nontender.  Extremities with 1 to 2 + pretibial edema bilaterally.    Today's laboratory studies show an essentially normal BMP.    Assessment:  1. Dyspnea.  Multifactorial.  Stable.  O2 sats okay at rest on room air.  Will see what he does with exercise later today.  This should help determine whether not he will need home oxygen.      2. Atrial fib flutter.      3. Cardiomyopathy.  Combination systolic and diastolic dysfunction.    4. Azotemia.  Resolved with the day off from IV Lasix.      5. Poorly functioning diaphragm    6. Recent pneumonia likely chemical pneumonitis.  Resolving.  May still be contributing to some of his shortness of breath.    7. Relatively low blood pressure.  His valsartan has been held by Cardiology.    Plan:  Continue physical therapy and observation in the hospital.  Check lab work tomorrow.  Depending on kidney function he would maybe in good enough shape to go home tomorrow with close outpatient follow-up.  Dose of diuretic to be determined at time of discharge.  Also dose of potassium supplement to be determined at time of discharge.  Further pulmonary workup as well as cardiac workup to be done as an outpatient.  He will eventually need  pulmonary function tests done but this will have to be done after he recovers from the pneumonia completely as well as completes his cardiac workup.

## 2023-01-20 NOTE — PLAN OF CARE
01/20/23 1443   Discharge Assessment   Assessment Type Discharge Planning Assessment   Confirmed/corrected address, phone number and insurance Yes   Source of Information patient   People in Home alone   Do you expect to return to your current living situation? Yes   Do you have help at home or someone to help you manage your care at home? Yes   Current cognitive status: Alert/Oriented   Equipment Currently Used at Home CPAP   Do you currently have service(s) that help you manage your care at home? No   Do you take prescription medications? Yes   Do you have prescription coverage? Yes   Do you have any problems affording any of your prescribed medications? No   How do you get to doctors appointments? car, drives self   Are you on dialysis? No   Do you take coumadin? No   Discharge Plan A Home   Discharge Plan B Home Health   DME Needed Upon Discharge  none   Discharge Plan discussed with: Patient     Spoke to patient about discharge planning. List of in network home health providers given. Patient declines home health services at this time. Per nurse, patient does not need home oxygen.

## 2023-01-20 NOTE — PROGRESS NOTES
"Inpatient Nutrition Evaluation    Admit Date: 1/6/2023   Total duration of encounter: 14 days    Nutrition Recommendation/Prescription     Continue Cardiac diet with fluid restriction  Encourage compliance with diet    Nutrition Assessment     Chart Review    Reason Seen: follow-up    Malnutrition Screening Tool Results   Have you recently lost weight without trying?: No  Have you been eating poorly because of a decreased appetite?: No   MST Score: 0     Diagnosis:  Pneumonitis  Congestive HF  Chronic afib  SOB    Relevant Medical History: asthma, elevated L hemidiaphragm, PAF, GERD, HTN, MINDA, CAD    Nutrition-Related Medications: Colace daily, Pepcid BID, Protonix daily, KCl TID    Nutrition-Related Labs: 1/13: RBC-4.62, Hgb-13.7, Cl-96  1/20: Bun-26.2      Diet Order: Diet heart healthy Fluid - 1500mL  Oral Supplement Order: none  Appetite/Oral Intake: good/% of meals  Factors Affecting Nutritional Intake: none identified  Food/Episcopal/Cultural Preferences: none reported  Food Allergies: none reported    Skin Integrity: puncture  Wound(s):       Comments    1/13: pt reports good appetite, eating most of meals, with no n/v/d/c. Weight history not obtained at this time; pt had to use the bathroom. Weight of 110.8 kg (244 lb) on 1/13 and admit weight of 248 lb on 1/6. Weight fluctuation noted per previous weights, likely fluid related. Pt diuresed with lasix inpatient. Previous weight of 243 lb on 9/29/22. Weight stable at this time. Will continue to monitor.    1/20: Pt doing well with meals. Good appetite noted. Educated pt yesterday on Cardiac diet.     Anthropometrics    Height: 5' 8" (172.7 cm) Height Method: Stated  Last Weight: 107.1 kg (236 lb 1.8 oz) (01/20/23 0612) Weight Method: Standard Scale  BMI (Calculated): 35.9  BMI Classification: obese grade II (BMI 35-39.9)        Ideal Body Weight (IBW), Male: 154 lb     % Ideal Body Weight, Male (lb): 165.06 %                          Usual Weight " Provided By: EMR weight history    Wt Readings from Last 3 Encounters:   01/20/23 0612 107.1 kg (236 lb 1.8 oz)   01/19/23 0600 106.4 kg (234 lb 9.1 oz)   01/18/23 0600 107.1 kg (236 lb 1.8 oz)   01/17/23 0605 108 kg (238 lb 1.6 oz)   01/16/23 0451 108.9 kg (240 lb 1.3 oz)   01/15/23 0600 109.7 kg (241 lb 13.5 oz)   01/14/23 0345 108.9 kg (240 lb 1.3 oz)   01/13/23 0600 110.8 kg (244 lb 4.3 oz)   01/12/23 0537 111.2 kg (245 lb 2.4 oz)   01/09/23 0406 114 kg (251 lb 5.2 oz)   01/07/23 1308 115.3 kg (254 lb 3.1 oz)   01/07/23 0400 115.3 kg (254 lb 1.6 oz)   01/06/23 2309 112.5 kg (248 lb 0.3 oz)   01/05/23 0626 110.1 kg (242 lb 11.6 oz)   12/29/22 1505 113.4 kg (250 lb)   12/22/22 1407 114.2 kg (251 lb 12.8 oz)      Weight Change(s) Since Admission: -1.5%  Admit Weight: 112.5 kg (248 lb 0.3 oz) (01/06/23 2309)  1/20: 107.1kg; fluid loss noted      Patient Education    Not applicable.    Monitoring & Evaluation     Dietitian will monitor food and beverage intake and weight change.  Nutrition Risk/Follow-Up: low (follow-up in 5-7 days)  Patients assigned 'low nutrition risk' status do not qualify for a full nutritional assessment but will be monitored and re-evaluated in a 5-7 day time period. Please consult if re-evaluation needed sooner.    Mariana Fajardo, Registration Eligible, Provisional LDN

## 2023-01-20 NOTE — PT/OT/SLP PROGRESS
"Physical Therapy         Treatment        John Bullard   MRN: [id number]     PT Received On: 01/20/23  PT Start Time: 1346     PT Stop Time: 1409    PT Total Time (min): 23 min       Billable Minutes:  Gait [id number] and Therapeutic Activity 10  Total Minutes: 23    Treatment Type: Treatment  PT/PTA: PTA     PTA Visit Number: 2       General Precautions: Standard,    Orthopedic Precautions: Orthopedic Precautions : N/A   Braces:      Spiritual, Cultural Beliefs, Christianity Practices, Values that Affect Care: no    Subjective:  Communicated with NSG prior to session.    Pain/Comfort  Location - Orientation 1: upper  Location 1: abdomen  Pain Addressed 1: Reposition, Distraction, Nurse notified    Objective:  Patient found standing in room with NSG, with Patient found with: telemetry, pulse ox (continuous)    O2: room air: pt satted 90-95% throughout tx session      Functional Mobility:  Bed Mobility:   Supine to sit: Activity did not occur   Sit to supine: Activity did not occur   Rolling: Activity did not occur   Scooting: Independent    Balance:   Static Sit: NORMAL: No deviations seen in posture held statically  Dynamic Sit:  NORMAL: No deviations seen in posture held dynamically  Static Stand: GOOD: Takes MODERATE challenges from all directions  Dynamic stand: FAIR: Needs CONTACT GUARD during gait    Transfer Training:  Sit to stand:Stand-by Assistance with No Assistive Device . 6 trials completed from bedside chair. After activity pt c/o "discomfort" in upper abdomen whihch caused him to feel SOB. O2 94% during this time.     Gait Training:  Pt amb 450ft with no AD CGA. Pt with multiple LOBs but able to self-correct. Pt declined to amb with SPC at this time.       Additional Treatment:  Balance:   ~alt marching with no AD. 12 reps BLE. Pt with no LOB  ~modified tandem stance. 20 sec R/L. Pt Amy for activity    Activity Tolerance:  Patient tolerated treatment well    Patient left up in chair with call button in " Skin Abscess  A skin abscess is an infected area on or under your skin that contains a collection of pus and other material. An abscess may also be called a furuncle, carbuncle, or boil. An abscess can occur in or on almost any part of your body.  Some abscesses break open (rupture) on their own. Most continue to get worse unless they are treated. The infection can spread deeper into the body and eventually into your blood, which can make you feel ill. Treatment usually involves draining the abscess.  What are the causes?  An abscess occurs when germs, often bacteria, pass through your skin and cause an infection. This may be caused by:  · A scrape or cut on your skin.  · A puncture wound through your skin, including a needle injection.  · Blocked oil or sweat glands.  · Blocked and infected hair follicles.  · A cyst that forms beneath your skin (sebaceous cyst) and becomes infected.  What increases the risk?  This condition is more likely to develop in people who:  · Have a weak body defense system (immune system).  · Have diabetes.  · Have dry and irritated skin.  · Get frequent injections or use illegal IV drugs.  · Have a foreign body in a wound, such as a splinter.  · Have problems with their lymph system or veins.  What are the signs or symptoms?  An abscess may start as a painful, firm bump under the skin. Over time, the abscess may get larger or become softer. Pus may appear at the top of the abscess, causing pressure and pain. It may eventually break through the skin and drain. Other symptoms include:  · Redness.  · Warmth.  · Swelling.  · Tenderness.  · A sore on the skin.  How is this diagnosed?  This condition is diagnosed based on your medical history and a physical exam. A sample of pus may be taken from the abscess to find out what is causing the infection and what antibiotics can be used to treat it. You also may have:  · Blood tests to look for signs of infection or spread of an infection to your  blood.  · Imaging studies such as ultrasound, CT scan, or MRI if the abscess is deep.  How is this treated?  Small abscesses that drain on their own may not need treatment. Treatment for an abscess that does not rupture on its own may include:  · Warm compresses applied to the area several times per day.  · Incision and drainage. Your health care provider will make an incision to open the abscess and will remove pus and any foreign body or dead tissue. The incision area may be packed with gauze to keep it open for a few days while it heals.  · Antibiotic medicines to treat infection. For a severe abscess, you may first get antibiotics through an IV and then change to oral antibiotics.  Follow these instructions at home:  Abscess Care  · If you have an abscess that has not drained, place a warm, clean, wet washcloth over the abscess several times a day. Do this as told by your health care provider.  · Follow instructions from your health care provider about how to take care of your abscess. Make sure you:  ¨ Cover the abscess with a bandage (dressing).  ¨ Change your dressing or gauze as told by your health care provider.  ¨ Wash your hands with soap and water before you change the dressing or gauze. If soap and water are not available, use hand .  · Check your abscess every day for signs of a worsening infection. Check for:  ¨ More redness, swelling, or pain.  ¨ More fluid or blood.  ¨ Warmth.  ¨ More pus or a bad smell.  Medicines  · Take over-the-counter and prescription medicines only as told by your health care provider.  · If you were prescribed an antibiotic medicine, take it as told by your health care provider. Do not stop taking the antibiotic even if you start to feel better.  General instructions  · To avoid spreading the infection:  ¨ Do not share personal care items, towels, or hot tubs with others.  ¨ Avoid making skin contact with other people.  · Keep all follow-up visits as told by your  reach.    Assessment:  John Bullard is a 72 y.o. male with a medical diagnosis of dyspnea; afib/aflutter; recent pneumonia.  He presents with the following impairments/functional limitations: gait instability, impaired endurance, impaired balance, impaired cardiopulmonary response to activity.  Pt c/o upper abdomen discomfort/pain after activities. Pt states this mostly occurs when he's in sitting position.     Plan to practice stair training with pt prior to him d/cing home 2/2 him having 12 steps to get to his bedroom at home.         Rehab potential is excellent.    Activity tolerance: Excellent    Discharge recommendations: Discharge Facility/Level of Care Needs: home with home health, outpatient PT     Equipment recommendations: Equipment Needed After Discharge: none     GOALS:   Multidisciplinary Problems       Physical Therapy Goals          Problem: Physical Therapy    Goal Priority Disciplines Outcome Goal Variances Interventions   Physical Therapy Goal     PT, PT/OT Ongoing, Progressing     Description: Goals to be met by: 23     Patient will increase functional independence with mobility by performin. Sit to stand transfer with [place]  2. Gait  x 400 feet with [place] using No Assistive Device.   3. Ascend/descend 12 stair with bilateral Handrails Modified [place] using No Assistive Device.                          PLAN:    Patient to be seen 5 x/week  to address the above listed problems via gait training, therapeutic activities, therapeutic exercises  Plan of Care expires: 23  Plan of Care reviewed with: patient         2023     health care provider. This is important.  Contact a health care provider if:  · You have more redness, swelling, or pain around your abscess.  · You have more fluid or blood coming from your abscess.  · Your abscess feels warm to the touch.  · You have more pus or a bad smell coming from your abscess.  · You have a fever.  · You have muscle aches.  · You have chills or a general ill feeling.  Get help right away if:  · You have severe pain.  · You see red streaks on your skin spreading away from the abscess.  This information is not intended to replace advice given to you by your health care provider. Make sure you discuss any questions you have with your health care provider.  Document Released: 09/27/2006 Document Revised: 08/13/2017 Document Reviewed: 10/26/2016  [employer] Patient Education © 2017 [employer]

## 2023-01-20 NOTE — PROGRESS NOTES
"  [facility]  Cardiology  Progress Note    Patient Name: John Bullard  MRN: [id number]  Admission Date: 1/6/2023  Hospital Length of Stay: 13 days  Code Status: Full Code   Attending Provider: Fabiano Liang MD   Consulting Provider: Francisco Melgar [facility]  Primary Care Physician: Fabiano Liang MD  Principal Problem:<principal problem not specified>    Patient information was obtained from patient, past medical records, and ER records.     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 The patient recently had an EP Study with Successful Ablation of Typical A.Flutter. He underwent this EP Study on 1.5.23 without complications and was discharged home in stable condition. Post operatively he had a sore throat and a cough which he attributed the ETT. His SOB worsened and he presented to the ER for evaluation. Upon arrival to the ER he was found to have a CXR with Infiltrates and a CT of the Chest with no Evidence of Pulmonary Embolism, however, he did have significant infiltrates in bilateral lungs but worse on the R side. He was admitted to his Primary Care Provider and [facility] was consulted for Elevated Troponin Levels and Recent Cardiac Procedure.     1.8.23: NAD. Converted into PAF Last Night. Remains with CVR. "I am feeling ok." + SOB, Improving. Denies CP and Palps. + Cough.  1.9.23: Patient sitting up at bedside. Conversational dyspnea. Bibasilar rales. Still requiring O2. Abdomen distended. 2+ peripheral edema. Afib 90's on tele.  1.10.23: Patient standing up at bedside. Reports breathing is better. Bibasilar crackles noted. Abdomen still firm distended. 2+ peripheral edema. Co2 rising with diuresis. Albumin 3.2  1.11.23: Patient sitting up at bedside. Reports continued [place]. No conversational dyspnea noted. Still in Afib, Rate controlled.   1.12.23: Patient underwent DCCV with " "successful conversion to SR  1.13.23: Patient sitting up at bedside. NAD. VSS. Still has bibasilar crackles and peripheral edema. SR on tele  1.14.23: Remains SR on tele. VSS. Great UOP.   1.16.23: NAD. "I am feeling ok, but I have SOB when I exert myself. Especially when I bend over." Denies CP and Palps. + SOB/[place]. Fluid Balance Net Negative 1600mL.  1.17.23: NAD noted. VSS. SR on tele. Negative 1635mL in 24hrs, weight down 0.9kg in 24hrs.  1.18.23: NAD noted. Still SOB. Denies CP/Palps. Aflutter vs Coarse Afib with CVR on tele. Negative 1505mL in 24hrs, weight down 0.9kg  1.19.23: NAD noted. Improved SOB. Denies CP/Palps. SR on tele. Negative 1580mL in 24hrs, weight down 0.7kg. Renal indices worse today.   1.20.23: NAD noted. Denies CP/Palps, still with some SOB. Aflutter with CVR on tele. +635mL in 24hrs, weight up 0.7kg. Renal indices improved.    PMH: Anxiety, Asthma, PAF/Ablation , DJD, GERD, HTN, OA, Obesity, MINDA/CPAP, VI, VT, Insomnia, Elevated Calcium Score/Non-Obstructive CAD, FLORENTINO/Bilaterally Mild-Moderate  PSH: Lithotripsy, Sinus Surgery, Colonoscopy, Hernia Repair, EP Study/Ablation, Abdominal Surgery, [place] Teeth Extraction   Family History: Reviewed and Unremarkable for Heart Disease  Social History: Denies Illicit Drug, ETOH and Tobacco    Previous Cardiac Diagnostics:   ECHO 1.7.23:  Concentric hypertrophy and mildly decreased systolic function.  The estimated ejection fraction is 35-40%.  Grade I left ventricular diastolic dysfunction.  With normal right ventricular systolic function.  There is mild aortic valve stenosis.  Aortic valve area is 1.03 cm2; peak velocity is 1.99 m/s; mean gradient is 10 mmHg.    Carotid US 8.17.22:  Mild Bilaterally and Tortuous Carotid Arteries with No Hemodynamically Significant FLORENTINO  < 50% Bilaterally    ECHO 4.11.22:  Limited Parasternal Windows  Normal LV Systolic Function: LVEF 55-60%  Moderate AS, Peak AV Velocity 3.2 m/sec, Mean Gradient 23mmHg, JENNY 1.4 " cm2  Grade I DD  Mild TR with RVSP of 30mmHg    [facility] 4.16.20:  LHC via R Radial Approach with Nonobstructive CAD with Normal LV Function and an EF of 55%. EDP was 20mmHg.  LM: patent vessel.  LAD: calcified type III vessel with 4 patent diagonal arteries. D1 is moderate to large in size with Proximal 50% stenosis. Remaining diagonals are small to medium size patent vessels  Lcx: Nondominant patent vessel that gives rise to a medium size OM1 with an early takeoff. The OM 2 is a medium to large size patent bifurcating vessel. The AV groove artery terminates in a small size patent OM3 with a small terminal bifurcating PL segment.   RCA: very large dominant patent vessel with mild Luminal irregularities. RCA terminates in a very large patent PDA and a large patent PL branch    PET 4.8.20:  The study quality is below average.   This is an abnormal perfusion study. Study is consistent with mostly scar tissue with minimal ischemia.   Small fixed perfusion abnormality of moderate intensity in the apical segment. Small partially reversible perfusion abnormality of moderate intensity in the anterior septal region. Small fixed perfusion abnormality of moderate intensity in the inferior region.   This scan is suggestive of low to moderate risk for future cardiovascular events.   The left ventricular cavity is noted to be normal on the stress studies. The stress left ventricular ejection fraction was calculated to be 52% and left ventricular global function is normal. The rest left ventricular cavity is noted to be normal. The rest left ventricular ejection fraction was calculated to be 42% and rest left ventricular global function is mildly reduced.   When compared to the resting ejection fraction (42%), the stress ejection fraction (52%) has increased.     Review of patient's allergies indicates:   Allergen Reactions    Ativan [lorazepam] Hallucinations     Review of Systems   Constitutional:  Negative for chills and fever.    Respiratory:  Positive for shortness of breath. Negative for cough, chest tightness, wheezing and stridor.    Cardiovascular:  Positive for leg swelling. Negative for chest pain.   All other systems reviewed and are negative.    Objective:     Vital Signs (Most Recent):  Temp: 97.7 °F (36.5 °C) (01/20/23 0844)  Pulse: 71 (01/20/23 0844)  Resp: 18 (01/20/23 0844)  BP: 100/67 (01/20/23 0844)  SpO2: (!) 92 % (01/20/23 0844)   Vital Signs (24h Range):  Temp:  [97.7 °F (36.5 °C)-98.6 °F (37 °C)] 97.7 °F (36.5 °C)  Pulse:  [61-84] 71  Resp:  [16-20] 18  SpO2:  [90 %-93 %] 92 %  BP: ()/(57-72) 100/67     Weight: 107.1 kg (236 lb 1.8 oz)  Body mass index is 35.9 kg/m².    SpO2: (!) 92 %         Intake/Output Summary (Last 24 hours) at 1/20/2023 0912  Last data filed at 1/20/2023 0612  Gross per 24 hour   Intake 1560 ml   Output 625 ml   Net 935 ml       Lines/Drains/Airways       Peripheral Intravenous Line  Duration                  Peripheral IV - Single Lumen 01/16/23 1508 20 G Anterior;Left;Proximal Forearm 3 days                  Significant Labs:  Recent Results (from the past 72 hour(s))   Comprehensive Metabolic Panel    Collection Time: 01/18/23  4:04 AM   Result Value Ref Range    Sodium Level 139 136 - 145 mmol/L    Potassium Level 4.4 3.5 - 5.1 mmol/L    Chloride 98 98 - 107 mmol/L    Carbon Dioxide 31 23 - 31 mmol/L    Glucose Level 97 82 - 115 mg/dL    Blood Urea Nitrogen 23.2 8.4 - 25.7 mg/dL    Creatinine 1.10 0.73 - 1.18 mg/dL    Calcium Level Total 8.9 8.8 - 10.0 mg/dL    Protein Total 5.7 (L) 5.8 - 7.6 gm/dL    Albumin Level 3.1 (L) 3.4 - 4.8 g/dL    Globulin 2.6 2.4 - 3.5 gm/dL    Albumin/Globulin Ratio 1.2 1.1 - 2.0 ratio    Bilirubin Total 1.0 <=1.5 mg/dL    Alkaline Phosphatase 71 40 - 150 unit/L    Alanine Aminotransferase 26 0 - 55 unit/L    Aspartate Aminotransferase 19 5 - 34 unit/L    eGFR >60 mls/min/1.73/m2   BNP    Collection Time: 01/18/23  4:04 AM   Result Value Ref Range     Natriuretic Peptide 55.5 <=100.0 pg/mL   Troponin I    Collection Time: 01/18/23  4:04 AM   Result Value Ref Range    Troponin-I <0.010 0.000 - 0.045 ng/mL   Magnesium    Collection Time: 01/18/23  4:04 AM   Result Value Ref Range    Magnesium Level 2.10 1.60 - 2.60 mg/dL   CBC with Differential    Collection Time: 01/18/23  4:04 AM   Result Value Ref Range    WBC 10.7 4.5 - 11.5 x10(3)/mcL    RBC 4.53 (L) 4.70 - 6.10 x10(6)/mcL    Hgb 13.4 (L) 14.0 - 18.0 gm/dL    Hct 42.3 42.0 - 52.0 %    MCV 93.4 80.0 - 94.0 fL    MCH 29.6 pg    MCHC 31.7 (L) 33.0 - 36.0 mg/dL    RDW 14.5 11.5 - 17.0 %    Platelet 199 130 - 400 x10(3)/mcL    MPV 11.0 (H) 7.4 - 10.4 fL    Neut % 81.1 %    Lymph % 5.0 %    Mono % 10.8 %    Eos % 2.0 %    Basophil % 0.5 %    Lymph # 0.54 (L) 0.6 - 4.6 x10(3)/mcL    Neut # 8.70 2.1 - 9.2 x10(3)/mcL    Mono # 1.16 0.1 - 1.3 x10(3)/mcL    Eos # 0.21 0 - 0.9 x10(3)/mcL    Baso # 0.05 0 - 0.2 x10(3)/mcL    IG# 0.06 (H) 0 - 0.04 x10(3)/mcL    IG% 0.6 %    NRBC% 0.0 %   Basic Metabolic Panel    Collection Time: 01/19/23  3:58 AM   Result Value Ref Range    Sodium Level 141 136 - 145 mmol/L    Potassium Level 4.6 3.5 - 5.1 mmol/L    Chloride 97 (L) 98 - 107 mmol/L    Carbon Dioxide 33 (H) 23 - 31 mmol/L    Glucose Level 96 82 - 115 mg/dL    Blood Urea Nitrogen 26.4 (H) 8.4 - 25.7 mg/dL    Creatinine 1.49 (H) 0.73 - 1.18 mg/dL    BUN/Creatinine Ratio 18     Calcium Level Total 10.0 8.8 - 10.0 mg/dL    Anion Gap 11.0 mEq/L    eGFR 49 mls/min/1.73/m2   Basic Metabolic Panel    Collection Time: 01/20/23  4:06 AM   Result Value Ref Range    Sodium Level 136 136 - 145 mmol/L    Potassium Level 4.2 3.5 - 5.1 mmol/L    Chloride 95 (L) 98 - 107 mmol/L    Carbon Dioxide 32 (H) 23 - 31 mmol/L    Glucose Level 89 82 - 115 mg/dL    Blood Urea Nitrogen 26.2 (H) 8.4 - 25.7 mg/dL    Creatinine 1.16 0.73 - 1.18 mg/dL    BUN/Creatinine Ratio 23     Calcium Level Total 8.9 8.8 - 10.0 mg/dL    Anion Gap 9.0 mEq/L    eGFR >60  mls/min/1.73/m2     Telemetry: SR 70s    Physical Exam  Constitutional:       Appearance: Normal appearance.   HENT:      Head: Normocephalic.      Mouth/Throat:      Mouth: Mucous membranes are moist.   Eyes:      Extraocular Movements: Extraocular movements intact.   Cardiovascular:      Rate and Rhythm: Normal rate and regular rhythm.      Pulses: Normal pulses.      Heart sounds: Murmur heard.   Pulmonary:      Effort: No respiratory distress.      Breath sounds: Examination of the right-lower field reveals rales. Examination of the left-lower field reveals rales. Rales present.      Comments: PT on NC  Abdominal:      General: There is distension (Normal Anatomy).   Musculoskeletal:         General: Normal range of motion.      Right lower leg: Edema present.      Left lower leg: Edema present.   Skin:     General: Skin is warm and dry.   Neurological:      General: No focal deficit present.      Mental Status: He is alert and oriented to person, place, and time.   Psychiatric:         Mood and Affect: Mood normal.         Behavior: Behavior normal.     Current Inpatient Medications:    Current Facility-Administered Medications:     acetaminophen tablet 650 mg, 650 mg, Oral, Q6H PRN, Fabiano Liang MD, 650 mg at 01/20/23 0849    albuterol inhaler 2 puff, 2 puff, Inhalation, Q4H PRN, Fabiano Liang MD, 2 puff at 01/16/23 1441    ALPRAZolam tablet 0.5 mg, 0.5 mg, Oral, BID PRN, Fabiano Liang MD, 0.5 mg at 01/19/23 1704    amiodarone tablet 200 mg, 200 mg, Oral, Daily, NED Iniguez, 200 mg at 01/20/23 0900    apixaban tablet 5 mg, 5 mg, Oral, BID, Fabiano Liang MD, 5 mg at 01/20/23 0839    atorvastatin tablet 20 mg, 20 mg, Oral, Daily, Fabiano Liang MD, 20 mg at 01/20/23 0839    cetirizine tablet 10 mg, 10 mg, Oral, Daily, Fabiano Liang MD, 10 mg at 01/20/23 0839    docusate sodium capsule 100 mg, 100 mg, Oral, Daily, Fabiano Liang MD, 100 mg at 01/20/23  0839    famotidine tablet 20 mg, 20 mg, Oral, BID, Dameon Sanchez III, MD, 20 mg at 01/20/23 0839    fluticasone furoate-vilanteroL 200-25 mcg/dose diskus inhaler 1 puff, 1 puff, Inhalation, Daily, Fabiano Liang MD, 1 puff at 01/20/23 0839    furosemide tablet 80 mg, 80 mg, Oral, Daily, Fabiano Liang MD    latanoprost 0.005 % ophthalmic solution 1 drop, 1 drop, Both Eyes, Daily, Fabiano Laing MD, 1 drop at 01/20/23 0839    levalbuterol nebulizer solution 0.63 mg, 0.63 mg, Nebulization, Q8H, Fabiano Liang MD, 0.63 mg at 01/20/23 0823    melatonin tablet 6 mg, 6 mg, Oral, Nightly PRN, Dameon Sanchez III, MD    melatonin tablet 9 mg, 9 mg, Oral, Daily PRN, Fabiano Liang MD    metoprolol injection 5 mg, 5 mg, Intravenous, Q15 Min PRN, Lionel Roach NP    metoprolol succinate (TOPROL-XL) 24 hr tablet 100 mg, 100 mg, Oral, Daily, NED Iniguez, 100 mg at 01/20/23 0839    pantoprazole EC tablet 40 mg, 40 mg, Oral, Daily, Fabiano Liang MD, 40 mg at 01/20/23 0839    potassium chloride SA CR tablet 20 mEq, 20 mEq, Oral, TID, Fabiano Liang MD, 20 mEq at 01/20/23 0839    tamsulosin 24 hr capsule 0.4 mg, 1 capsule, Oral, Daily, Fabiano Liang MD, 0.4 mg at 01/20/23 0839    valsartan tablet 40 mg, 40 mg, Oral, Daily, NED Iniguez, 40 mg at 01/19/23 0900    zolpidem tablet 5 mg, 5 mg, Oral, QHS, Fabiano Liang MD, 5 mg at 01/19/23 2102    VTE Risk Mitigation (From admission, onward)           Ordered     apixaban tablet 5 mg  2 times daily         01/07/23 1050     IP VTE HIGH RISK PATIENT  Once         01/07/23 0303     Place sequential compression device  Until discontinued         01/07/23 0303                  Assessment:   Acute on Chronic Combined Systolic and Diastolic HF/EF 35-40%  CMO/EF 35-40%     - ECHO (1.13.23) - LVEF 35-40%    - ECHO (1.7.23) - Grade 1 DD, Mild AS, EF 35-40%    - ECHO (4.11.22) - LVEF 55-60%  Bilateral  Pneumonitis (R > L)  Mildly Elevated Troponin (Flat) - likely due to Recent Ablation/Decompensated HF  Hx of Asthma  PAF - Now SR    - CHADsVASc - 5 Points - 7.2% Stroke Risk per Year     - EP Study with Successful Ablation of Typical A.Flutter with Termination into SR (1.5.23)  VHD (Mild-Mod AS)  HTN - Controlled  MINDA/CPAP  Venous Insufficiency  Elevated Calcium Score/Mild Calcified Non-Obstructive CAD via [facility] (2020)    - [facility] (4.16.20) - Nonobstructive CAD with Normal LV Function and an EF of 55%  FLORENTINO/Bilaterally Mild-Moderate  Anxiety  GERD  OA  Obesity  No Hx of GIB per Chart Review    Plan:   Continue PO lasix for now but if continues with SOB, consider Lasix gtt  DC Valsartan to allow for diuresis secondary to hypotension  If remains hypotensive, may consider stopping BB and starting dig for rate control.  Accurate I&Os and Daily Weights  Continue BB, Statin, Amiodarone  Continue Eliquis Re Stroke Risk Reduction   Aggressive PT/OT  Will Plan for OP Ischemic Workup and Further Evaluation of Aortic Valve  Labs in AM: CBC, CMP and Mg    ANTHONY Velasquez-BC  Cardiology  [facility]  01/20/2023     I have seen the patient, reviewed the Nurse Practitioner's note, assessment and plan. I have personally interviewed and examined the patient at bedside and agree with the findings. Medical decision making listed above were done under my guidance.

## 2023-01-21 VITALS
BODY MASS INDEX: 35.55 KG/M2 | TEMPERATURE: 98 F | OXYGEN SATURATION: 93 % | RESPIRATION RATE: 20 BRPM | SYSTOLIC BLOOD PRESSURE: 107 MMHG | DIASTOLIC BLOOD PRESSURE: 62 MMHG | HEIGHT: 68 IN | HEART RATE: 84 BPM | WEIGHT: 234.56 LBS

## 2023-01-21 LAB
ANION GAP SERPL CALC-SCNC: 10 MEQ/L
BNP BLD-MCNC: 43.7 PG/ML
BUN SERPL-MCNC: 24.1 MG/DL (ref 8.4–25.7)
CALCIUM SERPL-MCNC: 9.2 MG/DL (ref 8.8–10)
CHLORIDE SERPL-SCNC: 98 MMOL/L (ref 98–107)
CO2 SERPL-SCNC: 35 MMOL/L (ref 23–31)
CREAT SERPL-MCNC: 1.31 MG/DL (ref 0.73–1.18)
CREAT/UREA NIT SERPL: 18
GFR SERPLBLD CREATININE-BSD FMLA CKD-EPI: 57 MLS/MIN/1.73/M2
GLUCOSE SERPL-MCNC: 89 MG/DL (ref 82–115)
MAGNESIUM SERPL-MCNC: 2.4 MG/DL (ref 1.6–2.6)
POTASSIUM SERPL-SCNC: 5 MMOL/L (ref 3.5–5.1)
SODIUM SERPL-SCNC: 143 MMOL/L (ref 136–145)

## 2023-01-21 PROCEDURE — 25000003 PHARM REV CODE 250: Performed by: EMERGENCY MEDICINE

## 2023-01-21 PROCEDURE — 83735 ASSAY OF MAGNESIUM: CPT | Performed by: INTERNAL MEDICINE

## 2023-01-21 PROCEDURE — 93010 ELECTROCARDIOGRAM REPORT: CPT | Mod: ,,, | Performed by: INTERNAL MEDICINE

## 2023-01-21 PROCEDURE — 99239 HOSP IP/OBS DSCHRG MGMT >30: CPT | Mod: ,,, | Performed by: INTERNAL MEDICINE

## 2023-01-21 PROCEDURE — 80048 BASIC METABOLIC PNL TOTAL CA: CPT | Performed by: INTERNAL MEDICINE

## 2023-01-21 PROCEDURE — 94640 AIRWAY INHALATION TREATMENT: CPT

## 2023-01-21 PROCEDURE — 93005 ELECTROCARDIOGRAM TRACING: CPT

## 2023-01-21 PROCEDURE — 25000003 PHARM REV CODE 250: Performed by: NURSE PRACTITIONER

## 2023-01-21 PROCEDURE — 25000242 PHARM REV CODE 250 ALT 637 W/ HCPCS: Performed by: INTERNAL MEDICINE

## 2023-01-21 PROCEDURE — 83880 ASSAY OF NATRIURETIC PEPTIDE: CPT | Performed by: INTERNAL MEDICINE

## 2023-01-21 PROCEDURE — 36415 COLL VENOUS BLD VENIPUNCTURE: CPT | Performed by: INTERNAL MEDICINE

## 2023-01-21 PROCEDURE — 25000003 PHARM REV CODE 250: Performed by: INTERNAL MEDICINE

## 2023-01-21 PROCEDURE — 99239 PR HOSPITAL DISCHARGE DAY,>30 MIN: ICD-10-PCS | Mod: ,,, | Performed by: INTERNAL MEDICINE

## 2023-01-21 PROCEDURE — 93010 EKG 12-LEAD: ICD-10-PCS | Mod: ,,, | Performed by: INTERNAL MEDICINE

## 2023-01-21 RX ORDER — AMIODARONE HYDROCHLORIDE 200 MG/1
200 TABLET ORAL DAILY
Qty: 30 TABLET | Refills: 11 | Status: SHIPPED | OUTPATIENT
Start: 2023-01-22 | End: 2023-01-21 | Stop reason: SDUPTHER

## 2023-01-21 RX ORDER — FUROSEMIDE 40 MG/1
40 TABLET ORAL DAILY
Status: DISCONTINUED | OUTPATIENT
Start: 2023-01-21 | End: 2023-01-21 | Stop reason: HOSPADM

## 2023-01-21 RX ORDER — METOPROLOL SUCCINATE 100 MG/1
100 TABLET, EXTENDED RELEASE ORAL DAILY
Qty: 30 TABLET | Refills: 11 | Status: SHIPPED | OUTPATIENT
Start: 2023-01-22 | End: 2023-04-11

## 2023-01-21 RX ORDER — AMIODARONE HYDROCHLORIDE 200 MG/1
200 TABLET ORAL DAILY
Qty: 30 TABLET | Refills: 11 | Status: SHIPPED | OUTPATIENT
Start: 2023-01-22 | End: 2023-04-11

## 2023-01-21 RX ORDER — FUROSEMIDE 40 MG/1
40 TABLET ORAL DAILY
Qty: 30 TABLET | Refills: 11 | Status: SHIPPED | OUTPATIENT
Start: 2023-01-22 | End: 2024-02-08

## 2023-01-21 RX ORDER — METOPROLOL SUCCINATE 100 MG/1
100 TABLET, EXTENDED RELEASE ORAL DAILY
Qty: 30 TABLET | Refills: 11 | Status: SHIPPED | OUTPATIENT
Start: 2023-01-22 | End: 2023-01-21 | Stop reason: SDUPTHER

## 2023-01-21 RX ORDER — FUROSEMIDE 40 MG/1
40 TABLET ORAL DAILY
Qty: 30 TABLET | Refills: 11 | Status: SHIPPED | OUTPATIENT
Start: 2023-01-22 | End: 2023-01-21 | Stop reason: SDUPTHER

## 2023-01-21 RX ADMIN — POTASSIUM CHLORIDE 20 MEQ: 1500 TABLET, EXTENDED RELEASE ORAL at 09:01

## 2023-01-21 RX ADMIN — FUROSEMIDE 40 MG: 40 TABLET ORAL at 09:01

## 2023-01-21 RX ADMIN — PANTOPRAZOLE SODIUM 40 MG: 40 TABLET, DELAYED RELEASE ORAL at 09:01

## 2023-01-21 RX ADMIN — TAMSULOSIN HYDROCHLORIDE 0.4 MG: 0.4 CAPSULE ORAL at 09:01

## 2023-01-21 RX ADMIN — METOPROLOL SUCCINATE 100 MG: 50 TABLET, EXTENDED RELEASE ORAL at 09:01

## 2023-01-21 RX ADMIN — AMIODARONE HYDROCHLORIDE 200 MG: 200 TABLET ORAL at 09:01

## 2023-01-21 RX ADMIN — APIXABAN 5 MG: 5 TABLET, FILM COATED ORAL at 09:01

## 2023-01-21 RX ADMIN — FAMOTIDINE 20 MG: 20 TABLET, FILM COATED ORAL at 09:01

## 2023-01-21 RX ADMIN — LEVALBUTEROL HYDROCHLORIDE 0.63 MG: 0.63 SOLUTION RESPIRATORY (INHALATION) at 12:01

## 2023-01-21 RX ADMIN — ATORVASTATIN CALCIUM 20 MG: 10 TABLET, FILM COATED ORAL at 09:01

## 2023-01-21 RX ADMIN — DOCUSATE SODIUM 100 MG: 100 CAPSULE, LIQUID FILLED ORAL at 09:01

## 2023-01-21 RX ADMIN — CETIRIZINE HYDROCHLORIDE 10 MG: 10 TABLET, FILM COATED ORAL at 09:01

## 2023-01-21 RX ADMIN — LATANOPROST 1 DROP: 50 SOLUTION OPHTHALMIC at 09:01

## 2023-01-21 RX ADMIN — FLUTICASONE FUROATE AND VILANTEROL TRIFENATATE 1 PUFF: 200; 25 POWDER RESPIRATORY (INHALATION) at 09:01

## 2023-01-21 NOTE — HPI
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 The patient recently had an EP Study with Successful Ablation of Typical A.Flutter. He underwent this EP Study on 1.5.23 without complications and was discharged home in stable condition. Post operatively he had a sore throat and a cough which he attributed the ETT. His SOB worsened and he presented to the ER for evaluation. Upon arrival to the ER he was found to have a CXR with Infiltrates and a CT of the Chest with no Evidence of Pulmonary Embolism, however, he did have significant infiltrates in bilateral lungs but worse on the R side. He was admitted to his Primary Care Provider and [facility] was consulted for Elevated Troponin Levels and Recent Cardiac Procedure.

## 2023-01-21 NOTE — PROGRESS NOTES
"  [facility]  Cardiology  Progress Note    Patient Name: John Bullard  MRN: [id number]  Admission Date: 1/6/2023  Hospital Length of Stay: 14 days  Code Status: Full Code   Attending Provider: Fabiano Liang MD   Consulting Provider: Francisco Melgar [facility]  Primary Care Physician: Fabiano Liang MD  Principal Problem:<principal problem not specified>    Patient information was obtained from patient, past medical records, and ER records.     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 The patient recently had an EP Study with Successful Ablation of Typical A.Flutter. He underwent this EP Study on 1.5.23 without complications and was discharged home in stable condition. Post operatively he had a sore throat and a cough which he attributed the ETT. His SOB worsened and he presented to the ER for evaluation. Upon arrival to the ER he was found to have a CXR with Infiltrates and a CT of the Chest with no Evidence of Pulmonary Embolism, however, he did have significant infiltrates in bilateral lungs but worse on the R side. He was admitted to his Primary Care Provider and [facility] was consulted for Elevated Troponin Levels and Recent Cardiac Procedure.     1.8.23: NAD. Converted into PAF Last Night. Remains with CVR. "I am feeling ok." + SOB, Improving. Denies CP and Palps. + Cough.  1.9.23: Patient sitting up at bedside. Conversational dyspnea. Bibasilar rales. Still requiring O2. Abdomen distended. 2+ peripheral edema. Afib 90's on tele.  1.10.23: Patient standing up at bedside. Reports breathing is better. Bibasilar crackles noted. Abdomen still firm distended. 2+ peripheral edema. Co2 rising with diuresis. Albumin 3.2  1.11.23: Patient sitting up at bedside. Reports continued [place]. No conversational dyspnea noted. Still in Afib, Rate controlled.   1.12.23: Patient underwent DCCV with " "successful conversion to SR  1.13.23: Patient sitting up at bedside. NAD. VSS. Still has bibasilar crackles and peripheral edema. SR on tele  1.14.23: Remains SR on tele. VSS. Great UOP.   1.16.23: NAD. "I am feeling ok, but I have SOB when I exert myself. Especially when I bend over." Denies CP and Palps. + SOB/[place]. Fluid Balance Net Negative 1600mL.  1.17.23: NAD noted. VSS. SR on tele. Negative 1635mL in 24hrs, weight down 0.9kg in 24hrs.  1.18.23: NAD noted. Still SOB. Denies CP/Palps. Aflutter vs Coarse Afib with CVR on tele. Negative 1505mL in 24hrs, weight down 0.9kg  1.19.23: NAD noted. Improved SOB. Denies CP/Palps. SR on tele. Negative 1580mL in 24hrs, weight down 0.7kg. Renal indices worse today.   1.20.23: NAD noted. Denies CP/Palps, still with some SOB. Aflutter with CVR on tele. +635mL in 24hrs, weight up 0.7kg. Renal indices improved.  1.21.23: NAD noted. Denies CP/Palps. Still SOB. Renal indices worse again today with addition of PO Lasix.     PMH: Anxiety, Asthma, PAF/Ablation , DJD, GERD, HTN, OA, Obesity, MINDA/CPAP, VI, VT, Insomnia, Elevated Calcium Score/Non-Obstructive CAD, FLORENTINO/Bilaterally Mild-Moderate  PSH: Lithotripsy, Sinus Surgery, Colonoscopy, Hernia Repair, EP Study/Ablation, Abdominal Surgery, [place] Teeth Extraction   Family History: Reviewed and Unremarkable for Heart Disease  Social History: Denies Illicit Drug, ETOH and Tobacco    Previous Cardiac Diagnostics:   ECHO 1.7.23:  Concentric hypertrophy and mildly decreased systolic function.  The estimated ejection fraction is 35-40%.  Grade I left ventricular diastolic dysfunction.  With normal right ventricular systolic function.  There is mild aortic valve stenosis.  Aortic valve area is 1.03 cm2; peak velocity is 1.99 m/s; mean gradient is 10 mmHg.    Carotid US 8.17.22:  Mild Bilaterally and Tortuous Carotid Arteries with No Hemodynamically Significant FLORENTINO  < 50% Bilaterally    ECHO 4.11.22:  Limited Parasternal Windows  Normal LV " Systolic Function: LVEF 55-60%  Moderate AS, Peak AV Velocity 3.2 m/sec, Mean Gradient 23mmHg, JENNY 1.4 cm2  Grade I DD  Mild TR with RVSP of 30mmHg    LHC 4.16.20:  LHC via R Radial Approach with Nonobstructive CAD with Normal LV Function and an EF of 55%. EDP was 20mmHg.  LM: patent vessel.  LAD: calcified type III vessel with 4 patent diagonal arteries. D1 is moderate to large in size with Proximal 50% stenosis. Remaining diagonals are small to medium size patent vessels  Lcx: Nondominant patent vessel that gives rise to a medium size OM1 with an early takeoff. The OM 2 is a medium to large size patent bifurcating vessel. The AV groove artery terminates in a small size patent OM3 with a small terminal bifurcating PL segment.   RCA: very large dominant patent vessel with mild Luminal irregularities. RCA terminates in a very large patent PDA and a large patent PL branch    PET 4.8.20:  The study quality is below average.   This is an abnormal perfusion study. Study is consistent with mostly scar tissue with minimal ischemia.   Small fixed perfusion abnormality of moderate intensity in the apical segment. Small partially reversible perfusion abnormality of moderate intensity in the anterior septal region. Small fixed perfusion abnormality of moderate intensity in the inferior region.   This scan is suggestive of low to moderate risk for future cardiovascular events.   The left ventricular cavity is noted to be normal on the stress studies. The stress left ventricular ejection fraction was calculated to be 52% and left ventricular global function is normal. The rest left ventricular cavity is noted to be normal. The rest left ventricular ejection fraction was calculated to be 42% and rest left ventricular global function is mildly reduced.   When compared to the resting ejection fraction (42%), the stress ejection fraction (52%) has increased.     Review of patient's allergies indicates:   Allergen Reactions    Ativan  [lorazepam] Hallucinations     Review of Systems   Constitutional:  Negative for chills and fever.   Respiratory:  Positive for shortness of breath. Negative for cough, chest tightness, wheezing and stridor.    Cardiovascular:  Positive for leg swelling. Negative for chest pain.   All other systems reviewed and are negative.    Objective:     Vital Signs (Most Recent):  Temp: 98.4 °F (36.9 °C) (01/21/23 0801)  Pulse: 71 (01/21/23 0801)  Resp: 18 (01/21/23 0801)  BP: 119/78 (01/21/23 0801)  SpO2: (!) 93 % (01/21/23 0801)   Vital Signs (24h Range):  Temp:  [97.7 °F (36.5 °C)-98.4 °F (36.9 °C)] 98.4 °F (36.9 °C)  Pulse:  [65-94] 71  Resp:  [16-22] 18  SpO2:  [90 %-94 %] 93 %  BP: ()/(65-78) 119/78     Weight: 106.4 kg (234 lb 9.1 oz)  Body mass index is 35.67 kg/m².    SpO2: (!) 93 %         Intake/Output Summary (Last 24 hours) at 1/21/2023 0921  Last data filed at 1/21/2023 0600  Gross per 24 hour   Intake 1860 ml   Output 2625 ml   Net -765 ml       Lines/Drains/Airways       Peripheral Intravenous Line  Duration                  Peripheral IV - Single Lumen 01/21/23 0400 22 G Anterior;Left Forearm <1 day                  Significant Labs:  Recent Results (from the past 72 hour(s))   Basic Metabolic Panel    Collection Time: 01/19/23  3:58 AM   Result Value Ref Range    Sodium Level 141 136 - 145 mmol/L    Potassium Level 4.6 3.5 - 5.1 mmol/L    Chloride 97 (L) 98 - 107 mmol/L    Carbon Dioxide 33 (H) 23 - 31 mmol/L    Glucose Level 96 82 - 115 mg/dL    Blood Urea Nitrogen 26.4 (H) 8.4 - 25.7 mg/dL    Creatinine 1.49 (H) 0.73 - 1.18 mg/dL    BUN/Creatinine Ratio 18     Calcium Level Total 10.0 8.8 - 10.0 mg/dL    Anion Gap 11.0 mEq/L    eGFR 49 mls/min/1.73/m2   Basic Metabolic Panel    Collection Time: 01/20/23  4:06 AM   Result Value Ref Range    Sodium Level 136 136 - 145 mmol/L    Potassium Level 4.2 3.5 - 5.1 mmol/L    Chloride 95 (L) 98 - 107 mmol/L    Carbon Dioxide 32 (H) 23 - 31 mmol/L    Glucose  Level 89 82 - 115 mg/dL    Blood Urea Nitrogen 26.2 (H) 8.4 - 25.7 mg/dL    Creatinine 1.16 0.73 - 1.18 mg/dL    BUN/Creatinine Ratio 23     Calcium Level Total 8.9 8.8 - 10.0 mg/dL    Anion Gap 9.0 mEq/L    eGFR >60 mls/min/1.73/m2   BNP    Collection Time: 01/21/23  4:07 AM   Result Value Ref Range    Natriuretic Peptide 43.7 <=100.0 pg/mL   Basic Metabolic Panel    Collection Time: 01/21/23  4:07 AM   Result Value Ref Range    Sodium Level 143 136 - 145 mmol/L    Potassium Level 5.0 3.5 - 5.1 mmol/L    Chloride 98 98 - 107 mmol/L    Carbon Dioxide 35 (H) 23 - 31 mmol/L    Glucose Level 89 82 - 115 mg/dL    Blood Urea Nitrogen 24.1 8.4 - 25.7 mg/dL    Creatinine 1.31 (H) 0.73 - 1.18 mg/dL    BUN/Creatinine Ratio 18     Calcium Level Total 9.2 8.8 - 10.0 mg/dL    Anion Gap 10.0 mEq/L    eGFR 57 mls/min/1.73/m2   Magnesium    Collection Time: 01/21/23  4:07 AM   Result Value Ref Range    Magnesium Level 2.40 1.60 - 2.60 mg/dL     Telemetry: SR 70s    Physical Exam  Constitutional:       Appearance: Normal appearance.   HENT:      Head: Normocephalic.      Mouth/Throat:      Mouth: Mucous membranes are moist.   Eyes:      Extraocular Movements: Extraocular movements intact.   Cardiovascular:      Rate and Rhythm: Normal rate and regular rhythm.      Pulses: Normal pulses.      Heart sounds: Murmur heard.   Pulmonary:      Effort: No respiratory distress.      Breath sounds: Examination of the right-lower field reveals rales. Examination of the left-lower field reveals rales. Rales present.      Comments: PT on NC  Abdominal:      General: There is distension (Normal Anatomy).   Musculoskeletal:         General: Normal range of motion.      Right lower leg: Edema present.      Left lower leg: Edema present.   Skin:     General: Skin is warm and dry.   Neurological:      General: No focal deficit present.      Mental Status: He is alert and oriented to person, place, and time.   Psychiatric:         Mood and Affect:  Mood normal.         Behavior: Behavior normal.     Current Inpatient Medications:    Current Facility-Administered Medications:     acetaminophen tablet 650 mg, 650 mg, Oral, Q6H PRN, Fabiano Liang MD, 650 mg at 01/20/23 0849    albuterol inhaler 2 puff, 2 puff, Inhalation, Q4H PRN, Fabiano Liang MD, 2 puff at 01/16/23 1441    ALPRAZolam tablet 0.5 mg, 0.5 mg, Oral, BID PRN, Fabiano Liang MD, 0.5 mg at 01/19/23 1704    amiodarone tablet 200 mg, 200 mg, Oral, Daily, [place]ika [place], FNJENNIFER, 200 mg at 01/20/23 0900    apixaban tablet 5 mg, 5 mg, Oral, BID, Fabiano Liang MD, 5 mg at 01/20/23 1957    atorvastatin tablet 20 mg, 20 mg, Oral, Daily, Fabiano Liang MD, 20 mg at 01/20/23 0839    cetirizine tablet 10 mg, 10 mg, Oral, Daily, Fabiano Liang MD, 10 mg at 01/20/23 0839    docusate sodium capsule 100 mg, 100 mg, Oral, Daily, Fabiano Liang MD, 100 mg at 01/20/23 0839    famotidine tablet 20 mg, 20 mg, Oral, BID, Dameon Sanchez III, MD, 20 mg at 01/20/23 1957    fluticasone furoate-vilanteroL 200-25 mcg/dose diskus inhaler 1 puff, 1 puff, Inhalation, Daily, Fabiano Liang MD, 1 puff at 01/20/23 0839    furosemide tablet 40 mg, 40 mg, Oral, Daily, Francisco Melgar, AGACNP-BC    latanoprost 0.005 % ophthalmic solution 1 drop, 1 drop, Both Eyes, Daily, Fabiano Liang MD, 1 drop at 01/20/23 0839    levalbuterol nebulizer solution 0.63 mg, 0.63 mg, Nebulization, Q8H, Fabiano Liang MD, 0.63 mg at 01/21/23 0009    melatonin tablet 6 mg, 6 mg, Oral, Nightly PRN, Dameon Sanchez III, MD    melatonin tablet 9 mg, 9 mg, Oral, Daily PRN, Fabiano Liang MD    metoprolol injection 5 mg, 5 mg, Intravenous, Q15 Min PRN, Lionel SALDIVAR. Marley, MONIKA    metoprolol succinate (TOPROL-XL) 24 hr tablet 100 mg, 100 mg, Oral, Daily, NED Iniguez, 100 mg at 01/20/23 0839    pantoprazole EC tablet 40 mg, 40 mg, Oral, Daily, Fabiano Liang MD, 40 mg at  01/20/23 0839    potassium chloride SA CR tablet 20 mEq, 20 mEq, Oral, TID, Fabiano Liang MD, 20 mEq at 01/20/23 1957    tamsulosin 24 hr capsule 0.4 mg, 1 capsule, Oral, Daily, Fabiano Liang MD, 0.4 mg at 01/20/23 0839    zolpidem tablet 5 mg, 5 mg, Oral, QHS, Fabiano Liang MD, 5 mg at 01/20/23 1957    VTE Risk Mitigation (From admission, onward)           Ordered     apixaban tablet 5 mg  2 times daily         01/07/23 1050     IP VTE HIGH RISK PATIENT  Once         01/07/23 0303     Place sequential compression device  Until discontinued         01/07/23 0303                  Assessment:   Acute on Chronic Combined Systolic and Diastolic HF/EF 35-40%  CMO/EF 35-40%     - ECHO (1.13.23) - LVEF 35-40%    - ECHO (1.7.23) - Grade 1 DD, Mild AS, EF 35-40%    - ECHO (4.11.22) - LVEF 55-60%  Bilateral Pneumonitis (R > L)  Mildly Elevated Troponin (Flat) - likely due to Recent Ablation/Decompensated HF  Hx of Asthma  PAF - Now SR    - CHADsVASc - 5 Points - 7.2% Stroke Risk per Year     - EP Study with Successful Ablation of Typical A.Flutter with Termination into SR (1.5.23)  VHD (Mild-Mod AS)  HTN - Controlled  MINDA/CPAP  Venous Insufficiency  Elevated Calcium Score/Mild Calcified Non-Obstructive CAD via [facility] (2020)    - [facility] (4.16.20) - Nonobstructive CAD with Normal LV Function and an EF of 55%  FLORENTINO/Bilaterally Mild-Moderate  Anxiety  GERD  OA  Obesity  No Hx of GIB per Chart Review    Plan:   Decrease PO lasix to 40mg daily given increase Cr  Patient not tolerating aggressive diuresis. Suspect he is euvolemic and SOB may be secondary to left hemidiaphragm elevation.  Continue to hold Valsartan secondary to hypotension/JAMIL  Accurate I&Os and Daily Weights  Continue BB, Statin, Amiodarone  Continue Eliquis Re Stroke Risk Reduction   Aggressive PT/OT  Plan for outpatient BMP and Echo in 7 days  F/U with Dr. Bennett in 10 days  If ok with primary DC home with close F/U with above diagnostics    Francisco  TWILA Melgar, [facility]-BC  Cardiology  [facility]  01/21/2023

## 2023-01-21 NOTE — HOSPITAL COURSE
Discharge diagnosis:   1. Chronic systolic heart failure, EF 35-40%  2. Paroxysmal atrial flutter, ablation 01/05/2023   3. Bilateral pneumonitis   4. History of asthma   5. Hypertension   6. Nonobstructive coronary artery disease   7. Obstructive sleep apnea  8. Atrial fibrillation  9. Left hemidiaphragm elevation    72-year-old male patient of Dr. Liang presented to the emergency room with worsening dyspnea.  Previously, he underwent EP study on 01/05/2023 with successful ablation of typical atrial flutter.  Postoperatively, he had a sore throat and cough and presented to the ER with symptoms above.  He presented with volume overload and was aggressively diuresed. Echo showed EF of 35-40% on 1/7.  Because of atrial fibrillation, he underwent cardioversion 1/12.  Over 7 L have been removed over the past 2 weeks with IV Lasix.  Lasix was decreased to 40 mg daily today, and he is stable for discharge home today.  He will no longer take hydrochlorothiazide.  Diltiazem was discontinued, and metoprolol was adjusted.  He is now on amiodarone.  Continue Eliquis.  Valsartan was discontinued because of hypotension and elevated creatinine.  I consulted case management for home health, and patient will be discharged later this afternoon.

## 2023-01-21 NOTE — PLAN OF CARE
Pt is being discharged home today, he will need hh sn ordered by dr. Dumont.  He is an employee of /[employer].  [facility] .  Called on call rnlexa .  Referral thru [facility].  Also sent to fax [phone] per her request.

## 2023-01-21 NOTE — DISCHARGE SUMMARY
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 The patient recently had an EP Study with Successful Ablation of Typical A.Flutter. He underwent this EP Study on 1.5.23 without complications and was discharged home in stable condition. Post operatively he had a sore throat and a cough which he attributed the ETT. His SOB worsened and he presented to the ER for evaluation. Upon arrival to the ER he was found to have a CXR with Infiltrates and a CT of the Chest with no Evidence of Pulmonary Embolism, however, he did have significant infiltrates in bilateral lungs but worse on the R side. He was admitted to his Primary Care Provider and [facility] was consulted for Elevated Troponin Levels and Recent Cardiac Procedure.       Procedure(s) (LRB):  Cardioversion (N/A)      Hospital Course:   Discharge diagnosis:   1. Chronic systolic heart failure, EF 35-40%  2. Paroxysmal atrial flutter, ablation 01/05/2023   3. Bilateral pneumonitis   4. History of asthma   5. Hypertension   6. Nonobstructive coronary artery disease   7. Obstructive sleep apnea  8. Atrial fibrillation  9. Left hemidiaphragm elevation    72-year-old male patient of Dr. Liang presented to the emergency room with worsening dyspnea.  Previously, he underwent EP study on 01/05/2023 with successful ablation of typical atrial flutter.  Postoperatively, he had a sore throat and cough and presented to the ER with symptoms above.  He presented  with volume overload and was aggressively diuresed. Echo showed EF of 35-40% on 1/7.  Because of atrial fibrillation, he underwent cardioversion 1/12.  Over 7 L have been removed over the past 2 weeks with IV Lasix.  Lasix was decreased to 40 mg daily today, and he is stable for discharge home today.  He will no longer take hydrochlorothiazide.  Diltiazem was discontinued, and metoprolol was adjusted.  He is now on amiodarone.  Continue Eliquis.  Valsartan was discontinued because of hypotension and elevated creatinine.  I consulted case management for home health, and patient will be discharged later this afternoon.       Goals of Care Treatment Preferences:  Code Status: Full Code      Consults:   Consults (From admission, onward)        Status Ordering Provider     Inpatient consult to Social Work/Case Management  Once        Provider:  (Not yet assigned)    Ordered FRANCISCO ESTEVES II     Inpatient consult to Spiritual Care  Once        Provider:  (Not yet assigned)    Completed FABIANO CARVALHO     Inpatient consult to Pulmonology  Once        Provider:  Fabiano Kaplan MD    Completed FABIANO CARVALHO     Inpatient consult to Cardiology  Once        Provider:  Kenny Waller MD    Completed FABIANO CARVALHO          No new Assessment & Plan notes have been filed under this hospital service since the last note was generated.  Service: Hospital Medicine    Final Active Diagnoses:    Diagnosis Date Noted POA    PRINCIPAL PROBLEM:  Combined systolic and diastolic congestive heart failure [I50.40] 01/14/2023 Yes    Atrial fibrillation [I48.91]  Yes    Hypertension [I10]  Yes    Venous insufficiency [I87.2]  Yes      Problems Resolved During this Admission:       Discharged Condition: fair    Disposition: Home or Self Care    Follow Up:   Follow-up Information     Fabiano Carvalho MD Follow up in 1 week(s).    Specialty: Internal Medicine  Contact information:  [address] [place] [address]  Leonid MCCONNELL  [id number]  [phone]                       Patient Instructions:   No discharge procedures on file.    Significant Diagnostic Studies: Labs:   BMP:   Recent Labs   Lab 01/20/23  0406 01/21/23  0407    143   K 4.2 5.0   CO2 32* 35*   BUN 26.2* 24.1   CREATININE 1.16 1.31*   CALCIUM 8.9 9.2   MG  --  2.40       Pending Diagnostic Studies:     None         Medications:  Reconciled Home Medications:      Medication List      START taking these medications    amiodarone 200 MG Tab  Commonly known as: PACERONE  Take 1 tablet (200 mg total) by mouth once daily.  Start taking on: January 22, 2023     furosemide 40 MG tablet  Commonly known as: LASIX  Take 1 tablet (40 mg total) by mouth once daily.  Start taking on: January 22, 2023        CHANGE how you take these medications    metoprolol succinate 100 MG 24 hr tablet  Commonly known as: TOPROL-XL  Take 1 tablet (100 mg total) by mouth once daily.  Start taking on: January 22, 2023  What changed:   · medication strength  · how much to take  · when to take this     testosterone cypionate 200 mg/mL injection  Commonly known as: DEPOTESTOTERONE CYPIONATE  Inject 1 mL (200 mg total) into the muscle every 14 (fourteen) days.  What changed: additional instructions        CONTINUE taking these medications    albuterol 90 mcg/actuation inhaler  Commonly known as: PROVENTIL/VENTOLIN HFA  INHALE 2 PUFFS BY MOUTH EVERY 6 HOURS     ALPRAZolam 0.5 MG tablet  Commonly known as: XANAX  Take 1 tablet by mouth twice daily as needed for anxiety     ELIQUIS 5 mg Tab  Generic drug: apixaban  Take 5 mg by mouth 2 (two) times daily.     fluticasone-salmeterol 230-21 mcg/dose 230-21 mcg/actuation Hfaa inhaler  Commonly known as: ADVAIR HFA  Inhale 2 puffs into the lungs 2 (two) times daily. Controller     loratadine 10 mg tablet  Commonly known as: CLARITIN  Take 10 mg by mouth daily as needed for Allergies.     melatonin 10 mg Tab  Take 10 mg by mouth daily as needed (insomnia).      montelukast 10 mg tablet  Commonly known as: SINGULAIR  Take 1 tablet by mouth once daily     omeprazole 40 MG capsule  Commonly known as: PRILOSEC  Take 1 capsule by mouth once daily     potassium chloride 8 MEQ Tbsr  Commonly known as: KLOR-CON  Take 1 tablet (8 mEq total) by mouth once daily.     rosuvastatin 20 MG tablet  Commonly known as: CRESTOR  TAKE 1 TABLET BY MOUTH ONCE IN THE EVENING     tamsulosin 0.4 mg Cap  Commonly known as: FLOMAX  Take 1 capsule by mouth once daily.     travoprost (benzalkonium) 0.004 % ophthalmic solution  Commonly known as: TRAVATAN  Place 1 drop into both eyes every evening.     zolpidem 5 MG Tab  Commonly known as: AMBIEN  TAKE 1 TABLET BY MOUTH ONCE DAILY AT BEDTIME AS NEEDED FOR INSOMNIA        STOP taking these medications    diltiaZEM 240 MG 24 hr capsule  Commonly known as: CARDIZEM CD     hydroCHLOROthiazide 50 MG tablet  Commonly known as: HYDRODIURIL            Indwelling Lines/Drains at time of discharge:   Lines/Drains/Airways     None                 Time spent on the discharge of patient: 40 minutes         Michele Dumont II, MD  Department of Hospital Medicine  [facility] - 6th Floor Medical Telemetry

## 2023-01-23 ENCOUNTER — PATIENT OUTREACH (OUTPATIENT)
Dept: ADMINISTRATIVE | Facility: CLINIC | Age: 73
End: 2023-01-23
Payer: COMMERCIAL

## 2023-01-23 NOTE — PROGRESS NOTES
C3 nurse spoke with John Bullard for a TCC post hospital discharge follow up call. The patient has a scheduled HOS appointment with Fabiano Liang MD on 2/2/23 @ 2:00p.

## 2023-01-24 ENCOUNTER — PATIENT MESSAGE (OUTPATIENT)
Dept: ADMINISTRATIVE | Facility: OTHER | Age: 73
End: 2023-01-24
Payer: COMMERCIAL

## 2023-01-26 ENCOUNTER — TELEPHONE (OUTPATIENT)
Dept: INTERNAL MEDICINE | Facility: CLINIC | Age: 73
End: 2023-01-26
Payer: COMMERCIAL

## 2023-01-26 NOTE — TELEPHONE ENCOUNTER
----- Message from Teodora Benavidez LPN sent at 1/25/2023  5:01 PM CST -----  Regarding: qiana Leigh 2/2 @2:00  No labs needed.

## 2023-01-29 ENCOUNTER — PATIENT MESSAGE (OUTPATIENT)
Dept: ADMINISTRATIVE | Facility: OTHER | Age: 73
End: 2023-01-29
Payer: COMMERCIAL

## 2023-01-30 ENCOUNTER — OFFICE VISIT (OUTPATIENT)
Dept: INTERNAL MEDICINE | Facility: CLINIC | Age: 73
End: 2023-01-30
Payer: COMMERCIAL

## 2023-01-30 ENCOUNTER — PATIENT MESSAGE (OUTPATIENT)
Dept: ADMINISTRATIVE | Facility: HOSPITAL | Age: 73
End: 2023-01-30
Payer: COMMERCIAL

## 2023-01-30 VITALS
DIASTOLIC BLOOD PRESSURE: 68 MMHG | OXYGEN SATURATION: 89 % | RESPIRATION RATE: 18 BRPM | BODY MASS INDEX: 35.67 KG/M2 | WEIGHT: 235.38 LBS | HEIGHT: 68 IN | SYSTOLIC BLOOD PRESSURE: 110 MMHG | HEART RATE: 90 BPM

## 2023-01-30 DIAGNOSIS — R06.00 DYSPNEA, UNSPECIFIED TYPE: Primary | ICD-10-CM

## 2023-01-30 DIAGNOSIS — E66.01 SEVERE OBESITY (BMI 35.0-39.9) WITH COMORBIDITY: ICD-10-CM

## 2023-01-30 DIAGNOSIS — I42.9 CARDIOMYOPATHY, UNSPECIFIED TYPE: ICD-10-CM

## 2023-01-30 DIAGNOSIS — I10 PRIMARY HYPERTENSION: ICD-10-CM

## 2023-01-30 DIAGNOSIS — J18.9 PNEUMONIA DUE TO INFECTIOUS ORGANISM, UNSPECIFIED LATERALITY, UNSPECIFIED PART OF LUNG: ICD-10-CM

## 2023-01-30 DIAGNOSIS — I48.19 PERSISTENT ATRIAL FIBRILLATION: ICD-10-CM

## 2023-01-30 PROCEDURE — 3078F DIAST BP <80 MM HG: CPT | Mod: CPTII,,, | Performed by: INTERNAL MEDICINE

## 2023-01-30 PROCEDURE — 1126F AMNT PAIN NOTED NONE PRSNT: CPT | Mod: CPTII,,, | Performed by: INTERNAL MEDICINE

## 2023-01-30 PROCEDURE — 3074F PR MOST RECENT SYSTOLIC BLOOD PRESSURE < 130 MM HG: ICD-10-PCS | Mod: CPTII,,, | Performed by: INTERNAL MEDICINE

## 2023-01-30 PROCEDURE — 3008F BODY MASS INDEX DOCD: CPT | Mod: CPTII,,, | Performed by: INTERNAL MEDICINE

## 2023-01-30 PROCEDURE — 1159F MED LIST DOCD IN RCRD: CPT | Mod: CPTII,,, | Performed by: INTERNAL MEDICINE

## 2023-01-30 PROCEDURE — 99213 PR OFFICE/OUTPT VISIT, EST, LEVL III, 20-29 MIN: ICD-10-PCS | Mod: ,,, | Performed by: INTERNAL MEDICINE

## 2023-01-30 PROCEDURE — 1159F PR MEDICATION LIST DOCUMENTED IN MEDICAL RECORD: ICD-10-PCS | Mod: CPTII,,, | Performed by: INTERNAL MEDICINE

## 2023-01-30 PROCEDURE — 3008F PR BODY MASS INDEX (BMI) DOCUMENTED: ICD-10-PCS | Mod: CPTII,,, | Performed by: INTERNAL MEDICINE

## 2023-01-30 PROCEDURE — 1126F PR PAIN SEVERITY QUANTIFIED, NO PAIN PRESENT: ICD-10-PCS | Mod: CPTII,,, | Performed by: INTERNAL MEDICINE

## 2023-01-30 PROCEDURE — 99213 OFFICE O/P EST LOW 20 MIN: CPT | Mod: ,,, | Performed by: INTERNAL MEDICINE

## 2023-01-30 PROCEDURE — 3074F SYST BP LT 130 MM HG: CPT | Mod: CPTII,,, | Performed by: INTERNAL MEDICINE

## 2023-01-30 PROCEDURE — 3078F PR MOST RECENT DIASTOLIC BLOOD PRESSURE < 80 MM HG: ICD-10-PCS | Mod: CPTII,,, | Performed by: INTERNAL MEDICINE

## 2023-01-30 NOTE — PHYSICIAN QUERY
PT Name: John Bullard  MR #: [id number]     DOCUMENTATION CLARIFICATION     CDS/: Jaelyn Arreola RN              Contact information:[email or web address]  This form is a permanent document in the medical record.     Query Date: January 30, 2023    By submitting this query, we are merely seeking further clarification of documentation.  Please utilize your independent clinical judgment when addressing the question(s) below.    The Medical Record contains the following   Indicators Supporting Clinical Findings Location in Medical Record   x Heart Failure documented      BNP New onset congestive heart failure with combination systolic and diastolic dysfunction    Acute on chronic systolic and diastolic HF  --decompensated HM note 1-14      Cardiology note 1-15    EF/Echo      Radiology findings     x Subjective/Objective Respiratory Conditions SOB HM note 1-14    Recent/Current MI      Heart Transplant, LVAD     x Edema, JVD 2+ peripheral edema Cardiology note 1-14    Ascites     x Diuretics/Meds IV Furosemide Mar 1-8 only  Mar 1-10 to 1-19    Other Treatment     x Other PMH: Anxiety, Asthma, PAF/Ablation , DJD, GERD, HTN, OA, Obesity, MINDA/CPAP, VI, VT, Insomnia, Elevated Calcium Score/Non-Obstructive CAD, FLORENTINO/Bilaterally Mild-Moderate Cardiology note 1-13     Heart failure is a clinical diagnosis which includes symptomatic fluid retention, elevated intracardiac pressures, and/or the inability of the heart to deliver adequate blood flow.    Heart Failure with reduced Ejection Fraction (HFrEF) or Systolic Heart Failure (loses ability to contract normally, EF is <40%)    Heart Failure with preserved Ejection Fraction (HFpEF) or Diastolic Heart Failure (stiff ventricles, does not relax properly, EF is >50%)     Heart Failure with Combined Systolic and Diastolic Failure (stiff ventricles, does not relax properly and EF is <50%)    Mid-range or mildly reduced ejection fraction (HFmrEF) is classified  as systolic heart failure.  Congestive heart failure with a recovered EF is classified as Diastolic Heart Failure.  Common clues to acute exacerbation:  Rapidly progressive symptoms (w/in 2 weeks of presentation), using IV diuretics, using supplemental O2, pulmonary edema on Xray, new or worsening pleural effusion, +JVD or other signs of volume overload, MI w/in 4 weeks, and/or BNP >500  The clinical guidelines noted are only system guidelines, and do not replace the providers clinical judgment.    Provider, please Clarify the acuity of the combined chf diagnosis associated with the above clinical findings.    [  X ]  Acute Combined Systolic and Diastolic Heart Failure - new diagnosis   [   ]  Acute on Chronic Combined Systolic and Diastolic Heart Failure - worsening of CHF signs/symptoms in preexisting CHF   [   ]  Other (please specify): ___________________________________     Please document in your progress notes daily for the duration of treatment until resolved and include in your discharge summary.    References:  American Heart Association editorial staff. (2017, May). Ejection Fraction Heart Failure Measurement. American Heart Association. https://www.heart.org/en/health-topics/heart-failure/diagnosing-heart-failure/ejection-fraction-heart-failure-measurement#:~:text=Ejection%20fraction%20(EF)%20is%20a,pushed%20out%20with%20each%20heartbeat  ESPINOZA Price (2020, December 15). Heart failure with preserved ejection fraction: Clinical manifestations and diagnosis. [employer]. https://www.Aternity[employer]"/contents/heart-failure-with-preserved-ejection-fraction-clinical-manifestations-and-diagnosis.  ICD-10-CM/PCS Coding Clinic Third Quarter ICD-10, Effective with discharges: September 8, 2020 [facility] § Heart failure with mid-range or mildly reduced ejection fraction (2020).  ICD-10-CM/PCS Coding Clinic Third Quarter ICD-10, Effective with discharges: September 8, 2020 [facility]  [facility] § Heart failure with recovered ejection fraction (2020).  Form No. 29701

## 2023-01-30 NOTE — PROGRESS NOTES
Subjective:       Patient ID: John Bullard is a 72 y.o. male.    Chief Complaint: Follow-up (Hospital follow-up, pt states he is still exhibiting sob during exertion, when he's turning over in bed and other activities.)      The patient is a 72-year-old man in for hospital follow-up.  He went home a little over a week ago.  He was hospitalized for shortness of breaths related to pneumonia as well as heart failure and recurrence of atrial fibrillation/flutter.  He developed pneumonia after a cardiac ablation procedure.  It really seemed to be more of a chemical pneumonitis than bacterial.  He was treated however with antibiotics.  That resolve fairly quickly but he did have issues with congestive heart failure thought to be secondary to systolic and diastolic dysfunction.  His ejection fraction had fallen considerably from prior studies.  His heart rate was controlled but he was in and out of fibrillation and flutter.  He was also diuresed fairly aggressively for severe edema and eventually he developed some mild azotemia and the diuretic dose was reduced.  He was stabilized in left the hospital about 8 days ago.  He was no longer requiring oxygen.  Since discharge she has done fairly well with general improvement.  His swelling has remained fairly controlled.  Shortness of breath is minimal.  He still has episodic spasms of the chest wall or perhaps diaphragm.  These occur 4 5 times a day and are often brought on by positional changes.  He did have a repeat echo in some blood work done by his cardiologist last week but I do not have those results.  He is scheduled to see Dr. Bennett tomorrow.    Follow-up    Review of Systems     Current Outpatient Medications on File Prior to Visit   Medication Sig Dispense Refill    albuterol (PROVENTIL/VENTOLIN HFA) 90 mcg/actuation inhaler INHALE 2 PUFFS BY MOUTH EVERY 6 HOURS 8 g 3    ALPRAZolam (XANAX) 0.5 MG tablet Take 1 tablet by mouth twice daily as needed for anxiety  "45 tablet 0    amiodarone (PACERONE) 200 MG Tab Take 1 tablet (200 mg total) by mouth once daily. 30 tablet 11    ELIQUIS 5 mg Tab Take 5 mg by mouth 2 (two) times daily.      fluticasone-salmeterol 230-21 mcg/dose (ADVAIR HFA) 230-21 mcg/actuation HFAA inhaler Inhale 2 puffs into the lungs 2 (two) times daily. Controller      furosemide (LASIX) 40 MG tablet Take 1 tablet (40 mg total) by mouth once daily. 30 tablet 11    loratadine (CLARITIN) 10 mg tablet Take 10 mg by mouth daily as needed for Allergies.      melatonin 10 mg Tab Take 10 mg by mouth daily as needed (insomnia).      metoprolol succinate (TOPROL-XL) 100 MG 24 hr tablet Take 1 tablet (100 mg total) by mouth once daily. 30 tablet 11    montelukast (SINGULAIR) 10 mg tablet Take 1 tablet by mouth once daily 30 tablet 6    omeprazole (PRILOSEC) 40 MG capsule Take 1 capsule by mouth once daily 30 capsule 0    potassium chloride (KLOR-CON) 8 MEQ TbSR Take 1 tablet (8 mEq total) by mouth once daily. 30 tablet 5    rosuvastatin (CRESTOR) 20 MG tablet TAKE 1 TABLET BY MOUTH ONCE IN THE EVENING      tamsulosin (FLOMAX) 0.4 mg Cap Take 1 capsule by mouth once daily.      testosterone cypionate (DEPOTESTOTERONE CYPIONATE) 200 mg/mL injection Inject 1 mL (200 mg total) into the muscle every 14 (fourteen) days. 3 mL 5    travoprost, benzalkonium, (TRAVATAN) 0.004 % ophthalmic solution Place 1 drop into both eyes every evening.      zolpidem (AMBIEN) 5 MG Tab TAKE 1 TABLET BY MOUTH ONCE DAILY AT BEDTIME AS NEEDED FOR INSOMNIA 30 tablet 5     No current facility-administered medications on file prior to visit.     Objective:      /68 (BP Location: Right arm, Patient Position: Sitting, BP Method: Large (Manual))   Pulse 90   Resp 18   Ht 5' 8" (1.727 m)   Wt 106.8 kg (235 lb 6.4 oz)   SpO2 (!) 89%   BMI 35.79 kg/m²     Physical Exam  Vitals reviewed.   Constitutional:       Appearance: Normal appearance.   HENT:      Head: Normocephalic and atraumatic.     "  Mouth/Throat:      Pharynx: Oropharynx is clear.   Eyes:      Pupils: Pupils are equal, round, and reactive to light.   Cardiovascular:      Rate and Rhythm: Normal rate and regular rhythm.      Pulses: Normal pulses.      Heart sounds: Normal heart sounds.      Comments: Rhythm is regular with a rate of around 90 and a 2/6 systolic murmur across the precordium  Pulmonary:      Breath sounds: Normal breath sounds.      Comments: Few rales at the right base  Abdominal:      General: Abdomen is flat.      Palpations: Abdomen is soft.   Musculoskeletal:      Cervical back: Neck supple.      Comments: 1 to 2+ pretibial edema bilaterally.   Skin:     General: Skin is warm and dry.   Neurological:      Mental Status: He is alert.       Assessment:       1. Dyspnea, unspecified type    2. Persistent atrial fibrillation    3. Primary hypertension    4. Severe obesity (BMI 35.0-39.9) with comorbidity    5. Pneumonia due to infectious organism, unspecified laterality, unspecified part of lung    6. Cardiomyopathy, unspecified type          Plan:     1. Dyspnea.  It was multifactorial.  He had congestive heart failure with both systolic and diastolic dysfunction.  He had atrial fib/flutter but rate was controlled.  He had pneumonia thought to be chemical pneumonitis which exacerbated his lung problems.  There is a past history of asthma.  There is also an issue with an elevated left hemidiaphragm along with central obesity resulting in some restriction of his diaphragm.  There was also degree of deconditioning    2. Atrial fib flutter.  Status post ablation but failed    3. Hypertension.  Well controlled    4. 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 Cardiomyopathy.  New systolic dysfunction noted but not severe and hopefully related to the recent pneumonia    6. Pneumonia.  Likely a chemical pneumonitis related to the ablation.  Resolving clinically     7. Severe obesity.  Contributing to his  shortness of breath    8. Episodic at chest wall pain.  Really seems to be spasm perhaps even coming from the diaphragm itself    Continue same meds including Lasix daily.  We will try to obtain copies of test results from Dr. Bennett.  Follow-up with me 1 month with CBC CMP lipid TSH and chest x-ray prior.    He tells me he has an appointment with the pulmonologist in the near future as well.

## 2023-01-30 NOTE — PHYSICIAN QUERY
PT Name: John Bullard  MR #: [id number]    DOCUMENTATION CLARIFICATION     CDS/: Jaelyn Arreola RN              Contact information:[email or web address]     This form is a permanent document in the medical record.     Query Date: January 30, 2023    By submitting this query, we are merely seeking further clarification of documentation. Please utilize your independent clinical judgment when addressing the question(s) below.    The Medical Record contains the following:   Indicators Supporting Clinical Findings Location in Medical Record   x Atrial Fibrillation Atrial fibrillation.  Persistent    PAF HM note 1-10    Cardiology note 1-21    EKG Results     x Medication Continue Eliquis   Home meds were amiodarone  Cardiology note 1-7  H&P   x Treatment Patient underwent DCCV with successful conversion to SR Cardiology note 1-21    Other       The clinical guidelines noted are only a system guideline. It does not replace the provider's clinical judgment.    Provider, please clarify the conflicting type of Atrial Fibrillation (AF):    [ X ] Paroxysmal - defined as AF that terminates spontaneously or with intervention within < 7 days of onset, episodes may recur with variable frequency   [  ] Other Persistent - defined as AF that sustained for ?7 days; Episodes often require pharmacologic or electrical cardioversion to restore sinus rhythm   [  ] Other cardiac diagnosis (please specify): ____________     Please document in your progress notes daily for the duration of treatment until resolved, and include in your discharge summary.    Reference:  ANN Alexis MD. (2020, September 14). Overview of atrial fibrillation (DWAYNE Wright MD & VAHE Otto MD, Eds.). Retrieved October 22, 2020, from https://www.Mac[employer]/contents/uipphwcf-cj-uctdwy-fibrillation?search=atrial%20fibrillation&source=search_result&selectedTitle=1~150&usage_type=default&display_rank=1    Form No. 89041

## 2023-01-31 ENCOUNTER — CLINICAL SUPPORT (OUTPATIENT)
Dept: INTERNAL MEDICINE | Facility: CLINIC | Age: 73
End: 2023-01-31
Payer: COMMERCIAL

## 2023-01-31 DIAGNOSIS — R79.89 LOW TESTOSTERONE: Primary | ICD-10-CM

## 2023-01-31 PROCEDURE — 96372 THER/PROPH/DIAG INJ SC/IM: CPT | Mod: ,,, | Performed by: INTERNAL MEDICINE

## 2023-01-31 PROCEDURE — 96372 PR INJECTION,THERAP/PROPH/DIAG2ST, IM OR SUBCUT: ICD-10-PCS | Mod: ,,, | Performed by: INTERNAL MEDICINE

## 2023-01-31 RX ORDER — TESTOSTERONE CYPIONATE 200 MG/ML
200 INJECTION, SOLUTION INTRAMUSCULAR
Status: COMPLETED | OUTPATIENT
Start: 2023-01-31 | End: 2023-01-31

## 2023-01-31 RX ADMIN — TESTOSTERONE CYPIONATE 200 MG: 200 INJECTION, SOLUTION INTRAMUSCULAR at 08:01

## 2023-02-02 LAB — NONINV COLON CA DNA+OCC BLD SCRN STL QL: NEGATIVE

## 2023-02-08 ENCOUNTER — TELEPHONE (OUTPATIENT)
Dept: INTERNAL MEDICINE | Facility: CLINIC | Age: 73
End: 2023-02-08
Payer: COMMERCIAL

## 2023-02-14 ENCOUNTER — CLINICAL SUPPORT (OUTPATIENT)
Dept: INTERNAL MEDICINE | Facility: CLINIC | Age: 73
End: 2023-02-14
Payer: COMMERCIAL

## 2023-02-14 DIAGNOSIS — R79.89 LOW TESTOSTERONE: Primary | ICD-10-CM

## 2023-02-14 PROCEDURE — 96372 THER/PROPH/DIAG INJ SC/IM: CPT | Mod: ,,, | Performed by: INTERNAL MEDICINE

## 2023-02-14 PROCEDURE — 96372 PR INJECTION,THERAP/PROPH/DIAG2ST, IM OR SUBCUT: ICD-10-PCS | Mod: ,,, | Performed by: INTERNAL MEDICINE

## 2023-02-14 RX ORDER — TESTOSTERONE CYPIONATE 200 MG/ML
200 INJECTION, SOLUTION INTRAMUSCULAR
Status: COMPLETED | OUTPATIENT
Start: 2023-02-14 | End: 2023-02-14

## 2023-02-14 RX ADMIN — TESTOSTERONE CYPIONATE 200 MG: 200 INJECTION, SOLUTION INTRAMUSCULAR at 08:02

## 2023-02-16 ENCOUNTER — TELEPHONE (OUTPATIENT)
Dept: INTERNAL MEDICINE | Facility: CLINIC | Age: 73
End: 2023-02-16
Payer: COMMERCIAL

## 2023-02-16 NOTE — TELEPHONE ENCOUNTER
Pt gave me another fax number to send paperwork too. He said one department received it but the other department didn't.

## 2023-02-18 DIAGNOSIS — K21.9 GASTROESOPHAGEAL REFLUX DISEASE, UNSPECIFIED WHETHER ESOPHAGITIS PRESENT: ICD-10-CM

## 2023-02-20 ENCOUNTER — TELEPHONE (OUTPATIENT)
Dept: INTERNAL MEDICINE | Facility: CLINIC | Age: 73
End: 2023-02-20
Payer: COMMERCIAL

## 2023-02-20 ENCOUNTER — HOSPITAL ENCOUNTER (OUTPATIENT)
Dept: RADIOLOGY | Facility: HOSPITAL | Age: 73
Discharge: HOME | End: 2023-02-20
Attending: NURSE PRACTITIONER
Payer: COMMERCIAL

## 2023-02-20 DIAGNOSIS — J44.9 CHRONIC OBSTRUCTIVE PULMONARY DISEASE, UNSPECIFIED COPD TYPE: ICD-10-CM

## 2023-02-20 DIAGNOSIS — I50.32 CHRONIC DIASTOLIC HEART FAILURE: ICD-10-CM

## 2023-02-20 PROCEDURE — 76000 FLUOROSCOPY <1 HR PHYS/QHP: CPT | Mod: TC

## 2023-02-20 RX ORDER — OMEPRAZOLE 40 MG/1
CAPSULE, DELAYED RELEASE ORAL
Qty: 30 CAPSULE | Refills: 0 | Status: SHIPPED | OUTPATIENT
Start: 2023-02-20 | End: 2023-03-22

## 2023-02-20 NOTE — TELEPHONE ENCOUNTER
----- Message from Teodora Benavidez LPN sent at 2/20/2023 11:26 AM CST -----  Regarding: qiana Leigh 2/27 @3:00  Fasting labs needed.

## 2023-02-23 ENCOUNTER — HOSPITAL ENCOUNTER (OUTPATIENT)
Dept: RADIOLOGY | Facility: HOSPITAL | Age: 73
Discharge: HOME | End: 2023-02-23
Attending: INTERNAL MEDICINE
Payer: COMMERCIAL

## 2023-02-23 DIAGNOSIS — I10 PRIMARY HYPERTENSION: ICD-10-CM

## 2023-02-23 DIAGNOSIS — R06.00 DYSPNEA, UNSPECIFIED TYPE: ICD-10-CM

## 2023-02-23 DIAGNOSIS — E66.01 SEVERE OBESITY (BMI 35.0-39.9) WITH COMORBIDITY: ICD-10-CM

## 2023-02-23 DIAGNOSIS — I42.9 CARDIOMYOPATHY, UNSPECIFIED TYPE: ICD-10-CM

## 2023-02-23 DIAGNOSIS — J18.9 PNEUMONIA DUE TO INFECTIOUS ORGANISM, UNSPECIFIED LATERALITY, UNSPECIFIED PART OF LUNG: ICD-10-CM

## 2023-02-23 DIAGNOSIS — I48.19 PERSISTENT ATRIAL FIBRILLATION: ICD-10-CM

## 2023-02-23 PROCEDURE — 71046 X-RAY EXAM CHEST 2 VIEWS: CPT | Mod: TC

## 2023-02-27 ENCOUNTER — OFFICE VISIT (OUTPATIENT)
Dept: INTERNAL MEDICINE | Facility: CLINIC | Age: 73
End: 2023-02-27
Payer: COMMERCIAL

## 2023-02-27 VITALS
HEART RATE: 77 BPM | SYSTOLIC BLOOD PRESSURE: 130 MMHG | RESPIRATION RATE: 22 BRPM | DIASTOLIC BLOOD PRESSURE: 68 MMHG | HEIGHT: 68 IN | BODY MASS INDEX: 35.92 KG/M2 | WEIGHT: 237 LBS | OXYGEN SATURATION: 90 %

## 2023-02-27 DIAGNOSIS — I48.19 PERSISTENT ATRIAL FIBRILLATION: Primary | ICD-10-CM

## 2023-02-27 DIAGNOSIS — I50.41 ACUTE COMBINED SYSTOLIC AND DIASTOLIC CONGESTIVE HEART FAILURE: ICD-10-CM

## 2023-02-27 DIAGNOSIS — I42.9 CARDIOMYOPATHY, UNSPECIFIED TYPE: ICD-10-CM

## 2023-02-27 DIAGNOSIS — J18.9 PNEUMONIA DUE TO INFECTIOUS ORGANISM, UNSPECIFIED LATERALITY, UNSPECIFIED PART OF LUNG: ICD-10-CM

## 2023-02-27 DIAGNOSIS — I48.0 PAROXYSMAL ATRIAL FIBRILLATION: ICD-10-CM

## 2023-02-27 DIAGNOSIS — I10 PRIMARY HYPERTENSION: ICD-10-CM

## 2023-02-27 DIAGNOSIS — E66.01 SEVERE OBESITY (BMI 35.0-39.9) WITH COMORBIDITY: ICD-10-CM

## 2023-02-27 PROCEDURE — 3075F PR MOST RECENT SYSTOLIC BLOOD PRESS GE 130-139MM HG: ICD-10-PCS | Mod: CPTII,,, | Performed by: INTERNAL MEDICINE

## 2023-02-27 PROCEDURE — 1101F PR PT FALLS ASSESS DOC 0-1 FALLS W/OUT INJ PAST YR: ICD-10-PCS | Mod: CPTII,,, | Performed by: INTERNAL MEDICINE

## 2023-02-27 PROCEDURE — 3078F PR MOST RECENT DIASTOLIC BLOOD PRESSURE < 80 MM HG: ICD-10-PCS | Mod: CPTII,,, | Performed by: INTERNAL MEDICINE

## 2023-02-27 PROCEDURE — 1101F PT FALLS ASSESS-DOCD LE1/YR: CPT | Mod: CPTII,,, | Performed by: INTERNAL MEDICINE

## 2023-02-27 PROCEDURE — 3078F DIAST BP <80 MM HG: CPT | Mod: CPTII,,, | Performed by: INTERNAL MEDICINE

## 2023-02-27 PROCEDURE — 99214 PR OFFICE/OUTPT VISIT, EST, LEVL IV, 30-39 MIN: ICD-10-PCS | Mod: ,,, | Performed by: INTERNAL MEDICINE

## 2023-02-27 PROCEDURE — 3288F FALL RISK ASSESSMENT DOCD: CPT | Mod: CPTII,,, | Performed by: INTERNAL MEDICINE

## 2023-02-27 PROCEDURE — 1126F PR PAIN SEVERITY QUANTIFIED, NO PAIN PRESENT: ICD-10-PCS | Mod: CPTII,,, | Performed by: INTERNAL MEDICINE

## 2023-02-27 PROCEDURE — 99214 OFFICE O/P EST MOD 30 MIN: CPT | Mod: ,,, | Performed by: INTERNAL MEDICINE

## 2023-02-27 PROCEDURE — 1126F AMNT PAIN NOTED NONE PRSNT: CPT | Mod: CPTII,,, | Performed by: INTERNAL MEDICINE

## 2023-02-27 PROCEDURE — 3288F PR FALLS RISK ASSESSMENT DOCUMENTED: ICD-10-PCS | Mod: CPTII,,, | Performed by: INTERNAL MEDICINE

## 2023-02-27 PROCEDURE — 3075F SYST BP GE 130 - 139MM HG: CPT | Mod: CPTII,,, | Performed by: INTERNAL MEDICINE

## 2023-02-27 PROCEDURE — 3008F BODY MASS INDEX DOCD: CPT | Mod: CPTII,,, | Performed by: INTERNAL MEDICINE

## 2023-02-27 PROCEDURE — 3008F PR BODY MASS INDEX (BMI) DOCUMENTED: ICD-10-PCS | Mod: CPTII,,, | Performed by: INTERNAL MEDICINE

## 2023-02-27 NOTE — PROGRESS NOTES
Subjective:       Patient ID: John Bullard is a 72 y.o. male.    Chief Complaint: Follow-up (4wk/Discuss labs & diaphragm was done /Still SOB- he feels like it is getting worse )      The patient is a 72-year-old man in for follow-up of multiple medical problems.  He was hospitalized last month with pneumonia, atrial fib, heart failure with systolic and diastolic dysfunction.  He was there for several days but did gradually improve with treatment.  By the time of discharge his echocardiogram did show improved function.  At any rate he has returned to work and is doing reasonably well but does have exertional dyspnea.  No cough or fever.  Overall improved but certainly not feeling great.  He did have fluoroscopic studies on the diaphragm recently and it showed that the left hemidiaphragm was sluggish but not paralyzed.    Follow-up    Review of Systems     Current Outpatient Medications on File Prior to Visit   Medication Sig Dispense Refill    albuterol (PROVENTIL/VENTOLIN HFA) 90 mcg/actuation inhaler INHALE 2 PUFFS BY MOUTH EVERY 6 HOURS 8 g 3    ALPRAZolam (XANAX) 0.5 MG tablet Take 1 tablet by mouth twice daily as needed for anxiety 45 tablet 0    amiodarone (PACERONE) 200 MG Tab Take 1 tablet (200 mg total) by mouth once daily. 30 tablet 11    DULoxetine (CYMBALTA) 30 MG capsule Take 30 mg by mouth.      ELIQUIS 5 mg Tab Take 5 mg by mouth 2 (two) times daily.      fluticasone-salmeterol 230-21 mcg/dose (ADVAIR HFA) 230-21 mcg/actuation HFAA inhaler Inhale 2 puffs into the lungs 2 (two) times daily. Controller      furosemide (LASIX) 40 MG tablet Take 1 tablet (40 mg total) by mouth once daily. 30 tablet 11    loratadine (CLARITIN) 10 mg tablet Take 10 mg by mouth daily as needed for Allergies.      melatonin 10 mg Tab Take 10 mg by mouth daily as needed (insomnia).      metoprolol succinate (TOPROL-XL) 100 MG 24 hr tablet Take 1 tablet (100 mg total) by mouth once daily. 30 tablet 11    montelukast  "(SINGULAIR) 10 mg tablet Take 1 tablet by mouth once daily 30 tablet 6    omeprazole (PRILOSEC) 40 MG capsule Take 1 capsule by mouth once daily 30 capsule 0    potassium chloride (KLOR-CON) 8 MEQ TbSR Take 1 tablet (8 mEq total) by mouth once daily. 30 tablet 5    rosuvastatin (CRESTOR) 20 MG tablet TAKE 1 TABLET BY MOUTH ONCE IN THE EVENING      tamsulosin (FLOMAX) 0.4 mg Cap Take 1 capsule by mouth once daily.      testosterone cypionate (DEPOTESTOTERONE CYPIONATE) 200 mg/mL injection INJECT 1 ML (CC) INTRAMUSCULARLY EVERY 14 DAYS 3 mL 0    travoprost, benzalkonium, (TRAVATAN) 0.004 % ophthalmic solution Place 1 drop into both eyes every evening.      zolpidem (AMBIEN) 5 MG Tab TAKE 1 TABLET BY MOUTH ONCE DAILY AT BEDTIME AS NEEDED FOR INSOMNIA 30 tablet 5     No current facility-administered medications on file prior to visit.     Objective:      /68 (BP Location: Left arm, Patient Position: Sitting, BP Method: Large (Manual))   Pulse 77   Resp (!) 22   Ht 5' 7.99" (1.727 m)   Wt 107.5 kg (237 lb)   SpO2 (!) 90%   BMI 36.04 kg/m²     Physical Exam  Vitals reviewed.   Constitutional:       Appearance: Normal appearance.   HENT:      Head: Normocephalic and atraumatic.      Mouth/Throat:      Pharynx: Oropharynx is clear.   Eyes:      Pupils: Pupils are equal, round, and reactive to light.   Cardiovascular:      Rate and Rhythm: Normal rate and regular rhythm.      Pulses: Normal pulses.      Heart sounds: Normal heart sounds.   Pulmonary:      Breath sounds: Normal breath sounds.   Abdominal:      General: Abdomen is flat.      Palpations: Abdomen is soft.      Comments: Abdomen remains distended.   Musculoskeletal:      Cervical back: Neck supple.      Comments: There is 1+ bilateral pretibial edema   Skin:     General: Skin is warm and dry.   Neurological:      Mental Status: He is alert.     Lab work x-ray and fluoro reports reviewed.  Assessment:       1. Persistent atrial fibrillation    2. " Primary hypertension    3. Severe obesity (BMI 35.0-39.9) with comorbidity    4. Cardiomyopathy, unspecified type    5. Paroxysmal atrial fibrillation    6. Acute combined systolic and diastolic congestive heart failure    7. Pneumonia due to infectious organism, unspecified laterality, unspecified part of lung          Plan:     1. Chronic dyspnea.  Multifactorial     2. History of heart failure.  Currently compensated.  He is had both systolic and diastolic dysfunction with the systolic dysfunction showing improvement by most recent EKOS     3. Paroxysmal atrial fib.  Followed by Cardiology     4. Sluggish left hemidiaphragm, no doubt contributing to her symptoms.  It is doubtful that plication of the diaphragm would be helpful but I would defer that decision to Pulmonary Medicine specialist     5. Obesity, especially central obesity.  No doubt contributing to malfunction of the diaphragm     6. Recent pneumonia.  Resolved     Continue same meds.  Recommends frequent small meals.  Low-sodium diet.  Activity as tolerated.  Follow-up with me 6 weeks with another chest x-ray as well as CBC CMP lipid TSH prior

## 2023-02-27 NOTE — PROGRESS NOTES
Subjective:       Patient ID: John Bullard is a 72 y.o. male.    Chief Complaint: Follow-up (4wk/Discuss labs & diaphragm was done /Still SOB- he feels like it is getting worse )      HPI  Review of Systems     Current Outpatient Medications on File Prior to Visit   Medication Sig Dispense Refill    albuterol (PROVENTIL/VENTOLIN HFA) 90 mcg/actuation inhaler INHALE 2 PUFFS BY MOUTH EVERY 6 HOURS 8 g 3    ALPRAZolam (XANAX) 0.5 MG tablet Take 1 tablet by mouth twice daily as needed for anxiety 45 tablet 0    amiodarone (PACERONE) 200 MG Tab Take 1 tablet (200 mg total) by mouth once daily. 30 tablet 11    DULoxetine (CYMBALTA) 30 MG capsule Take 30 mg by mouth.      ELIQUIS 5 mg Tab Take 5 mg by mouth 2 (two) times daily.      fluticasone-salmeterol 230-21 mcg/dose (ADVAIR HFA) 230-21 mcg/actuation HFAA inhaler Inhale 2 puffs into the lungs 2 (two) times daily. Controller      furosemide (LASIX) 40 MG tablet Take 1 tablet (40 mg total) by mouth once daily. 30 tablet 11    loratadine (CLARITIN) 10 mg tablet Take 10 mg by mouth daily as needed for Allergies.      melatonin 10 mg Tab Take 10 mg by mouth daily as needed (insomnia).      metoprolol succinate (TOPROL-XL) 100 MG 24 hr tablet Take 1 tablet (100 mg total) by mouth once daily. 30 tablet 11    montelukast (SINGULAIR) 10 mg tablet Take 1 tablet by mouth once daily 30 tablet 6    omeprazole (PRILOSEC) 40 MG capsule Take 1 capsule by mouth once daily 30 capsule 0    potassium chloride (KLOR-CON) 8 MEQ TbSR Take 1 tablet (8 mEq total) by mouth once daily. 30 tablet 5    rosuvastatin (CRESTOR) 20 MG tablet TAKE 1 TABLET BY MOUTH ONCE IN THE EVENING      tamsulosin (FLOMAX) 0.4 mg Cap Take 1 capsule by mouth once daily.      testosterone cypionate (DEPOTESTOTERONE CYPIONATE) 200 mg/mL injection INJECT 1 ML (CC) INTRAMUSCULARLY EVERY 14 DAYS 3 mL 0    travoprost, benzalkonium, (TRAVATAN) 0.004 % ophthalmic solution Place 1 drop into both eyes every evening.       "zolpidem (AMBIEN) 5 MG Tab TAKE 1 TABLET BY MOUTH ONCE DAILY AT BEDTIME AS NEEDED FOR INSOMNIA 30 tablet 5     No current facility-administered medications on file prior to visit.     Objective:      /68 (BP Location: Left arm, Patient Position: Sitting, BP Method: Large (Manual))   Pulse 77   Resp (!) 22   Ht 5' 7.99" (1.727 m)   Wt 107.5 kg (237 lb)   SpO2 (!) 90%   BMI 36.04 kg/m²     Physical Exam    Assessment:       1. Persistent atrial fibrillation    2. Primary hypertension    3. Severe obesity (BMI 35.0-39.9) with comorbidity    4. Cardiomyopathy, unspecified type    5. Paroxysmal atrial fibrillation    6. Acute combined systolic and diastolic congestive heart failure    7. Pneumonia due to infectious organism, unspecified laterality, unspecified part of lung          Plan:                 "

## 2023-03-07 ENCOUNTER — CLINICAL SUPPORT (OUTPATIENT)
Dept: INTERNAL MEDICINE | Facility: CLINIC | Age: 73
End: 2023-03-07
Payer: COMMERCIAL

## 2023-03-07 DIAGNOSIS — R79.89 LOW TESTOSTERONE: Primary | ICD-10-CM

## 2023-03-07 PROCEDURE — 96372 THER/PROPH/DIAG INJ SC/IM: CPT | Mod: ,,, | Performed by: INTERNAL MEDICINE

## 2023-03-07 PROCEDURE — 96372 PR INJECTION,THERAP/PROPH/DIAG2ST, IM OR SUBCUT: ICD-10-PCS | Mod: ,,, | Performed by: INTERNAL MEDICINE

## 2023-03-07 RX ORDER — TESTOSTERONE CYPIONATE 200 MG/ML
200 INJECTION, SOLUTION INTRAMUSCULAR
Status: COMPLETED | OUTPATIENT
Start: 2023-03-07 | End: 2023-03-07

## 2023-03-07 RX ADMIN — TESTOSTERONE CYPIONATE 200 MG: 200 INJECTION, SOLUTION INTRAMUSCULAR at 08:03

## 2023-03-13 ENCOUNTER — TELEPHONE (OUTPATIENT)
Dept: INTERNAL MEDICINE | Facility: CLINIC | Age: 73
End: 2023-03-13
Payer: COMMERCIAL

## 2023-03-14 ENCOUNTER — OFFICE VISIT (OUTPATIENT)
Dept: CARDIAC SURGERY | Facility: CLINIC | Age: 73
End: 2023-03-14
Payer: COMMERCIAL

## 2023-03-14 VITALS
BODY MASS INDEX: 35.56 KG/M2 | HEIGHT: 68 IN | WEIGHT: 234.63 LBS | DIASTOLIC BLOOD PRESSURE: 85 MMHG | SYSTOLIC BLOOD PRESSURE: 134 MMHG | HEART RATE: 81 BPM | OXYGEN SATURATION: 90 %

## 2023-03-14 DIAGNOSIS — J98.6 ELEVATED HEMIDIAPHRAGM: ICD-10-CM

## 2023-03-14 DIAGNOSIS — E66.9 OBESITY, UNSPECIFIED CLASSIFICATION, UNSPECIFIED OBESITY TYPE, UNSPECIFIED WHETHER SERIOUS COMORBIDITY PRESENT: ICD-10-CM

## 2023-03-14 DIAGNOSIS — J98.6 CHRONICALLY ELEVATED HEMIDIAPHRAGM: Primary | ICD-10-CM

## 2023-03-14 PROCEDURE — 3008F PR BODY MASS INDEX (BMI) DOCUMENTED: ICD-10-PCS | Mod: CPTII,,, | Performed by: SPECIALIST

## 2023-03-14 PROCEDURE — 3079F PR MOST RECENT DIASTOLIC BLOOD PRESSURE 80-89 MM HG: ICD-10-PCS | Mod: CPTII,,, | Performed by: SPECIALIST

## 2023-03-14 PROCEDURE — 3008F BODY MASS INDEX DOCD: CPT | Mod: CPTII,,, | Performed by: SPECIALIST

## 2023-03-14 PROCEDURE — 3288F PR FALLS RISK ASSESSMENT DOCUMENTED: ICD-10-PCS | Mod: CPTII,,, | Performed by: SPECIALIST

## 2023-03-14 PROCEDURE — 3079F DIAST BP 80-89 MM HG: CPT | Mod: CPTII,,, | Performed by: SPECIALIST

## 2023-03-14 PROCEDURE — 1159F PR MEDICATION LIST DOCUMENTED IN MEDICAL RECORD: ICD-10-PCS | Mod: CPTII,,, | Performed by: SPECIALIST

## 2023-03-14 PROCEDURE — 99203 OFFICE O/P NEW LOW 30 MIN: CPT | Mod: ,,, | Performed by: SPECIALIST

## 2023-03-14 PROCEDURE — 3075F PR MOST RECENT SYSTOLIC BLOOD PRESS GE 130-139MM HG: ICD-10-PCS | Mod: CPTII,,, | Performed by: SPECIALIST

## 2023-03-14 PROCEDURE — 3075F SYST BP GE 130 - 139MM HG: CPT | Mod: CPTII,,, | Performed by: SPECIALIST

## 2023-03-14 PROCEDURE — 3288F FALL RISK ASSESSMENT DOCD: CPT | Mod: CPTII,,, | Performed by: SPECIALIST

## 2023-03-14 PROCEDURE — 1101F PT FALLS ASSESS-DOCD LE1/YR: CPT | Mod: CPTII,,, | Performed by: SPECIALIST

## 2023-03-14 PROCEDURE — 99203 PR OFFICE/OUTPT VISIT, NEW, LEVL III, 30-44 MIN: ICD-10-PCS | Mod: ,,, | Performed by: SPECIALIST

## 2023-03-14 PROCEDURE — 1101F PR PT FALLS ASSESS DOC 0-1 FALLS W/OUT INJ PAST YR: ICD-10-PCS | Mod: CPTII,,, | Performed by: SPECIALIST

## 2023-03-14 PROCEDURE — 1159F MED LIST DOCD IN RCRD: CPT | Mod: CPTII,,, | Performed by: SPECIALIST

## 2023-03-14 NOTE — PROGRESS NOTES
[facility]  History & Physical  Cardiothoracic Surgery    SUBJECTIVE:     Chief Complaint/Reason for Visit:   Chief Complaint   Patient presents with    Shortness of Breath     Normal O2 runs 90-92%; feels SOB with with activity;         History of Present Illness:  Patient is a 72 y.o. male presents essentially self-referred as he is the  at the hospital and had seen the patient that I had previously done a left diaphragmatic plication on and realized he has the same issue.  He gets fairly short of breath with certain activities it comes on every day.  He has been dealing with this for several years.  He used to ride a bike 40 miles a day with no difficulty but now essentially whenever he leans over certain activities he really gets quite short of breath takes him a long time to recover from.  He has a elevated left hemidiaphragm this has been present for couple of years at the very least.  He has mild COPD.  He had an episode of atrial fib recently and also some pneumonia about 3 weeks ago.  He had a sniff test that reveals the diaphragm is not fully paralyzed but more of a paresis.  In any event it does remains substantially elevated.  I do think he will benefit from a diaphragmatic plication on the left.  He has a slightly higher than normal risk given his obesity.  He is very active otherwise.  He is really trying to get some relief from this problem and would like to proceed.  We will give him a little more time to recover from his recent hospitalization and plan for surgery towards the end of next month.    Review of patient's allergies indicates:   Allergen Reactions    Ativan [lorazepam] Hallucinations       Past Medical History:   Diagnosis Date    Anticoagulant long-term use     Anxiety     Asthma     Atrial fibrillation     Decreased testosterone level     DJD (degenerative joint disease)     Dyspnea     GERD (gastroesophageal reflux disease)     Hypertension     OA  (osteoarthritis)     Obesity     MINDA on CPAP     PAF (paroxysmal atrial fibrillation)     s/p Ablation (1/5/2023)    SBO (small bowel obstruction)     hx multiple (3) incudling requiring surgical intervention    Sleep apnea     Venous insufficiency     Ventricular tachycardia      Past Surgical History:   Procedure Laterality Date    ABDOMINAL SURGERY      for illeus    ABLATION N/A 01/05/2023    Procedure: ABLATION;  Surgeon: Kenny Waller MD;  Location: [facility] CATH LAB;  Service: Cardiology;  Laterality: N/A;  EPS + AFL CATH ABLATION W/ ANEST.    COLONOSCOPY      DIAGNOSTIC LAPAROSCOPY      LAPAROSCOPIC REPAIR OF UMBILICAL HERNIA      LITHOTRIPSY      SINUS SURGERY      WISDOM TOOTH EXTRACTION Bilateral      Family History   Problem Relation Age of Onset    Cancer Mother      Social History     Tobacco Use    Smoking status: Never    Smokeless tobacco: Never   Substance Use Topics    Alcohol use: Yes     Alcohol/week: 1.0 standard drink     Types: 1 Glasses of wine per week    Drug use: Never        Review of Systems:  Review of Systems   Constitutional: Negative.   HENT: Negative.     Eyes: Negative.    Cardiovascular:  Positive for dyspnea on exertion.   Respiratory:  Positive for shortness of breath.    Endocrine: Negative.    Hematologic/Lymphatic: Negative.    Skin: Negative.    Musculoskeletal: Negative.    Gastrointestinal: Negative.    Genitourinary: Negative.    Neurological: Negative.    Psychiatric/Behavioral: Negative.     Allergic/Immunologic: Negative.      OBJECTIVE:     Vital Signs (Most Recent):  Pulse: 81 (03/14/23 0904)  BP: 134/85 (03/14/23 0904)  SpO2: (!) 90 % (03/14/23 0904)    Admission: Weight: 106.4 kg (234 lb 9.6 oz) (03/14/23 0904)   Most Recent: Weight: 106.4 kg (234 lb 9.6 oz) (03/14/23 0904)    Physical Exam:  Physical Exam  Vitals reviewed.   Constitutional:       Appearance: Normal appearance. He is obese.   HENT:      Head: Normocephalic and atraumatic.   Eyes:      Pupils: Pupils  are equal, round, and reactive to light.   Cardiovascular:      Rate and Rhythm: Normal rate and regular rhythm.      Pulses: Normal pulses.      Heart sounds: Normal heart sounds.   Pulmonary:      Effort: Pulmonary effort is normal.      Breath sounds: Normal breath sounds.   Abdominal:      Palpations: Abdomen is soft.   Musculoskeletal:         General: Normal range of motion.      Cervical back: Normal range of motion.   Skin:     General: Skin is warm.   Neurological:      General: No focal deficit present.      Mental Status: He is alert and oriented to person, place, and time.   Psychiatric:         Mood and Affect: Mood normal.         Behavior: Behavior normal.       Diagnostic Results:  X-Ray: Reviewed      ASSESSMENT/PLAN:     1. Elevated hemidiaphragm      Left diaphragmatic paresis   History of atrial fibrillation, paroxysmal  History of multiple ileus or small-bowel obstructions  Obesity   Planning left diaphragmatic plication via thoracotomy

## 2023-03-14 NOTE — TELEPHONE ENCOUNTER
Can you move his appt form April 10 to tomorrow March 15 @4:00. Elevated blood pressure. Pt has already been advised and made aware of his appointment date and time.

## 2023-03-15 ENCOUNTER — OFFICE VISIT (OUTPATIENT)
Dept: INTERNAL MEDICINE | Facility: CLINIC | Age: 73
End: 2023-03-15
Payer: COMMERCIAL

## 2023-03-15 VITALS
WEIGHT: 233.63 LBS | OXYGEN SATURATION: 90 % | HEART RATE: 75 BPM | DIASTOLIC BLOOD PRESSURE: 78 MMHG | BODY MASS INDEX: 35.41 KG/M2 | SYSTOLIC BLOOD PRESSURE: 126 MMHG | HEIGHT: 68 IN | RESPIRATION RATE: 18 BRPM

## 2023-03-15 DIAGNOSIS — I48.19 PERSISTENT ATRIAL FIBRILLATION: ICD-10-CM

## 2023-03-15 DIAGNOSIS — E66.01 SEVERE OBESITY (BMI 35.0-39.9) WITH COMORBIDITY: ICD-10-CM

## 2023-03-15 DIAGNOSIS — I10 PRIMARY HYPERTENSION: Primary | ICD-10-CM

## 2023-03-15 DIAGNOSIS — I42.9 CARDIOMYOPATHY, UNSPECIFIED TYPE: ICD-10-CM

## 2023-03-15 PROCEDURE — 1159F PR MEDICATION LIST DOCUMENTED IN MEDICAL RECORD: ICD-10-PCS | Mod: CPTII,,, | Performed by: INTERNAL MEDICINE

## 2023-03-15 PROCEDURE — 99214 OFFICE O/P EST MOD 30 MIN: CPT | Mod: ,,, | Performed by: INTERNAL MEDICINE

## 2023-03-15 PROCEDURE — 3288F PR FALLS RISK ASSESSMENT DOCUMENTED: ICD-10-PCS | Mod: CPTII,,, | Performed by: INTERNAL MEDICINE

## 2023-03-15 PROCEDURE — 3008F BODY MASS INDEX DOCD: CPT | Mod: CPTII,,, | Performed by: INTERNAL MEDICINE

## 2023-03-15 PROCEDURE — 1126F AMNT PAIN NOTED NONE PRSNT: CPT | Mod: CPTII,,, | Performed by: INTERNAL MEDICINE

## 2023-03-15 PROCEDURE — 3008F PR BODY MASS INDEX (BMI) DOCUMENTED: ICD-10-PCS | Mod: CPTII,,, | Performed by: INTERNAL MEDICINE

## 2023-03-15 PROCEDURE — 1101F PT FALLS ASSESS-DOCD LE1/YR: CPT | Mod: CPTII,,, | Performed by: INTERNAL MEDICINE

## 2023-03-15 PROCEDURE — 99214 PR OFFICE/OUTPT VISIT, EST, LEVL IV, 30-39 MIN: ICD-10-PCS | Mod: ,,, | Performed by: INTERNAL MEDICINE

## 2023-03-15 PROCEDURE — 3078F DIAST BP <80 MM HG: CPT | Mod: CPTII,,, | Performed by: INTERNAL MEDICINE

## 2023-03-15 PROCEDURE — 3078F PR MOST RECENT DIASTOLIC BLOOD PRESSURE < 80 MM HG: ICD-10-PCS | Mod: CPTII,,, | Performed by: INTERNAL MEDICINE

## 2023-03-15 PROCEDURE — 1159F MED LIST DOCD IN RCRD: CPT | Mod: CPTII,,, | Performed by: INTERNAL MEDICINE

## 2023-03-15 PROCEDURE — 3074F PR MOST RECENT SYSTOLIC BLOOD PRESSURE < 130 MM HG: ICD-10-PCS | Mod: CPTII,,, | Performed by: INTERNAL MEDICINE

## 2023-03-15 PROCEDURE — 3288F FALL RISK ASSESSMENT DOCD: CPT | Mod: CPTII,,, | Performed by: INTERNAL MEDICINE

## 2023-03-15 PROCEDURE — 1101F PR PT FALLS ASSESS DOC 0-1 FALLS W/OUT INJ PAST YR: ICD-10-PCS | Mod: CPTII,,, | Performed by: INTERNAL MEDICINE

## 2023-03-15 PROCEDURE — 3074F SYST BP LT 130 MM HG: CPT | Mod: CPTII,,, | Performed by: INTERNAL MEDICINE

## 2023-03-15 PROCEDURE — 1126F PR PAIN SEVERITY QUANTIFIED, NO PAIN PRESENT: ICD-10-PCS | Mod: CPTII,,, | Performed by: INTERNAL MEDICINE

## 2023-03-15 RX ORDER — LOSARTAN POTASSIUM 25 MG/1
25 TABLET ORAL DAILY
Qty: 90 TABLET | Refills: 3 | Status: SHIPPED | OUTPATIENT
Start: 2023-03-15 | End: 2023-04-11 | Stop reason: SDUPTHER

## 2023-03-15 NOTE — PROGRESS NOTES
Subjective:       Patient ID: John Bullard is a 72 y.o. male.    Chief Complaint: Follow-up      The patient is a 72-year-old  with concerns about high blood pressure.  He monitors his blood pressure at home and it has been consistently moderately elevated.  He has a digital device and he brought in readings and they were indeed high.  He checks it left arm.  These readings were mostly in the early morning.  He did see his cardiologist earlier today his blood pressure was normal.  It was also normal here by our nurse.  This was in the right arm.  I did check the right arm also and my reading was 126/80.  However the left arm was significantly higher at 130/90.    Follow-up    Review of Systems     Current Outpatient Medications on File Prior to Visit   Medication Sig Dispense Refill    albuterol (PROVENTIL/VENTOLIN HFA) 90 mcg/actuation inhaler INHALE 2 PUFFS BY MOUTH EVERY 6 HOURS 8 g 3    ALPRAZolam (XANAX) 0.5 MG tablet Take 1 tablet by mouth twice daily as needed for anxiety 45 tablet 0    amiodarone (PACERONE) 200 MG Tab Take 1 tablet (200 mg total) by mouth once daily. 30 tablet 11    DULoxetine (CYMBALTA) 30 MG capsule Take 30 mg by mouth.      ELIQUIS 5 mg Tab Take 5 mg by mouth 2 (two) times daily.      fluticasone-salmeterol 230-21 mcg/dose (ADVAIR HFA) 230-21 mcg/actuation HFAA inhaler Inhale 2 puffs into the lungs 2 (two) times daily. Controller      furosemide (LASIX) 40 MG tablet Take 1 tablet (40 mg total) by mouth once daily. 30 tablet 11    loratadine (CLARITIN) 10 mg tablet Take 10 mg by mouth daily as needed for Allergies.      melatonin 10 mg Tab Take 10 mg by mouth daily as needed (insomnia).      metoprolol succinate (TOPROL-XL) 100 MG 24 hr tablet Take 1 tablet (100 mg total) by mouth once daily. 30 tablet 11    montelukast (SINGULAIR) 10 mg tablet Take 1 tablet by mouth once daily 30 tablet 6    omeprazole (PRILOSEC) 40 MG capsule Take 1 capsule by mouth once daily 30  "capsule 0    potassium chloride (KLOR-CON) 8 MEQ TbSR Take 1 tablet (8 mEq total) by mouth once daily. 30 tablet 5    rosuvastatin (CRESTOR) 20 MG tablet TAKE 1 TABLET BY MOUTH ONCE IN THE EVENING      tamsulosin (FLOMAX) 0.4 mg Cap Take 1 capsule by mouth once daily.      testosterone cypionate (DEPOTESTOTERONE CYPIONATE) 200 mg/mL injection INJECT 1 ML (CC) INTRAMUSCULARLY EVERY 14 DAYS 3 mL 0    travoprost, benzalkonium, (TRAVATAN) 0.004 % ophthalmic solution Place 1 drop into both eyes every evening.      zolpidem (AMBIEN) 5 MG Tab TAKE 1 TABLET BY MOUTH ONCE DAILY AT BEDTIME AS NEEDED FOR INSOMNIA 30 tablet 5     No current facility-administered medications on file prior to visit.     Objective:      /78 (BP Location: Right arm, Patient Position: Sitting, BP Method: Large (Manual))   Pulse 75   Resp 18   Ht 5' 8" (1.727 m)   Wt 106 kg (233 lb 9.6 oz)   SpO2 (!) 90%   BMI 35.52 kg/m²     Physical Exam  Vitals reviewed.   Constitutional:       Appearance: Normal appearance.   HENT:      Head: Normocephalic and atraumatic.      Right Ear: Tympanic membrane normal.      Left Ear: Tympanic membrane normal.      Mouth/Throat:      Pharynx: Oropharynx is clear.   Eyes:      Pupils: Pupils are equal, round, and reactive to light.   Neck:      Vascular: No carotid bruit.   Cardiovascular:      Rate and Rhythm: Normal rate and regular rhythm.      Pulses: Normal pulses.      Heart sounds: Normal heart sounds.      Comments: Regular rhythm with a normal rate and a 2/6 systolic murmur.  Pulmonary:      Effort: Pulmonary effort is normal.      Breath sounds: Normal breath sounds.   Abdominal:      General: Abdomen is flat.      Palpations: Abdomen is soft. There is no mass.      Tenderness: There is no abdominal tenderness. There is no guarding.   Musculoskeletal:         General: No swelling.      Cervical back: Neck supple.      Comments: There is 1+ pretibial edema bilaterally.   Lymphadenopathy:      " Cervical: No cervical adenopathy.   Skin:     General: Skin is warm and dry.   Neurological:      General: No focal deficit present.      Mental Status: He is alert and oriented to person, place, and time.       Assessment:       1. Primary hypertension    2. Persistent atrial fibrillation    3. Cardiomyopathy, unspecified type    4. Severe obesity (BMI 35.0-39.9) with comorbidity          Plan:     1. Hypertension.  I agree with him that the pressure is not adequately controlled.  It appears his left arm consistently reach higher and that is the on we should believe in.    2. History of atrial fib.  His rhythm is currently regular    3. Cardiomyopathy.  He has significant systolic dysfunction when he was hospitalized but that improved    4. Obesity     5. Partially paralyzed diaphragm.  Contributing to his symptoms.  I understand he is scheduled for a diaphragmatic plication in the not too distant future    Will add losartan 25 daily.  I put in a message to his cardiologist to make sure that was okay with him.  Follow-up with me in about a month with CBC CMP TSH lipid profile prior.

## 2023-03-21 ENCOUNTER — CLINICAL SUPPORT (OUTPATIENT)
Dept: INTERNAL MEDICINE | Facility: CLINIC | Age: 73
End: 2023-03-21
Payer: COMMERCIAL

## 2023-03-21 DIAGNOSIS — R79.89 LOW TESTOSTERONE: Primary | ICD-10-CM

## 2023-03-21 PROCEDURE — 96372 PR INJECTION,THERAP/PROPH/DIAG2ST, IM OR SUBCUT: ICD-10-PCS | Mod: ,,, | Performed by: INTERNAL MEDICINE

## 2023-03-21 PROCEDURE — 96372 THER/PROPH/DIAG INJ SC/IM: CPT | Mod: ,,, | Performed by: INTERNAL MEDICINE

## 2023-03-21 RX ORDER — TESTOSTERONE CYPIONATE 200 MG/ML
200 INJECTION, SOLUTION INTRAMUSCULAR
Status: COMPLETED | OUTPATIENT
Start: 2023-03-21 | End: 2023-03-21

## 2023-03-21 RX ADMIN — TESTOSTERONE CYPIONATE 200 MG: 200 INJECTION, SOLUTION INTRAMUSCULAR at 08:03

## 2023-03-22 DIAGNOSIS — K21.9 GASTROESOPHAGEAL REFLUX DISEASE, UNSPECIFIED WHETHER ESOPHAGITIS PRESENT: ICD-10-CM

## 2023-03-22 RX ORDER — OMEPRAZOLE 40 MG/1
CAPSULE, DELAYED RELEASE ORAL
Qty: 30 CAPSULE | Refills: 0 | Status: SHIPPED | OUTPATIENT
Start: 2023-03-22 | End: 2023-04-18

## 2023-03-23 DIAGNOSIS — J98.6 CHRONICALLY ELEVATED HEMIDIAPHRAGM: Primary | ICD-10-CM

## 2023-04-04 ENCOUNTER — TELEPHONE (OUTPATIENT)
Dept: INTERNAL MEDICINE | Facility: CLINIC | Age: 73
End: 2023-04-04

## 2023-04-04 NOTE — TELEPHONE ENCOUNTER
----- Message from Teodora Benavidez LPN sent at 4/3/2023  2:29 PM CDT -----  Regarding: qiana Leigh 4/11 @2:20  Fasting labs needed.

## 2023-04-05 ENCOUNTER — LAB VISIT (OUTPATIENT)
Dept: LAB | Facility: HOSPITAL | Age: 73
End: 2023-04-05
Attending: INTERNAL MEDICINE
Payer: COMMERCIAL

## 2023-04-05 DIAGNOSIS — I50.41 ACUTE COMBINED SYSTOLIC AND DIASTOLIC CONGESTIVE HEART FAILURE: ICD-10-CM

## 2023-04-05 DIAGNOSIS — I10 PRIMARY HYPERTENSION: ICD-10-CM

## 2023-04-05 DIAGNOSIS — I48.0 PAROXYSMAL ATRIAL FIBRILLATION: ICD-10-CM

## 2023-04-05 DIAGNOSIS — J18.9 PNEUMONIA DUE TO INFECTIOUS ORGANISM, UNSPECIFIED LATERALITY, UNSPECIFIED PART OF LUNG: ICD-10-CM

## 2023-04-05 DIAGNOSIS — I42.9 CARDIOMYOPATHY, UNSPECIFIED TYPE: ICD-10-CM

## 2023-04-05 DIAGNOSIS — I48.19 PERSISTENT ATRIAL FIBRILLATION: ICD-10-CM

## 2023-04-05 LAB
ALBUMIN SERPL-MCNC: 3.5 G/DL (ref 3.4–4.8)
ALBUMIN/GLOB SERPL: 1.7 RATIO (ref 1.1–2)
ALP SERPL-CCNC: 50 UNIT/L (ref 40–150)
ALT SERPL-CCNC: 24 UNIT/L (ref 0–55)
AST SERPL-CCNC: 24 UNIT/L (ref 5–34)
BASOPHILS # BLD AUTO: 0.03 X10(3)/MCL (ref 0–0.2)
BASOPHILS NFR BLD AUTO: 0.5 %
BILIRUBIN DIRECT+TOT PNL SERPL-MCNC: 0.5 MG/DL
BNP BLD-MCNC: 69.9 PG/ML
BUN SERPL-MCNC: 15.3 MG/DL (ref 8.4–25.7)
CALCIUM SERPL-MCNC: 9.1 MG/DL (ref 8.8–10)
CHLORIDE SERPL-SCNC: 100 MMOL/L (ref 98–107)
CHOLEST SERPL-MCNC: 88 MG/DL
CHOLEST/HDLC SERPL: 3 {RATIO} (ref 0–5)
CO2 SERPL-SCNC: 32 MMOL/L (ref 23–31)
CREAT SERPL-MCNC: 0.87 MG/DL (ref 0.73–1.18)
EOSINOPHIL # BLD AUTO: 0.18 X10(3)/MCL (ref 0–0.9)
EOSINOPHIL NFR BLD AUTO: 3.3 %
ERYTHROCYTE [DISTWIDTH] IN BLOOD BY AUTOMATED COUNT: 15.2 % (ref 11.5–17)
GFR SERPLBLD CREATININE-BSD FMLA CKD-EPI: >60 MLS/MIN/1.73/M2
GLOBULIN SER-MCNC: 2.1 GM/DL (ref 2.4–3.5)
GLUCOSE SERPL-MCNC: 99 MG/DL (ref 82–115)
HCT VFR BLD AUTO: 46.8 % (ref 42–52)
HDLC SERPL-MCNC: 28 MG/DL (ref 35–60)
HGB BLD-MCNC: 15.3 G/DL (ref 14–18)
IMM GRANULOCYTES # BLD AUTO: 0.02 X10(3)/MCL (ref 0–0.04)
IMM GRANULOCYTES NFR BLD AUTO: 0.4 %
LDLC SERPL CALC-MCNC: 46 MG/DL (ref 50–140)
LYMPHOCYTES # BLD AUTO: 0.57 X10(3)/MCL (ref 0.6–4.6)
LYMPHOCYTES NFR BLD AUTO: 10.4 %
MCH RBC QN AUTO: 30.8 PG (ref 27–31)
MCHC RBC AUTO-ENTMCNC: 32.7 G/DL (ref 33–36)
MCV RBC AUTO: 94.4 FL (ref 80–94)
MONOCYTES # BLD AUTO: 0.42 X10(3)/MCL (ref 0.1–1.3)
MONOCYTES NFR BLD AUTO: 7.7 %
NEUTROPHILS # BLD AUTO: 4.24 X10(3)/MCL (ref 2.1–9.2)
NEUTROPHILS NFR BLD AUTO: 77.7 %
NRBC BLD AUTO-RTO: 0 %
PLATELET # BLD AUTO: 142 X10(3)/MCL (ref 130–400)
PMV BLD AUTO: 10.8 FL (ref 7.4–10.4)
POTASSIUM SERPL-SCNC: 3.7 MMOL/L (ref 3.5–5.1)
PROT SERPL-MCNC: 5.6 GM/DL (ref 5.8–7.6)
RBC # BLD AUTO: 4.96 X10(6)/MCL (ref 4.7–6.1)
SODIUM SERPL-SCNC: 141 MMOL/L (ref 136–145)
TRIGL SERPL-MCNC: 68 MG/DL (ref 34–140)
TSH SERPL-ACNC: 1.19 UIU/ML (ref 0.35–4.94)
VLDLC SERPL CALC-MCNC: 14 MG/DL
WBC # SPEC AUTO: 5.5 X10(3)/MCL (ref 4.5–11.5)

## 2023-04-05 PROCEDURE — 80061 LIPID PANEL: CPT

## 2023-04-05 PROCEDURE — 36415 COLL VENOUS BLD VENIPUNCTURE: CPT

## 2023-04-05 PROCEDURE — 80053 COMPREHEN METABOLIC PANEL: CPT

## 2023-04-05 PROCEDURE — 83880 ASSAY OF NATRIURETIC PEPTIDE: CPT

## 2023-04-05 PROCEDURE — 85025 COMPLETE CBC W/AUTO DIFF WBC: CPT

## 2023-04-05 PROCEDURE — 84443 ASSAY THYROID STIM HORMONE: CPT

## 2023-04-11 ENCOUNTER — OFFICE VISIT (OUTPATIENT)
Dept: INTERNAL MEDICINE | Facility: CLINIC | Age: 73
End: 2023-04-11
Payer: COMMERCIAL

## 2023-04-11 VITALS
BODY MASS INDEX: 34.04 KG/M2 | SYSTOLIC BLOOD PRESSURE: 128 MMHG | OXYGEN SATURATION: 92 % | HEIGHT: 68 IN | WEIGHT: 224.63 LBS | DIASTOLIC BLOOD PRESSURE: 86 MMHG | HEART RATE: 72 BPM | RESPIRATION RATE: 18 BRPM

## 2023-04-11 DIAGNOSIS — J98.6 DIAPHRAGM DYSFUNCTION: ICD-10-CM

## 2023-04-11 DIAGNOSIS — E66.01 SEVERE OBESITY (BMI 35.0-39.9) WITH COMORBIDITY: ICD-10-CM

## 2023-04-11 DIAGNOSIS — I10 PRIMARY HYPERTENSION: Primary | ICD-10-CM

## 2023-04-11 DIAGNOSIS — I50.41 ACUTE COMBINED SYSTOLIC AND DIASTOLIC CONGESTIVE HEART FAILURE: ICD-10-CM

## 2023-04-11 DIAGNOSIS — I87.2 VENOUS INSUFFICIENCY: ICD-10-CM

## 2023-04-11 DIAGNOSIS — I42.9 CARDIOMYOPATHY, UNSPECIFIED TYPE: ICD-10-CM

## 2023-04-11 DIAGNOSIS — I48.0 PAROXYSMAL ATRIAL FIBRILLATION: ICD-10-CM

## 2023-04-11 PROCEDURE — 3079F PR MOST RECENT DIASTOLIC BLOOD PRESSURE 80-89 MM HG: ICD-10-PCS | Mod: CPTII,,, | Performed by: INTERNAL MEDICINE

## 2023-04-11 PROCEDURE — 1126F AMNT PAIN NOTED NONE PRSNT: CPT | Mod: CPTII,,, | Performed by: INTERNAL MEDICINE

## 2023-04-11 PROCEDURE — 3008F BODY MASS INDEX DOCD: CPT | Mod: CPTII,,, | Performed by: INTERNAL MEDICINE

## 2023-04-11 PROCEDURE — 4010F ACE/ARB THERAPY RXD/TAKEN: CPT | Mod: CPTII,,, | Performed by: INTERNAL MEDICINE

## 2023-04-11 PROCEDURE — 99213 PR OFFICE/OUTPT VISIT, EST, LEVL III, 20-29 MIN: ICD-10-PCS | Mod: ,,, | Performed by: INTERNAL MEDICINE

## 2023-04-11 PROCEDURE — 99213 OFFICE O/P EST LOW 20 MIN: CPT | Mod: ,,, | Performed by: INTERNAL MEDICINE

## 2023-04-11 PROCEDURE — 3074F PR MOST RECENT SYSTOLIC BLOOD PRESSURE < 130 MM HG: ICD-10-PCS | Mod: CPTII,,, | Performed by: INTERNAL MEDICINE

## 2023-04-11 PROCEDURE — 1126F PR PAIN SEVERITY QUANTIFIED, NO PAIN PRESENT: ICD-10-PCS | Mod: CPTII,,, | Performed by: INTERNAL MEDICINE

## 2023-04-11 PROCEDURE — 3008F PR BODY MASS INDEX (BMI) DOCUMENTED: ICD-10-PCS | Mod: CPTII,,, | Performed by: INTERNAL MEDICINE

## 2023-04-11 PROCEDURE — 3074F SYST BP LT 130 MM HG: CPT | Mod: CPTII,,, | Performed by: INTERNAL MEDICINE

## 2023-04-11 PROCEDURE — 3079F DIAST BP 80-89 MM HG: CPT | Mod: CPTII,,, | Performed by: INTERNAL MEDICINE

## 2023-04-11 PROCEDURE — 4010F PR ACE/ARB THEARPY RXD/TAKEN: ICD-10-PCS | Mod: CPTII,,, | Performed by: INTERNAL MEDICINE

## 2023-04-11 PROCEDURE — 1159F MED LIST DOCD IN RCRD: CPT | Mod: CPTII,,, | Performed by: INTERNAL MEDICINE

## 2023-04-11 PROCEDURE — 1159F PR MEDICATION LIST DOCUMENTED IN MEDICAL RECORD: ICD-10-PCS | Mod: CPTII,,, | Performed by: INTERNAL MEDICINE

## 2023-04-11 RX ORDER — LOSARTAN POTASSIUM 25 MG/1
25 TABLET ORAL 2 TIMES DAILY
Qty: 180 TABLET | Refills: 3 | Status: SHIPPED | OUTPATIENT
Start: 2023-04-11 | End: 2024-04-10

## 2023-04-11 RX ORDER — SOTALOL HYDROCHLORIDE 80 MG/1
80 TABLET ORAL 2 TIMES DAILY
COMMUNITY
Start: 2023-04-04

## 2023-04-11 NOTE — PROGRESS NOTES
The patient is a 72-year-old man  Subjective:       Patient ID: John Bullard is a 72 y.o. male.    Chief Complaint: Follow-up      The patient is a 72-year-old man in for follow-up of hypertension and other problems.  He was here recently and we added losartan to his medical regimen because his blood pressure was too high.  We found his left arm to be consistently higher than the right.  He tolerates the medicine well but has noted persistently elevated a.m. blood pressure readings at home.  These were reviewed and significantly elevated.    He is scheduled for diaphragmatic plication in about 2 weeks.    Follow-up    Review of Systems     Current Outpatient Medications on File Prior to Visit   Medication Sig Dispense Refill    albuterol (PROVENTIL/VENTOLIN HFA) 90 mcg/actuation inhaler INHALE 2 PUFFS BY MOUTH EVERY 6 HOURS 8 g 3    ALPRAZolam (XANAX) 0.5 MG tablet Take 1 tablet by mouth twice daily as needed for anxiety 45 tablet 5    DULoxetine (CYMBALTA) 30 MG capsule Take 30 mg by mouth.      ELIQUIS 5 mg Tab Take 5 mg by mouth 2 (two) times daily.      fluticasone-salmeterol 230-21 mcg/dose (ADVAIR HFA) 230-21 mcg/actuation HFAA inhaler Inhale 2 puffs into the lungs 2 (two) times daily. Controller      furosemide (LASIX) 40 MG tablet Take 1 tablet (40 mg total) by mouth once daily. 30 tablet 11    loratadine (CLARITIN) 10 mg tablet Take 10 mg by mouth daily as needed for Allergies.      melatonin 10 mg Tab Take 10 mg by mouth daily as needed (insomnia).      montelukast (SINGULAIR) 10 mg tablet Take 1 tablet by mouth once daily 30 tablet 6    omeprazole (PRILOSEC) 40 MG capsule Take 1 capsule by mouth once daily 30 capsule 0    potassium chloride (KLOR-CON) 8 MEQ TbSR Take 1 tablet (8 mEq total) by mouth once daily. 30 tablet 5    sotaloL (BETAPACE) 80 MG tablet Take 80 mg by mouth 2 (two) times daily.      tamsulosin (FLOMAX) 0.4 mg Cap Take 1 capsule by mouth once daily.      testosterone cypionate  "(DEPOTESTOTERONE CYPIONATE) 200 mg/mL injection INJECT 1 ML (CC) INTRAMUSCULARLY EVERY 14 DAYS 3 mL 5    travoprost, benzalkonium, (TRAVATAN) 0.004 % ophthalmic solution Place 1 drop into both eyes every evening.      zolpidem (AMBIEN) 5 MG Tab TAKE 1 TABLET BY MOUTH ONCE DAILY AT BEDTIME AS NEEDED FOR INSOMNIA 30 tablet 5    [DISCONTINUED] losartan (COZAAR) 25 MG tablet Take 1 tablet (25 mg total) by mouth once daily. 90 tablet 3    rosuvastatin (CRESTOR) 20 MG tablet TAKE 1 TABLET BY MOUTH ONCE IN THE EVENING      [DISCONTINUED] amiodarone (PACERONE) 200 MG Tab Take 1 tablet (200 mg total) by mouth once daily. 30 tablet 11    [DISCONTINUED] metoprolol succinate (TOPROL-XL) 100 MG 24 hr tablet Take 1 tablet (100 mg total) by mouth once daily. 30 tablet 11     No current facility-administered medications on file prior to visit.     Objective:      /86 (BP Location: Left arm, Patient Position: Sitting, BP Method: Large (Manual))   Pulse 72   Resp 18   Ht 5' 8" (1.727 m)   Wt 101.9 kg (224 lb 9.6 oz)   SpO2 (!) 92%   BMI 34.15 kg/m²     Physical Exam  Vitals reviewed.   Constitutional:       Appearance: Normal appearance.      Comments: Blood pressure by me 140/86 left arm.   HENT:      Head: Normocephalic and atraumatic.      Mouth/Throat:      Pharynx: Oropharynx is clear.   Eyes:      Pupils: Pupils are equal, round, and reactive to light.   Cardiovascular:      Rate and Rhythm: Normal rate. Rhythm irregular.      Pulses: Normal pulses.      Heart sounds: Normal heart sounds.   Pulmonary:      Breath sounds: Normal breath sounds.   Abdominal:      General: Abdomen is flat.      Palpations: Abdomen is soft.   Musculoskeletal:      Cervical back: Neck supple.      Comments: There is 1+ bilateral pretibial edema   Skin:     General: Skin is warm and dry.   Neurological:      Mental Status: He is alert.     Lab work reviewed  Assessment:       1. Hypertension.  Control overall somewhat better but morning " blood pressure readings are still too high     2. History of cardiomyopathy.  He had combined diastolic and systolic dysfunction with marked improvement in systolic dysfunction recently    3. Obesity.  Better    4. Atrial fib    5. Venous insufficiency    7. Diaphragmatic dysfunction.  Scheduled for plication in the near future.  Plan:     Increase losartan to 25 b.i.d..  Other meds stay the same.  Tentative follow-up with me 3 months with CBC CMP lipid TSH prior

## 2023-04-13 RX ORDER — NAPROXEN SODIUM 220 MG
220 TABLET ORAL DAILY PRN
COMMUNITY

## 2023-04-13 RX ORDER — PHENYLEPHRINE HCL 10 MG
500 TABLET ORAL DAILY
COMMUNITY

## 2023-04-13 RX ORDER — LANOLIN ALCOHOL/MO/W.PET/CERES
1 CREAM (GRAM) TOPICAL DAILY
COMMUNITY

## 2023-04-13 RX ORDER — AMOXICILLIN 500 MG
1 CAPSULE ORAL DAILY
COMMUNITY

## 2023-04-13 RX ORDER — DOCUSATE SODIUM 100 MG/1
100 CAPSULE, LIQUID FILLED ORAL DAILY
COMMUNITY

## 2023-04-13 NOTE — PROGRESS NOTES
Pre-op instruction sheet from Dr Sylvester states pt is to take Toprol morning of procedure. Pt is not currently on Toprol. ISABELL Pizano at Dr Sylvester's office to verify instructions.     1313 [place] states pt should take Sotalol morning of.    1558 Spoke with Father Aleah, instructed to take am dose of Sotalol day of procedure. Pt verbalized understanding.

## 2023-04-17 ENCOUNTER — HOSPITAL ENCOUNTER (OUTPATIENT)
Dept: RADIOLOGY | Facility: HOSPITAL | Age: 73
Discharge: HOME | End: 2023-04-17
Attending: SPECIALIST
Payer: COMMERCIAL

## 2023-04-17 DIAGNOSIS — J98.6 CHRONICALLY ELEVATED HEMIDIAPHRAGM: ICD-10-CM

## 2023-04-17 PROCEDURE — 71046 X-RAY EXAM CHEST 2 VIEWS: CPT | Mod: TC

## 2023-04-18 ENCOUNTER — CLINICAL SUPPORT (OUTPATIENT)
Dept: INTERNAL MEDICINE | Facility: CLINIC | Age: 73
End: 2023-04-18
Payer: COMMERCIAL

## 2023-04-18 DIAGNOSIS — R79.89 LOW TESTOSTERONE: Primary | ICD-10-CM

## 2023-04-18 PROCEDURE — 96372 THER/PROPH/DIAG INJ SC/IM: CPT | Mod: ,,, | Performed by: INTERNAL MEDICINE

## 2023-04-18 PROCEDURE — 96372 PR INJECTION,THERAP/PROPH/DIAG2ST, IM OR SUBCUT: ICD-10-PCS | Mod: ,,, | Performed by: INTERNAL MEDICINE

## 2023-04-18 RX ORDER — TESTOSTERONE CYPIONATE 200 MG/ML
100 INJECTION, SOLUTION INTRAMUSCULAR
Status: COMPLETED | OUTPATIENT
Start: 2023-04-18 | End: 2023-04-18

## 2023-04-18 RX ADMIN — TESTOSTERONE CYPIONATE 100 MG: 200 INJECTION, SOLUTION INTRAMUSCULAR at 08:04

## 2023-04-20 ENCOUNTER — ANESTHESIA EVENT (OUTPATIENT)
Dept: SURGERY | Facility: HOSPITAL | Age: 73
DRG: 164 | End: 2023-04-20
Payer: COMMERCIAL

## 2023-04-21 NOTE — PRE-PROCEDURE INSTRUCTIONS
[facility]: Outpatient Surgery  Preprocedure Check-In Instructions    Your arrival time for your surgery or procedure is   We ask patients to arrive about 2 hours before surgery to allow for enough time to review your health history & medications, start your IV, complete any outstanding labwork or tests, and meet your Anesthesiologist.  You will arrive at [facility], [address] [place] [address].  Enter through the [place] entrance next to the Emergency Room, and come to the 6th floor to the Outpatient Surgery Department.    Visitory Policy:  You are allowed 2 adult visitors to be with you in the hospital.  All hospital visitors should be in good current health.  No small children.    What to Bring:  Please have your ID, insurance cards, and all home medication bottles with you at check in.  Bring your CPAP machine if one is used at home.    Fasting:  Nothing to eat or drink after midnight the night before your procedure. This includes no ice, gum, hard candies, and/or tobacco products.  Follow your doctor's instructions for taking any medications on the morning of your procedure.  If no instructions for taking medications were given, do not take any medications but bring your medications in their bottles to your procedure check in.    Follow your doctor's preoperative instructions regarding skin prep, bowel prep, bathing, or showering prior to your procedure.  If any special soaps were provided to you, please use according to your doctor's instructions. If no instructions were given from your doctor, take a good bath or shower with antibacterial soap the night before and the morning of your procedure.  On the morning of procedure, wear loose, comfortable clothing.  No lotions, makeup, perfumes, colognes, deodorant, or jewelry to your procedure.  Removable items (glasses, contact lenses, dentures, retainers, hearing aids) need to be removed for your procedure.  Bring your  storage containers for these items if you wear them.    Artificial nails, body jewelry, eyelash extensions, and/or hair extensions with metal clips are not allowed during your surgery.  If you currently wear any of these items, please arrange for them to be removed prior to your arrival to the hospital.    Outpatient or Same Day Surgeries:  Any patients receiving sedation/anesthesia are advised not to drive for 24 hours after their procedure.  We do not allow patients to drive themselves home after discharge.  If you are going home after your procedure, please have someone available to drive you home from the hospital.      You may call the Outpatient Surgery Department at [phone] with any questions or concerns.  We are looking forward to meeting you and taking great care of you for your procedure.  Thank you for choosing [facility] for your surgical needs.        Status: complete  Spoke with: patient  Time: 1183

## 2023-04-24 ENCOUNTER — HOSPITAL ENCOUNTER (INPATIENT)
Facility: HOSPITAL | Age: 73
LOS: 8 days | Discharge: HOME | DRG: 164 | End: 2023-05-02
Attending: SPECIALIST | Admitting: SPECIALIST
Payer: COMMERCIAL

## 2023-04-24 ENCOUNTER — ANESTHESIA (OUTPATIENT)
Dept: SURGERY | Facility: HOSPITAL | Age: 73
DRG: 164 | End: 2023-04-24
Payer: COMMERCIAL

## 2023-04-24 DIAGNOSIS — J98.6 CHRONICALLY ELEVATED HEMIDIAPHRAGM: Primary | ICD-10-CM

## 2023-04-24 DIAGNOSIS — Z86.79 H/O ATRIAL FIBRILLATION WITHOUT CURRENT MEDICATION: ICD-10-CM

## 2023-04-24 LAB
BASOPHILS # BLD AUTO: 0.01 X10(3)/MCL (ref 0–0.2)
BASOPHILS NFR BLD AUTO: 0.1 %
BSA FOR ECHO PROCEDURE: 2.18 M2
EOSINOPHIL # BLD AUTO: 0 X10(3)/MCL (ref 0–0.9)
EOSINOPHIL NFR BLD AUTO: 0 %
ERYTHROCYTE [DISTWIDTH] IN BLOOD BY AUTOMATED COUNT: 14.1 % (ref 11.5–17)
GROUP & RH: NORMAL
HCT VFR BLD AUTO: 39.6 % (ref 42–52)
HGB BLD-MCNC: 13 G/DL (ref 14–18)
IMM GRANULOCYTES # BLD AUTO: 0.05 X10(3)/MCL (ref 0–0.04)
IMM GRANULOCYTES NFR BLD AUTO: 0.6 %
INDIRECT COOMBS GEL: NORMAL
LYMPHOCYTES # BLD AUTO: 0.33 X10(3)/MCL (ref 0.6–4.6)
LYMPHOCYTES NFR BLD AUTO: 3.7 %
MCH RBC QN AUTO: 31.3 PG (ref 27–31)
MCHC RBC AUTO-ENTMCNC: 32.8 G/DL (ref 33–36)
MCV RBC AUTO: 95.4 FL (ref 80–94)
MONOCYTES # BLD AUTO: 0.52 X10(3)/MCL (ref 0.1–1.3)
MONOCYTES NFR BLD AUTO: 5.8 %
NEUTROPHILS # BLD AUTO: 8 X10(3)/MCL (ref 2.1–9.2)
NEUTROPHILS NFR BLD AUTO: 89.8 %
NRBC BLD AUTO-RTO: 0 %
PLATELET # BLD AUTO: 117 X10(3)/MCL (ref 130–400)
PMV BLD AUTO: 11.1 FL (ref 7.4–10.4)
RBC # BLD AUTO: 4.15 X10(6)/MCL (ref 4.7–6.1)
SPECIMEN OUTDATE: NORMAL
WBC # SPEC AUTO: 8.9 X10(3)/MCL (ref 4.5–11.5)

## 2023-04-24 PROCEDURE — 25000003 PHARM REV CODE 250: Performed by: ANESTHESIOLOGY

## 2023-04-24 PROCEDURE — 63600175 PHARM REV CODE 636 W HCPCS: Performed by: STUDENT IN AN ORGANIZED HEALTH CARE EDUCATION/TRAINING PROGRAM

## 2023-04-24 PROCEDURE — 36620 INSERTION CATHETER ARTERY: CPT | Performed by: STUDENT IN AN ORGANIZED HEALTH CARE EDUCATION/TRAINING PROGRAM

## 2023-04-24 PROCEDURE — 25000003 PHARM REV CODE 250: Performed by: STUDENT IN AN ORGANIZED HEALTH CARE EDUCATION/TRAINING PROGRAM

## 2023-04-24 PROCEDURE — 63600175 PHARM REV CODE 636 W HCPCS: Performed by: SPECIALIST

## 2023-04-24 PROCEDURE — 21400001 HC TELEMETRY ROOM

## 2023-04-24 PROCEDURE — 63600175 PHARM REV CODE 636 W HCPCS

## 2023-04-24 PROCEDURE — 11000001 HC ACUTE MED/SURG PRIVATE ROOM

## 2023-04-24 PROCEDURE — 36000708 HC OR TIME LEV III 1ST 15 MIN: Performed by: SPECIALIST

## 2023-04-24 PROCEDURE — D9220A PRA ANESTHESIA: ICD-10-PCS | Mod: CRNA,,,

## 2023-04-24 PROCEDURE — 37000008 HC ANESTHESIA 1ST 15 MINUTES: Performed by: SPECIALIST

## 2023-04-24 PROCEDURE — 25000003 PHARM REV CODE 250

## 2023-04-24 PROCEDURE — 71000039 HC RECOVERY, EACH ADD'L HOUR: Performed by: SPECIALIST

## 2023-04-24 PROCEDURE — C1729 CATH, DRAINAGE: HCPCS | Performed by: SPECIALIST

## 2023-04-24 PROCEDURE — 71000015 HC POSTOP RECOV 1ST HR: Performed by: SPECIALIST

## 2023-04-24 PROCEDURE — 39545 PR REVISION OF DIAPHRAGM: ICD-10-PCS | Mod: ,,, | Performed by: SPECIALIST

## 2023-04-24 PROCEDURE — 99223 1ST HOSP IP/OBS HIGH 75: CPT | Mod: 57,,, | Performed by: PHYSICIAN ASSISTANT

## 2023-04-24 PROCEDURE — 36620 ARTERIAL: ICD-10-PCS | Mod: 59,,, | Performed by: STUDENT IN AN ORGANIZED HEALTH CARE EDUCATION/TRAINING PROGRAM

## 2023-04-24 PROCEDURE — 25000003 PHARM REV CODE 250: Performed by: SPECIALIST

## 2023-04-24 PROCEDURE — D9220A PRA ANESTHESIA: Mod: ANES,,, | Performed by: ANESTHESIOLOGY

## 2023-04-24 PROCEDURE — 27000221 HC OXYGEN, UP TO 24 HOURS

## 2023-04-24 PROCEDURE — 86900 BLOOD TYPING SEROLOGIC ABO: CPT | Performed by: SPECIALIST

## 2023-04-24 PROCEDURE — D9220A PRA ANESTHESIA: ICD-10-PCS | Mod: ANES,,, | Performed by: ANESTHESIOLOGY

## 2023-04-24 PROCEDURE — 63600175 PHARM REV CODE 636 W HCPCS: Performed by: NURSE ANESTHETIST, CERTIFIED REGISTERED

## 2023-04-24 PROCEDURE — 39545 REVISION OF DIAPHRAGM: CPT | Mod: ,,, | Performed by: SPECIALIST

## 2023-04-24 PROCEDURE — D9220A PRA ANESTHESIA: Mod: CRNA,,,

## 2023-04-24 PROCEDURE — 37000009 HC ANESTHESIA EA ADD 15 MINS: Performed by: SPECIALIST

## 2023-04-24 PROCEDURE — 99223 PR INITIAL HOSPITAL CARE,LEVL III: ICD-10-PCS | Mod: 57,,, | Performed by: PHYSICIAN ASSISTANT

## 2023-04-24 PROCEDURE — 85025 COMPLETE CBC W/AUTO DIFF WBC: CPT | Performed by: SPECIALIST

## 2023-04-24 PROCEDURE — 39545 REVISION OF DIAPHRAGM: CPT | Mod: AS,,, | Performed by: PHYSICIAN ASSISTANT

## 2023-04-24 PROCEDURE — 36000709 HC OR TIME LEV III EA ADD 15 MIN: Performed by: SPECIALIST

## 2023-04-24 PROCEDURE — 62326 NJX INTERLAMINAR LMBR/SAC: CPT | Performed by: STUDENT IN AN ORGANIZED HEALTH CARE EDUCATION/TRAINING PROGRAM

## 2023-04-24 PROCEDURE — 39545 PR REVISION OF DIAPHRAGM: ICD-10-PCS | Mod: AS,,, | Performed by: PHYSICIAN ASSISTANT

## 2023-04-24 PROCEDURE — 71000033 HC RECOVERY, INTIAL HOUR: Performed by: SPECIALIST

## 2023-04-24 RX ORDER — ALBUMIN HUMAN 250 G/1000ML
SOLUTION INTRAVENOUS CONTINUOUS PRN
Status: DISCONTINUED | OUTPATIENT
Start: 2023-04-24 | End: 2023-04-24

## 2023-04-24 RX ORDER — OXYCODONE HYDROCHLORIDE 5 MG/1
5 TABLET ORAL EVERY 4 HOURS PRN
Status: DISCONTINUED | OUTPATIENT
Start: 2023-04-24 | End: 2023-05-02 | Stop reason: HOSPADM

## 2023-04-24 RX ORDER — SODIUM CHLORIDE, SODIUM GLUCONATE, SODIUM ACETATE, POTASSIUM CHLORIDE AND MAGNESIUM CHLORIDE 30; 37; 368; 526; 502 MG/100ML; MG/100ML; MG/100ML; MG/100ML; MG/100ML
INJECTION, SOLUTION INTRAVENOUS CONTINUOUS
Status: DISCONTINUED | OUTPATIENT
Start: 2023-04-24 | End: 2023-04-25

## 2023-04-24 RX ORDER — TALC
6 POWDER (GRAM) TOPICAL NIGHTLY PRN
Status: DISCONTINUED | OUTPATIENT
Start: 2023-04-24 | End: 2023-04-25 | Stop reason: SDUPTHER

## 2023-04-24 RX ORDER — ACETAMINOPHEN 325 MG/1
650 TABLET ORAL EVERY 4 HOURS PRN
Status: DISCONTINUED | OUTPATIENT
Start: 2023-04-24 | End: 2023-05-02 | Stop reason: HOSPADM

## 2023-04-24 RX ORDER — ONDANSETRON 4 MG/1
8 TABLET, ORALLY DISINTEGRATING ORAL EVERY 8 HOURS PRN
Status: DISCONTINUED | OUTPATIENT
Start: 2023-04-24 | End: 2023-05-02 | Stop reason: HOSPADM

## 2023-04-24 RX ORDER — KETOROLAC TROMETHAMINE 30 MG/ML
15 INJECTION, SOLUTION INTRAMUSCULAR; INTRAVENOUS 4 TIMES DAILY
Status: COMPLETED | OUTPATIENT
Start: 2023-04-24 | End: 2023-04-26

## 2023-04-24 RX ORDER — BUPIVACAINE HYDROCHLORIDE 2.5 MG/ML
INJECTION, SOLUTION EPIDURAL; INFILTRATION; INTRACAUDAL
Status: DISCONTINUED | OUTPATIENT
Start: 2023-04-24 | End: 2023-04-24

## 2023-04-24 RX ORDER — MIDAZOLAM HYDROCHLORIDE 1 MG/ML
INJECTION INTRAMUSCULAR; INTRAVENOUS
Status: COMPLETED
Start: 2023-04-24 | End: 2023-04-24

## 2023-04-24 RX ORDER — ENOXAPARIN SODIUM 100 MG/ML
40 INJECTION SUBCUTANEOUS EVERY 24 HOURS
Status: DISCONTINUED | OUTPATIENT
Start: 2023-04-24 | End: 2023-04-25 | Stop reason: SDUPTHER

## 2023-04-24 RX ORDER — LIDOCAINE HYDROCHLORIDE 10 MG/ML
1 INJECTION, SOLUTION EPIDURAL; INFILTRATION; INTRACAUDAL; PERINEURAL ONCE
Status: DISCONTINUED | OUTPATIENT
Start: 2023-04-24 | End: 2023-04-24 | Stop reason: HOSPADM

## 2023-04-24 RX ORDER — LOPERAMIDE HYDROCHLORIDE 2 MG/1
4 CAPSULE ORAL ONCE
Status: DISCONTINUED | OUTPATIENT
Start: 2023-04-24 | End: 2023-05-02 | Stop reason: HOSPADM

## 2023-04-24 RX ORDER — FENTANYL/BUPIVACAINE/NS/PF 2-1250MCG
PLASTIC BAG, INJECTION (ML) INJECTION CONTINUOUS
Status: DISCONTINUED | OUTPATIENT
Start: 2023-04-24 | End: 2023-04-25

## 2023-04-24 RX ORDER — PROPOFOL 10 MG/ML
VIAL (ML) INTRAVENOUS
Status: DISCONTINUED | OUTPATIENT
Start: 2023-04-24 | End: 2023-04-24

## 2023-04-24 RX ORDER — FAMOTIDINE 20 MG/1
20 TABLET, FILM COATED ORAL 2 TIMES DAILY
Status: DISCONTINUED | OUTPATIENT
Start: 2023-04-24 | End: 2023-05-02 | Stop reason: HOSPADM

## 2023-04-24 RX ORDER — DOCUSATE SODIUM 100 MG/1
100 CAPSULE, LIQUID FILLED ORAL 2 TIMES DAILY
Status: DISCONTINUED | OUTPATIENT
Start: 2023-04-24 | End: 2023-04-25 | Stop reason: SDUPTHER

## 2023-04-24 RX ORDER — PHENYLEPHRINE HYDROCHLORIDE 10 MG/ML
INJECTION INTRAVENOUS
Status: DISCONTINUED | OUTPATIENT
Start: 2023-04-24 | End: 2023-04-24

## 2023-04-24 RX ORDER — KETOROLAC TROMETHAMINE 30 MG/ML
INJECTION, SOLUTION INTRAMUSCULAR; INTRAVENOUS
Status: DISCONTINUED | OUTPATIENT
Start: 2023-04-24 | End: 2023-04-24

## 2023-04-24 RX ORDER — NALOXONE HCL 0.4 MG/ML
0.02 VIAL (ML) INJECTION
Status: DISCONTINUED | OUTPATIENT
Start: 2023-04-24 | End: 2023-05-02 | Stop reason: HOSPADM

## 2023-04-24 RX ORDER — VANCOMYCIN HCL IN 5 % DEXTROSE 1G/250ML
1000 PLASTIC BAG, INJECTION (ML) INTRAVENOUS
Status: COMPLETED | OUTPATIENT
Start: 2023-04-24 | End: 2023-04-26

## 2023-04-24 RX ORDER — MUPIROCIN 20 MG/G
1 OINTMENT TOPICAL 2 TIMES DAILY
Status: COMPLETED | OUTPATIENT
Start: 2023-04-24 | End: 2023-04-26

## 2023-04-24 RX ORDER — SODIUM CHLORIDE 450 MG/100ML
75 INJECTION, SOLUTION INTRAVENOUS CONTINUOUS
Status: DISCONTINUED | OUTPATIENT
Start: 2023-04-24 | End: 2023-04-25

## 2023-04-24 RX ORDER — METOCLOPRAMIDE HYDROCHLORIDE 5 MG/ML
5 INJECTION INTRAMUSCULAR; INTRAVENOUS EVERY 6 HOURS PRN
Status: DISCONTINUED | OUTPATIENT
Start: 2023-04-24 | End: 2023-04-26

## 2023-04-24 RX ORDER — HEPARIN 100 UNIT/ML
SYRINGE INTRAVENOUS
Status: DISPENSED
Start: 2023-04-24 | End: 2023-04-24

## 2023-04-24 RX ORDER — ACETAMINOPHEN 10 MG/ML
INJECTION, SOLUTION INTRAVENOUS
Status: DISCONTINUED | OUTPATIENT
Start: 2023-04-24 | End: 2023-04-24

## 2023-04-24 RX ORDER — FENTANYL CITRATE 50 UG/ML
INJECTION, SOLUTION INTRAMUSCULAR; INTRAVENOUS
Status: DISCONTINUED | OUTPATIENT
Start: 2023-04-24 | End: 2023-04-24

## 2023-04-24 RX ORDER — ONDANSETRON 4 MG/1
8 TABLET, ORALLY DISINTEGRATING ORAL EVERY 6 HOURS PRN
Status: DISCONTINUED | OUTPATIENT
Start: 2023-04-24 | End: 2023-04-24 | Stop reason: HOSPADM

## 2023-04-24 RX ORDER — CEFAZOLIN SODIUM 2 G/50ML
2 SOLUTION INTRAVENOUS
Status: COMPLETED | OUTPATIENT
Start: 2023-04-24 | End: 2023-04-24

## 2023-04-24 RX ORDER — ROCURONIUM BROMIDE 10 MG/ML
INJECTION, SOLUTION INTRAVENOUS
Status: DISCONTINUED | OUTPATIENT
Start: 2023-04-24 | End: 2023-04-24

## 2023-04-24 RX ORDER — NALBUPHINE HYDROCHLORIDE 10 MG/ML
2.5 INJECTION, SOLUTION INTRAMUSCULAR; INTRAVENOUS; SUBCUTANEOUS
Status: DISCONTINUED | OUTPATIENT
Start: 2023-04-24 | End: 2023-05-02 | Stop reason: HOSPADM

## 2023-04-24 RX ADMIN — ROCURONIUM BROMIDE 70 MG: 10 SOLUTION INTRAVENOUS at 12:04

## 2023-04-24 RX ADMIN — PHENYLEPHRINE HYDROCHLORIDE 100 MCG: 10 INJECTION INTRAVENOUS at 01:04

## 2023-04-24 RX ADMIN — PHENYLEPHRINE HYDROCHLORIDE 50 MCG: 10 INJECTION INTRAVENOUS at 12:04

## 2023-04-24 RX ADMIN — SUGAMMADEX 200 MG: 100 INJECTION, SOLUTION INTRAVENOUS at 02:04

## 2023-04-24 RX ADMIN — MIDAZOLAM HYDROCHLORIDE 2 MG: 1 INJECTION, SOLUTION INTRAMUSCULAR; INTRAVENOUS at 12:04

## 2023-04-24 RX ADMIN — PROPOFOL 120 MG: 10 INJECTION, EMULSION INTRAVENOUS at 12:04

## 2023-04-24 RX ADMIN — BUPIVACAINE HYDROCHLORIDE 5 MG: 2.5 INJECTION, SOLUTION EPIDURAL; INFILTRATION; INTRACAUDAL; PERINEURAL at 02:04

## 2023-04-24 RX ADMIN — CEFAZOLIN SODIUM 2 G: 2 SOLUTION INTRAVENOUS at 05:04

## 2023-04-24 RX ADMIN — CEFAZOLIN SODIUM 2 G: 2 SOLUTION INTRAVENOUS at 11:04

## 2023-04-24 RX ADMIN — FENTANYL CITRATE 50 MCG: 50 INJECTION, SOLUTION INTRAMUSCULAR; INTRAVENOUS at 01:04

## 2023-04-24 RX ADMIN — FENTANYL CITRATE 50 MCG: 50 INJECTION, SOLUTION INTRAMUSCULAR; INTRAVENOUS at 02:04

## 2023-04-24 RX ADMIN — ENOXAPARIN SODIUM 40 MG: 40 INJECTION SUBCUTANEOUS at 09:04

## 2023-04-24 RX ADMIN — ROCURONIUM BROMIDE 30 MG: 10 SOLUTION INTRAVENOUS at 01:04

## 2023-04-24 RX ADMIN — BUPIVACAINE HYDROCHLORIDE 5 MG: 2.5 INJECTION, SOLUTION EPIDURAL; INFILTRATION; INTRACAUDAL; PERINEURAL at 12:04

## 2023-04-24 RX ADMIN — MUPIROCIN 1 G: 20 OINTMENT TOPICAL at 08:04

## 2023-04-24 RX ADMIN — DOCUSATE SODIUM 100 MG: 100 CAPSULE, LIQUID FILLED ORAL at 08:04

## 2023-04-24 RX ADMIN — VANCOMYCIN HYDROCHLORIDE 1000 MG: 1 INJECTION, POWDER, LYOPHILIZED, FOR SOLUTION INTRAVENOUS at 11:04

## 2023-04-24 RX ADMIN — Medication 6 MG: at 08:04

## 2023-04-24 RX ADMIN — KETOROLAC TROMETHAMINE 15 MG: 30 INJECTION, SOLUTION INTRAMUSCULAR; INTRAVENOUS at 07:04

## 2023-04-24 RX ADMIN — ACETAMINOPHEN 1000 MG: 10 INJECTION, SOLUTION INTRAVENOUS at 02:04

## 2023-04-24 RX ADMIN — FENTANYL CITRATE 50 MCG: 50 INJECTION, SOLUTION INTRAMUSCULAR; INTRAVENOUS at 12:04

## 2023-04-24 RX ADMIN — ALBUMIN HUMAN: 250 SOLUTION INTRAVENOUS at 01:04

## 2023-04-24 RX ADMIN — DEXTROSE MONOHYDRATE 1.5 G: 50 INJECTION, SOLUTION INTRAVENOUS at 01:04

## 2023-04-24 RX ADMIN — KETOROLAC TROMETHAMINE 15 MG: 30 INJECTION, SOLUTION INTRAMUSCULAR; INTRAVENOUS at 11:04

## 2023-04-24 RX ADMIN — FAMOTIDINE 20 MG: 20 TABLET, FILM COATED ORAL at 08:04

## 2023-04-24 RX ADMIN — Medication: at 03:04

## 2023-04-24 RX ADMIN — ONDANSETRON 8 MG: 4 TABLET, ORALLY DISINTEGRATING ORAL at 09:04

## 2023-04-24 RX ADMIN — SODIUM CHLORIDE, SODIUM GLUCONATE, SODIUM ACETATE, POTASSIUM CHLORIDE AND MAGNESIUM CHLORIDE: 526; 502; 368; 37; 30 INJECTION, SOLUTION INTRAVENOUS at 12:04

## 2023-04-24 RX ADMIN — KETOROLAC TROMETHAMINE 30 MG: 30 INJECTION, SOLUTION INTRAMUSCULAR; INTRAVENOUS at 02:04

## 2023-04-24 NOTE — OP NOTE
Date of service 04/24/2023   Preop diagnosis:  Left diaphragmatic paresis  Postop diagnosis same  Procedure:  Left diaphragmatic plication  Surgeon: Nga  Assistant: Reji  Anesthesia:  General endotracheal   Drains: Chest tube x1    Procedure in detail:  The patient was taken to the operating room under informed consent placed on table in supine position.  General endotracheal anesthesia was induced by the anesthesia department.  He was rotated on his right side all pressure points appropriately padded and the left draped in usual sterile fashion.  Posterolateral thoracotomy incision made and carried down to the chest wall.  The 7th interspace was entered.  Using 2-0 Ethibond sutures that were pledgeted long horizontal mattress sutures were placed from anterior to posterior going in and out of the diaphragm and once about 6 or 7 of the sutures were placed they were tied down secured plicating the diaphragm and flattening it out.  This worked very nicely.  This point the pericardium was opened in an attempt to try to ligate the left atrial appendage.  Unfortunately however, there were extensive pericardial adhesions and this made it impossible to ligate or even identify the left atrial appendage through this low thoracotomy incision.  A 24 chest tube was placed through separate incision and secured to the skin.  The ribs closed with interrupted 2 Vicryl sutures.  The muscle layer closed with a running 1. Vicryl.  The soft tissue with 2-0 Vicryl the skin with a 3-0 subcuticular stitch.  The patient tolerated this procedure well and left the operating room in stable condition.

## 2023-04-24 NOTE — ANESTHESIA PREPROCEDURE EVALUATION
04/24/2023  John Bullard is a 72 y.o., male with chronically elevated hemidiaphragm  He gets fairly short of breath with certain activities it comes on every day.  He has been dealing with this for several years.  He used to ride a bike 40 miles a day with no difficulty but now essentially whenever he leans over certain activities he really gets quite short of breath takes him a long time to recover from.  He has a elevated left hemidiaphragm this has been present for couple of years at the very least.  He has mild COPD.  He had an episode of atrial fib recently and also some pneumonia about 3 weeks ago.  He had a sniff test that reveals the diaphragm is not fully paralyzed but more of a paresis    Here for plication via left thoracotomy.    Echo 1/23: EF 40%  Had Afib Ablation in 1/23  EKG: NSR at present  OFF Eliquis > 4 days    Pre-op Assessment    I have reviewed the Patient Summary Reports.     I have reviewed the Nursing Notes. I have reviewed the NPO Status.   I have reviewed the Medications.     Review of Systems  Anesthesia Hx:   Denies Personal Hx of Anesthesia complications.   Cardiovascular:   Hypertension Dysrhythmias CHF    Pulmonary:   Asthma Sleep Apnea    Hepatic/GI:   GERD    Musculoskeletal:   Arthritis     Neurological:   Neuromuscular Disease,    Endocrine:  Obesity / BMI > 30  Psych:  Psychiatric Normal           Physical Exam  General: Well nourished, Cooperative, Alert and Oriented    Airway:  Mallampati: II   Mouth Opening: Normal  TM Distance: Normal  Tongue: Normal  Neck ROM: Normal ROM    Dental:  Intact    Chest/Lungs:  Normal Respiratory Rate    Heart:  Rate: Normal        Anesthesia Plan  Type of Anesthesia, risks & benefits discussed:    Anesthesia Type: Gen ETT  Intra-op Monitoring Plan: Standard ASA Monitors, Art Line and REJI  Post Op Pain Control Plan: multimodal  analgesia and epidural analgesia  Induction:  IV  Informed Consent: Informed consent signed with the Patient and all parties understand the risks and agree with anesthesia plan.  All questions answered. Patient consented to blood products? Yes  ASA Score: 3  Day of Surgery Review of History & Physical: H&P Update referred to the surgeon/provider.    Ready For Surgery From Anesthesia Perspective.     .

## 2023-04-24 NOTE — ANESTHESIA POSTPROCEDURE EVALUATION
Anesthesia Post Evaluation    Patient: John Bullard    Procedure(s) Performed: Procedure(s) (LRB):  PLICATION, DIAPHRAGM (Left)    Final Anesthesia Type: general      Patient location during evaluation: PACU  Patient participation: Yes- Able to Participate  Level of consciousness: oriented and sedated  Post-procedure vital signs: reviewed and stable  Pain management: adequate  Airway patency: patent    PONV status at discharge: No PONV  Anesthetic complications: no      Cardiovascular status: hemodynamically stable  Respiratory status: spontaneous ventilation, unassisted and face mask  Hydration status: euvolemic  Follow-up not needed.  Comments: NAAC    Thoracic Epidural infusing, good analgesia reported      Vitals Value Taken Time   /78 04/24/23 1601   Temp 36.6 °C (97.9 °F) 04/24/23 1450   Pulse 80 04/24/23 1610   Resp 15 04/24/23 1610   SpO2 94 % 04/24/23 1610   Vitals shown include unvalidated device data.      No case tracking events are documented in the log.      Pain/Jim Score: Jim Score: 8 (4/24/2023  4:00 PM)

## 2023-04-24 NOTE — ANESTHESIA PROCEDURE NOTES
Intubation    Date/Time: 4/24/2023 12:35 PM  Performed by: Norma Dallas CRNA  Authorized by: Tristan Thomas MD     Intubation:     Induction:  Intravenous    Intubated:  Postinduction    Mask Ventilation:  Easy mask    Attempts:  1    Attempted By:  CRNA    Method of Intubation:  Video laryngoscopy    Blade:  Flores 3    Laryngeal View Grade: Grade IIA - cords partially seen      Difficult Airway Encountered?: No      Complications:  None    Airway Device:  Double lumen tube left    Airway Device Size:  39F    Style/Cuff Inflation:  Cuffed (inflated to minimal occlusive pressure)    Tube secured:  25    Secured at:  The lips    Placement Verified By:  Capnometry and Fiber optic visualization    Complicating Factors:  None    Findings Post-Intubation:  BS equal bilateral and atraumatic/condition of teeth unchanged

## 2023-04-24 NOTE — ADDENDUM NOTE
Addendum  created 04/24/23 1826 by Tristan Thomas MD    Child order released for a procedure order, Clinical Note Signed, Intraprocedure Blocks edited, SmartForm saved

## 2023-04-24 NOTE — H&P
[facility]  History & Physical  Cardiothoracic Surgery    SUBJECTIVE:     Chief Complaint/Reason for Admission: shortness of breath    History of Present Illness:  Patient is a 72 y.o. male presents with .Patient is a 72 y.o. male presents essentially self-referred as he is the  at the hospital and had seen the patient that I had previously done a left diaphragmatic plication on and realized he has the same issue.  He gets fairly short of breath with certain activities it comes on every day.  He has been dealing with this for several years.  He used to ride a bike 40 miles a day with no difficulty but now essentially whenever he leans over certain activities he really gets quite short of breath takes him a long time to recover from.  He has a elevated left hemidiaphragm this has been present for couple of years at the very least.  He has mild COPD.  He had an episode of atrial fib recently and also some pneumonia about 3 weeks ago.  He had a sniff test that reveals the diaphragm is not fully paralyzed but more of a paresis.  In any event it does remains substantially elevated.  I do think he will benefit from a diaphragmatic plication on the left.  He has a slightly higher than normal risk given his obesity.  He is very active otherwise.  He is really trying to get some relief from this problem and would like to proceed.  We will give him a little more time to recover from his recent hospitalization and plan for surgery towards the end of next month.         Family History of Heart Disease: no    No current facility-administered medications on file prior to encounter.     Current Outpatient Medications on File Prior to Encounter   Medication Sig Dispense Refill    albuterol (PROVENTIL/VENTOLIN HFA) 90 mcg/actuation inhaler INHALE 2 PUFFS BY MOUTH EVERY 6 HOURS 8 g 3    calcium citrate-vitamin D3 315-200 mg (CITRACAL+D) 315 mg-5 mcg (200 unit) per tablet Take 1 tablet by mouth  once daily.      cinnamon bark (CINNAMON) 500 mg capsule Take 500 mg by mouth once daily.      docusate sodium (COLACE) 100 MG capsule Take 100 mg by mouth Daily.      DULoxetine (CYMBALTA) 30 MG capsule Take 30 mg by mouth once daily.      ELIQUIS 5 mg Tab Take 5 mg by mouth 2 (two) times daily.      fluticasone-salmeterol 230-21 mcg/dose (ADVAIR HFA) 230-21 mcg/actuation HFAA inhaler Inhale 2 puffs into the lungs daily as needed. Controller      furosemide (LASIX) 40 MG tablet Take 1 tablet (40 mg total) by mouth once daily. 30 tablet 11    loratadine (CLARITIN) 10 mg tablet Take 10 mg by mouth daily as needed for Allergies.      melatonin 10 mg Tab Take 10-20 mg by mouth daily as needed (insomnia).      montelukast (SINGULAIR) 10 mg tablet Take 1 tablet by mouth once daily 30 tablet 6    naproxen sodium (ANAPROX) 220 MG tablet Take 220 mg by mouth daily as needed.      NON FORMULARY MEDICATION Take 1 tablet by mouth once daily. Tumeric      omega-3 fatty acids/fish oil (FISH OIL-OMEGA-3 FATTY ACIDS) 300-1,000 mg capsule Take 1 capsule by mouth once daily.      potassium chloride (KLOR-CON) 8 MEQ TbSR Take 1 tablet (8 mEq total) by mouth once daily. 30 tablet 5    rosuvastatin (CRESTOR) 20 MG tablet TAKE 1 TABLET BY MOUTH ONCE IN THE EVENING      tamsulosin (FLOMAX) 0.4 mg Cap Take 1 capsule by mouth once daily.      travoprost, benzalkonium, (TRAVATAN) 0.004 % ophthalmic solution Place 1 drop into both eyes every evening.      zolpidem (AMBIEN) 5 MG Tab TAKE 1 TABLET BY MOUTH ONCE DAILY AT BEDTIME AS NEEDED FOR INSOMNIA 30 tablet 5        Review of patient's allergies indicates:   Allergen Reactions    Ativan [lorazepam] Hallucinations        Past Medical History:   Diagnosis Date    Anticoagulant long-term use     Anxiety     Asthma     Atrial fibrillation     Decreased testosterone level     Diaphragmatic hernia without obstruction or gangrene     DJD (degenerative joint disease)     Enlarged prostate      Exertional dyspnea     GERD (gastroesophageal reflux disease)     Glaucoma     History of kidney stones     HLD (hyperlipidemia)     Hypertension     OA (osteoarthritis)     Obesity     MINDA on CPAP     PAF (paroxysmal atrial fibrillation)     s/p Ablation (1/5/2023)    Pneumonia 01/2023    SBO (small bowel obstruction)     hx multiple (3) incudling requiring surgical intervention    Venous insufficiency     Ventricular tachycardia         Past Surgical History:   Procedure Laterality Date    ABDOMINAL SURGERY      for illeus    ABLATION N/A 01/05/2023    Procedure: ABLATION;  Surgeon: Kenny Waller MD;  Location: [facility] CATH LAB;  Service: Cardiology;  Laterality: N/A;  EPS + AFL CATH ABLATION W/ ANEST.    COLONOSCOPY      LAPAROSCOPIC REPAIR OF UMBILICAL HERNIA      LITHOTRIPSY      SINUS SURGERY      WISDOM TOOTH EXTRACTION Bilateral            Review of Systems:  Review of Systems   Respiratory:  Positive for shortness of breath.    All other systems reviewed and are negative.     OBJECTIVE:        Physical Exam:  Physical Exam  Vitals reviewed.   HENT:      Head: Normocephalic.      Right Ear: Tympanic membrane normal.      Left Ear: Tympanic membrane normal.      Nose: Nose normal.      Mouth/Throat:      Mouth: Mucous membranes are moist.   Eyes:      Pupils: Pupils are equal, round, and reactive to light.   Cardiovascular:      Rate and Rhythm: Normal rate and regular rhythm.      Pulses: Normal pulses.   Pulmonary:      Effort: Pulmonary effort is normal.      Breath sounds: Normal breath sounds.   Abdominal:      Palpations: Abdomen is soft.   Musculoskeletal:         General: Normal range of motion.      Cervical back: Normal range of motion.   Skin:     General: Skin is warm.   Neurological:      General: No focal deficit present.      Mental Status: He is alert.   Psychiatric:         Mood and Affect: Mood normal.        Laboratory:  I have reviewed all pertinent lab results within the past 24  hours.    Diagnostic Results:  CT: Reviewed          ASSESSMENT/PLAN:     A: SOB c paralyzed diaphragm  P: Plication of diaphragm

## 2023-04-24 NOTE — ANESTHESIA PROCEDURE NOTES
REJI    Diagnosis: atrial fibrillation  Patient location during procedure: OR  Procedure start time: 4/24/2023 12:45 PM  Procedure end time: 4/24/2023 12:46 PM  Exam type: Final    Staffing  Performed: anesthesiologist     Anesthesiologist: Tristan Thomas MD        Anesthesiologist Present  Yes      Setup & Induction  Probe Insertion: easy  Exam: limited  Exam                                               Effusions    SummaryFindings discussed with surgeon.    Other Findings   REJI requested by Dr. Sylvester.  Atrial appendage examined at 60 and 90 degrees.  Acquired views shared with surgeon.

## 2023-04-24 NOTE — ANESTHESIA PROCEDURE NOTES
Arterial    Diagnosis: diaphragmatic paresis    Patient location during procedure: pre-op  Procedure start time: 4/24/2023 11:15 AM  Timeout: 4/24/2023 11:15 AM  Procedure end time: 4/24/2023 11:20 AM    Staffing  Authorizing Provider: Tristan Thomas MD  Performing Provider: Tristan Thomas MD    Anesthesiologist was present at the time of the procedure.    Preanesthetic Checklist  Completed: patient identified, IV checked, site marked, risks and benefits discussed, surgical consent, monitors and equipment checked, pre-op evaluation, timeout performed and anesthesia consent givenArterial  Skin Prep: chlorhexidine gluconate  Local Infiltration: lidocaine  Orientation: left  Location: radial    Catheter Size: 20 G  Catheter placement by Ultrasound guidance. Heme positive aspiration all ports.   Vessel Caliber: medium, patent, compressibility normal  Vascular Doppler:  not done  Needle advanced into vessel with real time Ultrasound guidance.Insertion Attempts: 1  Assessment  Dressing: secured with tape and tegaderm  Patient: Tolerated well

## 2023-04-24 NOTE — TRANSFER OF CARE
"Anesthesia Transfer of Care Note    Patient: John Bullard    Procedure(s) Performed: Procedure(s) (LRB):  PLICATION, DIAPHRAGM (Left)    Patient location: PACU    Anesthesia Type: general    Transport from OR: Transported from OR on room air with adequate spontaneous ventilation    Post pain: adequate analgesia    Post assessment: no apparent anesthetic complications    Post vital signs: stable    Level of consciousness: awake    Nausea/Vomiting: no nausea/vomiting    Complications: none    Transfer of care protocol was followed      Last vitals:   Visit Vitals  /83   Pulse 73   Temp 36.6 °C (97.9 °F)   Resp 18   Ht 5' 8" (1.727 m)   Wt 99 kg (218 lb 4.1 oz)   SpO2 (!) 90%   BMI 33.19 kg/m²     "

## 2023-04-24 NOTE — ANESTHESIA PROCEDURE NOTES
Thoracic epidural T7-T8    Patient location during procedure: OB   Reason for block: primary anesthetic   Reason for block: labor analgesia requested by patient and obstetrician  Diagnosis: IUP   Start time: 4/24/2023 11:55 AM  Timeout: 4/24/2023 11:55 AM  End time: 4/24/2023 12:05 PM    Staffing  Performing Provider: Tristan Thomas MD  Authorizing Provider: Tristan Thomas MD        Preanesthetic Checklist  Completed: patient identified, IV checked, site marked, risks and benefits discussed, surgical consent, monitors and equipment checked, pre-op evaluation, timeout performed, anesthesia consent given, hand hygiene performed and patient being monitored  Preparation  Patient position: sitting  Prep: ChloraPrep  Patient monitoring: Pulse Ox  Reason for block: primary anesthetic   Epidural  Skin Anesthetic: lidocaine 1%  Administration type: continuous  Approach: right paramedian  Interspace: T7-8    Injection technique: BRENDEN saline  Needle and Epidural Catheter  Needle type: Tuohy   Needle gauge: 17  Needle insertion depth: 5 cm  Catheter type: springwound  Catheter size: 19 G  Catheter at skin depth: 10 cm  Insertion Attempts: 1  Test dose: 5 mL of lidocaine 1.5% with Epi 1-to-200,000  Additional Documentation: no paresthesia on injection, no significant pain on injection, no signs/symptoms of IV or SA injection, no significant complaints from patient and negative aspiration for heme and CSF  Needle localization: anatomical landmarks  Assessment  Ease of block: easy  Patient's tolerance of the procedure: comfortable throughout block  Additional Notes  Straightforward, though required two levels.  Attempted initially at likely T6-T7 without success; moved one level caudad with lamina at 3 cm via right paramedian approach.  Tuohy directed medially and cephalad with great engagement in ligament and obvious Brenden at 5cm.  Catheter threaded easily, taped at 10cm at the skin, negative SA/IV test dose.    William AREVALO  No  inadvertent dural puncture with Tuohy.  Dural puncture not performed with spinal needle

## 2023-04-25 LAB
ANION GAP SERPL CALC-SCNC: 3 MEQ/L
APPEARANCE UR: ABNORMAL
BACTERIA #/AREA URNS AUTO: ABNORMAL /HPF
BASOPHILS # BLD AUTO: 0 X10(3)/MCL (ref 0–0.2)
BASOPHILS NFR BLD AUTO: 0 %
BILIRUB UR QL STRIP.AUTO: ABNORMAL MG/DL
BUN SERPL-MCNC: 19.3 MG/DL (ref 8.4–25.7)
CALCIUM SERPL-MCNC: 8.3 MG/DL (ref 8.8–10)
CHLORIDE SERPL-SCNC: 105 MMOL/L (ref 98–107)
CO2 SERPL-SCNC: 32 MMOL/L (ref 23–31)
COLOR UR AUTO: ABNORMAL
CREAT SERPL-MCNC: 0.84 MG/DL (ref 0.73–1.18)
CREAT/UREA NIT SERPL: 23
EOSINOPHIL # BLD AUTO: 0 X10(3)/MCL (ref 0–0.9)
EOSINOPHIL NFR BLD AUTO: 0 %
ERYTHROCYTE [DISTWIDTH] IN BLOOD BY AUTOMATED COUNT: 14.1 % (ref 11.5–17)
GFR SERPLBLD CREATININE-BSD FMLA CKD-EPI: >60 MLS/MIN/1.73/M2
GLUCOSE SERPL-MCNC: 150 MG/DL (ref 82–115)
GLUCOSE UR QL STRIP.AUTO: NEGATIVE MG/DL
HCT VFR BLD AUTO: 38.6 % (ref 42–52)
HGB BLD-MCNC: 12.6 G/DL (ref 14–18)
IMM GRANULOCYTES # BLD AUTO: 0.02 X10(3)/MCL (ref 0–0.04)
IMM GRANULOCYTES NFR BLD AUTO: 0.3 %
KETONES UR QL STRIP.AUTO: NEGATIVE MG/DL
LEUKOCYTE ESTERASE UR QL STRIP.AUTO: ABNORMAL UNIT/L
LYMPHOCYTES # BLD AUTO: 0.36 X10(3)/MCL (ref 0.6–4.6)
LYMPHOCYTES NFR BLD AUTO: 4.5 %
MCH RBC QN AUTO: 31.5 PG (ref 27–31)
MCHC RBC AUTO-ENTMCNC: 32.6 G/DL (ref 33–36)
MCV RBC AUTO: 96.5 FL (ref 80–94)
MONOCYTES # BLD AUTO: 0.7 X10(3)/MCL (ref 0.1–1.3)
MONOCYTES NFR BLD AUTO: 8.8 %
NEUTROPHILS # BLD AUTO: 6.92 X10(3)/MCL (ref 2.1–9.2)
NEUTROPHILS NFR BLD AUTO: 86.4 %
NITRITE UR QL STRIP.AUTO: NEGATIVE
NRBC BLD AUTO-RTO: 0 %
PH UR STRIP.AUTO: 6 [PH]
PLATELET # BLD AUTO: 126 X10(3)/MCL (ref 130–400)
PMV BLD AUTO: 10.4 FL (ref 7.4–10.4)
POTASSIUM SERPL-SCNC: 4.2 MMOL/L (ref 3.5–5.1)
PROT UR QL STRIP.AUTO: ABNORMAL MG/DL
RBC # BLD AUTO: 4 X10(6)/MCL (ref 4.7–6.1)
RBC #/AREA URNS AUTO: 218 /HPF
RBC UR QL AUTO: ABNORMAL UNIT/L
SODIUM SERPL-SCNC: 140 MMOL/L (ref 136–145)
SP GR UR STRIP.AUTO: 1.03 (ref 1–1.03)
SQUAMOUS #/AREA URNS AUTO: <5 /HPF
UROBILINOGEN UR STRIP-ACNC: 1 MG/DL
WBC # SPEC AUTO: 8 X10(3)/MCL (ref 4.5–11.5)
WBC #/AREA URNS AUTO: 11 /HPF

## 2023-04-25 PROCEDURE — 99024 POSTOP FOLLOW-UP VISIT: CPT | Mod: ,,, | Performed by: PHYSICIAN ASSISTANT

## 2023-04-25 PROCEDURE — 25000003 PHARM REV CODE 250: Performed by: STUDENT IN AN ORGANIZED HEALTH CARE EDUCATION/TRAINING PROGRAM

## 2023-04-25 PROCEDURE — 25000003 PHARM REV CODE 250: Performed by: PHYSICIAN ASSISTANT

## 2023-04-25 PROCEDURE — 99024 PR POST-OP FOLLOW-UP VISIT: ICD-10-PCS | Mod: ,,, | Performed by: PHYSICIAN ASSISTANT

## 2023-04-25 PROCEDURE — 25000003 PHARM REV CODE 250: Performed by: SPECIALIST

## 2023-04-25 PROCEDURE — 63600175 PHARM REV CODE 636 W HCPCS: Performed by: SPECIALIST

## 2023-04-25 PROCEDURE — 81001 URINALYSIS AUTO W/SCOPE: CPT | Performed by: PHYSICIAN ASSISTANT

## 2023-04-25 PROCEDURE — 87088 URINE BACTERIA CULTURE: CPT | Performed by: PHYSICIAN ASSISTANT

## 2023-04-25 PROCEDURE — 21400001 HC TELEMETRY ROOM

## 2023-04-25 PROCEDURE — 27000221 HC OXYGEN, UP TO 24 HOURS

## 2023-04-25 PROCEDURE — 85025 COMPLETE CBC W/AUTO DIFF WBC: CPT | Performed by: SPECIALIST

## 2023-04-25 PROCEDURE — 63600175 PHARM REV CODE 636 W HCPCS: Performed by: PHYSICIAN ASSISTANT

## 2023-04-25 PROCEDURE — 80048 BASIC METABOLIC PNL TOTAL CA: CPT | Performed by: SPECIALIST

## 2023-04-25 RX ORDER — FLUTICASONE FUROATE AND VILANTEROL 200; 25 UG/1; UG/1
1 POWDER RESPIRATORY (INHALATION) DAILY
Status: DISCONTINUED | OUTPATIENT
Start: 2023-04-25 | End: 2023-05-02 | Stop reason: HOSPADM

## 2023-04-25 RX ORDER — POTASSIUM CHLORIDE 600 MG/1
8 TABLET, FILM COATED, EXTENDED RELEASE ORAL DAILY
Status: DISCONTINUED | OUTPATIENT
Start: 2023-04-25 | End: 2023-04-25

## 2023-04-25 RX ORDER — METHOCARBAMOL 750 MG/1
750 TABLET, FILM COATED ORAL 3 TIMES DAILY
Status: DISCONTINUED | OUTPATIENT
Start: 2023-04-25 | End: 2023-04-26

## 2023-04-25 RX ORDER — ATORVASTATIN CALCIUM 40 MG/1
40 TABLET, FILM COATED ORAL DAILY
Status: DISCONTINUED | OUTPATIENT
Start: 2023-04-25 | End: 2023-05-02 | Stop reason: HOSPADM

## 2023-04-25 RX ORDER — FERROUS SULFATE, DRIED 160(50) MG
1 TABLET, EXTENDED RELEASE ORAL DAILY
Status: DISCONTINUED | OUTPATIENT
Start: 2023-04-25 | End: 2023-05-02 | Stop reason: HOSPADM

## 2023-04-25 RX ORDER — OXYCODONE HYDROCHLORIDE 5 MG/1
5 TABLET ORAL ONCE
Status: COMPLETED | OUTPATIENT
Start: 2023-04-25 | End: 2023-04-25

## 2023-04-25 RX ORDER — LATANOPROST 50 UG/ML
1 SOLUTION/ DROPS OPHTHALMIC NIGHTLY
Status: DISCONTINUED | OUTPATIENT
Start: 2023-04-25 | End: 2023-05-02 | Stop reason: HOSPADM

## 2023-04-25 RX ORDER — ALPRAZOLAM 0.5 MG/1
0.5 TABLET ORAL 2 TIMES DAILY PRN
Status: DISCONTINUED | OUTPATIENT
Start: 2023-04-25 | End: 2023-05-02 | Stop reason: HOSPADM

## 2023-04-25 RX ORDER — PANTOPRAZOLE SODIUM 40 MG/1
40 TABLET, DELAYED RELEASE ORAL DAILY
Status: DISCONTINUED | OUTPATIENT
Start: 2023-04-25 | End: 2023-05-02 | Stop reason: HOSPADM

## 2023-04-25 RX ORDER — MONTELUKAST SODIUM 10 MG/1
10 TABLET ORAL DAILY
Status: DISCONTINUED | OUTPATIENT
Start: 2023-04-26 | End: 2023-05-02 | Stop reason: HOSPADM

## 2023-04-25 RX ORDER — ALBUTEROL SULFATE 90 UG/1
1 AEROSOL, METERED RESPIRATORY (INHALATION) EVERY 4 HOURS PRN
Status: DISCONTINUED | OUTPATIENT
Start: 2023-04-25 | End: 2023-05-02 | Stop reason: HOSPADM

## 2023-04-25 RX ORDER — ZOLPIDEM TARTRATE 5 MG/1
5 TABLET ORAL NIGHTLY PRN
Status: DISCONTINUED | OUTPATIENT
Start: 2023-04-25 | End: 2023-05-02 | Stop reason: HOSPADM

## 2023-04-25 RX ORDER — FUROSEMIDE 40 MG/1
40 TABLET ORAL DAILY
Status: DISCONTINUED | OUTPATIENT
Start: 2023-04-25 | End: 2023-04-28

## 2023-04-25 RX ORDER — DULOXETIN HYDROCHLORIDE 30 MG/1
30 CAPSULE, DELAYED RELEASE ORAL DAILY
Status: DISCONTINUED | OUTPATIENT
Start: 2023-04-25 | End: 2023-05-02 | Stop reason: HOSPADM

## 2023-04-25 RX ORDER — DOCUSATE SODIUM 100 MG/1
100 CAPSULE, LIQUID FILLED ORAL DAILY
Status: DISCONTINUED | OUTPATIENT
Start: 2023-04-25 | End: 2023-04-26

## 2023-04-25 RX ORDER — OXYCODONE HYDROCHLORIDE 10 MG/1
10 TABLET ORAL EVERY 4 HOURS PRN
Status: DISCONTINUED | OUTPATIENT
Start: 2023-04-25 | End: 2023-05-02 | Stop reason: HOSPADM

## 2023-04-25 RX ORDER — LOSARTAN POTASSIUM 25 MG/1
25 TABLET ORAL 2 TIMES DAILY
Status: DISCONTINUED | OUTPATIENT
Start: 2023-04-25 | End: 2023-05-02 | Stop reason: HOSPADM

## 2023-04-25 RX ORDER — TAMSULOSIN HYDROCHLORIDE 0.4 MG/1
1 CAPSULE ORAL DAILY
Status: DISCONTINUED | OUTPATIENT
Start: 2023-04-25 | End: 2023-05-02 | Stop reason: HOSPADM

## 2023-04-25 RX ORDER — TALC
9 POWDER (GRAM) TOPICAL NIGHTLY PRN
Status: DISCONTINUED | OUTPATIENT
Start: 2023-04-25 | End: 2023-05-02 | Stop reason: HOSPADM

## 2023-04-25 RX ORDER — SODIUM CHLORIDE, SODIUM LACTATE, POTASSIUM CHLORIDE, CALCIUM CHLORIDE 600; 310; 30; 20 MG/100ML; MG/100ML; MG/100ML; MG/100ML
INJECTION, SOLUTION INTRAVENOUS CONTINUOUS
Status: DISCONTINUED | OUTPATIENT
Start: 2023-04-25 | End: 2023-04-26

## 2023-04-25 RX ORDER — ENOXAPARIN SODIUM 100 MG/ML
40 INJECTION SUBCUTANEOUS EVERY 24 HOURS
Status: DISCONTINUED | OUTPATIENT
Start: 2023-04-25 | End: 2023-04-26

## 2023-04-25 RX ORDER — SOTALOL HYDROCHLORIDE 80 MG/1
80 TABLET ORAL 2 TIMES DAILY
Status: DISCONTINUED | OUTPATIENT
Start: 2023-04-25 | End: 2023-05-02 | Stop reason: HOSPADM

## 2023-04-25 RX ADMIN — FAMOTIDINE 20 MG: 20 TABLET, FILM COATED ORAL at 09:04

## 2023-04-25 RX ADMIN — KETOROLAC TROMETHAMINE 15 MG: 30 INJECTION, SOLUTION INTRAMUSCULAR; INTRAVENOUS at 12:04

## 2023-04-25 RX ADMIN — MUPIROCIN 1 G: 20 OINTMENT TOPICAL at 09:04

## 2023-04-25 RX ADMIN — Medication 1 TABLET: at 12:04

## 2023-04-25 RX ADMIN — METHOCARBAMOL 750 MG: 750 TABLET ORAL at 09:04

## 2023-04-25 RX ADMIN — KETOROLAC TROMETHAMINE 15 MG: 30 INJECTION, SOLUTION INTRAMUSCULAR; INTRAVENOUS at 05:04

## 2023-04-25 RX ADMIN — LATANOPROST 1 DROP: 50 SOLUTION OPHTHALMIC at 09:04

## 2023-04-25 RX ADMIN — METHOCARBAMOL 750 MG: 750 TABLET ORAL at 02:04

## 2023-04-25 RX ADMIN — ALPRAZOLAM 0.5 MG: 0.5 TABLET ORAL at 12:04

## 2023-04-25 RX ADMIN — PANTOPRAZOLE SODIUM 40 MG: 40 TABLET, DELAYED RELEASE ORAL at 12:04

## 2023-04-25 RX ADMIN — DULOXETINE 30 MG: 30 CAPSULE, DELAYED RELEASE ORAL at 12:04

## 2023-04-25 RX ADMIN — MUPIROCIN 1 G: 20 OINTMENT TOPICAL at 10:04

## 2023-04-25 RX ADMIN — TAMSULOSIN HYDROCHLORIDE 0.4 MG: 0.4 CAPSULE ORAL at 12:04

## 2023-04-25 RX ADMIN — VANCOMYCIN HYDROCHLORIDE 1000 MG: 1 INJECTION, POWDER, LYOPHILIZED, FOR SOLUTION INTRAVENOUS at 11:04

## 2023-04-25 RX ADMIN — KETOROLAC TROMETHAMINE 15 MG: 30 INJECTION, SOLUTION INTRAMUSCULAR; INTRAVENOUS at 11:04

## 2023-04-25 RX ADMIN — Medication 9 MG: at 09:04

## 2023-04-25 RX ADMIN — Medication: at 01:04

## 2023-04-25 RX ADMIN — SOTALOL HYDROCHLORIDE 80 MG: 80 TABLET ORAL at 09:04

## 2023-04-25 RX ADMIN — OXYCODONE HYDROCHLORIDE 10 MG: 10 TABLET ORAL at 06:04

## 2023-04-25 RX ADMIN — SOTALOL HYDROCHLORIDE 80 MG: 80 TABLET ORAL at 12:04

## 2023-04-25 RX ADMIN — ACETAMINOPHEN 325MG 650 MG: 325 TABLET ORAL at 07:04

## 2023-04-25 RX ADMIN — ENOXAPARIN SODIUM 40 MG: 40 INJECTION SUBCUTANEOUS at 09:04

## 2023-04-25 RX ADMIN — SODIUM CHLORIDE, POTASSIUM CHLORIDE, SODIUM LACTATE AND CALCIUM CHLORIDE: 600; 310; 30; 20 INJECTION, SOLUTION INTRAVENOUS at 02:04

## 2023-04-25 RX ADMIN — OXYCODONE 5 MG: 5 TABLET ORAL at 02:04

## 2023-04-25 RX ADMIN — ATORVASTATIN CALCIUM 40 MG: 40 TABLET, FILM COATED ORAL at 12:04

## 2023-04-25 RX ADMIN — FUROSEMIDE 40 MG: 40 TABLET ORAL at 12:04

## 2023-04-25 RX ADMIN — LOSARTAN POTASSIUM 25 MG: 25 TABLET, FILM COATED ORAL at 09:04

## 2023-04-25 RX ADMIN — DOCUSATE SODIUM 100 MG: 100 CAPSULE, LIQUID FILLED ORAL at 09:04

## 2023-04-25 RX ADMIN — OXYCODONE 5 MG: 5 TABLET ORAL at 12:04

## 2023-04-25 NOTE — NURSING
Nurses Note -- 4 Eyes      4/24/2023   11:14 PM      Skin assessed during: Admit      [x] No Altered Skin Integrity Present    []Prevention Measures Documented      [] Yes- Altered Skin Integrity Present or Discovered   [] LDA Added if Not in Epic (Describe Wound)   [] New Altered Skin Integrity was Present on Admit and Documented in LDA   [] Wound Image Taken    Wound Care Consulted? No    Attending Nurse:  Eduarda Curiel RN     Second RN/Staff Member: Jossy Loyd RN

## 2023-04-25 NOTE — PROGRESS NOTES
Pod 1  Pain controlled  Vss  Heart sinus  Wounds c/d/I  Cxr improved  Ct 250cc  Cont ct and epidural

## 2023-04-26 LAB
ANION GAP SERPL CALC-SCNC: 6 MEQ/L
BUN SERPL-MCNC: 18.8 MG/DL (ref 8.4–25.7)
CALCIUM SERPL-MCNC: 8.7 MG/DL (ref 8.8–10)
CHLORIDE SERPL-SCNC: 101 MMOL/L (ref 98–107)
CO2 SERPL-SCNC: 31 MMOL/L (ref 23–31)
CREAT SERPL-MCNC: 0.87 MG/DL (ref 0.73–1.18)
CREAT/UREA NIT SERPL: 22
GFR SERPLBLD CREATININE-BSD FMLA CKD-EPI: >60 MLS/MIN/1.73/M2
GLUCOSE SERPL-MCNC: 82 MG/DL (ref 82–115)
POTASSIUM SERPL-SCNC: 3.9 MMOL/L (ref 3.5–5.1)
SODIUM SERPL-SCNC: 138 MMOL/L (ref 136–145)

## 2023-04-26 PROCEDURE — 25000242 PHARM REV CODE 250 ALT 637 W/ HCPCS: Performed by: PHYSICIAN ASSISTANT

## 2023-04-26 PROCEDURE — 63600175 PHARM REV CODE 636 W HCPCS: Performed by: SPECIALIST

## 2023-04-26 PROCEDURE — 80048 BASIC METABOLIC PNL TOTAL CA: CPT | Performed by: SPECIALIST

## 2023-04-26 PROCEDURE — 25000003 PHARM REV CODE 250: Performed by: PHYSICIAN ASSISTANT

## 2023-04-26 PROCEDURE — 21400001 HC TELEMETRY ROOM

## 2023-04-26 PROCEDURE — 25000003 PHARM REV CODE 250: Performed by: SPECIALIST

## 2023-04-26 PROCEDURE — 99024 PR POST-OP FOLLOW-UP VISIT: ICD-10-PCS | Mod: ,,, | Performed by: PHYSICIAN ASSISTANT

## 2023-04-26 PROCEDURE — 99024 POSTOP FOLLOW-UP VISIT: CPT | Mod: ,,, | Performed by: PHYSICIAN ASSISTANT

## 2023-04-26 RX ORDER — ENOXAPARIN SODIUM 100 MG/ML
40 INJECTION SUBCUTANEOUS EVERY 24 HOURS
Status: DISCONTINUED | OUTPATIENT
Start: 2023-04-26 | End: 2023-05-02 | Stop reason: HOSPADM

## 2023-04-26 RX ORDER — METOCLOPRAMIDE HYDROCHLORIDE 5 MG/ML
10 INJECTION INTRAMUSCULAR; INTRAVENOUS EVERY 6 HOURS PRN
Status: DISCONTINUED | OUTPATIENT
Start: 2023-04-26 | End: 2023-05-02 | Stop reason: HOSPADM

## 2023-04-26 RX ORDER — CYCLOBENZAPRINE HCL 10 MG
10 TABLET ORAL 3 TIMES DAILY PRN
Status: DISCONTINUED | OUTPATIENT
Start: 2023-04-26 | End: 2023-05-02 | Stop reason: HOSPADM

## 2023-04-26 RX ORDER — POLYETHYLENE GLYCOL 3350 17 G/17G
17 POWDER, FOR SOLUTION ORAL 2 TIMES DAILY
Status: DISCONTINUED | OUTPATIENT
Start: 2023-04-26 | End: 2023-05-02 | Stop reason: HOSPADM

## 2023-04-26 RX ORDER — DOCUSATE SODIUM 100 MG/1
100 CAPSULE, LIQUID FILLED ORAL DAILY
Status: DISCONTINUED | OUTPATIENT
Start: 2023-04-27 | End: 2023-05-02 | Stop reason: HOSPADM

## 2023-04-26 RX ADMIN — Medication 9 MG: at 09:04

## 2023-04-26 RX ADMIN — LATANOPROST 1 DROP: 50 SOLUTION OPHTHALMIC at 09:04

## 2023-04-26 RX ADMIN — FAMOTIDINE 20 MG: 20 TABLET, FILM COATED ORAL at 09:04

## 2023-04-26 RX ADMIN — FAMOTIDINE 20 MG: 20 TABLET, FILM COATED ORAL at 08:04

## 2023-04-26 RX ADMIN — TAMSULOSIN HYDROCHLORIDE 0.4 MG: 0.4 CAPSULE ORAL at 08:04

## 2023-04-26 RX ADMIN — DULOXETINE 30 MG: 30 CAPSULE, DELAYED RELEASE ORAL at 08:04

## 2023-04-26 RX ADMIN — KETOROLAC TROMETHAMINE 15 MG: 30 INJECTION, SOLUTION INTRAMUSCULAR; INTRAVENOUS at 05:04

## 2023-04-26 RX ADMIN — VANCOMYCIN HYDROCHLORIDE 1000 MG: 1 INJECTION, POWDER, LYOPHILIZED, FOR SOLUTION INTRAVENOUS at 10:04

## 2023-04-26 RX ADMIN — ENOXAPARIN SODIUM 40 MG: 40 INJECTION SUBCUTANEOUS at 09:04

## 2023-04-26 RX ADMIN — MONTELUKAST 10 MG: 10 TABLET, FILM COATED ORAL at 08:04

## 2023-04-26 RX ADMIN — METHOCARBAMOL 750 MG: 750 TABLET ORAL at 08:04

## 2023-04-26 RX ADMIN — Medication 1 TABLET: at 08:04

## 2023-04-26 RX ADMIN — SODIUM CHLORIDE, POTASSIUM CHLORIDE, SODIUM LACTATE AND CALCIUM CHLORIDE: 600; 310; 30; 20 INJECTION, SOLUTION INTRAVENOUS at 06:04

## 2023-04-26 RX ADMIN — SOTALOL HYDROCHLORIDE 80 MG: 80 TABLET ORAL at 09:04

## 2023-04-26 RX ADMIN — ATORVASTATIN CALCIUM 40 MG: 40 TABLET, FILM COATED ORAL at 08:04

## 2023-04-26 RX ADMIN — ACETAMINOPHEN 325MG 650 MG: 325 TABLET ORAL at 03:04

## 2023-04-26 RX ADMIN — OXYCODONE HYDROCHLORIDE 10 MG: 10 TABLET ORAL at 11:04

## 2023-04-26 RX ADMIN — SOTALOL HYDROCHLORIDE 80 MG: 80 TABLET ORAL at 08:04

## 2023-04-26 RX ADMIN — OXYCODONE HYDROCHLORIDE 10 MG: 10 TABLET ORAL at 06:04

## 2023-04-26 RX ADMIN — MUPIROCIN 1 G: 20 OINTMENT TOPICAL at 09:04

## 2023-04-26 RX ADMIN — FLUTICASONE FUROATE AND VILANTEROL TRIFENATATE 1 PUFF: 200; 25 POWDER RESPIRATORY (INHALATION) at 09:04

## 2023-04-26 RX ADMIN — OXYCODONE HYDROCHLORIDE 10 MG: 10 TABLET ORAL at 03:04

## 2023-04-26 RX ADMIN — METHOCARBAMOL 750 MG: 750 TABLET ORAL at 03:04

## 2023-04-26 RX ADMIN — KETOROLAC TROMETHAMINE 15 MG: 30 INJECTION, SOLUTION INTRAMUSCULAR; INTRAVENOUS at 01:04

## 2023-04-26 RX ADMIN — OXYCODONE 5 MG: 5 TABLET ORAL at 01:04

## 2023-04-26 RX ADMIN — FUROSEMIDE 40 MG: 40 TABLET ORAL at 08:04

## 2023-04-26 RX ADMIN — PANTOPRAZOLE SODIUM 40 MG: 40 TABLET, DELAYED RELEASE ORAL at 08:04

## 2023-04-26 RX ADMIN — OXYCODONE HYDROCHLORIDE 10 MG: 10 TABLET ORAL at 07:04

## 2023-04-26 RX ADMIN — CYCLOBENZAPRINE 10 MG: 10 TABLET, FILM COATED ORAL at 05:04

## 2023-04-26 NOTE — PLAN OF CARE
04/26/23 1059   Discharge Assessment   Assessment Type Discharge Planning Assessment   Confirmed/corrected address, phone number and insurance Yes   Source of Information patient   When was your last doctors appointment?   (Last PCP appointment was in April)   Reason For Admission Left Diaphragmatic Plication   People in Home alone   Do you expect to return to your current living situation? Yes   Do you have help at home or someone to help you manage your care at home? Yes   Prior to hospitilization cognitive status: Alert/Oriented   Current cognitive status: Alert/Oriented   Home Accessibility stairs within home   Home Layout Able to live on 1st floor   Equipment Currently Used at Home CPAP   Readmission within 30 days? No   Do you currently have service(s) that help you manage your care at home? No   Do you take prescription medications? Yes   Do you have prescription coverage? Yes   Do you have any problems affording any of your prescribed medications? No   Who is going to help you get home at discharge? Brother/Sister In Law   How do you get to doctors appointments? family or friend will provide   Are you on dialysis? No   Do you take coumadin? No   Discharge Plan A Home Health   Discharge Plan B Home Health   Discharge Plan discussed with: Patient     Informed patient that physician has ordered home health and recommend [facility]. Patient is in agreement. FOC obtained. Referral sent via [employer].

## 2023-04-26 NOTE — PLAN OF CARE
Problem: Adult Inpatient Plan of Care  Goal: Plan of Care Review  Outcome: Ongoing, Progressing  Goal: Optimal Comfort and Wellbeing  Outcome: Ongoing, Progressing  Goal: Readiness for Transition of Care  Outcome: Ongoing, Progressing     Problem: Fall Injury Risk  Goal: Absence of Fall and Fall-Related Injury  Outcome: Ongoing, Progressing     Problem: Infection  Goal: Absence of Infection Signs and Symptoms  Outcome: Ongoing, Progressing

## 2023-04-27 LAB — BACTERIA UR CULT: NO GROWTH

## 2023-04-27 PROCEDURE — 25000003 PHARM REV CODE 250: Performed by: SPECIALIST

## 2023-04-27 PROCEDURE — 99024 PR POST-OP FOLLOW-UP VISIT: ICD-10-PCS | Mod: ,,, | Performed by: PHYSICIAN ASSISTANT

## 2023-04-27 PROCEDURE — 27000221 HC OXYGEN, UP TO 24 HOURS

## 2023-04-27 PROCEDURE — 63600175 PHARM REV CODE 636 W HCPCS: Performed by: SPECIALIST

## 2023-04-27 PROCEDURE — 99024 POSTOP FOLLOW-UP VISIT: CPT | Mod: ,,, | Performed by: PHYSICIAN ASSISTANT

## 2023-04-27 PROCEDURE — 21400001 HC TELEMETRY ROOM

## 2023-04-27 PROCEDURE — 25000242 PHARM REV CODE 250 ALT 637 W/ HCPCS: Performed by: PHYSICIAN ASSISTANT

## 2023-04-27 PROCEDURE — 25000003 PHARM REV CODE 250: Performed by: PHYSICIAN ASSISTANT

## 2023-04-27 RX ADMIN — OXYCODONE HYDROCHLORIDE 10 MG: 10 TABLET ORAL at 10:04

## 2023-04-27 RX ADMIN — DULOXETINE 30 MG: 30 CAPSULE, DELAYED RELEASE ORAL at 08:04

## 2023-04-27 RX ADMIN — OXYCODONE HYDROCHLORIDE 10 MG: 10 TABLET ORAL at 06:04

## 2023-04-27 RX ADMIN — ENOXAPARIN SODIUM 40 MG: 40 INJECTION SUBCUTANEOUS at 04:04

## 2023-04-27 RX ADMIN — POLYETHYLENE GLYCOL 3350 17 G: 17 POWDER, FOR SOLUTION ORAL at 08:04

## 2023-04-27 RX ADMIN — CYCLOBENZAPRINE 10 MG: 10 TABLET, FILM COATED ORAL at 02:04

## 2023-04-27 RX ADMIN — MONTELUKAST 10 MG: 10 TABLET, FILM COATED ORAL at 08:04

## 2023-04-27 RX ADMIN — FLUTICASONE FUROATE AND VILANTEROL TRIFENATATE 1 PUFF: 200; 25 POWDER RESPIRATORY (INHALATION) at 08:04

## 2023-04-27 RX ADMIN — Medication 1 TABLET: at 08:04

## 2023-04-27 RX ADMIN — SOTALOL HYDROCHLORIDE 80 MG: 80 TABLET ORAL at 08:04

## 2023-04-27 RX ADMIN — OXYCODONE HYDROCHLORIDE 10 MG: 10 TABLET ORAL at 02:04

## 2023-04-27 RX ADMIN — FAMOTIDINE 20 MG: 20 TABLET, FILM COATED ORAL at 08:04

## 2023-04-27 RX ADMIN — FAMOTIDINE 20 MG: 20 TABLET, FILM COATED ORAL at 09:04

## 2023-04-27 RX ADMIN — LATANOPROST 1 DROP: 50 SOLUTION OPHTHALMIC at 09:04

## 2023-04-27 RX ADMIN — DOCUSATE SODIUM 100 MG: 100 CAPSULE, LIQUID FILLED ORAL at 08:04

## 2023-04-27 RX ADMIN — Medication 9 MG: at 09:04

## 2023-04-27 RX ADMIN — SOTALOL HYDROCHLORIDE 80 MG: 80 TABLET ORAL at 09:04

## 2023-04-27 RX ADMIN — FUROSEMIDE 40 MG: 40 TABLET ORAL at 08:04

## 2023-04-27 RX ADMIN — ATORVASTATIN CALCIUM 40 MG: 40 TABLET, FILM COATED ORAL at 08:04

## 2023-04-27 RX ADMIN — PANTOPRAZOLE SODIUM 40 MG: 40 TABLET, DELAYED RELEASE ORAL at 08:04

## 2023-04-27 RX ADMIN — TAMSULOSIN HYDROCHLORIDE 0.4 MG: 0.4 CAPSULE ORAL at 08:04

## 2023-04-28 PROCEDURE — 25000003 PHARM REV CODE 250: Performed by: SPECIALIST

## 2023-04-28 PROCEDURE — 25000003 PHARM REV CODE 250: Performed by: PHYSICIAN ASSISTANT

## 2023-04-28 PROCEDURE — 63600175 PHARM REV CODE 636 W HCPCS: Performed by: SPECIALIST

## 2023-04-28 PROCEDURE — 94761 N-INVAS EAR/PLS OXIMETRY MLT: CPT

## 2023-04-28 PROCEDURE — 21400001 HC TELEMETRY ROOM

## 2023-04-28 PROCEDURE — 99024 PR POST-OP FOLLOW-UP VISIT: ICD-10-PCS | Mod: ,,, | Performed by: PHYSICIAN ASSISTANT

## 2023-04-28 PROCEDURE — 99024 POSTOP FOLLOW-UP VISIT: CPT | Mod: ,,, | Performed by: PHYSICIAN ASSISTANT

## 2023-04-28 RX ORDER — FUROSEMIDE 10 MG/ML
40 INJECTION INTRAMUSCULAR; INTRAVENOUS DAILY
Status: COMPLETED | OUTPATIENT
Start: 2023-04-28 | End: 2023-04-28

## 2023-04-28 RX ORDER — ADHESIVE BANDAGE
30 BANDAGE TOPICAL DAILY PRN
Status: DISCONTINUED | OUTPATIENT
Start: 2023-04-28 | End: 2023-05-02 | Stop reason: HOSPADM

## 2023-04-28 RX ORDER — FUROSEMIDE 40 MG/1
40 TABLET ORAL DAILY
Status: DISCONTINUED | OUTPATIENT
Start: 2023-04-29 | End: 2023-05-02 | Stop reason: HOSPADM

## 2023-04-28 RX ADMIN — MAGNESIUM HYDROXIDE 2400 MG: 400 SUSPENSION ORAL at 04:04

## 2023-04-28 RX ADMIN — PANTOPRAZOLE SODIUM 40 MG: 40 TABLET, DELAYED RELEASE ORAL at 08:04

## 2023-04-28 RX ADMIN — FUROSEMIDE 40 MG: 10 INJECTION, SOLUTION INTRAMUSCULAR; INTRAVENOUS at 11:04

## 2023-04-28 RX ADMIN — ACETAMINOPHEN 325MG 650 MG: 325 TABLET ORAL at 02:04

## 2023-04-28 RX ADMIN — POLYETHYLENE GLYCOL 3350 17 G: 17 POWDER, FOR SOLUTION ORAL at 08:04

## 2023-04-28 RX ADMIN — LATANOPROST 1 DROP: 50 SOLUTION OPHTHALMIC at 09:04

## 2023-04-28 RX ADMIN — CYCLOBENZAPRINE 10 MG: 10 TABLET, FILM COATED ORAL at 04:04

## 2023-04-28 RX ADMIN — FAMOTIDINE 20 MG: 20 TABLET, FILM COATED ORAL at 09:04

## 2023-04-28 RX ADMIN — ACETAMINOPHEN 325MG 650 MG: 325 TABLET ORAL at 08:04

## 2023-04-28 RX ADMIN — ATORVASTATIN CALCIUM 40 MG: 40 TABLET, FILM COATED ORAL at 08:04

## 2023-04-28 RX ADMIN — TAMSULOSIN HYDROCHLORIDE 0.4 MG: 0.4 CAPSULE ORAL at 08:04

## 2023-04-28 RX ADMIN — LOSARTAN POTASSIUM 25 MG: 25 TABLET, FILM COATED ORAL at 09:04

## 2023-04-28 RX ADMIN — ACETAMINOPHEN 325MG 650 MG: 325 TABLET ORAL at 09:04

## 2023-04-28 RX ADMIN — FAMOTIDINE 20 MG: 20 TABLET, FILM COATED ORAL at 08:04

## 2023-04-28 RX ADMIN — MONTELUKAST 10 MG: 10 TABLET, FILM COATED ORAL at 08:04

## 2023-04-28 RX ADMIN — DOCUSATE SODIUM 100 MG: 100 CAPSULE, LIQUID FILLED ORAL at 08:04

## 2023-04-28 RX ADMIN — POLYETHYLENE GLYCOL 3350 17 G: 17 POWDER, FOR SOLUTION ORAL at 09:04

## 2023-04-28 RX ADMIN — ENOXAPARIN SODIUM 40 MG: 40 INJECTION SUBCUTANEOUS at 04:04

## 2023-04-28 RX ADMIN — SOTALOL HYDROCHLORIDE 80 MG: 80 TABLET ORAL at 09:04

## 2023-04-28 RX ADMIN — DULOXETINE 30 MG: 30 CAPSULE, DELAYED RELEASE ORAL at 08:04

## 2023-04-28 RX ADMIN — Medication 1 TABLET: at 08:04

## 2023-04-28 RX ADMIN — SOTALOL HYDROCHLORIDE 80 MG: 80 TABLET ORAL at 08:04

## 2023-04-28 RX ADMIN — CYCLOBENZAPRINE 10 MG: 10 TABLET, FILM COATED ORAL at 08:04

## 2023-04-28 RX ADMIN — Medication 9 MG: at 09:04

## 2023-04-29 PROCEDURE — 99024 PR POST-OP FOLLOW-UP VISIT: ICD-10-PCS | Mod: ,,, | Performed by: PHYSICIAN ASSISTANT

## 2023-04-29 PROCEDURE — 21400001 HC TELEMETRY ROOM

## 2023-04-29 PROCEDURE — 63600175 PHARM REV CODE 636 W HCPCS: Performed by: SPECIALIST

## 2023-04-29 PROCEDURE — 25000003 PHARM REV CODE 250: Performed by: PHYSICIAN ASSISTANT

## 2023-04-29 PROCEDURE — 25000003 PHARM REV CODE 250: Performed by: SPECIALIST

## 2023-04-29 PROCEDURE — 99024 POSTOP FOLLOW-UP VISIT: CPT | Mod: ,,, | Performed by: PHYSICIAN ASSISTANT

## 2023-04-29 PROCEDURE — 25000242 PHARM REV CODE 250 ALT 637 W/ HCPCS: Performed by: PHYSICIAN ASSISTANT

## 2023-04-29 RX ADMIN — LOSARTAN POTASSIUM 25 MG: 25 TABLET, FILM COATED ORAL at 09:04

## 2023-04-29 RX ADMIN — ACETAMINOPHEN 325MG 650 MG: 325 TABLET ORAL at 09:04

## 2023-04-29 RX ADMIN — ACETAMINOPHEN 325MG 650 MG: 325 TABLET ORAL at 08:04

## 2023-04-29 RX ADMIN — FUROSEMIDE 40 MG: 40 TABLET ORAL at 08:04

## 2023-04-29 RX ADMIN — POLYETHYLENE GLYCOL 3350 17 G: 17 POWDER, FOR SOLUTION ORAL at 09:04

## 2023-04-29 RX ADMIN — LOSARTAN POTASSIUM 25 MG: 25 TABLET, FILM COATED ORAL at 08:04

## 2023-04-29 RX ADMIN — FAMOTIDINE 20 MG: 20 TABLET, FILM COATED ORAL at 09:04

## 2023-04-29 RX ADMIN — POLYETHYLENE GLYCOL 3350 17 G: 17 POWDER, FOR SOLUTION ORAL at 08:04

## 2023-04-29 RX ADMIN — CYCLOBENZAPRINE 10 MG: 10 TABLET, FILM COATED ORAL at 06:04

## 2023-04-29 RX ADMIN — DULOXETINE 30 MG: 30 CAPSULE, DELAYED RELEASE ORAL at 08:04

## 2023-04-29 RX ADMIN — ATORVASTATIN CALCIUM 40 MG: 40 TABLET, FILM COATED ORAL at 08:04

## 2023-04-29 RX ADMIN — Medication 9 MG: at 09:04

## 2023-04-29 RX ADMIN — LATANOPROST 1 DROP: 50 SOLUTION OPHTHALMIC at 09:04

## 2023-04-29 RX ADMIN — ENOXAPARIN SODIUM 40 MG: 40 INJECTION SUBCUTANEOUS at 06:04

## 2023-04-29 RX ADMIN — FLUTICASONE FUROATE AND VILANTEROL TRIFENATATE 1 PUFF: 200; 25 POWDER RESPIRATORY (INHALATION) at 08:04

## 2023-04-29 RX ADMIN — TAMSULOSIN HYDROCHLORIDE 0.4 MG: 0.4 CAPSULE ORAL at 08:04

## 2023-04-29 RX ADMIN — MONTELUKAST 10 MG: 10 TABLET, FILM COATED ORAL at 08:04

## 2023-04-29 RX ADMIN — DOCUSATE SODIUM 100 MG: 100 CAPSULE, LIQUID FILLED ORAL at 08:04

## 2023-04-29 RX ADMIN — SOTALOL HYDROCHLORIDE 80 MG: 80 TABLET ORAL at 09:04

## 2023-04-29 RX ADMIN — SOTALOL HYDROCHLORIDE 80 MG: 80 TABLET ORAL at 08:04

## 2023-04-29 RX ADMIN — ACETAMINOPHEN 325MG 650 MG: 325 TABLET ORAL at 06:04

## 2023-04-29 RX ADMIN — FAMOTIDINE 20 MG: 20 TABLET, FILM COATED ORAL at 08:04

## 2023-04-29 RX ADMIN — Medication 1 TABLET: at 08:04

## 2023-04-29 RX ADMIN — PANTOPRAZOLE SODIUM 40 MG: 40 TABLET, DELAYED RELEASE ORAL at 08:04

## 2023-04-29 NOTE — PROGRESS NOTES
"Inpatient Nutrition Evaluation    Admit Date: 4/24/2023   Total duration of encounter: 5 days    Nutrition Recommendation/Prescription     Continue cardiac diet as tolerated  RD to monitor po intake and weight changes    Nutrition Assessment     Chart Review    Reason Seen: length of stay    Malnutrition Screening Tool Results   Have you recently lost weight without trying?: No  Have you been eating poorly because of a decreased appetite?: No   MST Score: 0     Diagnosis:  SOB c paralyzed diaphragm  Plication of diaphragm POD 5    Relevant Medical History: COPD, GERD, HLD, HTN, PAF    Nutrition-Related Medications: calcium-vit D3, Colace, Pepcid, Lasix, Zofran PRN, Protonix, Miralax    Nutrition-Related Labs: n/a      Diet Order: Diet Cardiac  Oral Supplement Order: none  Appetite/Oral Intake: fair/50-75% of meals  Factors Affecting Nutritional Intake: none identified  Food/Christianity/Cultural Preferences: none reported  Food Allergies: none reported    Skin Integrity: incision, puncture  Wound(s):       Comments    4/29: pt reports fair appetite since admission, with good appetite prior. Pt complained of constipation, with BM this AM. Pt stated appetite was decreased r/t constipation, and expects appetite to increase today after BM. Pt refused ONS at this time. UBW reported 218 lb with intentional weight loss prior admission. Weight of 102.1 kg (225 lb) on 4/29. Will continue to monitor.    Anthropometrics    Height: 5' 8" (172.7 cm) Height Method: Stated  Last Weight: 102.1 kg (225 lb) (04/29/23 0500) Weight Method: Standard Scale  BMI (Calculated): 34.2  BMI Classification: obese grade I (BMI 30-34.9)        Ideal Body Weight (IBW), Male: 154 lb     % Ideal Body Weight, Male (lb): 141.73 %                          Usual Weight Provided By: patient and EMR weight history    Wt Readings from Last 5 Encounters:   04/29/23 102.1 kg (225 lb)   04/11/23 101.9 kg (224 lb 9.6 oz)   03/15/23 106 kg (233 lb 9.6 oz) "   03/14/23 106.4 kg (234 lb 9.6 oz)   02/27/23 107.5 kg (237 lb)     Weight Change(s) Since Admission:  Admit Weight: 104.3 kg (230 lb) (04/13/23 1046)      Patient Education    Not applicable.    Monitoring & Evaluation     Dietitian will monitor food and beverage intake and weight change.  Nutrition Risk/Follow-Up: low (follow-up in 5-7 days)  Patients assigned 'low nutrition risk' status do not qualify for a full nutritional assessment but will be monitored and re-evaluated in a 5-7 day time period. Please consult if re-evaluation needed sooner.    Mariana Fajardo, Registration Eligible, Provisional LDN

## 2023-04-29 NOTE — PROGRESS NOTES
John Bullard is a 72 y.o. male patient.   1. Chronically elevated hemidiaphragm    2. H/O atrial fibrillation without current medication      Past Medical History:   Diagnosis Date    Anticoagulant long-term use     Anxiety     Asthma     Atrial fibrillation     Decreased testosterone level     Diaphragmatic hernia without obstruction or gangrene     DJD (degenerative joint disease)     Enlarged prostate     Exertional dyspnea     GERD (gastroesophageal reflux disease)     Glaucoma     History of kidney stones     HLD (hyperlipidemia)     Hypertension     OA (osteoarthritis)     Obesity     MINDA on CPAP     PAF (paroxysmal atrial fibrillation)     s/p Ablation (2023)    Pneumonia 2023    SBO (small bowel obstruction)     hx multiple (3) incudling requiring surgical intervention    Venous insufficiency     Ventricular tachycardia      Past Surgical History Pertinent Negatives:   Procedure Date Noted    ADENOIDECTOMY 2022    APPENDECTOMY 2022    BRAIN SURGERY 2022    BREAST SURGERY 2022    CARDIAC VALVE REPLACEMENT 2022     SECTION 2022    CHOLECYSTECTOMY 2022    COLON SURGERY 2022    CORONARY ARTERY BYPASS GRAFT 2022    COSMETIC SURGERY 2022    EYE SURGERY 2022    FRACTURE SURGERY 2022    HERNIA REPAIR 2022    JOINT REPLACEMENT 2022    KIDNEY TRANSPLANT 2022    LIVER TRANSPLANT 2022    PROSTATE SURGERY 2022    SMALL INTESTINE SURGERY 2022    SPINE SURGERY 2022    TONSILLECTOMY 2022    TUBAL LIGATION 2022    VASECTOMY 2022     Scheduled Meds:   atorvastatin  40 mg Oral Daily    calcium-vitamin D3  1 tablet Oral Daily    docusate sodium  100 mg Oral Daily    DULoxetine  30 mg Oral Daily    enoxaparin  40 mg Subcutaneous Daily    famotidine  20 mg Oral BID    fluticasone furoate-vilanteroL  1 puff Inhalation Daily    furosemide  40 mg Oral Daily    latanoprost  1 drop Both  "Eyes QHS    loperamide  4 mg Oral Once    losartan  25 mg Oral BID    montelukast  10 mg Oral Daily    pantoprazole  40 mg Oral Daily    polyethylene glycol  17 g Oral BID    sotaloL  80 mg Oral BID    tamsulosin  1 capsule Oral Daily     Continuous Infusions:  PRN Meds:acetaminophen, albuterol, ALPRAZolam, cyclobenzaprine, magnesium hydroxide 400 mg/5 ml, melatonin, metoclopramide HCl, nalbuphine, naloxone, ondansetron, oxyCODONE, oxyCODONE, zolpidem    Review of patient's allergies indicates:   Allergen Reactions    Ativan [lorazepam] Hallucinations     There are no hospital problems to display for this patient.    Blood pressure 120/77, pulse 84, temperature 97.5 °F (36.4 °C), temperature source Oral, resp. rate 18, height 5' 8" (1.727 m), weight 102.1 kg (225 lb), SpO2 (!) 90 %.    Subjective:    POD #5  Awake. Alert.  Sitting up in chair  C/o sob with ambulation. Started back on lasix yesterday.      Objective:   AFVSS. 90% on RA  Heart: RRR  Lungs: respirations nonlabored, diminished at bases  Incision: c/d/I, echymosis      Assesment/Plan:    S/p L diaphragmatic plication  - diurese  - increase activity as tolerated  - IS            SAFIA Campbell  4/29/2023    "

## 2023-04-30 LAB
ANION GAP SERPL CALC-SCNC: 7 MEQ/L
APPEARANCE UR: CLEAR
BACTERIA #/AREA URNS AUTO: NORMAL /HPF
BASOPHILS # BLD AUTO: 0.03 X10(3)/MCL (ref 0–0.2)
BASOPHILS NFR BLD AUTO: 0.3 %
BILIRUB UR QL STRIP.AUTO: NEGATIVE MG/DL
BUN SERPL-MCNC: 16.9 MG/DL (ref 8.4–25.7)
CALCIUM SERPL-MCNC: 9.3 MG/DL (ref 8.8–10)
CHLORIDE SERPL-SCNC: 98 MMOL/L (ref 98–107)
CO2 SERPL-SCNC: 32 MMOL/L (ref 23–31)
COLOR UR AUTO: YELLOW
CREAT SERPL-MCNC: 0.86 MG/DL (ref 0.73–1.18)
CREAT/UREA NIT SERPL: 20
EOSINOPHIL # BLD AUTO: 0.23 X10(3)/MCL (ref 0–0.9)
EOSINOPHIL NFR BLD AUTO: 2.5 %
ERYTHROCYTE [DISTWIDTH] IN BLOOD BY AUTOMATED COUNT: 13.9 % (ref 11.5–17)
GFR SERPLBLD CREATININE-BSD FMLA CKD-EPI: >60 MLS/MIN/1.73/M2
GLUCOSE SERPL-MCNC: 89 MG/DL (ref 82–115)
GLUCOSE UR QL STRIP.AUTO: NEGATIVE MG/DL
HCT VFR BLD AUTO: 41.3 % (ref 42–52)
HGB BLD-MCNC: 13.8 G/DL (ref 14–18)
IMM GRANULOCYTES # BLD AUTO: 0.05 X10(3)/MCL (ref 0–0.04)
IMM GRANULOCYTES NFR BLD AUTO: 0.5 %
KETONES UR QL STRIP.AUTO: ABNORMAL MG/DL
LEUKOCYTE ESTERASE UR QL STRIP.AUTO: ABNORMAL UNIT/L
LYMPHOCYTES # BLD AUTO: 0.64 X10(3)/MCL (ref 0.6–4.6)
LYMPHOCYTES NFR BLD AUTO: 7 %
MCH RBC QN AUTO: 31.4 PG (ref 27–31)
MCHC RBC AUTO-ENTMCNC: 33.4 G/DL (ref 33–36)
MCV RBC AUTO: 94.1 FL (ref 80–94)
MONOCYTES # BLD AUTO: 0.93 X10(3)/MCL (ref 0.1–1.3)
MONOCYTES NFR BLD AUTO: 10.2 %
NEUTROPHILS # BLD AUTO: 7.22 X10(3)/MCL (ref 2.1–9.2)
NEUTROPHILS NFR BLD AUTO: 79.5 %
NITRITE UR QL STRIP.AUTO: NEGATIVE
NRBC BLD AUTO-RTO: 0 %
PH UR STRIP.AUTO: 6 [PH]
PLATELET # BLD AUTO: 176 X10(3)/MCL (ref 130–400)
PMV BLD AUTO: 10 FL (ref 7.4–10.4)
POTASSIUM SERPL-SCNC: 4 MMOL/L (ref 3.5–5.1)
PROT UR QL STRIP.AUTO: NEGATIVE MG/DL
RBC # BLD AUTO: 4.39 X10(6)/MCL (ref 4.7–6.1)
RBC #/AREA URNS AUTO: <5 /HPF
RBC UR QL AUTO: NEGATIVE UNIT/L
SODIUM SERPL-SCNC: 137 MMOL/L (ref 136–145)
SP GR UR STRIP.AUTO: 1.02 (ref 1–1.03)
SQUAMOUS #/AREA URNS AUTO: <5 /HPF
UROBILINOGEN UR STRIP-ACNC: 1 MG/DL
WBC # SPEC AUTO: 9.1 X10(3)/MCL (ref 4.5–11.5)
WBC #/AREA URNS AUTO: <5 /HPF

## 2023-04-30 PROCEDURE — 25000003 PHARM REV CODE 250: Performed by: SPECIALIST

## 2023-04-30 PROCEDURE — 25000242 PHARM REV CODE 250 ALT 637 W/ HCPCS: Performed by: PHYSICIAN ASSISTANT

## 2023-04-30 PROCEDURE — 99024 PR POST-OP FOLLOW-UP VISIT: ICD-10-PCS | Mod: ,,, | Performed by: PHYSICIAN ASSISTANT

## 2023-04-30 PROCEDURE — 63600175 PHARM REV CODE 636 W HCPCS: Performed by: SPECIALIST

## 2023-04-30 PROCEDURE — 99024 POSTOP FOLLOW-UP VISIT: CPT | Mod: ,,, | Performed by: PHYSICIAN ASSISTANT

## 2023-04-30 PROCEDURE — 25000003 PHARM REV CODE 250: Performed by: PHYSICIAN ASSISTANT

## 2023-04-30 PROCEDURE — 80048 BASIC METABOLIC PNL TOTAL CA: CPT | Performed by: PHYSICIAN ASSISTANT

## 2023-04-30 PROCEDURE — 21400001 HC TELEMETRY ROOM

## 2023-04-30 PROCEDURE — 81001 URINALYSIS AUTO W/SCOPE: CPT | Performed by: PHYSICIAN ASSISTANT

## 2023-04-30 PROCEDURE — 85025 COMPLETE CBC W/AUTO DIFF WBC: CPT | Performed by: PHYSICIAN ASSISTANT

## 2023-04-30 RX ADMIN — PANTOPRAZOLE SODIUM 40 MG: 40 TABLET, DELAYED RELEASE ORAL at 08:04

## 2023-04-30 RX ADMIN — FAMOTIDINE 20 MG: 20 TABLET, FILM COATED ORAL at 08:04

## 2023-04-30 RX ADMIN — Medication 1 TABLET: at 08:04

## 2023-04-30 RX ADMIN — CYCLOBENZAPRINE 10 MG: 10 TABLET, FILM COATED ORAL at 08:04

## 2023-04-30 RX ADMIN — FLUTICASONE FUROATE AND VILANTEROL TRIFENATATE 1 PUFF: 200; 25 POWDER RESPIRATORY (INHALATION) at 08:04

## 2023-04-30 RX ADMIN — MONTELUKAST 10 MG: 10 TABLET, FILM COATED ORAL at 08:04

## 2023-04-30 RX ADMIN — CYCLOBENZAPRINE 10 MG: 10 TABLET, FILM COATED ORAL at 04:04

## 2023-04-30 RX ADMIN — FUROSEMIDE 40 MG: 40 TABLET ORAL at 08:04

## 2023-04-30 RX ADMIN — SOTALOL HYDROCHLORIDE 80 MG: 80 TABLET ORAL at 08:04

## 2023-04-30 RX ADMIN — ACETAMINOPHEN 325MG 650 MG: 325 TABLET ORAL at 08:04

## 2023-04-30 RX ADMIN — ENOXAPARIN SODIUM 40 MG: 40 INJECTION SUBCUTANEOUS at 04:04

## 2023-04-30 RX ADMIN — ACETAMINOPHEN 325MG 650 MG: 325 TABLET ORAL at 09:04

## 2023-04-30 RX ADMIN — LOSARTAN POTASSIUM 25 MG: 25 TABLET, FILM COATED ORAL at 08:04

## 2023-04-30 RX ADMIN — ATORVASTATIN CALCIUM 40 MG: 40 TABLET, FILM COATED ORAL at 08:04

## 2023-04-30 RX ADMIN — TAMSULOSIN HYDROCHLORIDE 0.4 MG: 0.4 CAPSULE ORAL at 08:04

## 2023-04-30 RX ADMIN — DULOXETINE 30 MG: 30 CAPSULE, DELAYED RELEASE ORAL at 08:04

## 2023-04-30 RX ADMIN — DOCUSATE SODIUM 100 MG: 100 CAPSULE, LIQUID FILLED ORAL at 08:04

## 2023-04-30 RX ADMIN — ACETAMINOPHEN 325MG 650 MG: 325 TABLET ORAL at 04:04

## 2023-04-30 RX ADMIN — LATANOPROST 1 DROP: 50 SOLUTION OPHTHALMIC at 08:04

## 2023-04-30 RX ADMIN — Medication 9 MG: at 08:04

## 2023-04-30 RX ADMIN — POLYETHYLENE GLYCOL 3350 17 G: 17 POWDER, FOR SOLUTION ORAL at 08:04

## 2023-04-30 NOTE — PLAN OF CARE
Problem: Adult Inpatient Plan of Care  Goal: Plan of Care Review  4/30/2023 0536 by Jonathan Liang RN  Outcome: Ongoing, Progressing  4/30/2023 0529 by Jonathan Liang RN  Outcome: Ongoing, Progressing  Goal: Patient-Specific Goal (Individualized)  4/30/2023 0536 by Jonathan Liang RN  Outcome: Ongoing, Progressing  4/30/2023 0529 by Jonathan Liang RN  Outcome: Ongoing, Progressing  Goal: Absence of Hospital-Acquired Illness or Injury  Outcome: Ongoing, Progressing  Goal: Optimal Comfort and Wellbeing  4/30/2023 0536 by Jonathan Liang RN  Outcome: Ongoing, Progressing  4/30/2023 0529 by Jonathan Liang RN  Outcome: Ongoing, Progressing  Goal: Readiness for Transition of Care  Outcome: Ongoing, Progressing     Problem: Fall Injury Risk  Goal: Absence of Fall and Fall-Related Injury  Outcome: Ongoing, Progressing     Problem: Infection  Goal: Absence of Infection Signs and Symptoms  Outcome: Ongoing, Progressing

## 2023-04-30 NOTE — PROGRESS NOTES
John Bullard is a 72 y.o. male patient.   1. Chronically elevated hemidiaphragm    2. H/O atrial fibrillation without current medication      Past Medical History:   Diagnosis Date    Anticoagulant long-term use     Anxiety     Asthma     Atrial fibrillation     Decreased testosterone level     Diaphragmatic hernia without obstruction or gangrene     DJD (degenerative joint disease)     Enlarged prostate     Exertional dyspnea     GERD (gastroesophageal reflux disease)     Glaucoma     History of kidney stones     HLD (hyperlipidemia)     Hypertension     OA (osteoarthritis)     Obesity     MINDA on CPAP     PAF (paroxysmal atrial fibrillation)     s/p Ablation (2023)    Pneumonia 2023    SBO (small bowel obstruction)     hx multiple (3) incudling requiring surgical intervention    Venous insufficiency     Ventricular tachycardia      Past Surgical History Pertinent Negatives:   Procedure Date Noted    ADENOIDECTOMY 2022    APPENDECTOMY 2022    BRAIN SURGERY 2022    BREAST SURGERY 2022    CARDIAC VALVE REPLACEMENT 2022     SECTION 2022    CHOLECYSTECTOMY 2022    COLON SURGERY 2022    CORONARY ARTERY BYPASS GRAFT 2022    COSMETIC SURGERY 2022    EYE SURGERY 2022    FRACTURE SURGERY 2022    HERNIA REPAIR 2022    JOINT REPLACEMENT 2022    KIDNEY TRANSPLANT 2022    LIVER TRANSPLANT 2022    PROSTATE SURGERY 2022    SMALL INTESTINE SURGERY 2022    SPINE SURGERY 2022    TONSILLECTOMY 2022    TUBAL LIGATION 2022    VASECTOMY 2022     Scheduled Meds:   atorvastatin  40 mg Oral Daily    calcium-vitamin D3  1 tablet Oral Daily    docusate sodium  100 mg Oral Daily    DULoxetine  30 mg Oral Daily    enoxaparin  40 mg Subcutaneous Daily    famotidine  20 mg Oral BID    fluticasone furoate-vilanteroL  1 puff Inhalation Daily    furosemide  40 mg Oral Daily    latanoprost  1 drop Both  "Eyes QHS    loperamide  4 mg Oral Once    losartan  25 mg Oral BID    montelukast  10 mg Oral Daily    pantoprazole  40 mg Oral Daily    polyethylene glycol  17 g Oral BID    sotaloL  80 mg Oral BID    tamsulosin  1 capsule Oral Daily     Continuous Infusions:  PRN Meds:acetaminophen, albuterol, ALPRAZolam, cyclobenzaprine, magnesium hydroxide 400 mg/5 ml, melatonin, metoclopramide HCl, nalbuphine, naloxone, ondansetron, oxyCODONE, oxyCODONE, zolpidem    Review of patient's allergies indicates:   Allergen Reactions    Ativan [lorazepam] Hallucinations     There are no hospital problems to display for this patient.    Blood pressure 102/64, pulse 72, temperature 98 °F (36.7 °C), temperature source Oral, resp. rate 16, height 5' 8" (1.727 m), weight 102.4 kg (225 lb 12 oz), SpO2 (!) 92 %.    Subjective:    POD #6  Awake. Alert.  Sitting up in chair  C/o incisional soreness  On Lasix     Objective:   AFVSS. 92% on RA  Heart: RRR  Lungs: respirations nonlabored, diminished at bases  Incision: c/d/I, echymosis  Uop 450cc/24hrs      Assesment/Plan:    S/p L diaphragmatic plication  - continue diuresis  - f/u bmp  - increase activity as tolerated  - IS    SAFIA Campbell  4/30/2023    "

## 2023-05-01 PROCEDURE — 25000003 PHARM REV CODE 250: Performed by: PHYSICIAN ASSISTANT

## 2023-05-01 PROCEDURE — 25000003 PHARM REV CODE 250: Performed by: SPECIALIST

## 2023-05-01 PROCEDURE — 21400001 HC TELEMETRY ROOM

## 2023-05-01 PROCEDURE — 63600175 PHARM REV CODE 636 W HCPCS: Performed by: SPECIALIST

## 2023-05-01 PROCEDURE — 25000242 PHARM REV CODE 250 ALT 637 W/ HCPCS: Performed by: PHYSICIAN ASSISTANT

## 2023-05-01 PROCEDURE — 94761 N-INVAS EAR/PLS OXIMETRY MLT: CPT

## 2023-05-01 RX ADMIN — SOTALOL HYDROCHLORIDE 80 MG: 80 TABLET ORAL at 08:05

## 2023-05-01 RX ADMIN — Medication 9 MG: at 08:05

## 2023-05-01 RX ADMIN — TAMSULOSIN HYDROCHLORIDE 0.4 MG: 0.4 CAPSULE ORAL at 08:05

## 2023-05-01 RX ADMIN — LOSARTAN POTASSIUM 25 MG: 25 TABLET, FILM COATED ORAL at 08:05

## 2023-05-01 RX ADMIN — FUROSEMIDE 40 MG: 40 TABLET ORAL at 08:05

## 2023-05-01 RX ADMIN — CYCLOBENZAPRINE 10 MG: 10 TABLET, FILM COATED ORAL at 11:05

## 2023-05-01 RX ADMIN — LATANOPROST 1 DROP: 50 SOLUTION OPHTHALMIC at 08:05

## 2023-05-01 RX ADMIN — DOCUSATE SODIUM 100 MG: 100 CAPSULE, LIQUID FILLED ORAL at 08:05

## 2023-05-01 RX ADMIN — Medication 1 TABLET: at 08:05

## 2023-05-01 RX ADMIN — ENOXAPARIN SODIUM 40 MG: 40 INJECTION SUBCUTANEOUS at 04:05

## 2023-05-01 RX ADMIN — FAMOTIDINE 20 MG: 20 TABLET, FILM COATED ORAL at 08:05

## 2023-05-01 RX ADMIN — POLYETHYLENE GLYCOL 3350 17 G: 17 POWDER, FOR SOLUTION ORAL at 08:05

## 2023-05-01 RX ADMIN — ACETAMINOPHEN 325MG 650 MG: 325 TABLET ORAL at 08:05

## 2023-05-01 RX ADMIN — CYCLOBENZAPRINE 10 MG: 10 TABLET, FILM COATED ORAL at 08:05

## 2023-05-01 RX ADMIN — FLUTICASONE FUROATE AND VILANTEROL TRIFENATATE 1 PUFF: 200; 25 POWDER RESPIRATORY (INHALATION) at 08:05

## 2023-05-01 RX ADMIN — ATORVASTATIN CALCIUM 40 MG: 40 TABLET, FILM COATED ORAL at 08:05

## 2023-05-01 RX ADMIN — PANTOPRAZOLE SODIUM 40 MG: 40 TABLET, DELAYED RELEASE ORAL at 08:05

## 2023-05-01 RX ADMIN — MONTELUKAST 10 MG: 10 TABLET, FILM COATED ORAL at 08:05

## 2023-05-01 RX ADMIN — ACETAMINOPHEN 325MG 650 MG: 325 TABLET ORAL at 06:05

## 2023-05-01 RX ADMIN — DULOXETINE 30 MG: 30 CAPSULE, DELAYED RELEASE ORAL at 08:05

## 2023-05-01 NOTE — PLAN OF CARE
Problem: Adult Inpatient Plan of Care  Goal: Plan of Care Review  Outcome: Ongoing, Progressing  Goal: Patient-Specific Goal (Individualized)  Outcome: Ongoing, Progressing  Goal: Absence of Hospital-Acquired Illness or Injury  Outcome: Ongoing, Progressing  Goal: Optimal Comfort and Wellbeing  Outcome: Ongoing, Progressing  Goal: Readiness for Transition of Care  Outcome: Ongoing, Progressing     Problem: Fall Injury Risk  Goal: Absence of Fall and Fall-Related Injury  Outcome: Ongoing, Progressing     Problem: Infection  Goal: Absence of Infection Signs and Symptoms  Outcome: Ongoing, Progressing   See today's documentation for interventions

## 2023-05-02 VITALS
BODY MASS INDEX: 33.88 KG/M2 | OXYGEN SATURATION: 92 % | TEMPERATURE: 98 F | SYSTOLIC BLOOD PRESSURE: 106 MMHG | RESPIRATION RATE: 18 BRPM | HEIGHT: 68 IN | DIASTOLIC BLOOD PRESSURE: 67 MMHG | WEIGHT: 223.56 LBS | HEART RATE: 83 BPM

## 2023-05-02 PROCEDURE — 25000003 PHARM REV CODE 250: Performed by: PHYSICIAN ASSISTANT

## 2023-05-02 PROCEDURE — 94761 N-INVAS EAR/PLS OXIMETRY MLT: CPT

## 2023-05-02 PROCEDURE — 99024 POSTOP FOLLOW-UP VISIT: CPT | Mod: ,,, | Performed by: PHYSICIAN ASSISTANT

## 2023-05-02 PROCEDURE — 25000242 PHARM REV CODE 250 ALT 637 W/ HCPCS: Performed by: PHYSICIAN ASSISTANT

## 2023-05-02 PROCEDURE — 99024 PR POST-OP FOLLOW-UP VISIT: ICD-10-PCS | Mod: ,,, | Performed by: PHYSICIAN ASSISTANT

## 2023-05-02 PROCEDURE — 25000003 PHARM REV CODE 250: Performed by: SPECIALIST

## 2023-05-02 RX ORDER — CYCLOBENZAPRINE HCL 10 MG
10 TABLET ORAL 3 TIMES DAILY PRN
Qty: 30 TABLET | Refills: 0 | Status: SHIPPED | OUTPATIENT
Start: 2023-05-02 | End: 2023-05-12

## 2023-05-02 RX ADMIN — LOSARTAN POTASSIUM 25 MG: 25 TABLET, FILM COATED ORAL at 09:05

## 2023-05-02 RX ADMIN — SOTALOL HYDROCHLORIDE 80 MG: 80 TABLET ORAL at 09:05

## 2023-05-02 RX ADMIN — FAMOTIDINE 20 MG: 20 TABLET, FILM COATED ORAL at 09:05

## 2023-05-02 RX ADMIN — MONTELUKAST 10 MG: 10 TABLET, FILM COATED ORAL at 09:05

## 2023-05-02 RX ADMIN — ACETAMINOPHEN 325MG 650 MG: 325 TABLET ORAL at 02:05

## 2023-05-02 RX ADMIN — PANTOPRAZOLE SODIUM 40 MG: 40 TABLET, DELAYED RELEASE ORAL at 09:05

## 2023-05-02 RX ADMIN — FLUTICASONE FUROATE AND VILANTEROL TRIFENATATE 1 PUFF: 200; 25 POWDER RESPIRATORY (INHALATION) at 11:05

## 2023-05-02 RX ADMIN — CYCLOBENZAPRINE 10 MG: 10 TABLET, FILM COATED ORAL at 02:05

## 2023-05-02 RX ADMIN — DULOXETINE 30 MG: 30 CAPSULE, DELAYED RELEASE ORAL at 09:05

## 2023-05-02 RX ADMIN — FUROSEMIDE 40 MG: 40 TABLET ORAL at 09:05

## 2023-05-02 RX ADMIN — TAMSULOSIN HYDROCHLORIDE 0.4 MG: 0.4 CAPSULE ORAL at 09:05

## 2023-05-02 RX ADMIN — DOCUSATE SODIUM 100 MG: 100 CAPSULE, LIQUID FILLED ORAL at 09:05

## 2023-05-02 RX ADMIN — Medication 1 TABLET: at 09:05

## 2023-05-02 RX ADMIN — POLYETHYLENE GLYCOL 3350 17 G: 17 POWDER, FOR SOLUTION ORAL at 09:05

## 2023-05-02 RX ADMIN — ATORVASTATIN CALCIUM 40 MG: 40 TABLET, FILM COATED ORAL at 09:05

## 2023-05-02 NOTE — PLAN OF CARE
Rounded on pt, pt states his sister in law and brother will drive him home this afternoon. Pt denies any dc needs. Pt set up with [facility] and dc orders [place] via [facility] [place] Margarita STR[place] [place]

## 2023-05-02 NOTE — DISCHARGE SUMMARY
[facility] - 6th Floor Medical Telemetry  Cardiothoracic Surgery  Discharge Summary      Patient Name: John Bullard  MRN: [id number]  Admission Date: 4/24/2023  Hospital Length of Stay: 8 days  Discharge Date and Time:  05/02/2023 7:56 AM  Attending Physician: Roberto Sylvester IV, MD   Discharging Provider: SAFIA Fallon  Primary Care Provider: Fabiano Liang MD    HPI:   No notes on file    Procedure(s) (LRB):  PLICATION, DIAPHRAGM (Left)      Indwelling Lines/Drains at time of discharge:   Lines/Drains/Airways     None               Hospital Course: No notes on file    Goals of Care Treatment Preferences:  Code Status: Full Code          Significant Diagnostic Studies: Labs: All labs within the past 24 hours have been reviewed    Pending Diagnostic Studies:     None          No new Assessment & Plan notes have been filed under this hospital service since the last note was generated.  Service: Cardiothoracic Surgery    Final Active Diagnoses:    Diagnosis Date Noted POA    PRINCIPAL PROBLEM:  Severe obesity (BMI 35.0-39.9) with comorbidity [E66.01] 06/08/2022 Yes      Problems Resolved During this Admission:      Discharged Condition: good    Disposition: Home or Self Care    Follow Up:   Follow-up Information     [facility].    Specialty: Home Health Services  Why: This is your home health agency.  Contact information:  [address] [place] [address]  [address] [place] [id number] [phone]                       Patient Instructions:      HOME HEALTH ORDERS     Order Specific Question Answer Comments   What Home Health Agency is the patient currently using? Other/External [facility]     HOME HEALTH ORDERS   Order Comments: Home Health:[facility] and [facility] All Services     Order Specific Question Answer Comments   What Home Health Agency is the patient currently using? Other/External      Medications:  Reconciled Home Medications:      Medication List      START taking these  medications    cyclobenzaprine 10 MG tablet  Commonly known as: FLEXERIL  Take 1 tablet (10 mg total) by mouth 3 (three) times daily as needed for Muscle spasms.        CONTINUE taking these medications    albuterol 90 mcg/actuation inhaler  Commonly known as: PROVENTIL/VENTOLIN HFA  INHALE 2 PUFFS BY MOUTH EVERY 6 HOURS     ALPRAZolam 0.5 MG tablet  Commonly known as: XANAX  Take 1 tablet by mouth twice daily as needed for anxiety     calcium citrate-vitamin D3 315-200 mg 315 mg-5 mcg (200 unit) per tablet  Commonly known as: CITRACAL+D  Take 1 tablet by mouth once daily.     cinnamon bark 500 mg capsule  Take 500 mg by mouth once daily.     docusate sodium 100 MG capsule  Commonly known as: COLACE  Take 100 mg by mouth Daily.     DULoxetine 30 MG capsule  Commonly known as: CYMBALTA  Take 30 mg by mouth once daily.     ELIQUIS 5 mg Tab  Generic drug: apixaban  Take 5 mg by mouth 2 (two) times daily.     fish oil-omega-3 fatty acids 300-1,000 mg capsule  Take 1 capsule by mouth once daily.     fluticasone-salmeterol 230-21 mcg/dose 230-21 mcg/actuation Hfaa inhaler  Commonly known as: ADVAIR HFA  Inhale 2 puffs into the lungs daily as needed. Controller     furosemide 40 MG tablet  Commonly known as: LASIX  Take 1 tablet (40 mg total) by mouth once daily.     loratadine 10 mg tablet  Commonly known as: CLARITIN  Take 10 mg by mouth daily as needed for Allergies.     losartan 25 MG tablet  Commonly known as: COZAAR  Take 1 tablet (25 mg total) by mouth 2 (two) times a day.     melatonin 10 mg Tab  Take 10-20 mg by mouth daily as needed (insomnia).     montelukast 10 mg tablet  Commonly known as: SINGULAIR  Take 1 tablet by mouth once daily     naproxen sodium 220 MG tablet  Commonly known as: ANAPROX  Take 220 mg by mouth daily as needed.     NON FORMULARY MEDICATION  Take 1 tablet by mouth once daily. Tumeric     omeprazole 40 MG capsule  Commonly known as: PRILOSEC  Take 1 capsule by mouth once daily     potassium  chloride 8 MEQ Tbsr  Commonly known as: KLOR-CON  Take 1 tablet (8 mEq total) by mouth once daily.     rosuvastatin 20 MG tablet  Commonly known as: CRESTOR  TAKE 1 TABLET BY MOUTH ONCE IN THE EVENING     sotaloL 80 MG tablet  Commonly known as: BETAPACE  Take 80 mg by mouth 2 (two) times daily.     tamsulosin 0.4 mg Cap  Commonly known as: FLOMAX  Take 1 capsule by mouth once daily.     testosterone cypionate 200 mg/mL injection  Commonly known as: DEPOTESTOTERONE CYPIONATE  INJECT 1 ML (CC) INTRAMUSCULARLY EVERY 14 DAYS     travoprost (benzalkonium) 0.004 % ophthalmic solution  Commonly known as: TRAVATAN  Place 1 drop into both eyes every evening.     zolpidem 5 MG Tab  Commonly known as: AMBIEN  TAKE 1 TABLET BY MOUTH ONCE DAILY AT BEDTIME AS NEEDED FOR INSOMNIA          Time spent on the discharge of patient: 33 minutes    SAFIA Fallon  Cardiothoracic Surgery  [facility] - 6th Floor Medical Telemetry

## 2023-05-02 NOTE — PLAN OF CARE
All discharge information has been submitted to [facility] via [facility].

## 2023-05-03 ENCOUNTER — PATIENT OUTREACH (OUTPATIENT)
Dept: ADMINISTRATIVE | Facility: CLINIC | Age: 73
End: 2023-05-03
Payer: COMMERCIAL

## 2023-05-03 PROCEDURE — G0180 PR HOME HEALTH MD CERTIFICATION: ICD-10-PCS | Mod: ,,, | Performed by: SPECIALIST

## 2023-05-03 PROCEDURE — G0180 MD CERTIFICATION HHA PATIENT: HCPCS | Mod: ,,, | Performed by: SPECIALIST

## 2023-05-03 NOTE — PROGRESS NOTES
C3 nurse spoke with John Bullard for a TCC post hospital discharge follow up call. The patient has a scheduled [facility] appointment with  on Fabiano Liang MD (Internal Medicine) on 5/9/2023; @9:40 and Roberto Sylvester Iv, MD (Cardiothoracic Surgery) on 5/23/2023; @ 8:40.

## 2023-05-07 DIAGNOSIS — F32.A DEPRESSION, UNSPECIFIED DEPRESSION TYPE: Primary | ICD-10-CM

## 2023-05-08 RX ORDER — DULOXETIN HYDROCHLORIDE 30 MG/1
CAPSULE, DELAYED RELEASE ORAL
Qty: 30 CAPSULE | Refills: 0 | Status: SHIPPED | OUTPATIENT
Start: 2023-05-08 | End: 2023-06-14 | Stop reason: SDUPTHER

## 2023-05-09 ENCOUNTER — OFFICE VISIT (OUTPATIENT)
Dept: INTERNAL MEDICINE | Facility: CLINIC | Age: 73
End: 2023-05-09
Payer: COMMERCIAL

## 2023-05-09 VITALS
DIASTOLIC BLOOD PRESSURE: 68 MMHG | BODY MASS INDEX: 33.95 KG/M2 | WEIGHT: 224 LBS | HEART RATE: 67 BPM | HEIGHT: 68 IN | SYSTOLIC BLOOD PRESSURE: 102 MMHG | RESPIRATION RATE: 16 BRPM | OXYGEN SATURATION: 93 %

## 2023-05-09 DIAGNOSIS — R06.02 SOB (SHORTNESS OF BREATH): ICD-10-CM

## 2023-05-09 DIAGNOSIS — Z98.890 S/P PLICATION OF DIAPHRAGM: ICD-10-CM

## 2023-05-09 DIAGNOSIS — R49.0 HOARSENESS OF VOICE: ICD-10-CM

## 2023-05-09 DIAGNOSIS — E66.01 SEVERE OBESITY (BMI 35.0-39.9) WITH COMORBIDITY: ICD-10-CM

## 2023-05-09 DIAGNOSIS — R79.89 LOW TESTOSTERONE: ICD-10-CM

## 2023-05-09 DIAGNOSIS — Z09 HOSPITAL DISCHARGE FOLLOW-UP: Primary | ICD-10-CM

## 2023-05-09 PROCEDURE — 99496 TRANSITIONAL CARE MANAGE SERVICE 7 DAY DISCHARGE: ICD-10-PCS | Mod: 25,,, | Performed by: NURSE PRACTITIONER

## 2023-05-09 PROCEDURE — 1125F AMNT PAIN NOTED PAIN PRSNT: CPT | Mod: CPTII,,, | Performed by: NURSE PRACTITIONER

## 2023-05-09 PROCEDURE — 3288F FALL RISK ASSESSMENT DOCD: CPT | Mod: CPTII,,, | Performed by: NURSE PRACTITIONER

## 2023-05-09 PROCEDURE — 4010F PR ACE/ARB THEARPY RXD/TAKEN: ICD-10-PCS | Mod: CPTII,,, | Performed by: NURSE PRACTITIONER

## 2023-05-09 PROCEDURE — 3078F PR MOST RECENT DIASTOLIC BLOOD PRESSURE < 80 MM HG: ICD-10-PCS | Mod: CPTII,,, | Performed by: NURSE PRACTITIONER

## 2023-05-09 PROCEDURE — 1101F PR PT FALLS ASSESS DOC 0-1 FALLS W/OUT INJ PAST YR: ICD-10-PCS | Mod: CPTII,,, | Performed by: NURSE PRACTITIONER

## 2023-05-09 PROCEDURE — 1160F RVW MEDS BY RX/DR IN RCRD: CPT | Mod: CPTII,,, | Performed by: NURSE PRACTITIONER

## 2023-05-09 PROCEDURE — 3008F PR BODY MASS INDEX (BMI) DOCUMENTED: ICD-10-PCS | Mod: CPTII,,, | Performed by: NURSE PRACTITIONER

## 2023-05-09 PROCEDURE — 1101F PT FALLS ASSESS-DOCD LE1/YR: CPT | Mod: CPTII,,, | Performed by: NURSE PRACTITIONER

## 2023-05-09 PROCEDURE — 1159F PR MEDICATION LIST DOCUMENTED IN MEDICAL RECORD: ICD-10-PCS | Mod: CPTII,,, | Performed by: NURSE PRACTITIONER

## 2023-05-09 PROCEDURE — 1125F PR PAIN SEVERITY QUANTIFIED, PAIN PRESENT: ICD-10-PCS | Mod: CPTII,,, | Performed by: NURSE PRACTITIONER

## 2023-05-09 PROCEDURE — 99496 TRANSJ CARE MGMT HIGH F2F 7D: CPT | Mod: 25,,, | Performed by: NURSE PRACTITIONER

## 2023-05-09 PROCEDURE — 96372 THER/PROPH/DIAG INJ SC/IM: CPT | Mod: ,,, | Performed by: NURSE PRACTITIONER

## 2023-05-09 PROCEDURE — 1160F PR REVIEW ALL MEDS BY PRESCRIBER/CLIN PHARMACIST DOCUMENTED: ICD-10-PCS | Mod: CPTII,,, | Performed by: NURSE PRACTITIONER

## 2023-05-09 PROCEDURE — 3074F PR MOST RECENT SYSTOLIC BLOOD PRESSURE < 130 MM HG: ICD-10-PCS | Mod: CPTII,,, | Performed by: NURSE PRACTITIONER

## 2023-05-09 PROCEDURE — 3008F BODY MASS INDEX DOCD: CPT | Mod: CPTII,,, | Performed by: NURSE PRACTITIONER

## 2023-05-09 PROCEDURE — 3288F PR FALLS RISK ASSESSMENT DOCUMENTED: ICD-10-PCS | Mod: CPTII,,, | Performed by: NURSE PRACTITIONER

## 2023-05-09 PROCEDURE — 3074F SYST BP LT 130 MM HG: CPT | Mod: CPTII,,, | Performed by: NURSE PRACTITIONER

## 2023-05-09 PROCEDURE — 1159F MED LIST DOCD IN RCRD: CPT | Mod: CPTII,,, | Performed by: NURSE PRACTITIONER

## 2023-05-09 PROCEDURE — 96372 PR INJECTION,THERAP/PROPH/DIAG2ST, IM OR SUBCUT: ICD-10-PCS | Mod: ,,, | Performed by: NURSE PRACTITIONER

## 2023-05-09 PROCEDURE — 3078F DIAST BP <80 MM HG: CPT | Mod: CPTII,,, | Performed by: NURSE PRACTITIONER

## 2023-05-09 PROCEDURE — 4010F ACE/ARB THERAPY RXD/TAKEN: CPT | Mod: CPTII,,, | Performed by: NURSE PRACTITIONER

## 2023-05-09 RX ORDER — METOPROLOL SUCCINATE 100 MG/1
100 TABLET, EXTENDED RELEASE ORAL DAILY
COMMUNITY
Start: 2023-05-08

## 2023-05-09 RX ORDER — TRAVOPROST OPHTHALMIC SOLUTION 0.04 MG/ML
SOLUTION OPHTHALMIC
COMMUNITY
End: 2024-01-23 | Stop reason: SDUPTHER

## 2023-05-09 RX ORDER — TESTOSTERONE CYPIONATE 200 MG/ML
200 INJECTION, SOLUTION INTRAMUSCULAR
Status: COMPLETED | OUTPATIENT
Start: 2023-05-09 | End: 2023-05-09

## 2023-05-09 RX ADMIN — TESTOSTERONE CYPIONATE 200 MG: 200 INJECTION, SOLUTION INTRAMUSCULAR at 10:05

## 2023-05-09 NOTE — PROGRESS NOTES
Subjective:      Patient ID: John Bullard is a 72 y.o. male.    Chief Complaint: Follow-up (Pt here for 3 month f/u, pt was to have labs but not drawn but was in the hospital pt got out of hospital 5/3 had operation on diaphram. Pt is still very sore, but is doing better. )    Family and/or Caretaker present at visit?  No.  Diagnostic tests reviewed/disposition: No diagnosic tests pending after this hospitalization.  Disease/illness education: Yes  Home health/community services discussion/referrals: Patient has home health established at [facility] .   Establishment or re-establishment of referral orders for community resources: No other necessary community resources.   Discussion with other health care providers: No discussion with other health care providers necessary.  I reviewed and reconciled the medications from hospital discharge.    HPI: Patient here today hospital follow up. 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  S/p plication of diaphragm 4/24. D/c home from hospital 5/2. Pain is intermittent and exacerbated by bending anteriorly. He is under the care of [facility] . Denies fever, chills, sweats, inc SOB, issues with bowels/bladder, CP, palpitations, or SOB.    Review of patient's allergies indicates:   Allergen Reactions    Ativan [lorazepam] Hallucinations       Review of Systems  Constitutional: No fever, No chills, No sweats, No fatigue, weight loss.  Ear/Nose/Mouth/Throat: No nasal congestion, No vertigo.  Respiratory: No shortness of breath, No cough, No sputum production, No wheezing, No exertional dyspnea.   Cardiovascular: No chest pain, No palpitations, No claudication, No orthopnea, No peripheral edema.  Gastrointestinal: No nausea, No vomiting, No diarrhea, No rectal bleeding, No constipation, No abdominal pain.  Genitourinary: No dysuria, No hematuria, No frequency.  Musculoskeletal: L flank/sx incision pain  Integumentary: No rash,  "ecchymosis.    Objective:   Visit Vitals  /68 (BP Location: Left arm, Patient Position: Sitting, BP Method: Medium (Manual))   Pulse 67   Resp 16   Ht 5' 8" (1.727 m)   Wt 101.6 kg (224 lb)   SpO2 (!) 93%   BMI 34.06 kg/m²     The patient's weight trend is below:   Wt Readings from Last 4 Encounters:   05/09/23 101.6 kg (224 lb)   05/02/23 101.4 kg (223 lb 8.7 oz)   04/11/23 101.9 kg (224 lb 9.6 oz)   03/15/23 106 kg (233 lb 9.6 oz)        Physical Exam  Vitals and nursing note reviewed.   Constitutional:       General: He is not in acute distress.     Appearance: Normal appearance. He is obese. He is not ill-appearing, toxic-appearing or diaphoretic.   HENT:      Head: Normocephalic and atraumatic.   Cardiovascular:      Rate and Rhythm: Normal rate and regular rhythm.      Pulses: Normal pulses.      Heart sounds: No murmur heard.    No friction rub. No gallop.   Pulmonary:      Effort: Pulmonary effort is normal. No respiratory distress.      Breath sounds: Normal breath sounds. No stridor. No wheezing, rhonchi or rales.   Abdominal:      General: Abdomen is flat. Bowel sounds are normal. There is no distension.      Palpations: Abdomen is soft. There is no mass.      Tenderness: There is no abdominal tenderness. There is no guarding or rebound.      Hernia: No hernia is present.   Musculoskeletal:         General: No swelling, tenderness, deformity or signs of injury. Normal range of motion.      Right lower leg: Edema present.      Left lower leg: Edema present.   Skin:     General: Skin is warm and dry.      Capillary Refill: Capillary refill takes less than 2 seconds.      Coloration: Skin is not jaundiced or pale.      Findings: Bruising present. No erythema, lesion or rash.   Neurological:      General: No focal deficit present.      Mental Status: He is alert and oriented to person, place, and time. Mental status is at baseline.      Motor: No weakness.      Coordination: Coordination normal.      " Gait: Gait normal.   Psychiatric:         Mood and Affect: Mood normal.         Thought Content: Thought content normal.         Judgment: Judgment normal.           Assessment/Plan:   1. Hospital discharge follow-up  Personally reviewed hospital records/dx studies    2. S/P plication of diaphragm  Follow up with specialist that is also caring for this problem.    3. SOB (shortness of breath)  Stable since hospitalization    4. Low testosterone    - testosterone cypionate injection 200 mg  - Testosterone; Future  - PSA, Screening; Future    5. Hoarseness of voice  Extensive review of surgical intervention-may take several weeks to resolve  Inc fluids    6. Obesity  HEALTH EDUCATION RECOMMENDATIONS:  weight control, diet and cholesterol, exercise 150 minutes per week, stress reduction, and adequate sleep 6-8 hours per night        Medication List with Changes/Refills   Current Medications    ALBUTEROL (PROVENTIL/VENTOLIN HFA) 90 MCG/ACTUATION INHALER    INHALE 2 PUFFS BY MOUTH EVERY 6 HOURS    ALPRAZOLAM (XANAX) 0.5 MG TABLET    Take 1 tablet by mouth twice daily as needed for anxiety    CALCIUM CITRATE-VITAMIN D3 315-200 MG (CITRACAL+D) 315 MG-5 MCG (200 UNIT) PER TABLET    Take 1 tablet by mouth once daily.    CINNAMON BARK 500 MG CAPSULE    Take 500 mg by mouth once daily.    CYCLOBENZAPRINE (FLEXERIL) 10 MG TABLET    Take 1 tablet (10 mg total) by mouth 3 (three) times daily as needed for Muscle spasms.    DOCUSATE SODIUM (COLACE) 100 MG CAPSULE    Take 100 mg by mouth Daily.    DULOXETINE (CYMBALTA) 30 MG CAPSULE    TAKE 1 CAPSULE BY MOUTH ONCE DAILY DO  NOT  CRUSH  OR  CHEW    ELIQUIS 5 MG TAB    Take 5 mg by mouth 2 (two) times daily.    FLUTICASONE-SALMETEROL 230-21 MCG/DOSE (ADVAIR HFA) 230-21 MCG/ACTUATION HFAA INHALER    Inhale 2 puffs into the lungs daily as needed. Controller    FUROSEMIDE (LASIX) 40 MG TABLET    Take 1 tablet (40 mg total) by mouth once daily.    LORATADINE (CLARITIN) 10 MG TABLET     Take 10 mg by mouth daily as needed for Allergies.    LOSARTAN (COZAAR) 25 MG TABLET    Take 1 tablet (25 mg total) by mouth 2 (two) times a day.    MELATONIN 10 MG TAB    Take 10-20 mg by mouth daily as needed (insomnia).    METOPROLOL SUCCINATE (TOPROL-XL) 100 MG 24 HR TABLET    Take 100 mg by mouth.    MONTELUKAST (SINGULAIR) 10 MG TABLET    Take 1 tablet by mouth once daily    NAPROXEN SODIUM (ANAPROX) 220 MG TABLET    Take 220 mg by mouth daily as needed.    NON FORMULARY MEDICATION    Take 1 tablet by mouth once daily. Tumeric    OMEGA-3 FATTY ACIDS/FISH OIL (FISH OIL-OMEGA-3 FATTY ACIDS) 300-1,000 MG CAPSULE    Take 1 capsule by mouth once daily.    OMEPRAZOLE (PRILOSEC) 40 MG CAPSULE    Take 1 capsule by mouth once daily    POTASSIUM CHLORIDE (KLOR-CON) 8 MEQ TBSR    Take 1 tablet (8 mEq total) by mouth once daily.    ROSUVASTATIN (CRESTOR) 20 MG TABLET    TAKE 1 TABLET BY MOUTH ONCE IN THE EVENING    SOTALOL (BETAPACE) 80 MG TABLET    Take 80 mg by mouth 2 (two) times daily.    TAMSULOSIN (FLOMAX) 0.4 MG CAP    Take 1 capsule by mouth once daily.    TESTOSTERONE CYPIONATE (DEPOTESTOTERONE CYPIONATE) 200 MG/ML INJECTION    INJECT 1 ML (CC) INTRAMUSCULARLY EVERY 14 DAYS    TRAVOPROST (TRAVATAN Z) 0.004 % OPHTHALMIC SOLUTION    travoprost 0.004 % eye drops   Instill 1 drop twice a day by ophthalmic route as needed.    TRAVOPROST, BENZALKONIUM, (TRAVATAN) 0.004 % OPHTHALMIC SOLUTION    Place 1 drop into both eyes every evening.    ZOLPIDEM (AMBIEN) 5 MG TAB    TAKE 1 TABLET BY MOUTH ONCE DAILY AT BEDTIME AS NEEDED FOR INSOMNIA        No follow-ups on file.    Chemistry:  Lab Results   Component Value Date     04/30/2023    K 4.0 04/30/2023    CHLORIDE 98 04/30/2023    BUN 16.9 04/30/2023    CREATININE 0.86 04/30/2023    EGFRNORACEVR >60 04/30/2023    GLUCOSE 89 04/30/2023    CALCIUM 9.3 04/30/2023    ALKPHOS 55 04/17/2023    LABPROT 6.2 04/17/2023    ALBUMIN 3.9 04/17/2023    BILIDIR 0.3 04/29/2022     IBILI 0.30 04/29/2022    AST 22 04/17/2023    ALT 22 04/17/2023    MG 2.40 01/21/2023        Lab Results   Component Value Date    HGBA1C 5.0 05/12/2021        Hematology:  Lab Results   Component Value Date    WBC 9.1 04/30/2023    HGB 13.8 (L) 04/30/2023    HCT 41.3 (L) 04/30/2023     04/30/2023       Lipid Panel:  Lab Results   Component Value Date    CHOL 88 04/05/2023    HDL 28 (L) 04/05/2023    LDL 46.00 (L) 04/05/2023    TRIG 68 04/05/2023    TOTALCHOLEST 3 04/05/2023        Urine:  Lab Results   Component Value Date    COLORUA Yellow 04/30/2023    APPEARANCEUA Clear 04/30/2023    SGUA 1.017 04/30/2023    PHUA 6.0 04/30/2023    PROTEINUA Negative 04/30/2023    GLUCOSEUA Negative 04/30/2023    KETONESUA Trace (A) 04/30/2023    BLOODUA Negative 04/30/2023    NITRITESUA Negative 04/30/2023    LEUKOCYTESUR Trace (A) 04/30/2023    RBCUA <5 04/30/2023    WBCUA <5 04/30/2023    BACTERIA None Seen 04/30/2023

## 2023-05-17 DIAGNOSIS — J98.6 CHRONICALLY ELEVATED HEMIDIAPHRAGM: Primary | ICD-10-CM

## 2023-05-17 RX ORDER — CYCLOBENZAPRINE HCL 10 MG
10 TABLET ORAL 3 TIMES DAILY PRN
Qty: 30 TABLET | Refills: 0 | Status: SHIPPED | OUTPATIENT
Start: 2023-05-17 | End: 2023-05-27

## 2023-05-23 ENCOUNTER — OFFICE VISIT (OUTPATIENT)
Dept: CARDIAC SURGERY | Facility: CLINIC | Age: 73
End: 2023-05-23
Payer: COMMERCIAL

## 2023-05-23 ENCOUNTER — HOSPITAL ENCOUNTER (OUTPATIENT)
Dept: RADIOLOGY | Facility: HOSPITAL | Age: 73
Discharge: HOME | End: 2023-05-23
Attending: SPECIALIST
Payer: COMMERCIAL

## 2023-05-23 VITALS
DIASTOLIC BLOOD PRESSURE: 88 MMHG | BODY MASS INDEX: 33.34 KG/M2 | SYSTOLIC BLOOD PRESSURE: 127 MMHG | HEIGHT: 68 IN | HEART RATE: 83 BPM | WEIGHT: 220 LBS

## 2023-05-23 DIAGNOSIS — J98.6 CHRONICALLY ELEVATED HEMIDIAPHRAGM: Primary | ICD-10-CM

## 2023-05-23 DIAGNOSIS — J98.6 CHRONICALLY ELEVATED HEMIDIAPHRAGM: ICD-10-CM

## 2023-05-23 PROCEDURE — 3079F PR MOST RECENT DIASTOLIC BLOOD PRESSURE 80-89 MM HG: ICD-10-PCS | Mod: CPTII,,, | Performed by: SPECIALIST

## 2023-05-23 PROCEDURE — 1159F PR MEDICATION LIST DOCUMENTED IN MEDICAL RECORD: ICD-10-PCS | Mod: CPTII,,, | Performed by: SPECIALIST

## 2023-05-23 PROCEDURE — 3288F PR FALLS RISK ASSESSMENT DOCUMENTED: ICD-10-PCS | Mod: CPTII,,, | Performed by: SPECIALIST

## 2023-05-23 PROCEDURE — 3008F PR BODY MASS INDEX (BMI) DOCUMENTED: ICD-10-PCS | Mod: CPTII,,, | Performed by: SPECIALIST

## 2023-05-23 PROCEDURE — 99024 PR POST-OP FOLLOW-UP VISIT: ICD-10-PCS | Mod: ,,, | Performed by: SPECIALIST

## 2023-05-23 PROCEDURE — 1111F DSCHRG MED/CURRENT MED MERGE: CPT | Mod: CPTII,,, | Performed by: SPECIALIST

## 2023-05-23 PROCEDURE — 3008F BODY MASS INDEX DOCD: CPT | Mod: CPTII,,, | Performed by: SPECIALIST

## 2023-05-23 PROCEDURE — 3288F FALL RISK ASSESSMENT DOCD: CPT | Mod: CPTII,,, | Performed by: SPECIALIST

## 2023-05-23 PROCEDURE — 4010F PR ACE/ARB THEARPY RXD/TAKEN: ICD-10-PCS | Mod: CPTII,,, | Performed by: SPECIALIST

## 2023-05-23 PROCEDURE — 3074F PR MOST RECENT SYSTOLIC BLOOD PRESSURE < 130 MM HG: ICD-10-PCS | Mod: CPTII,,, | Performed by: SPECIALIST

## 2023-05-23 PROCEDURE — 1111F PR DISCHARGE MEDS RECONCILED W/ CURRENT OUTPATIENT MED LIST: ICD-10-PCS | Mod: CPTII,,, | Performed by: SPECIALIST

## 2023-05-23 PROCEDURE — 3079F DIAST BP 80-89 MM HG: CPT | Mod: CPTII,,, | Performed by: SPECIALIST

## 2023-05-23 PROCEDURE — 1101F PT FALLS ASSESS-DOCD LE1/YR: CPT | Mod: CPTII,,, | Performed by: SPECIALIST

## 2023-05-23 PROCEDURE — 71046 X-RAY EXAM CHEST 2 VIEWS: CPT | Mod: TC

## 2023-05-23 PROCEDURE — 1159F MED LIST DOCD IN RCRD: CPT | Mod: CPTII,,, | Performed by: SPECIALIST

## 2023-05-23 PROCEDURE — 1101F PR PT FALLS ASSESS DOC 0-1 FALLS W/OUT INJ PAST YR: ICD-10-PCS | Mod: CPTII,,, | Performed by: SPECIALIST

## 2023-05-23 PROCEDURE — 3074F SYST BP LT 130 MM HG: CPT | Mod: CPTII,,, | Performed by: SPECIALIST

## 2023-05-23 PROCEDURE — 4010F ACE/ARB THERAPY RXD/TAKEN: CPT | Mod: CPTII,,, | Performed by: SPECIALIST

## 2023-05-23 PROCEDURE — 99024 POSTOP FOLLOW-UP VISIT: CPT | Mod: ,,, | Performed by: SPECIALIST

## 2023-05-23 NOTE — PROGRESS NOTES
Patient returns about a month out from a plication of his left diaphragm.  Overall he is feeling reasonably well.  He is slightly hoarse but he says this is improving.  He says his breathing is better than prior to surgery.  Not perfect yet but getting there.  On physical examination his incision is healing beautifully.  He has no chest wall hernia.  The left lung base however has some e to a changes like maybe some consolidation or perhaps even some fluid.  He is not having any fever   We will check a chest x-ray today  He is not quite ready to go back to work yet as we need to get this left lung base issue resolved

## 2023-05-25 ENCOUNTER — HOSPITAL ENCOUNTER (OUTPATIENT)
Dept: RADIOLOGY | Facility: HOSPITAL | Age: 73
Discharge: HOME | End: 2023-05-25
Attending: RADIOLOGY
Payer: COMMERCIAL

## 2023-05-25 ENCOUNTER — HOSPITAL ENCOUNTER (OUTPATIENT)
Dept: RADIOLOGY | Facility: HOSPITAL | Age: 73
Discharge: HOME | End: 2023-05-25
Attending: SPECIALIST
Payer: COMMERCIAL

## 2023-05-25 DIAGNOSIS — J90 PLEURAL EFFUSION: ICD-10-CM

## 2023-05-25 PROCEDURE — 32555 ASPIRATE PLEURA W/ IMAGING: CPT

## 2023-05-25 PROCEDURE — 71045 X-RAY EXAM CHEST 1 VIEW: CPT | Mod: TC

## 2023-05-29 DIAGNOSIS — K21.9 GASTROESOPHAGEAL REFLUX DISEASE, UNSPECIFIED WHETHER ESOPHAGITIS PRESENT: ICD-10-CM

## 2023-05-30 RX ORDER — OMEPRAZOLE 40 MG/1
CAPSULE, DELAYED RELEASE ORAL
Qty: 30 CAPSULE | Refills: 6 | Status: SHIPPED | OUTPATIENT
Start: 2023-05-30 | End: 2024-01-05

## 2023-05-31 DIAGNOSIS — R06.02 SOB (SHORTNESS OF BREATH): ICD-10-CM

## 2023-05-31 RX ORDER — ALBUTEROL SULFATE 90 UG/1
AEROSOL, METERED RESPIRATORY (INHALATION)
Qty: 8 G | Refills: 3 | Status: SHIPPED | OUTPATIENT
Start: 2023-05-31 | End: 2023-08-10

## 2023-06-01 ENCOUNTER — EXTERNAL HOME HEALTH (OUTPATIENT)
Dept: HOME HEALTH SERVICES | Facility: HOSPITAL | Age: 73
End: 2023-06-01
Payer: COMMERCIAL

## 2023-06-01 DIAGNOSIS — R79.89 LOW TESTOSTERONE: Primary | ICD-10-CM

## 2023-06-01 DIAGNOSIS — G47.00 INSOMNIA, UNSPECIFIED TYPE: ICD-10-CM

## 2023-06-01 PROCEDURE — 96372 THER/PROPH/DIAG INJ SC/IM: CPT | Mod: ,,, | Performed by: INTERNAL MEDICINE

## 2023-06-01 PROCEDURE — 96372 PR INJECTION,THERAP/PROPH/DIAG2ST, IM OR SUBCUT: ICD-10-PCS | Mod: ,,, | Performed by: INTERNAL MEDICINE

## 2023-06-01 RX ORDER — ZOLPIDEM TARTRATE 5 MG/1
TABLET ORAL
Qty: 30 TABLET | Refills: 5 | Status: SHIPPED | OUTPATIENT
Start: 2023-06-01 | End: 2023-12-18

## 2023-06-01 RX ORDER — TESTOSTERONE CYPIONATE 200 MG/ML
200 INJECTION, SOLUTION INTRAMUSCULAR
Status: COMPLETED | OUTPATIENT
Start: 2023-06-01 | End: 2023-06-01

## 2023-06-01 RX ADMIN — TESTOSTERONE CYPIONATE 200 MG: 200 INJECTION, SOLUTION INTRAMUSCULAR at 08:06

## 2023-06-05 ENCOUNTER — TELEPHONE (OUTPATIENT)
Dept: CARDIAC SURGERY | Facility: CLINIC | Age: 73
End: 2023-06-05
Payer: COMMERCIAL

## 2023-06-05 NOTE — TELEPHONE ENCOUNTER
[facility] pt will discuss with АЛЕКСАНДР Mao when she gets back to office this week.  Date on Full Duty noted 6/24/23.  Pt also was not cleared to return to work on last visit with Dr. Sylvester, CXR showed Lt sided pleural effusion and pt had U/S Thoracentesis on 5/25/23.  Not sure if Dr. Sylvester has seen results, pt may need f/u CXR and release from Dr. Sylvester, who is out of town this week.  [facility] pt will call back this week on Wed or Thursday.  Verb  understanding.   ----- Message from Rayna Marino sent at 6/5/2023 10:41 AM CDT -----  Pt called and wants to go back to work Saturday 6/10/23. Needs the release faxed to Employee Health at [phone]    Father Aleah [phone]

## 2023-06-07 ENCOUNTER — HOSPITAL ENCOUNTER (OUTPATIENT)
Dept: RADIOLOGY | Facility: HOSPITAL | Age: 73
Discharge: HOME | End: 2023-06-07
Attending: SPECIALIST
Payer: COMMERCIAL

## 2023-06-07 DIAGNOSIS — J98.6 CHRONICALLY ELEVATED HEMIDIAPHRAGM: Primary | ICD-10-CM

## 2023-06-07 DIAGNOSIS — I48.19 PERSISTENT ATRIAL FIBRILLATION: Primary | ICD-10-CM

## 2023-06-07 DIAGNOSIS — J98.6 CHRONICALLY ELEVATED HEMIDIAPHRAGM: ICD-10-CM

## 2023-06-07 PROCEDURE — 71046 X-RAY EXAM CHEST 2 VIEWS: CPT | Mod: TC

## 2023-06-07 RX ORDER — APIXABAN 5 MG/1
5 TABLET, FILM COATED ORAL 2 TIMES DAILY
Qty: 60 TABLET | Refills: 11 | Status: SHIPPED | OUTPATIENT
Start: 2023-06-07

## 2023-06-13 ENCOUNTER — CLINICAL SUPPORT (OUTPATIENT)
Dept: INTERNAL MEDICINE | Facility: CLINIC | Age: 73
End: 2023-06-13
Payer: COMMERCIAL

## 2023-06-13 DIAGNOSIS — R79.89 LOW TESTOSTERONE: Primary | ICD-10-CM

## 2023-06-13 PROCEDURE — 96372 PR INJECTION,THERAP/PROPH/DIAG2ST, IM OR SUBCUT: ICD-10-PCS | Mod: ,,, | Performed by: INTERNAL MEDICINE

## 2023-06-13 PROCEDURE — 96372 THER/PROPH/DIAG INJ SC/IM: CPT | Mod: ,,, | Performed by: INTERNAL MEDICINE

## 2023-06-13 RX ORDER — TESTOSTERONE CYPIONATE 200 MG/ML
200 INJECTION, SOLUTION INTRAMUSCULAR
Status: COMPLETED | OUTPATIENT
Start: 2023-06-13 | End: 2023-06-13

## 2023-06-13 RX ADMIN — TESTOSTERONE CYPIONATE 200 MG: 200 INJECTION, SOLUTION INTRAMUSCULAR at 08:06

## 2023-06-14 DIAGNOSIS — F32.A DEPRESSION, UNSPECIFIED DEPRESSION TYPE: ICD-10-CM

## 2023-06-14 RX ORDER — DULOXETIN HYDROCHLORIDE 30 MG/1
CAPSULE, DELAYED RELEASE ORAL
Qty: 30 CAPSULE | Refills: 5 | Status: SHIPPED | OUTPATIENT
Start: 2023-06-14 | End: 2023-12-18

## 2023-06-14 RX ORDER — DULOXETIN HYDROCHLORIDE 30 MG/1
CAPSULE, DELAYED RELEASE ORAL
Qty: 30 CAPSULE | Refills: 0 | OUTPATIENT
Start: 2023-06-14

## 2023-06-27 ENCOUNTER — CLINICAL SUPPORT (OUTPATIENT)
Dept: INTERNAL MEDICINE | Facility: CLINIC | Age: 73
End: 2023-06-27
Payer: COMMERCIAL

## 2023-06-27 DIAGNOSIS — R79.89 LOW TESTOSTERONE: Primary | ICD-10-CM

## 2023-06-27 PROCEDURE — 96372 THER/PROPH/DIAG INJ SC/IM: CPT | Mod: ,,, | Performed by: INTERNAL MEDICINE

## 2023-06-27 PROCEDURE — 96372 PR INJECTION,THERAP/PROPH/DIAG2ST, IM OR SUBCUT: ICD-10-PCS | Mod: ,,, | Performed by: INTERNAL MEDICINE

## 2023-06-27 RX ORDER — TESTOSTERONE CYPIONATE 200 MG/ML
200 INJECTION, SOLUTION INTRAMUSCULAR
Status: COMPLETED | OUTPATIENT
Start: 2023-06-27 | End: 2023-06-27

## 2023-06-27 RX ADMIN — TESTOSTERONE CYPIONATE 200 MG: 200 INJECTION, SOLUTION INTRAMUSCULAR at 08:06

## 2023-07-05 ENCOUNTER — TELEPHONE (OUTPATIENT)
Dept: INTERNAL MEDICINE | Facility: CLINIC | Age: 73
End: 2023-07-05
Payer: COMMERCIAL

## 2023-07-05 NOTE — TELEPHONE ENCOUNTER
----- Message from Teodora Benavidez LPN sent at 7/5/2023 12:05 PM CDT -----  Regarding: qiana Leigh 7/11 @2:40  Fasting labs needed.

## 2023-07-07 ENCOUNTER — LAB VISIT (OUTPATIENT)
Dept: LAB | Facility: HOSPITAL | Age: 73
End: 2023-07-07
Attending: INTERNAL MEDICINE
Payer: COMMERCIAL

## 2023-07-07 DIAGNOSIS — I48.0 PAROXYSMAL ATRIAL FIBRILLATION: ICD-10-CM

## 2023-07-07 DIAGNOSIS — R79.89 LOW TESTOSTERONE: ICD-10-CM

## 2023-07-07 DIAGNOSIS — I42.9 CARDIOMYOPATHY, UNSPECIFIED TYPE: ICD-10-CM

## 2023-07-07 DIAGNOSIS — J98.6 DIAPHRAGM DYSFUNCTION: ICD-10-CM

## 2023-07-07 DIAGNOSIS — I10 PRIMARY HYPERTENSION: ICD-10-CM

## 2023-07-07 DIAGNOSIS — I87.2 VENOUS INSUFFICIENCY: ICD-10-CM

## 2023-07-07 DIAGNOSIS — I50.41 ACUTE COMBINED SYSTOLIC AND DIASTOLIC CONGESTIVE HEART FAILURE: ICD-10-CM

## 2023-07-07 DIAGNOSIS — E66.01 SEVERE OBESITY (BMI 35.0-39.9) WITH COMORBIDITY: ICD-10-CM

## 2023-07-07 LAB
ALBUMIN SERPL-MCNC: 3.6 G/DL (ref 3.4–4.8)
ALBUMIN/GLOB SERPL: 1.5 RATIO (ref 1.1–2)
ALP SERPL-CCNC: 68 UNIT/L (ref 40–150)
ALT SERPL-CCNC: 17 UNIT/L (ref 0–55)
AST SERPL-CCNC: 22 UNIT/L (ref 5–34)
BASOPHILS # BLD AUTO: 0.03 X10(3)/MCL
BASOPHILS NFR BLD AUTO: 0.5 %
BILIRUBIN DIRECT+TOT PNL SERPL-MCNC: 0.6 MG/DL
BUN SERPL-MCNC: 14.4 MG/DL (ref 8.4–25.7)
CALCIUM SERPL-MCNC: 9.2 MG/DL (ref 8.8–10)
CHLORIDE SERPL-SCNC: 103 MMOL/L (ref 98–107)
CHOLEST SERPL-MCNC: 132 MG/DL
CHOLEST/HDLC SERPL: 3 {RATIO} (ref 0–5)
CO2 SERPL-SCNC: 30 MMOL/L (ref 23–31)
CREAT SERPL-MCNC: 0.82 MG/DL (ref 0.73–1.18)
EOSINOPHIL # BLD AUTO: 0.21 X10(3)/MCL (ref 0–0.9)
EOSINOPHIL NFR BLD AUTO: 3.3 %
ERYTHROCYTE [DISTWIDTH] IN BLOOD BY AUTOMATED COUNT: 14 % (ref 11.5–17)
GFR SERPLBLD CREATININE-BSD FMLA CKD-EPI: >60 MLS/MIN/1.73/M2
GLOBULIN SER-MCNC: 2.4 GM/DL (ref 2.4–3.5)
GLUCOSE SERPL-MCNC: 82 MG/DL (ref 82–115)
HCT VFR BLD AUTO: 46.8 % (ref 42–52)
HDLC SERPL-MCNC: 39 MG/DL (ref 35–60)
HGB BLD-MCNC: 15.2 G/DL (ref 14–18)
IMM GRANULOCYTES # BLD AUTO: 0.02 X10(3)/MCL (ref 0–0.04)
IMM GRANULOCYTES NFR BLD AUTO: 0.3 %
LDLC SERPL CALC-MCNC: 79 MG/DL (ref 50–140)
LYMPHOCYTES # BLD AUTO: 0.73 X10(3)/MCL (ref 0.6–4.6)
LYMPHOCYTES NFR BLD AUTO: 11.4 %
MCH RBC QN AUTO: 31.6 PG (ref 27–31)
MCHC RBC AUTO-ENTMCNC: 32.5 G/DL (ref 33–36)
MCV RBC AUTO: 97.3 FL (ref 80–94)
MONOCYTES # BLD AUTO: 0.67 X10(3)/MCL (ref 0.1–1.3)
MONOCYTES NFR BLD AUTO: 10.5 %
NEUTROPHILS # BLD AUTO: 4.73 X10(3)/MCL (ref 2.1–9.2)
NEUTROPHILS NFR BLD AUTO: 74 %
NRBC BLD AUTO-RTO: 0 %
PLATELET # BLD AUTO: 166 X10(3)/MCL (ref 130–400)
PMV BLD AUTO: 11 FL (ref 7.4–10.4)
POTASSIUM SERPL-SCNC: 4.2 MMOL/L (ref 3.5–5.1)
PROT SERPL-MCNC: 6 GM/DL (ref 5.8–7.6)
PSA SERPL-MCNC: 0.87 NG/ML
RBC # BLD AUTO: 4.81 X10(6)/MCL (ref 4.7–6.1)
SODIUM SERPL-SCNC: 143 MMOL/L (ref 136–145)
TESTOST SERPL-MCNC: 487.09 NG/DL (ref 220.91–715.81)
TRIGL SERPL-MCNC: 68 MG/DL (ref 34–140)
VLDLC SERPL CALC-MCNC: 14 MG/DL
WBC # SPEC AUTO: 6.39 X10(3)/MCL (ref 4.5–11.5)

## 2023-07-07 PROCEDURE — 84403 ASSAY OF TOTAL TESTOSTERONE: CPT

## 2023-07-07 PROCEDURE — 80053 COMPREHEN METABOLIC PANEL: CPT

## 2023-07-07 PROCEDURE — 80061 LIPID PANEL: CPT

## 2023-07-07 PROCEDURE — 36415 COLL VENOUS BLD VENIPUNCTURE: CPT

## 2023-07-07 PROCEDURE — 85025 COMPLETE CBC W/AUTO DIFF WBC: CPT

## 2023-07-07 PROCEDURE — 84153 ASSAY OF PSA TOTAL: CPT

## 2023-07-11 ENCOUNTER — HOSPITAL ENCOUNTER (OUTPATIENT)
Dept: RADIOLOGY | Facility: HOSPITAL | Age: 73
Discharge: HOME | End: 2023-07-11
Attending: INTERNAL MEDICINE
Payer: COMMERCIAL

## 2023-07-11 ENCOUNTER — OFFICE VISIT (OUTPATIENT)
Dept: INTERNAL MEDICINE | Facility: CLINIC | Age: 73
End: 2023-07-11
Payer: COMMERCIAL

## 2023-07-11 VITALS
WEIGHT: 223.19 LBS | SYSTOLIC BLOOD PRESSURE: 131 MMHG | DIASTOLIC BLOOD PRESSURE: 83 MMHG | RESPIRATION RATE: 18 BRPM | HEART RATE: 91 BPM | OXYGEN SATURATION: 92 % | BODY MASS INDEX: 33.83 KG/M2 | HEIGHT: 68 IN

## 2023-07-11 DIAGNOSIS — R06.00 DYSPNEA, UNSPECIFIED TYPE: ICD-10-CM

## 2023-07-11 DIAGNOSIS — G47.33 OSA ON CPAP: ICD-10-CM

## 2023-07-11 DIAGNOSIS — E66.01 SEVERE OBESITY (BMI 35.0-39.9) WITH COMORBIDITY: ICD-10-CM

## 2023-07-11 DIAGNOSIS — I10 PRIMARY HYPERTENSION: ICD-10-CM

## 2023-07-11 DIAGNOSIS — Z98.890 S/P PLICATION OF DIAPHRAGM: ICD-10-CM

## 2023-07-11 DIAGNOSIS — I42.9 CARDIOMYOPATHY, UNSPECIFIED TYPE: ICD-10-CM

## 2023-07-11 DIAGNOSIS — Z98.890 S/P PLICATION OF DIAPHRAGM: Primary | ICD-10-CM

## 2023-07-11 DIAGNOSIS — R79.89 LOW TESTOSTERONE: Primary | ICD-10-CM

## 2023-07-11 DIAGNOSIS — I48.0 PAROXYSMAL ATRIAL FIBRILLATION: ICD-10-CM

## 2023-07-11 PROCEDURE — 1159F MED LIST DOCD IN RCRD: CPT | Mod: CPTII,,, | Performed by: INTERNAL MEDICINE

## 2023-07-11 PROCEDURE — 99214 OFFICE O/P EST MOD 30 MIN: CPT | Mod: 25,,, | Performed by: INTERNAL MEDICINE

## 2023-07-11 PROCEDURE — 71046 X-RAY EXAM CHEST 2 VIEWS: CPT | Mod: TC

## 2023-07-11 PROCEDURE — 96372 THER/PROPH/DIAG INJ SC/IM: CPT | Mod: ,,, | Performed by: INTERNAL MEDICINE

## 2023-07-11 PROCEDURE — 3079F DIAST BP 80-89 MM HG: CPT | Mod: CPTII,,, | Performed by: INTERNAL MEDICINE

## 2023-07-11 PROCEDURE — 1159F PR MEDICATION LIST DOCUMENTED IN MEDICAL RECORD: ICD-10-PCS | Mod: CPTII,,, | Performed by: INTERNAL MEDICINE

## 2023-07-11 PROCEDURE — 3288F FALL RISK ASSESSMENT DOCD: CPT | Mod: CPTII,,, | Performed by: INTERNAL MEDICINE

## 2023-07-11 PROCEDURE — 96372 PR INJECTION,THERAP/PROPH/DIAG2ST, IM OR SUBCUT: ICD-10-PCS | Mod: ,,, | Performed by: INTERNAL MEDICINE

## 2023-07-11 PROCEDURE — 3008F PR BODY MASS INDEX (BMI) DOCUMENTED: ICD-10-PCS | Mod: CPTII,,, | Performed by: INTERNAL MEDICINE

## 2023-07-11 PROCEDURE — 3075F PR MOST RECENT SYSTOLIC BLOOD PRESS GE 130-139MM HG: ICD-10-PCS | Mod: CPTII,,, | Performed by: INTERNAL MEDICINE

## 2023-07-11 PROCEDURE — 1126F PR PAIN SEVERITY QUANTIFIED, NO PAIN PRESENT: ICD-10-PCS | Mod: CPTII,,, | Performed by: INTERNAL MEDICINE

## 2023-07-11 PROCEDURE — 3075F SYST BP GE 130 - 139MM HG: CPT | Mod: CPTII,,, | Performed by: INTERNAL MEDICINE

## 2023-07-11 PROCEDURE — 1126F AMNT PAIN NOTED NONE PRSNT: CPT | Mod: CPTII,,, | Performed by: INTERNAL MEDICINE

## 2023-07-11 PROCEDURE — 71045 X-RAY EXAM CHEST 1 VIEW: CPT | Mod: TC,LT

## 2023-07-11 PROCEDURE — 4010F PR ACE/ARB THEARPY RXD/TAKEN: ICD-10-PCS | Mod: CPTII,,, | Performed by: INTERNAL MEDICINE

## 2023-07-11 PROCEDURE — 1101F PR PT FALLS ASSESS DOC 0-1 FALLS W/OUT INJ PAST YR: ICD-10-PCS | Mod: CPTII,,, | Performed by: INTERNAL MEDICINE

## 2023-07-11 PROCEDURE — 3079F PR MOST RECENT DIASTOLIC BLOOD PRESSURE 80-89 MM HG: ICD-10-PCS | Mod: CPTII,,, | Performed by: INTERNAL MEDICINE

## 2023-07-11 PROCEDURE — 3288F PR FALLS RISK ASSESSMENT DOCUMENTED: ICD-10-PCS | Mod: CPTII,,, | Performed by: INTERNAL MEDICINE

## 2023-07-11 PROCEDURE — 99214 PR OFFICE/OUTPT VISIT, EST, LEVL IV, 30-39 MIN: ICD-10-PCS | Mod: 25,,, | Performed by: INTERNAL MEDICINE

## 2023-07-11 PROCEDURE — 1101F PT FALLS ASSESS-DOCD LE1/YR: CPT | Mod: CPTII,,, | Performed by: INTERNAL MEDICINE

## 2023-07-11 PROCEDURE — 4010F ACE/ARB THERAPY RXD/TAKEN: CPT | Mod: CPTII,,, | Performed by: INTERNAL MEDICINE

## 2023-07-11 PROCEDURE — 3008F BODY MASS INDEX DOCD: CPT | Mod: CPTII,,, | Performed by: INTERNAL MEDICINE

## 2023-07-11 RX ORDER — TESTOSTERONE CYPIONATE 200 MG/ML
200 INJECTION, SOLUTION INTRAMUSCULAR
Status: COMPLETED | OUTPATIENT
Start: 2023-07-11 | End: 2023-07-11

## 2023-07-11 RX ADMIN — TESTOSTERONE CYPIONATE 200 MG: 200 INJECTION, SOLUTION INTRAMUSCULAR at 04:07

## 2023-07-11 NOTE — PROGRESS NOTES
Subjective:       Patient ID: John Bullard is a 73 y.o. male.    Chief Complaint: Follow-up      Patient is a 73-year-old man in for follow-up of multiple medical problems.  Since I saw him last he had a plication of the left hemidiaphragm.  This was approximately 2 months ago.  He did have a significant persistent left-sided effusion a few weeks postop and had a thoracentesis done.  He seemed to be doing fairly well until the last several days when he began having more shortness of breath with exertion.  No change in dependent edema and in fact if anything it is better.  No orthopnea PND.  He does sleep on 2 pillows for reflux.  He does have a CPAP in uses that but does not wake up at night short of breath.  That this moon on exertion is somewhat inconsistent.  Walking on flat surfaces fine with going up a gentle inclined he gets somewhat short of breath.  Never severe.  No clear-cut chest pain.    Follow-up    Review of Systems     Current Outpatient Medications on File Prior to Visit   Medication Sig Dispense Refill    albuterol (PROVENTIL/VENTOLIN HFA) 90 mcg/actuation inhaler INHALE 2 PUFFS BY MOUTH EVERY 6 HOURS 8 g 3    ALPRAZolam (XANAX) 0.5 MG tablet Take 1 tablet by mouth twice daily as needed for anxiety 45 tablet 5    calcium citrate-vitamin D3 315-200 mg (CITRACAL+D) 315 mg-5 mcg (200 unit) per tablet Take 1 tablet by mouth once daily.      cinnamon bark 500 mg capsule Take 500 mg by mouth once daily.      docusate sodium (COLACE) 100 MG capsule Take 100 mg by mouth Daily.      DULoxetine (CYMBALTA) 30 MG capsule TAKE 1 CAPSULE BY MOUTH ONCE DAILY DO  NOT  CRUSH  OR  CHEW 30 capsule 5    ELIQUIS 5 mg Tab Take 1 tablet (5 mg total) by mouth 2 (two) times daily. 60 tablet 11    fluticasone-salmeterol 230-21 mcg/dose (ADVAIR HFA) 230-21 mcg/actuation HFAA inhaler Inhale 2 puffs into the lungs daily as needed. Controller      furosemide (LASIX) 40 MG tablet Take 1 tablet (40 mg total) by mouth once  daily. 30 tablet 11    loratadine (CLARITIN) 10 mg tablet Take 10 mg by mouth daily as needed for Allergies.      losartan (COZAAR) 25 MG tablet Take 1 tablet (25 mg total) by mouth 2 (two) times a day. 180 tablet 3    melatonin 10 mg Tab Take 10-20 mg by mouth daily as needed (insomnia).      metoprolol succinate (TOPROL-XL) 100 MG 24 hr tablet Take 100 mg by mouth.      montelukast (SINGULAIR) 10 mg tablet Take 1 tablet by mouth once daily 30 tablet 6    naproxen sodium (ANAPROX) 220 MG tablet Take 220 mg by mouth daily as needed.      NON FORMULARY MEDICATION Take 1 tablet by mouth once daily. Tumeric      omega-3 fatty acids/fish oil (FISH OIL-OMEGA-3 FATTY ACIDS) 300-1,000 mg capsule Take 1 capsule by mouth once daily.      omeprazole (PRILOSEC) 40 MG capsule Take 1 capsule by mouth once daily 30 capsule 6    potassium chloride (KLOR-CON) 8 MEQ TbSR Take 1 tablet (8 mEq total) by mouth once daily. 30 tablet 5    rosuvastatin (CRESTOR) 20 MG tablet TAKE 1 TABLET BY MOUTH ONCE IN THE EVENING      sotaloL (BETAPACE) 80 MG tablet Take 80 mg by mouth 2 (two) times daily.      tamsulosin (FLOMAX) 0.4 mg Cap Take 1 capsule by mouth once daily.      testosterone cypionate (DEPOTESTOTERONE CYPIONATE) 200 mg/mL injection INJECT 1 ML (CC) INTRAMUSCULARLY EVERY 14 DAYS 3 mL 5    travoprost (TRAVATAN Z) 0.004 % ophthalmic solution travoprost 0.004 % eye drops   Instill 1 drop twice a day by ophthalmic route as needed.      travoprost, benzalkonium, (TRAVATAN) 0.004 % ophthalmic solution Place 1 drop into both eyes every evening.      umeclidinium-vilanteroL (ANORO ELLIPTA) 62.5-25 mcg/actuation DsDv Inhale 1 puff into the lungs once daily. Controller 1 each 11    zolpidem (AMBIEN) 5 MG Tab TAKE 1 TABLET BY MOUTH ONCE DAILY AT BEDTIME AS NEEDED FOR INSOMNIA 30 tablet 5     No current facility-administered medications on file prior to visit.     Objective:      /83 (BP Location: Right arm, Patient Position: Sitting, BP  "Method: Large (Manual))   Pulse 91   Resp 18   Ht 5' 8" (1.727 m)   Wt 101.2 kg (223 lb 3.2 oz)   SpO2 (!) 92%   BMI 33.94 kg/m²     Physical Exam  Vitals reviewed.   Constitutional:       Appearance: Normal appearance.   HENT:      Head: Normocephalic and atraumatic.      Mouth/Throat:      Pharynx: Oropharynx is clear.   Eyes:      Pupils: Pupils are equal, round, and reactive to light.   Cardiovascular:      Rate and Rhythm: Normal rate and regular rhythm.      Pulses: Normal pulses.      Heart sounds: Normal heart sounds.      Comments: The rhythm is regular and there is a 2/6 systolic murmur loudest right upper sternal border.  The heart is generally hyperdynamic.  Pulmonary:      Breath sounds: Normal breath sounds.      Comments: Diminished breath Sounds left base  Abdominal:      General: Abdomen is flat.      Palpations: Abdomen is soft.   Musculoskeletal:      Cervical back: Neck supple.   Skin:     General: Skin is warm and dry.   Neurological:      Mental Status: He is alert.     Lab work reviewed  Assessment:       1. Dyspnea.  Primarily exertional and somewhat inconsistent.  Suspect related to persistent effusion all on the left side.  Consider heart failure but less likely.  Also need to give some consideration to pulmonary emboli    2. 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 Stable hypertension    4. History of cardiomyopathy.      5. Paroxysmal atrial fib.  Seems to be in sinus currently    6. Obesity.    Plan:       Check BNP and D-dimer.  Check two view chest x-ray and left lateral decubitus.  May need referral back to his chest surgeon.    Will pencil him in for 3 months with CBC CMP lipid prior.          "

## 2023-07-12 ENCOUNTER — TELEPHONE (OUTPATIENT)
Dept: INTERNAL MEDICINE | Facility: CLINIC | Age: 73
End: 2023-07-12
Payer: COMMERCIAL

## 2023-07-12 DIAGNOSIS — R06.00 DYSPNEA, UNSPECIFIED TYPE: Primary | ICD-10-CM

## 2023-07-12 NOTE — TELEPHONE ENCOUNTER
I saw patient in the office yesterday.  He was having persistent exertional dyspnea, somewhat worse in the last couple of weeks.  Workup included recent lab work and x-rays of the chest with left lateral decubitus view.  I discussed test results with patient.  There still an effusion but it is relatively small.  The diaphragm remains elevated.  He remains symptomatic as far shortness of breath is concerned.  D-dimer was normal effectively ruling out PE.  BNP was normal also, making heart failure unlikely.  Recommend referral to his pulmonologist.  I did put in a consult to Dr. Kaplan who who has seen him in the past.

## 2023-07-25 ENCOUNTER — CLINICAL SUPPORT (OUTPATIENT)
Dept: INTERNAL MEDICINE | Facility: CLINIC | Age: 73
End: 2023-07-25
Payer: COMMERCIAL

## 2023-07-25 DIAGNOSIS — R79.89 LOW TESTOSTERONE: Primary | ICD-10-CM

## 2023-07-25 PROCEDURE — 96372 PR INJECTION,THERAP/PROPH/DIAG2ST, IM OR SUBCUT: ICD-10-PCS | Mod: ,,, | Performed by: INTERNAL MEDICINE

## 2023-07-25 PROCEDURE — 96372 THER/PROPH/DIAG INJ SC/IM: CPT | Mod: ,,, | Performed by: INTERNAL MEDICINE

## 2023-07-25 RX ORDER — TESTOSTERONE CYPIONATE 200 MG/ML
100 INJECTION, SOLUTION INTRAMUSCULAR
Status: COMPLETED | OUTPATIENT
Start: 2023-07-25 | End: 2023-07-25

## 2023-07-25 RX ADMIN — TESTOSTERONE CYPIONATE 100 MG: 200 INJECTION, SOLUTION INTRAMUSCULAR at 08:07

## 2023-07-28 DIAGNOSIS — J45.909 ASTHMA, UNSPECIFIED ASTHMA SEVERITY, UNSPECIFIED WHETHER COMPLICATED, UNSPECIFIED WHETHER PERSISTENT: ICD-10-CM

## 2023-07-31 RX ORDER — MONTELUKAST SODIUM 10 MG/1
TABLET ORAL
Qty: 30 TABLET | Refills: 0 | Status: SHIPPED | OUTPATIENT
Start: 2023-07-31 | End: 2023-10-09

## 2023-08-08 ENCOUNTER — CLINICAL SUPPORT (OUTPATIENT)
Dept: INTERNAL MEDICINE | Facility: CLINIC | Age: 73
End: 2023-08-08
Payer: COMMERCIAL

## 2023-08-08 DIAGNOSIS — R79.89 LOW TESTOSTERONE: Primary | ICD-10-CM

## 2023-08-08 PROCEDURE — 96372 PR INJECTION,THERAP/PROPH/DIAG2ST, IM OR SUBCUT: ICD-10-PCS | Mod: ,,, | Performed by: INTERNAL MEDICINE

## 2023-08-08 PROCEDURE — 96372 THER/PROPH/DIAG INJ SC/IM: CPT | Mod: ,,, | Performed by: INTERNAL MEDICINE

## 2023-08-08 RX ORDER — TESTOSTERONE CYPIONATE 200 MG/ML
200 INJECTION, SOLUTION INTRAMUSCULAR
Status: COMPLETED | OUTPATIENT
Start: 2023-08-08 | End: 2023-08-08

## 2023-08-08 RX ADMIN — TESTOSTERONE CYPIONATE 200 MG: 200 INJECTION, SOLUTION INTRAMUSCULAR at 08:08

## 2023-08-10 DIAGNOSIS — R06.02 SOB (SHORTNESS OF BREATH): ICD-10-CM

## 2023-08-10 RX ORDER — ALBUTEROL SULFATE 90 UG/1
AEROSOL, METERED RESPIRATORY (INHALATION)
Qty: 18 G | Refills: 3 | Status: SHIPPED | OUTPATIENT
Start: 2023-08-10 | End: 2024-01-08

## 2023-08-22 ENCOUNTER — CLINICAL SUPPORT (OUTPATIENT)
Dept: INTERNAL MEDICINE | Facility: CLINIC | Age: 73
End: 2023-08-22
Payer: COMMERCIAL

## 2023-08-22 DIAGNOSIS — R79.89 LOW TESTOSTERONE: Primary | ICD-10-CM

## 2023-08-22 PROCEDURE — 96372 THER/PROPH/DIAG INJ SC/IM: CPT | Mod: ,,, | Performed by: INTERNAL MEDICINE

## 2023-08-22 PROCEDURE — 96372 PR INJECTION,THERAP/PROPH/DIAG2ST, IM OR SUBCUT: ICD-10-PCS | Mod: ,,, | Performed by: INTERNAL MEDICINE

## 2023-08-22 RX ORDER — TESTOSTERONE CYPIONATE 200 MG/ML
200 INJECTION, SOLUTION INTRAMUSCULAR
Status: COMPLETED | OUTPATIENT
Start: 2023-08-22 | End: 2023-08-22

## 2023-08-22 RX ADMIN — TESTOSTERONE CYPIONATE 200 MG: 200 INJECTION, SOLUTION INTRAMUSCULAR at 08:08

## 2023-09-05 ENCOUNTER — CLINICAL SUPPORT (OUTPATIENT)
Dept: INTERNAL MEDICINE | Facility: CLINIC | Age: 73
End: 2023-09-05
Payer: COMMERCIAL

## 2023-09-05 DIAGNOSIS — R79.89 LOW TESTOSTERONE: Primary | ICD-10-CM

## 2023-09-05 PROCEDURE — 96372 PR INJECTION,THERAP/PROPH/DIAG2ST, IM OR SUBCUT: ICD-10-PCS | Mod: ,,, | Performed by: INTERNAL MEDICINE

## 2023-09-05 PROCEDURE — 96372 THER/PROPH/DIAG INJ SC/IM: CPT | Mod: ,,, | Performed by: INTERNAL MEDICINE

## 2023-09-05 RX ORDER — TESTOSTERONE CYPIONATE 200 MG/ML
200 INJECTION, SOLUTION INTRAMUSCULAR
Status: COMPLETED | OUTPATIENT
Start: 2023-09-05 | End: 2023-09-05

## 2023-09-05 RX ADMIN — TESTOSTERONE CYPIONATE 200 MG: 200 INJECTION, SOLUTION INTRAMUSCULAR at 08:09

## 2023-09-05 NOTE — PROGRESS NOTES
Patient came in today for nurse visit testosterone injection, 1mL given right ventrogluteal, tolerated well. RTC 2 weeks

## 2023-09-19 ENCOUNTER — CLINICAL SUPPORT (OUTPATIENT)
Dept: INTERNAL MEDICINE | Facility: CLINIC | Age: 73
End: 2023-09-19
Payer: COMMERCIAL

## 2023-09-19 DIAGNOSIS — R79.89 LOW TESTOSTERONE: ICD-10-CM

## 2023-09-19 DIAGNOSIS — R79.89 LOW TESTOSTERONE: Primary | ICD-10-CM

## 2023-09-19 PROCEDURE — 96372 THER/PROPH/DIAG INJ SC/IM: CPT | Mod: ,,, | Performed by: INTERNAL MEDICINE

## 2023-09-19 PROCEDURE — 96372 PR INJECTION,THERAP/PROPH/DIAG2ST, IM OR SUBCUT: ICD-10-PCS | Mod: ,,, | Performed by: INTERNAL MEDICINE

## 2023-09-19 RX ORDER — TESTOSTERONE CYPIONATE 200 MG/ML
200 INJECTION, SOLUTION INTRAMUSCULAR
Status: COMPLETED | OUTPATIENT
Start: 2023-09-19 | End: 2023-09-19

## 2023-09-19 RX ORDER — TESTOSTERONE CYPIONATE 200 MG/ML
INJECTION, SOLUTION INTRAMUSCULAR
Qty: 3 ML | Refills: 0 | Status: SHIPPED | OUTPATIENT
Start: 2023-09-19 | End: 2023-11-06

## 2023-09-19 RX ADMIN — TESTOSTERONE CYPIONATE 200 MG: 200 INJECTION, SOLUTION INTRAMUSCULAR at 08:09

## 2023-09-19 NOTE — PROGRESS NOTES
Administered testosterone injection in right ventrogluteal. Pt tolerated well.   Vital Signs Last 24 Hrs  T(C): 36.8, Max: 36.8 (02-22 @ 07:22)  T(F): 98.2, Max: 98.2 (02-22 @ 07:22)  HR: 78 (78 - 88)  BP: 104/66 (97/54 - 115/68)  RR: 18 (17 - 18)  SpO2: 100% (99% - 100%)        LABS: All Labs Reviewed:                        14.2   12.0  )-----------( 84       ( 21 Feb 2017 18:07 )             41.3     21 Feb 2017 18:07    127    |  91     |  59     ----------------------------<  116    3.7     |  22     |  2.44     Ca    9.1        21 Feb 2017 18:07    TPro  7.4    /  Alb  3.2    /  TBili  5.9    /  DBili  x      /  AST  313    /  ALT  152    /  AlkPhos  248    21 Feb 2017 18:07    PT/INR - ( 21 Feb 2017 18:07 )   PT: 15.4 sec;   INR: 1.40 ratio         PTT - ( 21 Feb 2017 18:07 )  PTT:36.8 sec    EXAM:  CT ABDOMEN AND PELVIS OC       02/21/2017  IMPRESSION:   1. Proctocolitis. No bowel obstruction.   2. Hepatic morphology suspicious for cirrhosis and fatty liver.   Cholelithiasis without cholecystitis.

## 2023-10-03 ENCOUNTER — CLINICAL SUPPORT (OUTPATIENT)
Dept: INTERNAL MEDICINE | Facility: CLINIC | Age: 73
End: 2023-10-03
Payer: COMMERCIAL

## 2023-10-03 DIAGNOSIS — R79.89 LOW TESTOSTERONE: Primary | ICD-10-CM

## 2023-10-03 PROCEDURE — 90471 IMMUNIZATION ADMIN: CPT | Mod: ,,, | Performed by: INTERNAL MEDICINE

## 2023-10-03 PROCEDURE — 96372 THER/PROPH/DIAG INJ SC/IM: CPT | Mod: ,,, | Performed by: INTERNAL MEDICINE

## 2023-10-03 PROCEDURE — 90694 FLU VACCINE - QUADRIVALENT - ADJUVANTED: ICD-10-PCS | Mod: ,,, | Performed by: INTERNAL MEDICINE

## 2023-10-03 PROCEDURE — 90694 VACC AIIV4 NO PRSRV 0.5ML IM: CPT | Mod: ,,, | Performed by: INTERNAL MEDICINE

## 2023-10-03 PROCEDURE — 90471 FLU VACCINE - QUADRIVALENT - ADJUVANTED: ICD-10-PCS | Mod: ,,, | Performed by: INTERNAL MEDICINE

## 2023-10-03 PROCEDURE — 96372 PR INJECTION,THERAP/PROPH/DIAG2ST, IM OR SUBCUT: ICD-10-PCS | Mod: ,,, | Performed by: INTERNAL MEDICINE

## 2023-10-03 RX ORDER — TESTOSTERONE CYPIONATE 200 MG/ML
200 INJECTION, SOLUTION INTRAMUSCULAR
Status: COMPLETED | OUTPATIENT
Start: 2023-10-03 | End: 2023-10-03

## 2023-10-03 RX ADMIN — TESTOSTERONE CYPIONATE 200 MG: 200 INJECTION, SOLUTION INTRAMUSCULAR at 08:10

## 2023-10-07 DIAGNOSIS — J45.909 ASTHMA, UNSPECIFIED ASTHMA SEVERITY, UNSPECIFIED WHETHER COMPLICATED, UNSPECIFIED WHETHER PERSISTENT: ICD-10-CM

## 2023-10-09 RX ORDER — MONTELUKAST SODIUM 10 MG/1
TABLET ORAL
Qty: 30 TABLET | Refills: 0 | Status: SHIPPED | OUTPATIENT
Start: 2023-10-09 | End: 2023-11-01

## 2023-10-10 ENCOUNTER — TELEPHONE (OUTPATIENT)
Dept: INTERNAL MEDICINE | Facility: CLINIC | Age: 73
End: 2023-10-10
Payer: COMMERCIAL

## 2023-10-10 NOTE — TELEPHONE ENCOUNTER
----- Message from Teodora Benavidez LPN sent at 10/9/2023  4:32 PM CDT -----  Regarding: qiana Leigh 10/17 @1:40  Fasting labs needed.

## 2023-10-13 ENCOUNTER — LAB VISIT (OUTPATIENT)
Dept: LAB | Facility: HOSPITAL | Age: 73
End: 2023-10-13
Attending: INTERNAL MEDICINE
Payer: COMMERCIAL

## 2023-10-13 DIAGNOSIS — I48.0 PAROXYSMAL ATRIAL FIBRILLATION: ICD-10-CM

## 2023-10-13 DIAGNOSIS — R06.00 DYSPNEA, UNSPECIFIED TYPE: ICD-10-CM

## 2023-10-13 DIAGNOSIS — I10 PRIMARY HYPERTENSION: ICD-10-CM

## 2023-10-13 DIAGNOSIS — Z98.890 S/P PLICATION OF DIAPHRAGM: ICD-10-CM

## 2023-10-13 DIAGNOSIS — I10 HYPERTENSION, UNSPECIFIED TYPE: ICD-10-CM

## 2023-10-13 DIAGNOSIS — I42.9 CARDIOMYOPATHY, UNSPECIFIED TYPE: ICD-10-CM

## 2023-10-13 DIAGNOSIS — J45.909 ASTHMA, UNSPECIFIED ASTHMA SEVERITY, UNSPECIFIED WHETHER COMPLICATED, UNSPECIFIED WHETHER PERSISTENT: ICD-10-CM

## 2023-10-13 DIAGNOSIS — E66.01 SEVERE OBESITY (BMI 35.0-39.9) WITH COMORBIDITY: ICD-10-CM

## 2023-10-13 LAB
ALBUMIN SERPL-MCNC: 3.6 G/DL (ref 3.4–4.8)
ALBUMIN/GLOB SERPL: 1.5 RATIO (ref 1.1–2)
ALP SERPL-CCNC: 59 UNIT/L (ref 40–150)
ALT SERPL-CCNC: 17 UNIT/L (ref 0–55)
AST SERPL-CCNC: 20 UNIT/L (ref 5–34)
BASOPHILS # BLD AUTO: 0.04 X10(3)/MCL
BASOPHILS NFR BLD AUTO: 0.5 %
BILIRUB SERPL-MCNC: 0.6 MG/DL
BUN SERPL-MCNC: 11.1 MG/DL (ref 8.4–25.7)
CALCIUM SERPL-MCNC: 8.8 MG/DL (ref 8.8–10)
CHLORIDE SERPL-SCNC: 104 MMOL/L (ref 98–107)
CHOLEST SERPL-MCNC: 115 MG/DL
CHOLEST/HDLC SERPL: 3 {RATIO} (ref 0–5)
CO2 SERPL-SCNC: 31 MMOL/L (ref 23–31)
CREAT SERPL-MCNC: 0.86 MG/DL (ref 0.73–1.18)
EOSINOPHIL # BLD AUTO: 0.18 X10(3)/MCL (ref 0–0.9)
EOSINOPHIL NFR BLD AUTO: 2.2 %
ERYTHROCYTE [DISTWIDTH] IN BLOOD BY AUTOMATED COUNT: 13.9 % (ref 11.5–17)
GFR SERPLBLD CREATININE-BSD FMLA CKD-EPI: >60 MLS/MIN/1.73/M2
GLOBULIN SER-MCNC: 2.4 GM/DL (ref 2.4–3.5)
GLUCOSE SERPL-MCNC: 91 MG/DL (ref 82–115)
HCT VFR BLD AUTO: 46.7 % (ref 42–52)
HDLC SERPL-MCNC: 39 MG/DL (ref 35–60)
HGB BLD-MCNC: 15.1 G/DL (ref 14–18)
IMM GRANULOCYTES # BLD AUTO: 0.04 X10(3)/MCL (ref 0–0.04)
IMM GRANULOCYTES NFR BLD AUTO: 0.5 %
LDLC SERPL CALC-MCNC: 61 MG/DL (ref 50–140)
LYMPHOCYTES # BLD AUTO: 0.63 X10(3)/MCL (ref 0.6–4.6)
LYMPHOCYTES NFR BLD AUTO: 7.8 %
MCH RBC QN AUTO: 30.7 PG (ref 27–31)
MCHC RBC AUTO-ENTMCNC: 32.3 G/DL (ref 33–36)
MCV RBC AUTO: 94.9 FL (ref 80–94)
MONOCYTES # BLD AUTO: 0.69 X10(3)/MCL (ref 0.1–1.3)
MONOCYTES NFR BLD AUTO: 8.6 %
NEUTROPHILS # BLD AUTO: 6.48 X10(3)/MCL (ref 2.1–9.2)
NEUTROPHILS NFR BLD AUTO: 80.4 %
NRBC BLD AUTO-RTO: 0 %
PLATELET # BLD AUTO: 153 X10(3)/MCL (ref 130–400)
PMV BLD AUTO: 10.6 FL (ref 7.4–10.4)
POTASSIUM SERPL-SCNC: 4.1 MMOL/L (ref 3.5–5.1)
PROT SERPL-MCNC: 6 GM/DL (ref 5.8–7.6)
RBC # BLD AUTO: 4.92 X10(6)/MCL (ref 4.7–6.1)
SODIUM SERPL-SCNC: 142 MMOL/L (ref 136–145)
TRIGL SERPL-MCNC: 74 MG/DL (ref 34–140)
VLDLC SERPL CALC-MCNC: 15 MG/DL
WBC # SPEC AUTO: 8.06 X10(3)/MCL (ref 4.5–11.5)

## 2023-10-13 PROCEDURE — 80061 LIPID PANEL: CPT

## 2023-10-13 PROCEDURE — 36415 COLL VENOUS BLD VENIPUNCTURE: CPT

## 2023-10-13 PROCEDURE — 85025 COMPLETE CBC W/AUTO DIFF WBC: CPT

## 2023-10-13 PROCEDURE — 80053 COMPREHEN METABOLIC PANEL: CPT

## 2023-10-17 ENCOUNTER — OFFICE VISIT (OUTPATIENT)
Dept: INTERNAL MEDICINE | Facility: CLINIC | Age: 73
End: 2023-10-17
Payer: COMMERCIAL

## 2023-10-17 VITALS
WEIGHT: 223.19 LBS | BODY MASS INDEX: 33.83 KG/M2 | DIASTOLIC BLOOD PRESSURE: 72 MMHG | HEIGHT: 68 IN | HEART RATE: 85 BPM | SYSTOLIC BLOOD PRESSURE: 118 MMHG | OXYGEN SATURATION: 93 % | RESPIRATION RATE: 18 BRPM

## 2023-10-17 DIAGNOSIS — R06.00 DYSPNEA, UNSPECIFIED TYPE: Primary | ICD-10-CM

## 2023-10-17 DIAGNOSIS — R79.89 LOW TESTOSTERONE: ICD-10-CM

## 2023-10-17 DIAGNOSIS — G47.33 OSA ON CPAP: ICD-10-CM

## 2023-10-17 DIAGNOSIS — R79.89 LOW TESTOSTERONE: Primary | ICD-10-CM

## 2023-10-17 DIAGNOSIS — E66.01 SEVERE OBESITY (BMI 35.0-39.9) WITH COMORBIDITY: ICD-10-CM

## 2023-10-17 DIAGNOSIS — I10 PRIMARY HYPERTENSION: ICD-10-CM

## 2023-10-17 DIAGNOSIS — I48.0 PAROXYSMAL ATRIAL FIBRILLATION: ICD-10-CM

## 2023-10-17 DIAGNOSIS — I50.40 COMBINED SYSTOLIC AND DIASTOLIC CONGESTIVE HEART FAILURE, UNSPECIFIED HF CHRONICITY: ICD-10-CM

## 2023-10-17 PROCEDURE — 96372 PR INJECTION,THERAP/PROPH/DIAG2ST, IM OR SUBCUT: ICD-10-PCS | Mod: ,,, | Performed by: INTERNAL MEDICINE

## 2023-10-17 PROCEDURE — 1126F PR PAIN SEVERITY QUANTIFIED, NO PAIN PRESENT: ICD-10-PCS | Mod: CPTII,,, | Performed by: INTERNAL MEDICINE

## 2023-10-17 PROCEDURE — 4010F PR ACE/ARB THEARPY RXD/TAKEN: ICD-10-PCS | Mod: CPTII,,, | Performed by: INTERNAL MEDICINE

## 2023-10-17 PROCEDURE — 99213 OFFICE O/P EST LOW 20 MIN: CPT | Mod: 25,,, | Performed by: INTERNAL MEDICINE

## 2023-10-17 PROCEDURE — 96372 THER/PROPH/DIAG INJ SC/IM: CPT | Mod: ,,, | Performed by: INTERNAL MEDICINE

## 2023-10-17 PROCEDURE — 3074F SYST BP LT 130 MM HG: CPT | Mod: CPTII,,, | Performed by: INTERNAL MEDICINE

## 2023-10-17 PROCEDURE — 3008F PR BODY MASS INDEX (BMI) DOCUMENTED: ICD-10-PCS | Mod: CPTII,,, | Performed by: INTERNAL MEDICINE

## 2023-10-17 PROCEDURE — 1101F PT FALLS ASSESS-DOCD LE1/YR: CPT | Mod: CPTII,,, | Performed by: INTERNAL MEDICINE

## 2023-10-17 PROCEDURE — 3078F DIAST BP <80 MM HG: CPT | Mod: CPTII,,, | Performed by: INTERNAL MEDICINE

## 2023-10-17 PROCEDURE — 1126F AMNT PAIN NOTED NONE PRSNT: CPT | Mod: CPTII,,, | Performed by: INTERNAL MEDICINE

## 2023-10-17 PROCEDURE — 3288F PR FALLS RISK ASSESSMENT DOCUMENTED: ICD-10-PCS | Mod: CPTII,,, | Performed by: INTERNAL MEDICINE

## 2023-10-17 PROCEDURE — 99213 PR OFFICE/OUTPT VISIT, EST, LEVL III, 20-29 MIN: ICD-10-PCS | Mod: 25,,, | Performed by: INTERNAL MEDICINE

## 2023-10-17 PROCEDURE — 3078F PR MOST RECENT DIASTOLIC BLOOD PRESSURE < 80 MM HG: ICD-10-PCS | Mod: CPTII,,, | Performed by: INTERNAL MEDICINE

## 2023-10-17 PROCEDURE — 1159F PR MEDICATION LIST DOCUMENTED IN MEDICAL RECORD: ICD-10-PCS | Mod: CPTII,,, | Performed by: INTERNAL MEDICINE

## 2023-10-17 PROCEDURE — 3074F PR MOST RECENT SYSTOLIC BLOOD PRESSURE < 130 MM HG: ICD-10-PCS | Mod: CPTII,,, | Performed by: INTERNAL MEDICINE

## 2023-10-17 PROCEDURE — 3288F FALL RISK ASSESSMENT DOCD: CPT | Mod: CPTII,,, | Performed by: INTERNAL MEDICINE

## 2023-10-17 PROCEDURE — 4010F ACE/ARB THERAPY RXD/TAKEN: CPT | Mod: CPTII,,, | Performed by: INTERNAL MEDICINE

## 2023-10-17 PROCEDURE — 3008F BODY MASS INDEX DOCD: CPT | Mod: CPTII,,, | Performed by: INTERNAL MEDICINE

## 2023-10-17 PROCEDURE — 1159F MED LIST DOCD IN RCRD: CPT | Mod: CPTII,,, | Performed by: INTERNAL MEDICINE

## 2023-10-17 PROCEDURE — 1101F PR PT FALLS ASSESS DOC 0-1 FALLS W/OUT INJ PAST YR: ICD-10-PCS | Mod: CPTII,,, | Performed by: INTERNAL MEDICINE

## 2023-10-17 RX ORDER — TESTOSTERONE CYPIONATE 200 MG/ML
200 INJECTION, SOLUTION INTRAMUSCULAR
Status: COMPLETED | OUTPATIENT
Start: 2023-10-17 | End: 2023-10-17

## 2023-10-17 RX ADMIN — TESTOSTERONE CYPIONATE 200 MG: 200 INJECTION, SOLUTION INTRAMUSCULAR at 02:10

## 2023-10-17 NOTE — PROGRESS NOTES
Subjective:       Patient ID: John Bullard is a 73 y.o. male.    Chief Complaint: Follow-up      Is a 73-year-old man in for follow-up of multiple problems.  He feels okay overall.  Some persistent minor shortness of breath.  Also feels a bulge in the left upper quadrant of his abdomen.  He does remain very active.  He notes that his balance is not as good as it once was.  He has seen his pulmonologist a few weeks ago and everything was okay from his aspect.  No orthopnea PND but stable chronic mild pedal edema.  No anginal-type chest pain    Follow-up      Review of Systems     Current Outpatient Medications on File Prior to Visit   Medication Sig Dispense Refill    albuterol (PROVENTIL/VENTOLIN HFA) 90 mcg/actuation inhaler INHALE 2 PUFFS BY MOUTH EVERY 6 HOURS 18 g 3    ALPRAZolam (XANAX) 0.5 MG tablet Take 1 tablet by mouth twice daily as needed for anxiety 45 tablet 5    budesonide-glycopyr-formoterol (BREZTRI AEROSPHERE) 160-9-4.8 mcg/actuation HFAA Inhale into the lungs.      budesonide-glycopyr-formoterol (BREZTRI AEROSPHERE) 160-9-4.8 mcg/actuation HFAA Inhale 2 puffs into the lungs 2 (two) times a day. 10.7 g 11    calcium citrate-vitamin D3 315-200 mg (CITRACAL+D) 315 mg-5 mcg (200 unit) per tablet Take 1 tablet by mouth once daily.      cinnamon bark 500 mg capsule Take 500 mg by mouth once daily.      docusate sodium (COLACE) 100 MG capsule Take 100 mg by mouth Daily.      DULoxetine (CYMBALTA) 30 MG capsule TAKE 1 CAPSULE BY MOUTH ONCE DAILY DO  NOT  CRUSH  OR  CHEW 30 capsule 5    ELIQUIS 5 mg Tab Take 1 tablet (5 mg total) by mouth 2 (two) times daily. 60 tablet 11    furosemide (LASIX) 40 MG tablet Take 1 tablet (40 mg total) by mouth once daily. 30 tablet 11    loratadine (CLARITIN) 10 mg tablet Take 10 mg by mouth daily as needed for Allergies.      losartan (COZAAR) 25 MG tablet Take 1 tablet (25 mg total) by mouth 2 (two) times a day. 180 tablet 3    melatonin 10 mg Tab Take 10-20 mg by mouth  "daily as needed (insomnia).      metoprolol succinate (TOPROL-XL) 100 MG 24 hr tablet Take 100 mg by mouth.      montelukast (SINGULAIR) 10 mg tablet Take 1 tablet by mouth once daily 30 tablet 0    naproxen sodium (ANAPROX) 220 MG tablet Take 220 mg by mouth daily as needed.      NON FORMULARY MEDICATION Take 1 tablet by mouth once daily. Tumeric      omega-3 fatty acids/fish oil (FISH OIL-OMEGA-3 FATTY ACIDS) 300-1,000 mg capsule Take 1 capsule by mouth once daily.      omeprazole (PRILOSEC) 40 MG capsule Take 1 capsule by mouth once daily 30 capsule 6    potassium chloride (KLOR-CON) 8 MEQ TbSR Take 1 tablet (8 mEq total) by mouth once daily. 30 tablet 5    rosuvastatin (CRESTOR) 20 MG tablet TAKE 1 TABLET BY MOUTH ONCE IN THE EVENING      sotaloL (BETAPACE) 80 MG tablet Take 80 mg by mouth 2 (two) times daily.      tamsulosin (FLOMAX) 0.4 mg Cap Take 1 capsule by mouth once daily.      testosterone cypionate (DEPOTESTOTERONE CYPIONATE) 200 mg/mL injection INJECT 1 ML (CC) INTRAMUSCULARLY EVERY 14 DAYS 3 mL 0    travoprost (TRAVATAN Z) 0.004 % ophthalmic solution travoprost 0.004 % eye drops   Instill 1 drop twice a day by ophthalmic route as needed.      travoprost, benzalkonium, (TRAVATAN) 0.004 % ophthalmic solution Place 1 drop into both eyes every evening.      zolpidem (AMBIEN) 5 MG Tab TAKE 1 TABLET BY MOUTH ONCE DAILY AT BEDTIME AS NEEDED FOR INSOMNIA 30 tablet 5     No current facility-administered medications on file prior to visit.     Objective:      /72 (BP Location: Right arm, Patient Position: Sitting, BP Method: Large (Manual))   Pulse 85   Resp 18   Ht 5' 8" (1.727 m)   Wt 101.2 kg (223 lb 3.2 oz)   SpO2 (!) 93%   BMI 33.94 kg/m²     Physical Exam  Vitals reviewed.   Constitutional:       Appearance: Normal appearance.   HENT:      Head: Normocephalic and atraumatic.      Mouth/Throat:      Pharynx: Oropharynx is clear.   Eyes:      Pupils: Pupils are equal, round, and reactive to " light.   Cardiovascular:      Rate and Rhythm: Normal rate and regular rhythm.      Pulses: Normal pulses.      Heart sounds: Normal heart sounds.   Pulmonary:      Breath sounds: Normal breath sounds.   Abdominal:      General: Abdomen is flat.      Palpations: Abdomen is soft.   Musculoskeletal:      Cervical back: Neck supple.      Comments: 1+ bilateral pretibial edema noted   Skin:     General: Skin is warm and dry.   Neurological:      Mental Status: He is alert.       Lab work reviewed  Assessment:       1. Chronic dyspnea, mostly on exertion.  Starting to improve.  Status post plication of left hemidiaphragm.  Some component of asthma/perhaps COPD.  Nonsmoker.  Some degree of heart problems including previously diagnosed diastolic and systolic dysfunction.    2. Hypertension.  Excellent control on current meds    3. Paroxysmal atrial fib.  Clinically he remains in sinus rhythm.  Followed by Cardiology    4. 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 Obstructive sleep apnea on CPAP      6. Recently diagnosed kidney stones.  Treated by his urologist.  He was asking about his latest PSAs.  Plan:     Continue same meds TLC etc..  Encourage weight reduction.  Follow-up with me 3 months with CBC CMP lipid testosterone level and PSA

## 2023-10-31 ENCOUNTER — CLINICAL SUPPORT (OUTPATIENT)
Dept: INTERNAL MEDICINE | Facility: CLINIC | Age: 73
End: 2023-10-31
Payer: COMMERCIAL

## 2023-10-31 DIAGNOSIS — R79.89 LOW TESTOSTERONE: Primary | ICD-10-CM

## 2023-10-31 DIAGNOSIS — R79.89 LOW TESTOSTERONE: ICD-10-CM

## 2023-10-31 PROCEDURE — 96372 PR INJECTION,THERAP/PROPH/DIAG2ST, IM OR SUBCUT: ICD-10-PCS | Mod: ,,, | Performed by: INTERNAL MEDICINE

## 2023-10-31 PROCEDURE — 96372 THER/PROPH/DIAG INJ SC/IM: CPT | Mod: ,,, | Performed by: INTERNAL MEDICINE

## 2023-10-31 RX ORDER — TESTOSTERONE CYPIONATE 200 MG/ML
200 INJECTION, SOLUTION INTRAMUSCULAR
Status: COMPLETED | OUTPATIENT
Start: 2023-11-02 | End: 2023-10-31

## 2023-10-31 RX ORDER — TESTOSTERONE CYPIONATE 200 MG/ML
50 INJECTION, SOLUTION INTRAMUSCULAR
Status: DISCONTINUED | OUTPATIENT
Start: 2023-10-31 | End: 2023-10-31

## 2023-10-31 RX ADMIN — TESTOSTERONE CYPIONATE 200 MG: 200 INJECTION, SOLUTION INTRAMUSCULAR at 10:10

## 2023-11-01 DIAGNOSIS — J45.909 ASTHMA, UNSPECIFIED ASTHMA SEVERITY, UNSPECIFIED WHETHER COMPLICATED, UNSPECIFIED WHETHER PERSISTENT: ICD-10-CM

## 2023-11-01 RX ORDER — MONTELUKAST SODIUM 10 MG/1
TABLET ORAL
Qty: 30 TABLET | Refills: 0 | Status: SHIPPED | OUTPATIENT
Start: 2023-11-01 | End: 2023-12-08

## 2023-11-05 DIAGNOSIS — R79.89 LOW TESTOSTERONE: ICD-10-CM

## 2023-11-06 RX ORDER — TESTOSTERONE CYPIONATE 200 MG/ML
INJECTION, SOLUTION INTRAMUSCULAR
Qty: 3 ML | Refills: 5 | Status: SHIPPED | OUTPATIENT
Start: 2023-11-06

## 2023-11-14 ENCOUNTER — CLINICAL SUPPORT (OUTPATIENT)
Dept: INTERNAL MEDICINE | Facility: CLINIC | Age: 73
End: 2023-11-14
Payer: COMMERCIAL

## 2023-11-14 DIAGNOSIS — R79.89 LOW TESTOSTERONE: Primary | ICD-10-CM

## 2023-11-14 PROCEDURE — 96372 PR INJECTION,THERAP/PROPH/DIAG2ST, IM OR SUBCUT: ICD-10-PCS | Mod: ,,, | Performed by: INTERNAL MEDICINE

## 2023-11-14 PROCEDURE — 96372 THER/PROPH/DIAG INJ SC/IM: CPT | Mod: ,,, | Performed by: INTERNAL MEDICINE

## 2023-11-14 RX ORDER — TESTOSTERONE CYPIONATE 200 MG/ML
200 INJECTION, SOLUTION INTRAMUSCULAR
Status: COMPLETED | OUTPATIENT
Start: 2023-11-14 | End: 2023-11-14

## 2023-11-14 RX ADMIN — TESTOSTERONE CYPIONATE 200 MG: 200 INJECTION, SOLUTION INTRAMUSCULAR at 08:11

## 2023-11-15 DIAGNOSIS — F41.9 ANXIETY: ICD-10-CM

## 2023-11-16 RX ORDER — ALPRAZOLAM 0.5 MG/1
TABLET ORAL
Qty: 45 TABLET | Refills: 5 | Status: SHIPPED | OUTPATIENT
Start: 2023-11-16

## 2023-11-28 ENCOUNTER — CLINICAL SUPPORT (OUTPATIENT)
Dept: INTERNAL MEDICINE | Facility: CLINIC | Age: 73
End: 2023-11-28
Payer: COMMERCIAL

## 2023-11-28 DIAGNOSIS — R79.89 LOW TESTOSTERONE: Primary | ICD-10-CM

## 2023-11-28 PROCEDURE — 96372 THER/PROPH/DIAG INJ SC/IM: CPT | Mod: ,,, | Performed by: INTERNAL MEDICINE

## 2023-11-28 PROCEDURE — 96372 PR INJECTION,THERAP/PROPH/DIAG2ST, IM OR SUBCUT: ICD-10-PCS | Mod: ,,, | Performed by: INTERNAL MEDICINE

## 2023-11-28 RX ORDER — TESTOSTERONE CYPIONATE 200 MG/ML
200 INJECTION, SOLUTION INTRAMUSCULAR
Status: COMPLETED | OUTPATIENT
Start: 2023-11-28 | End: 2023-11-28

## 2023-11-28 RX ADMIN — TESTOSTERONE CYPIONATE 200 MG: 200 INJECTION, SOLUTION INTRAMUSCULAR at 08:11

## 2023-12-04 ENCOUNTER — HOSPITAL ENCOUNTER (OUTPATIENT)
Dept: RADIOLOGY | Facility: HOSPITAL | Age: 73
Discharge: HOME | End: 2023-12-04
Attending: SPECIALIST
Payer: COMMERCIAL

## 2023-12-04 DIAGNOSIS — R06.02 SOB (SHORTNESS OF BREATH): ICD-10-CM

## 2023-12-04 DIAGNOSIS — R06.02 SOB (SHORTNESS OF BREATH): Primary | ICD-10-CM

## 2023-12-04 PROCEDURE — 71046 X-RAY EXAM CHEST 2 VIEWS: CPT | Mod: TC

## 2023-12-06 DIAGNOSIS — J98.6 CHRONICALLY ELEVATED HEMIDIAPHRAGM: Primary | ICD-10-CM

## 2023-12-06 DIAGNOSIS — R06.02 SOB (SHORTNESS OF BREATH): Primary | ICD-10-CM

## 2023-12-08 DIAGNOSIS — J45.909 ASTHMA, UNSPECIFIED ASTHMA SEVERITY, UNSPECIFIED WHETHER COMPLICATED, UNSPECIFIED WHETHER PERSISTENT: ICD-10-CM

## 2023-12-08 RX ORDER — MONTELUKAST SODIUM 10 MG/1
TABLET ORAL
Qty: 30 TABLET | Refills: 0 | Status: SHIPPED | OUTPATIENT
Start: 2023-12-08 | End: 2024-01-02

## 2023-12-12 ENCOUNTER — CLINICAL SUPPORT (OUTPATIENT)
Dept: INTERNAL MEDICINE | Facility: CLINIC | Age: 73
End: 2023-12-12
Payer: COMMERCIAL

## 2023-12-12 DIAGNOSIS — R79.89 LOW TESTOSTERONE: Primary | ICD-10-CM

## 2023-12-12 PROCEDURE — 96372 THER/PROPH/DIAG INJ SC/IM: CPT | Mod: ,,, | Performed by: FAMILY MEDICINE

## 2023-12-12 PROCEDURE — 96372 PR INJECTION,THERAP/PROPH/DIAG2ST, IM OR SUBCUT: ICD-10-PCS | Mod: ,,, | Performed by: FAMILY MEDICINE

## 2023-12-12 RX ORDER — TESTOSTERONE CYPIONATE 200 MG/ML
50 INJECTION, SOLUTION INTRAMUSCULAR
Status: COMPLETED | OUTPATIENT
Start: 2023-12-12 | End: 2023-12-12

## 2023-12-12 RX ADMIN — TESTOSTERONE CYPIONATE 50 MG: 200 INJECTION, SOLUTION INTRAMUSCULAR at 08:12

## 2023-12-18 DIAGNOSIS — F32.A DEPRESSION, UNSPECIFIED DEPRESSION TYPE: ICD-10-CM

## 2023-12-18 DIAGNOSIS — G47.00 INSOMNIA, UNSPECIFIED TYPE: ICD-10-CM

## 2023-12-18 RX ORDER — ZOLPIDEM TARTRATE 5 MG/1
TABLET ORAL
Qty: 30 TABLET | Refills: 5 | Status: SHIPPED | OUTPATIENT
Start: 2023-12-18

## 2023-12-18 RX ORDER — DULOXETIN HYDROCHLORIDE 30 MG/1
CAPSULE, DELAYED RELEASE ORAL
Qty: 30 CAPSULE | Refills: 0 | Status: SHIPPED | OUTPATIENT
Start: 2023-12-18 | End: 2024-01-12

## 2023-12-19 ENCOUNTER — HOSPITAL ENCOUNTER (OUTPATIENT)
Dept: RADIOLOGY | Facility: HOSPITAL | Age: 73
Discharge: HOME | End: 2023-12-19
Attending: SPECIALIST
Payer: COMMERCIAL

## 2023-12-19 DIAGNOSIS — R06.02 SOB (SHORTNESS OF BREATH): ICD-10-CM

## 2023-12-19 PROCEDURE — 76604 US EXAM CHEST: CPT | Mod: TC

## 2024-01-01 DIAGNOSIS — J45.909 ASTHMA, UNSPECIFIED ASTHMA SEVERITY, UNSPECIFIED WHETHER COMPLICATED, UNSPECIFIED WHETHER PERSISTENT: ICD-10-CM

## 2024-01-02 ENCOUNTER — OFFICE VISIT (OUTPATIENT)
Dept: CARDIAC SURGERY | Facility: CLINIC | Age: 74
End: 2024-01-02
Payer: COMMERCIAL

## 2024-01-02 VITALS
WEIGHT: 229 LBS | HEART RATE: 97 BPM | BODY MASS INDEX: 34.71 KG/M2 | HEIGHT: 68 IN | DIASTOLIC BLOOD PRESSURE: 88 MMHG | SYSTOLIC BLOOD PRESSURE: 133 MMHG | OXYGEN SATURATION: 93 %

## 2024-01-02 DIAGNOSIS — J98.4 RESTRICTIVE LUNG DISEASE: Primary | ICD-10-CM

## 2024-01-02 PROCEDURE — 1126F AMNT PAIN NOTED NONE PRSNT: CPT | Mod: CPTII,,, | Performed by: SPECIALIST

## 2024-01-02 PROCEDURE — 3079F DIAST BP 80-89 MM HG: CPT | Mod: CPTII,,, | Performed by: SPECIALIST

## 2024-01-02 PROCEDURE — 1159F MED LIST DOCD IN RCRD: CPT | Mod: CPTII,,, | Performed by: SPECIALIST

## 2024-01-02 PROCEDURE — 1101F PT FALLS ASSESS-DOCD LE1/YR: CPT | Mod: CPTII,,, | Performed by: SPECIALIST

## 2024-01-02 PROCEDURE — 3288F FALL RISK ASSESSMENT DOCD: CPT | Mod: CPTII,,, | Performed by: SPECIALIST

## 2024-01-02 PROCEDURE — 99214 OFFICE O/P EST MOD 30 MIN: CPT | Mod: ,,, | Performed by: SPECIALIST

## 2024-01-02 PROCEDURE — 3075F SYST BP GE 130 - 139MM HG: CPT | Mod: CPTII,,, | Performed by: SPECIALIST

## 2024-01-02 PROCEDURE — 3008F BODY MASS INDEX DOCD: CPT | Mod: CPTII,,, | Performed by: SPECIALIST

## 2024-01-02 RX ORDER — MONTELUKAST SODIUM 10 MG/1
TABLET ORAL
Qty: 30 TABLET | Refills: 0 | Status: SHIPPED | OUTPATIENT
Start: 2024-01-02 | End: 2024-01-30

## 2024-01-02 RX ORDER — METHYLPREDNISOLONE 4 MG/1
TABLET ORAL
Qty: 21 EACH | Refills: 0 | Status: SHIPPED | OUTPATIENT
Start: 2024-01-02 | End: 2024-01-23 | Stop reason: ALTCHOICE

## 2024-01-02 NOTE — PROGRESS NOTES
Nurse visit for testosterone injection. Administered in right ventrogluteal. Pt tolerated well.     room air

## 2024-01-02 NOTE — PROGRESS NOTES
Patient returns for follow-up after having had his thoracic ultrasound which unfortunately did not show any significant amount of fluid around his lungs.  He is still short of breath.  He has had recent spirometry that shows an FEV1 of 41% with really severe restrictive lung disease.  In addition cardiac echo reveals an EF of 35-40%.  I do not think that his diaphragm is causing any shortness of breath and he longer.  He has multiple reasons for shortness of breath including his restrictive lung disease and is cardiomyopathy.  We are going to try him on a steroid Dosepak and see if he improves with this.  If he does then he will follow up with Pulmonary to see if long-term steroids would be of benefit.  I will see him back in 6 weeks.

## 2024-01-03 DIAGNOSIS — J98.4 DISEASE OF LUNG: Primary | ICD-10-CM

## 2024-01-05 DIAGNOSIS — K21.9 GASTROESOPHAGEAL REFLUX DISEASE, UNSPECIFIED WHETHER ESOPHAGITIS PRESENT: ICD-10-CM

## 2024-01-05 RX ORDER — OMEPRAZOLE 40 MG/1
CAPSULE, DELAYED RELEASE ORAL
Qty: 30 CAPSULE | Refills: 6 | Status: SHIPPED | OUTPATIENT
Start: 2024-01-05

## 2024-01-08 DIAGNOSIS — R06.02 SOB (SHORTNESS OF BREATH): ICD-10-CM

## 2024-01-08 RX ORDER — ALBUTEROL SULFATE 90 UG/1
AEROSOL, METERED RESPIRATORY (INHALATION)
Qty: 18 G | Refills: 0 | Status: SHIPPED | OUTPATIENT
Start: 2024-01-08 | End: 2024-01-29 | Stop reason: SDUPTHER

## 2024-01-09 ENCOUNTER — PROCEDURE VISIT (OUTPATIENT)
Dept: RESPIRATORY THERAPY | Facility: HOSPITAL | Age: 74
End: 2024-01-09
Attending: HOSPITALIST
Payer: COMMERCIAL

## 2024-01-09 ENCOUNTER — CLINICAL SUPPORT (OUTPATIENT)
Dept: INTERNAL MEDICINE | Facility: CLINIC | Age: 74
End: 2024-01-09
Payer: COMMERCIAL

## 2024-01-09 VITALS — HEART RATE: 76 BPM | OXYGEN SATURATION: 93 % | RESPIRATION RATE: 19 BRPM

## 2024-01-09 DIAGNOSIS — R79.89 LOW TESTOSTERONE: Primary | ICD-10-CM

## 2024-01-09 DIAGNOSIS — E87.6 HYPOKALEMIA: ICD-10-CM

## 2024-01-09 DIAGNOSIS — J98.4 DISEASE OF LUNG: ICD-10-CM

## 2024-01-09 PROCEDURE — 94060 EVALUATION OF WHEEZING: CPT

## 2024-01-09 PROCEDURE — 94729 DIFFUSING CAPACITY: CPT

## 2024-01-09 PROCEDURE — 94760 N-INVAS EAR/PLS OXIMETRY 1: CPT

## 2024-01-09 PROCEDURE — 94727 GAS DIL/WSHOT DETER LNG VOL: CPT

## 2024-01-09 PROCEDURE — 96372 THER/PROPH/DIAG INJ SC/IM: CPT | Mod: ,,, | Performed by: INTERNAL MEDICINE

## 2024-01-09 RX ORDER — TESTOSTERONE CYPIONATE 200 MG/ML
200 INJECTION, SOLUTION INTRAMUSCULAR
Status: COMPLETED | OUTPATIENT
Start: 2024-01-09 | End: 2024-01-09

## 2024-01-09 RX ORDER — ALBUTEROL SULFATE 0.83 MG/ML
2.5 SOLUTION RESPIRATORY (INHALATION)
Status: COMPLETED | OUTPATIENT
Start: 2024-01-09 | End: 2024-01-09

## 2024-01-09 RX ORDER — POTASSIUM CHLORIDE 600 MG/1
8 TABLET, EXTENDED RELEASE ORAL DAILY
Qty: 30 TABLET | Refills: 5 | Status: SHIPPED | OUTPATIENT
Start: 2024-01-09

## 2024-01-09 RX ADMIN — TESTOSTERONE CYPIONATE 200 MG: 200 INJECTION, SOLUTION INTRAMUSCULAR at 08:01

## 2024-01-09 RX ADMIN — ALBUTEROL SULFATE 2.5 MG: 0.83 SOLUTION RESPIRATORY (INHALATION) at 12:01

## 2024-01-09 NOTE — PROGRESS NOTES
BEFORE NEB PT CONSTANTLY HAD TO CLEAR HIS THROAT, AFTER NEB IT STOPPED    GREAT PT EFFORT AND COOPERATION    PFT COMPLETE

## 2024-01-10 ENCOUNTER — HOSPITAL ENCOUNTER (OUTPATIENT)
Dept: PULMONOLOGY | Facility: HOSPITAL | Age: 74
Discharge: HOME | End: 2024-01-10
Payer: COMMERCIAL

## 2024-01-10 DIAGNOSIS — J98.4 RESTRICTIVE LUNG DISEASE: Primary | ICD-10-CM

## 2024-01-10 PROCEDURE — 94625 PHY/QHP OP PULM RHB W/O MNTR: CPT

## 2024-01-10 RX ORDER — ALBUTEROL SULFATE 0.83 MG/ML
SOLUTION RESPIRATORY (INHALATION)
Status: DISPENSED
Start: 2024-01-10 | End: 2024-01-11

## 2024-01-10 NOTE — PROGRESS NOTES
PATIENT:   JONATHAN SOTOMAYOR  : 48   DIAGNOSIS: J98.4  DATE: 1/10/2024   PHYSICIAN: JUVENTINO SALMERON  MR#: [id number]    PULMONARY REHAB - INITIAL ASSESSMENT                           EXERCISE/FUNCTIONAL CAPACITY     EXERCISE TEST: 6MWT  FEET: 1120   VO2/METs: 2.62   DASI SCORE/METS: 29.65/6.05  CURRENT HOME EXERCISE:  NONE    INTERVENTION: CURRENT PULMONARY REHAB EXERCISE PRESCRIPTION                           MODE  DAYS PER WEEK  DURATION  INTENSITY/WORKLOAD   TREADMILL  2 8:00   1.8 mph    ARM ERGOMETER  2  8:00  25 meng   NU-STEP  2  8:00  25 meng          NUTRITION/WEIGHT MANAGEMENT      HEIGHT: 68    WEIGHT: 225                             BMI: 34.1   RAPID EATING ASSESSMENT SCORE:  26/39               PSYCHOSOCIAL     ASSESSMENT:  NELIA-7 SCORE: 3      ANXIETY LEVEL: minimal                                                        DEPRESSION     ASSESSMENT: PHQ - 9 SCORE: 3            DEPRESSION SEVERITY: minimal      TOBACCO USAGE     ASSESSMENT: CURRENT TOBACCO USAGE:   never                                                           OXYGEN/DYSPNEA      O2 REQUIREMENTS - HOME: none  EXERCISE: PRN TO MAINTAIN O2 SATS ABOVE 88%       MMRC DYSPNEA SCORE: 1    CAT SCORE: 14           PATIENT GOALS     GOAL PLAN/STATUS   INCREASE STRENGTH AND ENDURANCE INITIATE PULMONARY REHAB EXECISE   LEARN HEALTHY EATING HABITS PROVIDE APPROPRIATE EDUCATION   LOSE 10 POUNDS PROVIDE EXERCISE AND NUTRITION EDUCATION   MANAGE DYSPNEA PROVIDE APPROPRIATE EDUCATION   BE ABLE TO RIDE BIKE OUTDOORS AGAIN             EDUCATION LOG     DATE DOMAIN TOPIC   1/10/24 PULMONARY REHAB ORIENTATION TO PULMONARY REHAB    PULMONARY REHAB UNDERSTANDING LUNG DISEASE    EXERCISE EXERCISE GUIDELINES FOR PULMONARY PATIENTS    PULMONARY REHAB LUNG INFECTION PREVENTION    OXYGEN/DYSPNEA BREATH CONTROL TECHNIQUES    PULMONARY REHAB AIRWAY CLEARANCE    NUTRITION NUTRITION AND LUNG DISEASE    PSYCHOSOCIAL STRESS MANAGEMENT    PSYCHOSOCIAL DEPRESSION AND LUNG  DISEASE    PULMONARY REHAB ENERGY CONSERVATION    EXERCISE FALL PREVENTION/BALANCE EXERCISES    PSYCHOSOCIAL GOAL SETTING AND BEHAVIOR CHANGE    EXERCISE HOME EXERCISE GUIDELINES; RPE & RPD SCALES

## 2024-01-12 DIAGNOSIS — F32.A DEPRESSION, UNSPECIFIED DEPRESSION TYPE: ICD-10-CM

## 2024-01-12 RX ORDER — DULOXETIN HYDROCHLORIDE 30 MG/1
CAPSULE, DELAYED RELEASE ORAL
Qty: 30 CAPSULE | Refills: 0 | Status: SHIPPED | OUTPATIENT
Start: 2024-01-12 | End: 2024-02-08

## 2024-01-17 ENCOUNTER — TELEPHONE (OUTPATIENT)
Dept: INTERNAL MEDICINE | Facility: CLINIC | Age: 74
End: 2024-01-17
Payer: COMMERCIAL

## 2024-01-17 NOTE — TELEPHONE ENCOUNTER
----- Message from Teodora Benavidez LPN sent at 1/17/2024  8:33 AM CST -----  Regarding: qiana Leigh 1/23 @11:00  Fasting labs needed.

## 2024-01-18 ENCOUNTER — HOSPITAL ENCOUNTER (OUTPATIENT)
Dept: PULMONOLOGY | Facility: HOSPITAL | Age: 74
Discharge: HOME | End: 2024-01-18
Payer: COMMERCIAL

## 2024-01-18 DIAGNOSIS — J98.4 RESTRICTIVE LUNG DISEASE: Primary | ICD-10-CM

## 2024-01-18 PROCEDURE — 94625 PHY/QHP OP PULM RHB W/O MNTR: CPT

## 2024-01-18 NOTE — PROGRESS NOTES
"Arrival time: 0730   Departure time: 0805 Total clinic time: 35   TIME WORKLOAD HR RPD O2 sat %   RESTING   74 1 94   ARM ERGO 8 20 BOYLE               80 2 93   NUSTEP 8 25 BOYLE 91 3 93   TREADMILL 8 1.0 MPH/0% grade 98 3 90   RECOVERY   81 1 94   EXERCISE TIME 24         OXYGEN: NC 2L/min  EXERCISE SUMMARY: Patient able to complete 24:00 of aerobic exercise on three machines. No signs/symptoms noted or reported, vital sign responses appropriate. Patient stable and asymptomatic at discharge.  PLAN FOR NEXT SESSION: Continue exercise prescription with goal of 38:00-45:00 total exercise time.  EDUCATION:  "Overview of Lung Diseases."Patient verbalized appropriate understanding, instructed to ask any questions during subsequent sessions.  NOTES: First session of pulmonary rehab. Workloads and durations reduced to accommodate deconditioning. Will progress per patient tolerance.  "

## 2024-01-19 ENCOUNTER — LAB VISIT (OUTPATIENT)
Dept: LAB | Facility: HOSPITAL | Age: 74
End: 2024-01-19
Attending: INTERNAL MEDICINE
Payer: COMMERCIAL

## 2024-01-19 DIAGNOSIS — I10 PRIMARY HYPERTENSION: ICD-10-CM

## 2024-01-19 DIAGNOSIS — I48.0 PAROXYSMAL ATRIAL FIBRILLATION: ICD-10-CM

## 2024-01-19 DIAGNOSIS — R06.00 DYSPNEA, UNSPECIFIED TYPE: ICD-10-CM

## 2024-01-19 DIAGNOSIS — R79.89 LOW TESTOSTERONE: ICD-10-CM

## 2024-01-19 DIAGNOSIS — I50.40 COMBINED SYSTOLIC AND DIASTOLIC CONGESTIVE HEART FAILURE, UNSPECIFIED HF CHRONICITY: ICD-10-CM

## 2024-01-19 LAB
ALBUMIN SERPL-MCNC: 3.5 G/DL (ref 3.4–4.8)
ALBUMIN/GLOB SERPL: 1.5 RATIO (ref 1.1–2)
ALP SERPL-CCNC: 57 UNIT/L (ref 40–150)
ALT SERPL-CCNC: 17 UNIT/L (ref 0–55)
AST SERPL-CCNC: 19 UNIT/L (ref 5–34)
BASOPHILS # BLD AUTO: 0.03 X10(3)/MCL
BASOPHILS NFR BLD AUTO: 0.4 %
BILIRUB SERPL-MCNC: 0.5 MG/DL
BUN SERPL-MCNC: 15.3 MG/DL (ref 8.4–25.7)
CALCIUM SERPL-MCNC: 9.1 MG/DL (ref 8.8–10)
CHLORIDE SERPL-SCNC: 102 MMOL/L (ref 98–107)
CHOLEST SERPL-MCNC: 124 MG/DL
CHOLEST/HDLC SERPL: 4 {RATIO} (ref 0–5)
CO2 SERPL-SCNC: 34 MMOL/L (ref 23–31)
CREAT SERPL-MCNC: 0.96 MG/DL (ref 0.73–1.18)
EOSINOPHIL # BLD AUTO: 0.22 X10(3)/MCL (ref 0–0.9)
EOSINOPHIL NFR BLD AUTO: 3.2 %
ERYTHROCYTE [DISTWIDTH] IN BLOOD BY AUTOMATED COUNT: 13.8 % (ref 11.5–17)
GFR SERPLBLD CREATININE-BSD FMLA CKD-EPI: >60 MLS/MIN/1.73/M2
GLOBULIN SER-MCNC: 2.3 GM/DL (ref 2.4–3.5)
GLUCOSE SERPL-MCNC: 90 MG/DL (ref 82–115)
HCT VFR BLD AUTO: 45.1 % (ref 42–52)
HDLC SERPL-MCNC: 34 MG/DL (ref 35–60)
HGB BLD-MCNC: 14.6 G/DL (ref 14–18)
IMM GRANULOCYTES # BLD AUTO: 0.04 X10(3)/MCL (ref 0–0.04)
IMM GRANULOCYTES NFR BLD AUTO: 0.6 %
LDLC SERPL CALC-MCNC: 67 MG/DL (ref 50–140)
LYMPHOCYTES # BLD AUTO: 0.71 X10(3)/MCL (ref 0.6–4.6)
LYMPHOCYTES NFR BLD AUTO: 10.2 %
MCH RBC QN AUTO: 31 PG (ref 27–31)
MCHC RBC AUTO-ENTMCNC: 32.4 G/DL (ref 33–36)
MCV RBC AUTO: 95.8 FL (ref 80–94)
MONOCYTES # BLD AUTO: 0.69 X10(3)/MCL (ref 0.1–1.3)
MONOCYTES NFR BLD AUTO: 10 %
NEUTROPHILS # BLD AUTO: 5.24 X10(3)/MCL (ref 2.1–9.2)
NEUTROPHILS NFR BLD AUTO: 75.6 %
NRBC BLD AUTO-RTO: 0 %
PLATELET # BLD AUTO: 155 X10(3)/MCL (ref 130–400)
PMV BLD AUTO: 10.1 FL (ref 7.4–10.4)
POTASSIUM SERPL-SCNC: 4 MMOL/L (ref 3.5–5.1)
PROT SERPL-MCNC: 5.8 GM/DL (ref 5.8–7.6)
PSA SERPL-MCNC: 1.19 NG/ML
RBC # BLD AUTO: 4.71 X10(6)/MCL (ref 4.7–6.1)
SODIUM SERPL-SCNC: 141 MMOL/L (ref 136–145)
TESTOST SERPL-MCNC: 412.51 NG/DL (ref 220.91–715.81)
TRIGL SERPL-MCNC: 114 MG/DL (ref 34–140)
VLDLC SERPL CALC-MCNC: 23 MG/DL
WBC # SPEC AUTO: 6.93 X10(3)/MCL (ref 4.5–11.5)

## 2024-01-19 PROCEDURE — 85025 COMPLETE CBC W/AUTO DIFF WBC: CPT

## 2024-01-19 PROCEDURE — 36415 COLL VENOUS BLD VENIPUNCTURE: CPT

## 2024-01-19 PROCEDURE — 84403 ASSAY OF TOTAL TESTOSTERONE: CPT

## 2024-01-19 PROCEDURE — 84153 ASSAY OF PSA TOTAL: CPT

## 2024-01-19 PROCEDURE — 80053 COMPREHEN METABOLIC PANEL: CPT

## 2024-01-19 PROCEDURE — 80061 LIPID PANEL: CPT

## 2024-01-23 ENCOUNTER — OFFICE VISIT (OUTPATIENT)
Dept: INTERNAL MEDICINE | Facility: CLINIC | Age: 74
End: 2024-01-23
Payer: COMMERCIAL

## 2024-01-23 ENCOUNTER — HOSPITAL ENCOUNTER (OUTPATIENT)
Dept: PULMONOLOGY | Facility: HOSPITAL | Age: 74
Discharge: HOME | End: 2024-01-23
Payer: COMMERCIAL

## 2024-01-23 VITALS
OXYGEN SATURATION: 92 % | HEART RATE: 79 BPM | WEIGHT: 232 LBS | SYSTOLIC BLOOD PRESSURE: 110 MMHG | BODY MASS INDEX: 35.16 KG/M2 | DIASTOLIC BLOOD PRESSURE: 80 MMHG | HEIGHT: 68 IN

## 2024-01-23 DIAGNOSIS — E66.01 SEVERE OBESITY (BMI 35.0-39.9) WITH COMORBIDITY: ICD-10-CM

## 2024-01-23 DIAGNOSIS — G47.33 OSA ON CPAP: ICD-10-CM

## 2024-01-23 DIAGNOSIS — I50.40 COMBINED SYSTOLIC AND DIASTOLIC CONGESTIVE HEART FAILURE, UNSPECIFIED HF CHRONICITY: ICD-10-CM

## 2024-01-23 DIAGNOSIS — J98.4 RESTRICTIVE LUNG DISEASE: Primary | ICD-10-CM

## 2024-01-23 DIAGNOSIS — I10 PRIMARY HYPERTENSION: ICD-10-CM

## 2024-01-23 DIAGNOSIS — Z23 NEED FOR VACCINATION: ICD-10-CM

## 2024-01-23 DIAGNOSIS — R49.0 HOARSENESS: ICD-10-CM

## 2024-01-23 DIAGNOSIS — J98.4 RESTRICTIVE LUNG DISEASE: ICD-10-CM

## 2024-01-23 DIAGNOSIS — Z98.890 S/P PLICATION OF DIAPHRAGM: ICD-10-CM

## 2024-01-23 DIAGNOSIS — R79.89 LOW TESTOSTERONE: Primary | ICD-10-CM

## 2024-01-23 DIAGNOSIS — I48.0 PAROXYSMAL ATRIAL FIBRILLATION: ICD-10-CM

## 2024-01-23 PROBLEM — I42.9 CARDIOMYOPATHY: Status: RESOLVED | Noted: 2023-01-30 | Resolved: 2024-01-23

## 2024-01-23 PROBLEM — I50.43 ACUTE ON CHRONIC COMBINED SYSTOLIC (CONGESTIVE) AND DIASTOLIC (CONGESTIVE) HEART FAILURE: Status: ACTIVE | Noted: 2024-01-23

## 2024-01-23 PROCEDURE — 90471 IMMUNIZATION ADMIN: CPT | Mod: 59,,, | Performed by: INTERNAL MEDICINE

## 2024-01-23 PROCEDURE — 1159F MED LIST DOCD IN RCRD: CPT | Mod: CPTII,,, | Performed by: INTERNAL MEDICINE

## 2024-01-23 PROCEDURE — 3074F SYST BP LT 130 MM HG: CPT | Mod: CPTII,,, | Performed by: INTERNAL MEDICINE

## 2024-01-23 PROCEDURE — 94625 PHY/QHP OP PULM RHB W/O MNTR: CPT

## 2024-01-23 PROCEDURE — 3008F BODY MASS INDEX DOCD: CPT | Mod: CPTII,,, | Performed by: INTERNAL MEDICINE

## 2024-01-23 PROCEDURE — 3079F DIAST BP 80-89 MM HG: CPT | Mod: CPTII,,, | Performed by: INTERNAL MEDICINE

## 2024-01-23 PROCEDURE — 99214 OFFICE O/P EST MOD 30 MIN: CPT | Mod: 25,,, | Performed by: INTERNAL MEDICINE

## 2024-01-23 PROCEDURE — 90677 PCV20 VACCINE IM: CPT | Mod: ,,, | Performed by: INTERNAL MEDICINE

## 2024-01-23 PROCEDURE — 96372 THER/PROPH/DIAG INJ SC/IM: CPT | Mod: ,,, | Performed by: INTERNAL MEDICINE

## 2024-01-23 RX ORDER — TESTOSTERONE CYPIONATE 200 MG/ML
50 INJECTION, SOLUTION INTRAMUSCULAR
Status: COMPLETED | OUTPATIENT
Start: 2024-01-23 | End: 2024-01-23

## 2024-01-23 RX ORDER — LATANOPROST 50 UG/ML
1 SOLUTION/ DROPS OPHTHALMIC NIGHTLY
COMMUNITY
Start: 2024-01-15

## 2024-01-23 RX ADMIN — TESTOSTERONE CYPIONATE 50 MG: 200 INJECTION, SOLUTION INTRAMUSCULAR at 11:01

## 2024-01-23 NOTE — PROGRESS NOTES
"Arrival time: 0730                              Departure time: 0805            Total clinic time: 35    TIME WORKLOAD HR RPD O2 sat %   RESTING     88 1 94   ARM ERGO 9 20 BOYLE               92 2 93   NUSTEP 9 25 BOYLE 102 2 93   TREADMILL 8 1.1 MPH/0% grade 102 3 91   RECOVERY     90 1 94   EXERCISE TIME 26              OXYGEN: NC 2L/min  EXERCISE SUMMARY: Patient able to complete 26:00 of aerobic exercise on three machines. No signs/symptoms noted or reported, vital sign responses appropriate. Patient stable and asymptomatic at discharge.  PLAN FOR NEXT SESSION: Continue exercise prescription with goal of 38:00-45:00 total exercise time.  EDUCATION:  "Preventing Lung Infections."Patient verbalized appropriate understanding, instructed to ask any questions during subsequent sessions.    "

## 2024-01-23 NOTE — PROGRESS NOTES
Subjective:       Patient ID: John Bullard is a 73 y.o. male.    Chief Complaint: Follow-up (3 mth)      Is a 73-year-old man in for follow-up of multiple problems.    He was complaining of persistent hoarseness.  Present for several months.  He was not yet seen an ENT doctor.    Also chronic shortness a breath.  He thinks there was some benefit to the diaphragmatic plication procedure done but he still remains symptomatic.    Numerous questions regarding vaccinations.  He did receive a pneumococcal 13 several years ago but has not had 1 since.    Follow-up      Review of Systems     Current Outpatient Medications on File Prior to Visit   Medication Sig Dispense Refill    albuterol (PROVENTIL/VENTOLIN HFA) 90 mcg/actuation inhaler INHALE 2 PUFFS BY MOUTH EVERY 6 HOURS 18 g 0    ALPRAZolam (XANAX) 0.5 MG tablet Take 1 tablet by mouth twice daily as needed for anxiety 45 tablet 5    calcium citrate-vitamin D3 315-200 mg (CITRACAL+D) 315 mg-5 mcg (200 unit) per tablet Take 1 tablet by mouth once daily.      cinnamon bark 500 mg capsule Take 500 mg by mouth once daily.      docusate sodium (COLACE) 100 MG capsule Take 100 mg by mouth Daily.      DULoxetine (CYMBALTA) 30 MG capsule TAKE 1 CAPSULE BY MOUTH ONCE DAILY DO  NOT  CRUSH  OR  CHEW 30 capsule 0    ELIQUIS 5 mg Tab Take 1 tablet (5 mg total) by mouth 2 (two) times daily. 60 tablet 11    fluticasone-umeclidin-vilanter (TRELEGY ELLIPTA) 100-62.5-25 mcg DsDv Inhale 1 puff into the lungs once daily. 1 each 11    furosemide (LASIX) 40 MG tablet Take 1 tablet (40 mg total) by mouth once daily. 30 tablet 11    latanoprost 0.005 % ophthalmic solution Place 1 drop into both eyes every evening.      loratadine (CLARITIN) 10 mg tablet Take 10 mg by mouth daily as needed for Allergies.      losartan (COZAAR) 25 MG tablet Take 1 tablet (25 mg total) by mouth 2 (two) times a day. 180 tablet 3    melatonin 10 mg Tab Take 10-20 mg by mouth daily as needed (insomnia).       "metoprolol succinate (TOPROL-XL) 100 MG 24 hr tablet Take 100 mg by mouth.      montelukast (SINGULAIR) 10 mg tablet Take 1 tablet by mouth once daily 30 tablet 0    naproxen sodium (ANAPROX) 220 MG tablet Take 220 mg by mouth daily as needed.      NON FORMULARY MEDICATION Take 1 tablet by mouth once daily. Tumeric      omega-3 fatty acids/fish oil (FISH OIL-OMEGA-3 FATTY ACIDS) 300-1,000 mg capsule Take 1 capsule by mouth once daily.      omeprazole (PRILOSEC) 40 MG capsule Take 1 capsule by mouth once daily 30 capsule 6    potassium chloride (KLOR-CON) 8 MEQ TbSR Take 1 tablet (8 mEq total) by mouth once daily. 30 tablet 5    rosuvastatin (CRESTOR) 20 MG tablet TAKE 1 TABLET BY MOUTH ONCE IN THE EVENING      sotaloL (BETAPACE) 80 MG tablet Take 80 mg by mouth 2 (two) times daily.      tamsulosin (FLOMAX) 0.4 mg Cap Take 1 capsule by mouth once daily.      testosterone cypionate (DEPOTESTOTERONE CYPIONATE) 200 mg/mL injection INJECT 1 ML (CC) INTRAMUSCULARLY EVERY 14 DAYS 3 mL 5    zolpidem (AMBIEN) 5 MG Tab TAKE 1 TABLET BY MOUTH ONCE DAILY AT BEDTIME AS NEEDED FOR INSOMNIA 30 tablet 5    [DISCONTINUED] travoprost, benzalkonium, (TRAVATAN) 0.004 % ophthalmic solution Place 1 drop into both eyes every evening.      budesonide-glycopyr-formoterol (BREZTRI AEROSPHERE) 160-9-4.8 mcg/actuation HFAA Inhale into the lungs.      [DISCONTINUED] budesonide-glycopyr-formoterol (BREZTRI AEROSPHERE) 160-9-4.8 mcg/actuation HFAA Inhale 2 puffs into the lungs 2 (two) times a day. (Patient not taking: Reported on 1/23/2024) 10.7 g 11    [DISCONTINUED] methylPREDNISolone (MEDROL DOSEPACK) 4 mg tablet use as directed 21 each 0    [DISCONTINUED] travoprost (TRAVATAN Z) 0.004 % ophthalmic solution travoprost 0.004 % eye drops   Instill 1 drop twice a day by ophthalmic route as needed.       No current facility-administered medications on file prior to visit.     Objective:      /80   Pulse 79   Ht 5' 8" (1.727 m)   Wt 105.2 " kg (232 lb)   SpO2 (!) 92%   BMI 35.28 kg/m²     Physical Exam  Vitals reviewed.   Constitutional:       Appearance: Normal appearance.   HENT:      Head: Normocephalic and atraumatic.      Mouth/Throat:      Pharynx: Oropharynx is clear.   Eyes:      Pupils: Pupils are equal, round, and reactive to light.   Cardiovascular:      Rate and Rhythm: Normal rate and regular rhythm.      Pulses: Normal pulses.      Heart sounds: Normal heart sounds.   Pulmonary:      Breath sounds: Normal breath sounds.   Abdominal:      General: Abdomen is flat.      Palpations: Abdomen is soft.   Musculoskeletal:      Cervical back: Neck supple.   Skin:     General: Skin is warm and dry.   Neurological:      Mental Status: He is alert.       Laboratory studies reviewed and acceptable.  Assessment:       1. Chronic dyspnea.  Multifactorial     2. Obesity.  Contributing to 1.     3. Status post plication of diaphragm    4. Stable heart disease including systolic and diastolic dysfunction.    5. Paroxysmal atrial fib     6. Obstructive sleep apnea on CPAP.  Doing well     7. Controlled hypertension    8. Low testosterone level on replacement hormone    Plan:     Continue same meds.  Pneumonia 20 vaccine today.  Recommend shingles vaccines after that.  He eventually to get RSV.    Refer to ENT to assess the hoarseness.    Follow-up with me 6 months with CBC CMP lipid TSH PSA testosterone level prior

## 2024-01-29 ENCOUNTER — TELEPHONE (OUTPATIENT)
Dept: INTERNAL MEDICINE | Facility: CLINIC | Age: 74
End: 2024-01-29
Payer: COMMERCIAL

## 2024-01-29 DIAGNOSIS — R06.02 SOB (SHORTNESS OF BREATH): ICD-10-CM

## 2024-01-29 RX ORDER — ALBUTEROL SULFATE 90 UG/1
2 AEROSOL, METERED RESPIRATORY (INHALATION) EVERY 6 HOURS
Qty: 18 G | Refills: 0 | Status: SHIPPED | OUTPATIENT
Start: 2024-01-29 | End: 2024-02-12 | Stop reason: SDUPTHER

## 2024-01-29 NOTE — TELEPHONE ENCOUNTER
----- Message from Rebecca Reynolds sent at 1/29/2024  9:10 AM CST -----  Regarding: refill  Type:  RX Refill Request    Who Called: Milliard    Refill or New Rx:refill    RX Name and Strength:albuterol (PROVENTIL/VENTOLIN HFA) 90 mcg/actuation inhaler    How is the patient currently taking it? (ex. 1XDay):    Is this a 30 day or 90 day RX:    Preferred Pharmacy with phone number:[facility]    Local or Mail Order:    Ordering Provider:    Would the patient rather a call back or a response via [facility]?     Best Call Back Number:    Additional Information:

## 2024-01-30 ENCOUNTER — HOSPITAL ENCOUNTER (OUTPATIENT)
Dept: PULMONOLOGY | Facility: HOSPITAL | Age: 74
Discharge: HOME | End: 2024-01-30
Payer: COMMERCIAL

## 2024-01-30 DIAGNOSIS — J98.4 RESTRICTIVE LUNG DISEASE: Primary | ICD-10-CM

## 2024-01-30 DIAGNOSIS — J45.909 ASTHMA, UNSPECIFIED ASTHMA SEVERITY, UNSPECIFIED WHETHER COMPLICATED, UNSPECIFIED WHETHER PERSISTENT: ICD-10-CM

## 2024-01-30 PROCEDURE — 94625 PHY/QHP OP PULM RHB W/O MNTR: CPT

## 2024-01-30 RX ORDER — MONTELUKAST SODIUM 10 MG/1
TABLET ORAL
Qty: 30 TABLET | Refills: 0 | Status: SHIPPED | OUTPATIENT
Start: 2024-01-30 | End: 2024-02-23

## 2024-01-30 NOTE — PROGRESS NOTES
"Arrival time: 0730                              Departure time: 0805            Total clinic time: 35    TIME WORKLOAD HR RPD O2 sat %   RESTING     91 1 94   ARM ERGO 10 20 BOYLE               102 2 94   NUSTEP 10 25 BOYLE 102 2 92   TREADMILL 9 1.1 MPH/0% grade 105 3 93   RECOVERY     96 1 94   EXERCISE TIME 29              OXYGEN: NC 2L/min  EXERCISE SUMMARY: Patient able to complete 29:00 of aerobic exercise on three machines. No signs/symptoms noted or reported, vital sign responses appropriate. Patient stable and asymptomatic at discharge.  PLAN FOR NEXT SESSION: Continue exercise prescription with goal of 38:00-45:00 total exercise time.  EDUCATION:  "Breath Control." Patient verbalized appropriate understanding, instructed to ask any questions during subsequent sessions.  "

## 2024-02-01 ENCOUNTER — HOSPITAL ENCOUNTER (OUTPATIENT)
Dept: PULMONOLOGY | Facility: HOSPITAL | Age: 74
Discharge: HOME | End: 2024-02-01
Payer: COMMERCIAL

## 2024-02-01 DIAGNOSIS — J98.4 RESTRICTIVE LUNG DISEASE: Primary | ICD-10-CM

## 2024-02-01 PROCEDURE — 94625 PHY/QHP OP PULM RHB W/O MNTR: CPT

## 2024-02-01 NOTE — PROGRESS NOTES
"Arrival time: 0730                              Departure time: 0805            Total clinic time: 35    TIME WORKLOAD HR RPD O2 sat %   RESTING     85 1 90   ARM ERGO 12 25 BOYLE               91 2 94   NUSTEP 12 25 BOYLE 90 2 94   TREADMILL 10 1.1 MPH/0% grade 100 3 93   RECOVERY    90 1 93   EXERCISE TIME 34              OXYGEN: NC 2L/min  EXERCISE SUMMARY: Patient able to complete 34:00 of aerobic exercise on three machines. No signs/symptoms noted or reported, vital sign responses appropriate. Patient stable and asymptomatic at discharge.  PLAN FOR NEXT SESSION: Continue exercise prescription with goal of 38:00-45:00 total exercise time.  EDUCATION:  "Airway Clearance." Patient verbalized appropriate understanding, instructed to ask any questions during subsequent sessions.  "

## 2024-02-06 ENCOUNTER — CLINICAL SUPPORT (OUTPATIENT)
Dept: INTERNAL MEDICINE | Facility: CLINIC | Age: 74
End: 2024-02-06
Payer: COMMERCIAL

## 2024-02-06 ENCOUNTER — HOSPITAL ENCOUNTER (OUTPATIENT)
Dept: PULMONOLOGY | Facility: HOSPITAL | Age: 74
Discharge: HOME | End: 2024-02-06
Payer: COMMERCIAL

## 2024-02-06 DIAGNOSIS — R79.89 LOW TESTOSTERONE: Primary | ICD-10-CM

## 2024-02-06 DIAGNOSIS — J98.4 RESTRICTIVE LUNG DISEASE: Primary | ICD-10-CM

## 2024-02-06 PROCEDURE — 96372 THER/PROPH/DIAG INJ SC/IM: CPT | Mod: ,,, | Performed by: INTERNAL MEDICINE

## 2024-02-06 PROCEDURE — 94625 PHY/QHP OP PULM RHB W/O MNTR: CPT

## 2024-02-06 RX ORDER — TESTOSTERONE CYPIONATE 200 MG/ML
200 INJECTION, SOLUTION INTRAMUSCULAR
Status: COMPLETED | OUTPATIENT
Start: 2024-02-06 | End: 2024-02-06

## 2024-02-06 RX ADMIN — TESTOSTERONE CYPIONATE 200 MG: 200 INJECTION, SOLUTION INTRAMUSCULAR at 08:02

## 2024-02-06 NOTE — PROGRESS NOTES
"Arrival time: 0730                              Departure time: 0805            Total clinic time: 35    TIME WORKLOAD HR RPD O2 sat %   RESTING     82 1 91   ARM ERGO 12 25 BOYLE               89 2 95   NUSTEP 12 25 BOYLE 93 2 95   TREADMILL 11 1.2 MPH/0% grade 96 3 91   RECOVERY     88 1 94   EXERCISE TIME 35              OXYGEN: NC 2L/min  EXERCISE SUMMARY: Patient able to complete 35:00 of aerobic exercise on three machines. No signs/symptoms noted or reported, vital sign responses appropriate. Patient stable and asymptomatic at discharge.  PLAN FOR NEXT SESSION: Continue exercise prescription with goal of 38:00-45:00 total exercise time.  EDUCATION:  "Nutrition and Lung Disease." Patient verbalized appropriate understanding, instructed to ask any questions during subsequent sessions.  "

## 2024-02-08 ENCOUNTER — HOSPITAL ENCOUNTER (OUTPATIENT)
Dept: PULMONOLOGY | Facility: HOSPITAL | Age: 74
Discharge: HOME | End: 2024-02-08
Payer: COMMERCIAL

## 2024-02-08 DIAGNOSIS — J98.6 CHRONICALLY ELEVATED HEMIDIAPHRAGM: Primary | ICD-10-CM

## 2024-02-08 DIAGNOSIS — F32.A DEPRESSION, UNSPECIFIED DEPRESSION TYPE: ICD-10-CM

## 2024-02-08 DIAGNOSIS — J98.4 RESTRICTIVE LUNG DISEASE: Primary | ICD-10-CM

## 2024-02-08 PROCEDURE — 94625 PHY/QHP OP PULM RHB W/O MNTR: CPT

## 2024-02-08 RX ORDER — FUROSEMIDE 40 MG/1
40 TABLET ORAL
Qty: 30 TABLET | Refills: 0 | Status: SHIPPED | OUTPATIENT
Start: 2024-02-08 | End: 2024-03-11

## 2024-02-08 RX ORDER — DULOXETIN HYDROCHLORIDE 30 MG/1
CAPSULE, DELAYED RELEASE ORAL
Qty: 30 CAPSULE | Refills: 6 | Status: SHIPPED | OUTPATIENT
Start: 2024-02-08

## 2024-02-08 NOTE — PROGRESS NOTES
Arrival time: 0730                              Departure time: 0810            Total clinic time: 40    TIME WORKLOAD HR RPD O2 sat %   RESTING     82 1 92   ARM ERGO 13 25 BOYLE               90 2 97   NUSTEP 13 25 BOYLE 94 2 92   TREADMILL 12 1.2 MPH/0% grade 97 3 91   RECOVERY    86 1 94   EXERCISE TIME 38              OXYGEN: NC 2L/min  EXERCISE SUMMARY: Patient able to complete 38:00 of aerobic exercise on three machines. No signs/symptoms noted or reported, vital sign responses appropriate. Patient stable and asymptomatic at discharge.  PLAN FOR NEXT SESSION: Continue exercise prescription with goal of 38:00-45:00 total exercise time.

## 2024-02-12 DIAGNOSIS — R06.02 SOB (SHORTNESS OF BREATH): ICD-10-CM

## 2024-02-12 RX ORDER — ALBUTEROL SULFATE 90 UG/1
2 AEROSOL, METERED RESPIRATORY (INHALATION) EVERY 6 HOURS
Qty: 18 G | Refills: 0 | Status: SHIPPED | OUTPATIENT
Start: 2024-02-12 | End: 2024-02-23 | Stop reason: SDUPTHER

## 2024-02-20 ENCOUNTER — OFFICE VISIT (OUTPATIENT)
Dept: CARDIAC SURGERY | Facility: CLINIC | Age: 74
End: 2024-02-20
Payer: COMMERCIAL

## 2024-02-20 ENCOUNTER — CLINICAL SUPPORT (OUTPATIENT)
Dept: INTERNAL MEDICINE | Facility: CLINIC | Age: 74
End: 2024-02-20
Payer: COMMERCIAL

## 2024-02-20 ENCOUNTER — ANESTHESIA EVENT (OUTPATIENT)
Dept: SURGERY | Facility: HOSPITAL | Age: 74
End: 2024-02-20
Payer: COMMERCIAL

## 2024-02-20 VITALS
HEART RATE: 82 BPM | RESPIRATION RATE: 20 BRPM | OXYGEN SATURATION: 93 % | BODY MASS INDEX: 34.95 KG/M2 | SYSTOLIC BLOOD PRESSURE: 140 MMHG | HEIGHT: 68 IN | WEIGHT: 230.63 LBS | DIASTOLIC BLOOD PRESSURE: 83 MMHG

## 2024-02-20 DIAGNOSIS — R79.89 LOW TESTOSTERONE: Primary | ICD-10-CM

## 2024-02-20 DIAGNOSIS — J98.4 RESTRICTIVE LUNG DISEASE: Primary | ICD-10-CM

## 2024-02-20 PROCEDURE — 1159F MED LIST DOCD IN RCRD: CPT | Mod: CPTII,,, | Performed by: SPECIALIST

## 2024-02-20 PROCEDURE — 3079F DIAST BP 80-89 MM HG: CPT | Mod: CPTII,,, | Performed by: SPECIALIST

## 2024-02-20 PROCEDURE — 3288F FALL RISK ASSESSMENT DOCD: CPT | Mod: CPTII,,, | Performed by: SPECIALIST

## 2024-02-20 PROCEDURE — 3008F BODY MASS INDEX DOCD: CPT | Mod: CPTII,,, | Performed by: SPECIALIST

## 2024-02-20 PROCEDURE — 99213 OFFICE O/P EST LOW 20 MIN: CPT | Mod: ,,, | Performed by: SPECIALIST

## 2024-02-20 PROCEDURE — 96372 THER/PROPH/DIAG INJ SC/IM: CPT | Mod: ,,, | Performed by: INTERNAL MEDICINE

## 2024-02-20 PROCEDURE — 1125F AMNT PAIN NOTED PAIN PRSNT: CPT | Mod: CPTII,,, | Performed by: SPECIALIST

## 2024-02-20 PROCEDURE — 3077F SYST BP >= 140 MM HG: CPT | Mod: CPTII,,, | Performed by: SPECIALIST

## 2024-02-20 PROCEDURE — 1101F PT FALLS ASSESS-DOCD LE1/YR: CPT | Mod: CPTII,,, | Performed by: SPECIALIST

## 2024-02-20 RX ORDER — TESTOSTERONE CYPIONATE 200 MG/ML
200 INJECTION, SOLUTION INTRAMUSCULAR
Status: COMPLETED | OUTPATIENT
Start: 2024-02-20 | End: 2024-02-20

## 2024-02-20 RX ORDER — FAMOTIDINE 20 MG/1
20 TABLET, FILM COATED ORAL NIGHTLY PRN
COMMUNITY
Start: 2024-02-01

## 2024-02-20 RX ADMIN — TESTOSTERONE CYPIONATE 200 MG: 200 INJECTION, SOLUTION INTRAMUSCULAR at 08:02

## 2024-02-20 NOTE — PROGRESS NOTES
Father returns today after having completed his brief steroid Dosepak.  He said that this did slightly improve his breathing.  He is also currently undergoing pulmonary rehab.  He is having some possibly reflux symptoms in his going to see GI to see if this helps.  But overall he still has some significant shortness of breath.  His cardiac eval was negative.  At this point, I do not see any surgical ways we can improve his condition.  Basically at this point I think his best option is to continue to try to lose weight and continue with the pulmonary rehab.  He will follow up long-term with pulmonary and return to clinic as needed.

## 2024-02-21 ENCOUNTER — ANESTHESIA (OUTPATIENT)
Dept: SURGERY | Facility: HOSPITAL | Age: 74
End: 2024-02-21
Payer: COMMERCIAL

## 2024-02-21 ENCOUNTER — HOSPITAL ENCOUNTER (OUTPATIENT)
Facility: HOSPITAL | Age: 74
Discharge: HOME | End: 2024-02-21
Attending: OPHTHALMOLOGY | Admitting: OPHTHALMOLOGY
Payer: COMMERCIAL

## 2024-02-21 PROCEDURE — 25000003 PHARM REV CODE 250: Performed by: OPHTHALMOLOGY

## 2024-02-21 PROCEDURE — 37000008 HC ANESTHESIA 1ST 15 MINUTES: Performed by: OPHTHALMOLOGY

## 2024-02-21 PROCEDURE — 36000707: Performed by: OPHTHALMOLOGY

## 2024-02-21 PROCEDURE — 37000009 HC ANESTHESIA EA ADD 15 MINS: Performed by: OPHTHALMOLOGY

## 2024-02-21 PROCEDURE — 63600175 PHARM REV CODE 636 W HCPCS: Performed by: NURSE ANESTHETIST, CERTIFIED REGISTERED

## 2024-02-21 PROCEDURE — 25000003 PHARM REV CODE 250

## 2024-02-21 PROCEDURE — 71000016 HC POSTOP RECOV ADDL HR: Performed by: OPHTHALMOLOGY

## 2024-02-21 PROCEDURE — 71000015 HC POSTOP RECOV 1ST HR: Performed by: OPHTHALMOLOGY

## 2024-02-21 PROCEDURE — 25000003 PHARM REV CODE 250: Performed by: NURSE ANESTHETIST, CERTIFIED REGISTERED

## 2024-02-21 PROCEDURE — D9220A PRA ANESTHESIA: Mod: CRNA,,, | Performed by: NURSE ANESTHETIST, CERTIFIED REGISTERED

## 2024-02-21 PROCEDURE — 63600175 PHARM REV CODE 636 W HCPCS: Performed by: ANESTHESIOLOGY

## 2024-02-21 PROCEDURE — 71000033 HC RECOVERY, INTIAL HOUR: Performed by: OPHTHALMOLOGY

## 2024-02-21 PROCEDURE — 36000706: Performed by: OPHTHALMOLOGY

## 2024-02-21 PROCEDURE — D9220A PRA ANESTHESIA: Mod: ANES,,, | Performed by: ANESTHESIOLOGY

## 2024-02-21 RX ORDER — DEXAMETHASONE SODIUM PHOSPHATE 4 MG/ML
INJECTION, SOLUTION INTRA-ARTICULAR; INTRALESIONAL; INTRAMUSCULAR; INTRAVENOUS; SOFT TISSUE
Status: DISCONTINUED | OUTPATIENT
Start: 2024-02-21 | End: 2024-02-21

## 2024-02-21 RX ORDER — LIDOCAINE HYDROCHLORIDE 10 MG/ML
INJECTION, SOLUTION EPIDURAL; INFILTRATION; INTRACAUDAL; PERINEURAL
Status: DISCONTINUED | OUTPATIENT
Start: 2024-02-21 | End: 2024-02-21

## 2024-02-21 RX ORDER — PHENYLEPHRINE HYDROCHLORIDE 25 MG/ML
1 SOLUTION/ DROPS OPHTHALMIC
Status: COMPLETED | OUTPATIENT
Start: 2024-02-21 | End: 2024-02-21

## 2024-02-21 RX ORDER — POVIDONE-IODINE 5 %
SOLUTION, NON-ORAL OPHTHALMIC (EYE)
Status: DISCONTINUED | OUTPATIENT
Start: 2024-02-21 | End: 2024-02-21 | Stop reason: HOSPADM

## 2024-02-21 RX ORDER — PROPOFOL 10 MG/ML
VIAL (ML) INTRAVENOUS
Status: DISCONTINUED | OUTPATIENT
Start: 2024-02-21 | End: 2024-02-21

## 2024-02-21 RX ORDER — HYDROMORPHONE HYDROCHLORIDE 2 MG/ML
0.4 INJECTION, SOLUTION INTRAMUSCULAR; INTRAVENOUS; SUBCUTANEOUS EVERY 5 MIN PRN
Status: CANCELLED | OUTPATIENT
Start: 2024-02-21

## 2024-02-21 RX ORDER — HYDROMORPHONE HYDROCHLORIDE 2 MG/ML
0.4 INJECTION, SOLUTION INTRAMUSCULAR; INTRAVENOUS; SUBCUTANEOUS EVERY 5 MIN PRN
Status: DISCONTINUED | OUTPATIENT
Start: 2024-02-21 | End: 2024-02-21 | Stop reason: HOSPADM

## 2024-02-21 RX ORDER — ONDANSETRON HYDROCHLORIDE 2 MG/ML
4 INJECTION, SOLUTION INTRAVENOUS ONCE
Status: CANCELLED | OUTPATIENT
Start: 2024-02-21 | End: 2024-02-21

## 2024-02-21 RX ORDER — SODIUM CHLORIDE 9 MG/ML
INJECTION, SOLUTION INTRAVENOUS CONTINUOUS
Status: DISCONTINUED | OUTPATIENT
Start: 2024-02-21 | End: 2024-02-21 | Stop reason: HOSPADM

## 2024-02-21 RX ORDER — PHENYLEPHRINE HYDROCHLORIDE 10 MG/ML
INJECTION INTRAVENOUS
Status: DISCONTINUED | OUTPATIENT
Start: 2024-02-21 | End: 2024-02-21

## 2024-02-21 RX ORDER — MEPERIDINE HYDROCHLORIDE 25 MG/ML
12.5 INJECTION INTRAMUSCULAR; INTRAVENOUS; SUBCUTANEOUS ONCE
Status: DISCONTINUED | OUTPATIENT
Start: 2024-02-21 | End: 2024-02-21 | Stop reason: HOSPADM

## 2024-02-21 RX ORDER — BUPIVACAINE HYDROCHLORIDE 5 MG/ML
INJECTION, SOLUTION EPIDURAL; INTRACAUDAL
Status: DISCONTINUED
Start: 2024-02-21 | End: 2024-02-21 | Stop reason: WASHOUT

## 2024-02-21 RX ORDER — ONDANSETRON HYDROCHLORIDE 2 MG/ML
INJECTION, SOLUTION INTRAMUSCULAR; INTRAVENOUS
Status: DISCONTINUED | OUTPATIENT
Start: 2024-02-21 | End: 2024-02-21

## 2024-02-21 RX ORDER — MEPERIDINE HYDROCHLORIDE 25 MG/ML
12.5 INJECTION INTRAMUSCULAR; INTRAVENOUS; SUBCUTANEOUS ONCE
Status: CANCELLED | OUTPATIENT
Start: 2024-02-21 | End: 2024-02-21

## 2024-02-21 RX ORDER — SODIUM CHLORIDE, SODIUM GLUCONATE, SODIUM ACETATE, POTASSIUM CHLORIDE AND MAGNESIUM CHLORIDE 30; 37; 368; 526; 502 MG/100ML; MG/100ML; MG/100ML; MG/100ML; MG/100ML
INJECTION, SOLUTION INTRAVENOUS CONTINUOUS
Status: DISCONTINUED | OUTPATIENT
Start: 2024-02-21 | End: 2024-02-21 | Stop reason: HOSPADM

## 2024-02-21 RX ORDER — FENTANYL CITRATE 50 UG/ML
INJECTION, SOLUTION INTRAMUSCULAR; INTRAVENOUS
Status: DISCONTINUED | OUTPATIENT
Start: 2024-02-21 | End: 2024-02-21

## 2024-02-21 RX ORDER — ONDANSETRON HYDROCHLORIDE 2 MG/ML
4 INJECTION, SOLUTION INTRAVENOUS ONCE
Status: DISCONTINUED | OUTPATIENT
Start: 2024-02-21 | End: 2024-02-21 | Stop reason: HOSPADM

## 2024-02-21 RX ORDER — MIDAZOLAM HYDROCHLORIDE 1 MG/ML
2 INJECTION INTRAMUSCULAR; INTRAVENOUS ONCE AS NEEDED
Status: COMPLETED | OUTPATIENT
Start: 2024-02-21 | End: 2024-02-21

## 2024-02-21 RX ORDER — SODIUM CHLORIDE, SODIUM LACTATE, POTASSIUM CHLORIDE, CALCIUM CHLORIDE 600; 310; 30; 20 MG/100ML; MG/100ML; MG/100ML; MG/100ML
INJECTION, SOLUTION INTRAVENOUS CONTINUOUS
Status: DISCONTINUED | OUTPATIENT
Start: 2024-02-21 | End: 2024-02-21 | Stop reason: HOSPADM

## 2024-02-21 RX ADMIN — PHENYLEPHRINE HYDROCHLORIDE 100 MCG: 10 INJECTION INTRAVENOUS at 08:02

## 2024-02-21 RX ADMIN — PHENYLEPHRINE HYDROCHLORIDE 1 DROP: 25 SOLUTION/ DROPS OPHTHALMIC at 06:02

## 2024-02-21 RX ADMIN — MIDAZOLAM HYDROCHLORIDE 1 MG: 1 INJECTION, SOLUTION INTRAMUSCULAR; INTRAVENOUS at 07:02

## 2024-02-21 RX ADMIN — PROPOFOL 150 MG: 10 INJECTION, EMULSION INTRAVENOUS at 07:02

## 2024-02-21 RX ADMIN — FENTANYL CITRATE 50 MCG: 50 INJECTION, SOLUTION INTRAMUSCULAR; INTRAVENOUS at 07:02

## 2024-02-21 RX ADMIN — LIDOCAINE HYDROCHLORIDE 30 MG: 10 INJECTION, SOLUTION EPIDURAL; INFILTRATION; INTRACAUDAL; PERINEURAL at 07:02

## 2024-02-21 RX ADMIN — SODIUM CHLORIDE, POTASSIUM CHLORIDE, SODIUM LACTATE AND CALCIUM CHLORIDE: 600; 310; 30; 20 INJECTION, SOLUTION INTRAVENOUS at 06:02

## 2024-02-21 RX ADMIN — ONDANSETRON 4 MG: 2 INJECTION INTRAMUSCULAR; INTRAVENOUS at 07:02

## 2024-02-21 RX ADMIN — DEXAMETHASONE SODIUM PHOSPHATE 8 MG: 4 INJECTION, SOLUTION INTRA-ARTICULAR; INTRALESIONAL; INTRAMUSCULAR; INTRAVENOUS; SOFT TISSUE at 07:02

## 2024-02-21 NOTE — TRANSFER OF CARE
"Anesthesia Transfer of Care Note    Patient: John Bullard    Procedure(s) Performed: Procedure(s) (LRB):  STRABISMUS SURGERY  bmr recess (Bilateral)    Patient location: PACU    Anesthesia Type: general    Transport from OR: Transported from OR on room air with adequate spontaneous ventilation    Post pain: adequate analgesia    Post assessment: no apparent anesthetic complications    Post vital signs: stable    Level of consciousness: responds to stimulation    Nausea/Vomiting: no nausea/vomiting    Complications: none    Transfer of care protocol was followed      Last vitals: Visit Vitals  /71   Pulse 71   Temp 36.3 °C (97.3 °F)   Resp 15   Ht 5' 8" (1.727 m)   Wt 102.3 kg (225 lb 8.5 oz)   SpO2 99%   BMI 34.29 kg/m²     "

## 2024-02-21 NOTE — DISCHARGE INSTRUCTIONS
Patient Education       Strabismus Discharge Instructions        What care is needed at home?   Tylenol and Ibuprofen alternating every 3 hours for pain control.  Antibiotic ointment to the lower eyelid twice a day for two weeks.  No water in the eyes for 2 weeks.  No driving until Sunday, 2/25/24.  You can restart your Eliquis tomorrow.  Have someone stay with your today to help you get around.   What follow-up care is needed?   Be sure to keep your follow up visit.  Will physical activity be limited?   Depending on how much your eyesight is affected, some of your activities may be limited. Talk to your doctor about the right amount of activity for your child.  What problems could happen?   Eyes may not line up correctly  Problems with depth perception  Loss of eyesight in one eye  Problems reading, driving, or playing sports  Children may have learning problems  When do I need to call the doctor?   Signs of being cross eyed  Seeing double  Trouble seeing  One eye looks straight ahead and the other is looking up, down, or outwards  Any uncontrolled pain

## 2024-02-21 NOTE — OP NOTE
[facility] [address] [place] [address]    PATIENT NAME:      JONATHAN SOTOMAYOR  YOB: 1950  CSN:               [id number]  MRN:               [id number]  ADMIT DATE:        02/21/2024 05:56:00  PHYSICIAN:         Reg Mendez MD                          OPERATIVE REPORT      DATE OF SURGERY:        SURGEON:  Reg Mendez MD    PREOPERATIVE DIAGNOSIS:  Divergence insufficiency esotropia.    POSTOPERATIVE DIAGNOSIS:  Divergence insufficiency esotropia.    PROCEDURE:  Bilateral medial rectus recession, 6.0 mm.    ANESTHESIA:  General.    COMPLICATIONS:  None.    CONDITION:  Stable.    PROCEDURE IN DETAIL:  After consent was obtained, the patient was taken to the   operating room where endotracheal anesthesia was induced.  Ophthalmic Betadine   was applied to both eyes.  The patient was then prepped and draped in a normal   ophthalmic fashion.  A lid speculum was placed into the right eye.  A 0.5   Gomes-Givens forceps was then used to rotate the eye superotemporally, exposing   the inferonasal quadrant.  Teena scissors were used to create a fornix   peritomy through conjunctiva and Tenon's.  The medial rectus muscle was then   isolated with a small, followed by a large Anselmo muscle hook, after which the   small muscle hook was used to reflect the conjunctiva over the tip of the larger   muscle hook and a peritomy was performed through the intermuscular septum.    Tenon's were then cleaned from the anterior and posterior insertion site of the   muscle.  A double-armed 6-0 Vicryl suture was then placed in an interlocking   fashion 1-mm posterior to the insertion of the muscle.  The muscle was then   disinserted from the globe with Teena scissors, after which each of the 6-0   Vicryl sutures were then placed partial thickness through sclera via a crossed   sword fashion 6.0 mm  posterior to the original scleral insertion site.  The   sutures were tied down and cut, leaving a modest tail.  Large and small muscle   hooks were used to massage close the fornix peritomy.      The lid speculum was then removed from the right eye and then placed into the   left eye. The identical sequence was performed in the left eye recessing the   left medial rectus muscle 6.0 mm posterior to the original scleral insertion   site.  The lid speculum was removed from the left eye.  Ophthalmic Betadine and   Tobradex ophthalmic ointment was placed into both eyes.  The patient tolerated   the procedure well and was brought to the recovery room in stable condition.        ______________________________  MD TRI Guerrero/KATIE  DD:  02/21/2024  Time:  08:43AM  DT:  02/21/2024  Time:  08:49AM  Job #:  [id number]      OPERATIVE REPORT

## 2024-02-21 NOTE — ANESTHESIA PREPROCEDURE EVALUATION
"                                                                                                             02/21/2024  John Bullard is a 73 y.o., male.  Pre-operative evaluation for Procedure(s) (LRB):  STRABISMUS SURGERY  bmr recess (N/A)    BP (!) 158/98   Pulse 73   Temp 36.8 °C (98.3 °F) (Oral)   Resp 18   Ht 5' 8" (1.727 m)   Wt 102.3 kg (225 lb 8.5 oz)   SpO2 (!) 93%   BMI 34.29 kg/m²     Past Medical History:   Diagnosis Date    Anticoagulant long-term use     Anxiety     Asthma     Atrial fibrillation     Decreased testosterone level     Diaphragmatic hernia without obstruction or gangrene     DJD (degenerative joint disease)     Enlarged prostate     Exertional dyspnea     GERD (gastroesophageal reflux disease)     Glaucoma     History of kidney stones     HLD (hyperlipidemia)     Hypertension     OA (osteoarthritis)     Obesity     MINDA on CPAP     PAF (paroxysmal atrial fibrillation)     s/p Ablation (1/5/2023)    Pneumonia 01/2023    SBO (small bowel obstruction)     hx multiple (3) incudling requiring surgical intervention    Venous insufficiency     Ventricular tachycardia        Patient Active Problem List   Diagnosis    Severe obesity (BMI 35.0-39.9) with comorbidity    Venous insufficiency    OA (osteoarthritis)    Hypertension    Atrial fibrillation    Tachycardia    Atrial flutter    Restrictive lung disease    Acute on chronic combined systolic (congestive) and diastolic (congestive) heart failure       Review of patient's allergies indicates:   Allergen Reactions    Ativan [lorazepam] Hallucinations       Current Outpatient Medications   Medication Instructions    albuterol (PROVENTIL/VENTOLIN HFA) 90 mcg/actuation inhaler 2 puffs, Inhalation, Every 6 hours, Rescue    ALPRAZolam (XANAX) 0.5 MG tablet Take 1 tablet by mouth twice daily as needed for anxiety    budesonide-glycopyr-formoterol (BREZTRI AEROSPHERE) 160-9-4.8 mcg/actuation HFAA Inhalation    calcium citrate-vitamin D3 " 315-200 mg (CITRACAL+D) 315 mg-5 mcg (200 unit) per tablet 1 tablet, Oral, Daily    cinnamon bark 500 mg, Oral, Daily    docusate sodium (COLACE) 100 mg, Oral, Daily    DULoxetine (CYMBALTA) 30 MG capsule TAKE 1 CAPSULE BY MOUTH ONCE DAILY DO  NOT  CRUSH  OR  CHEW    ELIQUIS 5 mg, Oral, 2 times daily    famotidine (PEPCID) 20 mg, Oral, Nightly PRN    fluticasone-umeclidin-vilanter (TRELEGY ELLIPTA) 100-62.5-25 mcg DsDv 1 puff, Inhalation, Daily    furosemide (LASIX) 40 mg, Oral    latanoprost 0.005 % ophthalmic solution 1 drop, Both Eyes, Nightly    loratadine (CLARITIN) 10 mg, Oral, Daily PRN    losartan (COZAAR) 25 mg, Oral, 2 times daily    melatonin 10-20 mg, Oral, Daily PRN    metoprolol succinate (TOPROL-XL) 100 mg, Oral, Daily    montelukast (SINGULAIR) 10 mg tablet Take 1 tablet by mouth once daily    naproxen sodium (ANAPROX) 220 mg, Oral, Daily PRN    NON FORMULARY MEDICATION 1 tablet, Oral, Daily, Tumeric    omega-3 fatty acids/fish oil (FISH OIL-OMEGA-3 FATTY ACIDS) 300-1,000 mg capsule 1 capsule, Oral, Daily    omeprazole (PRILOSEC) 40 MG capsule Take 1 capsule by mouth once daily    potassium chloride (KLOR-CON) 8 MEQ TbSR 8 mEq, Oral, Daily    rosuvastatin (CRESTOR) 20 MG tablet TAKE 1 TABLET BY MOUTH ONCE IN THE EVENING    sotaloL (BETAPACE) 80 mg, Oral, 2 times daily    tamsulosin (FLOMAX) 0.4 mg Cap 1 capsule, Oral, Daily    testosterone cypionate (DEPOTESTOTERONE CYPIONATE) 200 mg/mL injection INJECT 1 ML (CC) INTRAMUSCULARLY EVERY 14 DAYS    zolpidem (AMBIEN) 5 MG Tab TAKE 1 TABLET BY MOUTH ONCE DAILY AT BEDTIME AS NEEDED FOR INSOMNIA       Past Surgical History:   Procedure Laterality Date    ABDOMINAL SURGERY      for illeus    ABLATION N/A 01/05/2023    Procedure: ABLATION;  Surgeon: Kenny Waller MD;  Location: [facility] CATH LAB;  Service: Cardiology;  Laterality: N/A;  EPS + AFL CATH ABLATION W/ ANEST.    COLONOSCOPY      DIAPHRAGM PLICATION Left 04/24/2023    Procedure: PLICATION, DIAPHRAGM;   "Surgeon: Roberto Cheney IV, MD;  Location: [facility];  Service: Cardiovascular;  Laterality: Left;  please add possible LLAA & REJI intraop    EYE SURGERY      LAPAROSCOPIC REPAIR OF UMBILICAL HERNIA      LITHOTRIPSY      SINUS SURGERY      WISDOM TOOTH EXTRACTION Bilateral          Lab Results   Component Value Date    WBC 6.93 01/19/2024    HGB 14.6 01/19/2024    HCT 45.1 01/19/2024    MCV 95.8 (H) 01/19/2024     01/19/2024          BMP  Lab Results   Component Value Date     01/19/2024    K 4.0 01/19/2024    CHLORIDE 102 01/19/2024    CO2 34 (H) 01/19/2024    GLUCOSE 90 01/19/2024    BUN 15.3 01/19/2024    CREATININE 0.96 01/19/2024    CALCIUM 9.1 01/19/2024    EGFRNONAA >60 07/07/2022        INR  No results for input(s): "PT", "INR", "PROTIME", "APTT" in the last 72 hours.        Diagnostic Studies:      EKG:  Results for orders placed or performed in visit on 04/17/23   EKG 12-lead    Collection Time: 04/17/23  7:01 AM    Narrative    Test Reason : J98.6,    Vent. Rate : 073 BPM     Atrial Rate : 073 BPM     P-R Int : 136 ms          QRS Dur : 140 ms      QT Int : 448 ms       P-R-T Axes : 000 070 032 degrees     QTc Int : 493 ms    Sinus rhythm with marked sinus arrythmia  Nonspecific intraventricular block  Abnormal ECG  Confirmed by Wong Jameson MD ([id number]) on 4/18/2023 6:48:47 AM    Referred By: ROBERTO CHENEY IV           Confirmed By:Wong Jameson MD       Results for orders placed during the hospital encounter of 01/06/23    Echo    Interpretation Summary  Study was technically limited. Ejection fraction visually appears to be reduced, around 35-40%.        Off Eliquis    Pre-op Assessment          Review of Systems      Physical Exam  General: Alert and Oriented    Airway:  Mallampati: II   Mouth Opening: Normal  TM Distance: Normal  Tongue: Normal  Neck ROM: Normal ROM    Dental:  Intact    Chest/Lungs:  Clear to auscultation, Normal Respiratory Rate    Heart:  Rate: Normal  Rhythm: Regular " Rhythm        Anesthesia Plan  Type of Anesthesia, risks & benefits discussed:    Anesthesia Type: Gen ETT  Intra-op Monitoring Plan: Standard ASA Monitors  Post Op Pain Control Plan: multimodal analgesia  Induction:  IV and Inhalation  Airway Plan: Direct, Post-Induction  Informed Consent: Informed consent signed with the Patient and all parties understand the risks and agree with anesthesia plan.  All questions answered.   ASA Score: 3  Day of Surgery Review of History & Physical: H&P Update referred to the surgeon/provider.  Anesthesia Plan Notes: Discussed Anesthetic Plan w/ Pt/Family. Questions Entertained. Accepted.    Ready For Surgery From Anesthesia Perspective.     .

## 2024-02-21 NOTE — ANESTHESIA PROCEDURE NOTES
Intubation    Date/Time: 2/21/2024 7:55 AM    Performed by: Gladys Mckinney CRNA  Authorized by: Dylan Warner MD    Intubation:     Induction:  Intravenous    Intubated:  Postinduction    Mask Ventilation:  Easy with oral airway    Attempts:  1    Attempted By:  CRNA    Difficult Airway Encountered?: No      Complications:  None    Airway Device:  Supraglottic airway/LMA    Airway Device Size:  4.0    Style/Cuff Inflation:  Cuffed (inflated to minimal occlusive pressure)    Placement Verified By:  Capnometry    Complicating Factors:  None    Findings Post-Intubation:  BS equal bilateral

## 2024-02-21 NOTE — ANESTHESIA POSTPROCEDURE EVALUATION
Anesthesia Post Evaluation    Patient: John Bullard    Procedure(s) Performed: Procedure(s) (LRB):  STRABISMUS SURGERY  bmr recess (Bilateral)    Final Anesthesia Type: general      Patient location during evaluation: PACU  Patient participation: Yes- Able to Participate  Level of consciousness: awake  Post-procedure vital signs: reviewed and stable  Pain management: adequate  Airway patency: patent    PONV status at discharge: vomiting (controlled) and nausea (controlled)  Anesthetic complications: no      Cardiovascular status: hemodynamically stable  Respiratory status: spontaneous ventilation and unassisted  Hydration status: euvolemic  Follow-up not needed.  Comments:                  Vitals Value Taken Time   /74 02/21/24 0917   Temp 36.5 °C (97.7 °F) 02/21/24 0918   Pulse 82 02/21/24 0918   Resp 108 02/21/24 0917   SpO2 94 % 02/21/24 0918         Event Time   Out of Recovery 09:16:00         Pain/Jim Score: Jim Score: 9 (2/21/2024  9:20 AM)

## 2024-02-22 ENCOUNTER — PATIENT MESSAGE (OUTPATIENT)
Dept: ADMINISTRATIVE | Facility: OTHER | Age: 74
End: 2024-02-22
Payer: COMMERCIAL

## 2024-02-22 VITALS
WEIGHT: 225.5 LBS | HEIGHT: 68 IN | RESPIRATION RATE: 108 BRPM | HEART RATE: 84 BPM | TEMPERATURE: 98 F | OXYGEN SATURATION: 95 % | BODY MASS INDEX: 34.17 KG/M2 | SYSTOLIC BLOOD PRESSURE: 104 MMHG | DIASTOLIC BLOOD PRESSURE: 67 MMHG

## 2024-02-23 ENCOUNTER — PATIENT MESSAGE (OUTPATIENT)
Dept: ADMINISTRATIVE | Facility: OTHER | Age: 74
End: 2024-02-23
Payer: COMMERCIAL

## 2024-02-23 DIAGNOSIS — R06.02 SOB (SHORTNESS OF BREATH): ICD-10-CM

## 2024-02-23 DIAGNOSIS — J45.909 ASTHMA, UNSPECIFIED ASTHMA SEVERITY, UNSPECIFIED WHETHER COMPLICATED, UNSPECIFIED WHETHER PERSISTENT: ICD-10-CM

## 2024-02-23 RX ORDER — MONTELUKAST SODIUM 10 MG/1
TABLET ORAL
Qty: 30 TABLET | Refills: 0 | Status: SHIPPED | OUTPATIENT
Start: 2024-02-23 | End: 2024-04-04

## 2024-02-23 RX ORDER — ALBUTEROL SULFATE 90 UG/1
2 AEROSOL, METERED RESPIRATORY (INHALATION) EVERY 6 HOURS
Qty: 18 G | Refills: 0 | Status: SHIPPED | OUTPATIENT
Start: 2024-02-23 | End: 2024-03-18 | Stop reason: SDUPTHER

## 2024-02-27 ENCOUNTER — HOSPITAL ENCOUNTER (OUTPATIENT)
Dept: PULMONOLOGY | Facility: HOSPITAL | Age: 74
Discharge: HOME | End: 2024-02-27
Payer: COMMERCIAL

## 2024-02-27 DIAGNOSIS — J98.4 RESTRICTIVE LUNG DISEASE: Primary | ICD-10-CM

## 2024-02-27 PROCEDURE — 94625 PHY/QHP OP PULM RHB W/O MNTR: CPT

## 2024-02-27 NOTE — PROGRESS NOTES
Arrival time: 0730                              Departure time: 0815            Total clinic time: 45    TIME WORKLOAD HR RPD O2 sat %   RESTING     93 1 90   ARM ERGO 13 25 BOYLE               95 2 95   NUSTEP 13 25 BOYLE 95 2 95   TREADMILL 13 1.2 MPH/0% grade 99 3 92   RECOVERY     90 1 92   EXERCISE TIME 39              OXYGEN: NC 2L/min  EXERCISE SUMMARY: Patient able to complete 39:00 of aerobic exercise on three machines. No signs/symptoms noted or reported, vital sign responses appropriate. Patient stable and asymptomatic at discharge.  PLAN FOR NEXT SESSION: Continue exercise prescription with goal of 38:00-45:00 total exercise time.

## 2024-03-04 ENCOUNTER — TELEPHONE (OUTPATIENT)
Dept: INTERNAL MEDICINE | Facility: CLINIC | Age: 74
End: 2024-03-04
Payer: COMMERCIAL

## 2024-03-04 NOTE — TELEPHONE ENCOUNTER
----- Message from Tamela Cm sent at 3/4/2024 10:13 AM CST -----  .Type:  Needs Medical Advice    Who Called: pt  Symptoms (please be specific):    How long has patient had these symptoms:    Pharmacy name and phone #:    Would the patient rather a call back or a response via [facility]?   Best Call Back Number: [phone]  Additional Information: pt has a nurse visit tomorrow at 8:00 but will get there at 8:15 , tried back line

## 2024-03-05 ENCOUNTER — CLINICAL SUPPORT (OUTPATIENT)
Dept: INTERNAL MEDICINE | Facility: CLINIC | Age: 74
End: 2024-03-05
Payer: COMMERCIAL

## 2024-03-05 ENCOUNTER — HOSPITAL ENCOUNTER (OUTPATIENT)
Dept: PULMONOLOGY | Facility: HOSPITAL | Age: 74
Discharge: HOME | End: 2024-03-05
Payer: COMMERCIAL

## 2024-03-05 DIAGNOSIS — J98.4 RESTRICTIVE LUNG DISEASE: Primary | ICD-10-CM

## 2024-03-05 DIAGNOSIS — R79.89 LOW TESTOSTERONE: Primary | ICD-10-CM

## 2024-03-05 PROCEDURE — 96372 THER/PROPH/DIAG INJ SC/IM: CPT | Mod: ,,, | Performed by: INTERNAL MEDICINE

## 2024-03-05 PROCEDURE — 94625 PHY/QHP OP PULM RHB W/O MNTR: CPT

## 2024-03-05 RX ORDER — TESTOSTERONE CYPIONATE 200 MG/ML
200 INJECTION, SOLUTION INTRAMUSCULAR
Status: COMPLETED | OUTPATIENT
Start: 2024-03-05 | End: 2024-03-05

## 2024-03-05 RX ADMIN — TESTOSTERONE CYPIONATE 200 MG: 200 INJECTION, SOLUTION INTRAMUSCULAR at 08:03

## 2024-03-05 NOTE — PROGRESS NOTES
Arrival time: 0730                              Departure time: 0815            Total clinic time: 45    TIME WORKLOAD HR RPD O2 sat %   RESTING     83 1 90   ARM ERGO 13 25 BOYLE               88 2 94   NUSTEP 13 25 BOYLE 89 2 94   TREADMILL 13 1.2 MPH/0% grade 96 3 90   RECOVERY     90 1 92   EXERCISE TIME 39              OXYGEN: NC 2L/min  EXERCISE SUMMARY: Patient able to complete 39:00 of aerobic exercise on three machines. No signs/symptoms noted or reported, vital sign responses appropriate. Patient stable and asymptomatic at discharge.  PLAN FOR NEXT SESSION: Continue exercise prescription with goal of 38:00-45:00 total exercise time.

## 2024-03-07 ENCOUNTER — HOSPITAL ENCOUNTER (OUTPATIENT)
Dept: PULMONOLOGY | Facility: HOSPITAL | Age: 74
Discharge: HOME | End: 2024-03-07
Payer: COMMERCIAL

## 2024-03-07 DIAGNOSIS — J98.4 RESTRICTIVE LUNG DISEASE: Primary | ICD-10-CM

## 2024-03-07 PROCEDURE — 94625 PHY/QHP OP PULM RHB W/O MNTR: CPT

## 2024-03-07 NOTE — PROGRESS NOTES
Arrival time:0730   Departure time:0815  Total clinic time:45   TIME WORKLOAD HR RPD O2 sat %   RESTING   81 0 91   ARM ERGO 14 30 BOYLE               88 1 96   NUSTEP 14 30 BOYLE 91 1 94   TREADMILL 13 1.3 MPH 92 2 90   RECOVERY   84 0 92   EXERCISE TIME 41         OXYGEN: NC 2L/min  EXERCISE SUMMARY: Patient able to complete 41:00 of aerobic exercise on three machines. No signs/symptoms noted or reported, vital sign responses appropriate. Patient stable and asymptomatic at discharge.  PLAN FOR NEXT SESSION: Continue exercise prescription with goal of 38:00-45:00 total exercise time.

## 2024-03-10 DIAGNOSIS — J98.6 CHRONICALLY ELEVATED HEMIDIAPHRAGM: ICD-10-CM

## 2024-03-11 RX ORDER — FUROSEMIDE 40 MG/1
40 TABLET ORAL
Qty: 90 TABLET | Refills: 1 | Status: SHIPPED | OUTPATIENT
Start: 2024-03-11

## 2024-03-12 ENCOUNTER — HOSPITAL ENCOUNTER (OUTPATIENT)
Dept: PULMONOLOGY | Facility: HOSPITAL | Age: 74
Discharge: HOME | End: 2024-03-12
Payer: COMMERCIAL

## 2024-03-12 DIAGNOSIS — J98.4 RESTRICTIVE LUNG DISEASE: Primary | ICD-10-CM

## 2024-03-12 PROCEDURE — 94625 PHY/QHP OP PULM RHB W/O MNTR: CPT

## 2024-03-12 NOTE — PROGRESS NOTES
Arrival time:0730                               Departure time:0815             Total clinic time:45    TIME WORKLOAD HR RPD O2 sat %   RESTING     82 0 95   ARM ERGO 14 30 BOYLE               84 1 96   NUSTEP 14 30 BOYLE 89 1 94   TREADMILL 13 1.3 MPH 96 2 91   RECOVERY     85 0 94   EXERCISE TIME 41              OXYGEN: NC 2L/min  EXERCISE SUMMARY: Patient able to complete 41:00 of aerobic exercise on three machines. No signs/symptoms noted or reported, vital sign responses appropriate. Patient stable and asymptomatic at discharge.  PLAN FOR NEXT SESSION: Continue exercise prescription with goal of 38:00-45:00 total exercise time.

## 2024-03-18 ENCOUNTER — TELEPHONE (OUTPATIENT)
Dept: INTERNAL MEDICINE | Facility: CLINIC | Age: 74
End: 2024-03-18
Payer: COMMERCIAL

## 2024-03-18 DIAGNOSIS — R06.02 SOB (SHORTNESS OF BREATH): ICD-10-CM

## 2024-03-18 RX ORDER — ALBUTEROL SULFATE 90 UG/1
2 AEROSOL, METERED RESPIRATORY (INHALATION) EVERY 6 HOURS
Qty: 18 G | Refills: 0 | Status: SHIPPED | OUTPATIENT
Start: 2024-03-18 | End: 2024-04-05

## 2024-03-18 NOTE — TELEPHONE ENCOUNTER
----- Message from Susan Donald sent at 3/18/2024  9:14 AM CDT -----  Regarding: med advice  .Type:  Needs Medical Advice    Who Called: Pt  Symptoms (please be specific):    How long has patient had these symptoms:    Pharmacy name and phone #:    Would the patient rather a call back or a response via [facility]? Call back  Best Call Back Number: [phone]  Additional Information:Pt calling in regards to his albuterol

## 2024-03-19 ENCOUNTER — CLINICAL SUPPORT (OUTPATIENT)
Dept: INTERNAL MEDICINE | Facility: CLINIC | Age: 74
End: 2024-03-19
Payer: COMMERCIAL

## 2024-03-19 ENCOUNTER — HOSPITAL ENCOUNTER (OUTPATIENT)
Dept: PULMONOLOGY | Facility: HOSPITAL | Age: 74
Discharge: HOME | End: 2024-03-19
Payer: COMMERCIAL

## 2024-03-19 DIAGNOSIS — J98.4 RESTRICTIVE LUNG DISEASE: Primary | ICD-10-CM

## 2024-03-19 DIAGNOSIS — R79.89 LOW TESTOSTERONE: Primary | ICD-10-CM

## 2024-03-19 PROCEDURE — 94625 PHY/QHP OP PULM RHB W/O MNTR: CPT

## 2024-03-19 PROCEDURE — 96372 THER/PROPH/DIAG INJ SC/IM: CPT | Mod: ,,, | Performed by: INTERNAL MEDICINE

## 2024-03-19 RX ORDER — TESTOSTERONE CYPIONATE 200 MG/ML
200 INJECTION, SOLUTION INTRAMUSCULAR
Status: COMPLETED | OUTPATIENT
Start: 2024-03-19 | End: 2024-03-19

## 2024-03-19 RX ADMIN — TESTOSTERONE CYPIONATE 200 MG: 200 INJECTION, SOLUTION INTRAMUSCULAR at 08:03

## 2024-03-19 NOTE — PROGRESS NOTES
Arrival time:0730   Departure time:0815  Total clinic time:45   TIME WORKLOAD HR RPD O2 sat %   RESTING   98 1 91   ARM ERGO 15 30 BOYLE               99 2 96   NUSTEP 15 30 BOYLE 103 3 94   TREADMILL 14 1.3  2 95   RECOVERY   100 1 94   EXERCISE TIME 44         OXYGEN: NC 2L/min  EXERCISE SUMMARY: Patient able to complete 44:00 of aerobic exercise on three machines. No signs/symptoms noted or reported, vital sign responses appropriate. Patient stable and asymptomatic at discharge.  PLAN FOR NEXT SESSION: Continue exercise prescription with goal of 38:00-45:00 total exercise time.

## 2024-03-21 ENCOUNTER — HOSPITAL ENCOUNTER (OUTPATIENT)
Dept: PULMONOLOGY | Facility: HOSPITAL | Age: 74
Discharge: HOME | End: 2024-03-21
Payer: COMMERCIAL

## 2024-03-21 DIAGNOSIS — J98.4 RESTRICTIVE LUNG DISEASE: Primary | ICD-10-CM

## 2024-03-21 PROCEDURE — 94625 PHY/QHP OP PULM RHB W/O MNTR: CPT

## 2024-03-21 NOTE — PROGRESS NOTES
Arrival time:0730                               Departure time:0815             Total clinic time:45    TIME WORKLOAD HR RPD O2 sat %   RESTING     94 1 89   ARM ERGO 15 35 BOYLE               97 2 95   NUSTEP 15 40 BOYLE 104 3 92   TREADMILL 15 1.3  2 93   RECOVERY     97 1 94   EXERCISE TIME 44              OXYGEN: NC 2L/min  EXERCISE SUMMARY: Patient able to complete 45:00 of aerobic exercise on three machines. No signs/symptoms noted or reported, vital sign responses appropriate. Patient stable and asymptomatic at discharge.  PLAN FOR NEXT SESSION: Continue exercise prescription with goal of 38:00-45:00 total exercise time.

## 2024-03-26 ENCOUNTER — HOSPITAL ENCOUNTER (OUTPATIENT)
Dept: PULMONOLOGY | Facility: HOSPITAL | Age: 74
Discharge: HOME | End: 2024-03-26
Payer: COMMERCIAL

## 2024-03-26 DIAGNOSIS — J98.4 RESTRICTIVE LUNG DISEASE: Primary | ICD-10-CM

## 2024-03-26 PROCEDURE — 94625 PHY/QHP OP PULM RHB W/O MNTR: CPT

## 2024-03-26 NOTE — PROGRESS NOTES
Arrival time:0730                               Departure time:0815             Total clinic time:45    TIME WORKLOAD HR RPD O2 sat %   RESTING     86 1 93   ARM ERGO 15 35 BOYLE               93 2 95   NUSTEP 15 40 BOYLE 93 3 94   TREADMILL 15 1.3 MPH 95 2 91   RECOVERY     91 1 94   EXERCISE TIME 45              OXYGEN: NC 2L/min  EXERCISE SUMMARY: Patient able to complete 45:00 of aerobic exercise on three machines. No signs/symptoms noted or reported, vital sign responses appropriate. Patient stable and asymptomatic at discharge.  PLAN FOR NEXT SESSION: Continue exercise prescription with goal of 38:00-45:00 total exercise time.

## 2024-03-26 NOTE — DISCHARGE SUMMARY
[facility] [address] [place] [address]    PATIENT NAME:       JONATHAN SOTOMAYOR  YOB: 1950  CSN:                [id number]   MRN:                [id number]  ADMIT DATE:         02/21/2024 05:56:00  PHYSICIAN:          Reg Mendez MD                          DISCHARGE SUMMARY    DATE OF DISCHARGE:  02/21/2024 10:29:00    PREOPERATIVE DIAGNOSIS:  Divergence insufficiency esotropia.    POSTOPERATIVE DIAGNOSIS:  Divergence insufficiency esotropia.    PROCEDURE:  Bilateral medial rectus recession.    ANESTHESIA:  General.    COMPLICATIONS:  None.    CONDITION:  Stable.    The patient was discharged home in stable condition.  The patient was given   Tobradex ophthalmic ointment to be applied to both eyes twice a day for 2 weeks.    The patient is to follow up in my office in 2 weeks.        ______________________________  Reg Mendez MD    RJP/AQS  DD:  03/25/2024  Time:  01:48PM  DT:  03/25/2024  Time:  08:48PM  Job #:  [id number]      DISCHARGE SUMMARY

## 2024-03-28 ENCOUNTER — HOSPITAL ENCOUNTER (OUTPATIENT)
Dept: PULMONOLOGY | Facility: HOSPITAL | Age: 74
Discharge: HOME | End: 2024-03-28
Payer: COMMERCIAL

## 2024-03-28 DIAGNOSIS — J98.4 RESTRICTIVE LUNG DISEASE: Primary | ICD-10-CM

## 2024-03-28 PROCEDURE — 94625 PHY/QHP OP PULM RHB W/O MNTR: CPT

## 2024-03-28 NOTE — PROGRESS NOTES
Arrival time:0730                               Departure time:0825             Total clinic time:55    TIME WORKLOAD HR RPD O2 sat %   RESTING     84 1 94   ARM ERGO 15 35 BOYLE               95 2 96   NUSTEP 15 40 BOYLE 90 3 94   TREADMILL 15 1.5 MPH 96 2 95   RECOVERY     94 1 95   EXERCISE TIME 45              OXYGEN: NC 2L/min  EXERCISE SUMMARY: Patient able to complete 45:00 of aerobic exercise on three machines. No signs/symptoms noted or reported, vital sign responses appropriate. Patient stable and asymptomatic at discharge.  PLAN FOR NEXT SESSION: Continue exercise prescription with goal of 38:00-45:00 total exercise time.

## 2024-04-02 ENCOUNTER — HOSPITAL ENCOUNTER (OUTPATIENT)
Dept: PULMONOLOGY | Facility: HOSPITAL | Age: 74
Discharge: HOME | End: 2024-04-02
Payer: COMMERCIAL

## 2024-04-02 ENCOUNTER — CLINICAL SUPPORT (OUTPATIENT)
Dept: INTERNAL MEDICINE | Facility: CLINIC | Age: 74
End: 2024-04-02
Payer: COMMERCIAL

## 2024-04-02 DIAGNOSIS — J98.4 RESTRICTIVE LUNG DISEASE: Primary | ICD-10-CM

## 2024-04-02 DIAGNOSIS — R79.89 LOW TESTOSTERONE: Primary | ICD-10-CM

## 2024-04-02 PROCEDURE — 94625 PHY/QHP OP PULM RHB W/O MNTR: CPT

## 2024-04-02 NOTE — PROGRESS NOTES
Patient is here for Testosterone injection; medication from patient pharmacy used. Testosterone Cypionate 200 mg/ml given in right dorsogluteal IM. Injection went well, with little/no discomfort.

## 2024-04-02 NOTE — PROGRESS NOTES
Arrival time:0730                               Departure time:0825             Total clinic time:55    TIME WORKLOAD HR RPD O2 sat %   RESTING     87 1 93   ARM ERGO 15 35 BOYLE               91 2 94   NUSTEP 15 40 BOYLE 97 3 94   TREADMILL 15 1.5 MPH 94 2 92   RECOVERY     94 1 95   EXERCISE TIME 45              OXYGEN: NC 2L/min  EXERCISE SUMMARY: Patient able to complete 45:00 of aerobic exercise on three machines. No signs/symptoms noted or reported, vital sign responses appropriate. Patient stable and asymptomatic at discharge.  PLAN FOR NEXT SESSION: Continue exercise prescription with goal of 38:00-45:00 total exercise time.

## 2024-04-04 ENCOUNTER — HOSPITAL ENCOUNTER (OUTPATIENT)
Dept: PULMONOLOGY | Facility: HOSPITAL | Age: 74
Discharge: HOME | End: 2024-04-04
Payer: COMMERCIAL

## 2024-04-04 DIAGNOSIS — J45.909 ASTHMA, UNSPECIFIED ASTHMA SEVERITY, UNSPECIFIED WHETHER COMPLICATED, UNSPECIFIED WHETHER PERSISTENT: ICD-10-CM

## 2024-04-04 DIAGNOSIS — J98.4 RESTRICTIVE LUNG DISEASE: Primary | ICD-10-CM

## 2024-04-04 PROCEDURE — 94625 PHY/QHP OP PULM RHB W/O MNTR: CPT

## 2024-04-04 RX ORDER — MONTELUKAST SODIUM 10 MG/1
TABLET ORAL
Qty: 30 TABLET | Refills: 0 | Status: SHIPPED | OUTPATIENT
Start: 2024-04-04 | End: 2024-05-06

## 2024-04-04 NOTE — PROGRESS NOTES
Arrival time:0730                               Departure time:0825             Total clinic time:55    TIME WORKLOAD HR RPD O2 sat %   RESTING     77 1 94   ARM ERGO 15 35 BOYLE               86 2 96   NUSTEP 15 40 BOYLE 94 3 93   TREADMILL 15 1.6  2 93   RECOVERY     81 1 94   EXERCISE TIME 45              OXYGEN: NC 2L/min  EXERCISE SUMMARY: Patient able to complete 45:00 of aerobic exercise on three machines. No signs/symptoms noted or reported, vital sign responses appropriate. Patient stable and asymptomatic at discharge.  DISCHARGE PLAN: Patient has completed the program. Stated he will consider joining [facility] wellness program to continue exercisisng under supervision.

## 2024-04-05 DIAGNOSIS — R06.02 SOB (SHORTNESS OF BREATH): ICD-10-CM

## 2024-04-05 RX ORDER — ALBUTEROL SULFATE 90 UG/1
AEROSOL, METERED RESPIRATORY (INHALATION)
Qty: 18 G | Refills: 0 | Status: SHIPPED | OUTPATIENT
Start: 2024-04-05 | End: 2024-04-25 | Stop reason: SDUPTHER

## 2024-04-12 ENCOUNTER — TELEPHONE (OUTPATIENT)
Dept: INTERNAL MEDICINE | Facility: CLINIC | Age: 74
End: 2024-04-12
Payer: COMMERCIAL

## 2024-04-12 NOTE — TELEPHONE ENCOUNTER
----- Message from Rebecca Rodolfo sent at 4/12/2024  8:40 AM CDT -----  Regarding: medical advice  Type:  Needs Medical Advice    Who Called: John  Symptoms (please be specific): episodes of Shortness of breath   How long has patient had these symptoms:    Pharmacy name and phone #:    Would the patient rather a call back or a response via [facility]? Call back   Best Call Back Number: [phone]  Additional Information: Father Aleah would like a call back to discuss his shortness of breath, he states Dr Liang is aware of it. At the moment he was not experiencing shortness of breath. He was trying to get an appt next week to see Dr Liang next week(no appt)

## 2024-04-16 ENCOUNTER — OFFICE VISIT (OUTPATIENT)
Dept: INTERNAL MEDICINE | Facility: CLINIC | Age: 74
End: 2024-04-16
Payer: COMMERCIAL

## 2024-04-16 ENCOUNTER — HOSPITAL ENCOUNTER (OUTPATIENT)
Dept: RADIOLOGY | Facility: HOSPITAL | Age: 74
Discharge: HOME | End: 2024-04-16
Attending: NURSE PRACTITIONER
Payer: COMMERCIAL

## 2024-04-16 VITALS
HEART RATE: 73 BPM | SYSTOLIC BLOOD PRESSURE: 104 MMHG | DIASTOLIC BLOOD PRESSURE: 64 MMHG | RESPIRATION RATE: 16 BRPM | OXYGEN SATURATION: 94 % | HEIGHT: 68 IN | BODY MASS INDEX: 36.98 KG/M2 | WEIGHT: 244 LBS

## 2024-04-16 DIAGNOSIS — R49.0 HOARSENESS OF VOICE: ICD-10-CM

## 2024-04-16 DIAGNOSIS — I73.9 PERIPHERAL VASCULAR DISEASE: ICD-10-CM

## 2024-04-16 DIAGNOSIS — R07.89 CHEST TIGHTNESS: ICD-10-CM

## 2024-04-16 DIAGNOSIS — R07.89 CHEST TIGHTNESS: Primary | ICD-10-CM

## 2024-04-16 DIAGNOSIS — J68.0 BRONCHITIS AND PNEUMONITIS DUE TO CHEMICALS, GASES, FUMES AND VAPORS: ICD-10-CM

## 2024-04-16 DIAGNOSIS — R06.02 SOB (SHORTNESS OF BREATH): ICD-10-CM

## 2024-04-16 DIAGNOSIS — I50.43 ACUTE ON CHRONIC COMBINED SYSTOLIC (CONGESTIVE) AND DIASTOLIC (CONGESTIVE) HEART FAILURE: ICD-10-CM

## 2024-04-16 DIAGNOSIS — J98.4 RESTRICTIVE LUNG DISEASE: ICD-10-CM

## 2024-04-16 DIAGNOSIS — R79.89 LOW TESTOSTERONE: ICD-10-CM

## 2024-04-16 PROCEDURE — 3074F SYST BP LT 130 MM HG: CPT | Mod: CPTII,,, | Performed by: NURSE PRACTITIONER

## 2024-04-16 PROCEDURE — 71046 X-RAY EXAM CHEST 2 VIEWS: CPT | Mod: TC

## 2024-04-16 PROCEDURE — 1160F RVW MEDS BY RX/DR IN RCRD: CPT | Mod: CPTII,,, | Performed by: NURSE PRACTITIONER

## 2024-04-16 PROCEDURE — 99214 OFFICE O/P EST MOD 30 MIN: CPT | Mod: ,,, | Performed by: NURSE PRACTITIONER

## 2024-04-16 PROCEDURE — 3288F FALL RISK ASSESSMENT DOCD: CPT | Mod: CPTII,,, | Performed by: NURSE PRACTITIONER

## 2024-04-16 PROCEDURE — 4010F ACE/ARB THERAPY RXD/TAKEN: CPT | Mod: CPTII,,, | Performed by: NURSE PRACTITIONER

## 2024-04-16 PROCEDURE — 1159F MED LIST DOCD IN RCRD: CPT | Mod: CPTII,,, | Performed by: NURSE PRACTITIONER

## 2024-04-16 PROCEDURE — 3078F DIAST BP <80 MM HG: CPT | Mod: CPTII,,, | Performed by: NURSE PRACTITIONER

## 2024-04-16 PROCEDURE — 1101F PT FALLS ASSESS-DOCD LE1/YR: CPT | Mod: CPTII,,, | Performed by: NURSE PRACTITIONER

## 2024-04-16 RX ORDER — TESTOSTERONE CYPIONATE 200 MG/ML
50 INJECTION, SOLUTION INTRAMUSCULAR
Status: CANCELLED | OUTPATIENT
Start: 2024-04-16 | End: 2024-04-16

## 2024-04-16 RX ORDER — ACETAMINOPHEN, DIPHENHYDRAMINE HCL, PHENYLEPHRINE HCL 325; 25; 5 MG/1; MG/1; MG/1
TABLET ORAL
COMMUNITY
End: 2024-04-16 | Stop reason: SDUPTHER

## 2024-04-16 NOTE — PROGRESS NOTES
"Subjective:      Patient ID: John Bullard is a 73 y.o. male.    Chief Complaint: Shortness of Breath (Testosterone injection )      HPI: Patient here today for c/o SOB and testosterone injection. He reports that he had a bout of chest tightness on inhalation with stable O2 at that time. He did take 2 Benadryl with improvement in s/s.  Appetite was slightly decreased at that time with abdominal bloating. SOB is exacerbated by eating. Planned EGD/c-scope scheduled in 2 weeks per. Dr. Stewart. SOB s/s x approx 1 year. Chest tightness was intermittent and occurred while at rest. Some exertional SOB without CP. After discussion, chest tightness noted to upper aspect of abdomen and "spasm" in nature. Also, occ knee discomforts. Questioning what is appropriate topically to apply OTC.    Review of patient's allergies indicates:   Allergen Reactions    Ativan [lorazepam] Hallucinations       Review of Systems  Constitutional: No fever, No chills, No sweats, No fatigue, No weight loss.  Ear/Nose/Mouth/Throat: No nasal congestion, No vertigo. Voice hoarseness  Respiratory: No shortness of breath, No cough, No sputum production, No wheezing, stable sleep apnea on CPAP, exertional dyspnea.   Cardiovascular: lower chest pain, No palpitations, No claudication, No orthopnea, No peripheral edema.  Gastrointestinal: No nausea, No vomiting, No diarrhea, No rectal bleeding, No constipation, No abdominal pain.  Genitourinary: No dysuria, No hematuria, No frequency.  Musculoskeletal: R knee pain    Objective:   Visit Vitals  /64 (BP Location: Right arm, Patient Position: Sitting, BP Method: Medium (Manual))   Pulse 73   Resp 16   Ht 5' 8" (1.727 m)   Wt 110.7 kg (244 lb)   SpO2 (!) 94%   BMI 37.10 kg/m²     The patient's weight trend is below:   Wt Readings from Last 4 Encounters:   04/16/24 110.7 kg (244 lb)   02/21/24 102.3 kg (225 lb 8.5 oz)   02/20/24 104.6 kg (230 lb 9.6 oz)   01/23/24 105.2 kg (232 lb)        Physical " Exam  Vitals and nursing note reviewed.   Constitutional:       General: He is not in acute distress.     Appearance: Normal appearance. He is obese. He is not ill-appearing, toxic-appearing or diaphoretic.   HENT:      Head: Normocephalic and atraumatic.   Cardiovascular:      Rate and Rhythm: Normal rate. Rhythm irregular.      Heart sounds: Murmur heard.   Pulmonary:      Effort: Pulmonary effort is normal.      Breath sounds: Normal breath sounds.   Abdominal:      General: Abdomen is protuberant. Bowel sounds are normal.      Palpations: Abdomen is soft.   Neurological:      General: No focal deficit present.      Mental Status: He is alert and oriented to person, place, and time. Mental status is at baseline.         Assessment/Plan:   1. Chest tightness  EKG SR with PVCs and HR of 77  Will work up with labs and CXR    - CBC Auto Differential; Future  - Comprehensive Metabolic Panel; Future  - BNP; Future  - X-Ray Chest PA And Lateral; Future    2. SOB (shortness of breath)  See #1    - CBC Auto Differential; Future  - Comprehensive Metabolic Panel; Future  - BNP; Future  - X-Ray Chest PA And Lateral; Future    3. Restrictive lung disease  Reviewed last Pul note  See #1    4. Bronchitis and pneumonitis due to chemicals, gases, fumes and vapors  Stable on current dosage of Brezstri and albuterol PRN  Follow up with specialist that is also caring for this problem.    5. Peripheral vascular disease  Compression socks/hose recommended  Low salt diet    6. Acute on chronic combined systolic (congestive) and diastolic (congestive) heart failure  Work up with labs and CXR  Follow up with specialist that is also caring for this problem  Monitor weight daily  Reviewed prior CXR noting small pleural effusion    - CBC Auto Differential; Future  - Comprehensive Metabolic Panel; Future  - BNP; Future  - X-Ray Chest PA And Lateral; Future    7. Hoarseness of voice  Upcoming EGD with GI  Reviewed prior work up with ENT      8. Low testosterone  Injection given    9. R knee pain  Avoid NSAIDs  Ice/heat  CBD cream ok    Medication List with Changes/Refills   Current Medications    ALBUTEROL (PROVENTIL/VENTOLIN HFA) 90 MCG/ACTUATION INHALER    INHALE 2 PUFFS INTO THE LUNGS EVERY 6 HOURS (RESCUE)    ALPRAZOLAM (XANAX) 0.5 MG TABLET    Take 1 tablet by mouth twice daily as needed for anxiety    BUDESONIDE-GLYCOPYR-FORMOTEROL (BREZTRI AEROSPHERE) 160-9-4.8 MCG/ACTUATION HFAA    Inhale into the lungs.    CALCIUM CITRATE-VITAMIN D3 315-200 MG (CITRACAL+D) 315 MG-5 MCG (200 UNIT) PER TABLET    Take 1 tablet by mouth once daily.    CINNAMON BARK 500 MG CAPSULE    Take 500 mg by mouth once daily.    DOCUSATE SODIUM (COLACE) 100 MG CAPSULE    Take 100 mg by mouth Daily.    DULOXETINE (CYMBALTA) 30 MG CAPSULE    TAKE 1 CAPSULE BY MOUTH ONCE DAILY DO  NOT  CRUSH  OR  CHEW    ELIQUIS 5 MG TAB    Take 1 tablet (5 mg total) by mouth 2 (two) times daily.    FAMOTIDINE (PEPCID) 20 MG TABLET    Take 20 mg by mouth nightly as needed.    FLUTICASONE-UMECLIDIN-VILANTER (TRELEGY ELLIPTA) 100-62.5-25 MCG DSDV    Inhale 1 puff into the lungs once daily.    FUROSEMIDE (LASIX) 40 MG TABLET    Take 1 tablet by mouth once daily    LATANOPROST 0.005 % OPHTHALMIC SOLUTION    Place 1 drop into both eyes every evening.    LORATADINE (CLARITIN) 10 MG TABLET    Take 10 mg by mouth daily as needed for Allergies.    LOSARTAN (COZAAR) 25 MG TABLET    Take 1 tablet (25 mg total) by mouth 2 (two) times a day.    MELATONIN 10 MG TAB    Take 10-20 mg by mouth daily as needed (insomnia).    METOPROLOL SUCCINATE (TOPROL-XL) 100 MG 24 HR TABLET    Take 100 mg by mouth once daily.    MONTELUKAST (SINGULAIR) 10 MG TABLET    Take 1 tablet by mouth once daily    NON FORMULARY MEDICATION    Take 1 tablet by mouth once daily. Tumeric    OMEGA-3 FATTY ACIDS/FISH OIL (FISH OIL-OMEGA-3 FATTY ACIDS) 300-1,000 MG CAPSULE    Take 1 capsule by mouth once daily.    OMEPRAZOLE (PRILOSEC) 40  MG CAPSULE    Take 1 capsule by mouth once daily    POTASSIUM CHLORIDE (KLOR-CON) 8 MEQ TBSR    Take 1 tablet (8 mEq total) by mouth once daily.    ROSUVASTATIN (CRESTOR) 20 MG TABLET    TAKE 1 TABLET BY MOUTH ONCE IN THE EVENING    SOTALOL (BETAPACE) 80 MG TABLET    Take 80 mg by mouth 2 (two) times daily.    TAMSULOSIN (FLOMAX) 0.4 MG CAP    Take 1 capsule by mouth once daily.    TESTOSTERONE CYPIONATE (DEPOTESTOTERONE CYPIONATE) 200 MG/ML INJECTION    INJECT 1 ML (CC) INTRAMUSCULARLY EVERY 14 DAYS    UMECLIDINIUM-VILANTEROL (ANORO ELLIPTA) 62.5-25 MCG/ACTUATION DSDV    62.5-25 mcg/actuation 1 puff daily    ZOLPIDEM (AMBIEN) 5 MG TAB    TAKE 1 TABLET BY MOUTH ONCE DAILY AT BEDTIME AS NEEDED FOR INSOMNIA   Discontinued Medications    MELATONIN 10 MG TAB    10-20 mg, PRN    NAPROXEN SODIUM (ANAPROX) 220 MG TABLET    Take 220 mg by mouth daily as needed.        No follow-ups on file.    Chemistry:  Lab Results   Component Value Date     01/19/2024    K 4.0 01/19/2024    CHLORIDE 102 01/19/2024    BUN 15.3 01/19/2024    CREATININE 0.96 01/19/2024    EGFRNORACEVR >60 01/19/2024    GLUCOSE 90 01/19/2024    CALCIUM 9.1 01/19/2024    ALKPHOS 57 01/19/2024    LABPROT 5.8 01/19/2024    ALBUMIN 3.5 01/19/2024    BILIDIR 0.3 04/29/2022    IBILI 0.30 04/29/2022    AST 19 01/19/2024    ALT 17 01/19/2024    MG 2.40 01/21/2023        Lab Results   Component Value Date    HGBA1C 5.0 05/12/2021        Hematology:  Lab Results   Component Value Date    WBC 6.93 01/19/2024    HGB 14.6 01/19/2024    HCT 45.1 01/19/2024     01/19/2024       Lipid Panel:  Lab Results   Component Value Date    CHOL 124 01/19/2024    HDL 34 (L) 01/19/2024    LDL 67.00 01/19/2024    TRIG 114 01/19/2024    TOTALCHOLEST 4 01/19/2024        Urine:  Lab Results   Component Value Date    COLORUA Yellow 04/30/2023    APPEARANCEUA Clear 04/30/2023    SGUA 1.017 04/30/2023    PHUA 6.0 04/30/2023    PROTEINUA Negative 04/30/2023    GLUCOSEUA Negative  04/30/2023    KETONESUA Trace (A) 04/30/2023    BLOODUA Negative 04/30/2023    NITRITESUA Negative 04/30/2023    LEUKOCYTESUR Trace (A) 04/30/2023    RBCUA <5 04/30/2023    WBCUA <5 04/30/2023    BACTERIA None Seen 04/30/2023

## 2024-04-17 ENCOUNTER — TELEPHONE (OUTPATIENT)
Dept: INTERNAL MEDICINE | Facility: CLINIC | Age: 74
End: 2024-04-17
Payer: COMMERCIAL

## 2024-04-17 PROCEDURE — 96372 THER/PROPH/DIAG INJ SC/IM: CPT | Mod: ,,, | Performed by: NURSE PRACTITIONER

## 2024-04-17 RX ORDER — TESTOSTERONE CYPIONATE 200 MG/ML
50 INJECTION, SOLUTION INTRAMUSCULAR
Status: COMPLETED | OUTPATIENT
Start: 2024-04-17 | End: 2024-04-17

## 2024-04-17 RX ADMIN — TESTOSTERONE CYPIONATE 50 MG: 200 INJECTION, SOLUTION INTRAMUSCULAR at 08:04

## 2024-04-17 NOTE — TELEPHONE ENCOUNTER
Spoke with pt. Pt verbalized understanding. States he is still feeling down. Feels tight but it comes and goes.

## 2024-04-17 NOTE — TELEPHONE ENCOUNTER
Please encourage him to reach out to [facility] regarding these s/s and sent [facility] (Dr. Bennett, I believe), results from labs, EKG, and Cxr.

## 2024-04-17 NOTE — TELEPHONE ENCOUNTER
----- Message from Mary Sumner NP sent at 4/17/2024  7:49 AM CDT -----  Please let pt know that labs stable with mild dec in platelets, which I see to be evident about 1 year ago.  Will continue to monitor this.  No sign of HF with normal BNP.  CXR stable. How is he feeling today?

## 2024-04-17 NOTE — TELEPHONE ENCOUNTER
----- Message from Toni Angel MA sent at 4/17/2024  3:42 PM CDT -----  Regarding: Mary 4/22 @ 2:20PM  Are there any outstanding tasks in chart?   Labs already done    2. Is there any documentation of tasks? No    3. Has the pt seen another physician, been to ER, [facility], or admitted to hospital since last visit?    4. Has the pt done blood work or imaging since last visit?     5. PLEASE HAVE PATIENT BRING MEDICATION LIST OR BOTTLES TO EVERY OFFICE VISIT

## 2024-04-22 ENCOUNTER — OFFICE VISIT (OUTPATIENT)
Dept: INTERNAL MEDICINE | Facility: CLINIC | Age: 74
End: 2024-04-22
Payer: COMMERCIAL

## 2024-04-22 VITALS
HEIGHT: 68 IN | WEIGHT: 224 LBS | HEART RATE: 88 BPM | OXYGEN SATURATION: 95 % | RESPIRATION RATE: 18 BRPM | BODY MASS INDEX: 33.95 KG/M2 | SYSTOLIC BLOOD PRESSURE: 120 MMHG | DIASTOLIC BLOOD PRESSURE: 68 MMHG

## 2024-04-22 DIAGNOSIS — G62.9 NEUROPATHY: Primary | ICD-10-CM

## 2024-04-22 DIAGNOSIS — B35.1 ONYCHOMYCOSIS: ICD-10-CM

## 2024-04-22 PROCEDURE — 3288F FALL RISK ASSESSMENT DOCD: CPT | Mod: CPTII,,, | Performed by: NURSE PRACTITIONER

## 2024-04-22 PROCEDURE — 1159F MED LIST DOCD IN RCRD: CPT | Mod: CPTII,,, | Performed by: NURSE PRACTITIONER

## 2024-04-22 PROCEDURE — 1160F RVW MEDS BY RX/DR IN RCRD: CPT | Mod: CPTII,,, | Performed by: NURSE PRACTITIONER

## 2024-04-22 PROCEDURE — 3078F DIAST BP <80 MM HG: CPT | Mod: CPTII,,, | Performed by: NURSE PRACTITIONER

## 2024-04-22 PROCEDURE — 4010F ACE/ARB THERAPY RXD/TAKEN: CPT | Mod: CPTII,,, | Performed by: NURSE PRACTITIONER

## 2024-04-22 PROCEDURE — 3074F SYST BP LT 130 MM HG: CPT | Mod: CPTII,,, | Performed by: NURSE PRACTITIONER

## 2024-04-22 PROCEDURE — 1126F AMNT PAIN NOTED NONE PRSNT: CPT | Mod: CPTII,,, | Performed by: NURSE PRACTITIONER

## 2024-04-22 PROCEDURE — 99214 OFFICE O/P EST MOD 30 MIN: CPT | Mod: ,,, | Performed by: NURSE PRACTITIONER

## 2024-04-22 PROCEDURE — 1101F PT FALLS ASSESS-DOCD LE1/YR: CPT | Mod: CPTII,,, | Performed by: NURSE PRACTITIONER

## 2024-04-22 RX ORDER — GABAPENTIN 100 MG/1
100 CAPSULE ORAL NIGHTLY
Qty: 30 CAPSULE | Refills: 11 | Status: SHIPPED | OUTPATIENT
Start: 2024-04-22 | End: 2025-04-22

## 2024-04-22 NOTE — PROGRESS NOTES
"Subjective:      Patient ID: John Bullard is a 73 y.o. male.    Chief Complaint: Numbness (C/o feet burning and foot numbness)      HPI: Patient here today for c/o bilateral LE numbness, tingling, and burning x some time.  S/s worse at night. Reports that he is on his feet a lot for work. No back issues, CP, palpitations, or SOB. No claudication. Denies lesions to feet.    Also, some issues with toenail fungus. No evidence of cellulitis.     Review of patient's allergies indicates:   Allergen Reactions    Ativan [lorazepam] Hallucinations       Review of Systems  Constitutional: No fever, No chills, No sweats, No fatigue  Respiratory: No shortness of breath, No exertional dyspnea.   Cardiovascular: No chest pain, No palpitations, No claudication, No orthopnea, No peripheral edema.  Musculoskeletal: No joint pain, No muscle pain.  Integumentary: No rash, No ecchymosis. Juan toenail fungus.  Objective:   Visit Vitals  /68 (BP Location: Right arm, Patient Position: Sitting, BP Method: Small (Manual))   Pulse 88   Resp 18   Ht 5' 7.99" (1.727 m)   Wt 101.6 kg (224 lb)   SpO2 95%   BMI 34.07 kg/m²     The patient's weight trend is below:   Wt Readings from Last 4 Encounters:   04/22/24 101.6 kg (224 lb)   04/16/24 110.7 kg (244 lb)   02/21/24 102.3 kg (225 lb 8.5 oz)   02/20/24 104.6 kg (230 lb 9.6 oz)        Physical Exam  Vitals and nursing note reviewed.   Constitutional:       General: He is not in acute distress.     Appearance: Normal appearance. He is normal weight. He is not ill-appearing, toxic-appearing or diaphoretic.   HENT:      Head: Normocephalic and atraumatic.   Cardiovascular:      Rate and Rhythm: Normal rate and regular rhythm.      Pulses:           Dorsalis pedis pulses are 2+ on the right side and 2+ on the left side.        Posterior tibial pulses are 2+ on the right side and 2+ on the left side.      Heart sounds: Normal heart sounds.   Pulmonary:      Effort: Pulmonary effort is normal. "      Breath sounds: Normal breath sounds.   Musculoskeletal:      Right foot: No deformity.      Left foot: No deformity.   Feet:      Right foot:      Protective Sensation: 5 sites tested.  5 sites sensed.      Skin integrity: Dry skin present. No ulcer, blister, skin breakdown, erythema, warmth, callus or fissure.      Toenail Condition: Right toenails are abnormally thick, long and ingrown. Fungal disease present.     Left foot:      Protective Sensation: 5 sites tested.  5 sites sensed.      Skin integrity: Dry skin present. No ulcer, blister, skin breakdown, erythema, warmth, callus or fissure.      Toenail Condition: Left toenails are abnormally thick and long. Fungal disease present.  Neurological:      General: No focal deficit present.      Mental Status: He is alert and oriented to person, place, and time. Mental status is at baseline.         Assessment/Plan:   1. Neuropathy  RX gabapentin to be taken nightly-advised of possible s/e, benefits, and purpose of meds  Reviewed recent labs, all of which stable without evidence of diabetes  Consider further work up with nerve conduction studies    - gabapentin (NEURONTIN) 100 MG capsule; Take 1 capsule (100 mg total) by mouth every evening.  Dispense: 30 capsule; Refill: 11    2. Onychomycosis  Will refer to Pod for eval/tx    - Ambulatory referral/consult to Podiatry; Future      Medication List with Changes/Refills   New Medications    GABAPENTIN (NEURONTIN) 100 MG CAPSULE    Take 1 capsule (100 mg total) by mouth every evening.   Current Medications    ALBUTEROL (PROVENTIL/VENTOLIN HFA) 90 MCG/ACTUATION INHALER    INHALE 2 PUFFS INTO THE LUNGS EVERY 6 HOURS (RESCUE)    ALPRAZOLAM (XANAX) 0.5 MG TABLET    Take 1 tablet by mouth twice daily as needed for anxiety    BUDESONIDE-GLYCOPYR-FORMOTEROL (BREZTRI AEROSPHERE) 160-9-4.8 MCG/ACTUATION HFAA    Inhale into the lungs.    CALCIUM CITRATE-VITAMIN D3 315-200 MG (CITRACAL+D) 315 MG-5 MCG (200 UNIT) PER TABLET     Take 1 tablet by mouth once daily.    CINNAMON BARK 500 MG CAPSULE    Take 500 mg by mouth once daily.    DOCUSATE SODIUM (COLACE) 100 MG CAPSULE    Take 100 mg by mouth Daily.    DULOXETINE (CYMBALTA) 30 MG CAPSULE    TAKE 1 CAPSULE BY MOUTH ONCE DAILY DO  NOT  CRUSH  OR  CHEW    ELIQUIS 5 MG TAB    Take 1 tablet (5 mg total) by mouth 2 (two) times daily.    FAMOTIDINE (PEPCID) 20 MG TABLET    Take 20 mg by mouth nightly as needed.    FLUTICASONE-UMECLIDIN-VILANTER (TRELEGY ELLIPTA) 100-62.5-25 MCG DSDV    Inhale 1 puff into the lungs once daily.    FUROSEMIDE (LASIX) 40 MG TABLET    Take 1 tablet by mouth once daily    LATANOPROST 0.005 % OPHTHALMIC SOLUTION    Place 1 drop into both eyes every evening.    LORATADINE (CLARITIN) 10 MG TABLET    Take 10 mg by mouth daily as needed for Allergies.    LOSARTAN (COZAAR) 25 MG TABLET    Take 1 tablet (25 mg total) by mouth 2 (two) times a day.    MELATONIN 10 MG TAB    Take 10-20 mg by mouth daily as needed (insomnia).    METOPROLOL SUCCINATE (TOPROL-XL) 100 MG 24 HR TABLET    Take 100 mg by mouth once daily.    MONTELUKAST (SINGULAIR) 10 MG TABLET    Take 1 tablet by mouth once daily    NON FORMULARY MEDICATION    Take 1 tablet by mouth once daily. Tumeric    OMEGA-3 FATTY ACIDS/FISH OIL (FISH OIL-OMEGA-3 FATTY ACIDS) 300-1,000 MG CAPSULE    Take 1 capsule by mouth once daily.    OMEPRAZOLE (PRILOSEC) 40 MG CAPSULE    Take 1 capsule by mouth once daily    POTASSIUM CHLORIDE (KLOR-CON) 8 MEQ TBSR    Take 1 tablet (8 mEq total) by mouth once daily.    ROSUVASTATIN (CRESTOR) 20 MG TABLET    TAKE 1 TABLET BY MOUTH ONCE IN THE EVENING    SOTALOL (BETAPACE) 80 MG TABLET    Take 80 mg by mouth 2 (two) times daily.    TAMSULOSIN (FLOMAX) 0.4 MG CAP    Take 1 capsule by mouth once daily.    TESTOSTERONE CYPIONATE (DEPOTESTOTERONE CYPIONATE) 200 MG/ML INJECTION    INJECT 1 ML (CC) INTRAMUSCULARLY EVERY 14 DAYS    ZOLPIDEM (AMBIEN) 5 MG TAB    TAKE 1 TABLET BY MOUTH ONCE DAILY AT  BEDTIME AS NEEDED FOR INSOMNIA        Chemistry:  Lab Results   Component Value Date     04/16/2024    K 3.9 04/16/2024    CHLORIDE 103 04/16/2024    BUN 16.1 04/16/2024    CREATININE 0.98 04/16/2024    EGFRNORACEVR >60 04/16/2024    GLUCOSE 88 04/16/2024    CALCIUM 9.3 04/16/2024    ALKPHOS 58 04/16/2024    LABPROT 6.3 04/16/2024    ALBUMIN 3.8 04/16/2024    BILIDIR 0.3 04/29/2022    IBILI 0.30 04/29/2022    AST 25 04/16/2024    ALT 20 04/16/2024    MG 2.40 01/21/2023        Lab Results   Component Value Date    HGBA1C 5.0 05/12/2021        Hematology:  Lab Results   Component Value Date    WBC 7.30 04/16/2024    HGB 15.0 04/16/2024    HCT 46.0 04/16/2024     (L) 04/16/2024       Lipid Panel:  Lab Results   Component Value Date    CHOL 124 01/19/2024    HDL 34 (L) 01/19/2024    LDL 67.00 01/19/2024    TRIG 114 01/19/2024    TOTALCHOLEST 4 01/19/2024        Urine:  Lab Results   Component Value Date    COLORUA Yellow 04/30/2023    APPEARANCEUA Clear 04/30/2023    SGUA 1.017 04/30/2023    PHUA 6.0 04/30/2023    PROTEINUA Negative 04/30/2023    GLUCOSEUA Negative 04/30/2023    KETONESUA Trace (A) 04/30/2023    BLOODUA Negative 04/30/2023    NITRITESUA Negative 04/30/2023    LEUKOCYTESUR Trace (A) 04/30/2023    RBCUA <5 04/30/2023    WBCUA <5 04/30/2023    BACTERIA None Seen 04/30/2023

## 2024-04-25 ENCOUNTER — TELEPHONE (OUTPATIENT)
Dept: INTERNAL MEDICINE | Facility: CLINIC | Age: 74
End: 2024-04-25
Payer: COMMERCIAL

## 2024-04-25 DIAGNOSIS — R06.02 SOB (SHORTNESS OF BREATH): ICD-10-CM

## 2024-04-25 RX ORDER — ALBUTEROL SULFATE 90 UG/1
2 AEROSOL, METERED RESPIRATORY (INHALATION) EVERY 6 HOURS PRN
Qty: 18 G | Refills: 0 | Status: SHIPPED | OUTPATIENT
Start: 2024-04-25 | End: 2024-05-14

## 2024-04-30 ENCOUNTER — CLINICAL SUPPORT (OUTPATIENT)
Dept: INTERNAL MEDICINE | Facility: CLINIC | Age: 74
End: 2024-04-30
Payer: COMMERCIAL

## 2024-04-30 DIAGNOSIS — R79.89 LOW TESTOSTERONE: Primary | ICD-10-CM

## 2024-04-30 PROCEDURE — 96372 THER/PROPH/DIAG INJ SC/IM: CPT | Mod: ,,, | Performed by: INTERNAL MEDICINE

## 2024-04-30 RX ORDER — TESTOSTERONE CYPIONATE 200 MG/ML
200 INJECTION, SOLUTION INTRAMUSCULAR
Status: COMPLETED | OUTPATIENT
Start: 2024-04-30 | End: 2024-04-30

## 2024-04-30 RX ADMIN — TESTOSTERONE CYPIONATE 200 MG: 200 INJECTION, SOLUTION INTRAMUSCULAR at 08:04

## 2024-05-02 ENCOUNTER — ANESTHESIA EVENT (OUTPATIENT)
Dept: ENDOSCOPY | Facility: HOSPITAL | Age: 74
End: 2024-05-02
Payer: COMMERCIAL

## 2024-05-02 ENCOUNTER — PATIENT MESSAGE (OUTPATIENT)
Dept: ADMINISTRATIVE | Facility: OTHER | Age: 74
End: 2024-05-02
Payer: COMMERCIAL

## 2024-05-02 NOTE — ANESTHESIA PREPROCEDURE EVALUATION
05/02/2024  John Bullard is a 73 y.o., male presents with h/o GERD and shortness of breath   Diagnosis:      GERD without esophagitis [K21.9]      SOB (shortness of breath) [R06.02]         The pt. comes to [facility] endoscopy for the noted procedure under GA/TIVA.  Procedure:   EGD (Abdomen)   Anesthesia type: General/MAC         PMHx:    Problem List  Current as of 05/02/24 0924  Acute on chronic combined systolic (congestive) and diastolic (congestive) heart failure Atrial fibrillation   Atrial flutter Bronchitis and pneumonitis due to chemicals, gases, fumes and vapors   Hypertension OA (osteoarthritis)   Peripheral vascular disease Restrictive lung disease   Severe obesity (BMI 35.0-39.9) with comorbidity Tachycardia   Venous insufficiency      No specialty history recorded    Other Medical History   Anxiety Asthma   Atrial fibrillation Decreased testosterone level   Exertional dyspnea GERD (gastroesophageal reflux disease)   Hypertension Ventricular tachycardia   Obesity OA (osteoarthritis)   SBO (small bowel obstruction) Venous insufficiency   Anticoagulant long-term use DJD (degenerative joint disease)   PAF (paroxysmal atrial fibrillation) MINDA on CPAP   Glaucoma HLD (hyperlipidemia)   Pneumonia Enlarged prostate   History of kidney stones Diaphragmatic hernia without obstruction or gangrene       Surgical History:  LAPAROSCOPIC REPAIR OF UMBILICAL HERNIA LITHOTRIPSY   SINUS SURGERY COLONOSCOPY   WISDOM TOOTH EXTRACTION ABLATION   ABDOMINAL SURGERY DIAPHRAGM PLICATION   EYE SURGERY STRABISMUS SURGERY       Vital signs:        Lab Data:      Echo:        EKG:      Pre-op Assessment    I have reviewed the Patient Summary Reports.     I have reviewed the Nursing Notes. I have reviewed the NPO Status.   I have reviewed the Medications.     Review of Systems  Anesthesia Hx:  No problems with  previous Anesthesia                Social:  Non-Smoker       Hematology/Oncology:  Hematology Normal   Oncology Normal                                   EENT/Dental:  EENT/Dental Normal           Cardiovascular:  Exercise tolerance: good   Hypertension       CHF         Functional Capacity good / => 4 METS                         Pulmonary:  Pneumonia  Asthma  Shortness of breath  Sleep Apnea                Renal/:  Renal/ Normal                 Hepatic/GI:     GERD             Musculoskeletal:  Arthritis               Neurological:    Neuromuscular Disease,                                   Endocrine:  Endocrine Normal            Dermatological:  Skin Normal    Psych:  Psychiatric Normal                    Physical Exam  General: Alert, Oriented, Well nourished and Cooperative    Airway:  Mallampati: II   Mouth Opening: Normal  TM Distance: Normal  Tongue: Normal  Neck ROM: Normal ROM    Dental:  Intact    Chest/Lungs:  Clear to auscultation, Normal Respiratory Rate    Heart:  Rate: Normal  Rhythm: Regular Rhythm        Anesthesia Plan  Type of Anesthesia, risks & benefits discussed:    Anesthesia Type: Gen Natural Airway  Intra-op Monitoring Plan: Standard ASA Monitors  Induction:  IV  Informed Consent: Informed consent signed with the Patient and all parties understand the risks and agree with anesthesia plan.  All questions answered.   ASA Score: 3  Day of Surgery Review of History & Physical: H&P Update referred to the surgeon/provider.    Ready For Surgery From Anesthesia Perspective.     .

## 2024-05-03 ENCOUNTER — ANESTHESIA (OUTPATIENT)
Dept: ENDOSCOPY | Facility: HOSPITAL | Age: 74
End: 2024-05-03
Payer: COMMERCIAL

## 2024-05-03 ENCOUNTER — HOSPITAL ENCOUNTER (OUTPATIENT)
Facility: HOSPITAL | Age: 74
Discharge: HOME | End: 2024-05-03
Attending: INTERNAL MEDICINE | Admitting: INTERNAL MEDICINE
Payer: COMMERCIAL

## 2024-05-03 VITALS
DIASTOLIC BLOOD PRESSURE: 74 MMHG | OXYGEN SATURATION: 92 % | HEART RATE: 82 BPM | SYSTOLIC BLOOD PRESSURE: 118 MMHG | TEMPERATURE: 98 F | RESPIRATION RATE: 18 BRPM

## 2024-05-03 DIAGNOSIS — K21.9 GERD WITHOUT ESOPHAGITIS: ICD-10-CM

## 2024-05-03 DIAGNOSIS — R06.02 SOB (SHORTNESS OF BREATH): ICD-10-CM

## 2024-05-03 PROCEDURE — D9220A PRA ANESTHESIA: Mod: ANES,,, | Performed by: ANESTHESIOLOGY

## 2024-05-03 PROCEDURE — 43239 EGD BIOPSY SINGLE/MULTIPLE: CPT | Performed by: INTERNAL MEDICINE

## 2024-05-03 PROCEDURE — 27201423 OPTIME MED/SURG SUP & DEVICES STERILE SUPPLY: Performed by: INTERNAL MEDICINE

## 2024-05-03 PROCEDURE — 37000008 HC ANESTHESIA 1ST 15 MINUTES: Performed by: INTERNAL MEDICINE

## 2024-05-03 PROCEDURE — 37000009 HC ANESTHESIA EA ADD 15 MINS: Performed by: INTERNAL MEDICINE

## 2024-05-03 PROCEDURE — 63600175 PHARM REV CODE 636 W HCPCS

## 2024-05-03 PROCEDURE — 25000003 PHARM REV CODE 250: Performed by: ANESTHESIOLOGY

## 2024-05-03 PROCEDURE — D9220A PRA ANESTHESIA: Mod: CRNA,,,

## 2024-05-03 PROCEDURE — 25000003 PHARM REV CODE 250

## 2024-05-03 RX ORDER — PROPOFOL 10 MG/ML
VIAL (ML) INTRAVENOUS
Status: COMPLETED
Start: 2024-05-03 | End: 2024-05-03

## 2024-05-03 RX ORDER — ETOMIDATE 2 MG/ML
INJECTION INTRAVENOUS
Status: DISCONTINUED | OUTPATIENT
Start: 2024-05-03 | End: 2024-05-03

## 2024-05-03 RX ORDER — LIDOCAINE HYDROCHLORIDE 20 MG/ML
INJECTION INTRAVENOUS
Status: DISCONTINUED | OUTPATIENT
Start: 2024-05-03 | End: 2024-05-03

## 2024-05-03 RX ORDER — SODIUM CHLORIDE, SODIUM GLUCONATE, SODIUM ACETATE, POTASSIUM CHLORIDE AND MAGNESIUM CHLORIDE 30; 37; 368; 526; 502 MG/100ML; MG/100ML; MG/100ML; MG/100ML; MG/100ML
INJECTION, SOLUTION INTRAVENOUS CONTINUOUS
Status: DISCONTINUED | OUTPATIENT
Start: 2024-05-03 | End: 2024-05-03 | Stop reason: HOSPADM

## 2024-05-03 RX ORDER — PROPOFOL 10 MG/ML
VIAL (ML) INTRAVENOUS
Status: DISCONTINUED | OUTPATIENT
Start: 2024-05-03 | End: 2024-05-03

## 2024-05-03 RX ORDER — ETOMIDATE 2 MG/ML
INJECTION INTRAVENOUS
Status: COMPLETED
Start: 2024-05-03 | End: 2024-05-03

## 2024-05-03 RX ORDER — LIDOCAINE HYDROCHLORIDE 20 MG/ML
INJECTION, SOLUTION EPIDURAL; INFILTRATION; INTRACAUDAL; PERINEURAL
Status: DISCONTINUED
Start: 2024-05-03 | End: 2024-05-03 | Stop reason: HOSPADM

## 2024-05-03 RX ADMIN — PROPOFOL 50 MG: 10 INJECTION, EMULSION INTRAVENOUS at 07:05

## 2024-05-03 RX ADMIN — SODIUM CHLORIDE, SODIUM GLUCONATE, SODIUM ACETATE, POTASSIUM CHLORIDE AND MAGNESIUM CHLORIDE: 526; 502; 368; 37; 30 INJECTION, SOLUTION INTRAVENOUS at 07:05

## 2024-05-03 RX ADMIN — ETOMIDATE 4 MG: 2 INJECTION INTRAVENOUS at 07:05

## 2024-05-03 RX ADMIN — PROPOFOL 20 MG: 10 INJECTION, EMULSION INTRAVENOUS at 08:05

## 2024-05-03 RX ADMIN — LIDOCAINE HYDROCHLORIDE 100 MG: 20 INJECTION INTRAVENOUS at 07:05

## 2024-05-03 RX ADMIN — PROPOFOL 20 MG: 10 INJECTION, EMULSION INTRAVENOUS at 07:05

## 2024-05-03 NOTE — TRANSFER OF CARE
Anesthesia Transfer of Care Note    Patient: John Bullard    Procedure(s) Performed: Procedure(s) (LRB):  EGD (N/A)  EGD, WITH CLOSED BIOPSY    Patient location: GI    Anesthesia Type: MAC    Transport from OR: Transported from OR on room air with adequate spontaneous ventilation    Post pain: adequate analgesia    Post assessment: no apparent anesthetic complications and tolerated procedure well    Post vital signs: stable    Level of consciousness: awake, alert and responds to stimulation    Nausea/Vomiting: no nausea/vomiting    Complications: none    Transfer of care protocol was followed      Last vitals: Visit Vitals  /70   Pulse (!) 116   Temp 36.7 °C (98.1 °F)   Resp 18   SpO2 98%

## 2024-05-03 NOTE — H&P
[facility]  Gastroenterology  H&P    Patient Name: John Bullard  MRN: [id number]  Admission Date: 5/3/2024  Code Status: Prior    Attending Provider: TWILA Stewart MD  Primary Care Physician: Fabiano Liang MD  Principal Problem:<principal problem not specified>    Subjective:     History of Present Illness:  Hoarseness and shortness of breath    This 73-year-old  comes in for evaluation of increasing episodes of hoarseness along with some shortness of breath which he attributes possibly to a part of asthma but also notes that he gets more short of breath standing up then lying down.  He denies any melena hematochezia.  He does have some heartburn from time to time.  He denies any dysphagia.  His weight is stable to increased.  He has had a diaphragmatic plication.    Past Medical History:   Diagnosis Date    Anticoagulant long-term use     Anxiety     Asthma     Atrial fibrillation     Decreased testosterone level     Diaphragmatic hernia without obstruction or gangrene     DJD (degenerative joint disease)     Enlarged prostate     Exertional dyspnea     GERD (gastroesophageal reflux disease)     Glaucoma     History of kidney stones     HLD (hyperlipidemia)     Hypertension     OA (osteoarthritis)     Obesity     MINDA on CPAP     PAF (paroxysmal atrial fibrillation)     s/p Ablation (1/5/2023)    Pneumonia 01/2023    SBO (small bowel obstruction)     hx multiple (3) incudling requiring surgical intervention    Venous insufficiency     Ventricular tachycardia        Past Surgical History:   Procedure Laterality Date    ABDOMINAL SURGERY      for illeus    ABLATION N/A 01/05/2023    Procedure: ABLATION;  Surgeon: Kenny Waller MD;  Location: [facility] CATH LAB;  Service: Cardiology;  Laterality: N/A;  EPS + AFL CATH ABLATION W/ ANEST.    COLONOSCOPY      DIAPHRAGM PLICATION Left 04/24/2023    Procedure: PLICATION, DIAPHRAGM;  Surgeon: Roberto Sylvester IV, MD;   Location: [facility] OR;  Service: Cardiovascular;  Laterality: Left;  please add possible LLAA & REJI intraop    EYE SURGERY      LAPAROSCOPIC REPAIR OF UMBILICAL HERNIA      LITHOTRIPSY      SINUS SURGERY      STRABISMUS SURGERY Bilateral 2/21/2024    Procedure: STRABISMUS SURGERY  bmr recess;  Surgeon: Reg Mendez MD;  Location: [facility] OR;  Service: Ophthalmology;  Laterality: Bilateral;    WISDOM TOOTH EXTRACTION Bilateral        Review of patient's allergies indicates:   Allergen Reactions    Ativan [lorazepam] Hallucinations     Family History       Problem Relation (Age of Onset)    Cancer Mother          Tobacco Use    Smoking status: Never    Smokeless tobacco: Never   Substance and Sexual Activity    Alcohol use: Yes     Alcohol/week: 1.0 standard drink of alcohol     Types: 1 Glasses of wine per week     Comment: occasionally    Drug use: Never    Sexual activity: Never     Review of Systems  Objective:     Vital Signs (Most Recent):  Temp: 98.1 °F (36.7 °C) (05/03/24 0707)  Pulse: 79 (05/03/24 0707)  Resp: 17 (05/03/24 0707)  BP: (!) 133/95 (05/03/24 0707)  SpO2: 96 % (05/03/24 0707) Vital Signs (24h Range):  Temp:  [98.1 °F (36.7 °C)] 98.1 °F (36.7 °C)  Pulse:  [79] 79  Resp:  [17] 17  SpO2:  [96 %] 96 %  BP: (133)/(95) 133/95        There is no height or weight on file to calculate BMI.    No intake or output data in the 24 hours ending 05/03/24 0744    Lines/Drains/Airways       Peripheral Intravenous Line  Duration                  Peripheral IV - Single Lumen 05/03/24 0725 22 G Anterior;Right Forearm <1 day                    Physical Exam  Vitals and nursing note reviewed. Exam conducted with a chaperone present.   Constitutional:       Appearance: He is obese.   Cardiovascular:      Rate and Rhythm: Normal rate.   Abdominal:      Comments: Abdomen mildly protuberant soft nontender   Skin:     General: Skin is warm.   Neurological:      General: No focal deficit present.      Mental Status: He is  alert.   Psychiatric:         Mood and Affect: Mood normal.         Significant Labs:  All pertinent lab results from the last 24 hours have been reviewed.    Significant Imaging:  Imaging results within the past 24 hours have been reviewed.    Assessment/Plan:     There are no hospital problems to display for this patient.      Suspect that the shortness of breath is not directly GI related suspect this has something to do with his body habitus and diaphragmatic plication.  Upper endoscopy to rule out a significant hernia.    TWILA Stewart MD  Gastroenterology  [facility]

## 2024-05-03 NOTE — PROVATION PATIENT INSTRUCTIONS
Discharge Summary/Instructions after an Endoscopic Procedure  Patient Name: John Bullard  Patient MRN: [id number]  Patient YOB: 1950  Friday, May 3, 2024  EDIN Stewart MD  Dear patient,  As a result of recent federal legislation (The Federal Cures Act), you may   receive lab or pathology results from your procedure in your [facility]   account before your physician is able to contact you. Your physician or   their representative will relay the results to you with their   recommendations at their soonest availability.  Thank you,  RESTRICTIONS:  During your procedure today, you received medications for sedation.  These   medications may affect your judgment, balance and coordination.  Therefore,   for 24 hours, you have the following restrictions:   - DO NOT drive a car, operate machinery, make legal/financial decisions,   sign important papers or drink alcohol.    ACTIVITY:  Today: no heavy lifting, straining or running due to procedural   sedation/anesthesia.  The following day: return to full activity including work.  DIET:  Eat and drink normally unless instructed otherwise.     TREATMENT FOR COMMON SIDE EFFECTS:  - Mild abdominal pain, nausea, belching, bloating or excessive gas:  rest,   eat lightly and use a heating pad.  - Sore Throat: treat with throat lozenges and/or gargle with warm salt   water.  - Because air was used during the procedure, expelling large amounts of air   from your rectum or belching is normal.  - If a bowel prep was taken, you may not have a bowel movement for 1-3 days.    This is normal.  SYMPTOMS TO WATCH FOR AND REPORT TO YOUR PHYSICIAN:  1. Abdominal pain or bloating, other than gas cramps.  2. Chest pain.  3. Back pain.  4. Signs of infection such as: chills or fever occurring within 24 hours   after the procedure.  5. Rectal bleeding, which would show as bright red, maroon, or black stools.   (A tablespoon of blood from the rectum is not serious, especially  if   hemorrhoids are present.)  6. Vomiting.  7. Weakness or dizziness.  GO DIRECTLY TO THE NEAREST EMERGENCY ROOM IF YOU HAVE ANY OF THE FOLLOWING:      Difficulty breathing              Chills and/or fever over 101 F   Persistent vomiting and/or vomiting blood   Severe abdominal pain   Severe chest pain   Black, tarry stools   Bleeding- more than one tablespoon   Any other symptom or condition that you feel may need urgent attention  Your doctor recommends these additional instructions:  If any biopsies were taken, your doctors clinic will contact you in 1 to 2   weeks with any results.  - Patient has a contact number available for emergencies.  The signs and   symptoms of potential delayed complications were discussed with the   patient.  Return to normal activities tomorrow.  Written discharge   instructions were provided to the patient.   - Discharge patient to home.   - Resume previous diet.   - Return to referring physician as previously scheduled.   Office will set up CT scan of chest and abdomen to make sure there are no   other reasons for the shortness of breath while you were standing up as   opposed to lying down.  For questions, problems or results please call your physician - EDIN Stewart MD at Work:  [phone].  [facility], EMERGENCY ROOM PHONE NUMBER: [phone]  IF A COMPLICATION OR EMERGENCY SITUATION ARISES AND YOU ARE UNABLE TO REACH   YOUR PHYSICIAN - GO DIRECTLY TO THE EMERGENCY ROOM.  MD EDIN Meneses MD  5/3/2024 8:20:37 AM  This report has been verified and signed electronically.  Dear patient,  As a result of recent federal legislation (The Federal Cures Act), you may   receive lab or pathology results from your procedure in your [facility]   account before your physician is able to contact you. Your physician or   their representative will relay the results to you with their   recommendations at their soonest  availability.  Thank you,  PROVATION

## 2024-05-03 NOTE — DISCHARGE SUMMARY
[facility] End[facility]  Discharge Note  Short Stay    Procedure(s) (LRB):  EGD (N/A)  EGD, WITH CLOSED BIOPSY      OUTCOME: Patient tolerated treatment/procedure well without complication and is now ready for discharge.    DISPOSITION: Home or Self Care    FINAL DIAGNOSIS:  <principal problem not specified>    FOLLOWUP: In clinic    DISCHARGE INSTRUCTIONS:  No discharge procedures on file.      Clinical Reference Documents Added to Patient Instructions         Document    BARKER'S ESOPHAGUS DISCHARGE INSTRUCTIONS (ENGLISH)    MODERATE SEDATION IN ADULTS DISCHARGE INSTRUCTIONS (ENGLISH)    UPPER GI ENDOSCOPY DISCHARGE INSTRUCTIONS (ENGLISH)            TIME SPENT ON DISCHARGE: 5 minutes

## 2024-05-04 ENCOUNTER — PATIENT MESSAGE (OUTPATIENT)
Dept: ADMINISTRATIVE | Facility: OTHER | Age: 74
End: 2024-05-04
Payer: COMMERCIAL

## 2024-05-04 DIAGNOSIS — J45.909 ASTHMA, UNSPECIFIED ASTHMA SEVERITY, UNSPECIFIED WHETHER COMPLICATED, UNSPECIFIED WHETHER PERSISTENT: ICD-10-CM

## 2024-05-04 NOTE — ANESTHESIA POSTPROCEDURE EVALUATION
Anesthesia Post Evaluation    Patient: John Bullard    Procedure(s) Performed: Procedure(s) (LRB):  EGD (N/A)  EGD, WITH CLOSED BIOPSY    Final Anesthesia Type: general      Patient location during evaluation: PACU  Patient participation: Yes- Able to Participate  Level of consciousness: awake and alert  Post-procedure vital signs: reviewed and stable  Pain management: adequate  Airway patency: patent    PONV status at discharge: No PONV, nausea (controlled) and vomiting (controlled)  Anesthetic complications: no      Cardiovascular status: blood pressure returned to baseline and stable  Respiratory status: unassisted  Hydration status: euvolemic  Follow-up not needed.              Vitals Value Taken Time   /74 05/03/24 0837   Temp 37.3 05/04/24 0759   Pulse 82 05/03/24 0837   Resp 18 05/03/24 0837   SpO2 92 % 05/03/24 0837         No case tracking events are documented in the log.      Pain/Jim Score: Jim Score: 10 (5/3/2024  8:37 AM)

## 2024-05-06 LAB — PSYCHE PATHOLOGY RESULT: NORMAL

## 2024-05-06 RX ORDER — MONTELUKAST SODIUM 10 MG/1
TABLET ORAL
Qty: 30 TABLET | Refills: 0 | Status: SHIPPED | OUTPATIENT
Start: 2024-05-06 | End: 2024-05-30

## 2024-05-14 ENCOUNTER — CLINICAL SUPPORT (OUTPATIENT)
Dept: INTERNAL MEDICINE | Facility: CLINIC | Age: 74
End: 2024-05-14
Payer: COMMERCIAL

## 2024-05-14 DIAGNOSIS — R06.02 SOB (SHORTNESS OF BREATH): ICD-10-CM

## 2024-05-14 DIAGNOSIS — R79.89 LOW TESTOSTERONE: Primary | ICD-10-CM

## 2024-05-14 PROCEDURE — 96372 THER/PROPH/DIAG INJ SC/IM: CPT | Mod: ,,, | Performed by: INTERNAL MEDICINE

## 2024-05-14 RX ORDER — ALBUTEROL SULFATE 90 UG/1
2 AEROSOL, METERED RESPIRATORY (INHALATION) EVERY 6 HOURS PRN
Qty: 18 G | Refills: 0 | Status: SHIPPED | OUTPATIENT
Start: 2024-05-14 | End: 2024-06-03

## 2024-05-14 RX ORDER — TESTOSTERONE CYPIONATE 200 MG/ML
200 INJECTION, SOLUTION INTRAMUSCULAR
Status: COMPLETED | OUTPATIENT
Start: 2024-05-14 | End: 2024-05-14

## 2024-05-14 RX ADMIN — TESTOSTERONE CYPIONATE 200 MG: 200 INJECTION, SOLUTION INTRAMUSCULAR at 08:05

## 2024-05-30 DIAGNOSIS — J45.909 ASTHMA, UNSPECIFIED ASTHMA SEVERITY, UNSPECIFIED WHETHER COMPLICATED, UNSPECIFIED WHETHER PERSISTENT: ICD-10-CM

## 2024-05-30 RX ORDER — MONTELUKAST SODIUM 10 MG/1
TABLET ORAL
Qty: 30 TABLET | Refills: 11 | Status: SHIPPED | OUTPATIENT
Start: 2024-05-30

## 2024-05-31 DIAGNOSIS — F41.9 ANXIETY: ICD-10-CM

## 2024-05-31 RX ORDER — ALPRAZOLAM 0.5 MG/1
TABLET ORAL
Qty: 45 TABLET | Refills: 5 | Status: SHIPPED | OUTPATIENT
Start: 2024-05-31

## 2024-06-03 DIAGNOSIS — R06.02 SOB (SHORTNESS OF BREATH): ICD-10-CM

## 2024-06-03 RX ORDER — ALBUTEROL SULFATE 90 UG/1
2 AEROSOL, METERED RESPIRATORY (INHALATION) EVERY 6 HOURS PRN
Qty: 18 G | Refills: 0 | Status: SHIPPED | OUTPATIENT
Start: 2024-06-03

## 2024-06-11 ENCOUNTER — CLINICAL SUPPORT (OUTPATIENT)
Dept: INTERNAL MEDICINE | Facility: CLINIC | Age: 74
End: 2024-06-11
Payer: COMMERCIAL

## 2024-06-11 DIAGNOSIS — R79.89 LOW TESTOSTERONE: Primary | ICD-10-CM

## 2024-06-11 PROCEDURE — 96372 THER/PROPH/DIAG INJ SC/IM: CPT | Mod: ,,, | Performed by: INTERNAL MEDICINE

## 2024-06-11 RX ORDER — TESTOSTERONE CYPIONATE 200 MG/ML
200 INJECTION, SOLUTION INTRAMUSCULAR
Status: COMPLETED | OUTPATIENT
Start: 2024-06-11 | End: 2024-06-11

## 2024-06-11 RX ADMIN — TESTOSTERONE CYPIONATE 200 MG: 200 INJECTION, SOLUTION INTRAMUSCULAR at 08:06

## 2024-06-11 NOTE — PROGRESS NOTES
Patient is here for Testosterone injection; medication brought from pharmacy.Testosterone Cyp 2,000/10Ml 1mL IM to Left gluteus medius. Injection went well, with little discomfort.

## 2024-06-16 DIAGNOSIS — G47.00 INSOMNIA, UNSPECIFIED TYPE: ICD-10-CM

## 2024-06-17 DIAGNOSIS — I48.19 PERSISTENT ATRIAL FIBRILLATION: ICD-10-CM

## 2024-06-17 RX ORDER — ZOLPIDEM TARTRATE 5 MG/1
TABLET ORAL
Qty: 30 TABLET | Refills: 0 | Status: SHIPPED | OUTPATIENT
Start: 2024-06-17

## 2024-06-17 RX ORDER — APIXABAN 5 MG/1
5 TABLET, FILM COATED ORAL 2 TIMES DAILY
Qty: 60 TABLET | Refills: 0 | Status: SHIPPED | OUTPATIENT
Start: 2024-06-17

## 2024-06-23 DIAGNOSIS — I10 PRIMARY HYPERTENSION: Primary | ICD-10-CM

## 2024-06-24 RX ORDER — LOSARTAN POTASSIUM 25 MG/1
25 TABLET ORAL 2 TIMES DAILY
Qty: 180 TABLET | Refills: 0 | Status: SHIPPED | OUTPATIENT
Start: 2024-06-24

## 2024-06-25 ENCOUNTER — CLINICAL SUPPORT (OUTPATIENT)
Dept: INTERNAL MEDICINE | Facility: CLINIC | Age: 74
End: 2024-06-25
Payer: COMMERCIAL

## 2024-06-25 DIAGNOSIS — R79.89 LOW TESTOSTERONE: Primary | ICD-10-CM

## 2024-06-25 PROCEDURE — 96372 THER/PROPH/DIAG INJ SC/IM: CPT | Mod: ,,, | Performed by: INTERNAL MEDICINE

## 2024-06-25 RX ORDER — TESTOSTERONE CYPIONATE 200 MG/ML
200 INJECTION, SOLUTION INTRAMUSCULAR
Status: COMPLETED | OUTPATIENT
Start: 2024-06-25 | End: 2024-06-25

## 2024-06-25 RX ADMIN — TESTOSTERONE CYPIONATE 200 MG: 200 INJECTION, SOLUTION INTRAMUSCULAR at 08:06

## 2024-06-25 NOTE — PROGRESS NOTES
Administered testosterone injection in left ventrogluteal. Pt tolerated well.   ----- Message from Melida Rooney MD sent at 11/4/2017 12:28 PM CDT -----  Vit D is still good at 33.3.   Cholesterol levels within good range and urine microalb not elevated.

## 2024-07-03 ENCOUNTER — TELEPHONE (OUTPATIENT)
Dept: INTERNAL MEDICINE | Facility: CLINIC | Age: 74
End: 2024-07-03
Payer: COMMERCIAL

## 2024-07-03 DIAGNOSIS — R06.02 SOB (SHORTNESS OF BREATH): ICD-10-CM

## 2024-07-03 RX ORDER — ALBUTEROL SULFATE 90 UG/1
2 AEROSOL, METERED RESPIRATORY (INHALATION) EVERY 6 HOURS PRN
Qty: 18 G | Refills: 0 | Status: SHIPPED | OUTPATIENT
Start: 2024-07-03

## 2024-07-03 NOTE — TELEPHONE ENCOUNTER
----- Message from Nadine Baer sent at 7/3/2024  9:17 AM CDT -----  Who Called: Umair Bullard    Pharmacy is calling to request assistance with Rx    Pharmacy name and phone number:  [id number]  [address] - [address]   Phone: [phone]  Fax: [phone]    Pharmacy contact:  ailyn  Patient Name:  umair  Prescription Name: albuterol (PROVENTIL/VENTOLIN HFA) 90 mcg/actuation inhaler 18 g   What do they need to clarify?: refill request    Preferred Method of Contact: Phone Call  Patient's Preferred Phone Number on File: [phone]   Best Call Back Number, if different:  Additional Information: pharmacy call, please advise, thanks

## 2024-07-08 DIAGNOSIS — R06.02 SHORTNESS OF BREATH: Primary | ICD-10-CM

## 2024-07-08 DIAGNOSIS — Z98.890 PERSONAL HISTORY OF SURGERY TO HEART AND GREAT VESSELS, PRESENTING HAZARDS TO HEALTH: ICD-10-CM

## 2024-07-09 ENCOUNTER — CLINICAL SUPPORT (OUTPATIENT)
Dept: INTERNAL MEDICINE | Facility: CLINIC | Age: 74
End: 2024-07-09
Payer: COMMERCIAL

## 2024-07-09 DIAGNOSIS — R79.89 LOW TESTOSTERONE: Primary | ICD-10-CM

## 2024-07-09 DIAGNOSIS — R79.89 LOW TESTOSTERONE: ICD-10-CM

## 2024-07-09 PROCEDURE — 96372 THER/PROPH/DIAG INJ SC/IM: CPT | Mod: ,,, | Performed by: INTERNAL MEDICINE

## 2024-07-09 RX ORDER — TESTOSTERONE CYPIONATE 200 MG/ML
200 INJECTION, SOLUTION INTRAMUSCULAR
Qty: 3 ML | Refills: 5 | Status: SHIPPED | OUTPATIENT
Start: 2024-07-09

## 2024-07-09 RX ORDER — TESTOSTERONE CYPIONATE 200 MG/ML
200 INJECTION, SOLUTION INTRAMUSCULAR
Status: COMPLETED | OUTPATIENT
Start: 2024-07-09 | End: 2024-07-09

## 2024-07-09 RX ADMIN — TESTOSTERONE CYPIONATE 200 MG: 200 INJECTION, SOLUTION INTRAMUSCULAR at 08:07

## 2024-07-22 DIAGNOSIS — G47.00 INSOMNIA, UNSPECIFIED TYPE: ICD-10-CM

## 2024-07-22 RX ORDER — ZOLPIDEM TARTRATE 5 MG/1
TABLET ORAL
Qty: 30 TABLET | Refills: 5 | Status: SHIPPED | OUTPATIENT
Start: 2024-07-22

## 2024-07-23 ENCOUNTER — HOSPITAL ENCOUNTER (OUTPATIENT)
Dept: RADIOLOGY | Facility: HOSPITAL | Age: 74
Discharge: HOME | End: 2024-07-23
Attending: INTERNAL MEDICINE
Payer: COMMERCIAL

## 2024-07-23 ENCOUNTER — CLINICAL SUPPORT (OUTPATIENT)
Dept: INTERNAL MEDICINE | Facility: CLINIC | Age: 74
End: 2024-07-23
Payer: COMMERCIAL

## 2024-07-23 DIAGNOSIS — Z98.890 PERSONAL HISTORY OF SURGERY TO HEART AND GREAT VESSELS, PRESENTING HAZARDS TO HEALTH: ICD-10-CM

## 2024-07-23 DIAGNOSIS — R06.02 SHORTNESS OF BREATH: ICD-10-CM

## 2024-07-23 DIAGNOSIS — R79.89 LOW TESTOSTERONE: Primary | ICD-10-CM

## 2024-07-23 PROCEDURE — 71260 CT THORAX DX C+: CPT | Mod: TC

## 2024-07-23 PROCEDURE — 25500020 PHARM REV CODE 255: Performed by: INTERNAL MEDICINE

## 2024-07-23 PROCEDURE — 96372 THER/PROPH/DIAG INJ SC/IM: CPT | Mod: ,,, | Performed by: INTERNAL MEDICINE

## 2024-07-23 RX ORDER — TESTOSTERONE CYPIONATE 200 MG/ML
200 INJECTION, SOLUTION INTRAMUSCULAR
Status: COMPLETED | OUTPATIENT
Start: 2024-07-23 | End: 2024-07-23

## 2024-07-23 RX ADMIN — TESTOSTERONE CYPIONATE 200 MG: 200 INJECTION, SOLUTION INTRAMUSCULAR at 08:07

## 2024-07-23 RX ADMIN — IOHEXOL 100 ML: 350 INJECTION, SOLUTION INTRAVENOUS at 11:07

## 2024-07-25 DIAGNOSIS — K21.9 GASTROESOPHAGEAL REFLUX DISEASE, UNSPECIFIED WHETHER ESOPHAGITIS PRESENT: ICD-10-CM

## 2024-07-25 RX ORDER — OMEPRAZOLE 40 MG/1
CAPSULE, DELAYED RELEASE ORAL
Qty: 30 CAPSULE | Refills: 6 | Status: SHIPPED | OUTPATIENT
Start: 2024-07-25

## 2024-07-30 ENCOUNTER — TELEPHONE (OUTPATIENT)
Dept: INTERNAL MEDICINE | Facility: CLINIC | Age: 74
End: 2024-07-30
Payer: COMMERCIAL

## 2024-07-30 NOTE — TELEPHONE ENCOUNTER
----- Message from Teodora Benavidez LPN sent at 7/29/2024  2:34 PM CDT -----  Regarding: qiana Velásquez 08/06 @9:40  Fasting labs needed.

## 2024-07-31 DIAGNOSIS — R06.02 SOB (SHORTNESS OF BREATH): ICD-10-CM

## 2024-07-31 RX ORDER — ALBUTEROL SULFATE 90 UG/1
2 AEROSOL, METERED RESPIRATORY (INHALATION) EVERY 6 HOURS PRN
Qty: 18 G | Refills: 0 | Status: SHIPPED | OUTPATIENT
Start: 2024-07-31

## 2024-08-02 ENCOUNTER — LAB VISIT (OUTPATIENT)
Dept: LAB | Facility: HOSPITAL | Age: 74
End: 2024-08-02
Attending: INTERNAL MEDICINE
Payer: COMMERCIAL

## 2024-08-02 DIAGNOSIS — R49.0 HOARSENESS: ICD-10-CM

## 2024-08-02 DIAGNOSIS — I48.0 PAROXYSMAL ATRIAL FIBRILLATION: ICD-10-CM

## 2024-08-02 DIAGNOSIS — I50.40 COMBINED SYSTOLIC AND DIASTOLIC CONGESTIVE HEART FAILURE, UNSPECIFIED HF CHRONICITY: ICD-10-CM

## 2024-08-02 DIAGNOSIS — R79.89 LOW TESTOSTERONE: ICD-10-CM

## 2024-08-02 DIAGNOSIS — I10 PRIMARY HYPERTENSION: ICD-10-CM

## 2024-08-02 DIAGNOSIS — J98.4 RESTRICTIVE LUNG DISEASE: ICD-10-CM

## 2024-08-02 DIAGNOSIS — G47.33 OSA ON CPAP: ICD-10-CM

## 2024-08-02 LAB
ALBUMIN SERPL-MCNC: 3.7 G/DL (ref 3.4–4.8)
ALBUMIN/GLOB SERPL: 1.6 RATIO (ref 1.1–2)
ALP SERPL-CCNC: 52 UNIT/L (ref 40–150)
ALT SERPL-CCNC: 21 UNIT/L (ref 0–55)
ANION GAP SERPL CALC-SCNC: 6 MEQ/L
AST SERPL-CCNC: 25 UNIT/L (ref 5–34)
BASOPHILS # BLD AUTO: 0.03 X10(3)/MCL
BASOPHILS NFR BLD AUTO: 0.5 %
BILIRUB SERPL-MCNC: 0.7 MG/DL
BUN SERPL-MCNC: 13.7 MG/DL (ref 8.4–25.7)
CALCIUM SERPL-MCNC: 9.3 MG/DL (ref 8.8–10)
CHLORIDE SERPL-SCNC: 104 MMOL/L (ref 98–107)
CHOLEST SERPL-MCNC: 121 MG/DL
CHOLEST/HDLC SERPL: 3 {RATIO} (ref 0–5)
CO2 SERPL-SCNC: 29 MMOL/L (ref 23–31)
CREAT SERPL-MCNC: 0.9 MG/DL (ref 0.73–1.18)
CREAT/UREA NIT SERPL: 15
EOSINOPHIL # BLD AUTO: 0.16 X10(3)/MCL (ref 0–0.9)
EOSINOPHIL NFR BLD AUTO: 2.4 %
ERYTHROCYTE [DISTWIDTH] IN BLOOD BY AUTOMATED COUNT: 14.2 % (ref 11.5–17)
GFR SERPLBLD CREATININE-BSD FMLA CKD-EPI: >60 ML/MIN/1.73/M2
GLOBULIN SER-MCNC: 2.3 GM/DL (ref 2.4–3.5)
GLUCOSE SERPL-MCNC: 86 MG/DL (ref 82–115)
HCT VFR BLD AUTO: 46.4 % (ref 42–52)
HDLC SERPL-MCNC: 41 MG/DL (ref 35–60)
HGB BLD-MCNC: 15.1 G/DL (ref 14–18)
IMM GRANULOCYTES # BLD AUTO: 0.04 X10(3)/MCL (ref 0–0.04)
IMM GRANULOCYTES NFR BLD AUTO: 0.6 %
LDLC SERPL CALC-MCNC: 65 MG/DL (ref 50–140)
LYMPHOCYTES # BLD AUTO: 0.64 X10(3)/MCL (ref 0.6–4.6)
LYMPHOCYTES NFR BLD AUTO: 9.7 %
MCH RBC QN AUTO: 31 PG (ref 27–31)
MCHC RBC AUTO-ENTMCNC: 32.5 G/DL (ref 33–36)
MCV RBC AUTO: 95.3 FL (ref 80–94)
MONOCYTES # BLD AUTO: 0.59 X10(3)/MCL (ref 0.1–1.3)
MONOCYTES NFR BLD AUTO: 9 %
NEUTROPHILS # BLD AUTO: 5.11 X10(3)/MCL (ref 2.1–9.2)
NEUTROPHILS NFR BLD AUTO: 77.8 %
NRBC BLD AUTO-RTO: 0 %
PLATELET # BLD AUTO: 130 X10(3)/MCL (ref 130–400)
PLATELETS.RETICULATED NFR BLD AUTO: 5.3 % (ref 0.9–11.2)
PMV BLD AUTO: 10.7 FL (ref 7.4–10.4)
POTASSIUM SERPL-SCNC: 4 MMOL/L (ref 3.5–5.1)
PROT SERPL-MCNC: 6 GM/DL (ref 5.8–7.6)
PSA SERPL-MCNC: 1.06 NG/ML
RBC # BLD AUTO: 4.87 X10(6)/MCL (ref 4.7–6.1)
SODIUM SERPL-SCNC: 139 MMOL/L (ref 136–145)
TESTOST SERPL-MCNC: 513.16 NG/DL (ref 220.91–715.81)
TRIGL SERPL-MCNC: 74 MG/DL (ref 34–140)
TSH SERPL-ACNC: 1.5 UIU/ML (ref 0.35–4.94)
VLDLC SERPL CALC-MCNC: 15 MG/DL
WBC # BLD AUTO: 6.57 X10(3)/MCL (ref 4.5–11.5)

## 2024-08-02 PROCEDURE — 80061 LIPID PANEL: CPT

## 2024-08-02 PROCEDURE — 84443 ASSAY THYROID STIM HORMONE: CPT

## 2024-08-02 PROCEDURE — 84403 ASSAY OF TOTAL TESTOSTERONE: CPT

## 2024-08-02 PROCEDURE — 84153 ASSAY OF PSA TOTAL: CPT

## 2024-08-02 PROCEDURE — 80053 COMPREHEN METABOLIC PANEL: CPT

## 2024-08-02 PROCEDURE — 85025 COMPLETE CBC W/AUTO DIFF WBC: CPT

## 2024-08-02 PROCEDURE — 36415 COLL VENOUS BLD VENIPUNCTURE: CPT

## 2024-08-06 ENCOUNTER — OFFICE VISIT (OUTPATIENT)
Dept: INTERNAL MEDICINE | Facility: CLINIC | Age: 74
End: 2024-08-06
Payer: COMMERCIAL

## 2024-08-06 VITALS
HEART RATE: 70 BPM | RESPIRATION RATE: 18 BRPM | OXYGEN SATURATION: 95 % | SYSTOLIC BLOOD PRESSURE: 122 MMHG | WEIGHT: 224.19 LBS | DIASTOLIC BLOOD PRESSURE: 84 MMHG | BODY MASS INDEX: 35.19 KG/M2 | HEIGHT: 67 IN

## 2024-08-06 DIAGNOSIS — K21.9 GASTROESOPHAGEAL REFLUX DISEASE, UNSPECIFIED WHETHER ESOPHAGITIS PRESENT: ICD-10-CM

## 2024-08-06 DIAGNOSIS — G47.33 OSA ON CPAP: ICD-10-CM

## 2024-08-06 DIAGNOSIS — R79.89 LOW TESTOSTERONE: ICD-10-CM

## 2024-08-06 DIAGNOSIS — I48.91 ATRIAL FIBRILLATION, UNSPECIFIED TYPE: ICD-10-CM

## 2024-08-06 DIAGNOSIS — E66.01 SEVERE OBESITY (BMI 35.0-39.9) WITH COMORBIDITY: ICD-10-CM

## 2024-08-06 DIAGNOSIS — Z00.00 WELLNESS EXAMINATION: Primary | ICD-10-CM

## 2024-08-06 DIAGNOSIS — I10 PRIMARY HYPERTENSION: ICD-10-CM

## 2024-08-06 PROCEDURE — 3074F SYST BP LT 130 MM HG: CPT | Mod: CPTII,,, | Performed by: INTERNAL MEDICINE

## 2024-08-06 PROCEDURE — 3288F FALL RISK ASSESSMENT DOCD: CPT | Mod: CPTII,,, | Performed by: INTERNAL MEDICINE

## 2024-08-06 PROCEDURE — 1159F MED LIST DOCD IN RCRD: CPT | Mod: CPTII,,, | Performed by: INTERNAL MEDICINE

## 2024-08-06 PROCEDURE — 99397 PER PM REEVAL EST PAT 65+ YR: CPT | Mod: ,,, | Performed by: INTERNAL MEDICINE

## 2024-08-06 PROCEDURE — 1101F PT FALLS ASSESS-DOCD LE1/YR: CPT | Mod: CPTII,,, | Performed by: INTERNAL MEDICINE

## 2024-08-06 PROCEDURE — 4010F ACE/ARB THERAPY RXD/TAKEN: CPT | Mod: CPTII,,, | Performed by: INTERNAL MEDICINE

## 2024-08-06 PROCEDURE — 3079F DIAST BP 80-89 MM HG: CPT | Mod: CPTII,,, | Performed by: INTERNAL MEDICINE

## 2024-08-06 PROCEDURE — 1126F AMNT PAIN NOTED NONE PRSNT: CPT | Mod: CPTII,,, | Performed by: INTERNAL MEDICINE

## 2024-08-08 ENCOUNTER — TELEPHONE (OUTPATIENT)
Dept: INTERNAL MEDICINE | Facility: CLINIC | Age: 74
End: 2024-08-08
Payer: COMMERCIAL

## 2024-08-09 DIAGNOSIS — R06.00 DYSPNEA, UNSPECIFIED TYPE: ICD-10-CM

## 2024-08-09 DIAGNOSIS — E66.01 SEVERE OBESITY (BMI 35.0-39.9) WITH COMORBIDITY: Primary | ICD-10-CM

## 2024-08-09 RX ORDER — TIRZEPATIDE 2.5 MG/.5ML
2.5 INJECTION, SOLUTION SUBCUTANEOUS
Qty: 4 PEN | Refills: 0 | Status: SHIPPED | OUTPATIENT
Start: 2024-08-09

## 2024-08-16 ENCOUNTER — TELEPHONE (OUTPATIENT)
Dept: INTERNAL MEDICINE | Facility: CLINIC | Age: 74
End: 2024-08-16
Payer: COMMERCIAL

## 2024-08-16 NOTE — TELEPHONE ENCOUNTER
----- Message from Jennifer Dubose sent at 8/16/2024  8:57 AM CDT -----  .Type:  Patient Returning Call    Who Called:pt  Who Left Message for Patient:pt  Does the patient know what this is regarding?:weight loss medication  Would the patient rather a call back or a response via [facility]?   Best Call Back Number:[phone]  Additional Information: Please call back about weight loss medication that should have been called in

## 2024-08-20 ENCOUNTER — CLINICAL SUPPORT (OUTPATIENT)
Dept: INTERNAL MEDICINE | Facility: CLINIC | Age: 74
End: 2024-08-20
Payer: COMMERCIAL

## 2024-08-20 DIAGNOSIS — R79.89 LOW TESTOSTERONE: Primary | ICD-10-CM

## 2024-08-20 PROCEDURE — 96372 THER/PROPH/DIAG INJ SC/IM: CPT | Mod: ,,, | Performed by: INTERNAL MEDICINE

## 2024-08-20 RX ORDER — TESTOSTERONE CYPIONATE 200 MG/ML
200 INJECTION, SOLUTION INTRAMUSCULAR
Status: COMPLETED | OUTPATIENT
Start: 2024-08-20 | End: 2024-08-20

## 2024-08-20 RX ADMIN — TESTOSTERONE CYPIONATE 200 MG: 200 INJECTION, SOLUTION INTRAMUSCULAR at 08:08

## 2024-08-24 DIAGNOSIS — F32.A DEPRESSION, UNSPECIFIED DEPRESSION TYPE: ICD-10-CM

## 2024-08-26 ENCOUNTER — OFFICE VISIT (OUTPATIENT)
Dept: INTERNAL MEDICINE | Facility: CLINIC | Age: 74
End: 2024-08-26
Payer: COMMERCIAL

## 2024-08-26 VITALS
WEIGHT: 224.19 LBS | RESPIRATION RATE: 20 BRPM | HEART RATE: 63 BPM | HEIGHT: 67 IN | BODY MASS INDEX: 35.19 KG/M2 | SYSTOLIC BLOOD PRESSURE: 116 MMHG | OXYGEN SATURATION: 97 % | DIASTOLIC BLOOD PRESSURE: 78 MMHG

## 2024-08-26 DIAGNOSIS — I10 PRIMARY HYPERTENSION: ICD-10-CM

## 2024-08-26 DIAGNOSIS — R06.00 DYSPNEA, UNSPECIFIED TYPE: Primary | ICD-10-CM

## 2024-08-26 DIAGNOSIS — J98.4 RESTRICTIVE LUNG DISEASE: ICD-10-CM

## 2024-08-26 DIAGNOSIS — G47.33 OSA ON CPAP: ICD-10-CM

## 2024-08-26 DIAGNOSIS — I48.91 ATRIAL FIBRILLATION, UNSPECIFIED TYPE: ICD-10-CM

## 2024-08-26 PROCEDURE — 1101F PT FALLS ASSESS-DOCD LE1/YR: CPT | Mod: CPTII,,, | Performed by: INTERNAL MEDICINE

## 2024-08-26 PROCEDURE — 3078F DIAST BP <80 MM HG: CPT | Mod: CPTII,,, | Performed by: INTERNAL MEDICINE

## 2024-08-26 PROCEDURE — 3288F FALL RISK ASSESSMENT DOCD: CPT | Mod: CPTII,,, | Performed by: INTERNAL MEDICINE

## 2024-08-26 PROCEDURE — 4010F ACE/ARB THERAPY RXD/TAKEN: CPT | Mod: CPTII,,, | Performed by: INTERNAL MEDICINE

## 2024-08-26 PROCEDURE — 1159F MED LIST DOCD IN RCRD: CPT | Mod: CPTII,,, | Performed by: INTERNAL MEDICINE

## 2024-08-26 PROCEDURE — 3074F SYST BP LT 130 MM HG: CPT | Mod: CPTII,,, | Performed by: INTERNAL MEDICINE

## 2024-08-26 PROCEDURE — 99214 OFFICE O/P EST MOD 30 MIN: CPT | Mod: ,,, | Performed by: INTERNAL MEDICINE

## 2024-08-26 RX ORDER — SEMAGLUTIDE 0.68 MG/ML
INJECTION, SOLUTION SUBCUTANEOUS
Start: 2024-08-26 | End: 2024-10-21

## 2024-08-26 RX ORDER — DULOXETIN HYDROCHLORIDE 30 MG/1
CAPSULE, DELAYED RELEASE ORAL
Qty: 30 CAPSULE | Refills: 3 | Status: SHIPPED | OUTPATIENT
Start: 2024-08-26

## 2024-08-26 NOTE — PROGRESS NOTES
Patient ID: [id number]      Subjective:     Chief Complaint: Shortness of Breath (Pt states tightness in his abdomen)      John Bullard is a 74 y.o. male.  Is a 74-year-old man in with gradually worsening shortness a breath.  Also what is new is some pain in the upper abdomen like a soreness in the abdominal musculature.  Something like he had been doing sit-ups and got sore.  Also the bulge in the left upper abdomen has worsened a bit.  His symptoms are worse when he sitting up or standing.  It seemed to make his breathing worse.      He has seen his cardiologist who thought the heart was not contributing to his shortness a breath.    Shortness of Breath        Review of Systems   Respiratory:  Positive for shortness of breath.        Outpatient Medications Marked as Taking for the 8/26/24 encounter (Office Visit) with Fabiano Liang MD   Medication Sig Dispense Refill    albuterol (PROVENTIL/VENTOLIN HFA) 90 mcg/actuation inhaler INHALE 2 PUFFS BY MOUTH EVERY 6 HOURS AS NEEDED FOR WHEEZING 18 g 0    ALPRAZolam (XANAX) 0.5 MG tablet Take 1 tablet by mouth twice daily as needed for anxiety 45 tablet 5    calcium citrate-vitamin D3 315-200 mg (CITRACAL+D) 315 mg-5 mcg (200 unit) per tablet Take 1 tablet by mouth once daily.      cinnamon bark 500 mg capsule Take 500 mg by mouth once daily.      docusate sodium (COLACE) 100 MG capsule Take 100 mg by mouth Daily.      DULoxetine (CYMBALTA) 30 MG capsule TAKE 1 CAPSULE BY MOUTH ONCE DAILY(DO NOT CRUSH OR CHEW) 30 capsule 3    ELIQUIS 5 mg Tab Take 1 tablet by mouth twice daily 60 tablet 0    famotidine (PEPCID) 20 MG tablet Take 20 mg by mouth nightly as needed.      fluticasone-umeclidin-vilanter (TRELEGY ELLIPTA) 100-62.5-25 mcg DsDv Inhale 1 puff into the lungs once daily. 1 each 11    furosemide (LASIX) 40 MG tablet Take 1 tablet by mouth once daily 90 tablet 1    gabapentin (NEURONTIN) 100 MG capsule Take 1 capsule (100 mg total) by mouth every evening. 30  "capsule 11    latanoprost 0.005 % ophthalmic solution Place 1 drop into both eyes every evening.      loratadine (CLARITIN) 10 mg tablet Take 10 mg by mouth daily as needed for Allergies.      losartan (COZAAR) 25 MG tablet Take 1 tablet by mouth twice daily 180 tablet 0    melatonin 10 mg Tab Take 10-20 mg by mouth daily as needed (insomnia).      metoprolol succinate (TOPROL-XL) 100 MG 24 hr tablet Take 100 mg by mouth once daily.      montelukast (SINGULAIR) 10 mg tablet Take 1 tablet by mouth once daily 30 tablet 11    NON FORMULARY MEDICATION Take 1 tablet by mouth once daily. Tumeric      omega-3 fatty acids/fish oil (FISH OIL-OMEGA-3 FATTY ACIDS) 300-1,000 mg capsule Take 1 capsule by mouth once daily.      omeprazole (PRILOSEC) 40 MG capsule Take 1 capsule by mouth once daily 30 capsule 6    potassium chloride (KLOR-CON) 8 MEQ TbSR Take 1 tablet (8 mEq total) by mouth once daily. 30 tablet 5    rosuvastatin (CRESTOR) 20 MG tablet TAKE 1 TABLET BY MOUTH ONCE IN THE EVENING      sotaloL (BETAPACE) 80 MG tablet Take 80 mg by mouth 2 (two) times daily.      tamsulosin (FLOMAX) 0.4 mg Cap Take 1 capsule by mouth once daily.      testosterone cypionate (DEPOTESTOTERONE CYPIONATE) 200 mg/mL injection Inject 1 mL (200 mg total) into the muscle every 14 (fourteen) days. 3 mL 5    tirzepatide (MOUNJARO) 2.5 mg/0.5 mL PnIj Inject 2.5 mg into the skin every 7 days. 4 Pen 0    zolpidem (AMBIEN) 5 MG Tab TAKE 1 TABLET BY MOUTH ONCE DAILY AT BEDTIME AS NEEDED FOR INSOMNIA 30 tablet 5       Objective:     /78 (BP Location: Right arm, Patient Position: Sitting, BP Method: Large (Manual))   Pulse 63   Resp 20   Ht 5' 7" (1.702 m)   Wt 101.7 kg (224 lb 3.2 oz)   SpO2 97%   BMI 35.11 kg/m²     Physical Exam  Vitals reviewed.   Constitutional:       Appearance: Normal appearance.   HENT:      Head: Normocephalic and atraumatic.      Mouth/Throat:      Pharynx: Oropharynx is clear.   Eyes:      Pupils: Pupils are " equal, round, and reactive to light.   Cardiovascular:      Rate and Rhythm: Normal rate and regular rhythm.      Pulses: Normal pulses.      Heart sounds: Normal heart sounds.   Pulmonary:      Breath sounds: Normal breath sounds.      Comments: Rales in the right base noted.  Abdominal:      General: Abdomen is flat.      Palpations: Abdomen is soft.      Comments: Marked central obesity noted.  Bulging abdominal wall left upper quadrant seems to be a bit more prominent.  No organomegaly.  No masses.  Nontender.   Musculoskeletal:      Cervical back: Neck supple.   Skin:     General: Skin is warm and dry.   Neurological:      Mental Status: He is alert.     Prior CT scans reviewed.    Assessment:     1. Persistent dyspnea.  Related to obesity in diaphragm malfunction.    2. Marked central obesity.  The abdominal musculature as stretching and becoming painful.  Doubt but consider intra-abdominal pathology.      3. Paroxysmal atrial fib     4. Combined systolic and diastolic dysfunction.  Currently stable.      5. Hypertension    6. Obstructive sleep apnea on CPAP stable    Plan:   Check D-dimer and BNP.  If D-dimer negative proceed with CT scan abdomen with contrast.  In the meantime start Ozempic 0.25 mg subQ weekly x4, and then progressed to 0.5 mg weekly thereafter.  Samples will be provided as able.  Follow-up with me 3 months.  Problem List Items Addressed This Visit          Pulmonary    Restrictive lung disease       Cardiac/Vascular    Hypertension    Atrial fibrillation       Other    MINDA on CPAP     Other Visit Diagnoses       Dyspnea, unspecified type    -  Primary    Relevant Orders    D-Dimer, Quantitative    Basic Metabolic Panel             Orders Placed This Encounter   Procedures    D-Dimer, Quantitative     Standing Status:   Future     Standing Expiration Date:   11/24/2025    Basic Metabolic Panel     Standing Status:   Future     Standing Expiration Date:   11/24/2025     Order Specific  Question:   Send normal result to authorizing provider's In Basket if patient is active on MyChart:     Answer:   Yes        Medication List with Changes/Refills   New Medications    SEMAGLUTIDE (OZEMPIC) 0.25 MG OR 0.5 MG (2 MG/3 ML) PEN INJECTOR    Inject 0.25 mg into the skin every 7 days for 28 days, THEN 0.5 mg every 7 days.       Start Date: 8/26/2024 End Date: 10/21/2024   Current Medications    ALBUTEROL (PROVENTIL/VENTOLIN HFA) 90 MCG/ACTUATION INHALER    INHALE 2 PUFFS BY MOUTH EVERY 6 HOURS AS NEEDED FOR WHEEZING       Start Date: 7/31/2024 End Date: --    ALPRAZOLAM (XANAX) 0.5 MG TABLET    Take 1 tablet by mouth twice daily as needed for anxiety       Start Date: 5/31/2024 End Date: --    CALCIUM CITRATE-VITAMIN D3 315-200 MG (CITRACAL+D) 315 MG-5 MCG (200 UNIT) PER TABLET    Take 1 tablet by mouth once daily.       Start Date: --        End Date: --    CINNAMON BARK 500 MG CAPSULE    Take 500 mg by mouth once daily.       Start Date: --        End Date: --    DOCUSATE SODIUM (COLACE) 100 MG CAPSULE    Take 100 mg by mouth Daily.       Start Date: --        End Date: --    DULOXETINE (CYMBALTA) 30 MG CAPSULE    TAKE 1 CAPSULE BY MOUTH ONCE DAILY(DO NOT CRUSH OR CHEW)       Start Date: 8/26/2024 End Date: --    ELIQUIS 5 MG TAB    Take 1 tablet by mouth twice daily       Start Date: 6/17/2024 End Date: --    FAMOTIDINE (PEPCID) 20 MG TABLET    Take 20 mg by mouth nightly as needed.       Start Date: 2/1/2024  End Date: --    FLUTICASONE-UMECLIDIN-VILANTER (TRELEGY ELLIPTA) 100-62.5-25 MCG DSDV    Inhale 1 puff into the lungs once daily.       Start Date: 12/18/2023End Date: --    FUROSEMIDE (LASIX) 40 MG TABLET    Take 1 tablet by mouth once daily       Start Date: 3/11/2024 End Date: --    GABAPENTIN (NEURONTIN) 100 MG CAPSULE    Take 1 capsule (100 mg total) by mouth every evening.       Start Date: 4/22/2024 End Date: 4/22/2025    LATANOPROST 0.005 % OPHTHALMIC SOLUTION    Place 1 drop into both eyes  every evening.       Start Date: 1/15/2024 End Date: --    LORATADINE (CLARITIN) 10 MG TABLET    Take 10 mg by mouth daily as needed for Allergies.       Start Date: --        End Date: --    LOSARTAN (COZAAR) 25 MG TABLET    Take 1 tablet by mouth twice daily       Start Date: 6/24/2024 End Date: --    MELATONIN 10 MG TAB    Take 10-20 mg by mouth daily as needed (insomnia).       Start Date: --        End Date: --    METOPROLOL SUCCINATE (TOPROL-XL) 100 MG 24 HR TABLET    Take 100 mg by mouth once daily.       Start Date: 5/8/2023  End Date: --    MONTELUKAST (SINGULAIR) 10 MG TABLET    Take 1 tablet by mouth once daily       Start Date: 5/30/2024 End Date: --    NON FORMULARY MEDICATION    Take 1 tablet by mouth once daily. Tumeric       Start Date: --        End Date: --    OMEGA-3 FATTY ACIDS/FISH OIL (FISH OIL-OMEGA-3 FATTY ACIDS) 300-1,000 MG CAPSULE    Take 1 capsule by mouth once daily.       Start Date: --        End Date: --    OMEPRAZOLE (PRILOSEC) 40 MG CAPSULE    Take 1 capsule by mouth once daily       Start Date: 7/25/2024 End Date: --    POTASSIUM CHLORIDE (KLOR-CON) 8 MEQ TBSR    Take 1 tablet (8 mEq total) by mouth once daily.       Start Date: 1/9/2024  End Date: --    ROSUVASTATIN (CRESTOR) 20 MG TABLET    TAKE 1 TABLET BY MOUTH ONCE IN THE EVENING       Start Date: 3/14/2022 End Date: --    SOTALOL (BETAPACE) 80 MG TABLET    Take 80 mg by mouth 2 (two) times daily.       Start Date: 4/4/2023  End Date: --    TAMSULOSIN (FLOMAX) 0.4 MG CAP    Take 1 capsule by mouth once daily.       Start Date: 3/18/2022 End Date: --    TESTOSTERONE CYPIONATE (DEPOTESTOTERONE CYPIONATE) 200 MG/ML INJECTION    Inject 1 mL (200 mg total) into the muscle every 14 (fourteen) days.       Start Date: 7/9/2024  End Date: --    TIRZEPATIDE (MOUNJARO) 2.5 MG/0.5 ML PNIJ    Inject 2.5 mg into the skin every 7 days.       Start Date: 8/9/2024  End Date: --    ZOLPIDEM (AMBIEN) 5 MG TAB    TAKE 1 TABLET BY MOUTH ONCE DAILY  AT BEDTIME AS NEEDED FOR INSOMNIA       Start Date: 7/22/2024 End Date: --            Follow up in about 3 months (around 11/26/2024). In addition to their scheduled follow up, the patient has also been instructed to follow up on as needed basis.       Fabiano Liang

## 2024-08-27 ENCOUNTER — LAB VISIT (OUTPATIENT)
Dept: LAB | Facility: HOSPITAL | Age: 74
End: 2024-08-27
Attending: INTERNAL MEDICINE
Payer: COMMERCIAL

## 2024-08-27 DIAGNOSIS — R06.00 DYSPNEA, UNSPECIFIED TYPE: ICD-10-CM

## 2024-08-27 LAB
ANION GAP SERPL CALC-SCNC: 8 MEQ/L
BUN SERPL-MCNC: 16.8 MG/DL (ref 8.4–25.7)
CALCIUM SERPL-MCNC: 9.4 MG/DL (ref 8.8–10)
CHLORIDE SERPL-SCNC: 104 MMOL/L (ref 98–107)
CO2 SERPL-SCNC: 29 MMOL/L (ref 23–31)
CREAT SERPL-MCNC: 0.94 MG/DL (ref 0.73–1.18)
CREAT/UREA NIT SERPL: 18
D DIMER PPP IA.FEU-MCNC: <0.27 UG/ML FEU (ref 0–0.5)
GFR SERPLBLD CREATININE-BSD FMLA CKD-EPI: >60 ML/MIN/1.73/M2
GLUCOSE SERPL-MCNC: 89 MG/DL (ref 82–115)
POTASSIUM SERPL-SCNC: 4.2 MMOL/L (ref 3.5–5.1)
SODIUM SERPL-SCNC: 141 MMOL/L (ref 136–145)

## 2024-08-27 PROCEDURE — 36415 COLL VENOUS BLD VENIPUNCTURE: CPT

## 2024-08-27 PROCEDURE — 80048 BASIC METABOLIC PNL TOTAL CA: CPT

## 2024-08-27 PROCEDURE — 85379 FIBRIN DEGRADATION QUANT: CPT

## 2024-08-28 ENCOUNTER — TELEPHONE (OUTPATIENT)
Dept: INTERNAL MEDICINE | Facility: CLINIC | Age: 74
End: 2024-08-28
Payer: COMMERCIAL

## 2024-08-28 DIAGNOSIS — R10.9 ABDOMINAL PAIN, UNSPECIFIED ABDOMINAL LOCATION: Primary | ICD-10-CM

## 2024-08-28 DIAGNOSIS — R60.9 SWELLING: ICD-10-CM

## 2024-08-28 NOTE — TELEPHONE ENCOUNTER
----- Message from Fabiano Liang MD sent at 8/27/2024  6:25 PM CDT -----  Tell him D-dimer and BNP are normal.  Please proceed with CT scan abdomen pelvis with IV contrast, okay for oral routine contrast as well.  Diagnosis abdominal pain and swelling  ----- Message -----  From: Lab, Background User  Sent: 8/27/2024   9:20 AM CDT  To: Fabiano Liang MD

## 2024-09-03 ENCOUNTER — CLINICAL SUPPORT (OUTPATIENT)
Dept: INTERNAL MEDICINE | Facility: CLINIC | Age: 74
End: 2024-09-03
Payer: COMMERCIAL

## 2024-09-03 DIAGNOSIS — R79.89 LOW TESTOSTERONE: Primary | ICD-10-CM

## 2024-09-03 PROCEDURE — 96372 THER/PROPH/DIAG INJ SC/IM: CPT | Mod: ,,, | Performed by: INTERNAL MEDICINE

## 2024-09-03 RX ORDER — TESTOSTERONE CYPIONATE 200 MG/ML
200 INJECTION, SOLUTION INTRAMUSCULAR
Status: COMPLETED | OUTPATIENT
Start: 2024-09-03 | End: 2024-09-03

## 2024-09-03 RX ADMIN — TESTOSTERONE CYPIONATE 200 MG: 200 INJECTION, SOLUTION INTRAMUSCULAR at 08:09

## 2024-09-04 ENCOUNTER — TELEPHONE (OUTPATIENT)
Dept: INTERNAL MEDICINE | Facility: CLINIC | Age: 74
End: 2024-09-04
Payer: COMMERCIAL

## 2024-09-04 DIAGNOSIS — N28.1 CYST OF RIGHT KIDNEY: Primary | ICD-10-CM

## 2024-09-04 NOTE — TELEPHONE ENCOUNTER
----- Message from Fabiano Liang MD sent at 9/3/2024  5:37 PM CDT -----  Tell him CT scan looks stable except for enlarging cysts in the right kidney, again felt to be benign.  Still actually quite small but we should proceed with a an ultrasound to verify cystic nature of these lesions.  They had been seen in the past but have grown a bit.  These do not explain the increase in abdominal girth or abdominal pain.  ----- Message -----  From: Interface, Rad Results In  Sent: 8/30/2024   2:03 PM CDT  To: Fabiano Liang MD

## 2024-09-06 ENCOUNTER — TELEPHONE (OUTPATIENT)
Dept: INTERNAL MEDICINE | Facility: CLINIC | Age: 74
End: 2024-09-06
Payer: COMMERCIAL

## 2024-09-06 NOTE — TELEPHONE ENCOUNTER
----- Message from Fabiano Liang MD sent at 9/5/2024  5:25 PM CDT -----  Tell him ultrasound confirms benign cyst in the kidneys.  ----- Message -----  From: Interface, Rad Results In  Sent: 9/5/2024  10:07 AM CDT  To: Fabiano Liang MD

## 2024-09-09 ENCOUNTER — OFFICE VISIT (OUTPATIENT)
Dept: INTERNAL MEDICINE | Facility: CLINIC | Age: 74
End: 2024-09-09
Payer: COMMERCIAL

## 2024-09-09 VITALS
DIASTOLIC BLOOD PRESSURE: 72 MMHG | SYSTOLIC BLOOD PRESSURE: 118 MMHG | BODY MASS INDEX: 35.5 KG/M2 | WEIGHT: 226.19 LBS | RESPIRATION RATE: 18 BRPM | HEART RATE: 84 BPM | OXYGEN SATURATION: 93 % | HEIGHT: 67 IN

## 2024-09-09 DIAGNOSIS — R06.00 DYSPNEA, UNSPECIFIED TYPE: ICD-10-CM

## 2024-09-09 DIAGNOSIS — R10.9 ABDOMINAL PAIN, UNSPECIFIED ABDOMINAL LOCATION: ICD-10-CM

## 2024-09-09 DIAGNOSIS — N28.1 CYST OF RIGHT KIDNEY: Primary | ICD-10-CM

## 2024-09-09 PROCEDURE — 3074F SYST BP LT 130 MM HG: CPT | Mod: CPTII,,, | Performed by: INTERNAL MEDICINE

## 2024-09-09 PROCEDURE — 3078F DIAST BP <80 MM HG: CPT | Mod: CPTII,,, | Performed by: INTERNAL MEDICINE

## 2024-09-09 PROCEDURE — 1126F AMNT PAIN NOTED NONE PRSNT: CPT | Mod: CPTII,,, | Performed by: INTERNAL MEDICINE

## 2024-09-09 PROCEDURE — 3288F FALL RISK ASSESSMENT DOCD: CPT | Mod: CPTII,,, | Performed by: INTERNAL MEDICINE

## 2024-09-09 PROCEDURE — 99213 OFFICE O/P EST LOW 20 MIN: CPT | Mod: ,,, | Performed by: INTERNAL MEDICINE

## 2024-09-09 PROCEDURE — 1101F PT FALLS ASSESS-DOCD LE1/YR: CPT | Mod: CPTII,,, | Performed by: INTERNAL MEDICINE

## 2024-09-09 PROCEDURE — 4010F ACE/ARB THERAPY RXD/TAKEN: CPT | Mod: CPTII,,, | Performed by: INTERNAL MEDICINE

## 2024-09-09 PROCEDURE — 1159F MED LIST DOCD IN RCRD: CPT | Mod: CPTII,,, | Performed by: INTERNAL MEDICINE

## 2024-09-09 NOTE — PROGRESS NOTES
Patient ID: [id number]      Subjective:     Chief Complaint: Follow-up      John Bullard is a 74 y.o. male.  Is a 74-year-old man who comes in with persistent shortness a breath but also a new vague abdominal pain.  He was started off as a pain in the upper abdomen when he coughed and then became more severe.  Worse when ambulating and better when he lays down.  He also want to review the scans which showed evidence of increase visceral fat in an asymptomatic gallstone as well as a couple of renal cysts.  Ultrasound confirms.  No ascites seen.    Follow-up        Review of Systems    Outpatient Medications Marked as Taking for the 9/9/24 encounter (Office Visit) with Fabiano Liang MD   Medication Sig Dispense Refill    albuterol (PROVENTIL/VENTOLIN HFA) 90 mcg/actuation inhaler INHALE 2 PUFFS BY MOUTH EVERY 6 HOURS AS NEEDED FOR WHEEZING 18 g 0    ALPRAZolam (XANAX) 0.5 MG tablet Take 1 tablet by mouth twice daily as needed for anxiety 45 tablet 5    calcium citrate-vitamin D3 315-200 mg (CITRACAL+D) 315 mg-5 mcg (200 unit) per tablet Take 1 tablet by mouth once daily.      cinnamon bark 500 mg capsule Take 500 mg by mouth once daily.      docusate sodium (COLACE) 100 MG capsule Take 100 mg by mouth Daily.      DULoxetine (CYMBALTA) 30 MG capsule TAKE 1 CAPSULE BY MOUTH ONCE DAILY(DO NOT CRUSH OR CHEW) 30 capsule 3    ELIQUIS 5 mg Tab Take 1 tablet by mouth twice daily 60 tablet 0    famotidine (PEPCID) 20 MG tablet Take 20 mg by mouth nightly as needed.      fluticasone-umeclidin-vilanter (TRELEGY ELLIPTA) 100-62.5-25 mcg DsDv Inhale 1 puff into the lungs once daily. 1 each 11    furosemide (LASIX) 40 MG tablet Take 1 tablet by mouth once daily 90 tablet 1    gabapentin (NEURONTIN) 100 MG capsule Take 1 capsule (100 mg total) by mouth every evening. 30 capsule 11    latanoprost 0.005 % ophthalmic solution Place 1 drop into both eyes every evening.      loratadine (CLARITIN) 10 mg tablet Take 10 mg by mouth  "daily as needed for Allergies.      losartan (COZAAR) 25 MG tablet Take 1 tablet by mouth twice daily 180 tablet 0    melatonin 10 mg Tab Take 10-20 mg by mouth daily as needed (insomnia).      metoprolol succinate (TOPROL-XL) 100 MG 24 hr tablet Take 100 mg by mouth once daily.      montelukast (SINGULAIR) 10 mg tablet Take 1 tablet by mouth once daily 30 tablet 11    NON FORMULARY MEDICATION Take 1 tablet by mouth once daily. Tumeric      omega-3 fatty acids/fish oil (FISH OIL-OMEGA-3 FATTY ACIDS) 300-1,000 mg capsule Take 1 capsule by mouth once daily.      omeprazole (PRILOSEC) 40 MG capsule Take 1 capsule by mouth once daily 30 capsule 6    potassium chloride (KLOR-CON) 8 MEQ TbSR Take 1 tablet (8 mEq total) by mouth once daily. 30 tablet 5    rosuvastatin (CRESTOR) 20 MG tablet TAKE 1 TABLET BY MOUTH ONCE IN THE EVENING      semaglutide (OZEMPIC) 0.25 mg or 0.5 mg (2 mg/3 mL) pen injector Inject 0.25 mg into the skin every 7 days for 28 days, THEN 0.5 mg every 7 days.      sotaloL (BETAPACE) 80 MG tablet Take 80 mg by mouth 2 (two) times daily.      tamsulosin (FLOMAX) 0.4 mg Cap Take 1 capsule by mouth once daily.      testosterone cypionate (DEPOTESTOTERONE CYPIONATE) 200 mg/mL injection Inject 1 mL (200 mg total) into the muscle every 14 (fourteen) days. 3 mL 5    tirzepatide (MOUNJARO) 2.5 mg/0.5 mL PnIj Inject 2.5 mg into the skin every 7 days. 4 Pen 0    zolpidem (AMBIEN) 5 MG Tab TAKE 1 TABLET BY MOUTH ONCE DAILY AT BEDTIME AS NEEDED FOR INSOMNIA 30 tablet 5       Objective:     /72 (BP Location: Right arm, Patient Position: Sitting, BP Method: Large (Manual))   Pulse 84   Resp 18   Ht 5' 7" (1.702 m)   Wt 102.6 kg (226 lb 3.2 oz)   SpO2 (!) 93%   BMI 35.43 kg/m²     Physical Exam  Vitals reviewed.   Constitutional:       Appearance: Normal appearance.   HENT:      Head: Normocephalic and atraumatic.      Mouth/Throat:      Pharynx: Oropharynx is clear.   Eyes:      Pupils: Pupils are equal, " round, and reactive to light.   Cardiovascular:      Rate and Rhythm: Normal rate and regular rhythm.      Pulses: Normal pulses.      Heart sounds: Normal heart sounds.   Pulmonary:      Breath sounds: Normal breath sounds.   Abdominal:      General: Abdomen is flat.      Palpations: Abdomen is soft.   Musculoskeletal:      Cervical back: Neck supple.   Skin:     General: Skin is warm and dry.   Neurological:      Mental Status: He is alert.       CT scans reviewed.  Including images with the patient.  Assessment:   1. Abdominal pain.  Likely due to abdominal wall stretching and discomfort     2. Shortness a breath due to increased abdominal girth.      3. Asymptomatic gallstone    4. Controlled hypertension     5. Stable systolic/diastolic dysfunction.    Plan:   Continue Ozempic.  Follow-up with me as scheduled.  Problem List Items Addressed This Visit    None       No orders of the defined types were placed in this encounter.       Medication List with Changes/Refills   Current Medications    ALBUTEROL (PROVENTIL/VENTOLIN HFA) 90 MCG/ACTUATION INHALER    INHALE 2 PUFFS BY MOUTH EVERY 6 HOURS AS NEEDED FOR WHEEZING       Start Date: 7/31/2024 End Date: --    ALPRAZOLAM (XANAX) 0.5 MG TABLET    Take 1 tablet by mouth twice daily as needed for anxiety       Start Date: 5/31/2024 End Date: --    CALCIUM CITRATE-VITAMIN D3 315-200 MG (CITRACAL+D) 315 MG-5 MCG (200 UNIT) PER TABLET    Take 1 tablet by mouth once daily.       Start Date: --        End Date: --    CINNAMON BARK 500 MG CAPSULE    Take 500 mg by mouth once daily.       Start Date: --        End Date: --    DOCUSATE SODIUM (COLACE) 100 MG CAPSULE    Take 100 mg by mouth Daily.       Start Date: --        End Date: --    DULOXETINE (CYMBALTA) 30 MG CAPSULE    TAKE 1 CAPSULE BY MOUTH ONCE DAILY(DO NOT CRUSH OR CHEW)       Start Date: 8/26/2024 End Date: --    ELIQUIS 5 MG TAB    Take 1 tablet by mouth twice daily       Start Date: 6/17/2024 End Date: --     FAMOTIDINE (PEPCID) 20 MG TABLET    Take 20 mg by mouth nightly as needed.       Start Date: 2/1/2024  End Date: --    FLUTICASONE-UMECLIDIN-VILANTER (TRELEGY ELLIPTA) 100-62.5-25 MCG DSDV    Inhale 1 puff into the lungs once daily.       Start Date: 12/18/2023End Date: --    FUROSEMIDE (LASIX) 40 MG TABLET    Take 1 tablet by mouth once daily       Start Date: 3/11/2024 End Date: --    GABAPENTIN (NEURONTIN) 100 MG CAPSULE    Take 1 capsule (100 mg total) by mouth every evening.       Start Date: 4/22/2024 End Date: 4/22/2025    LATANOPROST 0.005 % OPHTHALMIC SOLUTION    Place 1 drop into both eyes every evening.       Start Date: 1/15/2024 End Date: --    LORATADINE (CLARITIN) 10 MG TABLET    Take 10 mg by mouth daily as needed for Allergies.       Start Date: --        End Date: --    LOSARTAN (COZAAR) 25 MG TABLET    Take 1 tablet by mouth twice daily       Start Date: 6/24/2024 End Date: --    MELATONIN 10 MG TAB    Take 10-20 mg by mouth daily as needed (insomnia).       Start Date: --        End Date: --    METOPROLOL SUCCINATE (TOPROL-XL) 100 MG 24 HR TABLET    Take 100 mg by mouth once daily.       Start Date: 5/8/2023  End Date: --    MONTELUKAST (SINGULAIR) 10 MG TABLET    Take 1 tablet by mouth once daily       Start Date: 5/30/2024 End Date: --    NON FORMULARY MEDICATION    Take 1 tablet by mouth once daily. Tumeric       Start Date: --        End Date: --    OMEGA-3 FATTY ACIDS/FISH OIL (FISH OIL-OMEGA-3 FATTY ACIDS) 300-1,000 MG CAPSULE    Take 1 capsule by mouth once daily.       Start Date: --        End Date: --    OMEPRAZOLE (PRILOSEC) 40 MG CAPSULE    Take 1 capsule by mouth once daily       Start Date: 7/25/2024 End Date: --    POTASSIUM CHLORIDE (KLOR-CON) 8 MEQ TBSR    Take 1 tablet (8 mEq total) by mouth once daily.       Start Date: 1/9/2024  End Date: --    ROSUVASTATIN (CRESTOR) 20 MG TABLET    TAKE 1 TABLET BY MOUTH ONCE IN THE EVENING       Start Date: 3/14/2022 End Date: --     SEMAGLUTIDE (OZEMPIC) 0.25 MG OR 0.5 MG (2 MG/3 ML) PEN INJECTOR    Inject 0.25 mg into the skin every 7 days for 28 days, THEN 0.5 mg every 7 days.       Start Date: 8/26/2024 End Date: 10/21/2024    SOTALOL (BETAPACE) 80 MG TABLET    Take 80 mg by mouth 2 (two) times daily.       Start Date: 4/4/2023  End Date: --    TAMSULOSIN (FLOMAX) 0.4 MG CAP    Take 1 capsule by mouth once daily.       Start Date: 3/18/2022 End Date: --    TESTOSTERONE CYPIONATE (DEPOTESTOTERONE CYPIONATE) 200 MG/ML INJECTION    Inject 1 mL (200 mg total) into the muscle every 14 (fourteen) days.       Start Date: 7/9/2024  End Date: --    TIRZEPATIDE (MOUNJARO) 2.5 MG/0.5 ML PNIJ    Inject 2.5 mg into the skin every 7 days.       Start Date: 8/9/2024  End Date: --    ZOLPIDEM (AMBIEN) 5 MG TAB    TAKE 1 TABLET BY MOUTH ONCE DAILY AT BEDTIME AS NEEDED FOR INSOMNIA       Start Date: 7/22/2024 End Date: --            No follow-ups on file. In addition to their scheduled follow up, the patient has also been instructed to follow up on as needed basis.       Fabiano Liang

## 2024-09-10 ENCOUNTER — TELEPHONE (OUTPATIENT)
Dept: INTERNAL MEDICINE | Facility: CLINIC | Age: 74
End: 2024-09-10
Payer: COMMERCIAL

## 2024-09-10 DIAGNOSIS — R06.02 SOB (SHORTNESS OF BREATH): ICD-10-CM

## 2024-09-10 RX ORDER — ALBUTEROL SULFATE 90 UG/1
2 INHALANT RESPIRATORY (INHALATION) EVERY 6 HOURS PRN
Qty: 18 G | Refills: 0 | Status: SHIPPED | OUTPATIENT
Start: 2024-09-10

## 2024-09-10 NOTE — TELEPHONE ENCOUNTER
----- Message from Onofre James sent at 9/10/2024  9:31 AM CDT -----  .Who Called: John Bullard    Refill or New Rx:Refill    RX Name and Strength: albuterol (PROVENTIL/VENTOLIN HFA) 90 mcg/actuation inhaler    How is the patient currently taking it? (ex. 1XDay): Sig - Route: INHALE 2 PUFFS BY MOUTH EVERY 6 HOURS AS NEEDED FOR WHEEZING - Inhalation    Is this a 30 day or 90 day RX: na/    Local or Mail Order: Local    List of preferred pharmacies on file (remove unneeded):   [facility] [address] [facility] [address]   Phone: [phone]  Fax: [phone]    Ordering Provider: Dr. Liang    Preferred Method of Contact: Phone Call    Patient's Preferred Phone Number on File: [phone]     Best Call Back Number, if different:  Additional Information:  Pharmacy called requesting refill request. Please advise, thank you.

## 2024-09-16 DIAGNOSIS — Z23 NEED FOR INFLUENZA VACCINATION: Primary | ICD-10-CM

## 2024-09-17 ENCOUNTER — CLINICAL SUPPORT (OUTPATIENT)
Dept: INTERNAL MEDICINE | Facility: CLINIC | Age: 74
End: 2024-09-17
Payer: COMMERCIAL

## 2024-09-17 DIAGNOSIS — R79.89 LOW TESTOSTERONE: Primary | ICD-10-CM

## 2024-09-17 PROCEDURE — 96372 THER/PROPH/DIAG INJ SC/IM: CPT | Mod: ,,, | Performed by: INTERNAL MEDICINE

## 2024-09-17 RX ORDER — TESTOSTERONE CYPIONATE 200 MG/ML
200 INJECTION, SOLUTION INTRAMUSCULAR
Status: COMPLETED | OUTPATIENT
Start: 2024-09-17 | End: 2024-09-17

## 2024-09-17 RX ADMIN — TESTOSTERONE CYPIONATE 200 MG: 200 INJECTION, SOLUTION INTRAMUSCULAR at 08:09

## 2024-09-30 DIAGNOSIS — R06.02 SOB (SHORTNESS OF BREATH): ICD-10-CM

## 2024-09-30 RX ORDER — ALBUTEROL SULFATE 90 UG/1
2 INHALANT RESPIRATORY (INHALATION) EVERY 6 HOURS PRN
Qty: 18 G | Refills: 0 | Status: SHIPPED | OUTPATIENT
Start: 2024-09-30

## 2024-10-01 ENCOUNTER — CLINICAL SUPPORT (OUTPATIENT)
Dept: INTERNAL MEDICINE | Facility: CLINIC | Age: 74
End: 2024-10-01
Payer: COMMERCIAL

## 2024-10-01 DIAGNOSIS — R79.89 LOW TESTOSTERONE: Primary | ICD-10-CM

## 2024-10-01 PROCEDURE — 96372 THER/PROPH/DIAG INJ SC/IM: CPT | Mod: ,,, | Performed by: INTERNAL MEDICINE

## 2024-10-01 RX ORDER — TESTOSTERONE CYPIONATE 200 MG/ML
200 INJECTION, SOLUTION INTRAMUSCULAR
Status: COMPLETED | OUTPATIENT
Start: 2024-10-01 | End: 2024-10-01

## 2024-10-01 RX ADMIN — TESTOSTERONE CYPIONATE 200 MG: 200 INJECTION, SOLUTION INTRAMUSCULAR at 08:10

## 2024-10-14 ENCOUNTER — OFFICE VISIT (OUTPATIENT)
Dept: INTERNAL MEDICINE | Facility: CLINIC | Age: 74
End: 2024-10-14
Payer: COMMERCIAL

## 2024-10-14 VITALS
BODY MASS INDEX: 34.84 KG/M2 | OXYGEN SATURATION: 95 % | TEMPERATURE: 98 F | RESPIRATION RATE: 16 BRPM | SYSTOLIC BLOOD PRESSURE: 124 MMHG | HEART RATE: 76 BPM | HEIGHT: 67 IN | DIASTOLIC BLOOD PRESSURE: 64 MMHG | WEIGHT: 222 LBS

## 2024-10-14 DIAGNOSIS — R10.84 GENERALIZED ABDOMINAL PAIN: Primary | ICD-10-CM

## 2024-10-14 DIAGNOSIS — N28.1 CYST OF RIGHT KIDNEY: ICD-10-CM

## 2024-10-14 PROBLEM — J98.6 PARALYSIS OF DIAPHRAGM: Status: ACTIVE | Noted: 2023-08-09

## 2024-10-14 PROBLEM — N20.0 KIDNEY STONES: Status: ACTIVE | Noted: 2024-10-14

## 2024-10-14 PROBLEM — R06.02 SHORTNESS OF BREATH: Status: ACTIVE | Noted: 2023-08-09

## 2024-10-14 PROBLEM — E66.9 OBESITY WITH BODY MASS INDEX 30 OR GREATER: Status: ACTIVE | Noted: 2022-05-11

## 2024-10-14 PROBLEM — J30.9 ALLERGIC RHINITIS: Status: ACTIVE | Noted: 2022-05-11

## 2024-10-14 PROBLEM — F41.9 ANXIETY: Status: ACTIVE | Noted: 2022-05-06

## 2024-10-14 PROBLEM — Z98.890 STATUS POST REPAIR OF PARAESOPHAGEAL DIAPHRAGMATIC HERNIA: Status: ACTIVE | Noted: 2024-10-14

## 2024-10-14 PROBLEM — J45.40 MODERATE PERSISTENT ASTHMA, UNCOMPLICATED: Status: ACTIVE | Noted: 2024-10-14

## 2024-10-14 PROBLEM — K21.9 GASTRO-ESOPHAGEAL REFLUX DISEASE WITHOUT ESOPHAGITIS: Status: ACTIVE | Noted: 2022-05-06

## 2024-10-14 PROBLEM — J44.9 MODERATE CHRONIC OBSTRUCTIVE PULMONARY DISEASE: Status: ACTIVE | Noted: 2023-08-09

## 2024-10-14 PROBLEM — I10 HYPERTENSION: Status: ACTIVE | Noted: 2024-10-14

## 2024-10-14 PROBLEM — Z87.19 STATUS POST REPAIR OF PARAESOPHAGEAL DIAPHRAGMATIC HERNIA: Status: ACTIVE | Noted: 2024-10-14

## 2024-10-14 PROBLEM — G47.30 SLEEP APNEA: Status: ACTIVE | Noted: 2022-05-11

## 2024-10-14 PROBLEM — I50.9 CONGESTIVE HEART FAILURE: Status: ACTIVE | Noted: 2023-01-14

## 2024-10-14 PROBLEM — F41.1 GENERALIZED ANXIETY DISORDER: Status: ACTIVE | Noted: 2024-10-14

## 2024-10-14 PROBLEM — I48.0 PAROXYSMAL ATRIAL FIBRILLATION: Status: ACTIVE | Noted: 2023-08-09

## 2024-10-14 PROBLEM — H40.9 GLAUCOMA: Status: ACTIVE | Noted: 2024-10-14

## 2024-10-14 PROBLEM — G47.00 INSOMNIA: Status: ACTIVE | Noted: 2022-05-06

## 2024-10-14 PROCEDURE — 3288F FALL RISK ASSESSMENT DOCD: CPT | Mod: CPTII,,, | Performed by: NURSE PRACTITIONER

## 2024-10-14 PROCEDURE — 4010F ACE/ARB THERAPY RXD/TAKEN: CPT | Mod: CPTII,,, | Performed by: NURSE PRACTITIONER

## 2024-10-14 PROCEDURE — 3078F DIAST BP <80 MM HG: CPT | Mod: CPTII,,, | Performed by: NURSE PRACTITIONER

## 2024-10-14 PROCEDURE — 1101F PT FALLS ASSESS-DOCD LE1/YR: CPT | Mod: CPTII,,, | Performed by: NURSE PRACTITIONER

## 2024-10-14 PROCEDURE — 3074F SYST BP LT 130 MM HG: CPT | Mod: CPTII,,, | Performed by: NURSE PRACTITIONER

## 2024-10-14 PROCEDURE — 1160F RVW MEDS BY RX/DR IN RCRD: CPT | Mod: CPTII,,, | Performed by: NURSE PRACTITIONER

## 2024-10-14 PROCEDURE — 1159F MED LIST DOCD IN RCRD: CPT | Mod: CPTII,,, | Performed by: NURSE PRACTITIONER

## 2024-10-14 PROCEDURE — 99214 OFFICE O/P EST MOD 30 MIN: CPT | Mod: ,,, | Performed by: NURSE PRACTITIONER

## 2024-10-14 PROCEDURE — 1126F AMNT PAIN NOTED NONE PRSNT: CPT | Mod: CPTII,,, | Performed by: NURSE PRACTITIONER

## 2024-10-14 NOTE — PROGRESS NOTES
"Subjective:      Patient ID: John Bullard is a 74 y.o. male.    Chief Complaint: Abdominal Pain (1 month, comes and goes)      HPI: Patient here today for complaints of intermittent, generalized abdominal discomfort x 1 month. Denies NVD, darkened/bloody stool. Some dec in appetite lately. He has attempted Voltaren and CBD cream with some relief. Rates pain 3-4/10 at its worse. He did have a CT abdomen/pelvis at that end of Aug, which I reviewed and is noted to be stable.  Also MRI abdomen done in 2020 and stable. Incidental R renal cyst noted without further recommendation for follow up and unrelated to ongoing abdominal issues. He has also seen GI with recent upper scope.     Review of patient's allergies indicates:   Allergen Reactions    Ativan [lorazepam] Hallucinations       Review of Systems  Constitutional: No fever, No chills, No sweats, No fatigue, No weight loss.  Ear/Nose/Mouth/Throat: No nasal congestion, No vertigo.  Respiratory: chronic shortness of breath, No cough, No sputum production, No wheezing, No exertional dyspnea.   Cardiovascular: No chest pain, No palpitations, No claudication, No orthopnea, No peripheral edema.  Gastrointestinal: No nausea, No vomiting, No diarrhea, No rectal bleeding, No constipation, generalized abdominal pain.  Genitourinary: No dysuria, No hematuria, No frequency.  Musculoskeletal: No joint pain, No muscle pain.  Integumentary: No rash, No ecchymosis.    Objective:   Visit Vitals  /64   Pulse 76   Temp 97.6 °F (36.4 °C) (Temporal)   Resp 16   Ht 5' 7" (1.702 m)   Wt 100.7 kg (222 lb)   SpO2 95%   BMI 34.77 kg/m²     The patient's weight trend is below:   Wt Readings from Last 4 Encounters:   10/14/24 100.7 kg (222 lb)   10/07/24 101.2 kg (223 lb)   09/09/24 102.6 kg (226 lb 3.2 oz)   08/26/24 101.7 kg (224 lb 3.2 oz)        Physical Exam  Vitals and nursing note reviewed.   Constitutional:       General: He is not in acute distress.     Appearance: Normal " appearance. He is obese. He is not ill-appearing, toxic-appearing or diaphoretic.   HENT:      Head: Normocephalic and atraumatic.   Cardiovascular:      Rate and Rhythm: Normal rate and regular rhythm.      Heart sounds: Normal heart sounds.   Pulmonary:      Effort: Pulmonary effort is normal.      Breath sounds: Normal breath sounds.   Abdominal:      General: Abdomen is protuberant. Bowel sounds are normal. There is no distension.      Palpations: Abdomen is soft. There is no mass.      Tenderness: There is no abdominal tenderness. There is no guarding or rebound.      Hernia: No hernia is present.   Skin:     General: Skin is warm and dry.   Neurological:      General: No focal deficit present.      Mental Status: He is alert and oriented to person, place, and time. Mental status is at baseline.   Psychiatric:         Mood and Affect: Mood normal.         Behavior: Behavior normal.         Thought Content: Thought content normal.         Judgment: Judgment normal.         Assessment/Plan:   1. Generalized abdominal pain  Reviewed LOV note per Dr. Liang, as well as recent CT and MRI abdomen  Will proceed with US abdomen r/o hernia    - US Abdomen Limited_Hernia; Future    2. Cyst of right kidney  No further recommendations for follow up noted      Medication List with Changes/Refills   Current Medications    ALBUTEROL (PROVENTIL/VENTOLIN HFA) 90 MCG/ACTUATION INHALER    INHALE 2 PUFFS INTO THE LUNGS EVERY 6 HOURS AS NEEDED    ALPRAZOLAM (XANAX) 0.5 MG TABLET    Take 1 tablet by mouth twice daily as needed for anxiety    CALCIUM CITRATE-VITAMIN D3 315-200 MG (CITRACAL+D) 315 MG-5 MCG (200 UNIT) PER TABLET    Take 1 tablet by mouth once daily.    CINNAMON BARK 500 MG CAPSULE    Take 500 mg by mouth once daily.    DOCUSATE SODIUM (COLACE) 100 MG CAPSULE    Take 100 mg by mouth Daily.    DULOXETINE (CYMBALTA) 30 MG CAPSULE    TAKE 1 CAPSULE BY MOUTH ONCE DAILY(DO NOT CRUSH OR CHEW)    ELIQUIS 5 MG TAB    Take 1  tablet by mouth twice daily    FAMOTIDINE (PEPCID) 20 MG TABLET    Take 20 mg by mouth nightly as needed.    FLUTICASONE-UMECLIDIN-VILANTER (TRELEGY ELLIPTA) 100-62.5-25 MCG DSDV    Inhale 1 puff into the lungs once daily.    FUROSEMIDE (LASIX) 40 MG TABLET    Take 1 tablet by mouth once daily    GABAPENTIN (NEURONTIN) 100 MG CAPSULE    Take 1 capsule (100 mg total) by mouth every evening.    LATANOPROST 0.005 % OPHTHALMIC SOLUTION    Place 1 drop into both eyes every evening.    LORATADINE (CLARITIN) 10 MG TABLET    Take 10 mg by mouth daily as needed for Allergies.    LOSARTAN (COZAAR) 25 MG TABLET    Take 1 tablet by mouth twice daily    MELATONIN 10 MG TAB    Take 10-20 mg by mouth daily as needed (insomnia).    METOPROLOL SUCCINATE (TOPROL-XL) 100 MG 24 HR TABLET    Take 100 mg by mouth once daily.    MONTELUKAST (SINGULAIR) 10 MG TABLET    Take 1 tablet by mouth once daily    NON FORMULARY MEDICATION    Take 1 tablet by mouth once daily. Tumeric    OMEGA-3 FATTY ACIDS/FISH OIL (FISH OIL-OMEGA-3 FATTY ACIDS) 300-1,000 MG CAPSULE    Take 1 capsule by mouth once daily.    OMEPRAZOLE (PRILOSEC) 40 MG CAPSULE    Take 1 capsule by mouth once daily    POTASSIUM CHLORIDE (KLOR-CON) 8 MEQ TBSR    Take 1 tablet (8 mEq total) by mouth once daily.    ROSUVASTATIN (CRESTOR) 20 MG TABLET    TAKE 1 TABLET BY MOUTH ONCE IN THE EVENING    SEMAGLUTIDE (OZEMPIC) 0.25 MG OR 0.5 MG (2 MG/3 ML) PEN INJECTOR    Inject 0.25 mg into the skin every 7 days for 28 days, THEN 0.5 mg every 7 days.    SOTALOL (BETAPACE) 80 MG TABLET    Take 80 mg by mouth 2 (two) times daily.    TAMSULOSIN (FLOMAX) 0.4 MG CAP    Take 1 capsule by mouth once daily.    TESTOSTERONE CYPIONATE (DEPOTESTOTERONE CYPIONATE) 200 MG/ML INJECTION    Inject 1 mL (200 mg total) into the muscle every 14 (fourteen) days.    TIRZEPATIDE (MOUNJARO) 2.5 MG/0.5 ML PNIJ    Inject 2.5 mg into the skin every 7 days.    ZOLPIDEM (AMBIEN) 5 MG TAB    TAKE 1 TABLET BY MOUTH ONCE  DAILY AT BEDTIME AS NEEDED FOR INSOMNIA        Follow up if symptoms worsen or fail to improve.    Chemistry:  Lab Results   Component Value Date     08/27/2024    K 4.2 08/27/2024    BUN 16.8 08/27/2024    CREATININE 0.94 08/27/2024    EGFRNORACEVR >60 08/27/2024    GLUCOSE 89 08/27/2024    CALCIUM 9.4 08/27/2024    ALKPHOS 52 08/02/2024    LABPROT 6.0 08/02/2024    ALBUMIN 3.7 08/02/2024    BILIDIR 0.3 04/29/2022    IBILI 0.30 04/29/2022    AST 25 08/02/2024    ALT 21 08/02/2024    MG 2.40 01/21/2023        Lab Results   Component Value Date    HGBA1C 5.0 05/12/2021        Hematology:  Lab Results   Component Value Date    WBC 6.57 08/02/2024    HGB 15.1 08/02/2024    HCT 46.4 08/02/2024     08/02/2024       Lipid Panel:  Lab Results   Component Value Date    CHOL 121 08/02/2024    HDL 41 08/02/2024    LDL 65.00 08/02/2024    TRIG 74 08/02/2024    TOTALCHOLEST 3 08/02/2024        Urine:  Lab Results   Component Value Date    APPEARANCEUA Clear 04/30/2023    SGUA 1.017 04/30/2023    PROTEINUA Negative 04/30/2023    KETONESUA Trace (A) 04/30/2023    LEUKOCYTESUR Trace (A) 04/30/2023    RBCUA <5 04/30/2023    WBCUA <5 04/30/2023    BACTERIA None Seen 04/30/2023        Future Appointments   Date Time Provider Department Center   10/15/2024  8:10 AM [id number] [place]   10/29/2024  8:10 AM [id number] [place]   11/26/2024  2:00 PM Fabiano Liang MD [id number] [place]   2/11/2025 11:00 AM Fabiano Liang MD [facility] [place]   8/12/2025  9:00 AM Fabiano Liang MD [id number]  [place]

## 2024-10-15 ENCOUNTER — CLINICAL SUPPORT (OUTPATIENT)
Dept: INTERNAL MEDICINE | Facility: CLINIC | Age: 74
End: 2024-10-15
Payer: COMMERCIAL

## 2024-10-15 DIAGNOSIS — R79.89 LOW TESTOSTERONE: Primary | ICD-10-CM

## 2024-10-15 PROCEDURE — 96372 THER/PROPH/DIAG INJ SC/IM: CPT | Mod: ,,, | Performed by: INTERNAL MEDICINE

## 2024-10-15 RX ORDER — TESTOSTERONE CYPIONATE 200 MG/ML
200 INJECTION, SOLUTION INTRAMUSCULAR
Status: COMPLETED | OUTPATIENT
Start: 2024-10-15 | End: 2024-10-15

## 2024-10-15 RX ADMIN — TESTOSTERONE CYPIONATE 200 MG: 200 INJECTION, SOLUTION INTRAMUSCULAR at 08:10

## 2024-10-17 ENCOUNTER — TELEPHONE (OUTPATIENT)
Dept: INTERNAL MEDICINE | Facility: CLINIC | Age: 74
End: 2024-10-17
Payer: COMMERCIAL

## 2024-10-17 NOTE — TELEPHONE ENCOUNTER
Spoke with pt. Advised pt US neg for findings of hernia.Recommended bland diet and continued efforts for maintaining a healthy weight.

## 2024-10-17 NOTE — TELEPHONE ENCOUNTER
----- Message from Mary Sumner NP sent at 10/16/2024  2:51 PM CDT -----  Please let pt know that US neg for findings of hernia.  In absence of findings on CT or US, etiology is unclear. I would recommend bland diet and continued efforts for maintaining a healthy weight.

## 2024-10-24 DIAGNOSIS — R06.02 SOB (SHORTNESS OF BREATH): ICD-10-CM

## 2024-10-24 RX ORDER — ALBUTEROL SULFATE 90 UG/1
2 INHALANT RESPIRATORY (INHALATION) EVERY 6 HOURS
Qty: 18 G | Refills: 0 | Status: SHIPPED | OUTPATIENT
Start: 2024-10-24

## 2024-10-29 ENCOUNTER — CLINICAL SUPPORT (OUTPATIENT)
Dept: INTERNAL MEDICINE | Facility: CLINIC | Age: 74
End: 2024-10-29
Payer: COMMERCIAL

## 2024-10-29 DIAGNOSIS — R79.89 LOW TESTOSTERONE: Primary | ICD-10-CM

## 2024-10-29 PROCEDURE — 96372 THER/PROPH/DIAG INJ SC/IM: CPT | Mod: ,,, | Performed by: INTERNAL MEDICINE

## 2024-10-29 RX ORDER — TESTOSTERONE CYPIONATE 200 MG/ML
50 INJECTION, SOLUTION INTRAMUSCULAR
Status: COMPLETED | OUTPATIENT
Start: 2024-10-29 | End: 2024-10-29

## 2024-10-29 RX ADMIN — TESTOSTERONE CYPIONATE 50 MG: 200 INJECTION, SOLUTION INTRAMUSCULAR at 08:10

## 2024-11-12 ENCOUNTER — CLINICAL SUPPORT (OUTPATIENT)
Dept: INTERNAL MEDICINE | Facility: CLINIC | Age: 74
End: 2024-11-12
Payer: COMMERCIAL

## 2024-11-12 DIAGNOSIS — R79.89 LOW TESTOSTERONE: Primary | ICD-10-CM

## 2024-11-12 PROCEDURE — 96372 THER/PROPH/DIAG INJ SC/IM: CPT | Mod: ,,, | Performed by: INTERNAL MEDICINE

## 2024-11-12 RX ORDER — TESTOSTERONE CYPIONATE 200 MG/ML
200 INJECTION, SOLUTION INTRAMUSCULAR
Status: COMPLETED | OUTPATIENT
Start: 2024-11-12 | End: 2024-11-12

## 2024-11-12 RX ADMIN — TESTOSTERONE CYPIONATE 200 MG: 200 INJECTION, SOLUTION INTRAMUSCULAR at 08:11

## 2024-11-19 ENCOUNTER — TELEPHONE (OUTPATIENT)
Dept: INTERNAL MEDICINE | Facility: CLINIC | Age: 74
End: 2024-11-19
Payer: COMMERCIAL

## 2024-11-19 NOTE — TELEPHONE ENCOUNTER
----- Message from Nurse Teodora sent at 11/19/2024 11:46 AM CST -----  Regarding: qiana Leigh 11/26 @2:00  Fasting labs needed.

## 2024-11-26 ENCOUNTER — OFFICE VISIT (OUTPATIENT)
Dept: INTERNAL MEDICINE | Facility: CLINIC | Age: 74
End: 2024-11-26
Payer: COMMERCIAL

## 2024-11-26 VITALS
WEIGHT: 225 LBS | RESPIRATION RATE: 18 BRPM | HEART RATE: 78 BPM | BODY MASS INDEX: 35.31 KG/M2 | OXYGEN SATURATION: 96 % | HEIGHT: 67 IN | SYSTOLIC BLOOD PRESSURE: 119 MMHG | DIASTOLIC BLOOD PRESSURE: 79 MMHG

## 2024-11-26 DIAGNOSIS — R06.00 DYSPNEA, UNSPECIFIED TYPE: ICD-10-CM

## 2024-11-26 DIAGNOSIS — R79.89 LOW TESTOSTERONE: Primary | ICD-10-CM

## 2024-11-26 DIAGNOSIS — R10.84 GENERALIZED ABDOMINAL PAIN: ICD-10-CM

## 2024-11-26 DIAGNOSIS — R06.02 SOB (SHORTNESS OF BREATH): ICD-10-CM

## 2024-11-26 DIAGNOSIS — G47.33 OSA ON CPAP: ICD-10-CM

## 2024-11-26 DIAGNOSIS — J98.4 RESTRICTIVE LUNG DISEASE: ICD-10-CM

## 2024-11-26 DIAGNOSIS — I48.91 ATRIAL FIBRILLATION, UNSPECIFIED TYPE: ICD-10-CM

## 2024-11-26 PROCEDURE — 4010F ACE/ARB THERAPY RXD/TAKEN: CPT | Mod: CPTII,,, | Performed by: INTERNAL MEDICINE

## 2024-11-26 PROCEDURE — 99213 OFFICE O/P EST LOW 20 MIN: CPT | Mod: 25,,, | Performed by: INTERNAL MEDICINE

## 2024-11-26 PROCEDURE — 1126F AMNT PAIN NOTED NONE PRSNT: CPT | Mod: CPTII,,, | Performed by: INTERNAL MEDICINE

## 2024-11-26 PROCEDURE — 96372 THER/PROPH/DIAG INJ SC/IM: CPT | Mod: ,,, | Performed by: INTERNAL MEDICINE

## 2024-11-26 PROCEDURE — 1101F PT FALLS ASSESS-DOCD LE1/YR: CPT | Mod: CPTII,,, | Performed by: INTERNAL MEDICINE

## 2024-11-26 PROCEDURE — 1159F MED LIST DOCD IN RCRD: CPT | Mod: CPTII,,, | Performed by: INTERNAL MEDICINE

## 2024-11-26 PROCEDURE — 3288F FALL RISK ASSESSMENT DOCD: CPT | Mod: CPTII,,, | Performed by: INTERNAL MEDICINE

## 2024-11-26 PROCEDURE — 3074F SYST BP LT 130 MM HG: CPT | Mod: CPTII,,, | Performed by: INTERNAL MEDICINE

## 2024-11-26 PROCEDURE — 3078F DIAST BP <80 MM HG: CPT | Mod: CPTII,,, | Performed by: INTERNAL MEDICINE

## 2024-11-26 RX ORDER — SIMETHICONE 125 MG
125 CAPSULE ORAL 4 TIMES DAILY PRN
Qty: 100 EACH | Refills: 11 | Status: SHIPPED | OUTPATIENT
Start: 2024-11-26

## 2024-11-26 RX ORDER — ALBUTEROL SULFATE 90 UG/1
2 INHALANT RESPIRATORY (INHALATION) EVERY 6 HOURS
Qty: 18 G | Refills: 11 | Status: SHIPPED | OUTPATIENT
Start: 2024-11-26

## 2024-11-26 RX ORDER — TESTOSTERONE CYPIONATE 200 MG/ML
200 INJECTION, SOLUTION INTRAMUSCULAR
Status: COMPLETED | OUTPATIENT
Start: 2024-11-26 | End: 2024-11-26

## 2024-11-26 RX ADMIN — TESTOSTERONE CYPIONATE 200 MG: 200 INJECTION, SOLUTION INTRAMUSCULAR at 02:11

## 2024-11-26 NOTE — PROGRESS NOTES
Patient ID: [id number]      Subjective:     Chief Complaint: Follow-up      John Bullard is a 74 y.o. male.  Is a 74-year-old man in for follow-up of multiple medical problems.  He continues to complain of shortness a breath which he feels it was gradually worsening as well as increasing abdominal bloating.  He feels he was lots of gas.  We did discuss the numerous causes of excess gas including carbonated drinks, drinking out of a straw, sniffles, chewing gum, etc..  Also certain foods to avoid.  We will also try simethicone.    Follow-up        Review of Systems    Outpatient Medications Marked as Taking for the 11/26/24 encounter (Office Visit) with Fabiano Liang MD   Medication Sig Dispense Refill    ALPRAZolam (XANAX) 0.5 MG tablet Take 1 tablet by mouth twice daily as needed for anxiety 45 tablet 5    calcium citrate-vitamin D3 315-200 mg (CITRACAL+D) 315 mg-5 mcg (200 unit) per tablet Take 1 tablet by mouth once daily.      cinnamon bark 500 mg capsule Take 500 mg by mouth once daily.      docusate sodium (COLACE) 100 MG capsule Take 100 mg by mouth Daily.      DULoxetine (CYMBALTA) 30 MG capsule TAKE 1 CAPSULE BY MOUTH ONCE DAILY(DO NOT CRUSH OR CHEW) 30 capsule 3    ELIQUIS 5 mg Tab Take 1 tablet by mouth twice daily 60 tablet 0    famotidine (PEPCID) 20 MG tablet Take 20 mg by mouth nightly as needed.      fluticasone-umeclidin-vilanter (TRELEGY ELLIPTA) 100-62.5-25 mcg DsDv Inhale 1 puff into the lungs once daily. 1 each 11    furosemide (LASIX) 40 MG tablet Take 1 tablet by mouth once daily 90 tablet 1    gabapentin (NEURONTIN) 100 MG capsule Take 1 capsule (100 mg total) by mouth every evening. 30 capsule 11    latanoprost 0.005 % ophthalmic solution Place 1 drop into both eyes every evening.      loratadine (CLARITIN) 10 mg tablet Take 10 mg by mouth daily as needed for Allergies.      losartan (COZAAR) 25 MG tablet Take 1 tablet by mouth twice daily 180 tablet 0    melatonin 10 mg Tab Take  "10-20 mg by mouth daily as needed (insomnia).      metoprolol succinate (TOPROL-XL) 100 MG 24 hr tablet Take 100 mg by mouth once daily.      montelukast (SINGULAIR) 10 mg tablet Take 1 tablet by mouth once daily 30 tablet 11    NON FORMULARY MEDICATION Take 1 tablet by mouth once daily. Tumeric      omega-3 fatty acids/fish oil (FISH OIL-OMEGA-3 FATTY ACIDS) 300-1,000 mg capsule Take 1 capsule by mouth once daily.      omeprazole (PRILOSEC) 40 MG capsule Take 1 capsule by mouth once daily 30 capsule 6    potassium chloride (KLOR-CON) 8 MEQ TbSR Take 1 tablet (8 mEq total) by mouth once daily. 30 tablet 5    rosuvastatin (CRESTOR) 20 MG tablet TAKE 1 TABLET BY MOUTH ONCE IN THE EVENING      sotaloL (BETAPACE) 80 MG tablet Take 80 mg by mouth 2 (two) times daily.      tamsulosin (FLOMAX) 0.4 mg Cap Take 1 capsule by mouth once daily.      tirzepatide (MOUNJARO) 2.5 mg/0.5 mL PnIj Inject 2.5 mg into the skin every 7 days. 4 Pen 0    zolpidem (AMBIEN) 5 MG Tab TAKE 1 TABLET BY MOUTH ONCE DAILY AT BEDTIME AS NEEDED FOR INSOMNIA 30 tablet 5    [DISCONTINUED] albuterol (PROVENTIL/VENTOLIN HFA) 90 mcg/actuation inhaler INHALE 2 PUFFS BY MOUTH EVERY 6 HOURS AS NEEDED 18 g 0    [DISCONTINUED] testosterone cypionate (DEPOTESTOTERONE CYPIONATE) 200 mg/mL injection Inject 1 mL (200 mg total) into the muscle every 14 (fourteen) days. 3 mL 5     Current Facility-Administered Medications for the 11/26/24 encounter (Office Visit) with Fabiano Liang MD   Medication Dose Route Frequency Provider Last Rate Last Admin    [COMPLETED] testosterone cypionate injection 200 mg  200 mg Intramuscular Q14 Days Fabiano Liang MD   200 mg at 11/26/24 1418       Objective:     /79 (BP Location: Right arm, Patient Position: Sitting)   Pulse 78   Resp 18   Ht 5' 7" (1.702 m)   Wt 102.1 kg (225 lb)   SpO2 96%   BMI 35.24 kg/m²     Physical Exam  Vitals reviewed.   Constitutional:       Appearance: Normal appearance. "   HENT:      Head: Normocephalic and atraumatic.      Right Ear: Tympanic membrane normal.      Left Ear: Tympanic membrane normal.      Mouth/Throat:      Pharynx: Oropharynx is clear.   Eyes:      Pupils: Pupils are equal, round, and reactive to light.   Neck:      Vascular: No carotid bruit.   Cardiovascular:      Rate and Rhythm: Normal rate and regular rhythm.      Pulses: Normal pulses.      Heart sounds: Normal heart sounds.   Pulmonary:      Effort: Pulmonary effort is normal.      Breath sounds: Normal breath sounds.   Abdominal:      General: Abdomen is flat.      Palpations: Abdomen is soft. There is no mass.      Tenderness: There is no abdominal tenderness. There is no guarding.      Comments: Obese and nontender.   Musculoskeletal:         General: No swelling.      Cervical back: Neck supple.   Lymphadenopathy:      Cervical: No cervical adenopathy.   Skin:     General: Skin is warm and dry.   Neurological:      General: No focal deficit present.      Mental Status: He is alert and oriented to person, place, and time.     No new lab work to report    Assessment:    1. Dyspnea.  Multifactorial but includes restrictive lung disease from obesity.      2. Obstructive sleep apnea on CPAP.  Could be contributing to excess air swallowing    3. Chronic atrial fibrillation     4.  Low T syndrome     5. Obesity.  He did not tolerate Ozempic nor moon Travis    Plan:   Try simethicone 125 q.i.d..  Follow-up 3 months with CBC CMP lipid TSH BNP prior.  Also check testosterone at that time.  Need to make sure those levels are within normal limits to improve muscular function.  Problem List Items Addressed This Visit          Pulmonary    Restrictive lung disease    Relevant Orders    CBC Auto Differential    Comprehensive Metabolic Panel    Lipid Panel    TSH    Urinalysis    BNP     Other Visit Diagnoses       Low testosterone    -  Primary    Relevant Orders    CBC Auto Differential    Comprehensive Metabolic Panel     Lipid Panel    TSH    Urinalysis    BNP    Generalized abdominal pain        Relevant Orders    CBC Auto Differential    Comprehensive Metabolic Panel    Lipid Panel    TSH    Urinalysis    BNP    Dyspnea, unspecified type        Relevant Orders    CBC Auto Differential    Comprehensive Metabolic Panel    Lipid Panel    TSH    Urinalysis    BNP    MINDA on CPAP        Relevant Orders    CBC Auto Differential    Comprehensive Metabolic Panel    Lipid Panel    TSH    Urinalysis    BNP    Atrial fibrillation, unspecified type        Relevant Orders    CBC Auto Differential    Comprehensive Metabolic Panel    Lipid Panel    TSH    Urinalysis    BNP    SOB (shortness of breath)        Relevant Medications    albuterol (PROVENTIL/VENTOLIN HFA) 90 mcg/actuation inhaler    Other Relevant Orders    CBC Auto Differential    Comprehensive Metabolic Panel    Lipid Panel    TSH    Urinalysis    BNP             Orders Placed This Encounter   Procedures    CBC Auto Differential     Standing Status:   Future     Standing Expiration Date:   11/26/2025    Comprehensive Metabolic Panel     Standing Status:   Future     Standing Expiration Date:   11/26/2025    Lipid Panel     Standing Status:   Future     Standing Expiration Date:   11/26/2025    TSH     Standing Status:   Future     Standing Expiration Date:   11/26/2025    Urinalysis     Standing Status:   Future     Number of Occurrences:   1     Standing Expiration Date:   11/26/2025    BNP     Standing Status:   Future     Standing Expiration Date:   2/24/2026     Order Specific Question:   Send normal result to authorizing provider's In Basket if patient is active on MyChart:     Answer:   Yes        Medication List with Changes/Refills   New Medications    SIMETHICONE (GAS RELIEF, SIMETHICONE,) 125 MG CAP CAPSULE    Take 1 capsule (125 mg total) by mouth 4 (four) times daily as needed for Flatulence.       Start Date: 11/26/2024End Date: --   Current Medications    ALPRAZOLAM  (XANAX) 0.5 MG TABLET    Take 1 tablet by mouth twice daily as needed for anxiety       Start Date: 5/31/2024 End Date: --    CALCIUM CITRATE-VITAMIN D3 315-200 MG (CITRACAL+D) 315 MG-5 MCG (200 UNIT) PER TABLET    Take 1 tablet by mouth once daily.       Start Date: --        End Date: --    CINNAMON BARK 500 MG CAPSULE    Take 500 mg by mouth once daily.       Start Date: --        End Date: --    DOCUSATE SODIUM (COLACE) 100 MG CAPSULE    Take 100 mg by mouth Daily.       Start Date: --        End Date: --    DULOXETINE (CYMBALTA) 30 MG CAPSULE    TAKE 1 CAPSULE BY MOUTH ONCE DAILY(DO NOT CRUSH OR CHEW)       Start Date: 8/26/2024 End Date: --    ELIQUIS 5 MG TAB    Take 1 tablet by mouth twice daily       Start Date: 6/17/2024 End Date: --    FAMOTIDINE (PEPCID) 20 MG TABLET    Take 20 mg by mouth nightly as needed.       Start Date: 2/1/2024  End Date: --    FLUTICASONE-UMECLIDIN-VILANTER (TRELEGY ELLIPTA) 100-62.5-25 MCG DSDV    Inhale 1 puff into the lungs once daily.       Start Date: 12/18/2023End Date: --    FUROSEMIDE (LASIX) 40 MG TABLET    Take 1 tablet by mouth once daily       Start Date: 3/11/2024 End Date: --    GABAPENTIN (NEURONTIN) 100 MG CAPSULE    Take 1 capsule (100 mg total) by mouth every evening.       Start Date: 4/22/2024 End Date: 4/22/2025    LATANOPROST 0.005 % OPHTHALMIC SOLUTION    Place 1 drop into both eyes every evening.       Start Date: 1/15/2024 End Date: --    LORATADINE (CLARITIN) 10 MG TABLET    Take 10 mg by mouth daily as needed for Allergies.       Start Date: --        End Date: --    LOSARTAN (COZAAR) 25 MG TABLET    Take 1 tablet by mouth twice daily       Start Date: 6/24/2024 End Date: --    MELATONIN 10 MG TAB    Take 10-20 mg by mouth daily as needed (insomnia).       Start Date: --        End Date: --    METOPROLOL SUCCINATE (TOPROL-XL) 100 MG 24 HR TABLET    Take 100 mg by mouth once daily.       Start Date: 5/8/2023  End Date: --    MONTELUKAST (SINGULAIR) 10 MG  TABLET    Take 1 tablet by mouth once daily       Start Date: 5/30/2024 End Date: --    NON FORMULARY MEDICATION    Take 1 tablet by mouth once daily. Tumeric       Start Date: --        End Date: --    OMEGA-3 FATTY ACIDS/FISH OIL (FISH OIL-OMEGA-3 FATTY ACIDS) 300-1,000 MG CAPSULE    Take 1 capsule by mouth once daily.       Start Date: --        End Date: --    OMEPRAZOLE (PRILOSEC) 40 MG CAPSULE    Take 1 capsule by mouth once daily       Start Date: 7/25/2024 End Date: --    POTASSIUM CHLORIDE (KLOR-CON) 8 MEQ TBSR    Take 1 tablet (8 mEq total) by mouth once daily.       Start Date: 1/9/2024  End Date: --    ROSUVASTATIN (CRESTOR) 20 MG TABLET    TAKE 1 TABLET BY MOUTH ONCE IN THE EVENING       Start Date: 3/14/2022 End Date: --    SOTALOL (BETAPACE) 80 MG TABLET    Take 80 mg by mouth 2 (two) times daily.       Start Date: 4/4/2023  End Date: --    TAMSULOSIN (FLOMAX) 0.4 MG CAP    Take 1 capsule by mouth once daily.       Start Date: 3/18/2022 End Date: --    TIRZEPATIDE (MOUNJARO) 2.5 MG/0.5 ML PNIJ    Inject 2.5 mg into the skin every 7 days.       Start Date: 8/9/2024  End Date: --    ZOLPIDEM (AMBIEN) 5 MG TAB    TAKE 1 TABLET BY MOUTH ONCE DAILY AT BEDTIME AS NEEDED FOR INSOMNIA       Start Date: 7/22/2024 End Date: --   Changed and/or Refilled Medications    Modified Medication Previous Medication    ALBUTEROL (PROVENTIL/VENTOLIN HFA) 90 MCG/ACTUATION INHALER albuterol (PROVENTIL/VENTOLIN HFA) 90 mcg/actuation inhaler       Inhale 2 puffs into the lungs every 6 (six) hours.    INHALE 2 PUFFS BY MOUTH EVERY 6 HOURS AS NEEDED       Start Date: 11/26/2024End Date: --    Start Date: 10/24/2024End Date: 11/26/2024   Discontinued Medications    TESTOSTERONE CYPIONATE (DEPOTESTOTERONE CYPIONATE) 200 MG/ML INJECTION    Inject 1 mL (200 mg total) into the muscle every 14 (fourteen) days.       Start Date: 7/9/2024  End Date: 11/26/2024            Follow up in about 3 months (around 2/26/2025). In addition to  their scheduled follow up, the patient has also been instructed to follow up on as needed basis.       Fabiano Liang

## 2024-11-27 ENCOUNTER — TELEPHONE (OUTPATIENT)
Dept: INTERNAL MEDICINE | Facility: CLINIC | Age: 74
End: 2024-11-27
Payer: COMMERCIAL

## 2024-11-27 DIAGNOSIS — J44.9 MODERATE CHRONIC OBSTRUCTIVE PULMONARY DISEASE: Primary | ICD-10-CM

## 2024-11-27 RX ORDER — ALBUTEROL SULFATE 90 UG/1
2 INHALANT RESPIRATORY (INHALATION) EVERY 6 HOURS PRN
Qty: 18 G | Status: SHIPPED | OUTPATIENT
Start: 2024-11-27

## 2024-11-29 DIAGNOSIS — F41.9 ANXIETY: ICD-10-CM

## 2024-12-02 RX ORDER — ALPRAZOLAM 0.5 MG/1
TABLET ORAL
Qty: 45 TABLET | Refills: 3 | Status: SHIPPED | OUTPATIENT
Start: 2024-12-02

## 2024-12-10 ENCOUNTER — OFFICE VISIT (OUTPATIENT)
Dept: INTERNAL MEDICINE | Facility: CLINIC | Age: 74
End: 2024-12-10
Payer: COMMERCIAL

## 2024-12-10 VITALS
HEIGHT: 67 IN | BODY MASS INDEX: 35.94 KG/M2 | RESPIRATION RATE: 18 BRPM | HEART RATE: 63 BPM | OXYGEN SATURATION: 94 % | SYSTOLIC BLOOD PRESSURE: 117 MMHG | DIASTOLIC BLOOD PRESSURE: 65 MMHG | WEIGHT: 229 LBS

## 2024-12-10 DIAGNOSIS — R79.89 LOW TESTOSTERONE: ICD-10-CM

## 2024-12-10 DIAGNOSIS — J98.6 PARALYSIS OF DIAPHRAGM: ICD-10-CM

## 2024-12-10 DIAGNOSIS — R06.00 DYSPNEA, UNSPECIFIED TYPE: ICD-10-CM

## 2024-12-10 DIAGNOSIS — J44.9 MODERATE CHRONIC OBSTRUCTIVE PULMONARY DISEASE: Primary | ICD-10-CM

## 2024-12-10 DIAGNOSIS — R10.84 GENERALIZED ABDOMINAL PAIN: ICD-10-CM

## 2024-12-10 DIAGNOSIS — G47.33 OSA ON CPAP: ICD-10-CM

## 2024-12-10 DIAGNOSIS — I42.9 CARDIOMYOPATHY, UNSPECIFIED TYPE: ICD-10-CM

## 2024-12-10 DIAGNOSIS — I48.91 ATRIAL FIBRILLATION, UNSPECIFIED TYPE: ICD-10-CM

## 2024-12-10 PROCEDURE — 3078F DIAST BP <80 MM HG: CPT | Mod: CPTII,,, | Performed by: INTERNAL MEDICINE

## 2024-12-10 PROCEDURE — 1126F AMNT PAIN NOTED NONE PRSNT: CPT | Mod: CPTII,,, | Performed by: INTERNAL MEDICINE

## 2024-12-10 PROCEDURE — 3074F SYST BP LT 130 MM HG: CPT | Mod: CPTII,,, | Performed by: INTERNAL MEDICINE

## 2024-12-10 PROCEDURE — 99214 OFFICE O/P EST MOD 30 MIN: CPT | Mod: ,,, | Performed by: INTERNAL MEDICINE

## 2024-12-10 PROCEDURE — 4010F ACE/ARB THERAPY RXD/TAKEN: CPT | Mod: CPTII,,, | Performed by: INTERNAL MEDICINE

## 2024-12-10 PROCEDURE — 1101F PT FALLS ASSESS-DOCD LE1/YR: CPT | Mod: CPTII,,, | Performed by: INTERNAL MEDICINE

## 2024-12-10 PROCEDURE — 1159F MED LIST DOCD IN RCRD: CPT | Mod: CPTII,,, | Performed by: INTERNAL MEDICINE

## 2024-12-10 PROCEDURE — 3288F FALL RISK ASSESSMENT DOCD: CPT | Mod: CPTII,,, | Performed by: INTERNAL MEDICINE

## 2024-12-10 RX ORDER — TIZANIDINE 4 MG/1
4 TABLET ORAL 2 TIMES DAILY
Qty: 60 TABLET | Refills: 3 | Status: SHIPPED | OUTPATIENT
Start: 2024-12-10

## 2024-12-10 NOTE — PROGRESS NOTES
Patient ID: [id number]      Subjective:     Chief Complaint: Shortness of Breath      John Bullard is a 74 y.o. male.  Is a 74-year-old man in with continued complaints of shortness a breath.  Somewhat intermittent and recently has a couple of episodes while getting into his car.  No palpitations or chest pain.  He continues to have discomfort of the upper abdominal area thought to be coming from the abdominal musculature.  Some exertional shortness a breath but not worse than prior.  He apparently did see his cardiologist not long ago in workup there was unrevealing.  I do not have those results.    Minimal cough without real change.  No wheezing.  He does sleep on 2 pillows but it would not wake up short of breath.  No edema.  He is on CPAP for sleep apnea.    Shortness of Breath        Review of Systems   Respiratory:  Positive for shortness of breath.        Outpatient Medications Marked as Taking for the 12/10/24 encounter (Office Visit) with Fabiano Liang MD   Medication Sig Dispense Refill    albuterol (PROVENTIL HFA) 90 mcg/actuation inhaler Inhale 2 puffs into the lungs every 6 (six) hours as needed for Wheezing. Rescue 18 g PRN    albuterol (PROVENTIL/VENTOLIN HFA) 90 mcg/actuation inhaler Inhale 2 puffs into the lungs every 6 (six) hours. 18 g 11    ALPRAZolam (XANAX) 0.5 MG tablet Take 1 tablet by mouth twice daily as needed for anxiety 45 tablet 3    calcium citrate-vitamin D3 315-200 mg (CITRACAL+D) 315 mg-5 mcg (200 unit) per tablet Take 1 tablet by mouth once daily.      cinnamon bark 500 mg capsule Take 500 mg by mouth once daily.      docusate sodium (COLACE) 100 MG capsule Take 100 mg by mouth Daily.      DULoxetine (CYMBALTA) 30 MG capsule TAKE 1 CAPSULE BY MOUTH ONCE DAILY(DO NOT CRUSH OR CHEW) 30 capsule 3    ELIQUIS 5 mg Tab Take 1 tablet by mouth twice daily 60 tablet 0    famotidine (PEPCID) 20 MG tablet Take 20 mg by mouth nightly as needed.      fluticasone-umeclidin-vilanter  "(TRELEGY ELLIPTA) 100-62.5-25 mcg DsDv Inhale 1 puff into the lungs once daily. 1 each 11    furosemide (LASIX) 40 MG tablet Take 1 tablet by mouth once daily 90 tablet 1    gabapentin (NEURONTIN) 100 MG capsule Take 1 capsule (100 mg total) by mouth every evening. 30 capsule 11    latanoprost 0.005 % ophthalmic solution Place 1 drop into both eyes every evening.      loratadine (CLARITIN) 10 mg tablet Take 10 mg by mouth daily as needed for Allergies.      losartan (COZAAR) 25 MG tablet Take 1 tablet by mouth twice daily 180 tablet 0    melatonin 10 mg Tab Take 10-20 mg by mouth daily as needed (insomnia).      metoprolol succinate (TOPROL-XL) 100 MG 24 hr tablet Take 100 mg by mouth once daily.      montelukast (SINGULAIR) 10 mg tablet Take 1 tablet by mouth once daily 30 tablet 11    NON FORMULARY MEDICATION Take 1 tablet by mouth once daily. Tumeric      omega-3 fatty acids/fish oil (FISH OIL-OMEGA-3 FATTY ACIDS) 300-1,000 mg capsule Take 1 capsule by mouth once daily.      omeprazole (PRILOSEC) 40 MG capsule Take 1 capsule by mouth once daily 30 capsule 6    potassium chloride (KLOR-CON) 8 MEQ TbSR Take 1 tablet (8 mEq total) by mouth once daily. 30 tablet 5    rosuvastatin (CRESTOR) 20 MG tablet TAKE 1 TABLET BY MOUTH ONCE IN THE EVENING      simethicone (GAS RELIEF, SIMETHICONE,) 125 mg Cap capsule Take 1 capsule (125 mg total) by mouth 4 (four) times daily as needed for Flatulence. 100 each 11    sotaloL (BETAPACE) 80 MG tablet Take 80 mg by mouth 2 (two) times daily.      tamsulosin (FLOMAX) 0.4 mg Cap Take 1 capsule by mouth once daily.      zolpidem (AMBIEN) 5 MG Tab TAKE 1 TABLET BY MOUTH ONCE DAILY AT BEDTIME AS NEEDED FOR INSOMNIA 30 tablet 5       Objective:     /65 (BP Location: Right arm, Patient Position: Sitting)   Pulse 63   Resp 18   Ht 5' 7" (1.702 m)   Wt 103.9 kg (229 lb)   SpO2 (!) 94%   BMI 35.87 kg/m²     Physical Exam  Vitals reviewed.   Constitutional:       Appearance: " Normal appearance.   HENT:      Head: Normocephalic and atraumatic.      Right Ear: Tympanic membrane normal.      Left Ear: Tympanic membrane normal.      Mouth/Throat:      Pharynx: Oropharynx is clear.   Eyes:      Pupils: Pupils are equal, round, and reactive to light.   Neck:      Vascular: No carotid bruit.   Cardiovascular:      Rate and Rhythm: Normal rate and regular rhythm.      Pulses: Normal pulses.      Heart sounds: Normal heart sounds.   Pulmonary:      Effort: Pulmonary effort is normal.      Breath sounds: Normal breath sounds.   Abdominal:      General: Abdomen is flat.      Palpations: Abdomen is soft. There is no mass.      Tenderness: There is no abdominal tenderness. There is no guarding.   Musculoskeletal:         General: No swelling.      Cervical back: Neck supple.   Lymphadenopathy:      Cervical: No cervical adenopathy.   Skin:     General: Skin is warm and dry.   Neurological:      General: No focal deficit present.      Mental Status: He is alert and oriented to person, place, and time.       No recent lab work.  Assessment:     1. Dyspnea.  Chronic but recently a bit worse, somewhat episodically.  Likely multifactorial.      2. History of congestive heart failure.  Recent workup with his cardiologist reportedly negative.      3. History of COPD and suspected restrictive lung disease     4. History of paralyzed hemidiaphragm.  Status post plication without clear-cut benefits     5. Chronic atrial fibrillation.  Rate remains normal but rhythm is irregular    6. Upper abdominal discomfort thought to be secondary to stretching/tearing abdominal wall musculature      Plan:   Add Zanaflex 4 b.i.d. p.r.n. abdominal pain.  Update lab work to include a CBC CMP lipid TSH BNP and D-dimer.  We will also get an x-ray of the chest with inspiration and expiration views.      Follow-up with me as scheduled or to be scheduled.    Try to obtain results from Cardiology  Problem List Items Addressed This  Visit          Pulmonary    Moderate chronic obstructive pulmonary disease - Primary    Relevant Orders    CBC Auto Differential    Comprehensive Metabolic Panel    Lipid Panel    TSH    D-Dimer, Quantitative    BNP    XRAY Chest 2 View Inspiration Expiration    Paralysis of diaphragm    Overview     partial, left         Relevant Orders    CBC Auto Differential    Comprehensive Metabolic Panel    Lipid Panel    TSH    D-Dimer, Quantitative    BNP    XRAY Chest 2 View Inspiration Expiration       Cardiac/Vascular    Cardiomyopathy    Overview     unspecified         Relevant Orders    CBC Auto Differential    Comprehensive Metabolic Panel    Lipid Panel    TSH    D-Dimer, Quantitative    BNP    XRAY Chest 2 View Inspiration Expiration     Other Visit Diagnoses       Dyspnea, unspecified type        Relevant Orders    CBC Auto Differential    Comprehensive Metabolic Panel    Lipid Panel    TSH    D-Dimer, Quantitative    BNP    XRAY Chest 2 View Inspiration Expiration    MINDA on CPAP        Relevant Orders    CBC Auto Differential    Comprehensive Metabolic Panel    Lipid Panel    TSH    D-Dimer, Quantitative    BNP    XRAY Chest 2 View Inspiration Expiration    Generalized abdominal pain        Relevant Orders    CBC Auto Differential    Comprehensive Metabolic Panel    Lipid Panel    TSH    D-Dimer, Quantitative    BNP    XRAY Chest 2 View Inspiration Expiration    Low testosterone        Relevant Orders    CBC Auto Differential    Comprehensive Metabolic Panel    Lipid Panel    TSH    D-Dimer, Quantitative    BNP    XRAY Chest 2 View Inspiration Expiration    Atrial fibrillation, unspecified type        Relevant Orders    CBC Auto Differential    Comprehensive Metabolic Panel    Lipid Panel    TSH    D-Dimer, Quantitative    BNP    XRAY Chest 2 View Inspiration Expiration             Orders Placed This Encounter   Procedures    XRAY Chest 2 View Inspiration Expiration     Standing Status:   Future     Standing  Expiration Date:   12/10/2025     Order Specific Question:   May the Radiologist modify the order per protocol to meet the clinical needs of the patient?     Answer:   Yes     Order Specific Question:   Release to patient     Answer:   Immediate    CBC Auto Differential     Standing Status:   Future     Standing Expiration Date:   12/10/2025    Comprehensive Metabolic Panel     Standing Status:   Future     Standing Expiration Date:   12/10/2025    Lipid Panel     Standing Status:   Future     Standing Expiration Date:   12/10/2025    TSH     Standing Status:   Future     Standing Expiration Date:   12/10/2025    D-Dimer, Quantitative     Standing Status:   Future     Standing Expiration Date:   3/10/2026     Order Specific Question:   Send normal result to authorizing provider's In Basket if patient is active on MyChart:     Answer:   Yes    BNP     Standing Status:   Future     Standing Expiration Date:   3/10/2026     Order Specific Question:   Send normal result to authorizing provider's In Basket if patient is active on MyChart:     Answer:   Yes        Medication List with Changes/Refills   New Medications    TIZANIDINE (ZANAFLEX) 4 MG TABLET    Take 1 tablet (4 mg total) by mouth 2 (two) times a day.       Start Date: 12/10/2024End Date: --   Current Medications    ALBUTEROL (PROVENTIL HFA) 90 MCG/ACTUATION INHALER    Inhale 2 puffs into the lungs every 6 (six) hours as needed for Wheezing. Rescue       Start Date: 11/27/2024End Date: --    ALBUTEROL (PROVENTIL/VENTOLIN HFA) 90 MCG/ACTUATION INHALER    Inhale 2 puffs into the lungs every 6 (six) hours.       Start Date: 11/26/2024End Date: --    ALPRAZOLAM (XANAX) 0.5 MG TABLET    Take 1 tablet by mouth twice daily as needed for anxiety       Start Date: 12/2/2024 End Date: --    CALCIUM CITRATE-VITAMIN D3 315-200 MG (CITRACAL+D) 315 MG-5 MCG (200 UNIT) PER TABLET    Take 1 tablet by mouth once daily.       Start Date: --        End Date: --    CINNAMON BARK  500 MG CAPSULE    Take 500 mg by mouth once daily.       Start Date: --        End Date: --    DOCUSATE SODIUM (COLACE) 100 MG CAPSULE    Take 100 mg by mouth Daily.       Start Date: --        End Date: --    DULOXETINE (CYMBALTA) 30 MG CAPSULE    TAKE 1 CAPSULE BY MOUTH ONCE DAILY(DO NOT CRUSH OR CHEW)       Start Date: 8/26/2024 End Date: --    ELIQUIS 5 MG TAB    Take 1 tablet by mouth twice daily       Start Date: 6/17/2024 End Date: --    FAMOTIDINE (PEPCID) 20 MG TABLET    Take 20 mg by mouth nightly as needed.       Start Date: 2/1/2024  End Date: --    FLUTICASONE-UMECLIDIN-VILANTER (TRELEGY ELLIPTA) 100-62.5-25 MCG DSDV    Inhale 1 puff into the lungs once daily.       Start Date: 12/18/2023End Date: --    FUROSEMIDE (LASIX) 40 MG TABLET    Take 1 tablet by mouth once daily       Start Date: 3/11/2024 End Date: --    GABAPENTIN (NEURONTIN) 100 MG CAPSULE    Take 1 capsule (100 mg total) by mouth every evening.       Start Date: 4/22/2024 End Date: 4/22/2025    LATANOPROST 0.005 % OPHTHALMIC SOLUTION    Place 1 drop into both eyes every evening.       Start Date: 1/15/2024 End Date: --    LORATADINE (CLARITIN) 10 MG TABLET    Take 10 mg by mouth daily as needed for Allergies.       Start Date: --        End Date: --    LOSARTAN (COZAAR) 25 MG TABLET    Take 1 tablet by mouth twice daily       Start Date: 6/24/2024 End Date: --    MELATONIN 10 MG TAB    Take 10-20 mg by mouth daily as needed (insomnia).       Start Date: --        End Date: --    METOPROLOL SUCCINATE (TOPROL-XL) 100 MG 24 HR TABLET    Take 100 mg by mouth once daily.       Start Date: 5/8/2023  End Date: --    MONTELUKAST (SINGULAIR) 10 MG TABLET    Take 1 tablet by mouth once daily       Start Date: 5/30/2024 End Date: --    NON FORMULARY MEDICATION    Take 1 tablet by mouth once daily. Tumeric       Start Date: --        End Date: --    OMEGA-3 FATTY ACIDS/FISH OIL (FISH OIL-OMEGA-3 FATTY ACIDS) 300-1,000 MG CAPSULE    Take 1 capsule by  mouth once daily.       Start Date: --        End Date: --    OMEPRAZOLE (PRILOSEC) 40 MG CAPSULE    Take 1 capsule by mouth once daily       Start Date: 7/25/2024 End Date: --    POTASSIUM CHLORIDE (KLOR-CON) 8 MEQ TBSR    Take 1 tablet (8 mEq total) by mouth once daily.       Start Date: 1/9/2024  End Date: --    ROSUVASTATIN (CRESTOR) 20 MG TABLET    TAKE 1 TABLET BY MOUTH ONCE IN THE EVENING       Start Date: 3/14/2022 End Date: --    SIMETHICONE (GAS RELIEF, SIMETHICONE,) 125 MG CAP CAPSULE    Take 1 capsule (125 mg total) by mouth 4 (four) times daily as needed for Flatulence.       Start Date: 11/26/2024End Date: --    SOTALOL (BETAPACE) 80 MG TABLET    Take 80 mg by mouth 2 (two) times daily.       Start Date: 4/4/2023  End Date: --    TAMSULOSIN (FLOMAX) 0.4 MG CAP    Take 1 capsule by mouth once daily.       Start Date: 3/18/2022 End Date: --    ZOLPIDEM (AMBIEN) 5 MG TAB    TAKE 1 TABLET BY MOUTH ONCE DAILY AT BEDTIME AS NEEDED FOR INSOMNIA       Start Date: 7/22/2024 End Date: --   Discontinued Medications    TIRZEPATIDE (MOUNJARO) 2.5 MG/0.5 ML PNIJ    Inject 2.5 mg into the skin every 7 days.       Start Date: 8/9/2024  End Date: 12/10/2024            No follow-ups on file. In addition to their scheduled follow up, the patient has also been instructed to follow up on as needed basis.       Fabiano Liang

## 2024-12-10 NOTE — PROGRESS NOTES
Testosterone injection administered in right ventrogluteal. Pt tolerated well.    NDC:8514-4035-81  LOT#[id number]  EXP:09/30/2027

## 2024-12-10 NOTE — LETTER
AUTHORIZATION FOR RELEASE OF   CONFIDENTIAL INFORMATION    Dear Staff,    We are seeing John Bullard, date of birth 1950, in the clinic at [place] [address] [facility]. Fabiano Liang MD is the patient's PCP. John Bullard has an outstanding lab/procedure at the time we reviewed his chart. In order to help keep his health information updated, he has authorized us to request the following medical record(s):        (  )  MAMMOGRAM                                      (  )  COLONOSCOPY      (  )  PAP SMEAR                                          (  )  OUTSIDE LAB RESULTS     (  )  DEXA SCAN                                          (  )  EYE EXAM            (  )  FOOT EXAM                                          (  )  ENTIRE RECORD     (  )  OUTSIDE IMMUNIZATIONS                 ( x )  most recent office visit notes       Please fax records to [facility], Alexander, Michael S, MD, [phone]     If you have any questions, please contact Teodora at [phone].           Patient Name: John Bullard  : 1950  Patient Phone #: [phone]

## 2024-12-11 ENCOUNTER — HOSPITAL ENCOUNTER (OUTPATIENT)
Dept: RADIOLOGY | Facility: HOSPITAL | Age: 74
Discharge: HOME | End: 2024-12-11
Attending: INTERNAL MEDICINE
Payer: COMMERCIAL

## 2024-12-11 DIAGNOSIS — J98.6 PARALYSIS OF DIAPHRAGM: ICD-10-CM

## 2024-12-11 DIAGNOSIS — I42.9 CARDIOMYOPATHY, UNSPECIFIED TYPE: ICD-10-CM

## 2024-12-11 DIAGNOSIS — G47.33 OSA ON CPAP: ICD-10-CM

## 2024-12-11 DIAGNOSIS — I48.91 ATRIAL FIBRILLATION, UNSPECIFIED TYPE: ICD-10-CM

## 2024-12-11 DIAGNOSIS — R10.84 GENERALIZED ABDOMINAL PAIN: ICD-10-CM

## 2024-12-11 DIAGNOSIS — R79.89 LOW TESTOSTERONE: ICD-10-CM

## 2024-12-11 DIAGNOSIS — R06.00 DYSPNEA, UNSPECIFIED TYPE: ICD-10-CM

## 2024-12-11 DIAGNOSIS — J44.9 MODERATE CHRONIC OBSTRUCTIVE PULMONARY DISEASE: ICD-10-CM

## 2024-12-11 PROCEDURE — 71046 X-RAY EXAM CHEST 2 VIEWS: CPT | Mod: TC

## 2024-12-14 DIAGNOSIS — F32.A DEPRESSION, UNSPECIFIED DEPRESSION TYPE: ICD-10-CM

## 2024-12-16 RX ORDER — DULOXETIN HYDROCHLORIDE 30 MG/1
CAPSULE, DELAYED RELEASE ORAL
Qty: 30 CAPSULE | Refills: 6 | Status: SHIPPED | OUTPATIENT
Start: 2024-12-16

## 2024-12-24 ENCOUNTER — CLINICAL SUPPORT (OUTPATIENT)
Dept: INTERNAL MEDICINE | Facility: CLINIC | Age: 74
End: 2024-12-24
Payer: COMMERCIAL

## 2024-12-24 DIAGNOSIS — R79.89 LOW TESTOSTERONE: Primary | ICD-10-CM

## 2024-12-24 PROCEDURE — 96372 THER/PROPH/DIAG INJ SC/IM: CPT | Mod: ,,, | Performed by: INTERNAL MEDICINE

## 2024-12-24 RX ORDER — TESTOSTERONE CYPIONATE 200 MG/ML
200 INJECTION, SOLUTION INTRAMUSCULAR
Status: COMPLETED | OUTPATIENT
Start: 2024-12-24 | End: 2024-12-24

## 2024-12-24 RX ADMIN — TESTOSTERONE CYPIONATE 200 MG: 200 INJECTION, SOLUTION INTRAMUSCULAR at 08:12

## 2025-01-05 ENCOUNTER — HOSPITAL ENCOUNTER (EMERGENCY)
Facility: HOSPITAL | Age: 75
Discharge: HOME | End: 2025-01-05
Attending: EMERGENCY MEDICINE
Payer: COMMERCIAL

## 2025-01-05 VITALS
RESPIRATION RATE: 16 BRPM | HEART RATE: 91 BPM | DIASTOLIC BLOOD PRESSURE: 95 MMHG | WEIGHT: 220 LBS | TEMPERATURE: 98 F | BODY MASS INDEX: 34.53 KG/M2 | HEIGHT: 67 IN | OXYGEN SATURATION: 94 % | SYSTOLIC BLOOD PRESSURE: 149 MMHG

## 2025-01-05 DIAGNOSIS — R06.00 DYSPNEA, UNSPECIFIED TYPE: Primary | ICD-10-CM

## 2025-01-05 DIAGNOSIS — R06.02 SHORTNESS OF BREATH: ICD-10-CM

## 2025-01-05 LAB
ALBUMIN SERPL-MCNC: 3.5 G/DL (ref 3.4–4.8)
ALBUMIN/GLOB SERPL: 1.3 RATIO (ref 1.1–2)
ALP SERPL-CCNC: 53 UNIT/L (ref 40–150)
ALT SERPL-CCNC: 16 UNIT/L (ref 0–55)
ANION GAP SERPL CALC-SCNC: 9 MEQ/L
APTT PPP: 28.8 SECONDS (ref 23.2–33.7)
AST SERPL-CCNC: 21 UNIT/L (ref 5–34)
BASOPHILS # BLD AUTO: 0.04 X10(3)/MCL
BASOPHILS NFR BLD AUTO: 0.6 %
BILIRUB SERPL-MCNC: 0.6 MG/DL
BNP BLD-MCNC: 50.9 PG/ML
BUN SERPL-MCNC: 14.7 MG/DL (ref 8.4–25.7)
CALCIUM SERPL-MCNC: 8.7 MG/DL (ref 8.8–10)
CHLORIDE SERPL-SCNC: 103 MMOL/L (ref 98–107)
CO2 SERPL-SCNC: 29 MMOL/L (ref 23–31)
CREAT SERPL-MCNC: 1.26 MG/DL (ref 0.72–1.25)
CREAT/UREA NIT SERPL: 12
EOSINOPHIL # BLD AUTO: 0.15 X10(3)/MCL (ref 0–0.9)
EOSINOPHIL NFR BLD AUTO: 2.3 %
ERYTHROCYTE [DISTWIDTH] IN BLOOD BY AUTOMATED COUNT: 13.2 % (ref 11.5–17)
FLUAV AG UPPER RESP QL IA.RAPID: NOT DETECTED
FLUBV AG UPPER RESP QL IA.RAPID: NOT DETECTED
GFR SERPLBLD CREATININE-BSD FMLA CKD-EPI: 59 ML/MIN/1.73/M2
GLOBULIN SER-MCNC: 2.7 GM/DL (ref 2.4–3.5)
GLUCOSE SERPL-MCNC: 89 MG/DL (ref 82–115)
HCT VFR BLD AUTO: 44.6 % (ref 42–52)
HGB BLD-MCNC: 14.5 G/DL (ref 14–18)
IMM GRANULOCYTES # BLD AUTO: 0.01 X10(3)/MCL (ref 0–0.04)
IMM GRANULOCYTES NFR BLD AUTO: 0.2 %
INR PPP: 1.1
LYMPHOCYTES # BLD AUTO: 0.78 X10(3)/MCL (ref 0.6–4.6)
LYMPHOCYTES NFR BLD AUTO: 12.2 %
MCH RBC QN AUTO: 30.6 PG (ref 27–31)
MCHC RBC AUTO-ENTMCNC: 32.5 G/DL (ref 33–36)
MCV RBC AUTO: 94.1 FL (ref 80–94)
MONOCYTES # BLD AUTO: 0.93 X10(3)/MCL (ref 0.1–1.3)
MONOCYTES NFR BLD AUTO: 14.5 %
NEUTROPHILS # BLD AUTO: 4.5 X10(3)/MCL (ref 2.1–9.2)
NEUTROPHILS NFR BLD AUTO: 70.2 %
NRBC BLD AUTO-RTO: 0 %
PLATELET # BLD AUTO: 130 X10(3)/MCL (ref 130–400)
PLATELETS.RETICULATED NFR BLD AUTO: 4.3 % (ref 0.9–11.2)
PMV BLD AUTO: 10.7 FL (ref 7.4–10.4)
POTASSIUM SERPL-SCNC: 3.7 MMOL/L (ref 3.5–5.1)
PROT SERPL-MCNC: 6.2 GM/DL (ref 5.8–7.6)
PROTHROMBIN TIME: 14.3 SECONDS (ref 12.5–14.5)
RBC # BLD AUTO: 4.74 X10(6)/MCL (ref 4.7–6.1)
RSV A 5' UTR RNA NPH QL NAA+PROBE: NOT DETECTED
SARS-COV-2 RNA RESP QL NAA+PROBE: NOT DETECTED
SODIUM SERPL-SCNC: 141 MMOL/L (ref 136–145)
TROPONIN I SERPL-MCNC: <0.01 NG/ML (ref 0–0.04)
WBC # BLD AUTO: 6.41 X10(3)/MCL (ref 4.5–11.5)

## 2025-01-05 PROCEDURE — 84484 ASSAY OF TROPONIN QUANT: CPT | Performed by: NURSE PRACTITIONER

## 2025-01-05 PROCEDURE — 85610 PROTHROMBIN TIME: CPT | Performed by: NURSE PRACTITIONER

## 2025-01-05 PROCEDURE — 0241U COVID/RSV/FLU A&B PCR: CPT | Performed by: NURSE PRACTITIONER

## 2025-01-05 PROCEDURE — 85025 COMPLETE CBC W/AUTO DIFF WBC: CPT | Performed by: NURSE PRACTITIONER

## 2025-01-05 PROCEDURE — 85730 THROMBOPLASTIN TIME PARTIAL: CPT | Performed by: NURSE PRACTITIONER

## 2025-01-05 PROCEDURE — 93005 ELECTROCARDIOGRAM TRACING: CPT

## 2025-01-05 PROCEDURE — 80053 COMPREHEN METABOLIC PANEL: CPT | Performed by: NURSE PRACTITIONER

## 2025-01-05 PROCEDURE — 25500020 PHARM REV CODE 255: Performed by: EMERGENCY MEDICINE

## 2025-01-05 PROCEDURE — 83880 ASSAY OF NATRIURETIC PEPTIDE: CPT | Performed by: NURSE PRACTITIONER

## 2025-01-05 PROCEDURE — 99285 EMERGENCY DEPT VISIT HI MDM: CPT | Mod: 25

## 2025-01-05 RX ADMIN — IOHEXOL 100 ML: 350 INJECTION, SOLUTION INTRAVENOUS at 06:01

## 2025-01-05 NOTE — FIRST PROVIDER EVALUATION
Medical screening examination initiated.  I have conducted a focused provider triage encounter, findings are as follows:    Brief history of present illness:  Patient states SOB. Denies any coughing, fever, or chest pain.     There were no vitals filed for this visit.    Pertinent physical exam:  Awake, alert, ambulatory      Brief workup plan:  Labs, EKG, Imaging    Preliminary workup initiated; this workup will be continued and followed by the physician or advanced practice provider that is assigned to the patient when roomed.

## 2025-01-05 NOTE — ED NOTES
Pt lifted up shirt to reveal a large distended abd, tight and shiny. Pt states that he feels full in abd.

## 2025-01-06 ENCOUNTER — OFFICE VISIT (OUTPATIENT)
Dept: INTERNAL MEDICINE | Facility: CLINIC | Age: 75
End: 2025-01-06
Payer: COMMERCIAL

## 2025-01-06 VITALS
HEIGHT: 67 IN | HEART RATE: 83 BPM | DIASTOLIC BLOOD PRESSURE: 68 MMHG | RESPIRATION RATE: 18 BRPM | OXYGEN SATURATION: 95 % | WEIGHT: 223 LBS | BODY MASS INDEX: 35 KG/M2 | SYSTOLIC BLOOD PRESSURE: 122 MMHG

## 2025-01-06 DIAGNOSIS — J44.9 MODERATE CHRONIC OBSTRUCTIVE PULMONARY DISEASE: Primary | ICD-10-CM

## 2025-01-06 DIAGNOSIS — E66.9 OBESITY WITH BODY MASS INDEX 30 OR GREATER: ICD-10-CM

## 2025-01-06 DIAGNOSIS — R79.89 LOW TESTOSTERONE: Primary | ICD-10-CM

## 2025-01-06 DIAGNOSIS — I48.91 ATRIAL FIBRILLATION, UNSPECIFIED TYPE: ICD-10-CM

## 2025-01-06 DIAGNOSIS — J98.6 PARALYSIS OF DIAPHRAGM: ICD-10-CM

## 2025-01-06 PROBLEM — I50.9 CONGESTIVE HEART FAILURE: Status: RESOLVED | Noted: 2023-01-14 | Resolved: 2025-01-06

## 2025-01-06 PROBLEM — E66.01 SEVERE OBESITY (BMI 35.0-39.9) WITH COMORBIDITY: Status: RESOLVED | Noted: 2022-06-08 | Resolved: 2025-01-06

## 2025-01-06 LAB
OHS QRS DURATION: 90 MS
OHS QTC CALCULATION: 435 MS

## 2025-01-06 PROCEDURE — 1101F PT FALLS ASSESS-DOCD LE1/YR: CPT | Mod: CPTII,,, | Performed by: INTERNAL MEDICINE

## 2025-01-06 PROCEDURE — 1159F MED LIST DOCD IN RCRD: CPT | Mod: CPTII,,, | Performed by: INTERNAL MEDICINE

## 2025-01-06 PROCEDURE — 3288F FALL RISK ASSESSMENT DOCD: CPT | Mod: CPTII,,, | Performed by: INTERNAL MEDICINE

## 2025-01-06 PROCEDURE — 99214 OFFICE O/P EST MOD 30 MIN: CPT | Mod: 25,,, | Performed by: INTERNAL MEDICINE

## 2025-01-06 PROCEDURE — 1126F AMNT PAIN NOTED NONE PRSNT: CPT | Mod: CPTII,,, | Performed by: INTERNAL MEDICINE

## 2025-01-06 PROCEDURE — 96372 THER/PROPH/DIAG INJ SC/IM: CPT | Mod: ,,, | Performed by: INTERNAL MEDICINE

## 2025-01-06 PROCEDURE — 3078F DIAST BP <80 MM HG: CPT | Mod: CPTII,,, | Performed by: INTERNAL MEDICINE

## 2025-01-06 PROCEDURE — 3074F SYST BP LT 130 MM HG: CPT | Mod: CPTII,,, | Performed by: INTERNAL MEDICINE

## 2025-01-06 RX ORDER — METOCLOPRAMIDE 5 MG/1
5 TABLET ORAL 2 TIMES DAILY
COMMUNITY
Start: 2025-01-02

## 2025-01-06 RX ORDER — TESTOSTERONE CYPIONATE 200 MG/ML
200 INJECTION, SOLUTION INTRAMUSCULAR
Status: COMPLETED | OUTPATIENT
Start: 2025-01-06 | End: 2025-01-06

## 2025-01-06 RX ORDER — THEOPHYLLINE 100 MG/1
100 TABLET, EXTENDED RELEASE ORAL 2 TIMES DAILY
Qty: 60 TABLET | Refills: 11 | Status: SHIPPED | OUTPATIENT
Start: 2025-01-06 | End: 2026-01-06

## 2025-01-06 RX ADMIN — TESTOSTERONE CYPIONATE 200 MG: 200 INJECTION, SOLUTION INTRAMUSCULAR at 11:01

## 2025-01-06 NOTE — PROGRESS NOTES
Patient ID: [id number]      Subjective:     Chief Complaint: Shortness of Breath (Pt states he notices he's more short of breath after he eats)      John Bullard is a 74 y.o. male.  The patient is a 74-year-old man in for an early visit because of worsening shortness a breath.  He has a chronic problem that has been extensively worked up in his associated with a poorly functioning diaphragm as well as some obstructive lung disease.  All exacerbated by obesity.  At any rate he thought it was getting a bit worse about a week ago and he made this appointment.  However over the weekend he felt like the symptoms were getting worse and he presented to the emergency room.  There he underwent extensive evaluation with nothing new being found.  This included a CTA of the chest.  Also CT abdomen.  Also routine lab work.    He tells me he has been seeing his ENT doctor and being treated for possible reflux.  Reglan has been tried.    Shortness of Breath        Review of Systems   Respiratory:  Positive for shortness of breath.        Outpatient Medications Marked as Taking for the 1/6/25 encounter (Office Visit) with Fabiano Liang MD   Medication Sig Dispense Refill    albuterol (PROVENTIL HFA) 90 mcg/actuation inhaler Inhale 2 puffs into the lungs every 6 (six) hours as needed for Wheezing. Rescue 18 g PRN    albuterol (PROVENTIL/VENTOLIN HFA) 90 mcg/actuation inhaler Inhale 2 puffs into the lungs every 6 (six) hours. 18 g 11    ALPRAZolam (XANAX) 0.5 MG tablet Take 1 tablet by mouth twice daily as needed for anxiety 45 tablet 3    calcium citrate-vitamin D3 315-200 mg (CITRACAL+D) 315 mg-5 mcg (200 unit) per tablet Take 1 tablet by mouth once daily.      cinnamon bark 500 mg capsule Take 500 mg by mouth once daily.      docusate sodium (COLACE) 100 MG capsule Take 100 mg by mouth Daily.      DULoxetine (CYMBALTA) 30 MG capsule Take 1 capsule by mouth once daily 30 capsule 6    ELIQUIS 5 mg Tab Take 1 tablet by mouth  twice daily 60 tablet 0    famotidine (PEPCID) 20 MG tablet Take 20 mg by mouth nightly as needed.      fluticasone-umeclidin-vilanter (TRELEGY ELLIPTA) 100-62.5-25 mcg DsDv Inhale 1 puff into the lungs once daily. 1 each 11    furosemide (LASIX) 40 MG tablet Take 1 tablet by mouth once daily 90 tablet 1    gabapentin (NEURONTIN) 100 MG capsule Take 1 capsule (100 mg total) by mouth every evening. 30 capsule 11    latanoprost 0.005 % ophthalmic solution Place 1 drop into both eyes every evening.      loratadine (CLARITIN) 10 mg tablet Take 10 mg by mouth daily as needed for Allergies.      losartan (COZAAR) 25 MG tablet Take 1 tablet by mouth twice daily 180 tablet 0    melatonin 10 mg Tab Take 10-20 mg by mouth daily as needed (insomnia).      metoclopramide HCl (REGLAN) 5 MG tablet Take 5 mg by mouth 2 (two) times daily.      metoprolol succinate (TOPROL-XL) 100 MG 24 hr tablet Take 100 mg by mouth once daily.      montelukast (SINGULAIR) 10 mg tablet Take 1 tablet by mouth once daily 30 tablet 11    NON FORMULARY MEDICATION Take 1 tablet by mouth once daily. Tumeric      omega-3 fatty acids/fish oil (FISH OIL-OMEGA-3 FATTY ACIDS) 300-1,000 mg capsule Take 1 capsule by mouth once daily.      omeprazole (PRILOSEC) 40 MG capsule Take 1 capsule by mouth once daily 30 capsule 6    potassium chloride (KLOR-CON) 8 MEQ TbSR Take 1 tablet (8 mEq total) by mouth once daily. 30 tablet 5    rosuvastatin (CRESTOR) 20 MG tablet TAKE 1 TABLET BY MOUTH ONCE IN THE EVENING      simethicone (GAS RELIEF, SIMETHICONE,) 125 mg Cap capsule Take 1 capsule (125 mg total) by mouth 4 (four) times daily as needed for Flatulence. 100 each 11    sotaloL (BETAPACE) 80 MG tablet Take 80 mg by mouth 2 (two) times daily.      tamsulosin (FLOMAX) 0.4 mg Cap Take 1 capsule by mouth once daily.      tiZANidine (ZANAFLEX) 4 MG tablet Take 1 tablet (4 mg total) by mouth 2 (two) times a day. 60 tablet 3    zolpidem (AMBIEN) 5 MG Tab TAKE 1 TABLET BY  "MOUTH ONCE DAILY AT BEDTIME AS NEEDED FOR INSOMNIA 30 tablet 5     Current Facility-Administered Medications for the 1/6/25 encounter (Office Visit) with Fabiano Liang MD   Medication Dose Route Frequency Provider Last Rate Last Admin    [COMPLETED] testosterone cypionate injection 200 mg  200 mg Intramuscular Q14 Days Fabiano Liang MD   200 mg at 01/06/25 1127       Objective:     /68 (BP Location: Right arm, Patient Position: Sitting)   Pulse 83   Resp 18   Ht 5' 7" (1.702 m)   Wt 101.2 kg (223 lb)   SpO2 95%   BMI 34.93 kg/m²     Physical Exam  Vitals reviewed.   Constitutional:       Appearance: Normal appearance.   HENT:      Head: Normocephalic and atraumatic.      Mouth/Throat:      Pharynx: Oropharynx is clear.   Eyes:      Pupils: Pupils are equal, round, and reactive to light.   Cardiovascular:      Rate and Rhythm: Normal rate and regular rhythm.      Pulses: Normal pulses.      Heart sounds: Normal heart sounds.   Pulmonary:      Breath sounds: Normal breath sounds.      Comments: Relatively decreased breath sounds left posterior chest  Abdominal:      General: Abdomen is flat.      Palpations: Abdomen is soft.      Comments: Obese but otherwise benign   Musculoskeletal:      Cervical back: Neck supple.   Skin:     General: Skin is warm and dry.   Neurological:      Mental Status: He is alert.     ER records reviewed    Assessment:     1. Dyspnea.  Chronic with mild exacerbation.  Multifactorial.  Including elevated left hemidiaphragm, status post plication diaphragm less than 2 years ago.    2. No evidence congestive heart failure despite past history of same.      3. History of atrial fibrillation.    4. Upper abdominal discomfort.    5. History of COPD on inhalers.      Plan:   Careful trial of theophylline or Domingo-Dur 100 b.i.d. after being cleared by ENT.  Follow-up with me as scheduled next month.  Problem List Items Addressed This Visit          Pulmonary    Moderate " chronic obstructive pulmonary disease - Primary    Paralysis of diaphragm    Overview     partial, left            Endocrine    Obesity with body mass index 30 or greater     Other Visit Diagnoses       Atrial fibrillation, unspecified type                 No orders of the defined types were placed in this encounter.       Medication List with Changes/Refills   New Medications    THEOPHYLLINE (THEODUR) 100 MG 12 HR TABLET    Take 1 tablet (100 mg total) by mouth 2 (two) times daily.       Start Date: 1/6/2025  End Date: 1/6/2026   Current Medications    ALBUTEROL (PROVENTIL HFA) 90 MCG/ACTUATION INHALER    Inhale 2 puffs into the lungs every 6 (six) hours as needed for Wheezing. Rescue       Start Date: 11/27/2024End Date: --    ALBUTEROL (PROVENTIL/VENTOLIN HFA) 90 MCG/ACTUATION INHALER    Inhale 2 puffs into the lungs every 6 (six) hours.       Start Date: 11/26/2024End Date: --    ALPRAZOLAM (XANAX) 0.5 MG TABLET    Take 1 tablet by mouth twice daily as needed for anxiety       Start Date: 12/2/2024 End Date: --    CALCIUM CITRATE-VITAMIN D3 315-200 MG (CITRACAL+D) 315 MG-5 MCG (200 UNIT) PER TABLET    Take 1 tablet by mouth once daily.       Start Date: --        End Date: --    CINNAMON BARK 500 MG CAPSULE    Take 500 mg by mouth once daily.       Start Date: --        End Date: --    DOCUSATE SODIUM (COLACE) 100 MG CAPSULE    Take 100 mg by mouth Daily.       Start Date: --        End Date: --    DULOXETINE (CYMBALTA) 30 MG CAPSULE    Take 1 capsule by mouth once daily       Start Date: 12/16/2024End Date: --    ELIQUIS 5 MG TAB    Take 1 tablet by mouth twice daily       Start Date: 6/17/2024 End Date: --    FAMOTIDINE (PEPCID) 20 MG TABLET    Take 20 mg by mouth nightly as needed.       Start Date: 2/1/2024  End Date: --    FLUTICASONE-UMECLIDIN-VILANTER (TRELEGY ELLIPTA) 100-62.5-25 MCG DSDV    Inhale 1 puff into the lungs once daily.       Start Date: 12/18/2023End Date: --    FUROSEMIDE (LASIX) 40 MG  TABLET    Take 1 tablet by mouth once daily       Start Date: 3/11/2024 End Date: --    GABAPENTIN (NEURONTIN) 100 MG CAPSULE    Take 1 capsule (100 mg total) by mouth every evening.       Start Date: 4/22/2024 End Date: 4/22/2025    LATANOPROST 0.005 % OPHTHALMIC SOLUTION    Place 1 drop into both eyes every evening.       Start Date: 1/15/2024 End Date: --    LORATADINE (CLARITIN) 10 MG TABLET    Take 10 mg by mouth daily as needed for Allergies.       Start Date: --        End Date: --    LOSARTAN (COZAAR) 25 MG TABLET    Take 1 tablet by mouth twice daily       Start Date: 6/24/2024 End Date: --    MELATONIN 10 MG TAB    Take 10-20 mg by mouth daily as needed (insomnia).       Start Date: --        End Date: --    METOCLOPRAMIDE HCL (REGLAN) 5 MG TABLET    Take 5 mg by mouth 2 (two) times daily.       Start Date: 1/2/2025  End Date: --    METOPROLOL SUCCINATE (TOPROL-XL) 100 MG 24 HR TABLET    Take 100 mg by mouth once daily.       Start Date: 5/8/2023  End Date: --    MONTELUKAST (SINGULAIR) 10 MG TABLET    Take 1 tablet by mouth once daily       Start Date: 5/30/2024 End Date: --    NON FORMULARY MEDICATION    Take 1 tablet by mouth once daily. Tumeric       Start Date: --        End Date: --    OMEGA-3 FATTY ACIDS/FISH OIL (FISH OIL-OMEGA-3 FATTY ACIDS) 300-1,000 MG CAPSULE    Take 1 capsule by mouth once daily.       Start Date: --        End Date: --    OMEPRAZOLE (PRILOSEC) 40 MG CAPSULE    Take 1 capsule by mouth once daily       Start Date: 7/25/2024 End Date: --    POTASSIUM CHLORIDE (KLOR-CON) 8 MEQ TBSR    Take 1 tablet (8 mEq total) by mouth once daily.       Start Date: 1/9/2024  End Date: --    ROSUVASTATIN (CRESTOR) 20 MG TABLET    TAKE 1 TABLET BY MOUTH ONCE IN THE EVENING       Start Date: 3/14/2022 End Date: --    SIMETHICONE (GAS RELIEF, SIMETHICONE,) 125 MG CAP CAPSULE    Take 1 capsule (125 mg total) by mouth 4 (four) times daily as needed for Flatulence.       Start Date: 11/26/2024End  Date: --    SOTALOL (BETAPACE) 80 MG TABLET    Take 80 mg by mouth 2 (two) times daily.       Start Date: 4/4/2023  End Date: --    TAMSULOSIN (FLOMAX) 0.4 MG CAP    Take 1 capsule by mouth once daily.       Start Date: 3/18/2022 End Date: --    TIZANIDINE (ZANAFLEX) 4 MG TABLET    Take 1 tablet (4 mg total) by mouth 2 (two) times a day.       Start Date: 12/10/2024End Date: --    ZOLPIDEM (AMBIEN) 5 MG TAB    TAKE 1 TABLET BY MOUTH ONCE DAILY AT BEDTIME AS NEEDED FOR INSOMNIA       Start Date: 7/22/2024 End Date: --            Follow up if symptoms worsen or fail to improve. In addition to their scheduled follow up, the patient has also been instructed to follow up on as needed basis.       Fabiano Liang

## 2025-01-06 NOTE — ED PROVIDER NOTES
Encounter Date: 1/5/2025    SCRIBE #1 NOTE: I, Madai Chou, am scribing for, and in the presence of,  Fabiano Altman MD. I have scribed the following portions of the note - the EKG reading. Other sections scribed: HPI, ROS, PE.       History     Chief Complaint   Patient presents with    Shortness of Breath     For the past few weeks he has been having increasing SOB. Pt has been worked up without any definitive results. Hx Afib.      Patient is a 74 year old male with a history of atrial fibrillation, COPD, and HTN that presents to the ED for worsening shortness of breath onset a few weeks ago. He reports difficulty taking a deep breath. He reports relief with rest. No acute changes in leg swelling and abdominal distention. He denies wheezing, cough, and fever. Patient is prescribed Eliquis. Patient has an appointment with Fabiano Liang MD tomorrow. He reports a procedure with MD Nga on his diaphragm a year ago.     The history is provided by the patient and medical records.     Review of patient's allergies indicates:   Allergen Reactions    Ativan [lorazepam] Hallucinations     Past Medical History:   Diagnosis Date    Anticoagulant long-term use     Anxiety     Asthma     Atrial fibrillation     Decreased testosterone level     Diaphragmatic hernia without obstruction or gangrene     DJD (degenerative joint disease)     Enlarged prostate     Exertional dyspnea     GERD (gastroesophageal reflux disease)     Glaucoma     History of kidney stones     HLD (hyperlipidemia)     Hypertension     OA (osteoarthritis)     Obesity     MINDA on CPAP     PAF (paroxysmal atrial fibrillation)     s/p Ablation (1/5/2023)    Pneumonia 01/2023    SBO (small bowel obstruction)     hx multiple (3) incudling requiring surgical intervention    Venous insufficiency     Ventricular tachycardia      Past Surgical History:   Procedure Laterality Date    ABDOMINAL SURGERY      for illeus    ABLATION N/A 01/05/2023    Procedure:  ABLATION;  Surgeon: Kenny Waller MD;  Location: [facility] CATH LAB;  Service: Cardiology;  Laterality: N/A;  EPS + AFL CATH ABLATION W/ ANEST.    COLONOSCOPY      DIAPHRAGM PLICATION Left 04/24/2023    Procedure: PLICATION, DIAPHRAGM;  Surgeon: Roberto Sylvester IV, MD;  Location: [facility];  Service: Cardiovascular;  Laterality: Left;  please add possible LLAA & REJI intraop    EGD, WITH CLOSED BIOPSY  5/3/2024    Procedure: EGD, WITH CLOSED BIOPSY;  Surgeon: TWILA Stewart MD;  Location: [facility] ENDOSCOPY;  Service: Gastroenterology;;    ESOPHAGOGASTRODUODENOSCOPY N/A 5/3/2024    Procedure: EGD;  Surgeon: TWILA Stewart MD;  Location: [facility] ENDOSCOPY;  Service: Gastroenterology;  Laterality: N/A;    EYE SURGERY      LAPAROSCOPIC REPAIR OF UMBILICAL HERNIA      LITHOTRIPSY      SINUS SURGERY      STRABISMUS SURGERY Bilateral 2/21/2024    Procedure: STRABISMUS SURGERY  bmr recess;  Surgeon: Reg Mendez MD;  Location: [facility];  Service: Ophthalmology;  Laterality: Bilateral;    WISDOM TOOTH EXTRACTION Bilateral      Family History   Problem Relation Name Age of Onset    Cancer Mother Sang Redd      Social History     Tobacco Use    Smoking status: Never    Smokeless tobacco: Never   Substance Use Topics    Alcohol use: Yes     Alcohol/week: 1.0 standard drink of alcohol     Types: 1 Glasses of wine per week     Comment: occasionally    Drug use: Never     Review of Systems   Constitutional:  Negative for chills, fatigue and fever.   HENT:  Negative for congestion and sore throat.    Eyes:  Negative for visual disturbance.   Respiratory:  Positive for shortness of breath. Negative for cough and wheezing.         Difficulty taking a deep breath.   Cardiovascular:  Negative for chest pain.   Gastrointestinal:  Negative for abdominal distention, abdominal pain, diarrhea, nausea and vomiting.   Genitourinary:  Negative for dysuria.   Musculoskeletal:  Negative for myalgias.   Skin:  Negative  for rash.   Neurological:  Negative for weakness, numbness and headaches.       Physical Exam     Initial Vitals [01/05/25 1543]   BP Pulse Resp Temp SpO2   (!) 164/89 69 20 97.6 °F (36.4 °C) (!) 94 %      MAP       --         Physical Exam    Nursing note and vitals reviewed.  Constitutional: He is Obese . No distress.   HENT:   Head: Normocephalic and atraumatic.   Right Ear: Tympanic membrane normal.   Left Ear: Tympanic membrane normal. Mouth/Throat: Oropharynx is clear and moist.   Eyes: Conjunctivae and EOM are normal. Pupils are equal, round, and reactive to light.   Neck: Trachea normal. Neck supple. Carotid bruit is not present. No JVD present.   Normal range of motion.  Cardiovascular:  Normal rate and regular rhythm.           2/6 systolic murmur at left sternal border.   Pulmonary/Chest: Breath sounds normal. No respiratory distress. He exhibits no tenderness.   Abdominal: Abdomen is soft. Bowel sounds are normal. He exhibits no distension. There is no abdominal tenderness.   Musculoskeletal:         General: Normal range of motion.      Cervical back: Normal range of motion and neck supple.      Lumbar back: Normal. No tenderness. Normal range of motion.     Neurological: He is alert and oriented to person, place, and time. He has normal strength. No cranial nerve deficit or sensory deficit.   Psychiatric: He has a normal mood and affect. Judgment normal.         ED Course   Procedures  Labs Reviewed   COMPREHENSIVE METABOLIC PANEL - Abnormal       Result Value    Sodium 141      Potassium 3.7      Chloride 103      CO2 29      Glucose 89      Blood Urea Nitrogen 14.7      Creatinine 1.26 (*)     Calcium 8.7 (*)     Protein Total 6.2      Albumin 3.5      Globulin 2.7      Albumin/Globulin Ratio 1.3      Bilirubin Total 0.6      ALP 53      ALT 16      AST 21      eGFR 59      Anion Gap 9.0      BUN/Creatinine Ratio 12     CBC WITH DIFFERENTIAL - Abnormal    WBC 6.41      RBC 4.74      Hgb 14.5      Hct  44.6      MCV 94.1 (*)     MCH 30.6      MCHC 32.5 (*)     RDW 13.2      Platelet 130      MPV 10.7 (*)     Neut % 70.2      Lymph % 12.2      Mono % 14.5      Eos % 2.3      Basophil % 0.6      Lymph # 0.78      Neut # 4.50      Mono # 0.93      Eos # 0.15      Baso # 0.04      IG# 0.01      IG% 0.2      NRBC% 0.0      IPF 4.3     B-TYPE NATRIURETIC PEPTIDE - Normal    Natriuretic Peptide 50.9     TROPONIN I - Normal    Troponin-I <0.010     COVID/RSV/FLU A&B PCR - Normal    Influenza A PCR Not Detected      Influenza B PCR Not Detected      Respiratory Syncytial Virus PCR Not Detected      SARS-CoV-2 PCR Not Detected      Narrative:     The Xpert Xpress SARS-CoV-2/FLU/RSV plus is a rapid, multiplexed real-time PCR test intended for the simultaneous qualitative detection and differentiation of SARS-CoV-2, Influenza A, Influenza B, and respiratory syncytial virus (RSV) viral RNA in either nasopharyngeal swab or nasal swab specimens.         APTT - Normal    PTT 28.8     PROTIME-INR - Normal    PT 14.3      INR 1.1     CBC W/ AUTO DIFFERENTIAL    Narrative:     The following orders were created for panel order CBC Auto Differential.  Procedure                               Abnormality         Status                     ---------                               -----------         ------                     CBC with Differential[[id number]]       Abnormal            Final result                 Please view results for these tests on the individual orders.     EKG Readings: (Independently Interpreted)   Initial Reading: No STEMI. Rhythm: Normal Sinus Rhythm. Heart Rate: 80. Ectopy: No Ectopy. Conduction: Normal. ST Segments: Normal ST Segments. T Waves: Normal. Axis: Normal. Clinical Impression: Left Ventricular Hypertrophy (LVH)   1545.       Imaging Results              CT Abdomen Pelvis With IV Contrast NO Oral Contrast (Final result)  Result time 01/05/25 19:30:56      Final result by Tom Fritz MD (01/05/25  19:30:56)                   Impression:      1.  No abdominopelvic visceral acute findings identified.    2.  Details of the findings above.      Electronically signed by: Tom Fritz  Date:    01/05/2025  Time:    19:30               Narrative:    EXAMINATION:  CT ABDOMEN PELVIS WITH IV CONTRAST    CLINICAL HISTORY:  Increasing abdominal distention;    TECHNIQUE:  Multidetector axial images were obtained of the abdomen and pelvis following the administration of IV contrast. Oral contrast was not administered.    Automated exposure control was utilized to minimize radiation dose.    COMPARISON:  August 30, 2024.    FINDINGS:  Chest findings are described on a separate report.    There is some appendix steatosis without focal space occupying lesion..  Gallbladder lumen contains a calculus on image 57 series 1.  Gallbladder is not distended and there is no thickened wall or pericholecystic fluid.  No biliary dilation identified. Pancreatic unremarkable attenuation without acute peripancreatic phlegmons. Main pancreatic duct is not dilated. Spleen is of normal size without focal lesion.  Root of mesentery ground opacities reflect some pending colitis    The adrenal glands appear within normal limits.  There is stable size of right kidney medial aspect exophytic 2.5 cm cystic structure.  Right kidney lower pole peripheral small exophytic cyst is again stable.  For these stable cysts no specific follow-up imaging is recommended.  Similar bilateral perinephric strandings.  There is no hydronephrosis.    Stomach is mostly decompressed.  There is no abnormal dilatation of the loops of small bowel and there is no focal or generalized mural thickening.  Appendix is not visualized.  There are no apparent right lower quadrant inflammatory changes.  Colon is remarkable for noninflamed diverticulosis coli without pericolonic acute strandings.  No bowel obstruction no free fluid.    Urinary bladder wall is not thickened.   Prostate is enlarged in size and measures 4.6 x 6 point is 6 cm.  Two there is no pelvic free fluid.    Grade 1 anterolisthesis of L4 on L5.  No acute or aggressive skeletal abnormality..                                       CTA Chest Non-Coronary (PE Studies) (Final result)  Result time 01/05/25 19:21:45      Final result by Tom Fritz MD (01/05/25 19:21:45)                   Impression:      1.  No pulmonary embolism identified.    2.  Left lower lung lobe chronic atelectasis or scarring.      Electronically signed by: Tom Fritz  Date:    01/05/2025  Time:    19:21               Narrative:    EXAMINATION:  CTA CHEST NON CORONARY (PE STUDIES)    CLINICAL HISTORY:  Pulmonary embolism (PE) suspected, unknown D-dimer;    TECHNIQUE:  Sequential axial images performed of the chest using pulmonary embolism protocol following intravenous contrast bolus. Sagittal and contrast reformations performed.    Dose product length of 1289 mGycm. Automated exposure control was utilized to minimize radiation dose.    COMPARISON:  July 23, 2024 and January 5, 2025.    FINDINGS:  Optimal contrast bolus timing with adequately opacified pulmonary artery system is without evidence of filling defects from pulmonary thromboembolism within the main pulmonary artery and the major branches.  Thoracic aorta is without aneurysmal dilatation or dissection.  Cardiac chamber size enlargement similar.  There is no pericardial effusion.  No enlarging chest lymph nodes.    Left lung chronically is of lesser volume with elevation of diaphragm.  Left basilar opacity is similar which may represent chronic atelectasis or scarring and less likely associated infectious process.  There also right lower lung lobe hypoventilatory changes and/or scarring with similar appearance.  There is no pulmonary edema.  No significant fluid within the pleural spaces.  No pneumothorax.                                       X-Ray Chest PA And Lateral (Final  result)  Result time 01/05/25 16:05:36      Final result by Wong Brewer MD (01/05/25 16:05:36)                   Impression:      No acute abnormality.      Electronically signed by: Wong Brewer MD  Date:    01/05/2025  Time:    16:05               Narrative:    EXAMINATION:  XR CHEST PA AND LATERAL    CLINICAL HISTORY:  SOB;    TECHNIQUE:  PA and lateral views of the chest were performed.    COMPARISON:  12/11/2024    FINDINGS:  The lungs are clear, with normal appearance of pulmonary vasculature and no pleural effusion or pneumothorax.  Persistent elevation left hemidiaphragm.    The cardiac silhouette is normal in size. The hilar and mediastinal contours are unremarkable.    Bones are intact.                                       Medications   iohexoL (OMNIPAQUE 350) injection 100 mL (100 mLs Intravenous Given 1/5/25 1851)     Medical Decision Making  Differential diagnosis includes, but is not limited to, CHF, anemia, metabolic disorder, MI, PE, and pnuemonia.     Amount and/or Complexity of Data Reviewed  Labs: ordered. Decision-making details documented in ED Course.  Radiology: ordered and independent interpretation performed.  ECG/medicine tests: ordered and independent interpretation performed.     Details: 1545. Initial Reading: No STEMI. Rhythm: Normal Sinus Rhythm. Heart Rate: 80. Ectopy: No Ectopy. Conduction: Normal. ST Segments: Normal ST Segments. T Waves: Normal. Axis: Normal. Clinical Impression: Left Ventricular Hypertrophy (LVH)             Scribe Attestation:   Scribe #1: I performed the above scribed service and the documentation accurately describes the services I performed. I attest to the accuracy of the note.    Attending Attestation:           Physician Attestation for Scribe:  Physician Attestation Statement for Scribe #1: I, Fabiano Altman MD, reviewed documentation, as scribed by Madai Chou in my presence, and it is both accurate and complete.             ED Course as of 01/05/25  1944   Sun Jan 05, 2025 1810 X-Ray Chest PA And Lateral [EV]   1940 Patient's workup is unremarkable, for reason for his dyspnea.  COVID RC and flu swabs negative, troponin negative BNP normal, hemoglobin 14.5 CMP unremarkable and CTA of the chest along with CT of the abdomen does not give an explanation.  I did discuss the incidental findings on the CT of the abdomen.  Patient has follow up in the morning with his internal medicine physician [MP]   1942 While in the ED is vitals have remained stable and other than slightly elevated blood pressure unremarkable [MP]      ED Course User Index  [EV] Madai Delgado FNP  [MP] Fabiano Altman MD                           Clinical Impression:  Final diagnoses:  [R06.02] Shortness of breath  [R06.00] Dyspnea, unspecified type (Primary)          ED Disposition Condition    Discharge Stable          ED Prescriptions    None       Follow-up Information       Follow up With Specialties Details Why Contact Info    Fabiano Liang MD Internal Medicine  Keep your appointment in the morning [address] [facility] [address]  [phone]      [facility] - Emergency Dept Emergency Medicine Go to  If symptoms worsen, As needed [address] [facility] [place] [id number] [phone]             Fabiano Altman MD  01/05/25 1944

## 2025-01-13 ENCOUNTER — TELEPHONE (OUTPATIENT)
Dept: INTERNAL MEDICINE | Facility: CLINIC | Age: 75
End: 2025-01-13
Payer: COMMERCIAL

## 2025-01-13 NOTE — TELEPHONE ENCOUNTER
----- Message from [employer] sent at 1/13/2025  3:46 PM CST -----  .Type:  Patient Returning Call    Who Called:pt  Who Left Message for Patient:pt  Does the patient know what this is regarding?:appt  Would the patient rather a call back or a response via [facility]?   Best Call Back Number:[phone]  Additional Information: Please call back about shortness of breath and cold no fever. Tried to schedule a appt but the system would not allow me

## 2025-01-14 ENCOUNTER — TELEPHONE (OUTPATIENT)
Dept: INTERNAL MEDICINE | Facility: CLINIC | Age: 75
End: 2025-01-14

## 2025-01-14 DIAGNOSIS — J98.6 PARALYSIS OF DIAPHRAGM: Primary | ICD-10-CM

## 2025-01-14 RX ORDER — THEOPHYLLINE 100 MG/1
100 TABLET, EXTENDED RELEASE ORAL 2 TIMES DAILY
Qty: 60 TABLET | Refills: 11 | Status: SHIPPED | OUTPATIENT
Start: 2025-01-14 | End: 2026-01-14

## 2025-01-14 NOTE — TELEPHONE ENCOUNTER
Patient stated [facility] does not carry the Rx, patient requested rx be sent to [facility].  Done per request, patient aware.

## 2025-01-14 NOTE — TELEPHONE ENCOUNTER
----- Message from Philip sent at 1/14/2025  2:08 PM CST -----  .Who Called: John Bullard    Caller is requesting assistance/information from provider's office.    Symptoms (please be specific):    How long has patient had these symptoms:    List of preferred pharmacies on file (remove unneeded):       Preferred Method of Contact: Phone Call  Patient's Preferred Phone Number on File: [phone]   Best Call Back Number, if different:  Additional Information: pt called requesting to speak with nurse in regards to recent prescription theophylline (THEODUR) 100 MG 12 hr tablet

## 2025-01-16 ENCOUNTER — OFFICE VISIT (OUTPATIENT)
Dept: INTERNAL MEDICINE | Facility: CLINIC | Age: 75
End: 2025-01-16
Payer: COMMERCIAL

## 2025-01-16 ENCOUNTER — HOSPITAL ENCOUNTER (OUTPATIENT)
Dept: RADIOLOGY | Facility: HOSPITAL | Age: 75
Discharge: HOME | End: 2025-01-16
Attending: NURSE PRACTITIONER
Payer: COMMERCIAL

## 2025-01-16 VITALS
DIASTOLIC BLOOD PRESSURE: 80 MMHG | SYSTOLIC BLOOD PRESSURE: 110 MMHG | WEIGHT: 224 LBS | OXYGEN SATURATION: 94 % | TEMPERATURE: 98 F | BODY MASS INDEX: 35.16 KG/M2 | HEART RATE: 85 BPM | HEIGHT: 67 IN

## 2025-01-16 DIAGNOSIS — R05.1 ACUTE COUGH: ICD-10-CM

## 2025-01-16 DIAGNOSIS — R09.89 CHEST CONGESTION: ICD-10-CM

## 2025-01-16 DIAGNOSIS — R05.1 ACUTE COUGH: Primary | ICD-10-CM

## 2025-01-16 DIAGNOSIS — R07.81 RIB PAIN ON LEFT SIDE: ICD-10-CM

## 2025-01-16 DIAGNOSIS — I42.9 CARDIOMYOPATHY, UNSPECIFIED TYPE: ICD-10-CM

## 2025-01-16 DIAGNOSIS — R06.02 CHRONIC SHORTNESS OF BREATH: ICD-10-CM

## 2025-01-16 PROCEDURE — 1160F RVW MEDS BY RX/DR IN RCRD: CPT | Mod: CPTII,,, | Performed by: NURSE PRACTITIONER

## 2025-01-16 PROCEDURE — 1125F AMNT PAIN NOTED PAIN PRSNT: CPT | Mod: CPTII,,, | Performed by: NURSE PRACTITIONER

## 2025-01-16 PROCEDURE — 1101F PT FALLS ASSESS-DOCD LE1/YR: CPT | Mod: CPTII,,, | Performed by: NURSE PRACTITIONER

## 2025-01-16 PROCEDURE — 3079F DIAST BP 80-89 MM HG: CPT | Mod: CPTII,,, | Performed by: NURSE PRACTITIONER

## 2025-01-16 PROCEDURE — 1159F MED LIST DOCD IN RCRD: CPT | Mod: CPTII,,, | Performed by: NURSE PRACTITIONER

## 2025-01-16 PROCEDURE — 71101 X-RAY EXAM UNILAT RIBS/CHEST: CPT | Mod: TC,LT

## 2025-01-16 PROCEDURE — 3074F SYST BP LT 130 MM HG: CPT | Mod: CPTII,,, | Performed by: NURSE PRACTITIONER

## 2025-01-16 PROCEDURE — 3288F FALL RISK ASSESSMENT DOCD: CPT | Mod: CPTII,,, | Performed by: NURSE PRACTITIONER

## 2025-01-16 PROCEDURE — 99214 OFFICE O/P EST MOD 30 MIN: CPT | Mod: ,,, | Performed by: NURSE PRACTITIONER

## 2025-01-16 RX ORDER — TESTOSTERONE CYPIONATE 200 MG/ML
200 INJECTION, SOLUTION INTRAMUSCULAR
COMMUNITY
Start: 2024-12-24

## 2025-01-16 NOTE — PROGRESS NOTES
"Subjective:      Patient ID: John Bullard is a 74 y.o. male.    Chief Complaint: Cough, chest congestion, Rib pain (Left side), and Shortness of Breath      HPI: Patient here today for cough, L rib pain, and SOB. SOB issues are chronic with recent addition of theophyline by Dr. Liang. Of note, he went to ER on 1/5 with SOB with neg work up/ER documentation reviewed.  S/s of SOB no worse, although L rib pain affects ability to take a deep breath with inc pain on inspiration and palpation.  No fever, chills, or sweats. Chronic sinus issues being managed by Dr. Medina, ENT. Suspicion of reflux.    Review of patient's allergies indicates:   Allergen Reactions    Ativan [lorazepam] Hallucinations       Review of Systems  Constitutional: No fever, No chills, No sweats, No fatigue, No weight loss.  Ear/Nose/Mouth/Throat: No nasal congestion, No vertigo. Chronic PND/hoarseness  Respiratory: chronic shortness of breath, cough, No sputum production, No wheezing, No exertional dyspnea.   Cardiovascular: No chest pain, No palpitations, No peripheral edema.  Gastrointestinal: No nausea, No vomiting, No diarrhea, No rectal bleeding, No constipation, No abdominal pain. Asymmetrical lump to L side of abdomen  Musculoskeletal: L rib pain    Objective:   Visit Vitals  /80 (BP Location: Right arm, Patient Position: Sitting)   Pulse 85   Temp 98 °F (36.7 °C)   Ht 5' 7" (1.702 m)   Wt 101.6 kg (224 lb)   SpO2 (!) 94%   BMI 35.08 kg/m²     The patient's weight trend is below:   Wt Readings from Last 4 Encounters:   01/16/25 101.6 kg (224 lb)   01/06/25 101.2 kg (223 lb)   01/05/25 99.8 kg (220 lb)   12/10/24 103.9 kg (229 lb)        Physical Exam  Vitals and nursing note reviewed.   Constitutional:       General: He is not in acute distress.     Appearance: Normal appearance. He is normal weight. He is not ill-appearing, toxic-appearing or diaphoretic.   HENT:      Head: Normocephalic and atraumatic.      Right Ear: " Tympanic membrane, ear canal and external ear normal.      Left Ear: Tympanic membrane, ear canal and external ear normal.      Nose: No congestion or rhinorrhea.      Mouth/Throat:      Mouth: Mucous membranes are moist.      Pharynx: Oropharynx is clear. No oropharyngeal exudate or posterior oropharyngeal erythema.   Cardiovascular:      Rate and Rhythm: Normal rate and regular rhythm.      Pulses: Normal pulses.      Heart sounds: No murmur heard.     No friction rub. No gallop.   Pulmonary:      Effort: Pulmonary effort is normal. No respiratory distress.      Breath sounds: Normal breath sounds. No stridor. No wheezing, rhonchi or rales.   Musculoskeletal:         General: Normal range of motion.        Arms:       Cervical back: Normal range of motion and neck supple. No rigidity or tenderness.      Comments: TTP noted to L lateral aspect of rib   Lymphadenopathy:      Cervical: No cervical adenopathy.   Skin:     General: Skin is warm and dry.   Neurological:      General: No focal deficit present.      Mental Status: He is alert and oriented to person, place, and time. Mental status is at baseline.      Motor: No weakness.      Coordination: Coordination normal.      Gait: Gait normal.   Psychiatric:         Mood and Affect: Mood normal.         Behavior: Behavior normal.         Thought Content: Thought content normal.         Judgment: Judgment normal.         Assessment/Plan:   1. Acute cough  Mucinex/Robitussin as needed    - XR Ribs Min 3 views w/PA Chest Left; Future    2. Chest congestion  See #1  CXR with lateral L rib view r/o fx  Splint, Tylenol as needed    - XR Ribs Min 3 views w/PA Chest Left; Future    3. Rib pain on left side  See #2    - XR Ribs Min 3 views w/PA Chest Left; Future    4. Chronic shortness of breath  He will be picking up theophyline today     5. Cardiomyopathy, unspecified type  No inc in weight, s/s worsening SOB  Under care of CV  Stable R & R      Medication List with  Changes/Refills   Current Medications    ALBUTEROL (PROVENTIL HFA) 90 MCG/ACTUATION INHALER    Inhale 2 puffs into the lungs every 6 (six) hours as needed for Wheezing. Rescue    ALBUTEROL (PROVENTIL/VENTOLIN HFA) 90 MCG/ACTUATION INHALER    Inhale 2 puffs into the lungs every 6 (six) hours.    ALPRAZOLAM (XANAX) 0.5 MG TABLET    Take 1 tablet by mouth twice daily as needed for anxiety    CALCIUM CITRATE-VITAMIN D3 315-200 MG (CITRACAL+D) 315 MG-5 MCG (200 UNIT) PER TABLET    Take 1 tablet by mouth once daily.    CINNAMON BARK 500 MG CAPSULE    Take 500 mg by mouth once daily.    DOCUSATE SODIUM (COLACE) 100 MG CAPSULE    Take 100 mg by mouth Daily.    DULOXETINE (CYMBALTA) 30 MG CAPSULE    Take 1 capsule by mouth once daily    ELIQUIS 5 MG TAB    Take 1 tablet by mouth twice daily    FAMOTIDINE (PEPCID) 20 MG TABLET    Take 20 mg by mouth nightly as needed.    FLUTICASONE-UMECLIDIN-VILANTER (TRELEGY ELLIPTA) 100-62.5-25 MCG DSDV    Inhale 1 puff into the lungs once daily.    FUROSEMIDE (LASIX) 40 MG TABLET    Take 1 tablet by mouth once daily    GABAPENTIN (NEURONTIN) 100 MG CAPSULE    Take 1 capsule (100 mg total) by mouth every evening.    LATANOPROST 0.005 % OPHTHALMIC SOLUTION    Place 1 drop into both eyes every evening.    LORATADINE (CLARITIN) 10 MG TABLET    Take 10 mg by mouth daily as needed for Allergies.    LOSARTAN (COZAAR) 25 MG TABLET    Take 1 tablet by mouth twice daily    MELATONIN 10 MG TAB    Take 10-20 mg by mouth daily as needed (insomnia).    METOCLOPRAMIDE HCL (REGLAN) 5 MG TABLET    Take 5 mg by mouth 2 (two) times daily.    METOPROLOL SUCCINATE (TOPROL-XL) 100 MG 24 HR TABLET    Take 100 mg by mouth once daily.    MONTELUKAST (SINGULAIR) 10 MG TABLET    Take 1 tablet by mouth once daily    NON FORMULARY MEDICATION    Take 1 tablet by mouth once daily. Tumeric    OMEGA-3 FATTY ACIDS/FISH OIL (FISH OIL-OMEGA-3 FATTY ACIDS) 300-1,000 MG CAPSULE    Take 1 capsule by mouth once daily.     OMEPRAZOLE (PRILOSEC) 40 MG CAPSULE    Take 1 capsule by mouth once daily    POTASSIUM CHLORIDE (KLOR-CON) 8 MEQ TBSR    Take 1 tablet (8 mEq total) by mouth once daily.    ROSUVASTATIN (CRESTOR) 20 MG TABLET    TAKE 1 TABLET BY MOUTH ONCE IN THE EVENING    SIMETHICONE (GAS RELIEF, SIMETHICONE,) 125 MG CAP CAPSULE    Take 1 capsule (125 mg total) by mouth 4 (four) times daily as needed for Flatulence.    SOTALOL (BETAPACE) 80 MG TABLET    Take 80 mg by mouth 2 (two) times daily.    TAMSULOSIN (FLOMAX) 0.4 MG CAP    Take 1 capsule by mouth once daily.    TESTOSTERONE CYPIONATE (DEPOTESTOTERONE CYPIONATE) 200 MG/ML INJECTION    Inject 200 mg into the muscle every 14 (fourteen) days.    THEOPHYLLINE (THEODUR) 100 MG 12 HR TABLET    Take 1 tablet (100 mg total) by mouth 2 (two) times daily.    TIZANIDINE (ZANAFLEX) 4 MG TABLET    Take 1 tablet (4 mg total) by mouth 2 (two) times a day.    ZOLPIDEM (AMBIEN) 5 MG TAB    TAKE 1 TABLET BY MOUTH ONCE DAILY AT BEDTIME AS NEEDED FOR INSOMNIA        No follow-ups on file.    Chemistry:  Lab Results   Component Value Date     01/05/2025    K 3.7 01/05/2025    BUN 14.7 01/05/2025    CREATININE 1.26 (H) 01/05/2025    EGFRNORACEVR 59 01/05/2025    GLUCOSE 89 01/05/2025    CALCIUM 8.7 (L) 01/05/2025    ALKPHOS 53 01/05/2025    LABPROT 6.2 01/05/2025    LABPROT 14.3 01/05/2025    ALBUMIN 3.5 01/05/2025    BILIDIR 0.3 04/29/2022    IBILI 0.30 04/29/2022    AST 21 01/05/2025    ALT 16 01/05/2025    MG 2.40 01/21/2023        Lab Results   Component Value Date    HGBA1C 5.0 05/12/2021        Hematology:  Lab Results   Component Value Date    WBC 6.41 01/05/2025    HGB 14.5 01/05/2025    HCT 44.6 01/05/2025     01/05/2025       Lipid Panel:  Lab Results   Component Value Date    CHOL 121 08/02/2024    HDL 41 08/02/2024    LDL 65.00 08/02/2024    TRIG 74 08/02/2024    TOTALCHOLEST 3 08/02/2024        Urine:  Lab Results   Component Value Date    APPEARANCEUA Clear 04/30/2023     SGUA 1.017 04/30/2023    PROTEINUA Negative 04/30/2023    KETONESUA Trace (A) 04/30/2023    LEUKOCYTESUR Trace (A) 04/30/2023    RBCUA <5 04/30/2023    WBCUA <5 04/30/2023    BACTERIA None Seen 04/30/2023        Future Appointments   Date Time Provider Department Center   1/21/2025  8:20 AM [facility]   2/4/2025  8:10 AM [facility]   2/18/2025  8:20 AM [facility]   2/18/2025 11:00 AM Fabiano Liang MD [id number]   3/6/2025  8:10 AM DEVEN KAPLAN [facility]   8/12/2025  9:00 AM Fabiano Liang MD [facility] [address] [place] [address]

## 2025-01-16 NOTE — PROGRESS NOTES
Subjective:      Patient ID: John Bullard is a 74 y.o. male.    Chief Complaint: Cough, chest congestion, and Rib pain (Left side)      HPI: Patient here today for complaints of cough, chest congestion, and L rib pain.    Review of patient's allergies indicates:   Allergen Reactions    Ativan [lorazepam] Hallucinations       Review of Systems    Objective:   There were no vitals taken for this visit.  The patient's weight trend is below:   Wt Readings from Last 4 Encounters:   01/06/25 101.2 kg (223 lb)   01/05/25 99.8 kg (220 lb)   12/10/24 103.9 kg (229 lb)   11/26/24 102.1 kg (225 lb)        Physical Exam    Assessment/Plan:   There are no diagnoses linked to this encounter.    Medication List with Changes/Refills   Current Medications    ALBUTEROL (PROVENTIL HFA) 90 MCG/ACTUATION INHALER    Inhale 2 puffs into the lungs every 6 (six) hours as needed for Wheezing. Rescue    ALBUTEROL (PROVENTIL/VENTOLIN HFA) 90 MCG/ACTUATION INHALER    Inhale 2 puffs into the lungs every 6 (six) hours.    ALPRAZOLAM (XANAX) 0.5 MG TABLET    Take 1 tablet by mouth twice daily as needed for anxiety    CALCIUM CITRATE-VITAMIN D3 315-200 MG (CITRACAL+D) 315 MG-5 MCG (200 UNIT) PER TABLET    Take 1 tablet by mouth once daily.    CINNAMON BARK 500 MG CAPSULE    Take 500 mg by mouth once daily.    DOCUSATE SODIUM (COLACE) 100 MG CAPSULE    Take 100 mg by mouth Daily.    DULOXETINE (CYMBALTA) 30 MG CAPSULE    Take 1 capsule by mouth once daily    ELIQUIS 5 MG TAB    Take 1 tablet by mouth twice daily    FAMOTIDINE (PEPCID) 20 MG TABLET    Take 20 mg by mouth nightly as needed.    FLUTICASONE-UMECLIDIN-VILANTER (TRELEGY ELLIPTA) 100-62.5-25 MCG DSDV    Inhale 1 puff into the lungs once daily.    FUROSEMIDE (LASIX) 40 MG TABLET    Take 1 tablet by mouth once daily    GABAPENTIN (NEURONTIN) 100 MG CAPSULE    Take 1 capsule (100 mg total) by mouth every evening.    LATANOPROST 0.005 % OPHTHALMIC SOLUTION    Place 1 drop into both eyes  every evening.    LORATADINE (CLARITIN) 10 MG TABLET    Take 10 mg by mouth daily as needed for Allergies.    LOSARTAN (COZAAR) 25 MG TABLET    Take 1 tablet by mouth twice daily    MELATONIN 10 MG TAB    Take 10-20 mg by mouth daily as needed (insomnia).    METOCLOPRAMIDE HCL (REGLAN) 5 MG TABLET    Take 5 mg by mouth 2 (two) times daily.    METOPROLOL SUCCINATE (TOPROL-XL) 100 MG 24 HR TABLET    Take 100 mg by mouth once daily.    MONTELUKAST (SINGULAIR) 10 MG TABLET    Take 1 tablet by mouth once daily    NON FORMULARY MEDICATION    Take 1 tablet by mouth once daily. Tumeric    OMEGA-3 FATTY ACIDS/FISH OIL (FISH OIL-OMEGA-3 FATTY ACIDS) 300-1,000 MG CAPSULE    Take 1 capsule by mouth once daily.    OMEPRAZOLE (PRILOSEC) 40 MG CAPSULE    Take 1 capsule by mouth once daily    POTASSIUM CHLORIDE (KLOR-CON) 8 MEQ TBSR    Take 1 tablet (8 mEq total) by mouth once daily.    ROSUVASTATIN (CRESTOR) 20 MG TABLET    TAKE 1 TABLET BY MOUTH ONCE IN THE EVENING    SIMETHICONE (GAS RELIEF, SIMETHICONE,) 125 MG CAP CAPSULE    Take 1 capsule (125 mg total) by mouth 4 (four) times daily as needed for Flatulence.    SOTALOL (BETAPACE) 80 MG TABLET    Take 80 mg by mouth 2 (two) times daily.    TAMSULOSIN (FLOMAX) 0.4 MG CAP    Take 1 capsule by mouth once daily.    TESTOSTERONE CYPIONATE (DEPOTESTOTERONE CYPIONATE) 200 MG/ML INJECTION    Inject 200 mg into the muscle every 14 (fourteen) days.    THEOPHYLLINE (THEODUR) 100 MG 12 HR TABLET    Take 1 tablet (100 mg total) by mouth 2 (two) times daily.    TIZANIDINE (ZANAFLEX) 4 MG TABLET    Take 1 tablet (4 mg total) by mouth 2 (two) times a day.    ZOLPIDEM (AMBIEN) 5 MG TAB    TAKE 1 TABLET BY MOUTH ONCE DAILY AT BEDTIME AS NEEDED FOR INSOMNIA        No follow-ups on file.    Chemistry:  Lab Results   Component Value Date     01/05/2025    K 3.7 01/05/2025    BUN 14.7 01/05/2025    CREATININE 1.26 (H) 01/05/2025    EGFRNORACEVR 59 01/05/2025    GLUCOSE 89 01/05/2025     CALCIUM 8.7 (L) 01/05/2025    ALKPHOS 53 01/05/2025    LABPROT 6.2 01/05/2025    LABPROT 14.3 01/05/2025    ALBUMIN 3.5 01/05/2025    BILIDIR 0.3 04/29/2022    IBILI 0.30 04/29/2022    AST 21 01/05/2025    ALT 16 01/05/2025    MG 2.40 01/21/2023        Lab Results   Component Value Date    HGBA1C 5.0 05/12/2021        Hematology:  Lab Results   Component Value Date    WBC 6.41 01/05/2025    HGB 14.5 01/05/2025    HCT 44.6 01/05/2025     01/05/2025       Lipid Panel:  Lab Results   Component Value Date    CHOL 121 08/02/2024    HDL 41 08/02/2024    LDL 65.00 08/02/2024    TRIG 74 08/02/2024    TOTALCHOLEST 3 08/02/2024        Urine:  Lab Results   Component Value Date    APPEARANCEUA Clear 04/30/2023    SGUA 1.017 04/30/2023    PROTEINUA Negative 04/30/2023    KETONESUA Trace (A) 04/30/2023    LEUKOCYTESUR Trace (A) 04/30/2023    RBCUA <5 04/30/2023    WBCUA <5 04/30/2023    BACTERIA None Seen 04/30/2023        Future Appointments   Date Time Provider Department Center   1/16/2025 10:40 AM Mary Sumner NP [id number] [place]   1/21/2025  8:20 AM DEVEN KAPLAN [facility] [place]   2/4/2025  8:10 AM DEVEN KAPLAN [facility] [place]   2/18/2025  8:20 AM DEVEN KAPLAN [facility] [place]   2/18/2025 11:00 AM Fabiano Liang MD [facility] [place]   3/6/2025  8:10 AM DEVEN KAPLAN [address] [place]   8/12/2025  9:00 AM Fabiano Liang MD [facility] [place]

## 2025-01-24 ENCOUNTER — OFFICE VISIT (OUTPATIENT)
Dept: INTERNAL MEDICINE | Facility: CLINIC | Age: 75
End: 2025-01-24
Payer: COMMERCIAL

## 2025-01-24 ENCOUNTER — DOCUMENTATION ONLY (OUTPATIENT)
Dept: INTERNAL MEDICINE | Facility: CLINIC | Age: 75
End: 2025-01-24

## 2025-01-24 ENCOUNTER — LAB VISIT (OUTPATIENT)
Dept: LAB | Facility: HOSPITAL | Age: 75
End: 2025-01-24
Attending: INTERNAL MEDICINE
Payer: COMMERCIAL

## 2025-01-24 ENCOUNTER — TELEPHONE (OUTPATIENT)
Dept: INTERNAL MEDICINE | Facility: CLINIC | Age: 75
End: 2025-01-24

## 2025-01-24 VITALS
DIASTOLIC BLOOD PRESSURE: 77 MMHG | HEART RATE: 88 BPM | BODY MASS INDEX: 35.1 KG/M2 | OXYGEN SATURATION: 94 % | WEIGHT: 223.63 LBS | RESPIRATION RATE: 18 BRPM | HEIGHT: 67 IN | SYSTOLIC BLOOD PRESSURE: 139 MMHG

## 2025-01-24 DIAGNOSIS — R06.02 CHRONIC SHORTNESS OF BREATH: ICD-10-CM

## 2025-01-24 DIAGNOSIS — J98.6 PARALYSIS OF DIAPHRAGM: ICD-10-CM

## 2025-01-24 DIAGNOSIS — E66.01 SEVERE OBESITY (BMI 35.0-39.9) WITH COMORBIDITY: ICD-10-CM

## 2025-01-24 DIAGNOSIS — G47.33 OSA ON CPAP: ICD-10-CM

## 2025-01-24 DIAGNOSIS — J44.9 MODERATE CHRONIC OBSTRUCTIVE PULMONARY DISEASE: ICD-10-CM

## 2025-01-24 DIAGNOSIS — I48.91 ATRIAL FIBRILLATION, UNSPECIFIED TYPE: ICD-10-CM

## 2025-01-24 DIAGNOSIS — I10 PRIMARY HYPERTENSION: ICD-10-CM

## 2025-01-24 DIAGNOSIS — R79.89 LOW TESTOSTERONE: ICD-10-CM

## 2025-01-24 DIAGNOSIS — Z00.00 WELLNESS EXAMINATION: ICD-10-CM

## 2025-01-24 DIAGNOSIS — K21.9 GASTROESOPHAGEAL REFLUX DISEASE, UNSPECIFIED WHETHER ESOPHAGITIS PRESENT: ICD-10-CM

## 2025-01-24 DIAGNOSIS — I48.91 ATRIAL FIBRILLATION, UNSPECIFIED TYPE: Primary | ICD-10-CM

## 2025-01-24 LAB
ALBUMIN SERPL-MCNC: 3.4 G/DL (ref 3.4–4.8)
ALBUMIN/GLOB SERPL: 1.2 RATIO (ref 1.1–2)
ALP SERPL-CCNC: 66 UNIT/L (ref 40–150)
ALT SERPL-CCNC: 18 UNIT/L (ref 0–55)
ANION GAP SERPL CALC-SCNC: 6 MEQ/L
AST SERPL-CCNC: 17 UNIT/L (ref 5–34)
BASOPHILS # BLD AUTO: 0.04 X10(3)/MCL
BASOPHILS NFR BLD AUTO: 0.5 %
BILIRUB SERPL-MCNC: 0.6 MG/DL
BNP BLD-MCNC: 27.4 PG/ML
BUN SERPL-MCNC: 17.4 MG/DL (ref 8.4–25.7)
CALCIUM SERPL-MCNC: 8.9 MG/DL (ref 8.8–10)
CHLORIDE SERPL-SCNC: 104 MMOL/L (ref 98–107)
CHOLEST SERPL-MCNC: 123 MG/DL
CHOLEST/HDLC SERPL: 4 {RATIO} (ref 0–5)
CO2 SERPL-SCNC: 31 MMOL/L (ref 23–31)
CREAT SERPL-MCNC: 0.99 MG/DL (ref 0.72–1.25)
CREAT/UREA NIT SERPL: 18
EOSINOPHIL # BLD AUTO: 0.2 X10(3)/MCL (ref 0–0.9)
EOSINOPHIL NFR BLD AUTO: 2.7 %
ERYTHROCYTE [DISTWIDTH] IN BLOOD BY AUTOMATED COUNT: 13.5 % (ref 11.5–17)
GFR SERPLBLD CREATININE-BSD FMLA CKD-EPI: >60 ML/MIN/1.73/M2
GLOBULIN SER-MCNC: 2.8 GM/DL (ref 2.4–3.5)
GLUCOSE SERPL-MCNC: 109 MG/DL (ref 82–115)
HCT VFR BLD AUTO: 48.3 % (ref 42–52)
HDLC SERPL-MCNC: 32 MG/DL (ref 35–60)
HGB BLD-MCNC: 15.3 G/DL (ref 14–18)
IMM GRANULOCYTES # BLD AUTO: 0.04 X10(3)/MCL (ref 0–0.04)
IMM GRANULOCYTES NFR BLD AUTO: 0.5 %
LDLC SERPL CALC-MCNC: 71 MG/DL (ref 50–140)
LYMPHOCYTES # BLD AUTO: 0.64 X10(3)/MCL (ref 0.6–4.6)
LYMPHOCYTES NFR BLD AUTO: 8.8 %
MCH RBC QN AUTO: 30.2 PG (ref 27–31)
MCHC RBC AUTO-ENTMCNC: 31.7 G/DL (ref 33–36)
MCV RBC AUTO: 95.5 FL (ref 80–94)
MONOCYTES # BLD AUTO: 0.75 X10(3)/MCL (ref 0.1–1.3)
MONOCYTES NFR BLD AUTO: 10.3 %
NEUTROPHILS # BLD AUTO: 5.62 X10(3)/MCL (ref 2.1–9.2)
NEUTROPHILS NFR BLD AUTO: 77.2 %
NRBC BLD AUTO-RTO: 0 %
PLATELET # BLD AUTO: 179 X10(3)/MCL (ref 130–400)
PMV BLD AUTO: 10 FL (ref 7.4–10.4)
POTASSIUM SERPL-SCNC: 4.1 MMOL/L (ref 3.5–5.1)
PROT SERPL-MCNC: 6.2 GM/DL (ref 5.8–7.6)
RBC # BLD AUTO: 5.06 X10(6)/MCL (ref 4.7–6.1)
SODIUM SERPL-SCNC: 141 MMOL/L (ref 136–145)
TESTOST SERPL-MCNC: 114.16 NG/DL (ref 220.91–715.81)
THEOPHYLLINE SERPL-MCNC: 3.2 UG/ML (ref 8–20)
TRIGL SERPL-MCNC: 98 MG/DL (ref 34–140)
TSH SERPL-ACNC: 0.69 UIU/ML (ref 0.35–4.94)
VLDLC SERPL CALC-MCNC: 20 MG/DL
WBC # BLD AUTO: 7.29 X10(3)/MCL (ref 4.5–11.5)

## 2025-01-24 PROCEDURE — 99214 OFFICE O/P EST MOD 30 MIN: CPT | Mod: ,,, | Performed by: INTERNAL MEDICINE

## 2025-01-24 PROCEDURE — 83880 ASSAY OF NATRIURETIC PEPTIDE: CPT

## 2025-01-24 PROCEDURE — 1101F PT FALLS ASSESS-DOCD LE1/YR: CPT | Mod: CPTII,,, | Performed by: INTERNAL MEDICINE

## 2025-01-24 PROCEDURE — 80061 LIPID PANEL: CPT

## 2025-01-24 PROCEDURE — 85025 COMPLETE CBC W/AUTO DIFF WBC: CPT

## 2025-01-24 PROCEDURE — 80198 ASSAY OF THEOPHYLLINE: CPT

## 2025-01-24 PROCEDURE — 80053 COMPREHEN METABOLIC PANEL: CPT

## 2025-01-24 PROCEDURE — 36415 COLL VENOUS BLD VENIPUNCTURE: CPT

## 2025-01-24 PROCEDURE — 3075F SYST BP GE 130 - 139MM HG: CPT | Mod: CPTII,,, | Performed by: INTERNAL MEDICINE

## 2025-01-24 PROCEDURE — 1159F MED LIST DOCD IN RCRD: CPT | Mod: CPTII,,, | Performed by: INTERNAL MEDICINE

## 2025-01-24 PROCEDURE — 84443 ASSAY THYROID STIM HORMONE: CPT

## 2025-01-24 PROCEDURE — 3078F DIAST BP <80 MM HG: CPT | Mod: CPTII,,, | Performed by: INTERNAL MEDICINE

## 2025-01-24 PROCEDURE — 3288F FALL RISK ASSESSMENT DOCD: CPT | Mod: CPTII,,, | Performed by: INTERNAL MEDICINE

## 2025-01-24 PROCEDURE — 84403 ASSAY OF TOTAL TESTOSTERONE: CPT

## 2025-01-24 PROCEDURE — 1126F AMNT PAIN NOTED NONE PRSNT: CPT | Mod: CPTII,,, | Performed by: INTERNAL MEDICINE

## 2025-01-24 RX ORDER — PREDNISONE 5 MG/1
TABLET ORAL
Qty: 30 TABLET | Refills: 0 | Status: SHIPPED | OUTPATIENT
Start: 2025-01-24 | End: 2025-02-05

## 2025-01-24 RX ORDER — DOXYCYCLINE 100 MG/1
100 CAPSULE ORAL EVERY 12 HOURS
Qty: 14 CAPSULE | Refills: 0 | Status: SHIPPED | OUTPATIENT
Start: 2025-01-24 | End: 2025-01-31

## 2025-01-24 NOTE — TELEPHONE ENCOUNTER
----- Message from Fabiano Liang MD sent at 1/24/2025  2:09 PM CST -----  Tell him blood test are unremarkable.  Theophylline level is actually low.  Not likely to cause a heart rhythm disturbance.  Thyroid normal.  Routine lab work all normal.  Test for heart failure is normal as well.  ----- Message -----  From: Lab, Background User  Sent: 1/24/2025  12:49 PM CST  To: Fabiano Liang MD

## 2025-01-24 NOTE — PROGRESS NOTES
Patient ID: [id number]      Subjective:     Chief Complaint: Cough (Cough x 8 days)      John Bullard is a 74 y.o. male.  Is a 74-year-old man complaining of persistent cough and shortness a breath.  Especially over the past few weeks.  He saw me on January 6 after an emergency room visit for same.  He saw nurse practitioner about 8 days ago.  Chest x-ray was clear but ribs had possible nondisplaced fractures.  He was having pain in that region which has improved.  The cough and shortness a breath and wheezing have not.  No fever chills.  The cough has been productive of clear or white phlegm.  The abdominal bloating seems to be worse than ever.  He does take Xanax occasionally but not that often.  He has a history of intolerance to Ativan.  No anginal-type chest pain.    Cough        Review of Systems   Respiratory:  Positive for cough.        Outpatient Medications Marked as Taking for the 1/24/25 encounter (Office Visit) with Fabiano Liang MD   Medication Sig Dispense Refill    albuterol (PROVENTIL HFA) 90 mcg/actuation inhaler Inhale 2 puffs into the lungs every 6 (six) hours as needed for Wheezing. Rescue 18 g PRN    albuterol (PROVENTIL/VENTOLIN HFA) 90 mcg/actuation inhaler Inhale 2 puffs into the lungs every 6 (six) hours. 18 g 11    ALPRAZolam (XANAX) 0.5 MG tablet Take 1 tablet by mouth twice daily as needed for anxiety 45 tablet 3    calcium citrate-vitamin D3 315-200 mg (CITRACAL+D) 315 mg-5 mcg (200 unit) per tablet Take 1 tablet by mouth once daily.      cinnamon bark 500 mg capsule Take 500 mg by mouth once daily.      docusate sodium (COLACE) 100 MG capsule Take 100 mg by mouth Daily.      DULoxetine (CYMBALTA) 30 MG capsule Take 1 capsule by mouth once daily 30 capsule 6    ELIQUIS 5 mg Tab Take 1 tablet by mouth twice daily 60 tablet 0    famotidine (PEPCID) 20 MG tablet Take 20 mg by mouth nightly as needed.      fluticasone-umeclidin-vilanter (TRELEGY ELLIPTA) 100-62.5-25 mcg DsDv Inhale  1 puff into the lungs once daily. 1 each 11    furosemide (LASIX) 40 MG tablet Take 1 tablet by mouth once daily 90 tablet 1    gabapentin (NEURONTIN) 100 MG capsule Take 1 capsule (100 mg total) by mouth every evening. 30 capsule 11    latanoprost 0.005 % ophthalmic solution Place 1 drop into both eyes every evening.      loratadine (CLARITIN) 10 mg tablet Take 10 mg by mouth daily as needed for Allergies.      losartan (COZAAR) 25 MG tablet Take 1 tablet by mouth twice daily 180 tablet 0    melatonin 10 mg Tab Take 10-20 mg by mouth daily as needed (insomnia).      metoclopramide HCl (REGLAN) 5 MG tablet Take 5 mg by mouth 2 (two) times daily.      metoprolol succinate (TOPROL-XL) 100 MG 24 hr tablet Take 100 mg by mouth once daily.      montelukast (SINGULAIR) 10 mg tablet Take 1 tablet by mouth once daily 30 tablet 11    NON FORMULARY MEDICATION Take 1 tablet by mouth once daily. Tumeric      omega-3 fatty acids/fish oil (FISH OIL-OMEGA-3 FATTY ACIDS) 300-1,000 mg capsule Take 1 capsule by mouth once daily.      omeprazole (PRILOSEC) 40 MG capsule Take 1 capsule by mouth once daily 30 capsule 6    potassium chloride (KLOR-CON) 8 MEQ TbSR Take 1 tablet (8 mEq total) by mouth once daily. 30 tablet 5    rosuvastatin (CRESTOR) 20 MG tablet TAKE 1 TABLET BY MOUTH ONCE IN THE EVENING      simethicone (GAS RELIEF, SIMETHICONE,) 125 mg Cap capsule Take 1 capsule (125 mg total) by mouth 4 (four) times daily as needed for Flatulence. 100 each 11    sotaloL (BETAPACE) 80 MG tablet Take 80 mg by mouth 2 (two) times daily.      tamsulosin (FLOMAX) 0.4 mg Cap Take 1 capsule by mouth once daily.      testosterone cypionate (DEPOTESTOTERONE CYPIONATE) 200 mg/mL injection Inject 200 mg into the muscle every 14 (fourteen) days.      theophylline (THEODUR) 100 MG 12 hr tablet Take 1 tablet (100 mg total) by mouth 2 (two) times daily. 60 tablet 11    tiZANidine (ZANAFLEX) 4 MG tablet Take 1 tablet (4 mg total) by mouth 2 (two)  "times a day. 60 tablet 3    zolpidem (AMBIEN) 5 MG Tab TAKE 1 TABLET BY MOUTH ONCE DAILY AT BEDTIME AS NEEDED FOR INSOMNIA 30 tablet 5       Objective:     /77 (BP Location: Right arm, Patient Position: Sitting)   Pulse 88   Resp 18   Ht 5' 7" (1.702 m)   Wt 101.4 kg (223 lb 9.6 oz)   SpO2 (!) 94%   BMI 35.02 kg/m²     Physical Exam  Vitals reviewed.   Constitutional:       Appearance: Normal appearance.   HENT:      Head: Normocephalic and atraumatic.      Mouth/Throat:      Pharynx: Oropharynx is clear.   Eyes:      Pupils: Pupils are equal, round, and reactive to light.   Cardiovascular:      Rate and Rhythm: Normal rate and regular rhythm.      Pulses: Normal pulses.      Heart sounds: Normal heart sounds.      Comments: Irregular rhythm.  Rate okay.  2/6 systolic murmur.  Pulmonary:      Breath sounds: Normal breath sounds.      Comments: Few rales right upper posterior thorax  Abdominal:      General: Abdomen is flat.      Palpations: Abdomen is soft.   Musculoskeletal:      Cervical back: Neck supple.   Skin:     General: Skin is warm and dry.   Neurological:      Mental Status: He is alert.         Assessment:     1. Persistent cough and shortness a breath.  Associated with intermittent wheezing.  Certainly some degree of bronchospasm.  We will treat for asthmatic bronchitis.    2. Chronic dyspnea that has multifactorial and extensively evaluated     3. History of malfunctioning left hemidiaphragm status post plication    4. History of atrial fibrillation.  Rhythm is suspicious again.      5. COPD     6. History of transient systolic dysfunction.  Followed by Dr. Dominique Hirsch    7. Central obesity.      Plan:   We will check EKG today.  Will add prednisone 20 mg daily with 3 day taper by 5 mg each.  Add Vibramycin 100 b.i.d. x7 days.  Keep appointment as scheduled in about 3 weeks.  If atrial fib back we will refer him back to his cardiologist.    EKG was read as ectopic atrial rhythm with short MI " interval infrequent PACs.  To me it could be atrial fibrillation.  I will send him back to his cardiologist to assess.  He is hemodynamically stable.  We do need to check electrolytes BNP and theophylline level.  Problem List Items Addressed This Visit    None  Visit Diagnoses       Atrial fibrillation, unspecified type    -  Primary    Relevant Orders    IN OFFICE EKG 12-LEAD (to Muse)             Orders Placed This Encounter   Procedures    IN OFFICE EKG 12-LEAD (to Muse)        Medication List with Changes/Refills   New Medications    DOXYCYCLINE (VIBRAMYCIN) 100 MG CAP    Take 1 capsule (100 mg total) by mouth every 12 (twelve) hours. for 7 days       Start Date: 1/24/2025 End Date: 1/31/2025    PREDNISONE (DELTASONE) 5 MG TABLET    Take 4 tablets (20 mg total) by mouth once daily for 3 days, THEN 3 tablets (15 mg total) once daily for 3 days, THEN 2 tablets (10 mg total) once daily for 3 days, THEN 1 tablet (5 mg total) once daily for 3 days.       Start Date: 1/24/2025 End Date: 2/5/2025   Current Medications    ALBUTEROL (PROVENTIL HFA) 90 MCG/ACTUATION INHALER    Inhale 2 puffs into the lungs every 6 (six) hours as needed for Wheezing. Rescue       Start Date: 11/27/2024End Date: --    ALBUTEROL (PROVENTIL/VENTOLIN HFA) 90 MCG/ACTUATION INHALER    Inhale 2 puffs into the lungs every 6 (six) hours.       Start Date: 11/26/2024End Date: --    ALPRAZOLAM (XANAX) 0.5 MG TABLET    Take 1 tablet by mouth twice daily as needed for anxiety       Start Date: 12/2/2024 End Date: --    CALCIUM CITRATE-VITAMIN D3 315-200 MG (CITRACAL+D) 315 MG-5 MCG (200 UNIT) PER TABLET    Take 1 tablet by mouth once daily.       Start Date: --        End Date: --    CINNAMON BARK 500 MG CAPSULE    Take 500 mg by mouth once daily.       Start Date: --        End Date: --    DOCUSATE SODIUM (COLACE) 100 MG CAPSULE    Take 100 mg by mouth Daily.       Start Date: --        End Date: --    DULOXETINE (CYMBALTA) 30 MG CAPSULE    Take 1  capsule by mouth once daily       Start Date: 12/16/2024End Date: --    ELIQUIS 5 MG TAB    Take 1 tablet by mouth twice daily       Start Date: 6/17/2024 End Date: --    FAMOTIDINE (PEPCID) 20 MG TABLET    Take 20 mg by mouth nightly as needed.       Start Date: 2/1/2024  End Date: --    FLUTICASONE-UMECLIDIN-VILANTER (TRELEGY ELLIPTA) 100-62.5-25 MCG DSDV    Inhale 1 puff into the lungs once daily.       Start Date: 12/18/2023End Date: --    FUROSEMIDE (LASIX) 40 MG TABLET    Take 1 tablet by mouth once daily       Start Date: 3/11/2024 End Date: --    GABAPENTIN (NEURONTIN) 100 MG CAPSULE    Take 1 capsule (100 mg total) by mouth every evening.       Start Date: 4/22/2024 End Date: 4/22/2025    LATANOPROST 0.005 % OPHTHALMIC SOLUTION    Place 1 drop into both eyes every evening.       Start Date: 1/15/2024 End Date: --    LORATADINE (CLARITIN) 10 MG TABLET    Take 10 mg by mouth daily as needed for Allergies.       Start Date: --        End Date: --    LOSARTAN (COZAAR) 25 MG TABLET    Take 1 tablet by mouth twice daily       Start Date: 6/24/2024 End Date: --    MELATONIN 10 MG TAB    Take 10-20 mg by mouth daily as needed (insomnia).       Start Date: --        End Date: --    METOCLOPRAMIDE HCL (REGLAN) 5 MG TABLET    Take 5 mg by mouth 2 (two) times daily.       Start Date: 1/2/2025  End Date: --    METOPROLOL SUCCINATE (TOPROL-XL) 100 MG 24 HR TABLET    Take 100 mg by mouth once daily.       Start Date: 5/8/2023  End Date: --    MONTELUKAST (SINGULAIR) 10 MG TABLET    Take 1 tablet by mouth once daily       Start Date: 5/30/2024 End Date: --    NON FORMULARY MEDICATION    Take 1 tablet by mouth once daily. Tumeric       Start Date: --        End Date: --    OMEGA-3 FATTY ACIDS/FISH OIL (FISH OIL-OMEGA-3 FATTY ACIDS) 300-1,000 MG CAPSULE    Take 1 capsule by mouth once daily.       Start Date: --        End Date: --    OMEPRAZOLE (PRILOSEC) 40 MG CAPSULE    Take 1 capsule by mouth once daily       Start Date:  7/25/2024 End Date: --    POTASSIUM CHLORIDE (KLOR-CON) 8 MEQ TBSR    Take 1 tablet (8 mEq total) by mouth once daily.       Start Date: 1/9/2024  End Date: --    ROSUVASTATIN (CRESTOR) 20 MG TABLET    TAKE 1 TABLET BY MOUTH ONCE IN THE EVENING       Start Date: 3/14/2022 End Date: --    SIMETHICONE (GAS RELIEF, SIMETHICONE,) 125 MG CAP CAPSULE    Take 1 capsule (125 mg total) by mouth 4 (four) times daily as needed for Flatulence.       Start Date: 11/26/2024End Date: --    SOTALOL (BETAPACE) 80 MG TABLET    Take 80 mg by mouth 2 (two) times daily.       Start Date: 4/4/2023  End Date: --    TAMSULOSIN (FLOMAX) 0.4 MG CAP    Take 1 capsule by mouth once daily.       Start Date: 3/18/2022 End Date: --    TESTOSTERONE CYPIONATE (DEPOTESTOTERONE CYPIONATE) 200 MG/ML INJECTION    Inject 200 mg into the muscle every 14 (fourteen) days.       Start Date: 12/24/2024End Date: --    THEOPHYLLINE (THEODUR) 100 MG 12 HR TABLET    Take 1 tablet (100 mg total) by mouth 2 (two) times daily.       Start Date: 1/14/2025 End Date: 1/14/2026    TIZANIDINE (ZANAFLEX) 4 MG TABLET    Take 1 tablet (4 mg total) by mouth 2 (two) times a day.       Start Date: 12/10/2024End Date: --    ZOLPIDEM (AMBIEN) 5 MG TAB    TAKE 1 TABLET BY MOUTH ONCE DAILY AT BEDTIME AS NEEDED FOR INSOMNIA       Start Date: 7/22/2024 End Date: --            Follow up if symptoms worsen or fail to improve. In addition to their scheduled follow up, the patient has also been instructed to follow up on as needed basis.       Fabiano Liang

## 2025-02-04 ENCOUNTER — CLINICAL SUPPORT (OUTPATIENT)
Dept: INTERNAL MEDICINE | Facility: CLINIC | Age: 75
End: 2025-02-04
Payer: COMMERCIAL

## 2025-02-04 DIAGNOSIS — R79.89 LOW TESTOSTERONE: Primary | ICD-10-CM

## 2025-02-04 PROCEDURE — 96372 THER/PROPH/DIAG INJ SC/IM: CPT | Mod: ,,, | Performed by: INTERNAL MEDICINE

## 2025-02-04 RX ORDER — TESTOSTERONE CYPIONATE 200 MG/ML
200 INJECTION, SOLUTION INTRAMUSCULAR
Status: COMPLETED | OUTPATIENT
Start: 2025-02-04 | End: 2025-02-04

## 2025-02-04 RX ADMIN — TESTOSTERONE CYPIONATE 200 MG: 200 INJECTION, SOLUTION INTRAMUSCULAR at 08:02

## 2025-02-10 ENCOUNTER — TELEPHONE (OUTPATIENT)
Dept: INTERNAL MEDICINE | Facility: CLINIC | Age: 75
End: 2025-02-10
Payer: COMMERCIAL

## 2025-02-10 NOTE — TELEPHONE ENCOUNTER
----- Message from Nurse Teodora sent at 2/10/2025 11:51 AM CST -----  Regarding: qiana Leigh 02/18 @11:00  Fasting labs needed.

## 2025-02-11 DIAGNOSIS — G47.00 INSOMNIA, UNSPECIFIED TYPE: ICD-10-CM

## 2025-02-11 RX ORDER — ZOLPIDEM TARTRATE 5 MG/1
TABLET ORAL
Qty: 30 TABLET | Refills: 5 | Status: SHIPPED | OUTPATIENT
Start: 2025-02-11

## 2025-02-14 ENCOUNTER — TELEPHONE (OUTPATIENT)
Dept: INTERNAL MEDICINE | Facility: CLINIC | Age: 75
End: 2025-02-14

## 2025-02-14 ENCOUNTER — LAB VISIT (OUTPATIENT)
Dept: LAB | Facility: HOSPITAL | Age: 75
End: 2025-02-14
Attending: INTERNAL MEDICINE
Payer: COMMERCIAL

## 2025-02-14 ENCOUNTER — OFFICE VISIT (OUTPATIENT)
Dept: INTERNAL MEDICINE | Facility: CLINIC | Age: 75
End: 2025-02-14
Payer: COMMERCIAL

## 2025-02-14 VITALS
BODY MASS INDEX: 35.09 KG/M2 | HEART RATE: 65 BPM | DIASTOLIC BLOOD PRESSURE: 70 MMHG | OXYGEN SATURATION: 95 % | WEIGHT: 223.56 LBS | SYSTOLIC BLOOD PRESSURE: 120 MMHG | HEIGHT: 67 IN

## 2025-02-14 DIAGNOSIS — R10.84 GENERALIZED ABDOMINAL PAIN: ICD-10-CM

## 2025-02-14 DIAGNOSIS — R06.02 SOB (SHORTNESS OF BREATH): ICD-10-CM

## 2025-02-14 DIAGNOSIS — E66.01 SEVERE OBESITY (BMI 35.0-39.9) WITH COMORBIDITY: ICD-10-CM

## 2025-02-14 DIAGNOSIS — G47.33 OSA ON CPAP: ICD-10-CM

## 2025-02-14 DIAGNOSIS — I48.91 ATRIAL FIBRILLATION, UNSPECIFIED TYPE: ICD-10-CM

## 2025-02-14 DIAGNOSIS — R79.89 LOW TESTOSTERONE: ICD-10-CM

## 2025-02-14 DIAGNOSIS — R06.00 DYSPNEA, UNSPECIFIED TYPE: ICD-10-CM

## 2025-02-14 DIAGNOSIS — R06.02 CHRONIC SHORTNESS OF BREATH: Primary | ICD-10-CM

## 2025-02-14 DIAGNOSIS — I49.9 IRREGULAR HEART RATE: ICD-10-CM

## 2025-02-14 DIAGNOSIS — F32.A DEPRESSION, UNSPECIFIED DEPRESSION TYPE: ICD-10-CM

## 2025-02-14 DIAGNOSIS — J98.4 RESTRICTIVE LUNG DISEASE: ICD-10-CM

## 2025-02-14 LAB
ALBUMIN SERPL-MCNC: 3.5 G/DL (ref 3.4–4.8)
ALBUMIN/GLOB SERPL: 1.5 RATIO (ref 1.1–2)
ALP SERPL-CCNC: 58 UNIT/L (ref 40–150)
ALT SERPL-CCNC: 21 UNIT/L (ref 0–55)
ANION GAP SERPL CALC-SCNC: 5 MEQ/L
AST SERPL-CCNC: 20 UNIT/L (ref 5–34)
BASOPHILS # BLD AUTO: 0.03 X10(3)/MCL
BASOPHILS NFR BLD AUTO: 0.5 %
BILIRUB SERPL-MCNC: 0.6 MG/DL
BNP BLD-MCNC: 48.3 PG/ML
BUN SERPL-MCNC: 13 MG/DL (ref 8.4–25.7)
CALCIUM SERPL-MCNC: 9 MG/DL (ref 8.8–10)
CHLORIDE SERPL-SCNC: 104 MMOL/L (ref 98–107)
CHOLEST SERPL-MCNC: 132 MG/DL
CHOLEST/HDLC SERPL: 3 {RATIO} (ref 0–5)
CO2 SERPL-SCNC: 30 MMOL/L (ref 23–31)
CREAT SERPL-MCNC: 0.84 MG/DL (ref 0.72–1.25)
CREAT/UREA NIT SERPL: 15
EOSINOPHIL # BLD AUTO: 0.19 X10(3)/MCL (ref 0–0.9)
EOSINOPHIL NFR BLD AUTO: 3 %
ERYTHROCYTE [DISTWIDTH] IN BLOOD BY AUTOMATED COUNT: 14.5 % (ref 11.5–17)
GFR SERPLBLD CREATININE-BSD FMLA CKD-EPI: >60 ML/MIN/1.73/M2
GLOBULIN SER-MCNC: 2.4 GM/DL (ref 2.4–3.5)
GLUCOSE SERPL-MCNC: 91 MG/DL (ref 82–115)
HCT VFR BLD AUTO: 45.5 % (ref 42–52)
HDLC SERPL-MCNC: 42 MG/DL (ref 35–60)
HGB BLD-MCNC: 14.8 G/DL (ref 14–18)
IMM GRANULOCYTES # BLD AUTO: 0.04 X10(3)/MCL (ref 0–0.04)
IMM GRANULOCYTES NFR BLD AUTO: 0.6 %
LDLC SERPL CALC-MCNC: 72 MG/DL (ref 50–140)
LYMPHOCYTES # BLD AUTO: 0.57 X10(3)/MCL (ref 0.6–4.6)
LYMPHOCYTES NFR BLD AUTO: 9.1 %
MCH RBC QN AUTO: 30 PG (ref 27–31)
MCHC RBC AUTO-ENTMCNC: 32.5 G/DL (ref 33–36)
MCV RBC AUTO: 92.3 FL (ref 80–94)
MONOCYTES # BLD AUTO: 0.75 X10(3)/MCL (ref 0.1–1.3)
MONOCYTES NFR BLD AUTO: 11.9 %
NEUTROPHILS # BLD AUTO: 4.71 X10(3)/MCL (ref 2.1–9.2)
NEUTROPHILS NFR BLD AUTO: 74.9 %
NRBC BLD AUTO-RTO: 0 %
PLATELET # BLD AUTO: 131 X10(3)/MCL (ref 130–400)
PLATELETS.RETICULATED NFR BLD AUTO: 2.7 % (ref 0.9–11.2)
PMV BLD AUTO: 10.3 FL (ref 7.4–10.4)
POTASSIUM SERPL-SCNC: 4 MMOL/L (ref 3.5–5.1)
PROT SERPL-MCNC: 5.9 GM/DL (ref 5.8–7.6)
RBC # BLD AUTO: 4.93 X10(6)/MCL (ref 4.7–6.1)
SODIUM SERPL-SCNC: 139 MMOL/L (ref 136–145)
TESTOST SERPL-MCNC: 362.01 NG/DL (ref 220.91–715.81)
TRIGL SERPL-MCNC: 90 MG/DL (ref 34–140)
TSH SERPL-ACNC: 1.46 UIU/ML (ref 0.35–4.94)
VLDLC SERPL CALC-MCNC: 18 MG/DL
WBC # BLD AUTO: 6.29 X10(3)/MCL (ref 4.5–11.5)

## 2025-02-14 PROCEDURE — 83880 ASSAY OF NATRIURETIC PEPTIDE: CPT

## 2025-02-14 PROCEDURE — 36415 COLL VENOUS BLD VENIPUNCTURE: CPT

## 2025-02-14 PROCEDURE — 80061 LIPID PANEL: CPT

## 2025-02-14 PROCEDURE — 80053 COMPREHEN METABOLIC PANEL: CPT

## 2025-02-14 PROCEDURE — 84403 ASSAY OF TOTAL TESTOSTERONE: CPT

## 2025-02-14 PROCEDURE — 84443 ASSAY THYROID STIM HORMONE: CPT

## 2025-02-14 PROCEDURE — 85025 COMPLETE CBC W/AUTO DIFF WBC: CPT

## 2025-02-14 RX ORDER — DILTIAZEM HYDROCHLORIDE 240 MG/1
240 CAPSULE, EXTENDED RELEASE ORAL DAILY
COMMUNITY
Start: 2025-01-31

## 2025-02-14 RX ORDER — DULOXETIN HYDROCHLORIDE 30 MG/1
30 CAPSULE, DELAYED RELEASE ORAL 2 TIMES DAILY
Qty: 60 CAPSULE | Refills: 6 | Status: SHIPPED | OUTPATIENT
Start: 2025-02-14

## 2025-02-14 NOTE — PROGRESS NOTES
"Subjective:      Patient ID: John Bullard is a 74 y.o. male.    Chief Complaint: Shortness of Breath      HPI: Patient here today for complaints of acute on chronic issues with SOB. Reports some "issues with inspiratory discomfort" noted to midepigastric region. He feels SOB on exertion. Currently on heart monitor per Dr. Bennett. S/s are relieved by relaxing.  He describes issue as "a sensation". He is frustrated with issues. Denies NELIA. He denies feeling as though he will pass out. He has attempted inhaler without relief. No URI or coughing. Prior CTA and XR rib reviewed and stable. EKG again reviewed with Dr. Liang.     Review of patient's allergies indicates:   Allergen Reactions    Ativan [lorazepam] Hallucinations       Review of Systems  Constitutional: No fatigue, No weight loss.  Respiratory: No shortness of breath, No exertional dyspnea.   Cardiovascular: No chest pain, No palpitations, No claudication, orthopnea, No peripheral edema.  Gastrointestinal: No nausea, No vomiting, No diarrhea, No rectal bleeding, No constipation, No abdominal pain.  Psychiatrics: No anxiety,No depression, No SI/HI.  Objective:   Visit Vitals  /70 (BP Location: Right arm, Patient Position: Sitting)   Pulse 65   Ht 5' 7" (1.702 m)   Wt 101.4 kg (223 lb 8.7 oz)   SpO2 95%   BMI 35.01 kg/m²     The patient's weight trend is below:   Wt Readings from Last 4 Encounters:   02/14/25 101.4 kg (223 lb 8.7 oz)   01/24/25 101.4 kg (223 lb 9.6 oz)   01/16/25 101.6 kg (224 lb)   01/06/25 101.2 kg (223 lb)        Physical Exam  Vitals and nursing note reviewed.   Constitutional:       General: He is not in acute distress.     Appearance: Normal appearance. He is obese. He is not ill-appearing, toxic-appearing or diaphoretic.   HENT:      Head: Normocephalic and atraumatic.   Cardiovascular:      Rate and Rhythm: Rhythm irregular.      Heart sounds: Murmur heard.   Pulmonary:      Effort: Pulmonary effort is normal.      Breath " sounds: Normal breath sounds.   Skin:     General: Skin is warm and dry.   Neurological:      General: No focal deficit present.      Mental Status: He is alert and oriented to person, place, and time. Mental status is at baseline.   Psychiatric:         Attention and Perception: Attention and perception normal.         Mood and Affect: Mood is anxious.         Speech: Speech normal.         Behavior: Behavior normal. Behavior is cooperative.         Thought Content: Thought content normal.         Cognition and Memory: Cognition and memory normal.         Judgment: Judgment normal.         Assessment/Plan:   1. Chronic shortness of breath  Reviewed prior dx studies with CTA, labs, CXR, and XR ribs  Under care of CV and Pul    2. Irregular heart rate  Stable R & R    3. Severe obesity (BMI 35.0-39.9) with comorbidity  HEALTH EDUCATION RECOMMENDATIONS:  weight control, diet and cholesterol, exercise 150 minutes per week, stress reduction, and adequate sleep 6-8 hours per night      4. Depression, unspecified depression type  Will inc to BID for possible neuropathic relief related to hemidiaphragm issues and concurrent benefit of possible anxiety  Follow up as scheduled    - DULoxetine (CYMBALTA) 30 MG capsule; Take 1 capsule (30 mg total) by mouth 2 (two) times daily. Take 1 capsule by mouth once daily  Dispense: 60 capsule; Refill: 6      Medication List with Changes/Refills   Current Medications    ALBUTEROL (PROVENTIL HFA) 90 MCG/ACTUATION INHALER    Inhale 2 puffs into the lungs every 6 (six) hours as needed for Wheezing. Rescue    ALBUTEROL (PROVENTIL/VENTOLIN HFA) 90 MCG/ACTUATION INHALER    Inhale 2 puffs into the lungs every 6 (six) hours.    ALPRAZOLAM (XANAX) 0.5 MG TABLET    Take 1 tablet by mouth twice daily as needed for anxiety    CALCIUM CITRATE-VITAMIN D3 315-200 MG (CITRACAL+D) 315 MG-5 MCG (200 UNIT) PER TABLET    Take 1 tablet by mouth once daily.    CINNAMON BARK 500 MG CAPSULE    Take 500 mg by  mouth once daily.    DILTIAZEM (TIAZAC) 240 MG CS24    Take 240 mg by mouth once daily.    DOCUSATE SODIUM (COLACE) 100 MG CAPSULE    Take 100 mg by mouth Daily.    ELIQUIS 5 MG TAB    Take 1 tablet by mouth twice daily    FAMOTIDINE (PEPCID) 20 MG TABLET    Take 20 mg by mouth nightly as needed.    FLUTICASONE-UMECLIDIN-VILANTER (TRELEGY ELLIPTA) 100-62.5-25 MCG DSDV    Inhale 1 puff into the lungs once daily.    FUROSEMIDE (LASIX) 40 MG TABLET    Take 1 tablet by mouth once daily    GABAPENTIN (NEURONTIN) 100 MG CAPSULE    Take 1 capsule (100 mg total) by mouth every evening.    LATANOPROST 0.005 % OPHTHALMIC SOLUTION    Place 1 drop into both eyes every evening.    LORATADINE (CLARITIN) 10 MG TABLET    Take 10 mg by mouth daily as needed for Allergies.    LOSARTAN (COZAAR) 25 MG TABLET    Take 1 tablet by mouth twice daily    MELATONIN 10 MG TAB    Take 10-20 mg by mouth daily as needed (insomnia).    METOCLOPRAMIDE HCL (REGLAN) 5 MG TABLET    Take 5 mg by mouth 2 (two) times daily.    METOPROLOL SUCCINATE (TOPROL-XL) 100 MG 24 HR TABLET    Take 100 mg by mouth once daily.    MONTELUKAST (SINGULAIR) 10 MG TABLET    Take 1 tablet by mouth once daily    NON FORMULARY MEDICATION    Take 1 tablet by mouth once daily. Tumeric    OMEGA-3 FATTY ACIDS/FISH OIL (FISH OIL-OMEGA-3 FATTY ACIDS) 300-1,000 MG CAPSULE    Take 1 capsule by mouth once daily.    OMEPRAZOLE (PRILOSEC) 40 MG CAPSULE    Take 1 capsule by mouth once daily    POTASSIUM CHLORIDE (KLOR-CON) 8 MEQ TBSR    Take 1 tablet (8 mEq total) by mouth once daily.    ROSUVASTATIN (CRESTOR) 20 MG TABLET    TAKE 1 TABLET BY MOUTH ONCE IN THE EVENING    SIMETHICONE (GAS RELIEF, SIMETHICONE,) 125 MG CAP CAPSULE    Take 1 capsule (125 mg total) by mouth 4 (four) times daily as needed for Flatulence.    SOTALOL (BETAPACE) 80 MG TABLET    Take 80 mg by mouth 2 (two) times daily.    TAMSULOSIN (FLOMAX) 0.4 MG CAP    Take 1 capsule by mouth once daily.    TESTOSTERONE  CYPIONATE (DEPOTESTOTERONE CYPIONATE) 200 MG/ML INJECTION    Inject 200 mg into the muscle every 14 (fourteen) days.    THEOPHYLLINE (THEODUR) 100 MG 12 HR TABLET    Take 1 tablet (100 mg total) by mouth 2 (two) times daily.    TIZANIDINE (ZANAFLEX) 4 MG TABLET    Take 1 tablet (4 mg total) by mouth 2 (two) times a day.    ZOLPIDEM (AMBIEN) 5 MG TAB    TAKE 1 TABLET BY MOUTH ONCE DAILY AT BEDTIME AS NEEDED FOR INSOMNIA   Changed and/or Refilled Medications    Modified Medication Previous Medication    DULOXETINE (CYMBALTA) 30 MG CAPSULE DULoxetine (CYMBALTA) 30 MG capsule       Take 1 capsule (30 mg total) by mouth 2 (two) times daily. Take 1 capsule by mouth once daily    Take 1 capsule by mouth once daily        No follow-ups on file.    Chemistry:  Lab Results   Component Value Date     02/14/2025    K 4.0 02/14/2025    BUN 13.0 02/14/2025    CREATININE 0.84 02/14/2025    EGFRNORACEVR >60 02/14/2025    GLUCOSE 91 02/14/2025    CALCIUM 9.0 02/14/2025    ALKPHOS 58 02/14/2025    LABPROT 5.9 02/14/2025    ALBUMIN 3.5 02/14/2025    BILIDIR 0.3 04/29/2022    IBILI 0.30 04/29/2022    AST 20 02/14/2025    ALT 21 02/14/2025    MG 2.40 01/21/2023        Lab Results   Component Value Date    HGBA1C 5.0 05/12/2021        Hematology:  Lab Results   Component Value Date    WBC 6.29 02/14/2025    HGB 14.8 02/14/2025    HCT 45.5 02/14/2025     02/14/2025       Lipid Panel:  Lab Results   Component Value Date    CHOL 132 02/14/2025    HDL 42 02/14/2025    LDL 72.00 02/14/2025    TRIG 90 02/14/2025    TOTALCHOLEST 3 02/14/2025        Urine:  Lab Results   Component Value Date    APPEARANCEUA Clear 04/30/2023    SGUA 1.017 04/30/2023    PROTEINUA Negative 04/30/2023    KETONESUA Trace (A) 04/30/2023    LEUKOCYTESUR Trace (A) 04/30/2023    RBCUA <5 04/30/2023    WBCUA <5 04/30/2023    BACTERIA None Seen 04/30/2023        Future Appointments   Date Time Provider Department Center   2/18/2025 11:00 AM Fabiano Liang  MD [id number]   3/6/2025  8:10 AM DEVEN KAPLAN [facility]   8/12/2025  9:00 AM [facility]ang [facility]

## 2025-02-18 ENCOUNTER — TELEPHONE (OUTPATIENT)
Dept: INTERNAL MEDICINE | Facility: CLINIC | Age: 75
End: 2025-02-18

## 2025-02-18 ENCOUNTER — OFFICE VISIT (OUTPATIENT)
Dept: INTERNAL MEDICINE | Facility: CLINIC | Age: 75
End: 2025-02-18
Payer: COMMERCIAL

## 2025-02-18 VITALS
WEIGHT: 227.38 LBS | DIASTOLIC BLOOD PRESSURE: 65 MMHG | BODY MASS INDEX: 35.69 KG/M2 | RESPIRATION RATE: 18 BRPM | HEART RATE: 95 BPM | HEIGHT: 67 IN | SYSTOLIC BLOOD PRESSURE: 117 MMHG | OXYGEN SATURATION: 95 %

## 2025-02-18 DIAGNOSIS — R06.02 CHRONIC SHORTNESS OF BREATH: Primary | ICD-10-CM

## 2025-02-18 DIAGNOSIS — I42.9 CARDIOMYOPATHY, UNSPECIFIED TYPE: ICD-10-CM

## 2025-02-18 DIAGNOSIS — F32.A DEPRESSION, UNSPECIFIED DEPRESSION TYPE: ICD-10-CM

## 2025-02-18 DIAGNOSIS — J98.6 PARALYSIS OF DIAPHRAGM: ICD-10-CM

## 2025-02-18 DIAGNOSIS — E66.01 SEVERE OBESITY (BMI 35.0-39.9) WITH COMORBIDITY: ICD-10-CM

## 2025-02-18 DIAGNOSIS — I48.91 ATRIAL FIBRILLATION, UNSPECIFIED TYPE: ICD-10-CM

## 2025-02-18 DIAGNOSIS — I10 PRIMARY HYPERTENSION: ICD-10-CM

## 2025-02-18 DIAGNOSIS — J44.9 MODERATE CHRONIC OBSTRUCTIVE PULMONARY DISEASE: ICD-10-CM

## 2025-02-18 DIAGNOSIS — R79.89 LOW TESTOSTERONE: ICD-10-CM

## 2025-02-18 NOTE — TELEPHONE ENCOUNTER
Pharmacist requesting confirmation rx is for Breztri Inhaler.  Confirmed with Dr. Liang, pharmacist advised.

## 2025-02-18 NOTE — PROGRESS NOTES
Patient ID: [id number]      Subjective:     Chief Complaint: No chief complaint on file.      John Bullard is a 74 y.o. male.  The patient is a 74-year-old man in for follow-up of multiple problems and in particular the chronic shortness a breath.  It does seem to be a bit better if the Cymbalta was increased to 30 b.i.d..  Some mild sedation noted as well.    He has had issues with atrial fib in irregular heart rhythms.  He has a 2 week event monitor on that will come off in 2 days.  Followed by Dr. Dominique Hirsch.            Review of Systems    Outpatient Medications Marked as Taking for the 2/18/25 encounter (Office Visit) with Fabiano Liang MD   Medication Sig Dispense Refill    albuterol (PROVENTIL HFA) 90 mcg/actuation inhaler Inhale 2 puffs into the lungs every 6 (six) hours as needed for Wheezing. Rescue 18 g PRN    albuterol (PROVENTIL/VENTOLIN HFA) 90 mcg/actuation inhaler Inhale 2 puffs into the lungs every 6 (six) hours. 18 g 11    ALPRAZolam (XANAX) 0.5 MG tablet Take 1 tablet by mouth twice daily as needed for anxiety 45 tablet 3    calcium citrate-vitamin D3 315-200 mg (CITRACAL+D) 315 mg-5 mcg (200 unit) per tablet Take 1 tablet by mouth once daily.      cinnamon bark 500 mg capsule Take 500 mg by mouth once daily.      diltiaZEM (TIAZAC) 240 MG Cs24 Take 240 mg by mouth once daily.      docusate sodium (COLACE) 100 MG capsule Take 100 mg by mouth Daily.      DULoxetine (CYMBALTA) 30 MG capsule Take 1 capsule (30 mg total) by mouth 2 (two) times daily. Take 1 capsule by mouth once daily 60 capsule 6    ELIQUIS 5 mg Tab Take 1 tablet by mouth twice daily 60 tablet 0    famotidine (PEPCID) 20 MG tablet Take 20 mg by mouth nightly as needed.      fluticasone-umeclidin-vilanter (TRELEGY ELLIPTA) 100-62.5-25 mcg DsDv Inhale 1 puff into the lungs once daily. 1 each 11    furosemide (LASIX) 40 MG tablet Take 1 tablet by mouth once daily 90 tablet 1    gabapentin (NEURONTIN) 100 MG capsule Take 1  capsule (100 mg total) by mouth every evening. 30 capsule 11    latanoprost 0.005 % ophthalmic solution Place 1 drop into both eyes every evening.      loratadine (CLARITIN) 10 mg tablet Take 10 mg by mouth daily as needed for Allergies.      losartan (COZAAR) 25 MG tablet Take 1 tablet by mouth twice daily 180 tablet 0    melatonin 10 mg Tab Take 10-20 mg by mouth daily as needed (insomnia).      metoclopramide HCl (REGLAN) 5 MG tablet Take 5 mg by mouth 2 (two) times daily.      metoprolol succinate (TOPROL-XL) 100 MG 24 hr tablet Take 100 mg by mouth once daily.      montelukast (SINGULAIR) 10 mg tablet Take 1 tablet by mouth once daily 30 tablet 11    NON FORMULARY MEDICATION Take 1 tablet by mouth once daily. Tumeric      omega-3 fatty acids/fish oil (FISH OIL-OMEGA-3 FATTY ACIDS) 300-1,000 mg capsule Take 1 capsule by mouth once daily.      omeprazole (PRILOSEC) 40 MG capsule Take 1 capsule by mouth once daily 30 capsule 6    potassium chloride (KLOR-CON) 8 MEQ TbSR Take 1 tablet (8 mEq total) by mouth once daily. 30 tablet 5    rosuvastatin (CRESTOR) 20 MG tablet TAKE 1 TABLET BY MOUTH ONCE IN THE EVENING      simethicone (GAS RELIEF, SIMETHICONE,) 125 mg Cap capsule Take 1 capsule (125 mg total) by mouth 4 (four) times daily as needed for Flatulence. 100 each 11    sotaloL (BETAPACE) 80 MG tablet Take 80 mg by mouth 2 (two) times daily.      tamsulosin (FLOMAX) 0.4 mg Cap Take 1 capsule by mouth once daily.      testosterone cypionate (DEPOTESTOTERONE CYPIONATE) 200 mg/mL injection Inject 200 mg into the muscle every 14 (fourteen) days.      theophylline (THEODUR) 100 MG 12 hr tablet Take 1 tablet (100 mg total) by mouth 2 (two) times daily. 60 tablet 11    tiZANidine (ZANAFLEX) 4 MG tablet Take 1 tablet (4 mg total) by mouth 2 (two) times a day. 60 tablet 3    zolpidem (AMBIEN) 5 MG Tab TAKE 1 TABLET BY MOUTH ONCE DAILY AT BEDTIME AS NEEDED FOR INSOMNIA 30 tablet 5       Objective:     /65 (BP  "Location: Right arm, Patient Position: Sitting)   Pulse 95   Resp 18   Ht 5' 7" (1.702 m)   Wt 103.1 kg (227 lb 6.4 oz)   SpO2 95%   BMI 35.62 kg/m²     Physical Exam  Vitals reviewed.   Constitutional:       Appearance: Normal appearance.   HENT:      Head: Normocephalic and atraumatic.      Mouth/Throat:      Pharynx: Oropharynx is clear.   Eyes:      Pupils: Pupils are equal, round, and reactive to light.   Cardiovascular:      Rate and Rhythm: Normal rate and regular rhythm.      Pulses: Normal pulses.      Heart sounds: Normal heart sounds.   Pulmonary:      Breath sounds: Normal breath sounds.   Abdominal:      General: Abdomen is flat.      Palpations: Abdomen is soft.   Musculoskeletal:      Cervical back: Neck supple.   Skin:     General: Skin is warm and dry.   Neurological:      Mental Status: He is alert.     Lab work reviewed    Assessment:     1. Chronic dyspnea.  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  Heart problems maybe contributing as well.  Overall better with increased dose of Cymbalta.  Maybe neuropathic or depression type contributors.      2. Hypertension.  Good control     3. Remote history of atrial fib     4. History of cardiomyopathy.  More recent echo showed much improvement    5. COPD by PFTs.  Relatively mild.    6. Obesity     7. Hyperlipidemia.  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 and follow-up with me 3 months with CBC CMP lipid TSH prior.      If upcoming cardiac workup benign then I think he needs to push his exercise program.  Problem List Items Addressed This Visit          Pulmonary    Moderate chronic obstructive pulmonary disease    Relevant Orders    CBC Auto Differential    Comprehensive Metabolic Panel    Lipid Panel    Paralysis of diaphragm    Overview   partial, " left            Cardiac/Vascular    Hypertension    Relevant Orders    CBC Auto Differential    Comprehensive Metabolic Panel    Lipid Panel    Cardiomyopathy    Overview   unspecified         Relevant Orders    CBC Auto Differential    Comprehensive Metabolic Panel    Lipid Panel       Endocrine    Severe obesity (BMI 35.0-39.9) with comorbidity    Relevant Orders    CBC Auto Differential    Comprehensive Metabolic Panel    Lipid Panel     Other Visit Diagnoses         Chronic shortness of breath    -  Primary    Relevant Orders    CBC Auto Differential    Comprehensive Metabolic Panel    Lipid Panel      Depression, unspecified depression type          Low testosterone          Atrial fibrillation, unspecified type        Relevant Orders    CBC Auto Differential    Comprehensive Metabolic Panel    Lipid Panel             Orders Placed This Encounter   Procedures    CBC Auto Differential     Standing Status:   Future     Expected Date:   5/18/2025     Expiration Date:   2/18/2026    Comprehensive Metabolic Panel     Standing Status:   Future     Expected Date:   5/18/2025     Expiration Date:   2/18/2026    Lipid Panel     Standing Status:   Future     Expected Date:   5/18/2025     Expiration Date:   2/18/2026        Medication List with Changes/Refills   New Medications    BUDESONIDE-GLYCOPYR-FORMOTEROL 160-9-4.8 MCG/ACTUATION HFAA    Inhale 1 puff into the lungs Daily.       Start Date: 2/18/2025 End Date: --   Current Medications    ALBUTEROL (PROVENTIL HFA) 90 MCG/ACTUATION INHALER    Inhale 2 puffs into the lungs every 6 (six) hours as needed for Wheezing. Rescue       Start Date: 11/27/2024End Date: --    ALBUTEROL (PROVENTIL/VENTOLIN HFA) 90 MCG/ACTUATION INHALER    Inhale 2 puffs into the lungs every 6 (six) hours.       Start Date: 11/26/2024End Date: --    ALPRAZOLAM (XANAX) 0.5 MG TABLET    Take 1 tablet by mouth twice daily as needed for anxiety       Start Date: 12/2/2024 End Date: --    CALCIUM  CITRATE-VITAMIN D3 315-200 MG (CITRACAL+D) 315 MG-5 MCG (200 UNIT) PER TABLET    Take 1 tablet by mouth once daily.       Start Date: --        End Date: --    CINNAMON BARK 500 MG CAPSULE    Take 500 mg by mouth once daily.       Start Date: --        End Date: --    DILTIAZEM (TIAZAC) 240 MG CS24    Take 240 mg by mouth once daily.       Start Date: 1/31/2025 End Date: --    DOCUSATE SODIUM (COLACE) 100 MG CAPSULE    Take 100 mg by mouth Daily.       Start Date: --        End Date: --    DULOXETINE (CYMBALTA) 30 MG CAPSULE    Take 1 capsule (30 mg total) by mouth 2 (two) times daily. Take 1 capsule by mouth once daily       Start Date: 2/14/2025 End Date: --    ELIQUIS 5 MG TAB    Take 1 tablet by mouth twice daily       Start Date: 6/17/2024 End Date: --    FAMOTIDINE (PEPCID) 20 MG TABLET    Take 20 mg by mouth nightly as needed.       Start Date: 2/1/2024  End Date: --    FLUTICASONE-UMECLIDIN-VILANTER (TRELEGY ELLIPTA) 100-62.5-25 MCG DSDV    Inhale 1 puff into the lungs once daily.       Start Date: 12/18/2023End Date: --    FUROSEMIDE (LASIX) 40 MG TABLET    Take 1 tablet by mouth once daily       Start Date: 3/11/2024 End Date: --    GABAPENTIN (NEURONTIN) 100 MG CAPSULE    Take 1 capsule (100 mg total) by mouth every evening.       Start Date: 4/22/2024 End Date: 4/22/2025    LATANOPROST 0.005 % OPHTHALMIC SOLUTION    Place 1 drop into both eyes every evening.       Start Date: 1/15/2024 End Date: --    LORATADINE (CLARITIN) 10 MG TABLET    Take 10 mg by mouth daily as needed for Allergies.       Start Date: --        End Date: --    LOSARTAN (COZAAR) 25 MG TABLET    Take 1 tablet by mouth twice daily       Start Date: 6/24/2024 End Date: --    MELATONIN 10 MG TAB    Take 10-20 mg by mouth daily as needed (insomnia).       Start Date: --        End Date: --    METOCLOPRAMIDE HCL (REGLAN) 5 MG TABLET    Take 5 mg by mouth 2 (two) times daily.       Start Date: 1/2/2025  End Date: --    METOPROLOL SUCCINATE  (TOPROL-XL) 100 MG 24 HR TABLET    Take 100 mg by mouth once daily.       Start Date: 5/8/2023  End Date: --    MONTELUKAST (SINGULAIR) 10 MG TABLET    Take 1 tablet by mouth once daily       Start Date: 5/30/2024 End Date: --    NON FORMULARY MEDICATION    Take 1 tablet by mouth once daily. Tumeric       Start Date: --        End Date: --    OMEGA-3 FATTY ACIDS/FISH OIL (FISH OIL-OMEGA-3 FATTY ACIDS) 300-1,000 MG CAPSULE    Take 1 capsule by mouth once daily.       Start Date: --        End Date: --    OMEPRAZOLE (PRILOSEC) 40 MG CAPSULE    Take 1 capsule by mouth once daily       Start Date: 7/25/2024 End Date: --    POTASSIUM CHLORIDE (KLOR-CON) 8 MEQ TBSR    Take 1 tablet (8 mEq total) by mouth once daily.       Start Date: 1/9/2024  End Date: --    ROSUVASTATIN (CRESTOR) 20 MG TABLET    TAKE 1 TABLET BY MOUTH ONCE IN THE EVENING       Start Date: 3/14/2022 End Date: --    SIMETHICONE (GAS RELIEF, SIMETHICONE,) 125 MG CAP CAPSULE    Take 1 capsule (125 mg total) by mouth 4 (four) times daily as needed for Flatulence.       Start Date: 11/26/2024End Date: --    SOTALOL (BETAPACE) 80 MG TABLET    Take 80 mg by mouth 2 (two) times daily.       Start Date: 4/4/2023  End Date: --    TAMSULOSIN (FLOMAX) 0.4 MG CAP    Take 1 capsule by mouth once daily.       Start Date: 3/18/2022 End Date: --    TESTOSTERONE CYPIONATE (DEPOTESTOTERONE CYPIONATE) 200 MG/ML INJECTION    Inject 200 mg into the muscle every 14 (fourteen) days.       Start Date: 12/24/2024End Date: --    THEOPHYLLINE (THEODUR) 100 MG 12 HR TABLET    Take 1 tablet (100 mg total) by mouth 2 (two) times daily.       Start Date: 1/14/2025 End Date: 1/14/2026    TIZANIDINE (ZANAFLEX) 4 MG TABLET    Take 1 tablet (4 mg total) by mouth 2 (two) times a day.       Start Date: 12/10/2024End Date: --    ZOLPIDEM (AMBIEN) 5 MG TAB    TAKE 1 TABLET BY MOUTH ONCE DAILY AT BEDTIME AS NEEDED FOR INSOMNIA       Start Date: 2/11/2025 End Date: --   Discontinued Medications     DOXYCYCLINE (VIBRAMYCIN) 100 MG CAP    Take 1 capsule (100 mg total) by mouth every 12 (twelve) hours. for 7 days       Start Date: 1/24/2025 End Date: 2/18/2025    PREDNISONE (DELTASONE) 5 MG TABLET    Take 4 tablets (20 mg total) by mouth once daily for 3 days, THEN 3 tablets (15 mg total) once daily for 3 days, THEN 2 tablets (10 mg total) once daily for 3 days, THEN 1 tablet (5 mg total) once daily for 3 days.       Start Date: 1/24/2025 End Date: 2/18/2025            Follow up in about 3 months (around 5/18/2025). In addition to their scheduled follow up, the patient has also been instructed to follow up on as needed basis.       Fabiano Liang

## 2025-02-18 NOTE — TELEPHONE ENCOUNTER
----- Message from Tahira sent at 2/18/2025  3:57 PM CST -----  Who Called: [facility]-Caller is requesting assistance/information from provider's office.Symptoms (please be specific): n/a How long has patient had these symptoms:  n/aList of preferred pharmacies on file (remove unneeded):n/aPreferred Method of Contact: Phone CallPatient's Preferred Phone Number on File: [phone] Best Call Back Number, if different: [phone] Information: needs clarification on budesonide-glycopyr-formoterol 160-9-4.8 mcg/actuation HFAA. Please advise.

## 2025-02-19 ENCOUNTER — CLINICAL SUPPORT (OUTPATIENT)
Dept: INTERNAL MEDICINE | Facility: CLINIC | Age: 75
End: 2025-02-19
Payer: COMMERCIAL

## 2025-02-19 DIAGNOSIS — R79.89 LOW TESTOSTERONE: Primary | ICD-10-CM

## 2025-02-19 RX ORDER — TESTOSTERONE CYPIONATE 200 MG/ML
200 INJECTION, SOLUTION INTRAMUSCULAR
Status: COMPLETED | OUTPATIENT
Start: 2025-02-19 | End: 2025-02-19

## 2025-02-19 RX ADMIN — TESTOSTERONE CYPIONATE 200 MG: 200 INJECTION, SOLUTION INTRAMUSCULAR at 08:02

## 2025-02-28 DIAGNOSIS — R79.89 LOW TESTOSTERONE: Primary | ICD-10-CM

## 2025-02-28 RX ORDER — TESTOSTERONE CYPIONATE 200 MG/ML
INJECTION, SOLUTION INTRAMUSCULAR
Qty: 3 ML | Refills: 4 | Status: SHIPPED | OUTPATIENT
Start: 2025-02-28

## 2025-03-06 ENCOUNTER — CLINICAL SUPPORT (OUTPATIENT)
Dept: INTERNAL MEDICINE | Facility: CLINIC | Age: 75
End: 2025-03-06
Payer: COMMERCIAL

## 2025-03-06 DIAGNOSIS — R79.89 LOW TESTOSTERONE: Primary | ICD-10-CM

## 2025-03-06 RX ORDER — TESTOSTERONE CYPIONATE 200 MG/ML
200 INJECTION, SOLUTION INTRAMUSCULAR
Status: COMPLETED | OUTPATIENT
Start: 2025-03-06 | End: 2025-03-06

## 2025-03-06 RX ADMIN — TESTOSTERONE CYPIONATE 200 MG: 200 INJECTION, SOLUTION INTRAMUSCULAR at 11:03

## 2025-03-17 DIAGNOSIS — K21.9 GASTROESOPHAGEAL REFLUX DISEASE, UNSPECIFIED WHETHER ESOPHAGITIS PRESENT: ICD-10-CM

## 2025-03-17 RX ORDER — OMEPRAZOLE 40 MG/1
40 CAPSULE, DELAYED RELEASE ORAL
Qty: 30 CAPSULE | Refills: 0 | Status: SHIPPED | OUTPATIENT
Start: 2025-03-17

## 2025-03-18 ENCOUNTER — CLINICAL SUPPORT (OUTPATIENT)
Dept: INTERNAL MEDICINE | Facility: CLINIC | Age: 75
End: 2025-03-18
Payer: COMMERCIAL

## 2025-03-18 DIAGNOSIS — R79.89 LOW TESTOSTERONE: Primary | ICD-10-CM

## 2025-03-18 PROCEDURE — 96372 THER/PROPH/DIAG INJ SC/IM: CPT | Mod: ,,, | Performed by: INTERNAL MEDICINE

## 2025-03-18 RX ORDER — TESTOSTERONE CYPIONATE 200 MG/ML
200 INJECTION, SOLUTION INTRAMUSCULAR
Status: COMPLETED | OUTPATIENT
Start: 2025-03-18 | End: 2025-03-18

## 2025-03-18 RX ADMIN — TESTOSTERONE CYPIONATE 200 MG: 200 INJECTION, SOLUTION INTRAMUSCULAR at 08:03

## 2025-04-01 ENCOUNTER — CLINICAL SUPPORT (OUTPATIENT)
Dept: INTERNAL MEDICINE | Facility: CLINIC | Age: 75
End: 2025-04-01
Payer: COMMERCIAL

## 2025-04-01 DIAGNOSIS — R79.89 LOW TESTOSTERONE: Primary | ICD-10-CM

## 2025-04-01 RX ORDER — TESTOSTERONE CYPIONATE 200 MG/ML
200 INJECTION, SOLUTION INTRAMUSCULAR
Status: COMPLETED | OUTPATIENT
Start: 2025-04-01 | End: 2025-04-01

## 2025-04-01 RX ADMIN — TESTOSTERONE CYPIONATE 200 MG: 200 INJECTION, SOLUTION INTRAMUSCULAR at 08:04

## 2025-04-11 DIAGNOSIS — K21.9 GASTROESOPHAGEAL REFLUX DISEASE, UNSPECIFIED WHETHER ESOPHAGITIS PRESENT: ICD-10-CM

## 2025-04-11 RX ORDER — OMEPRAZOLE 40 MG/1
40 CAPSULE, DELAYED RELEASE ORAL
Qty: 30 CAPSULE | Refills: 0 | Status: SHIPPED | OUTPATIENT
Start: 2025-04-11

## 2025-04-15 ENCOUNTER — CLINICAL SUPPORT (OUTPATIENT)
Dept: INTERNAL MEDICINE | Facility: CLINIC | Age: 75
End: 2025-04-15
Payer: COMMERCIAL

## 2025-04-15 DIAGNOSIS — R79.89 LOW TESTOSTERONE: Primary | ICD-10-CM

## 2025-04-15 RX ORDER — TESTOSTERONE CYPIONATE 200 MG/ML
200 INJECTION, SOLUTION INTRAMUSCULAR
Status: COMPLETED | OUTPATIENT
Start: 2025-04-15 | End: 2025-04-15

## 2025-04-15 RX ADMIN — TESTOSTERONE CYPIONATE 200 MG: 200 INJECTION, SOLUTION INTRAMUSCULAR at 08:04

## 2025-04-21 DIAGNOSIS — G62.9 NEUROPATHY: ICD-10-CM

## 2025-04-21 RX ORDER — GABAPENTIN 100 MG/1
100 CAPSULE ORAL NIGHTLY
Qty: 30 CAPSULE | Refills: 0 | Status: SHIPPED | OUTPATIENT
Start: 2025-04-21

## 2025-04-29 ENCOUNTER — CLINICAL SUPPORT (OUTPATIENT)
Dept: INTERNAL MEDICINE | Facility: CLINIC | Age: 75
End: 2025-04-29
Payer: COMMERCIAL

## 2025-04-29 DIAGNOSIS — R79.89 LOW TESTOSTERONE: Primary | ICD-10-CM

## 2025-04-29 RX ORDER — TESTOSTERONE CYPIONATE 200 MG/ML
200 INJECTION, SOLUTION INTRAMUSCULAR
Status: COMPLETED | OUTPATIENT
Start: 2025-04-29 | End: 2025-04-29

## 2025-04-29 RX ADMIN — TESTOSTERONE CYPIONATE 200 MG: 200 INJECTION, SOLUTION INTRAMUSCULAR at 08:04

## 2025-05-08 DIAGNOSIS — K21.9 GASTROESOPHAGEAL REFLUX DISEASE, UNSPECIFIED WHETHER ESOPHAGITIS PRESENT: ICD-10-CM

## 2025-05-09 RX ORDER — OMEPRAZOLE 40 MG/1
40 CAPSULE, DELAYED RELEASE ORAL
Qty: 30 CAPSULE | Refills: 0 | Status: SHIPPED | OUTPATIENT
Start: 2025-05-09

## 2025-05-13 ENCOUNTER — CLINICAL SUPPORT (OUTPATIENT)
Dept: INTERNAL MEDICINE | Facility: CLINIC | Age: 75
End: 2025-05-13
Payer: COMMERCIAL

## 2025-05-13 DIAGNOSIS — R79.89 LOW TESTOSTERONE: Primary | ICD-10-CM

## 2025-05-13 RX ORDER — TESTOSTERONE CYPIONATE 200 MG/ML
200 INJECTION, SOLUTION INTRAMUSCULAR
Status: COMPLETED | OUTPATIENT
Start: 2025-05-13 | End: 2025-05-13

## 2025-05-13 RX ADMIN — TESTOSTERONE CYPIONATE 200 MG: 200 INJECTION, SOLUTION INTRAMUSCULAR at 08:05

## 2025-05-18 DIAGNOSIS — M62.838 MUSCLE SPASM: Primary | ICD-10-CM

## 2025-05-19 DIAGNOSIS — G62.9 NEUROPATHY: ICD-10-CM

## 2025-05-19 RX ORDER — TIZANIDINE 4 MG/1
4 TABLET ORAL 2 TIMES DAILY
Qty: 60 TABLET | Refills: 2 | Status: SHIPPED | OUTPATIENT
Start: 2025-05-19

## 2025-05-19 RX ORDER — GABAPENTIN 100 MG/1
100 CAPSULE ORAL NIGHTLY
Qty: 30 CAPSULE | Refills: 0 | Status: SHIPPED | OUTPATIENT
Start: 2025-05-19

## 2025-05-20 ENCOUNTER — TELEPHONE (OUTPATIENT)
Dept: INTERNAL MEDICINE | Facility: CLINIC | Age: 75
End: 2025-05-20
Payer: COMMERCIAL

## 2025-05-20 NOTE — TELEPHONE ENCOUNTER
----- Message from Nurse Teodora sent at 5/20/2025 11:42 AM CDT -----  Regarding: qiana Leigh 05/27 @2:40  Fasting labs needed.

## 2025-05-26 ENCOUNTER — LAB VISIT (OUTPATIENT)
Dept: LAB | Facility: HOSPITAL | Age: 75
End: 2025-05-26
Attending: INTERNAL MEDICINE
Payer: COMMERCIAL

## 2025-05-26 DIAGNOSIS — E66.01 SEVERE OBESITY (BMI 35.0-39.9) WITH COMORBIDITY: ICD-10-CM

## 2025-05-26 DIAGNOSIS — R06.02 CHRONIC SHORTNESS OF BREATH: ICD-10-CM

## 2025-05-26 DIAGNOSIS — J44.9 MODERATE CHRONIC OBSTRUCTIVE PULMONARY DISEASE: ICD-10-CM

## 2025-05-26 DIAGNOSIS — I42.9 CARDIOMYOPATHY, UNSPECIFIED TYPE: ICD-10-CM

## 2025-05-26 DIAGNOSIS — I48.91 ATRIAL FIBRILLATION, UNSPECIFIED TYPE: ICD-10-CM

## 2025-05-26 DIAGNOSIS — I10 PRIMARY HYPERTENSION: ICD-10-CM

## 2025-05-26 LAB
ALBUMIN SERPL-MCNC: 3.3 G/DL (ref 3.4–4.8)
ALBUMIN/GLOB SERPL: 1.3 RATIO (ref 1.1–2)
ALP SERPL-CCNC: 61 UNIT/L (ref 40–150)
ALT SERPL-CCNC: 15 UNIT/L (ref 0–55)
ANION GAP SERPL CALC-SCNC: 9 MEQ/L
AST SERPL-CCNC: 15 UNIT/L (ref 11–45)
BASOPHILS # BLD AUTO: 0.03 X10(3)/MCL
BASOPHILS NFR BLD AUTO: 0.6 %
BILIRUB SERPL-MCNC: 0.5 MG/DL
BUN SERPL-MCNC: 16.1 MG/DL (ref 8.4–25.7)
CALCIUM SERPL-MCNC: 9.5 MG/DL (ref 8.8–10)
CHLORIDE SERPL-SCNC: 104 MMOL/L (ref 98–107)
CHOLEST SERPL-MCNC: 80 MG/DL
CHOLEST/HDLC SERPL: 3 {RATIO} (ref 0–5)
CO2 SERPL-SCNC: 30 MMOL/L (ref 23–31)
CREAT SERPL-MCNC: 1.05 MG/DL (ref 0.72–1.25)
CREAT/UREA NIT SERPL: 15
EOSINOPHIL # BLD AUTO: 0.19 X10(3)/MCL (ref 0–0.9)
EOSINOPHIL NFR BLD AUTO: 3.7 %
ERYTHROCYTE [DISTWIDTH] IN BLOOD BY AUTOMATED COUNT: 14.7 % (ref 11.5–17)
GFR SERPLBLD CREATININE-BSD FMLA CKD-EPI: >60 ML/MIN/1.73/M2
GLOBULIN SER-MCNC: 2.6 GM/DL (ref 2.4–3.5)
GLUCOSE SERPL-MCNC: 90 MG/DL (ref 82–115)
HCT VFR BLD AUTO: 48.8 % (ref 42–52)
HDLC SERPL-MCNC: 28 MG/DL (ref 35–60)
HGB BLD-MCNC: 15.2 G/DL (ref 14–18)
IMM GRANULOCYTES # BLD AUTO: 0.02 X10(3)/MCL (ref 0–0.04)
IMM GRANULOCYTES NFR BLD AUTO: 0.4 %
LDLC SERPL CALC-MCNC: 35 MG/DL (ref 50–140)
LYMPHOCYTES # BLD AUTO: 0.73 X10(3)/MCL (ref 0.6–4.6)
LYMPHOCYTES NFR BLD AUTO: 14 %
MCH RBC QN AUTO: 29.7 PG (ref 27–31)
MCHC RBC AUTO-ENTMCNC: 31.1 G/DL (ref 33–36)
MCV RBC AUTO: 95.5 FL (ref 80–94)
MONOCYTES # BLD AUTO: 0.75 X10(3)/MCL (ref 0.1–1.3)
MONOCYTES NFR BLD AUTO: 14.4 %
NEUTROPHILS # BLD AUTO: 3.48 X10(3)/MCL (ref 2.1–9.2)
NEUTROPHILS NFR BLD AUTO: 66.9 %
NRBC BLD AUTO-RTO: 0 %
PLATELET # BLD AUTO: 151 X10(3)/MCL (ref 130–400)
PMV BLD AUTO: 10.5 FL (ref 7.4–10.4)
POTASSIUM SERPL-SCNC: 4.1 MMOL/L (ref 3.5–5.1)
PROT SERPL-MCNC: 5.9 GM/DL (ref 5.8–7.6)
RBC # BLD AUTO: 5.11 X10(6)/MCL (ref 4.7–6.1)
SODIUM SERPL-SCNC: 143 MMOL/L (ref 136–145)
TRIGL SERPL-MCNC: 86 MG/DL (ref 34–140)
VLDLC SERPL CALC-MCNC: 17 MG/DL
WBC # BLD AUTO: 5.2 X10(3)/MCL (ref 4.5–11.5)

## 2025-05-26 PROCEDURE — 85025 COMPLETE CBC W/AUTO DIFF WBC: CPT

## 2025-05-26 PROCEDURE — 80053 COMPREHEN METABOLIC PANEL: CPT

## 2025-05-26 PROCEDURE — 80061 LIPID PANEL: CPT

## 2025-05-26 PROCEDURE — 36415 COLL VENOUS BLD VENIPUNCTURE: CPT

## 2025-05-27 ENCOUNTER — HOSPITAL ENCOUNTER (OUTPATIENT)
Dept: RADIOLOGY | Facility: HOSPITAL | Age: 75
Discharge: HOME | End: 2025-05-27
Attending: INTERNAL MEDICINE
Payer: COMMERCIAL

## 2025-05-27 ENCOUNTER — OFFICE VISIT (OUTPATIENT)
Dept: INTERNAL MEDICINE | Facility: CLINIC | Age: 75
End: 2025-05-27
Payer: COMMERCIAL

## 2025-05-27 VITALS
RESPIRATION RATE: 18 BRPM | HEIGHT: 67 IN | SYSTOLIC BLOOD PRESSURE: 113 MMHG | DIASTOLIC BLOOD PRESSURE: 71 MMHG | OXYGEN SATURATION: 95 % | HEART RATE: 71 BPM | BODY MASS INDEX: 35.47 KG/M2 | WEIGHT: 226 LBS

## 2025-05-27 DIAGNOSIS — E66.01 SEVERE OBESITY (BMI 35.0-39.9) WITH COMORBIDITY: ICD-10-CM

## 2025-05-27 DIAGNOSIS — R31.9 HEMATURIA, UNSPECIFIED TYPE: ICD-10-CM

## 2025-05-27 DIAGNOSIS — I10 PRIMARY HYPERTENSION: Primary | ICD-10-CM

## 2025-05-27 DIAGNOSIS — I48.91 ATRIAL FIBRILLATION, UNSPECIFIED TYPE: ICD-10-CM

## 2025-05-27 DIAGNOSIS — R06.02 CHRONIC SHORTNESS OF BREATH: ICD-10-CM

## 2025-05-27 DIAGNOSIS — R79.89 LOW TESTOSTERONE: Primary | ICD-10-CM

## 2025-05-27 DIAGNOSIS — R79.89 LOW TESTOSTERONE: ICD-10-CM

## 2025-05-27 LAB
BACTERIA #/AREA URNS AUTO: ABNORMAL /HPF
BILIRUB UR QL STRIP.AUTO: NEGATIVE
CLARITY UR: CLEAR
COLOR UR AUTO: ABNORMAL
GLUCOSE UR QL STRIP: NORMAL
HGB UR QL STRIP: NEGATIVE
HYALINE CASTS #/AREA URNS LPF: ABNORMAL /LPF
KETONES UR QL STRIP: NEGATIVE
LEUKOCYTE ESTERASE UR QL STRIP: 75
MUCOUS THREADS URNS QL MICRO: ABNORMAL /LPF
NITRITE UR QL STRIP: NEGATIVE
PH UR STRIP: 5 [PH]
PROT UR QL STRIP: NEGATIVE
RBC #/AREA URNS AUTO: ABNORMAL /HPF
SP GR UR STRIP.AUTO: 1.01 (ref 1–1.03)
SQUAMOUS #/AREA URNS LPF: ABNORMAL /HPF
UROBILINOGEN UR STRIP-ACNC: NORMAL
WBC #/AREA URNS AUTO: ABNORMAL /HPF

## 2025-05-27 PROCEDURE — 71046 X-RAY EXAM CHEST 2 VIEWS: CPT | Mod: TC

## 2025-05-27 RX ORDER — TESTOSTERONE CYPIONATE 200 MG/ML
200 INJECTION, SOLUTION INTRAMUSCULAR
Status: COMPLETED | OUTPATIENT
Start: 2025-05-27 | End: 2025-05-27

## 2025-05-27 RX ADMIN — TESTOSTERONE CYPIONATE 200 MG: 200 INJECTION, SOLUTION INTRAMUSCULAR at 03:05

## 2025-05-27 NOTE — PROGRESS NOTES
Patient ID: [id number]      Subjective:     Chief Complaint: Follow-up (No appetite, bloating and abdominal discomfort with exertion)      John Bullard is a 75 y.o. male.  He is in for follow-up of numerous medical problems.  He continues to have shortness for breath with exertion.  Also problems with the abdominal swelling.  Lots of gas.  Some discomfort.  Feels like the distention is making his breathing worse.  No diarrhea.  No nausea vomiting.  Appetite is not been good.  He feels that has nutrition however is good.  No carbonated drinks.  No sniffles.    Follow-up        Review of Systems    Outpatient Medications Marked as Taking for the 5/27/25 encounter (Office Visit) with Fabiano Liang MD   Medication Sig Dispense Refill    albuterol (PROVENTIL HFA) 90 mcg/actuation inhaler Inhale 2 puffs into the lungs every 6 (six) hours as needed for Wheezing. Rescue 18 g PRN    albuterol (PROVENTIL/VENTOLIN HFA) 90 mcg/actuation inhaler Inhale 2 puffs into the lungs every 6 (six) hours. 18 g 11    ALPRAZolam (XANAX) 0.5 MG tablet Take 1 tablet by mouth twice daily as needed for anxiety 45 tablet 3    budesonide-glycopyr-formoterol 160-9-4.8 mcg/actuation HFAA Inhale 1 puff into the lungs Daily. 15 g 11    calcium citrate-vitamin D3 315-200 mg (CITRACAL+D) 315 mg-5 mcg (200 unit) per tablet Take 1 tablet by mouth once daily.      cinnamon bark 500 mg capsule Take 500 mg by mouth once daily.      diltiaZEM (TIAZAC) 240 MG Cs24 Take 240 mg by mouth once daily.      docusate sodium (COLACE) 100 MG capsule Take 100 mg by mouth Daily.      DULoxetine (CYMBALTA) 30 MG capsule Take 1 capsule (30 mg total) by mouth 2 (two) times daily. Take 1 capsule by mouth once daily 60 capsule 6    ELIQUIS 5 mg Tab Take 1 tablet by mouth twice daily 60 tablet 0    famotidine (PEPCID) 20 MG tablet Take 20 mg by mouth nightly as needed.      fluticasone-umeclidin-vilanter (TRELEGY ELLIPTA) 100-62.5-25 mcg DsDv Inhale 1 puff into the  lungs once daily. 1 each 11    furosemide (LASIX) 40 MG tablet Take 1 tablet by mouth once daily 90 tablet 1    gabapentin (NEURONTIN) 100 MG capsule TAKE 1 CAPSULE BY MOUTH ONCE DAILY IN THE EVENING 30 capsule 0    latanoprost 0.005 % ophthalmic solution Place 1 drop into both eyes every evening.      loratadine (CLARITIN) 10 mg tablet Take 10 mg by mouth daily as needed for Allergies.      losartan (COZAAR) 25 MG tablet Take 1 tablet by mouth twice daily 180 tablet 0    melatonin 10 mg Tab Take 10-20 mg by mouth daily as needed (insomnia).      metoclopramide HCl (REGLAN) 5 MG tablet Take 5 mg by mouth 2 (two) times daily.      metoprolol succinate (TOPROL-XL) 100 MG 24 hr tablet Take 100 mg by mouth once daily.      montelukast (SINGULAIR) 10 mg tablet Take 1 tablet by mouth once daily 30 tablet 11    NON FORMULARY MEDICATION Take 1 tablet by mouth once daily. Tumeric      omega-3 fatty acids/fish oil (FISH OIL-OMEGA-3 FATTY ACIDS) 300-1,000 mg capsule Take 1 capsule by mouth once daily.      omeprazole (PRILOSEC) 40 MG capsule Take 1 capsule by mouth once daily 30 capsule 0    potassium chloride (KLOR-CON) 8 MEQ TbSR Take 1 tablet (8 mEq total) by mouth once daily. 30 tablet 5    rosuvastatin (CRESTOR) 20 MG tablet TAKE 1 TABLET BY MOUTH ONCE IN THE EVENING      simethicone (GAS RELIEF, SIMETHICONE,) 125 mg Cap capsule Take 1 capsule (125 mg total) by mouth 4 (four) times daily as needed for Flatulence. 100 each 11    sotaloL (BETAPACE) 80 MG tablet Take 80 mg by mouth 2 (two) times daily.      tamsulosin (FLOMAX) 0.4 mg Cap Take 1 capsule by mouth once daily.      testosterone cypionate (DEPOTESTOTERONE CYPIONATE) 200 mg/mL injection INJECT 1 ML (CC) INTRAMUSCULARLY EVERY 14 DAYS 3 mL 4    theophylline (THEODUR) 100 MG 12 hr tablet Take 1 tablet (100 mg total) by mouth 2 (two) times daily. 60 tablet 11    tiZANidine (ZANAFLEX) 4 MG tablet Take 1 tablet by mouth twice daily 60 tablet 2    zolpidem (AMBIEN) 5 MG  "Tab TAKE 1 TABLET BY MOUTH ONCE DAILY AT BEDTIME AS NEEDED FOR INSOMNIA 30 tablet 5       Objective:     /71 (BP Location: Right arm, Patient Position: Sitting)   Pulse 71   Resp 18   Ht 5' 7" (1.702 m)   Wt 102.5 kg (226 lb)   SpO2 95%   BMI 35.40 kg/m²     Physical Exam  Vitals reviewed.   Constitutional:       Appearance: Normal appearance.   HENT:      Head: Normocephalic and atraumatic.      Right Ear: Tympanic membrane normal.      Left Ear: Tympanic membrane normal.      Mouth/Throat:      Pharynx: Oropharynx is clear.   Eyes:      Pupils: Pupils are equal, round, and reactive to light.   Neck:      Vascular: No carotid bruit.   Cardiovascular:      Rate and Rhythm: Normal rate and regular rhythm.      Pulses: Normal pulses.      Heart sounds: Normal heart sounds.   Pulmonary:      Effort: Pulmonary effort is normal.      Breath sounds: Normal breath sounds.   Abdominal:      General: Abdomen is flat.      Palpations: Abdomen is soft. There is no mass.      Tenderness: There is no abdominal tenderness. There is no guarding.      Comments: Abdomen is very distended with diastasis evolving.   Musculoskeletal:         General: No swelling.      Cervical back: Neck supple.   Lymphadenopathy:      Cervical: No cervical adenopathy.   Skin:     General: Skin is warm and dry.   Neurological:      General: No focal deficit present.      Mental Status: He is alert and oriented to person, place, and time.     Lab work reviewed.  Albumin low.    Assessment:     1. Hypertension.  Good control     2. Paroxysmal atrial fib.  No evidence of recent recurrence     3. Chronic dyspnea.  Multifactorial     4. Abdominal obesity.  Contributing to 3.     5. Low albumin.  Doubt nutritional.  We will check a UA to rule out urinary tract losses.  Doubt liver disease given normal transaminases bilirubin etc. and unremarkable CT abdomen 6 months ago.    Plan:   Check urinalysis here today.  Update chest x-ray.  That has no " surprises there then continue current meds and follow-up 3 months with CBC CMP lipid prior.  Encouraged use of abdominal binder to help hold the musculature intact.  Problem List Items Addressed This Visit          Cardiac/Vascular    Hypertension - Primary    Relevant Orders    CBC Auto Differential    Comprehensive Metabolic Panel    Lipid Panel    TSH    Urinalysis    Urinalysis, Reflex to Urine Culture Urine, Clean Catch       Endocrine    Severe obesity (BMI 35.0-39.9) with comorbidity    Relevant Orders    CBC Auto Differential    Comprehensive Metabolic Panel    Lipid Panel    TSH    Urinalysis    Urinalysis, Reflex to Urine Culture Urine, Clean Catch     Other Visit Diagnoses         Atrial fibrillation, unspecified type        Relevant Orders    CBC Auto Differential    Comprehensive Metabolic Panel    Lipid Panel    TSH    Urinalysis    Urinalysis, Reflex to Urine Culture Urine, Clean Catch      Chronic shortness of breath        Relevant Orders    X-Ray Chest PA And Lateral    CBC Auto Differential    Comprehensive Metabolic Panel    Lipid Panel    TSH    Urinalysis    Urinalysis, Reflex to Urine Culture Urine, Clean Catch      Low testosterone        Relevant Orders    CBC Auto Differential    Comprehensive Metabolic Panel    Lipid Panel    TSH    Urinalysis    Urinalysis, Reflex to Urine Culture Urine, Clean Catch      Hematuria, unspecified type        Relevant Orders    CBC Auto Differential    Comprehensive Metabolic Panel    Lipid Panel    TSH    Urinalysis, Reflex to Urine Culture Urine, Clean Catch             Orders Placed This Encounter   Procedures    X-Ray Chest PA And Lateral     Standing Status:   Future     Expected Date:   5/27/2025     Expiration Date:   5/27/2026     May the Radiologist modify the order per protocol to meet the clinical needs of the patient?:   Yes     Release to patient:   Immediate    CBC Auto Differential     Standing Status:   Future     Expected Date:   11/27/2025      Expiration Date:   5/27/2026    Comprehensive Metabolic Panel     Standing Status:   Future     Expected Date:   11/27/2025     Expiration Date:   5/27/2026    Lipid Panel     Standing Status:   Future     Expected Date:   11/27/2025     Expiration Date:   5/27/2026    TSH     Standing Status:   Future     Expected Date:   11/27/2025     Expiration Date:   5/27/2026    Urinalysis     Standing Status:   Future     Number of Occurrences:   1     Expected Date:   11/27/2025     Expiration Date:   5/27/2026    Urinalysis, Reflex to Urine Culture Urine, Clean Catch     Preferred Collection Type:   Urine, Clean Catch     Specimen Source:   Urine     Send normal result to authorizing provider's In Basket if patient is active on MyChart::   Yes        Medication List with Changes/Refills   Current Medications    ALBUTEROL (PROVENTIL HFA) 90 MCG/ACTUATION INHALER    Inhale 2 puffs into the lungs every 6 (six) hours as needed for Wheezing. Rescue       Start Date: 11/27/2024End Date: --    ALBUTEROL (PROVENTIL/VENTOLIN HFA) 90 MCG/ACTUATION INHALER    Inhale 2 puffs into the lungs every 6 (six) hours.       Start Date: 11/26/2024End Date: --    ALPRAZOLAM (XANAX) 0.5 MG TABLET    Take 1 tablet by mouth twice daily as needed for anxiety       Start Date: 12/2/2024 End Date: --    BUDESONIDE-GLYCOPYR-FORMOTEROL 160-9-4.8 MCG/ACTUATION HFAA    Inhale 1 puff into the lungs Daily.       Start Date: 2/18/2025 End Date: --    CALCIUM CITRATE-VITAMIN D3 315-200 MG (CITRACAL+D) 315 MG-5 MCG (200 UNIT) PER TABLET    Take 1 tablet by mouth once daily.       Start Date: --        End Date: --    CINNAMON BARK 500 MG CAPSULE    Take 500 mg by mouth once daily.       Start Date: --        End Date: --    DILTIAZEM (TIAZAC) 240 MG CS24    Take 240 mg by mouth once daily.       Start Date: 1/31/2025 End Date: --    DOCUSATE SODIUM (COLACE) 100 MG CAPSULE    Take 100 mg by mouth Daily.       Start Date: --        End Date: --     DULOXETINE (CYMBALTA) 30 MG CAPSULE    Take 1 capsule (30 mg total) by mouth 2 (two) times daily. Take 1 capsule by mouth once daily       Start Date: 2/14/2025 End Date: --    ELIQUIS 5 MG TAB    Take 1 tablet by mouth twice daily       Start Date: 6/17/2024 End Date: --    FAMOTIDINE (PEPCID) 20 MG TABLET    Take 20 mg by mouth nightly as needed.       Start Date: 2/1/2024  End Date: --    FLUTICASONE-UMECLIDIN-VILANTER (TRELEGY ELLIPTA) 100-62.5-25 MCG DSDV    Inhale 1 puff into the lungs once daily.       Start Date: 12/18/2023End Date: --    FUROSEMIDE (LASIX) 40 MG TABLET    Take 1 tablet by mouth once daily       Start Date: 3/11/2024 End Date: --    GABAPENTIN (NEURONTIN) 100 MG CAPSULE    TAKE 1 CAPSULE BY MOUTH ONCE DAILY IN THE EVENING       Start Date: 5/19/2025 End Date: --    LATANOPROST 0.005 % OPHTHALMIC SOLUTION    Place 1 drop into both eyes every evening.       Start Date: 1/15/2024 End Date: --    LORATADINE (CLARITIN) 10 MG TABLET    Take 10 mg by mouth daily as needed for Allergies.       Start Date: --        End Date: --    LOSARTAN (COZAAR) 25 MG TABLET    Take 1 tablet by mouth twice daily       Start Date: 6/24/2024 End Date: --    MELATONIN 10 MG TAB    Take 10-20 mg by mouth daily as needed (insomnia).       Start Date: --        End Date: --    METOCLOPRAMIDE HCL (REGLAN) 5 MG TABLET    Take 5 mg by mouth 2 (two) times daily.       Start Date: 1/2/2025  End Date: --    METOPROLOL SUCCINATE (TOPROL-XL) 100 MG 24 HR TABLET    Take 100 mg by mouth once daily.       Start Date: 5/8/2023  End Date: --    MONTELUKAST (SINGULAIR) 10 MG TABLET    Take 1 tablet by mouth once daily       Start Date: 5/30/2024 End Date: --    NON FORMULARY MEDICATION    Take 1 tablet by mouth once daily. Tumeric       Start Date: --        End Date: --    OMEGA-3 FATTY ACIDS/FISH OIL (FISH OIL-OMEGA-3 FATTY ACIDS) 300-1,000 MG CAPSULE    Take 1 capsule by mouth once daily.       Start Date: --        End Date: --     OMEPRAZOLE (PRILOSEC) 40 MG CAPSULE    Take 1 capsule by mouth once daily       Start Date: 5/9/2025  End Date: --    POTASSIUM CHLORIDE (KLOR-CON) 8 MEQ TBSR    Take 1 tablet (8 mEq total) by mouth once daily.       Start Date: 1/9/2024  End Date: --    ROSUVASTATIN (CRESTOR) 20 MG TABLET    TAKE 1 TABLET BY MOUTH ONCE IN THE EVENING       Start Date: 3/14/2022 End Date: --    SIMETHICONE (GAS RELIEF, SIMETHICONE,) 125 MG CAP CAPSULE    Take 1 capsule (125 mg total) by mouth 4 (four) times daily as needed for Flatulence.       Start Date: 11/26/2024End Date: --    SOTALOL (BETAPACE) 80 MG TABLET    Take 80 mg by mouth 2 (two) times daily.       Start Date: 4/4/2023  End Date: --    TAMSULOSIN (FLOMAX) 0.4 MG CAP    Take 1 capsule by mouth once daily.       Start Date: 3/18/2022 End Date: --    TESTOSTERONE CYPIONATE (DEPOTESTOTERONE CYPIONATE) 200 MG/ML INJECTION    INJECT 1 ML (CC) INTRAMUSCULARLY EVERY 14 DAYS       Start Date: 2/28/2025 End Date: --    THEOPHYLLINE (THEODUR) 100 MG 12 HR TABLET    Take 1 tablet (100 mg total) by mouth 2 (two) times daily.       Start Date: 1/14/2025 End Date: 1/14/2026    TIZANIDINE (ZANAFLEX) 4 MG TABLET    Take 1 tablet by mouth twice daily       Start Date: 5/19/2025 End Date: --    ZOLPIDEM (AMBIEN) 5 MG TAB    TAKE 1 TABLET BY MOUTH ONCE DAILY AT BEDTIME AS NEEDED FOR INSOMNIA       Start Date: 2/11/2025 End Date: --            Follow up in about 6 months (around 11/27/2025). In addition to their scheduled follow up, the patient has also been instructed to follow up on as needed basis.       Fabiano Liang

## 2025-05-28 ENCOUNTER — RESULTS FOLLOW-UP (OUTPATIENT)
Dept: INTERNAL MEDICINE | Facility: CLINIC | Age: 75
End: 2025-05-28

## 2025-05-28 ENCOUNTER — TELEPHONE (OUTPATIENT)
Dept: INTERNAL MEDICINE | Facility: CLINIC | Age: 75
End: 2025-05-28
Payer: COMMERCIAL

## 2025-05-28 NOTE — TELEPHONE ENCOUNTER
----- Message from Fabiano Liang MD sent at 5/27/2025  5:24 PM CDT -----  Tell him chest x-ray looks fine.  No change from prior  ----- Message -----  From: Interface, Rad Results In  Sent: 5/27/2025   5:17 PM CDT  To: Fabiano Liang MD

## 2025-05-30 ENCOUNTER — TELEPHONE (OUTPATIENT)
Dept: INTERNAL MEDICINE | Facility: CLINIC | Age: 75
End: 2025-05-30
Payer: COMMERCIAL

## 2025-05-30 NOTE — TELEPHONE ENCOUNTER
----- Message from Fabiano Liang MD sent at 5/29/2025  5:15 PM CDT -----  Tell him urine shows no abnormal protein levels.  Chest x-ray stable.  Follow-up as scheduled.  ----- Message -----  From: Lab, Background User  Sent: 5/27/2025   7:01 PM CDT  To: Fabiano Liang MD

## 2025-06-02 DIAGNOSIS — K21.9 GERD (GASTROESOPHAGEAL REFLUX DISEASE): Primary | ICD-10-CM

## 2025-06-02 DIAGNOSIS — R06.02 SHORTNESS OF BREATH: ICD-10-CM

## 2025-06-08 DIAGNOSIS — K21.9 GASTROESOPHAGEAL REFLUX DISEASE, UNSPECIFIED WHETHER ESOPHAGITIS PRESENT: ICD-10-CM

## 2025-06-09 RX ORDER — OMEPRAZOLE 40 MG/1
40 CAPSULE, DELAYED RELEASE ORAL
Qty: 30 CAPSULE | Refills: 5 | Status: SHIPPED | OUTPATIENT
Start: 2025-06-09

## 2025-06-10 ENCOUNTER — CLINICAL SUPPORT (OUTPATIENT)
Dept: INTERNAL MEDICINE | Facility: CLINIC | Age: 75
End: 2025-06-10
Payer: COMMERCIAL

## 2025-06-10 DIAGNOSIS — R79.89 LOW TESTOSTERONE: Primary | ICD-10-CM

## 2025-06-10 RX ORDER — TESTOSTERONE CYPIONATE 200 MG/ML
200 INJECTION, SOLUTION INTRAMUSCULAR
Status: COMPLETED | OUTPATIENT
Start: 2025-06-10 | End: 2025-06-10

## 2025-06-10 RX ADMIN — TESTOSTERONE CYPIONATE 200 MG: 200 INJECTION, SOLUTION INTRAMUSCULAR at 08:06

## 2025-06-11 ENCOUNTER — HOSPITAL ENCOUNTER (OUTPATIENT)
Dept: RADIOLOGY | Facility: HOSPITAL | Age: 75
Discharge: HOME | End: 2025-06-11
Attending: INTERNAL MEDICINE
Payer: COMMERCIAL

## 2025-06-11 DIAGNOSIS — K21.9 GERD (GASTROESOPHAGEAL REFLUX DISEASE): ICD-10-CM

## 2025-06-11 DIAGNOSIS — R06.02 SHORTNESS OF BREATH: ICD-10-CM

## 2025-06-11 PROCEDURE — A9698 NON-RAD CONTRAST MATERIALNOC: HCPCS | Performed by: INTERNAL MEDICINE

## 2025-06-11 PROCEDURE — 74240 X-RAY XM UPR GI TRC 1CNTRST: CPT | Mod: TC

## 2025-06-11 PROCEDURE — 25500020 PHARM REV CODE 255: Performed by: INTERNAL MEDICINE

## 2025-06-11 RX ADMIN — Medication 176 G: at 08:06

## 2025-06-11 RX ADMIN — BARIUM SULFATE 10 ML: 980 POWDER, FOR SUSPENSION ORAL at 08:06

## 2025-06-12 DIAGNOSIS — G62.9 NEUROPATHY: ICD-10-CM

## 2025-06-12 RX ORDER — GABAPENTIN 100 MG/1
100 CAPSULE ORAL NIGHTLY
Qty: 30 CAPSULE | Refills: 0 | Status: SHIPPED | OUTPATIENT
Start: 2025-06-12

## 2025-06-23 DIAGNOSIS — F41.9 ANXIETY: ICD-10-CM

## 2025-06-23 DIAGNOSIS — J45.909 ASTHMA, UNSPECIFIED ASTHMA SEVERITY, UNSPECIFIED WHETHER COMPLICATED, UNSPECIFIED WHETHER PERSISTENT: ICD-10-CM

## 2025-06-23 RX ORDER — ALPRAZOLAM 0.5 MG/1
0.5 TABLET ORAL 2 TIMES DAILY
Qty: 45 TABLET | Refills: 0 | Status: SHIPPED | OUTPATIENT
Start: 2025-06-23

## 2025-06-23 RX ORDER — MONTELUKAST SODIUM 10 MG/1
10 TABLET ORAL
Qty: 30 TABLET | Refills: 0 | Status: SHIPPED | OUTPATIENT
Start: 2025-06-23

## 2025-06-24 ENCOUNTER — CLINICAL SUPPORT (OUTPATIENT)
Dept: INTERNAL MEDICINE | Facility: CLINIC | Age: 75
End: 2025-06-24
Payer: COMMERCIAL

## 2025-06-24 DIAGNOSIS — R79.89 LOW TESTOSTERONE: Primary | ICD-10-CM

## 2025-06-24 PROCEDURE — 96372 THER/PROPH/DIAG INJ SC/IM: CPT | Mod: ,,, | Performed by: INTERNAL MEDICINE

## 2025-06-24 RX ORDER — TESTOSTERONE CYPIONATE 200 MG/ML
200 INJECTION, SOLUTION INTRAMUSCULAR
Status: COMPLETED | OUTPATIENT
Start: 2025-06-24 | End: 2025-06-24

## 2025-06-24 RX ADMIN — TESTOSTERONE CYPIONATE 200 MG: 200 INJECTION, SOLUTION INTRAMUSCULAR at 08:06

## 2025-06-28 DIAGNOSIS — J45.909 ASTHMA, UNSPECIFIED ASTHMA SEVERITY, UNSPECIFIED WHETHER COMPLICATED, UNSPECIFIED WHETHER PERSISTENT: ICD-10-CM

## 2025-06-30 RX ORDER — MONTELUKAST SODIUM 10 MG/1
10 TABLET ORAL
Qty: 30 TABLET | Refills: 0 | Status: SHIPPED | OUTPATIENT
Start: 2025-06-30

## 2025-07-08 ENCOUNTER — CLINICAL SUPPORT (OUTPATIENT)
Dept: INTERNAL MEDICINE | Facility: CLINIC | Age: 75
End: 2025-07-08
Payer: COMMERCIAL

## 2025-07-08 DIAGNOSIS — R79.89 LOW TESTOSTERONE: Primary | ICD-10-CM

## 2025-07-08 RX ORDER — TESTOSTERONE CYPIONATE 200 MG/ML
200 INJECTION, SOLUTION INTRAMUSCULAR
Status: COMPLETED | OUTPATIENT
Start: 2025-07-08 | End: 2025-07-08

## 2025-07-08 RX ADMIN — TESTOSTERONE CYPIONATE 200 MG: 200 INJECTION, SOLUTION INTRAMUSCULAR at 08:07

## 2025-07-16 DIAGNOSIS — G62.9 NEUROPATHY: ICD-10-CM

## 2025-07-16 RX ORDER — GABAPENTIN 100 MG/1
100 CAPSULE ORAL NIGHTLY
Qty: 30 CAPSULE | Refills: 5 | Status: SHIPPED | OUTPATIENT
Start: 2025-07-16

## 2025-07-22 ENCOUNTER — CLINICAL SUPPORT (OUTPATIENT)
Dept: INTERNAL MEDICINE | Facility: CLINIC | Age: 75
End: 2025-07-22
Payer: COMMERCIAL

## 2025-07-22 DIAGNOSIS — R79.89 LOW TESTOSTERONE: Primary | ICD-10-CM

## 2025-07-22 RX ORDER — TESTOSTERONE CYPIONATE 200 MG/ML
200 INJECTION, SOLUTION INTRAMUSCULAR
Status: COMPLETED | OUTPATIENT
Start: 2025-07-22 | End: 2025-07-22

## 2025-07-22 RX ADMIN — TESTOSTERONE CYPIONATE 200 MG: 200 INJECTION, SOLUTION INTRAMUSCULAR at 08:07

## 2025-08-04 ENCOUNTER — OFFICE VISIT (OUTPATIENT)
Dept: INTERNAL MEDICINE | Facility: CLINIC | Age: 75
End: 2025-08-04
Payer: COMMERCIAL

## 2025-08-04 VITALS
RESPIRATION RATE: 16 BRPM | HEIGHT: 67 IN | OXYGEN SATURATION: 98 % | BODY MASS INDEX: 35.94 KG/M2 | DIASTOLIC BLOOD PRESSURE: 64 MMHG | HEART RATE: 80 BPM | SYSTOLIC BLOOD PRESSURE: 108 MMHG | WEIGHT: 229 LBS

## 2025-08-04 DIAGNOSIS — R79.89 LOW TESTOSTERONE: ICD-10-CM

## 2025-08-04 DIAGNOSIS — R06.02 CHRONIC SHORTNESS OF BREATH: Primary | ICD-10-CM

## 2025-08-04 DIAGNOSIS — J44.9 MODERATE CHRONIC OBSTRUCTIVE PULMONARY DISEASE: ICD-10-CM

## 2025-08-04 DIAGNOSIS — J45.909 ASTHMA, UNSPECIFIED ASTHMA SEVERITY, UNSPECIFIED WHETHER COMPLICATED, UNSPECIFIED WHETHER PERSISTENT: ICD-10-CM

## 2025-08-04 DIAGNOSIS — I48.91 ATRIAL FIBRILLATION, UNSPECIFIED TYPE: ICD-10-CM

## 2025-08-04 PROCEDURE — 99214 OFFICE O/P EST MOD 30 MIN: CPT | Mod: 25,,, | Performed by: INTERNAL MEDICINE

## 2025-08-04 PROCEDURE — 1125F AMNT PAIN NOTED PAIN PRSNT: CPT | Mod: CPTII,,, | Performed by: INTERNAL MEDICINE

## 2025-08-04 PROCEDURE — 3078F DIAST BP <80 MM HG: CPT | Mod: CPTII,,, | Performed by: INTERNAL MEDICINE

## 2025-08-04 PROCEDURE — 3074F SYST BP LT 130 MM HG: CPT | Mod: CPTII,,, | Performed by: INTERNAL MEDICINE

## 2025-08-04 PROCEDURE — 1159F MED LIST DOCD IN RCRD: CPT | Mod: CPTII,,, | Performed by: INTERNAL MEDICINE

## 2025-08-04 PROCEDURE — 96372 THER/PROPH/DIAG INJ SC/IM: CPT | Mod: ,,, | Performed by: INTERNAL MEDICINE

## 2025-08-04 RX ORDER — TESTOSTERONE CYPIONATE 200 MG/ML
200 INJECTION, SOLUTION INTRAMUSCULAR
Status: COMPLETED | OUTPATIENT
Start: 2025-08-04 | End: 2025-08-04

## 2025-08-04 RX ADMIN — TESTOSTERONE CYPIONATE 200 MG: 200 INJECTION, SOLUTION INTRAMUSCULAR at 03:08

## 2025-08-04 NOTE — PROGRESS NOTES
Patient ID: [id number]      Subjective:     Chief Complaint: Shortness of Breath (SOB Continues, Endoscopy done with Dr. Stewart, report on hand )      John Bullard is a 75 y.o. male.  That has a 75-year-old man and a bit early because of worsening shortness a breath.  Worsening of a chronic problem.  Minimal cough.  No wheezing.  No orthopnea PND or change in pedal edema.  No chest pain.  Some vague upper abdominal discomfort.  Recent upper endoscopy was essentially normal.  Mild esophagitis in his small hiatal hernia seen.    Shortness of Breath        Review of Systems   Respiratory:  Positive for shortness of breath.        Outpatient Medications Marked as Taking for the 8/4/25 encounter (Office Visit) with Fabiano Liang MD   Medication Sig Dispense Refill    albuterol (PROVENTIL HFA) 90 mcg/actuation inhaler Inhale 2 puffs into the lungs every 6 (six) hours as needed for Wheezing. Rescue 18 g PRN    albuterol (PROVENTIL/VENTOLIN HFA) 90 mcg/actuation inhaler Inhale 2 puffs into the lungs every 6 (six) hours. 18 g 11    ALPRAZolam (XANAX) 0.5 MG tablet Take 1 tablet by mouth twice daily as needed for anxiety 45 tablet 0    budesonide-glycopyr-formoterol 160-9-4.8 mcg/actuation HFAA Inhale 1 puff into the lungs Daily. 15 g 11    calcium citrate-vitamin D3 315-200 mg (CITRACAL+D) 315 mg-5 mcg (200 unit) per tablet Take 1 tablet by mouth once daily.      cinnamon bark 500 mg capsule Take 500 mg by mouth once daily.      diltiaZEM (TIAZAC) 240 MG Cs24 Take 240 mg by mouth once daily.      docusate sodium (COLACE) 100 MG capsule Take 100 mg by mouth Daily.      DULoxetine (CYMBALTA) 30 MG capsule Take 1 capsule (30 mg total) by mouth 2 (two) times daily. Take 1 capsule by mouth once daily 60 capsule 6    ELIQUIS 5 mg Tab Take 1 tablet by mouth twice daily 60 tablet 0    famotidine (PEPCID) 20 MG tablet Take 20 mg by mouth nightly as needed.      fluticasone-umeclidin-vilanter (TRELEGY ELLIPTA) 100-62.5-25  mcg DsDv Inhale 1 puff into the lungs once daily. 1 each 11    furosemide (LASIX) 40 MG tablet Take 1 tablet by mouth once daily 90 tablet 1    gabapentin (NEURONTIN) 100 MG capsule TAKE 1 CAPSULE BY MOUTH ONCE DAILY IN THE EVENING 30 capsule 5    latanoprost 0.005 % ophthalmic solution Place 1 drop into both eyes every evening.      loratadine (CLARITIN) 10 mg tablet Take 10 mg by mouth daily as needed for Allergies.      losartan (COZAAR) 25 MG tablet Take 1 tablet by mouth twice daily 180 tablet 0    melatonin 10 mg Tab Take 10-20 mg by mouth daily as needed (insomnia).      metoclopramide HCl (REGLAN) 5 MG tablet Take 5 mg by mouth 2 (two) times daily.      metoprolol succinate (TOPROL-XL) 100 MG 24 hr tablet Take 100 mg by mouth once daily.      montelukast (SINGULAIR) 10 mg tablet Take 1 tablet by mouth once daily 30 tablet 0    NON FORMULARY MEDICATION Take 1 tablet by mouth once daily. Tumeric      omega-3 fatty acids/fish oil (FISH OIL-OMEGA-3 FATTY ACIDS) 300-1,000 mg capsule Take 1 capsule by mouth once daily.      omeprazole (PRILOSEC) 40 MG capsule Take 1 capsule by mouth once daily 30 capsule 5    potassium chloride (KLOR-CON) 8 MEQ TbSR Take 1 tablet (8 mEq total) by mouth once daily. 30 tablet 5    rosuvastatin (CRESTOR) 20 MG tablet TAKE 1 TABLET BY MOUTH ONCE IN THE EVENING      simethicone (GAS RELIEF, SIMETHICONE,) 125 mg Cap capsule Take 1 capsule (125 mg total) by mouth 4 (four) times daily as needed for Flatulence. 100 each 11    sotaloL (BETAPACE) 80 MG tablet Take 80 mg by mouth 2 (two) times daily.      tamsulosin (FLOMAX) 0.4 mg Cap Take 1 capsule by mouth once daily.      testosterone cypionate (DEPOTESTOTERONE CYPIONATE) 200 mg/mL injection INJECT 1 ML (CC) INTRAMUSCULARLY EVERY 14 DAYS 3 mL 4    tiZANidine (ZANAFLEX) 4 MG tablet Take 1 tablet by mouth twice daily 60 tablet 2    zolpidem (AMBIEN) 5 MG Tab TAKE 1 TABLET BY MOUTH ONCE DAILY AT BEDTIME AS NEEDED FOR INSOMNIA 30 tablet 5  "      Objective:     /64   Pulse 80   Resp 16   Ht 5' 7" (1.702 m)   Wt 103.9 kg (229 lb)   SpO2 98%   BMI 35.87 kg/m²     Physical Exam  Vitals reviewed.   Constitutional:       Appearance: Normal appearance.   HENT:      Head: Normocephalic and atraumatic.      Right Ear: Tympanic membrane normal.      Left Ear: Tympanic membrane normal.      Mouth/Throat:      Pharynx: Oropharynx is clear.   Eyes:      Pupils: Pupils are equal, round, and reactive to light.   Neck:      Vascular: No carotid bruit.   Cardiovascular:      Rate and Rhythm: Normal rate and regular rhythm.      Pulses: Normal pulses.      Heart sounds: Normal heart sounds.   Pulmonary:      Effort: Pulmonary effort is normal.      Breath sounds: Normal breath sounds.   Abdominal:      General: Abdomen is flat.      Palpations: Abdomen is soft. There is no mass.      Tenderness: There is no abdominal tenderness. There is no guarding.   Musculoskeletal:         General: No swelling.      Cervical back: Neck supple.      Comments: One to 2+ pretibial edema bilaterally   Lymphadenopathy:      Cervical: No cervical adenopathy.   Skin:     General: Skin is warm and dry.   Neurological:      General: No focal deficit present.      Mental Status: He is alert and oriented to person, place, and time.         Assessment:     1. Dyspnea.  Chronic.  Multifactorial.  Appears to be gradually worsening     2. Paroxysmal atrial fib     3. Obesity     4. History of heart failure.  Primarily diastolic at last echo     5. Hypertension.  Good control.      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  Check two-view chest x-ray.  Consider trial of increase Lasix such as 80 Monday Wednesday Friday and 40 mg all other days, follow-up with me to be scheduled.  Problem List Items Addressed This Visit          Pulmonary    Moderate chronic obstructive pulmonary disease    Relevant Orders    CBC Auto Differential    " Comprehensive Metabolic Panel    Lipid Panel    TSH    Urinalysis    NT-Pro Natriuretic Peptide    X-Ray Chest PA And Lateral     Other Visit Diagnoses         Chronic shortness of breath    -  Primary    Relevant Orders    CBC Auto Differential    Comprehensive Metabolic Panel    Lipid Panel    TSH    Urinalysis    NT-Pro Natriuretic Peptide    X-Ray Chest PA And Lateral      Atrial fibrillation, unspecified type        Relevant Orders    CBC Auto Differential    Comprehensive Metabolic Panel    Lipid Panel    TSH    Urinalysis    NT-Pro Natriuretic Peptide    X-Ray Chest PA And Lateral      Asthma, unspecified asthma severity, unspecified whether complicated, unspecified whether persistent        Relevant Orders    CBC Auto Differential    Comprehensive Metabolic Panel    Lipid Panel    TSH    Urinalysis    NT-Pro Natriuretic Peptide    X-Ray Chest PA And Lateral             Orders Placed This Encounter   Procedures    X-Ray Chest PA And Lateral     Standing Status:   Future     Expected Date:   8/4/2025     Expiration Date:   8/4/2026     May the Radiologist modify the order per protocol to meet the clinical needs of the patient?:   Yes     Release to patient:   Immediate    CBC Auto Differential     Standing Status:   Future     Expected Date:   8/4/2025     Expiration Date:   11/4/2025    Comprehensive Metabolic Panel     Standing Status:   Future     Expected Date:   8/4/2025     Expiration Date:   11/4/2025    Lipid Panel     Standing Status:   Future     Expected Date:   8/4/2025     Expiration Date:   11/4/2025    TSH     Standing Status:   Future     Expected Date:   8/4/2025     Expiration Date:   11/4/2025    Urinalysis     Standing Status:   Future     Number of Occurrences:   1     Expected Date:   8/4/2025     Expiration Date:   8/4/2026    NT-Pro Natriuretic Peptide     Standing Status:   Future     Expected Date:   8/4/2025     Expiration Date:   11/2/2026     Send normal result to authorizing  provider's In Basket if patient is active on MyChart::   Yes        Medication List with Changes/Refills   Current Medications    ALBUTEROL (PROVENTIL HFA) 90 MCG/ACTUATION INHALER    Inhale 2 puffs into the lungs every 6 (six) hours as needed for Wheezing. Rescue       Start Date: 11/27/2024End Date: --    ALBUTEROL (PROVENTIL/VENTOLIN HFA) 90 MCG/ACTUATION INHALER    Inhale 2 puffs into the lungs every 6 (six) hours.       Start Date: 11/26/2024End Date: --    ALPRAZOLAM (XANAX) 0.5 MG TABLET    Take 1 tablet by mouth twice daily as needed for anxiety       Start Date: 6/23/2025 End Date: --    BUDESONIDE-GLYCOPYR-FORMOTEROL 160-9-4.8 MCG/ACTUATION HFAA    Inhale 1 puff into the lungs Daily.       Start Date: 2/18/2025 End Date: --    CALCIUM CITRATE-VITAMIN D3 315-200 MG (CITRACAL+D) 315 MG-5 MCG (200 UNIT) PER TABLET    Take 1 tablet by mouth once daily.       Start Date: --        End Date: --    CINNAMON BARK 500 MG CAPSULE    Take 500 mg by mouth once daily.       Start Date: --        End Date: --    DILTIAZEM (TIAZAC) 240 MG CS24    Take 240 mg by mouth once daily.       Start Date: 1/31/2025 End Date: --    DOCUSATE SODIUM (COLACE) 100 MG CAPSULE    Take 100 mg by mouth Daily.       Start Date: --        End Date: --    DULOXETINE (CYMBALTA) 30 MG CAPSULE    Take 1 capsule (30 mg total) by mouth 2 (two) times daily. Take 1 capsule by mouth once daily       Start Date: 2/14/2025 End Date: --    ELIQUIS 5 MG TAB    Take 1 tablet by mouth twice daily       Start Date: 6/17/2024 End Date: --    FAMOTIDINE (PEPCID) 20 MG TABLET    Take 20 mg by mouth nightly as needed.       Start Date: 2/1/2024  End Date: --    FLUTICASONE-UMECLIDIN-VILANTER (TRELEGY ELLIPTA) 100-62.5-25 MCG DSDV    Inhale 1 puff into the lungs once daily.       Start Date: 12/18/2023End Date: --    FUROSEMIDE (LASIX) 40 MG TABLET    Take 1 tablet by mouth once daily       Start Date: 3/11/2024 End Date: --    GABAPENTIN (NEURONTIN) 100 MG  CAPSULE    TAKE 1 CAPSULE BY MOUTH ONCE DAILY IN THE EVENING       Start Date: 7/16/2025 End Date: --    LATANOPROST 0.005 % OPHTHALMIC SOLUTION    Place 1 drop into both eyes every evening.       Start Date: 1/15/2024 End Date: --    LORATADINE (CLARITIN) 10 MG TABLET    Take 10 mg by mouth daily as needed for Allergies.       Start Date: --        End Date: --    LOSARTAN (COZAAR) 25 MG TABLET    Take 1 tablet by mouth twice daily       Start Date: 6/24/2024 End Date: --    MELATONIN 10 MG TAB    Take 10-20 mg by mouth daily as needed (insomnia).       Start Date: --        End Date: --    METOCLOPRAMIDE HCL (REGLAN) 5 MG TABLET    Take 5 mg by mouth 2 (two) times daily.       Start Date: 1/2/2025  End Date: --    METOPROLOL SUCCINATE (TOPROL-XL) 100 MG 24 HR TABLET    Take 100 mg by mouth once daily.       Start Date: 5/8/2023  End Date: --    MONTELUKAST (SINGULAIR) 10 MG TABLET    Take 1 tablet by mouth once daily       Start Date: 6/30/2025 End Date: --    NON FORMULARY MEDICATION    Take 1 tablet by mouth once daily. Tumeric       Start Date: --        End Date: --    OMEGA-3 FATTY ACIDS/FISH OIL (FISH OIL-OMEGA-3 FATTY ACIDS) 300-1,000 MG CAPSULE    Take 1 capsule by mouth once daily.       Start Date: --        End Date: --    OMEPRAZOLE (PRILOSEC) 40 MG CAPSULE    Take 1 capsule by mouth once daily       Start Date: 6/9/2025  End Date: --    POTASSIUM CHLORIDE (KLOR-CON) 8 MEQ TBSR    Take 1 tablet (8 mEq total) by mouth once daily.       Start Date: 1/9/2024  End Date: --    ROSUVASTATIN (CRESTOR) 20 MG TABLET    TAKE 1 TABLET BY MOUTH ONCE IN THE EVENING       Start Date: 3/14/2022 End Date: --    SIMETHICONE (GAS RELIEF, SIMETHICONE,) 125 MG CAP CAPSULE    Take 1 capsule (125 mg total) by mouth 4 (four) times daily as needed for Flatulence.       Start Date: 11/26/2024End Date: --    SOTALOL (BETAPACE) 80 MG TABLET    Take 80 mg by mouth 2 (two) times daily.       Start Date: 4/4/2023  End Date: --     TAMSULOSIN (FLOMAX) 0.4 MG CAP    Take 1 capsule by mouth once daily.       Start Date: 3/18/2022 End Date: --    TESTOSTERONE CYPIONATE (DEPOTESTOTERONE CYPIONATE) 200 MG/ML INJECTION    INJECT 1 ML (CC) INTRAMUSCULARLY EVERY 14 DAYS       Start Date: 2/28/2025 End Date: --    TIZANIDINE (ZANAFLEX) 4 MG TABLET    Take 1 tablet by mouth twice daily       Start Date: 5/19/2025 End Date: --    ZOLPIDEM (AMBIEN) 5 MG TAB    TAKE 1 TABLET BY MOUTH ONCE DAILY AT BEDTIME AS NEEDED FOR INSOMNIA       Start Date: 2/11/2025 End Date: --   Discontinued Medications    THEOPHYLLINE (THEODUR) 100 MG 12 HR TABLET    Take 1 tablet (100 mg total) by mouth 2 (two) times daily.       Start Date: 1/14/2025 End Date: 8/4/2025            Follow up if symptoms worsen or fail to improve. In addition to their scheduled follow up, the patient has also been instructed to follow up on as needed basis.       Fabiano Liang

## 2025-08-06 ENCOUNTER — HOSPITAL ENCOUNTER (OUTPATIENT)
Dept: RADIOLOGY | Facility: HOSPITAL | Age: 75
Discharge: HOME | End: 2025-08-06
Attending: INTERNAL MEDICINE
Payer: COMMERCIAL

## 2025-08-06 ENCOUNTER — TELEPHONE (OUTPATIENT)
Dept: INTERNAL MEDICINE | Facility: CLINIC | Age: 75
End: 2025-08-06
Payer: COMMERCIAL

## 2025-08-06 DIAGNOSIS — I48.91 ATRIAL FIBRILLATION, UNSPECIFIED TYPE: ICD-10-CM

## 2025-08-06 DIAGNOSIS — R06.02 CHRONIC SHORTNESS OF BREATH: ICD-10-CM

## 2025-08-06 DIAGNOSIS — J44.9 MODERATE CHRONIC OBSTRUCTIVE PULMONARY DISEASE: ICD-10-CM

## 2025-08-06 DIAGNOSIS — J45.909 ASTHMA, UNSPECIFIED ASTHMA SEVERITY, UNSPECIFIED WHETHER COMPLICATED, UNSPECIFIED WHETHER PERSISTENT: ICD-10-CM

## 2025-08-06 PROCEDURE — 71046 X-RAY EXAM CHEST 2 VIEWS: CPT | Mod: TC

## 2025-08-06 NOTE — TELEPHONE ENCOUNTER
----- Message from Fabiano Liang MD sent at 8/6/2025  4:16 PM CDT -----  Tell him lab work and chest x-ray are all fine  ----- Message -----  From: Lab, Background User  Sent: 8/6/2025   7:41 AM CDT  To: Fabiano Liang MD

## 2025-08-07 DIAGNOSIS — F41.9 ANXIETY: ICD-10-CM

## 2025-08-08 RX ORDER — ALPRAZOLAM 0.5 MG/1
0.5 TABLET ORAL 2 TIMES DAILY
Qty: 45 TABLET | Refills: 5 | Status: SHIPPED | OUTPATIENT
Start: 2025-08-08

## 2025-08-12 ENCOUNTER — OFFICE VISIT (OUTPATIENT)
Dept: INTERNAL MEDICINE | Facility: CLINIC | Age: 75
End: 2025-08-12
Payer: COMMERCIAL

## 2025-08-12 VITALS
SYSTOLIC BLOOD PRESSURE: 119 MMHG | BODY MASS INDEX: 35.63 KG/M2 | HEART RATE: 87 BPM | WEIGHT: 227 LBS | OXYGEN SATURATION: 96 % | DIASTOLIC BLOOD PRESSURE: 81 MMHG | HEIGHT: 67 IN | RESPIRATION RATE: 18 BRPM

## 2025-08-12 DIAGNOSIS — M62.838 MUSCLE SPASM: Primary | ICD-10-CM

## 2025-08-12 PROCEDURE — 1101F PT FALLS ASSESS-DOCD LE1/YR: CPT | Mod: CPTII,,, | Performed by: INTERNAL MEDICINE

## 2025-08-12 PROCEDURE — 99214 OFFICE O/P EST MOD 30 MIN: CPT | Mod: ,,, | Performed by: INTERNAL MEDICINE

## 2025-08-12 PROCEDURE — 1159F MED LIST DOCD IN RCRD: CPT | Mod: CPTII,,, | Performed by: INTERNAL MEDICINE

## 2025-08-12 PROCEDURE — 3079F DIAST BP 80-89 MM HG: CPT | Mod: CPTII,,, | Performed by: INTERNAL MEDICINE

## 2025-08-12 PROCEDURE — 3288F FALL RISK ASSESSMENT DOCD: CPT | Mod: CPTII,,, | Performed by: INTERNAL MEDICINE

## 2025-08-12 PROCEDURE — 3074F SYST BP LT 130 MM HG: CPT | Mod: CPTII,,, | Performed by: INTERNAL MEDICINE

## 2025-08-12 PROCEDURE — 1126F AMNT PAIN NOTED NONE PRSNT: CPT | Mod: CPTII,,, | Performed by: INTERNAL MEDICINE

## 2025-08-12 RX ORDER — DIAZEPAM 2 MG/1
2 TABLET ORAL 2 TIMES DAILY
Qty: 60 TABLET | Refills: 1 | Status: SHIPPED | OUTPATIENT
Start: 2025-08-12 | End: 2025-09-11

## 2025-08-19 ENCOUNTER — CLINICAL SUPPORT (OUTPATIENT)
Dept: INTERNAL MEDICINE | Facility: CLINIC | Age: 75
End: 2025-08-19
Payer: COMMERCIAL

## 2025-08-19 DIAGNOSIS — R79.89 LOW TESTOSTERONE: Primary | ICD-10-CM

## 2025-08-19 PROCEDURE — 96372 THER/PROPH/DIAG INJ SC/IM: CPT | Mod: ,,, | Performed by: INTERNAL MEDICINE

## 2025-08-19 RX ORDER — TESTOSTERONE ENANTHATE 200 MG/ML
200 VIAL (ML) INTRAMUSCULAR
Status: DISCONTINUED | OUTPATIENT
Start: 2025-08-19 | End: 2025-08-19

## 2025-08-19 RX ORDER — TESTOSTERONE CYPIONATE 200 MG/ML
200 INJECTION, SOLUTION INTRAMUSCULAR
Status: COMPLETED | OUTPATIENT
Start: 2025-08-19 | End: 2025-08-19

## 2025-08-19 RX ADMIN — TESTOSTERONE CYPIONATE 200 MG: 200 INJECTION, SOLUTION INTRAMUSCULAR at 09:08

## 2025-08-26 ENCOUNTER — TELEPHONE (OUTPATIENT)
Dept: INTERNAL MEDICINE | Facility: CLINIC | Age: 75
End: 2025-08-26
Payer: COMMERCIAL

## 2025-08-31 DIAGNOSIS — J45.909 ASTHMA, UNSPECIFIED ASTHMA SEVERITY, UNSPECIFIED WHETHER COMPLICATED, UNSPECIFIED WHETHER PERSISTENT: ICD-10-CM

## 2025-09-02 RX ORDER — MONTELUKAST SODIUM 10 MG/1
10 TABLET ORAL
Qty: 30 TABLET | Refills: 0 | Status: SHIPPED | OUTPATIENT
Start: 2025-09-02

## 2025-09-04 ENCOUNTER — OFFICE VISIT (OUTPATIENT)
Dept: INTERNAL MEDICINE | Facility: CLINIC | Age: 75
End: 2025-09-04
Payer: COMMERCIAL

## 2025-09-04 ENCOUNTER — TELEPHONE (OUTPATIENT)
Dept: INTERNAL MEDICINE | Facility: CLINIC | Age: 75
End: 2025-09-04

## 2025-09-04 VITALS
SYSTOLIC BLOOD PRESSURE: 120 MMHG | HEART RATE: 68 BPM | RESPIRATION RATE: 20 BRPM | OXYGEN SATURATION: 95 % | BODY MASS INDEX: 35.16 KG/M2 | WEIGHT: 224 LBS | DIASTOLIC BLOOD PRESSURE: 83 MMHG | HEIGHT: 67 IN

## 2025-09-04 DIAGNOSIS — R79.89 LOW TESTOSTERONE: Primary | ICD-10-CM

## 2025-09-04 DIAGNOSIS — M62.838 MUSCLE SPASM: ICD-10-CM

## 2025-09-04 DIAGNOSIS — R79.89 LOW TESTOSTERONE: ICD-10-CM

## 2025-09-04 RX ORDER — TESTOSTERONE CYPIONATE 200 MG/ML
200 INJECTION, SOLUTION INTRAMUSCULAR
Qty: 3 ML | Refills: 4 | Status: SHIPPED | OUTPATIENT
Start: 2025-09-04

## 2025-09-04 RX ORDER — DIAZEPAM 2 MG/1
4 TABLET ORAL EVERY 12 HOURS PRN
Qty: 100 TABLET | Refills: 1 | Status: SHIPPED | OUTPATIENT
Start: 2025-09-04 | End: 2025-10-04

## 2025-09-04 RX ORDER — TESTOSTERONE CYPIONATE 200 MG/ML
100 INJECTION, SOLUTION INTRAMUSCULAR
Status: COMPLETED | OUTPATIENT
Start: 2025-09-04 | End: 2025-09-04

## 2025-09-04 RX ADMIN — TESTOSTERONE CYPIONATE 100 MG: 200 INJECTION, SOLUTION INTRAMUSCULAR at 09:09

## (undated) DEVICE — COLLECTION SPECIMEN NEPTUNE

## (undated) DEVICE — SHEATH PINNACLE INTRO .035 4FR

## (undated) DEVICE — KIT SURGICAL COLON .25 1.1OZ

## (undated) DEVICE — GLOVE PROTEXIS HYDROGEL SZ6.5

## (undated) DEVICE — DRAIN CHEST DRY SUCTION

## (undated) DEVICE — CATH FLEXABILITY F-J 8FR 115CM

## (undated) DEVICE — FORCEP BIOPSY RADIAL JW 4

## (undated) DEVICE — BOWL UTILITY BLUE 32OZ

## (undated) DEVICE — Device

## (undated) DEVICE — ELECTRODE PATIENT RETURN DISP

## (undated) DEVICE — SUT PROLENE 5-0 36IN C-1

## (undated) DEVICE — KIT CANIST SUCTION 1200CC

## (undated) DEVICE — SUT EYE J548G 8-0 VICRYL

## (undated) DEVICE — SLEEVE ECLIPSE GOWN STRL 23IN

## (undated) DEVICE — GLOVE PROTEXIS BLUE LATEX 7

## (undated) DEVICE — DRAPE ORTH SPLIT 77X108IN

## (undated) DEVICE — CATH DECAPOLAR 6FRX110CM

## (undated) DEVICE — SUPPORT ULNA NERVE PROTECTOR

## (undated) DEVICE — SUT 6/0 18IN COATED VICRYL

## (undated) DEVICE — GLOVE PROTEXIS PI SYN SURG 7.5

## (undated) DEVICE — DRESSING TELFA + BARR 4X6IN

## (undated) DEVICE — FORCEP ALLIGATOR 2.8MM W/NDL

## (undated) DEVICE — SUT VICRYL #2 TP-1 2-27IN

## (undated) DEVICE — TRAY CATH FOL SIL URIMTR 16FR

## (undated) DEVICE — CATH DUO DECAPOLAR 7FRX95CM

## (undated) DEVICE — SUT VICRYL 2-0 36 CT-1

## (undated) DEVICE — KIT SURGICAL TURNOVER

## (undated) DEVICE — SUT VICRYL 3-0 8-18 PS-1

## (undated) DEVICE — FORCEP BX LG CAP 2.8MMX240CM

## (undated) DEVICE — SUT VICRYL 1 OB 36 CTX

## (undated) DEVICE — CONTAINER SPECIMEN SCREW 4OZ

## (undated) DEVICE — CUSHION  WC FOAM 20X20X.75IN

## (undated) DEVICE — SET IV EXT GENERAL 4.9ML 30IN

## (undated) DEVICE — PACK BASIC SURGICAL STERILE

## (undated) DEVICE — SUT ETHBND XTRA 1 OS-8 30IN

## (undated) DEVICE — DRESSING TRANS 2X2 TEGADERM

## (undated) DEVICE — INTRODUCER SHEATH 7F 11CM 35MM

## (undated) DEVICE — SYR TB DETACH NDL ST 25G 5/8IN

## (undated) DEVICE — SPNG CHERRY DISECT XRAY DTECT

## (undated) DEVICE — SOL IRRI STRL WATER 1000ML

## (undated) DEVICE — FORCEP HEMOSTAT MOSQUITO STR

## (undated) DEVICE — GOWN ECLIPSE REINF LVL4 TWL LG

## (undated) DEVICE — DRAPE SLUSH WARMER 66X44IN

## (undated) DEVICE — TIP SUCTION YANKAUER

## (undated) DEVICE — INTRO 8.5FR 63CM SRO

## (undated) DEVICE — BOWL STERILE LARGE 32OZ

## (undated) DEVICE — CATH SUPREME QPLR CRD-2 6F 120

## (undated) DEVICE — ELECTRODE EXTENDED BLADE

## (undated) DEVICE — TUBE SUCTION MEDI-VAC STERILE

## (undated) DEVICE — TOWEL OR DISP STRL BLUE 4/PK

## (undated) DEVICE — DRAPE STERI INCISE 23X23IN

## (undated) DEVICE — SPONGE LAP STRL 18X18IN

## (undated) DEVICE — BAG LABGUARD BIOHAZARD 6X9IN

## (undated) DEVICE — DRESSING TEGADERM 4.4X5IN

## (undated) DEVICE — DRESSING ANTIMIC ISLAND 4X10IN

## (undated) DEVICE — SET TUBING COOL POINT IRR

## (undated) DEVICE — APPLICATOR STRL COT 2INNR 6IN

## (undated) DEVICE — TUBING O2 FEMALE CONN 13FT

## (undated) DEVICE — GAUZE SPONGE 4X4 12PLY

## (undated) DEVICE — GLOVE PROTEXIS LTX MICRO 8

## (undated) DEVICE — BITE BLOCK ADULT